# Patient Record
Sex: MALE | Race: BLACK OR AFRICAN AMERICAN | NOT HISPANIC OR LATINO | Employment: OTHER | URBAN - METROPOLITAN AREA
[De-identification: names, ages, dates, MRNs, and addresses within clinical notes are randomized per-mention and may not be internally consistent; named-entity substitution may affect disease eponyms.]

---

## 2018-02-09 ENCOUNTER — PES CALL (OUTPATIENT)
Dept: ADMINISTRATIVE | Facility: CLINIC | Age: 63
End: 2018-02-09

## 2018-04-05 ENCOUNTER — OFFICE VISIT (OUTPATIENT)
Dept: INTERNAL MEDICINE | Facility: CLINIC | Age: 63
End: 2018-04-05
Payer: MEDICARE

## 2018-04-05 VITALS
OXYGEN SATURATION: 99 % | SYSTOLIC BLOOD PRESSURE: 122 MMHG | WEIGHT: 197.56 LBS | HEART RATE: 97 BPM | HEIGHT: 73 IN | BODY MASS INDEX: 26.18 KG/M2 | DIASTOLIC BLOOD PRESSURE: 92 MMHG

## 2018-04-05 DIAGNOSIS — Z00.00 HEALTHCARE MAINTENANCE: Primary | ICD-10-CM

## 2018-04-05 DIAGNOSIS — I63.10 CEREBROVASCULAR ACCIDENT (CVA) DUE TO EMBOLISM OF PRECEREBRAL ARTERY: ICD-10-CM

## 2018-04-05 DIAGNOSIS — Z86.73 H/O ISCHEMIC RIGHT MCA STROKE: ICD-10-CM

## 2018-04-05 DIAGNOSIS — Z12.5 SCREENING PSA (PROSTATE SPECIFIC ANTIGEN): ICD-10-CM

## 2018-04-05 DIAGNOSIS — E11.8 TYPE 2 DIABETES MELLITUS WITH COMPLICATION, WITH LONG-TERM CURRENT USE OF INSULIN: ICD-10-CM

## 2018-04-05 DIAGNOSIS — M85.9 LOW BONE DENSITY: ICD-10-CM

## 2018-04-05 DIAGNOSIS — Z76.89 ENCOUNTER TO ESTABLISH CARE: ICD-10-CM

## 2018-04-05 DIAGNOSIS — Z79.4 TYPE 2 DIABETES MELLITUS WITH COMPLICATION, WITH LONG-TERM CURRENT USE OF INSULIN: ICD-10-CM

## 2018-04-05 PROCEDURE — 3045F PR MOST RECENT HEMOGLOBIN A1C LEVEL 7.0-9.0%: CPT | Mod: CPTII,GC,S$GLB, | Performed by: STUDENT IN AN ORGANIZED HEALTH CARE EDUCATION/TRAINING PROGRAM

## 2018-04-05 PROCEDURE — 99999 PR PBB SHADOW E&M-EST. PATIENT-LVL IV: CPT | Mod: PBBFAC,GC,, | Performed by: STUDENT IN AN ORGANIZED HEALTH CARE EDUCATION/TRAINING PROGRAM

## 2018-04-05 PROCEDURE — 99205 OFFICE O/P NEW HI 60 MIN: CPT | Mod: GC,S$GLB,, | Performed by: STUDENT IN AN ORGANIZED HEALTH CARE EDUCATION/TRAINING PROGRAM

## 2018-04-05 RX ORDER — CLONIDINE HYDROCHLORIDE 0.1 MG/1
0.1 TABLET ORAL 3 TIMES DAILY
COMMUNITY
Start: 2018-03-20 | End: 2018-05-23 | Stop reason: SDUPTHER

## 2018-04-05 RX ORDER — WARFARIN 2.5 MG/1
TABLET ORAL
COMMUNITY
Start: 2018-01-07 | End: 2018-10-19 | Stop reason: SDUPTHER

## 2018-04-05 RX ORDER — AMANTADINE HYDROCHLORIDE 100 MG/1
100 CAPSULE, GELATIN COATED ORAL DAILY
COMMUNITY
Start: 2018-01-07 | End: 2018-05-10

## 2018-04-05 RX ORDER — BLOOD SUGAR DIAGNOSTIC
STRIP MISCELLANEOUS
COMMUNITY
Start: 2018-03-26 | End: 2018-12-24 | Stop reason: SDUPTHER

## 2018-04-05 RX ORDER — ALENDRONATE SODIUM 70 MG/1
TABLET ORAL
COMMUNITY
Start: 2018-02-27 | End: 2018-05-10

## 2018-04-05 RX ORDER — CARVEDILOL 3.12 MG/1
3.12 TABLET ORAL DAILY
COMMUNITY
Start: 2018-03-19 | End: 2018-06-29 | Stop reason: SDUPTHER

## 2018-04-05 RX ORDER — ERGOCALCIFEROL 1.25 MG/1
50000 CAPSULE ORAL WEEKLY
Refills: 0 | COMMUNITY
Start: 2018-03-30 | End: 2018-06-29 | Stop reason: SDUPTHER

## 2018-04-05 RX ORDER — NAPROXEN SODIUM 220 MG/1
81 TABLET, FILM COATED ORAL DAILY
Refills: 0
Start: 2018-04-05 | End: 2018-07-19

## 2018-04-05 RX ORDER — SIMVASTATIN 10 MG/1
10 TABLET, FILM COATED ORAL NIGHTLY
Qty: 90 TABLET | Refills: 3
Start: 2018-04-05 | End: 2018-05-10

## 2018-04-05 RX ORDER — AMLODIPINE BESYLATE 10 MG/1
10 TABLET ORAL DAILY
COMMUNITY
Start: 2018-03-19 | End: 2018-06-29 | Stop reason: SDUPTHER

## 2018-04-05 RX ORDER — AMMONIUM LACTATE 12 G/100G
1 CREAM TOPICAL 2 TIMES DAILY
Refills: 0 | COMMUNITY
Start: 2018-01-31 | End: 2023-03-23

## 2018-04-05 RX ORDER — INSULIN ASPART 100 [IU]/ML
6 INJECTION, SOLUTION INTRAVENOUS; SUBCUTANEOUS
COMMUNITY
Start: 2018-01-19 | End: 2018-06-29 | Stop reason: SDUPTHER

## 2018-04-05 RX ORDER — LISINOPRIL 20 MG/1
20 TABLET ORAL DAILY
COMMUNITY
Start: 2018-03-19 | End: 2018-06-29 | Stop reason: SDUPTHER

## 2018-04-05 RX ORDER — INSULIN DETEMIR 100 [IU]/ML
50 INJECTION, SOLUTION SUBCUTANEOUS NIGHTLY
COMMUNITY
Start: 2018-03-26 | End: 2018-06-29 | Stop reason: SDUPTHER

## 2018-04-05 RX ORDER — WARFARIN SODIUM 5 MG/1
TABLET ORAL
COMMUNITY
Start: 2018-03-17 | End: 2018-10-19 | Stop reason: SDUPTHER

## 2018-04-05 NOTE — PROGRESS NOTES
INTERNAL MEDICINE RESIDENT CLINIC  CLINIC NOTE    Patient Name: Kamlesh Diane  YOB: 1955    PRESENTING HISTORY       History of Present Illness:  Mr. Kamlesh Diane is a 62 y.o. male w/ significant PMHx of stroke in 2010 (see discharge summary in Epic for more details) Multiple acute CVA including right MCA, bilateral occipital lobe, and   bilateral cerebellar infarcts. Pt underwent vessel bx which was negative for vasculitis. Since then he was discharged to [facility]; today he comes to clinic to re-establish care. Prior to this he was seeing Dr. Felix Nunez at [facility] and says that he recently had a PSA that was elevated and was told that he should urgently see urology; however, he was notified that his insurance was not covered and that he also was not covered for primary care at [facility], so he has come here to establish care.    He says that he has generally been well and lives alone, gets around with a walker. Since his initial stroke he says that he had a subsequent stroke which resulted in R eye vision loss; he went to [facility] for this in 2011 and was put on warfarin. No hx of AF. Additionally on ASA 81 mg + Simvastatin 20 mg daily.    Pt also has DM2 on insulin for approximately 25 years. Takes novolog 6U TID + levemir 50U qHS. Pt's BGL logbook, which he brought with him to clinic, shows that his sugars range from 110s-130s fasting to 180s at noon. Denies hypoglycemic episode.    Pt is hypertensive, well-controlled (logbook shows 110s systolic) on amlodipine 10, clonidine 0.1 TID, coreg 3.125 BID, and lisinopril 20. Denies hx heart failure, CAD, or MI, no recent chest pain. No new strokes or changes in behavior / neurologic function since 2011.    Pt also takes fosamax 70 mg daily and calcium 50KU weekly; unsure if he has osteoporosis or osteopenia. Also on amantadine, unsure of the reason.    Review of Systems   Constitutional: Negative for chills, fever and weight loss.   Eyes:         R eye blindness, old.   Respiratory: Negative for cough.    Cardiovascular: Negative for chest pain.   Gastrointestinal: Negative for nausea and vomiting.   Genitourinary: Negative for dysuria and hematuria.   Musculoskeletal: Negative for falls, joint pain and myalgias.   Skin: Negative for itching and rash.   Neurological: Negative for dizziness and headaches.   Endo/Heme/Allergies: Does not bruise/bleed easily.         PAST HISTORY:   No past medical history on file.    No past surgical history on file.    No family history on file.    Social History     Social History    Marital status: Single     Spouse name: N/A    Number of children: N/A    Years of education: N/A     Social History Main Topics    Smoking status: Never Smoker    Smokeless tobacco: None    Alcohol use None    Drug use: Unknown    Sexual activity: Not Asked     Other Topics Concern    None     Social History Narrative    None       MEDICATIONS & ALLERGIES:     No current outpatient prescriptions on file prior to visit.     No current facility-administered medications on file prior to visit.        Current Outpatient Prescriptions:     ACCU-CHEK JUSTIN PLUS TEST STRP Strp, , Disp: , Rfl:     alendronate (FOSAMAX) 70 MG tablet, , Disp: , Rfl:     amantadine HCl (SYMMETREL) 100 mg capsule, Take 100 mg by mouth once daily., Disp: , Rfl:     amLODIPine (NORVASC) 10 MG tablet, Take 10 mg by mouth once daily., Disp: , Rfl:     ammonium lactate 12 % Crea, Apply 1 application topically 2 (two) times daily. Apply to affected area, Disp: , Rfl: 0    aspirin 81 MG Chew, Take 1 tablet (81 mg total) by mouth once daily., Disp: , Rfl: 0    carvedilol (COREG) 3.125 MG tablet, Take 3.125 mg by mouth once daily., Disp: , Rfl:     cloNIDine (CATAPRES) 0.1 MG tablet, Take 0.1 mg by mouth 3 (three) times daily., Disp: , Rfl:     LEVEMIR FLEXTOUCH U-100 INSULN 100 unit/mL (3 mL) InPn pen, Inject 50 Units/mL into the skin every evening., Disp: ,  "Rfl:     lisinopril (PRINIVIL,ZESTRIL) 20 MG tablet, Take 20 mg by mouth once daily., Disp: , Rfl:     NOVOLOG FLEXPEN U-100 INSULIN 100 unit/mL InPn pen, Inject 6 Units into the skin 3 (three) times daily with meals. If noon blood sugar is greater than 150 mg/dL, use 8 units instead for the noon dose.., Disp: , Rfl:     simvastatin (ZOCOR) 10 MG tablet, Take 1 tablet (10 mg total) by mouth every evening., Disp: 90 tablet, Rfl: 3    VITAMIN D2 50,000 unit capsule, Take 50,000 Units by mouth once a week., Disp: , Rfl: 0    warfarin (COUMADIN) 2.5 MG tablet, every Monday and Friday., Disp: , Rfl:     warfarin (COUMADIN) 5 MG tablet, every Tuesday. Wednesday, Thursday, Saturday, Sunday., Disp: , Rfl:     Review of patient's allergies indicates:  No Known Allergies    OBJECTIVE:   Vital Signs:  Vitals:    04/05/18 1543   BP: (!) 122/92   Pulse: 97   SpO2: 99%   Weight: 89.6 kg (197 lb 8.5 oz)   Height: 6' 1" (1.854 m)       No results found for this or any previous visit (from the past 24 hour(s)).      Physical Exam   Constitutional: He appears well-developed.   HENT:   Head: Normocephalic and atraumatic.   Right Ear: External ear normal.   Left Ear: External ear normal.   Nose: Nose normal.   Eyes: EOM are normal. Pupils are equal, round, and reactive to light.   Neck: No tracheal deviation present. No thyromegaly present.   Cardiovascular: Normal rate and regular rhythm.    Murmur (2/6 systolic murmur at tricuspid window) heard.  Pulmonary/Chest: Effort normal and breath sounds normal.   Abdominal: Soft. There is no tenderness. No hernia.   Musculoskeletal: He exhibits no edema.   Neurological:   R eye blind, dysarthria (pt says this is chronic). Pt walks with walker.   Skin: No rash noted.   Psychiatric: He has a normal mood and affect. Judgment normal.   Vitals reviewed.        ASSESSMENT & PLAN:     Kamlesh was seen today for establish care.    Diagnoses and all orders for this visit:    Healthcare " maintenance    Encounter to establish care    Type 2 diabetes mellitus with complication, with long-term current use of insulin  -     Hemoglobin A1c; Future    Screening PSA (prostate specific antigen)  -     PSA, Screening; Future    Other orders  -     simvastatin (ZOCOR) 10 MG tablet; Take 1 tablet (10 mg total) by mouth every evening.  -     aspirin 81 MG Chew; Take 1 tablet (81 mg total) by mouth once daily.      Plan:  Continue secondary prevention for CVA, warfarin for the time being.  - Plan to consult / refer to neurology to ask duration of warfarin therapy after details of initial indication found via records  - Obtain medical records from Dr. Nunez's office  - Draw PSA, if elevated refer to Urology  - A1c, keep current DM regimen  - Keep current BP regimen    Return in 5-6W to discuss labs, diabetic maintenance e.g. eye and foot exam, get old records.    Sheri Brooke MD  Internal Medicine PGY-1  [phone]

## 2018-04-12 NOTE — PROGRESS NOTES
Teaching Statement  I have personally taken the history and examined this patient and agree with the resident's history, exam and assessment and plan as stated above.  Boo Haq MD

## 2018-05-04 ENCOUNTER — TELEPHONE (OUTPATIENT)
Dept: INTERNAL MEDICINE | Facility: CLINIC | Age: 63
End: 2018-05-04

## 2018-05-04 NOTE — TELEPHONE ENCOUNTER
----- Message from Boo Haq MD sent at 4/23/2018  8:33 AM CDT -----  Pt with elevated PSA.  Please place consult to urology.    Boo Haq

## 2018-05-09 ENCOUNTER — TELEPHONE (OUTPATIENT)
Dept: INTERNAL MEDICINE | Facility: CLINIC | Age: 63
End: 2018-05-09

## 2018-05-09 DIAGNOSIS — R97.20 ELEVATED PSA: Primary | ICD-10-CM

## 2018-05-10 ENCOUNTER — OFFICE VISIT (OUTPATIENT)
Dept: INTERNAL MEDICINE | Facility: CLINIC | Age: 63
End: 2018-05-10
Payer: MEDICARE

## 2018-05-10 ENCOUNTER — ANTI-COAG VISIT (OUTPATIENT)
Dept: CARDIOLOGY | Facility: CLINIC | Age: 63
End: 2018-05-10

## 2018-05-10 VITALS
BODY MASS INDEX: 26 KG/M2 | DIASTOLIC BLOOD PRESSURE: 84 MMHG | HEART RATE: 66 BPM | WEIGHT: 196.19 LBS | HEIGHT: 73 IN | SYSTOLIC BLOOD PRESSURE: 114 MMHG

## 2018-05-10 DIAGNOSIS — Z79.4 TYPE 2 DIABETES MELLITUS WITH COMPLICATION, WITH LONG-TERM CURRENT USE OF INSULIN: ICD-10-CM

## 2018-05-10 DIAGNOSIS — Z79.01 CURRENT USE OF LONG TERM ANTICOAGULATION: ICD-10-CM

## 2018-05-10 DIAGNOSIS — I69.30 SEQUELAE OF CEREBRAL INFARCTION: Primary | ICD-10-CM

## 2018-05-10 DIAGNOSIS — Z79.01 LONG TERM (CURRENT) USE OF ANTICOAGULANTS: ICD-10-CM

## 2018-05-10 DIAGNOSIS — Z86.73 H/O: STROKE: ICD-10-CM

## 2018-05-10 DIAGNOSIS — E11.8 TYPE 2 DIABETES MELLITUS WITH COMPLICATION, WITH LONG-TERM CURRENT USE OF INSULIN: ICD-10-CM

## 2018-05-10 DIAGNOSIS — I63.10 CEREBROVASCULAR ACCIDENT (CVA) DUE TO EMBOLISM OF PRECEREBRAL ARTERY: ICD-10-CM

## 2018-05-10 PROCEDURE — 3045F PR MOST RECENT HEMOGLOBIN A1C LEVEL 7.0-9.0%: CPT | Mod: CPTII,S$GLB,, | Performed by: INTERNAL MEDICINE

## 2018-05-10 PROCEDURE — 3008F BODY MASS INDEX DOCD: CPT | Mod: CPTII,S$GLB,, | Performed by: INTERNAL MEDICINE

## 2018-05-10 PROCEDURE — 99999 PR PBB SHADOW E&M-EST. PATIENT-LVL III: CPT | Mod: PBBFAC,GC,, | Performed by: STUDENT IN AN ORGANIZED HEALTH CARE EDUCATION/TRAINING PROGRAM

## 2018-05-10 PROCEDURE — 99213 OFFICE O/P EST LOW 20 MIN: CPT | Mod: S$GLB,,, | Performed by: INTERNAL MEDICINE

## 2018-05-10 RX ORDER — ATORVASTATIN CALCIUM 40 MG/1
40 TABLET, FILM COATED ORAL DAILY
Qty: 90 TABLET | Refills: 3 | Status: SHIPPED | OUTPATIENT
Start: 2018-05-10 | End: 2019-03-12 | Stop reason: SDUPTHER

## 2018-05-10 NOTE — PROGRESS NOTES
INTERNAL MEDICINE RESIDENT CLINIC  CLINIC NOTE    Patient Name: Kamlesh Diane  YOB: 1955    PRESENTING HISTORY       History of Present Illness:  Mr. Kamlesh Diane is a 62 y.o. male w/ significant PMHx of stroke in 2010 (see discharge summary in Epic for more details) Multiple acute CVA including right MCA, bilateral occipital lobe, and   bilateral cerebellar infarcts. Pt underwent vessel bx which was negative for vasculitis. Since then he was discharged to [facility]; today he comes to clinic to f/u. Prior to this he was seeing Dr. Felix Nunez at [facility]; his insurance changed so he will follow at [facility].     He says that he has generally been well and lives alone, gets around with a walker. Since his initial stroke he says that he had a subsequent stroke which resulted in R eye vision loss; he went to [facility] for this in 2011 and was put on warfarin. No hx of AF; pt says that Dr. Nunez kept the warfarin on but was also not sure what the indication was.     Pt also has DM2 on insulin for approximately 25 years. Takes novolog 6U TID + levemir 50U qHS. Pt's BGL logbook, which he brought with him to clinic, shows that his sugars range from 110s-130s fasting to 180s at noon. Denies hypoglycemic episode. 2018 A1c 7.3%. He says that he has his eyes checked at [facility] yearly and will continue to get them checked at [facility]; he has gotten them checked this year. He sees podiatry at an outside hospital / clinic for his foot exams and for assistance with nails.     Pt is hypertensive, well-controlled (logbook shows 110s systolic) on amlodipine 10, clonidine 0.1 TID, coreg 3.125 BID, and lisinopril 20. Denies hx heart failure, CAD, or MI, no recent chest pain. No new strokes or changes in behavior / neurologic function since 2011.    Since his last visit with me his PSA was elevated at 6.3 Denies hematuria, dysuria, hesitancy, incomplete emptying, weight loss, night sweats, fevers, bone pain, or  recent falls/fractures.     Review of Systems   Constitutional: Negative for chills, fever and weight loss.   Eyes:        R eye blindness, old.   Respiratory: Negative for cough.    Cardiovascular: Negative for chest pain.   Gastrointestinal: Negative for nausea and vomiting.   Genitourinary: Negative for dysuria and hematuria.   Musculoskeletal: Negative for falls, joint pain and myalgias.   Skin: Negative for itching and rash.   Neurological: Negative for dizziness and headaches.   Endo/Heme/Allergies: Does not bruise/bleed easily.         PAST HISTORY:   No past medical history on file.    No past surgical history on file.    No family history on file.    Social History     Social History    Marital status: Single     Spouse name: N/A    Number of children: N/A    Years of education: N/A     Social History Main Topics    Smoking status: Never Smoker    Smokeless tobacco: None    Alcohol use None    Drug use: Unknown    Sexual activity: Not Asked     Other Topics Concern    None     Social History Narrative    None       MEDICATIONS & ALLERGIES:     Current Outpatient Prescriptions on File Prior to Visit   Medication Sig    ACCU-CHEK JUSTIN PLUS TEST STRP Strp     amLODIPine (NORVASC) 10 MG tablet Take 10 mg by mouth once daily.    ammonium lactate 12 % Crea Apply 1 application topically 2 (two) times daily. Apply to affected area    aspirin 81 MG Chew Take 1 tablet (81 mg total) by mouth once daily.    carvedilol (COREG) 3.125 MG tablet Take 3.125 mg by mouth once daily.    cloNIDine (CATAPRES) 0.1 MG tablet Take 0.1 mg by mouth 3 (three) times daily.    LEVEMIR FLEXTOUCH U-100 INSULN 100 unit/mL (3 mL) InPn pen Inject 50 Units/mL into the skin every evening.    lisinopril (PRINIVIL,ZESTRIL) 20 MG tablet Take 20 mg by mouth once daily.    NOVOLOG FLEXPEN U-100 INSULIN 100 unit/mL InPn pen Inject 6 Units into the skin 3 (three) times daily with meals. If noon blood sugar is greater than 150  "mg/dL, use 8 units instead for the noon dose..    VITAMIN D2 50,000 unit capsule Take 50,000 Units by mouth once a week.    warfarin (COUMADIN) 2.5 MG tablet every Monday and Friday.    warfarin (COUMADIN) 5 MG tablet every Tuesday. Wednesday, Thursday, Saturday, Sunday.    [DISCONTINUED] alendronate (FOSAMAX) 70 MG tablet     [DISCONTINUED] amantadine HCl (SYMMETREL) 100 mg capsule Take 100 mg by mouth once daily.    [DISCONTINUED] simvastatin (ZOCOR) 10 MG tablet Take 1 tablet (10 mg total) by mouth every evening.     No current facility-administered medications on file prior to visit.        Review of patient's allergies indicates:  No Known Allergies    OBJECTIVE:   Vital Signs:  Vitals:    05/10/18 1523   BP: 114/84   Pulse: 66   Weight: 89 kg (196 lb 3.4 oz)   Height: 6' 1" (1.854 m)       No results found for this or any previous visit (from the past 24 hour(s)).      Physical Exam   Constitutional: He appears well-developed.   HENT:   Head: Normocephalic and atraumatic.   Right Ear: External ear normal.   Left Ear: External ear normal.   Nose: Nose normal.   Eyes: EOM are normal. Pupils are equal, round, and reactive to light.   Neck: No tracheal deviation present. No thyromegaly present.   Cardiovascular: Normal rate and regular rhythm.    Murmur (2/6 systolic murmur at tricuspid window) heard.  Pulmonary/Chest: Effort normal and breath sounds normal.   Abdominal: Soft. There is no tenderness. No hernia.   Musculoskeletal: He exhibits no edema.   Neurological:   R eye blind, dysarthria (pt says this is chronic). Pt walks with walker.   Skin: No rash noted.   Psychiatric: He has a normal mood and affect. Judgment normal.   Vitals reviewed.        ASSESSMENT & PLAN:     Kamlesh was seen today for follow-up.    Diagnoses and all orders for this visit:    H/O: stroke  -     atorvastatin (LIPITOR) 40 MG tablet; Take 1 tablet (40 mg total) by mouth once daily.  -     Lipid panel; Future    Current use of " long term anticoagulation  -     Protime-INR; Future  -     Ambulatory consult to Anticoagulation Monitoring    Sequelae of cerebral infarction   -     Lipid panel; Future    F/U with Dr. Ontiveros to establish care, long-term management of warfarin.    Signed release of information to get [facility] records.      Sheri Brooke MD  Internal Medicine PGY-1  [phone]

## 2018-05-10 NOTE — PROGRESS NOTES
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    Per his med card, he has Warfarin 2.5mg and 5mg tablets.  The pt went to the lab for an INR this afternoon.  LMFCB - 10:42am

## 2018-05-11 ENCOUNTER — OFFICE VISIT (OUTPATIENT)
Dept: UROLOGY | Facility: CLINIC | Age: 63
End: 2018-05-11
Payer: MEDICARE

## 2018-05-11 ENCOUNTER — LAB VISIT (OUTPATIENT)
Dept: LAB | Facility: HOSPITAL | Age: 63
End: 2018-05-11
Attending: UROLOGY
Payer: MEDICARE

## 2018-05-11 VITALS
WEIGHT: 196 LBS | DIASTOLIC BLOOD PRESSURE: 79 MMHG | HEIGHT: 73 IN | BODY MASS INDEX: 25.98 KG/M2 | SYSTOLIC BLOOD PRESSURE: 120 MMHG | HEART RATE: 98 BPM

## 2018-05-11 DIAGNOSIS — R97.20 ELEVATED PSA: ICD-10-CM

## 2018-05-11 DIAGNOSIS — R30.0 DYSURIA: ICD-10-CM

## 2018-05-11 DIAGNOSIS — E13.9 DIABETES 1.5, MANAGED AS TYPE 2: ICD-10-CM

## 2018-05-11 DIAGNOSIS — I63.10 CEREBROVASCULAR ACCIDENT (CVA) DUE TO EMBOLISM OF PRECEREBRAL ARTERY: ICD-10-CM

## 2018-05-11 DIAGNOSIS — E11.8 TYPE 2 DIABETES MELLITUS WITH COMPLICATION, WITH LONG-TERM CURRENT USE OF INSULIN: ICD-10-CM

## 2018-05-11 DIAGNOSIS — R97.20 ELEVATED PSA: Primary | ICD-10-CM

## 2018-05-11 DIAGNOSIS — Z79.4 TYPE 2 DIABETES MELLITUS WITH COMPLICATION, WITH LONG-TERM CURRENT USE OF INSULIN: ICD-10-CM

## 2018-05-11 DIAGNOSIS — Z79.01 LONG TERM (CURRENT) USE OF ANTICOAGULANTS: ICD-10-CM

## 2018-05-11 LAB — COMPLEXED PSA SERPL-MCNC: 7.2 NG/ML

## 2018-05-11 PROCEDURE — 3045F PR MOST RECENT HEMOGLOBIN A1C LEVEL 7.0-9.0%: CPT | Mod: CPTII,S$GLB,, | Performed by: UROLOGY

## 2018-05-11 PROCEDURE — 99999 PR PBB SHADOW E&M-EST. PATIENT-LVL III: CPT | Mod: PBBFAC,,,

## 2018-05-11 PROCEDURE — 99204 OFFICE O/P NEW MOD 45 MIN: CPT | Mod: S$GLB,,, | Performed by: UROLOGY

## 2018-05-11 PROCEDURE — 84153 ASSAY OF PSA TOTAL: CPT

## 2018-05-11 PROCEDURE — 36415 COLL VENOUS BLD VENIPUNCTURE: CPT

## 2018-05-11 PROCEDURE — 3008F BODY MASS INDEX DOCD: CPT | Mod: CPTII,S$GLB,, | Performed by: UROLOGY

## 2018-05-11 RX ORDER — CEFTRIAXONE 1 G/1
1 INJECTION, POWDER, FOR SOLUTION INTRAMUSCULAR; INTRAVENOUS ONCE
Status: CANCELLED | OUTPATIENT
Start: 2018-05-11 | End: 2018-05-11

## 2018-05-11 RX ORDER — LIDOCAINE HYDROCHLORIDE 10 MG/ML
10 INJECTION INFILTRATION; PERINEURAL ONCE
Status: CANCELLED | OUTPATIENT
Start: 2018-05-11 | End: 2018-05-11

## 2018-05-11 RX ORDER — LIDOCAINE HYDROCHLORIDE 20 MG/ML
JELLY TOPICAL ONCE
Status: CANCELLED | OUTPATIENT
Start: 2018-05-11 | End: 2018-05-11

## 2018-05-11 NOTE — PROGRESS NOTES
"Urology - [facility]  Resident Clinic  Staff - Dr. Rodríguez    SUBJECTIVE:     Chief Complaint: patient referred by Sheri Brooke MD for elevated PSA    History of Present Illness:  Kamlesh Diane is a 62 y.o. male with history of DMII (on insulin), HTN, CVA (on coumadin) who was referred by his PCP for elevated PSA. His PSA one month ago was 6.2. He has no family history of prostate cancer. He does not complain of lower urinary tract symptoms. Denies hematuria, dysuria, nocturia, incomplete emptying, weak FOS, hesitancy, or straining to void. Denies recent weight loss or bone pain. No fevers, abdominal or flank pain.     He came today with his brother. Used to work at [employer].    He uses a walked to get around. Says he broke his right hip "a while back" and has trouble getting around.     Review of patient's allergies indicates:   No Known Allergies    Past Medical History:   Diagnosis Date    CVA (cerebral vascular accident)     Diabetes     Elevated PSA     Hypertension      No past surgical history on file.  No family history on file.  Social History   Substance Use Topics    Smoking status: Never Smoker    Smokeless tobacco: Not on file    Alcohol use Not on file        Review of Systems   Constitutional: Negative for activity change, appetite change, chills and fever.   HENT: Negative.    Eyes: Negative.    Respiratory: Negative for shortness of breath.    Cardiovascular: Negative for chest pain.   Gastrointestinal: Negative for abdominal distention, abdominal pain, nausea and vomiting.   Genitourinary: Negative.  Negative for difficulty urinating, dysuria, frequency, hematuria and urgency.   Musculoskeletal: Positive for arthralgias, back pain and gait problem.   Skin: Negative.    Neurological: Positive for tremors.   Psychiatric/Behavioral: Negative.        OBJECTIVE:       Estimated body mass index is 25.86 kg/m² as calculated from the following:    Height as of this " "encounter: 6' 1" (1.854 m).    Weight as of this encounter: 88.9 kg (196 lb).    Vital Signs (Most Recent)  Pulse: 98 (05/11/18 1504)  BP: 120/79 (05/11/18 1504)    Physical Exam   Constitutional: He is oriented to person, place, and time. No distress.   HENT:   Head: Normocephalic and atraumatic.   dysarthria   Eyes: EOM are normal. No scleral icterus.   Neck: Normal range of motion.   Cardiovascular: Normal rate and regular rhythm.  Exam reveals no gallop and no friction rub.    No murmur heard.  Pulmonary/Chest: Effort normal and breath sounds normal. No respiratory distress.   Abdominal: Soft. He exhibits no distension. There is no tenderness.   Genitourinary:   Genitourinary Comments:   Uncircumcised, no scrotal masses or tenderness, testicles normal bilaterally  ALEX 25g, no nodules   Musculoskeletal: Normal range of motion.   Lymphadenopathy:     He has no cervical adenopathy.   Neurological: He is alert and oriented to person, place, and time.   Unsteady gait   Skin: Skin is warm and dry. No rash noted.   Psychiatric: Affect and judgment normal.       Lab Results   Component Value Date    PSA 6.2 (H) 04/05/2018       BMP  Lab Results   Component Value Date     05/10/2018    K 4.9 05/10/2018     05/10/2018    CO2 27 05/10/2018    BUN 20 05/10/2018    CREATININE 2.0 (H) 05/10/2018    CALCIUM 9.5 05/10/2018    ANIONGAP 6 (L) 05/10/2018    ESTGFRAFRICA 40.2 (A) 05/10/2018    EGFRNONAA 34.7 (A) 05/10/2018       Lab Results   Component Value Date    WBC 11.40 (H) 09/27/2010    HGB 11.2 (L) 09/27/2010    HCT 33.1 (L) 09/27/2010    MCV 89.7 09/27/2010     09/27/2010       ASSESSMENT/PLAN:     Kamlesh was seen today for elevated psa.    Diagnoses and all orders for this visit:    Elevated PSA  -     Prostate Specific Antigen, Diagnostic; Future  -     Tissue Specimen To Pathology, Urology; Standing  -     Transrectal ultrasound w/ biopsy; Future    Type 2 diabetes mellitus with complication, with " long-term current use of insulin    Cerebrovascular accident (CVA) due to embolism of precerebral artery    Long term (current) use of anticoagulants    Other orders  -     lidocaine HCL 2% jelly; Apply topically once.  -     lidocaine HCL 10 mg/ml (1%) injection 10 mL; 10 mLs by Infiltration route once.  -     sodium phosphates (FLEET ENEMA) 19-7 gram/118 mL Enem; Place 1 enema rectally once. Administer the am of the procedure  -     cefTRIAXone injection 1 g; Inject 1 g into the muscle once.      Will repeat PSA today. If remains elevated will proceed to biopsy.   Collect urine for culture.   Needs to be off of coumadin and aspirin for his biopsy.   Patient's kidney function not ideal for contrasted MRI study of his prostate. Will hold off at this time.     Owen Soria MD  Urology PGY-II    I would like to thank Sheri Brooke MD for their consult on this patient.

## 2018-05-12 NOTE — PROGRESS NOTES
I have reviewed the notes, assessments, and/or procedures performed by the resident, I concur with her/his documentation of Kamlesh Diane.     Tin Daly MD  Internal Medicine

## 2018-05-14 ENCOUNTER — LAB VISIT (OUTPATIENT)
Dept: LAB | Facility: HOSPITAL | Age: 63
End: 2018-05-14
Attending: UROLOGY
Payer: MEDICARE

## 2018-05-14 DIAGNOSIS — R30.0 DYSURIA: ICD-10-CM

## 2018-05-14 PROCEDURE — 87086 URINE CULTURE/COLONY COUNT: CPT

## 2018-05-15 ENCOUNTER — TELEPHONE (OUTPATIENT)
Dept: INTERNAL MEDICINE | Facility: CLINIC | Age: 63
End: 2018-05-15

## 2018-05-15 DIAGNOSIS — Z79.4 TYPE 2 DIABETES MELLITUS WITH COMPLICATION, WITH LONG-TERM CURRENT USE OF INSULIN: Primary | ICD-10-CM

## 2018-05-15 DIAGNOSIS — E11.8 TYPE 2 DIABETES MELLITUS WITH COMPLICATION, WITH LONG-TERM CURRENT USE OF INSULIN: Primary | ICD-10-CM

## 2018-05-15 NOTE — TELEPHONE ENCOUNTER
----- Message from Sheri Jeffrey MD sent at 5/15/2018  1:19 PM CDT -----  Can we please schedule pt to be seen this week? I called him and let him know that his creatinine is up from our last labs (7 years ago), I'd like to get some repeat labs to find out why.    ----- Message -----  From: Nancy Mancera LPN  Sent: 5/14/2018  10:07 AM  To: Boo Haq MD, Sheri Jeffrey MD    He was seen in urology on Friday , do you still need him to come in  And dr jeffrey contact pt  ----- Message -----  From: Boo Haq MD  Sent: 5/11/2018  10:38 AM  To: Nancy Mancera LPN, Sheri Jeffrey MD    Please get patient back in.  Creatinine up.  Creatinine       Date                     Value               Ref Range           Status                05/10/2018               2.0 (H)             0.5 - 1.4 mg/dL     Final                 09/28/2010               0.8                 0.5 - 1.4 mg/dl     Final            ----------  Needs to be seen today.    Boo Haq

## 2018-05-15 NOTE — TELEPHONE ENCOUNTER
----- Message from Erum Mata sent at 5/15/2018  1:46 PM CDT -----      ----- Message -----  From: Eneida Kenny, PharmD  Sent: 5/15/2018   1:37 PM  To: Tin Daly MD, [facility] Staff    Good Afternoon Dr. Daly,    Mr. Diane is new to us at the [facility], as we have just recently taken over his monitoring.  We were informed that he will be scheduling a prostate biopsy in roughly 4 weeks and will need to hold coumadin x 5 days prior to this procedure.  It appears that he has a hx of multiple right ischemic MCAs in 2010.  Would you prefer that he be bridged with lovenox while he holds for this procedure?    Please advise and thanks,  Eneida

## 2018-05-16 LAB — BACTERIA UR CULT: NORMAL

## 2018-05-17 ENCOUNTER — DOCUMENTATION ONLY (OUTPATIENT)
Dept: UROLOGY | Facility: HOSPITAL | Age: 63
End: 2018-05-17

## 2018-05-17 NOTE — PROGRESS NOTES
Called Mr. Diane about his biopsy date and time (6/6 at 8:30am). He has an appointment with Cardiology on 5/23 to discuss coming off of his coumadin and bridging with lovenox before his biopsy. Instructed him to complete Fleet's enema the morning of his procedure. He and his brother expressed understanding.       Owen Soria MD

## 2018-05-18 ENCOUNTER — LAB VISIT (OUTPATIENT)
Dept: LAB | Facility: HOSPITAL | Age: 63
End: 2018-05-18
Payer: MEDICARE

## 2018-05-18 DIAGNOSIS — Z79.4 TYPE 2 DIABETES MELLITUS WITH COMPLICATION, WITH LONG-TERM CURRENT USE OF INSULIN: ICD-10-CM

## 2018-05-18 DIAGNOSIS — E11.8 TYPE 2 DIABETES MELLITUS WITH COMPLICATION, WITH LONG-TERM CURRENT USE OF INSULIN: ICD-10-CM

## 2018-05-18 LAB
ALBUMIN SERPL BCP-MCNC: 3.7 G/DL
ALP SERPL-CCNC: 91 U/L
ALT SERPL W/O P-5'-P-CCNC: 16 U/L
ANION GAP SERPL CALC-SCNC: 8 MMOL/L
ANION GAP SERPL CALC-SCNC: 8 MMOL/L
AST SERPL-CCNC: 14 U/L
BASOPHILS # BLD AUTO: 0.02 K/UL
BASOPHILS NFR BLD: 0.3 %
BILIRUB SERPL-MCNC: 0.5 MG/DL
BUN SERPL-MCNC: 13 MG/DL
BUN SERPL-MCNC: 13 MG/DL
CALCIUM SERPL-MCNC: 9 MG/DL
CALCIUM SERPL-MCNC: 9 MG/DL
CHLORIDE SERPL-SCNC: 111 MMOL/L
CHLORIDE SERPL-SCNC: 111 MMOL/L
CHOLEST SERPL-MCNC: 127 MG/DL
CHOLEST/HDLC SERPL: 3.6 {RATIO}
CO2 SERPL-SCNC: 24 MMOL/L
CO2 SERPL-SCNC: 24 MMOL/L
CREAT SERPL-MCNC: 1.5 MG/DL
CREAT SERPL-MCNC: 1.5 MG/DL
DIFFERENTIAL METHOD: ABNORMAL
EOSINOPHIL # BLD AUTO: 0.3 K/UL
EOSINOPHIL NFR BLD: 4.5 %
ERYTHROCYTE [DISTWIDTH] IN BLOOD BY AUTOMATED COUNT: 13.6 %
EST. GFR  (AFRICAN AMERICAN): 57 ML/MIN/1.73 M^2
EST. GFR  (AFRICAN AMERICAN): 57 ML/MIN/1.73 M^2
EST. GFR  (NON AFRICAN AMERICAN): 49 ML/MIN/1.73 M^2
EST. GFR  (NON AFRICAN AMERICAN): 49 ML/MIN/1.73 M^2
ESTIMATED AVG GLUCOSE: 157 MG/DL
GLUCOSE SERPL-MCNC: 74 MG/DL
GLUCOSE SERPL-MCNC: 74 MG/DL
HBA1C MFR BLD HPLC: 7.1 %
HCT VFR BLD AUTO: 39.7 %
HDLC SERPL-MCNC: 35 MG/DL
HDLC SERPL: 27.6 %
HGB BLD-MCNC: 13.1 G/DL
LDLC SERPL CALC-MCNC: 80.6 MG/DL
LYMPHOCYTES # BLD AUTO: 1.8 K/UL
LYMPHOCYTES NFR BLD: 27.6 %
MAGNESIUM SERPL-MCNC: 2 MG/DL
MCH RBC QN AUTO: 28.7 PG
MCHC RBC AUTO-ENTMCNC: 33 G/DL
MCV RBC AUTO: 87 FL
MONOCYTES # BLD AUTO: 0.7 K/UL
MONOCYTES NFR BLD: 10.2 %
NEUTROPHILS # BLD AUTO: 3.7 K/UL
NEUTROPHILS NFR BLD: 57.4 %
NONHDLC SERPL-MCNC: 92 MG/DL
PLATELET # BLD AUTO: 343 K/UL
PMV BLD AUTO: 10.1 FL
POTASSIUM SERPL-SCNC: 4 MMOL/L
POTASSIUM SERPL-SCNC: 4 MMOL/L
PROT SERPL-MCNC: 7.4 G/DL
RBC # BLD AUTO: 4.56 M/UL
SODIUM SERPL-SCNC: 143 MMOL/L
SODIUM SERPL-SCNC: 143 MMOL/L
TRIGL SERPL-MCNC: 57 MG/DL
WBC # BLD AUTO: 6.48 K/UL

## 2018-05-18 PROCEDURE — 80061 LIPID PANEL: CPT

## 2018-05-18 PROCEDURE — 83036 HEMOGLOBIN GLYCOSYLATED A1C: CPT

## 2018-05-18 PROCEDURE — 36415 COLL VENOUS BLD VENIPUNCTURE: CPT

## 2018-05-18 PROCEDURE — 80053 COMPREHEN METABOLIC PANEL: CPT

## 2018-05-18 PROCEDURE — 83735 ASSAY OF MAGNESIUM: CPT

## 2018-05-18 PROCEDURE — 85025 COMPLETE CBC W/AUTO DIFF WBC: CPT

## 2018-05-21 ENCOUNTER — TELEPHONE (OUTPATIENT)
Dept: INTERNAL MEDICINE | Facility: CLINIC | Age: 63
End: 2018-05-21

## 2018-05-23 ENCOUNTER — ANTI-COAG VISIT (OUTPATIENT)
Dept: CARDIOLOGY | Facility: CLINIC | Age: 63
End: 2018-05-23
Payer: MEDICARE

## 2018-05-23 DIAGNOSIS — Z79.01 LONG TERM (CURRENT) USE OF ANTICOAGULANTS: Primary | ICD-10-CM

## 2018-05-23 DIAGNOSIS — I63.10 CEREBROVASCULAR ACCIDENT (CVA) DUE TO EMBOLISM OF PRECEREBRAL ARTERY: ICD-10-CM

## 2018-05-23 LAB — INR PPP: 2.9 (ref 2–3)

## 2018-05-23 PROCEDURE — 85610 PROTHROMBIN TIME: CPT | Mod: QW,S$GLB,, | Performed by: INTERNAL MEDICINE

## 2018-05-23 NOTE — PROGRESS NOTES
Patient new to coumadin clinic but not new to coumadin. INR within therapeutic range today. A full discussion of the nature of anticoagulants has been carried out.  A benefit risk analysis has been presented to the patient, so that they understand the justification for choosing anticoagulation at this time. The need for frequent and regular monitoring, precise dosage adjustment and compliance is stressed.  Side effects of potential bleeding are discussed.  The patient should avoid any OTC items containing aspirin or ibuprofen, and should avoid great swings in general diet. Patient states that he drinks gin with cranberry juice once a week and eats reilly slaw once a week. Patient advised to be consistent with diet and ETOH consumption. Call if any signs of abnormal bleeding.  Next PT/INR test in 1 week telephone follow up thereafter with procedure instructions.

## 2018-05-24 RX ORDER — CLONIDINE HYDROCHLORIDE 0.1 MG/1
TABLET ORAL
Qty: 90 TABLET | Refills: 5 | Status: SHIPPED | OUTPATIENT
Start: 2018-05-24 | End: 2018-12-28 | Stop reason: SDUPTHER

## 2018-05-24 RX ORDER — AMANTADINE HYDROCHLORIDE 100 MG/1
CAPSULE, GELATIN COATED ORAL
Qty: 60 CAPSULE | Refills: 5 | Status: SHIPPED | OUTPATIENT
Start: 2018-05-24 | End: 2018-07-17 | Stop reason: SDUPTHER

## 2018-05-25 NOTE — PROGRESS NOTES
Pt seen by Ashley LOPEZ. I have reviewed her initial findings and agree with her assessment and plan

## 2018-05-30 ENCOUNTER — LAB VISIT (OUTPATIENT)
Dept: LAB | Facility: HOSPITAL | Age: 63
End: 2018-05-30
Payer: MEDICARE

## 2018-05-30 ENCOUNTER — ANTI-COAG VISIT (OUTPATIENT)
Dept: CARDIOLOGY | Facility: CLINIC | Age: 63
End: 2018-05-30

## 2018-05-30 DIAGNOSIS — I63.10 CEREBROVASCULAR ACCIDENT (CVA) DUE TO EMBOLISM OF PRECEREBRAL ARTERY: ICD-10-CM

## 2018-05-30 DIAGNOSIS — Z79.01 LONG TERM (CURRENT) USE OF ANTICOAGULANTS: ICD-10-CM

## 2018-05-30 LAB
INR PPP: 2.2
PROTHROMBIN TIME: 21.6 SEC

## 2018-05-30 PROCEDURE — 36415 COLL VENOUS BLD VENIPUNCTURE: CPT

## 2018-05-30 PROCEDURE — 85610 PROTHROMBIN TIME: CPT

## 2018-06-06 ENCOUNTER — HOSPITAL ENCOUNTER (OUTPATIENT)
Dept: UROLOGY | Facility: HOSPITAL | Age: 63
Discharge: HOME | End: 2018-06-06
Attending: UROLOGY
Payer: MEDICARE

## 2018-06-06 VITALS
RESPIRATION RATE: 18 BRPM | BODY MASS INDEX: 25.62 KG/M2 | WEIGHT: 193.31 LBS | TEMPERATURE: 99 F | HEIGHT: 73 IN | SYSTOLIC BLOOD PRESSURE: 123 MMHG | DIASTOLIC BLOOD PRESSURE: 77 MMHG | HEART RATE: 94 BPM

## 2018-06-06 DIAGNOSIS — R97.20 ELEVATED PSA: ICD-10-CM

## 2018-06-06 PROCEDURE — 55700 HC BIOPSY OF PROSTATE - NEEDLE OR PUNCH: CPT

## 2018-06-06 PROCEDURE — 88305 TISSUE EXAM BY PATHOLOGIST: CPT | Mod: 26,,, | Performed by: PATHOLOGY

## 2018-06-06 PROCEDURE — 76942 ECHO GUIDE FOR BIOPSY: CPT | Mod: 26,59,, | Performed by: UROLOGY

## 2018-06-06 PROCEDURE — 76872 US TRANSRECTAL: CPT | Mod: 26,,, | Performed by: UROLOGY

## 2018-06-06 PROCEDURE — 76942 ECHO GUIDE FOR BIOPSY: CPT | Mod: 59

## 2018-06-06 PROCEDURE — 76872 US TRANSRECTAL: CPT

## 2018-06-06 PROCEDURE — 88305 TISSUE EXAM BY PATHOLOGIST: CPT | Performed by: PATHOLOGY

## 2018-06-06 PROCEDURE — 55700 PR BIOPSY OF PROSTATE,NEEDLE/PUNCH: CPT | Mod: ,,, | Performed by: UROLOGY

## 2018-06-06 RX ORDER — LIDOCAINE HYDROCHLORIDE 20 MG/ML
JELLY TOPICAL
Status: COMPLETED | OUTPATIENT
Start: 2018-06-06 | End: 2018-06-06

## 2018-06-06 RX ORDER — LIDOCAINE HYDROCHLORIDE 10 MG/ML
10 INJECTION INFILTRATION; PERINEURAL ONCE
Status: COMPLETED | OUTPATIENT
Start: 2018-06-06 | End: 2018-06-06

## 2018-06-06 RX ORDER — CEFTRIAXONE 1 G/1
1 INJECTION, POWDER, FOR SOLUTION INTRAMUSCULAR; INTRAVENOUS ONCE
Status: COMPLETED | OUTPATIENT
Start: 2018-06-06 | End: 2018-06-06

## 2018-06-06 RX ORDER — LIDOCAINE HYDROCHLORIDE 20 MG/ML
JELLY TOPICAL ONCE
Status: DISCONTINUED | OUTPATIENT
Start: 2018-06-06 | End: 2022-04-08

## 2018-06-06 RX ORDER — GENTAMICIN SULFATE 40 MG/ML
160 INJECTION, SOLUTION INTRAMUSCULAR; INTRAVENOUS
Status: DISCONTINUED | OUTPATIENT
Start: 2018-06-06 | End: 2018-06-06

## 2018-06-06 RX ADMIN — LIDOCAINE HYDROCHLORIDE 20 ML: 20 JELLY TOPICAL at 09:06

## 2018-06-06 RX ADMIN — CEFTRIAXONE 1 G: 1 INJECTION, POWDER, FOR SOLUTION INTRAMUSCULAR; INTRAVENOUS at 09:06

## 2018-06-06 RX ADMIN — LIDOCAINE HYDROCHLORIDE 10 ML: 10 INJECTION INFILTRATION; PERINEURAL at 09:06

## 2018-06-06 NOTE — H&P
"SUBJECTIVE:      Chief Complaint: patient referred by Sheri Brooke MD for elevated PSA     History of Present Illness:  Kamlesh Diane is a 62 y.o. male with history of DMII (on insulin), HTN, CVA (on coumadin) who was referred by his PCP for elevated PSA. His PSA one month ago was 6.2. He has no family history of prostate cancer. He does not complain of lower urinary tract symptoms. Denies hematuria, dysuria, nocturia, incomplete emptying, weak FOS, hesitancy, or straining to void. Denies recent weight loss or bone pain. No fevers, abdominal or flank pain.      He came today with his brother. Used to work at [employer].    He uses a walked to get around. Says he broke his right hip "a while back" and has trouble getting around.      Review of patient's allergies indicates:   No Known Allergies          Past Medical History:   Diagnosis Date    CVA (cerebral vascular accident)      Diabetes      Elevated PSA      Hypertension        No past surgical history on file.  No family history on file.       Social History   Substance Use Topics    Smoking status: Never Smoker    Smokeless tobacco: Not on file    Alcohol use Not on file         Review of Systems   Constitutional: Negative for activity change, appetite change, chills and fever.   HENT: Negative.    Eyes: Negative.    Respiratory: Negative for shortness of breath.    Cardiovascular: Negative for chest pain.   Gastrointestinal: Negative for abdominal distention, abdominal pain, nausea and vomiting.   Genitourinary: Negative.  Negative for difficulty urinating, dysuria, frequency, hematuria and urgency.   Musculoskeletal: Positive for arthralgias, back pain and gait problem.   Skin: Negative.    Neurological: Positive for tremors.   Psychiatric/Behavioral: Negative.          OBJECTIVE:         Estimated body mass index is 25.86 kg/m² as calculated from the following:    Height as of this encounter: 6' 1" (1.854 m).    Weight as of this " encounter: 88.9 kg (196 lb).     Vital Signs (Most Recent)  Pulse: 98 (05/11/18 1504)  BP: 120/79 (05/11/18 1504)     Physical Exam   Constitutional: He is oriented to person, place, and time. No distress.   HENT:   Head: Normocephalic and atraumatic.   dysarthria   Eyes: EOM are normal. No scleral icterus.   Neck: Normal range of motion.   Cardiovascular: Normal rate and regular rhythm.  Exam reveals no gallop and no friction rub.    No murmur heard.  Pulmonary/Chest: Effort normal and breath sounds normal. No respiratory distress.   Abdominal: Soft. He exhibits no distension. There is no tenderness.   Genitourinary:   Genitourinary Comments:   Uncircumcised, no scrotal masses or tenderness, testicles normal bilaterally  ALEX 25g, no nodules   Musculoskeletal: Normal range of motion.   Lymphadenopathy:     He has no cervical adenopathy.   Neurological: He is alert and oriented to person, place, and time.   Unsteady gait   Skin: Skin is warm and dry. No rash noted.   Psychiatric: Affect and judgment normal.               Lab Results   Component Value Date     PSA 6.2 (H) 04/05/2018         BMP        Lab Results   Component Value Date      05/10/2018     K 4.9 05/10/2018      05/10/2018     CO2 27 05/10/2018     BUN 20 05/10/2018     CREATININE 2.0 (H) 05/10/2018     CALCIUM 9.5 05/10/2018     ANIONGAP 6 (L) 05/10/2018     ESTGFRAFRICA 40.2 (A) 05/10/2018     EGFRNONAA 34.7 (A) 05/10/2018               Lab Results   Component Value Date     WBC 11.40 (H) 09/27/2010     HGB 11.2 (L) 09/27/2010     HCT 33.1 (L) 09/27/2010     MCV 89.7 09/27/2010      09/27/2010         ASSESSMENT/PLAN:      Kamlesh was seen today for elevated psa.     Diagnoses and all orders for this visit:     Elevated PSA  -     Prostate Specific Antigen, Diagnostic; Future  -     Tissue Specimen To Pathology, Urology; Standing  -     Transrectal ultrasound w/ biopsy; Future     Type 2 diabetes mellitus with complication, with  long-term current use of insulin     Cerebrovascular accident (CVA) due to embolism of precerebral artery     Long term (current) use of anticoagulants     Other orders  -     lidocaine HCL 2% jelly; Apply topically once.  -     lidocaine HCL 10 mg/ml (1%) injection 10 mL; 10 mLs by Infiltration route once.  -     sodium phosphates (FLEET ENEMA) 19-7 gram/118 mL Enem; Place 1 enema rectally once. Administer the am of the procedure  -     cefTRIAXone injection 1 g; Inject 1 g into the muscle once.        Will repeat PSA today. If remains elevated will proceed to biopsy.   Collect urine for culture.   Needs to be off of coumadin and aspirin for his biopsy.   Patient's kidney function not ideal for contrasted MRI study of his prostate. Will hold off at this time.

## 2018-06-06 NOTE — PATIENT INSTRUCTIONS

## 2018-06-06 NOTE — PROCEDURES
Pre-procedure diagnosis: elevated psa  Post-procedure diagnosis: same    Procedure: Transrectal ultrasound guided prostate biopsy    Complications: none    Blood loss: minimal    Surgeon: Carey Rodríguez MD    Anesthesia: 10cc lidocaine jelly    TRUS size: 25cc, no median lobe. Left [place] peripheral area of concern.    Procedure: The risks and benefits of the procedure were explained to the patient and consent was obtained.  The patient laid in the lateral decubitus position.  10cc of lidocaine jelly was used for local analgesia in the rectum.  The ultrasound probe was advanced into the rectum. The prostate was visualized.  There was no median lobe.  10cc of 1% lidocaine without epi was used for a prostatic nerve block.  14 biopsies were then takes, 2 from the apex, mid and base from the right and left side and 1 each from the transition zones.    The patient tolerated the procedure well and was discharged home.  He will be called with the results when available.  Rocephin given today.  Resume coumadin per coumadin clinc

## 2018-06-11 ENCOUNTER — TELEPHONE (OUTPATIENT)
Dept: RADIATION ONCOLOGY | Facility: CLINIC | Age: 63
End: 2018-06-11

## 2018-06-11 ENCOUNTER — TELEPHONE (OUTPATIENT)
Dept: UROLOGY | Facility: CLINIC | Age: 63
End: 2018-06-11

## 2018-06-11 NOTE — TELEPHONE ENCOUNTER
----- Message from Carey Rodríugez MD sent at 6/11/2018  2:02 PM CDT -----  Patient and family informed of biopsy results. Told patient likely active surveillance would be recommended therapy. Will see that appt is scheduled with Dr. Mills and Dr. Muñoz to discuss.

## 2018-06-11 NOTE — TELEPHONE ENCOUNTER
----- Message from Judi Munroe sent at 6/11/2018  1:00 PM CDT -----  Patient Returning Call from [facility]    Who Left Message for Patient: Dr Rodríguez  Communication Preference: [phone]  Additional Information:

## 2018-06-12 ENCOUNTER — ANTI-COAG VISIT (OUTPATIENT)
Dept: CARDIOLOGY | Facility: CLINIC | Age: 63
End: 2018-06-12
Payer: MEDICARE

## 2018-06-12 DIAGNOSIS — I63.10 CEREBROVASCULAR ACCIDENT (CVA) DUE TO EMBOLISM OF PRECEREBRAL ARTERY: ICD-10-CM

## 2018-06-12 DIAGNOSIS — Z79.01 LONG TERM (CURRENT) USE OF ANTICOAGULANTS: Primary | ICD-10-CM

## 2018-06-12 LAB — INR PPP: 1.4 (ref 2–3)

## 2018-06-12 PROCEDURE — 85610 PROTHROMBIN TIME: CPT | Mod: QW,S$GLB,, | Performed by: INTERNAL MEDICINE

## 2018-06-12 NOTE — PROGRESS NOTES
Patient's brother called to reschedule 6/11 missed appointment, it was rescheduled to today-6/12, also reports patient had prostate biopsy 6/06 and held coumadin sev days, resumed -6/11

## 2018-06-12 NOTE — PROGRESS NOTES
Quick follow up from recent biopsy procedure on 6/6. INR low today. Patient reports no bleeding, bruising or other changes. Will boost today and increase weekly dose until follow up in 1 week for close monitoring. Advised patient to call with any changes or concerns.

## 2018-06-13 NOTE — PROGRESS NOTES
Pt seen by Elizabeth LOPEZ. I have reviewed her initial findings and agree with her assessment and plan

## 2018-06-19 ENCOUNTER — ANTI-COAG VISIT (OUTPATIENT)
Dept: CARDIOLOGY | Facility: CLINIC | Age: 63
End: 2018-06-19
Payer: MEDICARE

## 2018-06-19 DIAGNOSIS — Z79.01 LONG TERM (CURRENT) USE OF ANTICOAGULANTS: Primary | ICD-10-CM

## 2018-06-19 DIAGNOSIS — I63.10 CEREBROVASCULAR ACCIDENT (CVA) DUE TO EMBOLISM OF PRECEREBRAL ARTERY: ICD-10-CM

## 2018-06-19 LAB — INR PPP: 2.5 (ref 2–3)

## 2018-06-19 PROCEDURE — 85610 PROTHROMBIN TIME: CPT | Mod: QW,S$GLB,, | Performed by: INTERNAL MEDICINE

## 2018-06-19 NOTE — PROGRESS NOTES
Quick follow up from low INR on 6/12 and close monitoring for new increased dose. INR in therapeutic range. Patient reports no bleeding, bruising or other changes. Patient was stable on decreased dose. Will decrease weekly dose until follow up in 2 weeks. Advised patient to call with any changes or concerns.

## 2018-06-19 NOTE — PROGRESS NOTES
Patient seen by Elizabeth LOPEZ. I have reviewed her initial findings and agree with her assessment.  Care plan made together.

## 2018-06-20 ENCOUNTER — INITIAL CONSULT (OUTPATIENT)
Dept: RADIATION ONCOLOGY | Facility: CLINIC | Age: 63
End: 2018-06-20
Attending: RADIOLOGY
Payer: MEDICARE

## 2018-06-20 VITALS
BODY MASS INDEX: 24.44 KG/M2 | SYSTOLIC BLOOD PRESSURE: 119 MMHG | HEIGHT: 73 IN | HEART RATE: 94 BPM | RESPIRATION RATE: 16 BRPM | WEIGHT: 184.38 LBS | DIASTOLIC BLOOD PRESSURE: 78 MMHG

## 2018-06-20 DIAGNOSIS — C61 CANCER OF PROSTATE WITH LOW RECURRENCE RISK (STAGE T1-2A AND GLEASON < 7 AND PSA < 10): Primary | ICD-10-CM

## 2018-06-20 PROCEDURE — 99203 OFFICE O/P NEW LOW 30 MIN: CPT | Mod: S$GLB,,, | Performed by: RADIOLOGY

## 2018-06-20 PROCEDURE — 99999 PR PBB SHADOW E&M-EST. PATIENT-LVL III: CPT | Mod: PBBFAC,,, | Performed by: RADIOLOGY

## 2018-06-20 PROCEDURE — 3008F BODY MASS INDEX DOCD: CPT | Mod: CPTII,S$GLB,, | Performed by: RADIOLOGY

## 2018-06-20 PROCEDURE — 99499 UNLISTED E&M SERVICE: CPT | Mod: S$PBB,,, | Performed by: RADIOLOGY

## 2018-06-20 RX ORDER — PEN NEEDLE, DIABETIC 31 GX5/16"
NEEDLE, DISPOSABLE MISCELLANEOUS
COMMUNITY
Start: 2018-03-19 | End: 2019-10-09 | Stop reason: SDUPTHER

## 2018-06-20 RX ORDER — LANCETS
EACH MISCELLANEOUS
COMMUNITY
Start: 2018-05-29 | End: 2019-04-17 | Stop reason: SDUPTHER

## 2018-06-20 NOTE — PROGRESS NOTES
HISTORY OF PRESENT ILLNESS:   This patient presents for discussion of treatment options for a recently diagnosed prostate cancer.      Mr. Diane was recently referred to Dr. Rodríguez for evaluation of an elevated screening PSA of 4.2 ng/ml drawn in April of this year.  The patient denied any LUTS or family history of prostate cancer.  ALEX revealed a 25 gm prostate without palpable nodules or induration.  Repeat PSA on 5/11/18 remained elevated at 7.2 ng/ml.  On 6/6/18 the patient was taken for TRUS and biopsy of his prostate.  The prostate measured 25 cc.  There was suspicious area in the Lt. apex.  Biopsies from the Lt. mid gland, Lt. base, Lt. transition zone and Rt. transition zone returned positive for adenocarcinoma, [place] score 6 (3+3).  The patient presents with his brother to discuss possible treatment options.  His PMHx is significant for CVA some 10 years ago with residual Rt. sided weakness.  He currently ambulates with the aid of a walker.   Also with insulin dependent diabetes and chronic anticoagulation with coumadin.  Today, the patient states he feels well.  No  complains.      REVIEW OF SYSTEMS:   Review of Systems   Constitutional: Negative for chills, diaphoresis, fever and weight loss.   Respiratory: Negative for hemoptysis and shortness of breath.    Cardiovascular: Negative for chest pain and palpitations.   Gastrointestinal: Negative for abdominal pain, constipation and diarrhea.   Genitourinary: Negative for frequency, hematuria and urgency.   Neurological: Positive for speech change, focal weakness and headaches.         PAST MEDICAL HISTORY:  Past Medical History:   Diagnosis Date    CVA (cerebral vascular accident)     Diabetes     Elevated PSA     Hypertension     Prostate cancer        PAST SURGICAL HISTORY:  History reviewed. No pertinent surgical history.    ALLERGIES:   Review of patient's allergies indicates:  No Known Allergies    MEDICATIONS:  Current Outpatient  "Prescriptions   Medication Sig    ACCU-CHEK JUSTIN PLUS TEST STRP Strp     ACCU-CHEK SOFTCLIX LANCETS Misc     amantadine HCl (SYMMETREL) 100 mg capsule take 1 capsule by mouth twice a day    amLODIPine (NORVASC) 10 MG tablet Take 10 mg by mouth once daily.    ammonium lactate 12 % Crea Apply 1 application topically 2 (two) times daily. Apply to affected area    aspirin 81 MG Chew Take 1 tablet (81 mg total) by mouth once daily.    atorvastatin (LIPITOR) 40 MG tablet Take 1 tablet (40 mg total) by mouth once daily.    BD ULTRA-FINE SHORT PEN NEEDLE 31 gauge x 5/16" Ndle     carvedilol (COREG) 3.125 MG tablet Take 3.125 mg by mouth once daily.    cloNIDine (CATAPRES) 0.1 MG tablet take 1 tablet by mouth three times a day    LEVEMIR FLEXTOUCH U-100 INSULN 100 unit/mL (3 mL) InPn pen Inject 50 Units/mL into the skin every evening.    lisinopril (PRINIVIL,ZESTRIL) 20 MG tablet Take 20 mg by mouth once daily.    NOVOLOG FLEXPEN U-100 INSULIN 100 unit/mL InPn pen Inject 6 Units into the skin 3 (three) times daily with meals. If noon blood sugar is greater than 150 mg/dL, use 8 units instead for the noon dose..    VITAMIN D2 50,000 unit capsule Take 50,000 Units by mouth once a week.    warfarin (COUMADIN) 2.5 MG tablet every Monday and Friday.    warfarin (COUMADIN) 5 MG tablet every Tuesday. Wednesday, Thursday, Saturday, Sunday.     Current Facility-Administered Medications   Medication    lidocaine HCL 2% jelly       SOCIAL HISTORY:  Social History     Social History    Marital status: Single     Spouse name: N/A    Number of children: N/A    Years of education: N/A     Occupational History    Not on file.     Social History Main Topics    Smoking status: Never Smoker    Smokeless tobacco: Never Used    Alcohol use Yes    Drug use: Yes     Types: Marijuana    Sexual activity: Not Currently     Other Topics Concern    Not on file     Social History Narrative    No narrative on file " "      FAMILY HISTORY:  Family History   Problem Relation Age of Onset    Hypertension Father     Breast cancer Sister     Diabetes Brother     Hypertension Brother          PHYSICAL EXAMINATION:  Vitals:    18 1044   BP: 119/78   Pulse: 94   Resp: 16     Physical Exam   Constitutional: He is oriented to person, place, and time and well-developed, well-nourished, and in no distress.   Neurological: He is alert and oriented to person, place, and time.       ASSESSMENT/PLAN:  Stage I (cT1c, cN0, cM0, PSA: 7.2, Grade Group: 1) adenocarcinoma of the prostate      ECO    I had a long discussion with the patient and his brother.  We reviewed his diagnosis, stage, grade, risk group, and prognosis. We discussed the concept of low risk, moderate risk, and high risk disease.  I explained to the patient he is considered to have "low risk" prostate cancer.  We discussed the different treatment options including active surveillance, prostate brachytherapy, external beam treatment using IMRT /  IGRT techniques and robotic prostatectomy.We discussed the advantages, disadvantages, risks and benefits, as well as complications of each option. Regarding radiation therapy we discussed treatment planning, the different techniques, short and long term complications. These included radiation cystitis, radiation proctitis, and impotence. We discussed success, failure, and salvage therapeutic options.  We discussed surgical therapy including postoperative recuperation and recovery, and short and long term complications. We discussed the risks of incontinence, impotence, and recurrence. I answered questions and addressed concerns.  Given his co-morbidties, I did not recommend he consider surgery.  Suggested he consider active surveillance or definitive radiotherapy using IMRT / IGRT techniques.  The patient would like to proceed with active surveillance for now.  He understands we may recommend treatment in the future depending " on his PSA.  I will arrange for follow up visit with Dr. Rodríguez in 4 months with PSA. Thank you for allowing us to participate in the care of this patient.      I spent approximately 60 minutes reviewing the available records and evaluating the patient, out of which over 50% of the time was spent face to face with the patient in counseling and coordinating this patient's care.

## 2018-06-20 NOTE — LETTER
June 20, 2018      Carey Rodríguez MD  [address]  [facility] [address]           [facility]  [address]  [facility] [address]  Phone: [phone]          Patient: Kamlesh Diane   MR Number: [id number]   YOB: 1955   Date of Visit: 6/20/2018       Dear Dr. Carey Rodríguez:    Thank you for referring Kamlesh Diane to me for evaluation. Attached you will find relevant portions of my assessment and plan of care.    If you have questions, please do not hesitate to call me. I look forward to following Kamlesh Diane along with you.    Sincerely,    Lamont Muñoz Jr., MD    Enclosure  CC:  No Recipients    If you would like to receive this communication electronically, please contact [email or web address] or [phone] to request more information on [employer] access.    For providers and/or their staff who would like to refer a patient to [facility], please contact us through our one-stop-shop provider referral line, [facility], at [phone].    If you feel you have received this communication in error or would no longer like to receive these types of communications, please e-mail [email or web address]

## 2018-06-25 ENCOUNTER — TELEPHONE (OUTPATIENT)
Dept: UROLOGY | Facility: CLINIC | Age: 63
End: 2018-06-25

## 2018-06-25 DIAGNOSIS — C61 PROSTATE CANCER: Primary | ICD-10-CM

## 2018-06-27 ENCOUNTER — TELEPHONE (OUTPATIENT)
Dept: INTERNAL MEDICINE | Facility: CLINIC | Age: 63
End: 2018-06-27

## 2018-06-27 NOTE — TELEPHONE ENCOUNTER
----- Message from Felisha Kovacs sent at 6/27/2018 10:03 AM CDT -----  Contact: Salomon/[facility]/[phone]  Pt is calling to speak with someone in the office in regards to getting a MPI on Dr.Tameem Brooke to complete the insurance information to fill prescription amantadine HCl (SYMMETREL) 100 mg capsule. Please advise.      Thanks

## 2018-06-29 ENCOUNTER — OFFICE VISIT (OUTPATIENT)
Dept: INTERNAL MEDICINE | Facility: CLINIC | Age: 63
End: 2018-06-29
Payer: MEDICARE

## 2018-06-29 ENCOUNTER — TELEPHONE (OUTPATIENT)
Dept: INTERNAL MEDICINE | Facility: CLINIC | Age: 63
End: 2018-06-29

## 2018-06-29 VITALS
WEIGHT: 195.69 LBS | TEMPERATURE: 99 F | HEIGHT: 73 IN | SYSTOLIC BLOOD PRESSURE: 112 MMHG | HEART RATE: 101 BPM | DIASTOLIC BLOOD PRESSURE: 82 MMHG | BODY MASS INDEX: 25.93 KG/M2

## 2018-06-29 DIAGNOSIS — R47.01 BROCA'S APHASIA: ICD-10-CM

## 2018-06-29 DIAGNOSIS — Z79.01 CHRONIC ANTICOAGULATION: ICD-10-CM

## 2018-06-29 DIAGNOSIS — E11.9 DIABETES MELLITUS WITHOUT COMPLICATION: ICD-10-CM

## 2018-06-29 DIAGNOSIS — E55.9 VITAMIN D DEFICIENCY: ICD-10-CM

## 2018-06-29 DIAGNOSIS — Z79.4 CONTROLLED TYPE 2 DIABETES MELLITUS WITH COMPLICATION, WITH LONG-TERM CURRENT USE OF INSULIN: ICD-10-CM

## 2018-06-29 DIAGNOSIS — Z12.11 COLON CANCER SCREENING: ICD-10-CM

## 2018-06-29 DIAGNOSIS — Z11.59 NEED FOR HEPATITIS C SCREENING TEST: ICD-10-CM

## 2018-06-29 DIAGNOSIS — T17.908S ASPIRATION INTO AIRWAY, SEQUELA: ICD-10-CM

## 2018-06-29 DIAGNOSIS — I15.2 HYPERTENSION ASSOCIATED WITH DIABETES: ICD-10-CM

## 2018-06-29 DIAGNOSIS — I69.359 CVA, OLD, HEMIPARESIS: ICD-10-CM

## 2018-06-29 DIAGNOSIS — E11.59 HYPERTENSION ASSOCIATED WITH DIABETES: ICD-10-CM

## 2018-06-29 DIAGNOSIS — E78.5 HYPERLIPIDEMIA ASSOCIATED WITH TYPE 2 DIABETES MELLITUS: ICD-10-CM

## 2018-06-29 DIAGNOSIS — Z86.73 H/O: STROKE: ICD-10-CM

## 2018-06-29 DIAGNOSIS — C61 CANCER OF PROSTATE WITH LOW RECURRENCE RISK (STAGE T1-2A AND GLEASON < 7 AND PSA < 10): Primary | ICD-10-CM

## 2018-06-29 DIAGNOSIS — E11.69 HYPERLIPIDEMIA ASSOCIATED WITH TYPE 2 DIABETES MELLITUS: ICD-10-CM

## 2018-06-29 DIAGNOSIS — H54.61 VISION LOSS OF RIGHT EYE: ICD-10-CM

## 2018-06-29 DIAGNOSIS — E11.8 CONTROLLED TYPE 2 DIABETES MELLITUS WITH COMPLICATION, WITH LONG-TERM CURRENT USE OF INSULIN: ICD-10-CM

## 2018-06-29 PROCEDURE — 99499 UNLISTED E&M SERVICE: CPT | Mod: S$GLB,,, | Performed by: INTERNAL MEDICINE

## 2018-06-29 PROCEDURE — 3008F BODY MASS INDEX DOCD: CPT | Mod: CPTII,S$GLB,, | Performed by: INTERNAL MEDICINE

## 2018-06-29 PROCEDURE — 99999 PR PBB SHADOW E&M-EST. PATIENT-LVL V: CPT | Mod: PBBFAC,,, | Performed by: INTERNAL MEDICINE

## 2018-06-29 PROCEDURE — 3045F PR MOST RECENT HEMOGLOBIN A1C LEVEL 7.0-9.0%: CPT | Mod: CPTII,S$GLB,, | Performed by: INTERNAL MEDICINE

## 2018-06-29 PROCEDURE — 99215 OFFICE O/P EST HI 40 MIN: CPT | Mod: S$GLB,,, | Performed by: INTERNAL MEDICINE

## 2018-06-29 PROCEDURE — 3079F DIAST BP 80-89 MM HG: CPT | Mod: CPTII,S$GLB,, | Performed by: INTERNAL MEDICINE

## 2018-06-29 PROCEDURE — 3074F SYST BP LT 130 MM HG: CPT | Mod: CPTII,S$GLB,, | Performed by: INTERNAL MEDICINE

## 2018-06-29 RX ORDER — LISINOPRIL 20 MG/1
20 TABLET ORAL DAILY
Qty: 90 TABLET | Refills: 3 | Status: SHIPPED | OUTPATIENT
Start: 2018-06-29 | End: 2019-07-08

## 2018-06-29 RX ORDER — INSULIN DETEMIR 100 [IU]/ML
50 INJECTION, SOLUTION SUBCUTANEOUS NIGHTLY
Qty: 3 BOX | Refills: 3 | Status: SHIPPED | OUTPATIENT
Start: 2018-06-29 | End: 2018-12-20 | Stop reason: SDUPTHER

## 2018-06-29 RX ORDER — CARVEDILOL 3.12 MG/1
3.12 TABLET ORAL DAILY
Qty: 90 TABLET | Refills: 3 | Status: SHIPPED | OUTPATIENT
Start: 2018-06-29 | End: 2018-10-19 | Stop reason: SDUPTHER

## 2018-06-29 RX ORDER — INSULIN ASPART 100 [IU]/ML
6 INJECTION, SOLUTION INTRAVENOUS; SUBCUTANEOUS
Qty: 3 BOX | Refills: 3 | Status: SHIPPED | OUTPATIENT
Start: 2018-06-29 | End: 2018-07-17 | Stop reason: SDUPTHER

## 2018-06-29 RX ORDER — AMLODIPINE BESYLATE 10 MG/1
10 TABLET ORAL DAILY
Qty: 90 TABLET | Refills: 3 | Status: SHIPPED | OUTPATIENT
Start: 2018-06-29 | End: 2019-01-02 | Stop reason: SDUPTHER

## 2018-06-29 RX ORDER — ERGOCALCIFEROL 1.25 MG/1
50000 CAPSULE ORAL WEEKLY
Qty: 12 CAPSULE | Refills: 3 | Status: SHIPPED | OUTPATIENT
Start: 2018-06-29 | End: 2018-08-28 | Stop reason: SDUPTHER

## 2018-06-29 NOTE — PROGRESS NOTES
INTERNAL MEDICINE INITIAL VISIT NOTE      CHIEF COMPLAINT     EST CARE    HPI     Kamlesh Diane is a 63 y.o. AA male, accompanied by son Krish, who presents to est care. Previous PCP Dr. Nunez at [facility].     Prostate CA dx via biopsy 6/6/18 w/ Dr. Rodríguez  Rad onc - Dr. Muñoz 6/20/18; do not rec surgery due to comorbidity; plan for surveillance. F/u w/ Dr. Rodríguez in 4 mo w/ PSA.   No dysuria/hematuria/urinary frequency or urgency.     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   CTA head/neck 9/2010 - R MCA, L occipital and parietal and R cerebellum.  Placed on coumadin - unknown reason but possibly thinks CVA was embolic in nature? Follows w/ coumadin clinic.  Takes coumadin 5mg every day except for 2.5 on Mondays and Thursday currently. Last INR was 6/19/18.     Reports tremors in the R hands. On amantidine and that helps.   Occasionally would choke on food per son.    HLD - atorvastatin 40mg daily  LDL 80.6.     DM2 x 25 yrs. levemir 50 U qhs and Novolog 6-6-6  Checks glucose TID - fasting sugars usually <100 and the rest of the day usually 130s-150s. Last hypoglycemia was in Jan at 65 - uncertain what happened. Lowest was 80 6/4/18.   Yearly eye exam at [facility].   Outside podiatrist.  a1c 5/18/18 7.1    HTN - amlo 10, clonidine 0.1mg TID, coreg 3.125mg daily, lisinopril 20mg daily.  Brought BP log. Well controlled.    Vit D def - ergo 50k weekly.    Lives by self. Does not drive. Does not need assistance w/ ADLs per pt but does not cook or clean. Children helps and checks on pt. Children cooks and brings him food. Walks w/ rollator.   Son, Krish, living above pt currently.   Another son lives in [facility] [place], daughter, stays nearby.   Niece helps to give him his meds including insulin.    Past Medical History:  Past Medical History:   Diagnosis Date    CVA (cerebral vascular accident)     Diabetes     Elevated PSA      "Hypertension     Prostate cancer        Past Surgical History:  Past Surgical History:   Procedure Laterality Date    FRACTURE SURGERY      hips x 2 - last fall was 2 yrs ago.       Allergies:  Review of patient's allergies indicates:  No Known Allergies    Home Medications:  Prior to Admission medications    Medication Sig Start Date End Date Taking? Authorizing Provider   AMY JUSTIN PLUS TEST STRP Strp  3/26/18   Historical Provider, MD WOOTENU-MARIANNA SOFTCLIX LANCETS Misc  5/29/18   Historical Provider, MD   amantadine HCl (SYMMETREL) 100 mg capsule take 1 capsule by mouth twice a day 5/24/18   Sheri Brooke MD   amLODIPine (NORVASC) 10 MG tablet Take 10 mg by mouth once daily. 3/19/18   Historical Provider, MD   ammonium lactate 12 % Crea Apply 1 application topically 2 (two) times daily. Apply to affected area 1/31/18   Historical Provider, MD   aspirin 81 MG Chew Take 1 tablet (81 mg total) by mouth once daily. 4/5/18 4/5/19  Sheri Brooke MD   atorvastatin (LIPITOR) 40 MG tablet Take 1 tablet (40 mg total) by mouth once daily. 5/10/18 5/10/19  Sheri Brooke MD   BD ULTRA-FINE SHORT PEN NEEDLE 31 gauge x 5/16" Ndle  3/19/18   Historical Provider, MD   carvedilol (COREG) 3.125 MG tablet Take 3.125 mg by mouth once daily. 3/19/18   Historical Provider, MD   cloNIDine (CATAPRES) 0.1 MG tablet take 1 tablet by mouth three times a day 5/24/18   Sheri Brooke MD   LEVEMIR FLEXTOUCH U-100 INSULN 100 unit/mL (3 mL) InPn pen Inject 50 Units/mL into the skin every evening. 3/26/18   Historical Provider, MD   lisinopril (PRINIVIL,ZESTRIL) 20 MG tablet Take 20 mg by mouth once daily. 3/19/18   Historical Provider, MD   NOVOLOG FLEXPEN U-100 INSULIN 100 unit/mL InPn pen Inject 6 Units into the skin 3 (three) times daily with meals. If noon blood sugar is greater than 150 mg/dL, use 8 units instead for the noon dose.. 1/19/18   Historical Provider, MD   VITAMIN D2 50,000 unit capsule Take 50,000 Units by mouth once a " "week. 3/30/18   Historical Provider, MD   warfarin (COUMADIN) 2.5 MG tablet every Monday and Friday. 1/7/18   Historical Provider, MD   warfarin (COUMADIN) 5 MG tablet every Tuesday. Wednesday, Thursday, Saturday, Sunday. 3/17/18   Historical Provider, MD       Family History:  Family History   Problem Relation Age of Onset    Hypertension Father     Breast cancer Sister     Diabetes Brother     Hypertension Brother        Social History:  Social History   Substance Use Topics    Smoking status: Never Smoker    Smokeless tobacco: Never Used    Alcohol use Yes      Comment: a few shots once a week.        Review of Systems:  Review of Systems   Constitutional: Negative for appetite change, chills, fever and unexpected weight change.   HENT: Negative.    Eyes: Positive for visual disturbance (R eye vision loss of outer peripheral field).   Respiratory: Negative for cough, shortness of breath and wheezing.    Cardiovascular: Negative for chest pain, palpitations and leg swelling.   Gastrointestinal: Negative for abdominal pain, blood in stool, constipation, diarrhea, nausea and vomiting.   Genitourinary: Negative.    Musculoskeletal: Negative.    Skin: Negative for rash.   Neurological: Positive for weakness (RUE - chronic). Negative for dizziness, light-headedness, numbness and headaches.   Psychiatric/Behavioral: Negative for dysphoric mood and sleep disturbance. The patient is not nervous/anxious.      Health Maintainence:   Td - had it in the last 10 yrs.   Flu - yearly  C-SCOPE - done w/ Dr. Nunez.   Hep C screening - need.     PHYSICAL EXAM     /82 (BP Location: Left arm, Patient Position: Sitting, BP Method: Large (Manual))   Pulse 101   Temp 98.8 °F (37.1 °C)   Ht 6' 1" (1.854 m)   Wt 88.8 kg (195 lb 11.2 oz)   BMI 25.82 kg/m²     GEN - A+OX4, NAD   HEENT - L pupil round and reactive to light, EOMI, OP clear. MMM.   Neck - No thyromegaly or cervical LAD. No thyroid masses felt.  CV - RRR, " no m/r. No carotid bruit.   Chest - CTAB, no wheezing or rhonchi  Abd - S/NT/ND/+BS.   Ext - 1+BDP and radial pulses. No LE edema.   Neuro - EOMI, no nystagmus, eyebrow raise, facial sensation, hearing, m of mastication, smile, palatal raise, shoulder shrug, tongue protrusion symmetric and intact. 5/5 BUE and BLE strength. Difficulty w/ intentional movements w/ R hand. Sensation to light touch intact throughout. 0 DTRs. Antalgic gait - walks w/ walker; difficulty getting onto exam tabel. Broca's aphasia. Slurred speech (chronic)  MSK - No spinal tenderness to palpation.   Skin - No rash.    LABS     Previous labs reviewed.    ASSESSMENT/PLAN     Kamlesh Diane is a 63 y.o. male with  Kamlesh was seen today for establish care.    Diagnoses and all orders for this visit:    Cancer of prostate with low recurrence risk (stage T1-2a and [place] < 7 and PSA < 10) - seen Dr. Rodríguez and Dr. Muñoz. Opted for surveillance.     CVA, old, hemiparesis - cont current meds.   -     Ambulatory Referral to Ophthalmology  -     Ambulatory Referral to Cardiology  -     Ambulatory Referral to Neurology  -     Ambulatory Referral to Speech Therapy    Chronic anticoagulation - uncertain why he's on coumadin. Possibly due to multiple CVAs in 2010 imaging and they thought it was embolic in nature? INR within goal. Will refer to cardiology and neurology to see if need for coumadin.   -     Ambulatory Referral to Cardiology  -     Ambulatory Referral to Neurology    Controlled type 2 diabetes mellitus with complication, with long-term current use of insulin  -     Ambulatory Referral to Podiatry  -     NOVOLOG FLEXPEN U-100 INSULIN 100 unit/mL InPn pen; Inject 6 Units into the skin 3 (three) times daily with meals. If noon blood sugar is greater than 150 mg/dL, use 8 units instead for the noon dose..  -     LEVEMIR FLEXTOUCH U-100 INSULN 100 unit/mL (3 mL) InPn pen; Inject 50 Units into the skin every evening.    Vision loss of  right eye  -     Ambulatory Referral to Ophthalmology  -     Ambulatory Referral to Neurology    Hypertension associated with diabetes - Stable and controlled. Continue current medications. Usually coreg is BID but pt's BP controlled w/ daily so will go ahead and cont at daily dosage.  -     carvedilol (COREG) 3.125 MG tablet; Take 1 tablet (3.125 mg total) by mouth once daily.  -     lisinopril (PRINIVIL,ZESTRIL) 20 MG tablet; Take 1 tablet (20 mg total) by mouth once daily.  -     amLODIPine (NORVASC) 10 MG tablet; Take 1 tablet (10 mg total) by mouth once daily.    Hyperlipidemia associated with type 2 diabetes mellitus - cont atorva 40mg daily.     H/O: stroke - as above.    Vitamin D deficiency  -     VITAMIN D2 50,000 unit capsule; Take 1 capsule (50,000 Units total) by mouth once a week.  -     Vitamin D; Future; Expected date: 06/29/2018    Need for hepatitis C screening test  -     Hepatitis C antibody; Future; Expected date: 06/29/2018    Broca's aphasia  -     Ambulatory Referral to Speech Therapy    Aspiration into airway, sequela  -     Ambulatory Referral to Speech Therapy    Will get med release for Dr. Cheek, previous PCP at [facility]  45 minutes was spent on patient with over half the time was spent in coordination of care and/or counseling.  RTC in 4 months, sooner if needed and depending on labs.    Debora Ontiveros MD  Department of Internal Medicine - [facility]  7:01 AM

## 2018-06-29 NOTE — TELEPHONE ENCOUNTER
----- Message from Tiff Ríos sent at 6/29/2018  8:22 AM CDT -----  Contact: Mr Krish Diane/  brother [phone]  Patient in route will be about 10 minutes late.   Please call Mr Diane if any questions [phone]

## 2018-07-05 ENCOUNTER — LAB VISIT (OUTPATIENT)
Dept: LAB | Facility: HOSPITAL | Age: 63
End: 2018-07-05
Attending: INTERNAL MEDICINE
Payer: MEDICARE

## 2018-07-05 ENCOUNTER — TELEPHONE (OUTPATIENT)
Dept: INTERNAL MEDICINE | Facility: CLINIC | Age: 63
End: 2018-07-05

## 2018-07-05 DIAGNOSIS — C61 PROSTATE CANCER: ICD-10-CM

## 2018-07-05 DIAGNOSIS — E55.9 VITAMIN D DEFICIENCY: ICD-10-CM

## 2018-07-05 DIAGNOSIS — Z79.01 LONG TERM (CURRENT) USE OF ANTICOAGULANTS: ICD-10-CM

## 2018-07-05 DIAGNOSIS — I63.10 CEREBROVASCULAR ACCIDENT (CVA) DUE TO EMBOLISM OF PRECEREBRAL ARTERY: ICD-10-CM

## 2018-07-05 DIAGNOSIS — Z11.59 NEED FOR HEPATITIS C SCREENING TEST: ICD-10-CM

## 2018-07-05 LAB
25(OH)D3+25(OH)D2 SERPL-MCNC: 25 NG/ML
COMPLEXED PSA SERPL-MCNC: 5 NG/ML
HCV AB SERPL QL IA: NEGATIVE
INR PPP: 1.9
PROTHROMBIN TIME: 19.5 SEC

## 2018-07-05 PROCEDURE — 84153 ASSAY OF PSA TOTAL: CPT

## 2018-07-05 PROCEDURE — 85610 PROTHROMBIN TIME: CPT

## 2018-07-05 PROCEDURE — 86803 HEPATITIS C AB TEST: CPT

## 2018-07-05 PROCEDURE — 82306 VITAMIN D 25 HYDROXY: CPT

## 2018-07-05 PROCEDURE — 36415 COLL VENOUS BLD VENIPUNCTURE: CPT

## 2018-07-05 NOTE — TELEPHONE ENCOUNTER
Please call pt. Hepatitis C screening was negative. No exposure to hepatitis C.   Your vitamin D was slightly low - please continue supplement.

## 2018-07-05 NOTE — LETTER
[facility]  [address]  [facility] [address]  Phone: [phone]  Fax: [phone]   Dear Kamlesh Diane,        The office has made several attempts to contact you and your brother. We wanted to notify you that your Hepatitis C screening was negative. No exposure to hepatitis C. Your vitamin D was slightly low so please be sure to continue supplement.      Thank you

## 2018-07-06 ENCOUNTER — ANTI-COAG VISIT (OUTPATIENT)
Dept: CARDIOLOGY | Facility: CLINIC | Age: 63
End: 2018-07-06

## 2018-07-06 ENCOUNTER — TELEPHONE (OUTPATIENT)
Dept: UROLOGY | Facility: CLINIC | Age: 63
End: 2018-07-06

## 2018-07-06 DIAGNOSIS — R97.20 ELEVATED PSA: Primary | ICD-10-CM

## 2018-07-06 DIAGNOSIS — I63.10 CEREBROVASCULAR ACCIDENT (CVA) DUE TO EMBOLISM OF PRECEREBRAL ARTERY: ICD-10-CM

## 2018-07-06 DIAGNOSIS — Z79.01 LONG TERM (CURRENT) USE OF ANTICOAGULANTS: ICD-10-CM

## 2018-07-09 PROBLEM — Z00.00 HEALTHCARE MAINTENANCE: Status: RESOLVED | Noted: 2018-04-05 | Resolved: 2018-07-09

## 2018-07-10 ENCOUNTER — TELEPHONE (OUTPATIENT)
Dept: UROLOGY | Facility: CLINIC | Age: 63
End: 2018-07-10

## 2018-07-17 DIAGNOSIS — R25.1 TREMOR: ICD-10-CM

## 2018-07-17 DIAGNOSIS — Z79.4 CONTROLLED TYPE 2 DIABETES MELLITUS WITH COMPLICATION, WITH LONG-TERM CURRENT USE OF INSULIN: ICD-10-CM

## 2018-07-17 DIAGNOSIS — M89.9 BONE DISORDER: ICD-10-CM

## 2018-07-17 DIAGNOSIS — E11.8 CONTROLLED TYPE 2 DIABETES MELLITUS WITH COMPLICATION, WITH LONG-TERM CURRENT USE OF INSULIN: ICD-10-CM

## 2018-07-17 RX ORDER — AMANTADINE HYDROCHLORIDE 100 MG/1
100 CAPSULE, GELATIN COATED ORAL 2 TIMES DAILY
Qty: 180 CAPSULE | Refills: 1 | Status: SHIPPED | OUTPATIENT
Start: 2018-07-17 | End: 2018-12-07 | Stop reason: SDUPTHER

## 2018-07-17 RX ORDER — INSULIN ASPART 100 [IU]/ML
6 INJECTION, SOLUTION INTRAVENOUS; SUBCUTANEOUS
Qty: 3 BOX | Refills: 3 | Status: SHIPPED | OUTPATIENT
Start: 2018-07-17 | End: 2019-10-24

## 2018-07-17 RX ORDER — ALENDRONATE SODIUM 70 MG/1
70 TABLET ORAL
Qty: 12 TABLET | Refills: 1 | Status: SHIPPED | OUTPATIENT
Start: 2018-07-17 | End: 2019-03-12 | Stop reason: SDUPTHER

## 2018-07-17 NOTE — TELEPHONE ENCOUNTER
Alendronate, novolog and amantadine reordered.    Does patient know how long he has been on alendronate and when his last bone density was?

## 2018-07-17 NOTE — TELEPHONE ENCOUNTER
"----- Message from Deacasi Whitney sent at 7/17/2018  1:27 PM CDT -----  Contact: Mena/[facility]/[phone] fax#[phone]  RX request - refill or new RX.  Is this a refill or new RX:    RX name and strength:   #novolog mix 70/30 flex pen  #alondronate 70 mg tablet  #amantadine hci  Directions:   Is this a 30 day or 90 day RX:    Local pharmacy or mail order pharmacy:    Pharmacy name and phone # (DON'T enter "on file" or "in chart"): [facility] [address] [place] [address].   Comments:  Please call and advise. Thank you        "

## 2018-07-17 NOTE — LETTER
[facility]  [address]  [facility] [address]  Phone: [phone]  Fax: [phone]       Dear Kamlesh Diane,    The office have mad several attempts to contact patients brother Mr. Krish Diane. Patients medication was sent in to pharmacy; Alendronate, novolog and amantadine.     The office was calling to find out if the patient knows how long he has been on alendronate and when his last bone density was?  Please give us a call back with this information [phone]  thank you!

## 2018-07-19 ENCOUNTER — ANTI-COAG VISIT (OUTPATIENT)
Dept: CARDIOLOGY | Facility: CLINIC | Age: 63
End: 2018-07-19
Payer: MEDICARE

## 2018-07-19 ENCOUNTER — OFFICE VISIT (OUTPATIENT)
Dept: PODIATRY | Facility: CLINIC | Age: 63
End: 2018-07-19
Payer: MEDICARE

## 2018-07-19 ENCOUNTER — OFFICE VISIT (OUTPATIENT)
Dept: CARDIOLOGY | Facility: CLINIC | Age: 63
End: 2018-07-19
Payer: MEDICARE

## 2018-07-19 VITALS
WEIGHT: 196 LBS | HEART RATE: 100 BPM | HEIGHT: 73 IN | SYSTOLIC BLOOD PRESSURE: 112 MMHG | DIASTOLIC BLOOD PRESSURE: 72 MMHG | BODY MASS INDEX: 25.98 KG/M2

## 2018-07-19 VITALS
WEIGHT: 196.63 LBS | DIASTOLIC BLOOD PRESSURE: 72 MMHG | HEIGHT: 73 IN | HEART RATE: 100 BPM | BODY MASS INDEX: 26.06 KG/M2 | SYSTOLIC BLOOD PRESSURE: 112 MMHG

## 2018-07-19 DIAGNOSIS — E11.49 TYPE II DIABETES MELLITUS WITH NEUROLOGICAL MANIFESTATIONS: Primary | ICD-10-CM

## 2018-07-19 DIAGNOSIS — I73.9 PAD (PERIPHERAL ARTERY DISEASE): ICD-10-CM

## 2018-07-19 DIAGNOSIS — I63.10 CEREBROVASCULAR ACCIDENT (CVA) DUE TO EMBOLISM OF PRECEREBRAL ARTERY: Primary | ICD-10-CM

## 2018-07-19 DIAGNOSIS — I69.30 SEQUELAE OF CEREBRAL INFARCTION: ICD-10-CM

## 2018-07-19 DIAGNOSIS — C61 CANCER OF PROSTATE WITH LOW RECURRENCE RISK (STAGE T1-2A AND GLEASON < 7 AND PSA < 10): ICD-10-CM

## 2018-07-19 DIAGNOSIS — Z79.01 LONG TERM (CURRENT) USE OF ANTICOAGULANTS: Primary | ICD-10-CM

## 2018-07-19 DIAGNOSIS — Z79.01 LONG TERM (CURRENT) USE OF ANTICOAGULANTS: ICD-10-CM

## 2018-07-19 DIAGNOSIS — I63.10 CEREBROVASCULAR ACCIDENT (CVA) DUE TO EMBOLISM OF PRECEREBRAL ARTERY: ICD-10-CM

## 2018-07-19 DIAGNOSIS — B35.1 ONYCHOMYCOSIS DUE TO DERMATOPHYTE: ICD-10-CM

## 2018-07-19 LAB — INR PPP: 3 (ref 2–3)

## 2018-07-19 PROCEDURE — 3078F DIAST BP <80 MM HG: CPT | Mod: CPTII,S$GLB,, | Performed by: INTERNAL MEDICINE

## 2018-07-19 PROCEDURE — 3045F PR MOST RECENT HEMOGLOBIN A1C LEVEL 7.0-9.0%: CPT | Mod: CPTII,S$GLB,, | Performed by: PODIATRIST

## 2018-07-19 PROCEDURE — 17999 UNLISTD PX SKN MUC MEMB SUBQ: CPT | Mod: CSM,,, | Performed by: PODIATRIST

## 2018-07-19 PROCEDURE — 3008F BODY MASS INDEX DOCD: CPT | Mod: CPTII,S$GLB,, | Performed by: PODIATRIST

## 2018-07-19 PROCEDURE — 99202 OFFICE O/P NEW SF 15 MIN: CPT | Mod: S$GLB,,, | Performed by: PODIATRIST

## 2018-07-19 PROCEDURE — 93000 ELECTROCARDIOGRAM COMPLETE: CPT | Mod: S$GLB,,, | Performed by: INTERNAL MEDICINE

## 2018-07-19 PROCEDURE — 99204 OFFICE O/P NEW MOD 45 MIN: CPT | Mod: S$GLB,,, | Performed by: INTERNAL MEDICINE

## 2018-07-19 PROCEDURE — 99999 PR PBB SHADOW E&M-EST. PATIENT-LVL III: CPT | Mod: PBBFAC,,, | Performed by: PODIATRIST

## 2018-07-19 PROCEDURE — 99499 UNLISTED E&M SERVICE: CPT | Mod: S$GLB,,, | Performed by: INTERNAL MEDICINE

## 2018-07-19 PROCEDURE — 3074F SYST BP LT 130 MM HG: CPT | Mod: CPTII,S$GLB,, | Performed by: PODIATRIST

## 2018-07-19 PROCEDURE — 3078F DIAST BP <80 MM HG: CPT | Mod: CPTII,S$GLB,, | Performed by: PODIATRIST

## 2018-07-19 PROCEDURE — 99999 PR PBB SHADOW E&M-EST. PATIENT-LVL IV: CPT | Mod: PBBFAC,,, | Performed by: INTERNAL MEDICINE

## 2018-07-19 PROCEDURE — 3008F BODY MASS INDEX DOCD: CPT | Mod: CPTII,S$GLB,, | Performed by: INTERNAL MEDICINE

## 2018-07-19 PROCEDURE — 3074F SYST BP LT 130 MM HG: CPT | Mod: CPTII,S$GLB,, | Performed by: INTERNAL MEDICINE

## 2018-07-19 PROCEDURE — 85610 PROTHROMBIN TIME: CPT | Mod: QW,S$GLB,, | Performed by: INTERNAL MEDICINE
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
## 2018-07-19 NOTE — LETTER
July 19, 2018      Debora Ontiveros MD  [address]  [facility] [address]           [facility]  [address]  [facility] [address]  Phone: [phone]          Patient: Kamlesh Diane   MR Number: [id number]   YOB: 1955   Date of Visit: 7/19/2018       Dear Dr. Debora Ontiveros:    Thank you for referring Kamlesh Diane to me for evaluation. Attached you will find relevant portions of my assessment and plan of care.    If you have questions, please do not hesitate to call me. I look forward to following Kamlesh Diane along with you.    Sincerely,    Vik Ordaz, DPSRIRAM    Enclosure  CC:  No Recipients    If you would like to receive this communication electronically, please contact [email or web address] or [phone] to request more information on [employer] access.    For providers and/or their staff who would like to refer a patient to [facility], please contact us through our one-stop-shop provider referral line, Penny Howard, at [phone].    If you feel you have received this communication in error or would no longer like to receive these types of communications, please e-mail [email or web address]

## 2018-07-19 NOTE — PROGRESS NOTES
Subjective:      Patient ID: Kamlesh Diane is a 63 y.o. male.    Chief Complaint: Diabetes Mellitus (dr gaitan/ 06/29/2018) and Diabetic Foot Exam    Kamlesh is a 63 y.o. male who presents to the clinic upon referral from Dr. Gaitan  for evaluation and treatment of diabetic feet. Kamlesh has a past medical history of CVA (cerebral vascular accident); Diabetes; Elevated PSA; Hypertension; and Prostate cancer. Patient relates no major problem with feet. Only complaints today consist of long thick toenails both feet.  Gradual onset, worsening over past several weeks, aggravated by increased weight bearing, shoe gear, pressure.  Periodic debridement helps symptoms. .    PCP: Debora Gaitan MD    Date Last Seen by PCP:   Chief Complaint   Patient presents with    Diabetes Mellitus     dr gaitan/ 06/29/2018    Diabetic Foot Exam        Current shoe gear: Casual shoes    Hemoglobin A1C   Date Value Ref Range Status   05/18/2018 7.1 (H) 4.0 - 5.6 % Final     Comment:     According to ADA guidelines, hemoglobin A1c <7.0% represents  optimal control in non-pregnant diabetic patients. Different  metrics may apply to specific patient populations.   Standards of Medical Care in Diabetes-2016.  For the purpose of screening for the presence of diabetes:  <5.7%     Consistent with the absence of diabetes  5.7-6.4%  Consistent with increasing risk for diabetes   (prediabetes)  >or=6.5%  Consistent with diabetes  Currently, no consensus exists for use of hemoglobin A1c  for diagnosis of diabetes for children.  This Hemoglobin A1c assay has significant interference with fetal   hemoglobin   (HbF). The results are invalid for patients with abnormal amounts of   HbF,   including those with known Hereditary Persistence   of Fetal Hemoglobin. Heterozygous hemoglobin variants (HbAS, HbAC,   HbAD, HbAE, HbA2) do not significantly interfere with this assay;   however, presence of multiple variants in a sample may impact the %   interference.      04/05/2018 7.3 (H) 4.0 - 5.6 % Final     Comment:     According to ADA guidelines, hemoglobin A1c <7.0% represents  optimal control in non-pregnant diabetic patients. Different  metrics may apply to specific patient populations.   Standards of Medical Care in Diabetes-2016.  For the purpose of screening for the presence of diabetes:  <5.7%     Consistent with the absence of diabetes  5.7-6.4%  Consistent with increasing risk for diabetes   (prediabetes)  >or=6.5%  Consistent with diabetes  Currently, no consensus exists for use of hemoglobin A1c  for diagnosis of diabetes for children.  This Hemoglobin A1c assay has significant interference with fetal   hemoglobin   (HbF). The results are invalid for patients with abnormal amounts of   HbF,   including those with known Hereditary Persistence   of Fetal Hemoglobin. Heterozygous hemoglobin variants (HbAS, HbAC,   HbAD, HbAE, HbA2) do not significantly interfere with this assay;   however, presence of multiple variants in a sample may impact the %   interference.     09/01/2010 13.6 (H) 4.0 - 6.2 % Final           Review of Systems   Constitution: Negative for chills, diaphoresis, fever, malaise/fatigue and night sweats.   Cardiovascular: Negative for claudication, cyanosis, leg swelling and syncope.   Skin: Positive for nail changes. Negative for color change, dry skin, rash, suspicious lesions and unusual hair distribution.   Musculoskeletal: Positive for stiffness. Negative for falls, joint pain, joint swelling, muscle cramps and muscle weakness.   Gastrointestinal: Negative for constipation, diarrhea, nausea and vomiting.   Neurological: Positive for focal weakness and sensory change. Negative for brief paralysis, disturbances in coordination, numbness, paresthesias and tremors.           Objective:      Physical Exam   Constitutional: He is oriented to person, place, and time. He appears well-developed and well-nourished. He is cooperative.   Oriented to time,  place, and person.   Cardiovascular:   Pulses:       Dorsalis pedis pulses are 1+ on the right side, and 1+ on the left side.        Posterior tibial pulses are 1+ on the right side, and 1+ on the left side.   Capillary fill time 3-5 seconds.  All toes warm to touch.      Negative lower extremity edema bilateral.    Negative elevational pallor and dependent rubor bilateral.     Musculoskeletal:   Minimally ambulatory with walker due to weakness s/p CVA.    All ten toes without clubbing, cyanosis, or signs of ischemia.      No pain to palpation bilateral lower extremities.      Range of motion, stability, muscle strength, and muscle tone are age and health appropriate normal bilateral feet and legs.       Lymphadenopathy:   Negative lymphadenopathy bilateral popliteal fossa and tarsal tunnel.  Negative lymphangitic streaking bilateral foot/ankle bilateral.     Neurological: He is alert and oriented to person, place, and time. He has normal strength. He is not disoriented. He displays no atrophy and no tremor. A sensory deficit is present. He exhibits normal muscle tone.   Reflex Scores:       Patellar reflexes are 2+ on the right side and 2+ on the left side.       Achilles reflexes are 2+ on the right side and 2+ on the left side.  Decreased/absent vibratory sensation bilateral feet to 128Hz tuning fork.     Skin: Skin is warm, dry and intact. No abrasion, no bruising, no burn, no ecchymosis, no laceration, no lesion, no petechiae and no rash noted. He is not diaphoretic. No cyanosis or erythema. No pallor. Nails show no clubbing.   Skin thin, atrophic, with decreased density and distribution of pedal hair bilateral, but without hyperpigmentation, marshall discoloration,  ulcers, masses, nodules or cords palpated bilateral feet and legs.      Toenails 1st, 2nd, 3rd, 4th, 5th  bilateral are hypertrophic thickened 2-3 mm, dystrophic, discolored tanish brown with tan, gray crumbly subungual debris.  Tender to distal nail  plate pressure, without periungual skin abnormality of each.               Assessment:       Encounter Diagnoses   Name Primary?    Type II diabetes mellitus with neurological manifestations Yes    Onychomycosis due to dermatophyte          Plan:       Kamlesh was seen today for diabetes mellitus and diabetic foot exam.    Diagnoses and all orders for this visit:    Type II diabetes mellitus with neurological manifestations    Onychomycosis due to dermatophyte      I counseled the patient on his conditions, their implications and medical management.        - Shoe inspection. Diabetic Foot Education. Patient reminded of the importance of good nutrition and blood sugar control to help prevent podiatric complications of diabetes. Patient instructed on proper foot hygeine. We discussed wearing proper shoe gear, daily foot inspections, never walking without protective shoe gear, never putting sharp instruments to feet, routine podiatric visits at least annually.      - With patient's permission, nails were aggressively reduced and debrided x 10 to their soft tissue attachment mechanically and with electric , removing all offending nail and debris. Patient relates relief following the procedure. He will continue to monitor the areas daily, inspect his feet, wear protective shoe gear when ambulatory, moisturizer to maintain skin integrity and follow in this office p.r.n.      Discussed conservative treatment with shoes of adequate dimensions, material, and style to alleviate symptoms and delay or prevent surgical intervention.     Follow-up in about 1 year (around 7/19/2019).

## 2018-07-20 ENCOUNTER — OFFICE VISIT (OUTPATIENT)
Dept: OPTOMETRY | Facility: CLINIC | Age: 63
End: 2018-07-20
Payer: MEDICARE

## 2018-07-20 DIAGNOSIS — R00.2 PALPITATIONS: ICD-10-CM

## 2018-07-20 DIAGNOSIS — H16.103 SUPERFICIAL KERATITIS OF BOTH EYES: Primary | ICD-10-CM

## 2018-07-20 DIAGNOSIS — H04.123 DRY EYE SYNDROME OF BOTH EYES: ICD-10-CM

## 2018-07-20 DIAGNOSIS — H40.013 OPEN ANGLE WITH BORDERLINE FINDINGS OF BOTH EYES: ICD-10-CM

## 2018-07-20 DIAGNOSIS — Z86.73 H/O ISCHEMIC RIGHT MCA STROKE: ICD-10-CM

## 2018-07-20 DIAGNOSIS — E11.9 TYPE 2 DIABETES MELLITUS WITHOUT OPHTHALMIC MANIFESTATIONS: ICD-10-CM

## 2018-07-20 PROCEDURE — 92004 COMPRE OPH EXAM NEW PT 1/>: CPT | Mod: S$GLB,,, | Performed by: OPTOMETRIST

## 2018-07-20 PROCEDURE — 99999 PR PBB SHADOW E&M-EST. PATIENT-LVL I: CPT | Mod: PBBFAC,,, | Performed by: OPTOMETRIST

## 2018-07-20 RX ORDER — CYCLOSPORINE 0.5 MG/ML
1 EMULSION OPHTHALMIC 2 TIMES DAILY
Qty: 180 VIAL | Refills: 3 | Status: SHIPPED | OUTPATIENT
Start: 2018-07-20 | End: 2019-09-26 | Stop reason: SDUPTHER

## 2018-07-20 NOTE — PROGRESS NOTES
HPI     Hx of Cerebrovascular accident (CVA) x 3-4 years ago.  Patient here w/brother Krish which states OU vision seem impaired.  Pt states OD>OS decreased vision at dist.  No eye pain.  Artificial tears QD OD only  Cataract surgery OU  Doesn't close eyes while sleeping      Last edited by Michelle Bansal, OD on 7/20/2018  2:38 PM. (History)            Assessment /Plan     For exam results, see Encounter Report.    Superficial keratitis of both eyes    Dry eye syndrome of both eyes  -     cycloSPORINE (RESTASIS) 0.05 % ophthalmic emulsion; Place 0.4 mLs (1 drop total) into both eyes 2 (two) times daily.  Dispense: 180 vial; Refill: 3    Type 2 diabetes mellitus without ophthalmic manifestations    Open angle with borderline findings of both eyes    H/O ischemic right MCA stroke            1-2.   Start Restasis bid ou.  Educated patient on SE--redness, stinging.  Checking with insurance to see if it's affordable.  Otherwise may need to consider FML and artificial tears.  Vision should improve when the cornea heals.    3.  No retinopathy--monitor yearly.  4.  Due to increased c/d ratio.  IOPs normal.  Consider ON OCT in the future.  5.  Sending for old records.  Consider HVF in the future.      RTC 1-2 months for dry eye check.

## 2018-07-20 NOTE — LETTER
July 20, 2018      Debora Ontiveros MD  [address]  [facility] [address]           [facility]  [address]  [facility] [address]  Phone: [phone]  Fax: [phone]          Patient: Kamlesh Diane   MR Number: [id number]   YOB: 1955   Date of Visit: 7/20/2018       Dear Dr. Debora Ontiveros:    Thank you for referring Kamlesh Diane to me for evaluation. Attached you will find relevant portions of my assessment and plan of care.    If you have questions, please do not hesitate to call me. I look forward to following Kamlesh Diane along with you.    Sincerely,    Michelle Bansal, OD    Enclosure  CC:  No Recipients    If you would like to receive this communication electronically, please contact [email or web address] or [phone] to request more information on [employer] access.    For providers and/or their staff who would like to refer a patient to [facility], please contact us through our one-stop-shop provider referral line, [facility], at [phone].    If you feel you have received this communication in error or would no longer like to receive these types of communications, please e-mail [email or web address]

## 2018-07-23 ENCOUNTER — TELEPHONE (OUTPATIENT)
Dept: CARDIOLOGY | Facility: CLINIC | Age: 63
End: 2018-07-23

## 2018-07-23 DIAGNOSIS — R00.2 PALPITATIONS: Primary | ICD-10-CM

## 2018-07-23 NOTE — TELEPHONE ENCOUNTER
----- Message from Tiffany Summers RN sent at 7/23/2018 11:22 AM CDT -----  Regarding: FW: Need EKG order placed      ----- Message -----  From: Awa Ornelas  Sent: 7/23/2018  11:13 AM  To: [facility] Cardiology Clinical Support Staff  Subject: Need EKG order placed                            EKG done        7/19          .   Please place the order.

## 2018-07-23 NOTE — TELEPHONE ENCOUNTER
----- Message from Awa Ornelas sent at 7/23/2018 11:13 AM CDT -----  Regarding: Need EKG order placed  EKG done        7/19          .   Please place the order.

## 2018-08-09 ENCOUNTER — LAB VISIT (OUTPATIENT)
Dept: LAB | Facility: HOSPITAL | Age: 63
End: 2018-08-09
Attending: INTERNAL MEDICINE
Payer: MEDICARE

## 2018-08-09 ENCOUNTER — ANTI-COAG VISIT (OUTPATIENT)
Dept: CARDIOLOGY | Facility: CLINIC | Age: 63
End: 2018-08-09

## 2018-08-09 DIAGNOSIS — I63.10 CEREBROVASCULAR ACCIDENT (CVA) DUE TO EMBOLISM OF PRECEREBRAL ARTERY: ICD-10-CM

## 2018-08-09 DIAGNOSIS — Z79.01 LONG TERM (CURRENT) USE OF ANTICOAGULANTS: ICD-10-CM

## 2018-08-09 LAB
INR PPP: 1.3
PROTHROMBIN TIME: 13 SEC

## 2018-08-09 PROCEDURE — 85610 PROTHROMBIN TIME: CPT

## 2018-08-09 PROCEDURE — 36415 COLL VENOUS BLD VENIPUNCTURE: CPT

## 2018-08-16 ENCOUNTER — LAB VISIT (OUTPATIENT)
Dept: LAB | Facility: HOSPITAL | Age: 63
End: 2018-08-16
Attending: INTERNAL MEDICINE
Payer: MEDICARE

## 2018-08-16 ENCOUNTER — ANTI-COAG VISIT (OUTPATIENT)
Dept: CARDIOLOGY | Facility: CLINIC | Age: 63
End: 2018-08-16

## 2018-08-16 DIAGNOSIS — Z79.01 LONG TERM (CURRENT) USE OF ANTICOAGULANTS: ICD-10-CM

## 2018-08-16 DIAGNOSIS — I63.10 CEREBROVASCULAR ACCIDENT (CVA) DUE TO EMBOLISM OF PRECEREBRAL ARTERY: ICD-10-CM

## 2018-08-16 LAB
INR PPP: 1.7
PROTHROMBIN TIME: 17.1 SEC

## 2018-08-16 PROCEDURE — 85610 PROTHROMBIN TIME: CPT

## 2018-08-16 PROCEDURE — 36415 COLL VENOUS BLD VENIPUNCTURE: CPT

## 2018-08-28 DIAGNOSIS — E55.9 VITAMIN D DEFICIENCY: ICD-10-CM

## 2018-08-28 RX ORDER — ERGOCALCIFEROL 1.25 MG/1
50000 CAPSULE ORAL WEEKLY
Qty: 12 CAPSULE | Refills: 0 | Status: SHIPPED | OUTPATIENT
Start: 2018-08-28 | End: 2018-12-07 | Stop reason: SDUPTHER

## 2018-08-28 NOTE — TELEPHONE ENCOUNTER
"----- Message from Dea Whitney sent at 8/28/2018 11:46 AM CDT -----  Contact: Mimi/Isabell/ [phone]  RX request - refill or new RX.  Is this a refill or new RX: refill    RX name and strength: VITAMIN D2 50,000 unit capsule  Directions:   Is this a 30 day or 90 day RX:    Local pharmacy or mail order pharmacy:    Pharmacy name and phone # (DON'T enter "on file" or "in chart"): [facility] - [place], [place] - [address] [place] [address] [phone] (Phone)[phone] (Fax)      Comments:          "

## 2018-08-30 ENCOUNTER — LAB VISIT (OUTPATIENT)
Dept: LAB | Facility: HOSPITAL | Age: 63
End: 2018-08-30
Attending: INTERNAL MEDICINE
Payer: MEDICARE

## 2018-08-30 ENCOUNTER — ANTI-COAG VISIT (OUTPATIENT)
Dept: CARDIOLOGY | Facility: CLINIC | Age: 63
End: 2018-08-30

## 2018-08-30 DIAGNOSIS — I63.10 CEREBROVASCULAR ACCIDENT (CVA) DUE TO EMBOLISM OF PRECEREBRAL ARTERY: ICD-10-CM

## 2018-08-30 DIAGNOSIS — Z79.01 LONG TERM (CURRENT) USE OF ANTICOAGULANTS: ICD-10-CM

## 2018-08-30 LAB
INR PPP: 2.4
PROTHROMBIN TIME: 24.4 SEC

## 2018-08-30 PROCEDURE — 85610 PROTHROMBIN TIME: CPT

## 2018-08-30 PROCEDURE — 36415 COLL VENOUS BLD VENIPUNCTURE: CPT

## 2018-09-13 ENCOUNTER — ANTI-COAG VISIT (OUTPATIENT)
Dept: CARDIOLOGY | Facility: CLINIC | Age: 63
End: 2018-09-13
Payer: MEDICARE

## 2018-09-13 DIAGNOSIS — I63.10 CEREBROVASCULAR ACCIDENT (CVA) DUE TO EMBOLISM OF PRECEREBRAL ARTERY: ICD-10-CM

## 2018-09-13 DIAGNOSIS — Z79.01 LONG TERM (CURRENT) USE OF ANTICOAGULANTS: Primary | ICD-10-CM

## 2018-09-13 LAB — INR PPP: 2.9 (ref 2–3)

## 2018-09-13 PROCEDURE — 85610 PROTHROMBIN TIME: CPT | Mod: PBBFAC

## 2018-09-13 NOTE — PROGRESS NOTES
INR within therapeutic range today. Patient denies any bleeding, bruising or other changes that would affect warfarin therapy. Will maintain current dose. Patient advised to call coumadin clinic with any changes or concerns.

## 2018-10-04 ENCOUNTER — TELEPHONE (OUTPATIENT)
Dept: INTERNAL MEDICINE | Facility: CLINIC | Age: 63
End: 2018-10-04

## 2018-10-04 ENCOUNTER — LAB VISIT (OUTPATIENT)
Dept: LAB | Facility: HOSPITAL | Age: 63
End: 2018-10-04
Payer: MEDICARE

## 2018-10-04 ENCOUNTER — ANTI-COAG VISIT (OUTPATIENT)
Dept: CARDIOLOGY | Facility: CLINIC | Age: 63
End: 2018-10-04

## 2018-10-04 DIAGNOSIS — I63.10 CEREBROVASCULAR ACCIDENT (CVA) DUE TO EMBOLISM OF PRECEREBRAL ARTERY: ICD-10-CM

## 2018-10-04 DIAGNOSIS — Z79.01 LONG TERM (CURRENT) USE OF ANTICOAGULANTS: ICD-10-CM

## 2018-10-04 DIAGNOSIS — Z79.01 LONG TERM (CURRENT) USE OF ANTICOAGULANTS: Primary | ICD-10-CM

## 2018-10-04 LAB
INR PPP: 2
PROTHROMBIN TIME: 20.3 SEC

## 2018-10-04 PROCEDURE — 36415 COLL VENOUS BLD VENIPUNCTURE: CPT

## 2018-10-04 PROCEDURE — 85610 PROTHROMBIN TIME: CPT

## 2018-10-04 NOTE — TELEPHONE ENCOUNTER
I had been reviewing INR and signing off on Coumadin orders in Dr Ontiveros's absence. Now that she's back how do we get the orders transferred back to her? Thanks!

## 2018-10-05 NOTE — PROGRESS NOTES
Mr Rutherford was given lab result, he verified correct dose of coumadin, reported no changes, Mr Rutherford was advised of coumadin instructions and redraw date, appt. booked

## 2018-10-19 ENCOUNTER — TELEPHONE (OUTPATIENT)
Dept: INTERNAL MEDICINE | Facility: CLINIC | Age: 63
End: 2018-10-19

## 2018-10-19 DIAGNOSIS — E11.59 HYPERTENSION ASSOCIATED WITH DIABETES: ICD-10-CM

## 2018-10-19 DIAGNOSIS — I15.2 HYPERTENSION ASSOCIATED WITH DIABETES: ICD-10-CM

## 2018-10-19 RX ORDER — CARVEDILOL 3.12 MG/1
3.12 TABLET ORAL DAILY
Qty: 90 TABLET | Refills: 3 | Status: SHIPPED | OUTPATIENT
Start: 2018-10-19 | End: 2018-10-19 | Stop reason: SDUPTHER

## 2018-10-19 RX ORDER — WARFARIN 2.5 MG/1
TABLET ORAL
Qty: 90 TABLET | Refills: 3 | Status: SHIPPED | OUTPATIENT
Start: 2018-10-19 | End: 2018-11-20 | Stop reason: SDUPTHER

## 2018-10-19 RX ORDER — WARFARIN SODIUM 5 MG/1
TABLET ORAL
Qty: 90 TABLET | Refills: 3 | Status: SHIPPED | OUTPATIENT
Start: 2018-10-19 | End: 2018-11-23 | Stop reason: SDUPTHER

## 2018-10-19 RX ORDER — CARVEDILOL 3.12 MG/1
3.12 TABLET ORAL 2 TIMES DAILY WITH MEALS
Qty: 180 TABLET | Refills: 3 | Status: SHIPPED | OUTPATIENT
Start: 2018-10-19 | End: 2018-12-20 | Stop reason: SDUPTHER

## 2018-10-19 NOTE — TELEPHONE ENCOUNTER
----- Message from Felisa Benavides sent at 10/19/2018 11:06 AM CDT -----  Contact: Diane Pt's Niece [phone]  Pt's niece is calling to speak with someone in regards to his carvedilol (COREG) 3.125 MG tablet. She states that there's a problem with the way the prescription was sent in to the pharmacy and the insurance won't pay for it. Please call back and advise.      Thanks

## 2018-10-19 NOTE — TELEPHONE ENCOUNTER
Called pt.'s niece and let her know that all three medications were called into the pharmacy for pt. Pt.'s niece verbalized understanding.,

## 2018-10-19 NOTE — TELEPHONE ENCOUNTER
Called and spoke to pt.'s niece she said she didn't notice there was a change in medication with patient's carvedilol, she was giving it to the patient twice a day instead of once a day. So she needs a refill on it as well as his warfarin. Pended.

## 2018-10-19 NOTE — TELEPHONE ENCOUNTER
----- Message from Caity Long sent at 10/19/2018  8:59 AM CDT -----  Contact: Audra Steen Cell# [phone]  She's calling in regards to her uncles medication for carvedilol (COREG) 3.125 MG tablet. She said that the dosage have been changed and now [facility] said that they cannot refill his medication due to the change of the dosage. She would like a call back to speak with you please.

## 2018-10-23 ENCOUNTER — TELEPHONE (OUTPATIENT)
Dept: INTERNAL MEDICINE | Facility: CLINIC | Age: 63
End: 2018-10-23

## 2018-10-23 NOTE — TELEPHONE ENCOUNTER
----- Message from Dea Whitney sent at 10/23/2018  1:49 PM CDT -----  Contact: Diane/Audra/[phone]  Diane called in regards needing to find out if quantity on rx carvedilol (COREG) 3.125 MG tablet has change. Because patient was taking different amount before. Thank you

## 2018-11-01 ENCOUNTER — LAB VISIT (OUTPATIENT)
Dept: LAB | Facility: HOSPITAL | Age: 63
End: 2018-11-01
Attending: INTERNAL MEDICINE
Payer: MEDICARE

## 2018-11-01 ENCOUNTER — ANTI-COAG VISIT (OUTPATIENT)
Dept: CARDIOLOGY | Facility: CLINIC | Age: 63
End: 2018-11-01

## 2018-11-01 DIAGNOSIS — Z79.01 LONG TERM (CURRENT) USE OF ANTICOAGULANTS: ICD-10-CM

## 2018-11-01 DIAGNOSIS — I63.10 CEREBROVASCULAR ACCIDENT (CVA) DUE TO EMBOLISM OF PRECEREBRAL ARTERY: ICD-10-CM

## 2018-11-01 LAB
INR PPP: 2.6
PROTHROMBIN TIME: 26 SEC

## 2018-11-01 PROCEDURE — 36415 COLL VENOUS BLD VENIPUNCTURE: CPT | Mod: HCNC

## 2018-11-01 PROCEDURE — 85610 PROTHROMBIN TIME: CPT | Mod: HCNC

## 2018-11-02 NOTE — PROGRESS NOTES
Son advised to have pt (Maintain. Son verbalized understanding.  Cabbage/Jerod Slaw 1x/week  Gin & Cranberry juice 1x/week.

## 2018-11-20 RX ORDER — WARFARIN 2.5 MG/1
TABLET ORAL
Qty: 90 TABLET | Refills: 3 | Status: SHIPPED | OUTPATIENT
Start: 2018-11-20 | End: 2019-03-12 | Stop reason: SDUPTHER

## 2018-11-20 NOTE — TELEPHONE ENCOUNTER
"----- Message from Dea Whitney sent at 11/20/2018  2:35 PM CST -----  Contact: Celsa Diane/[phone]  Is this a refill or new RX:  New refill  RX name and strength: warfarin (COUMADIN) 2.5 MG tablet  Directions:   Is this a 30 day or 90 day RX:    Local pharmacy or mail order pharmacy: Local pharmacy    Pharmacy name and phone # (DON'T enter "on file" or "in chart"): [facility] [address] [place] [address] [address] AT [facility] [place] [place] [phone] (Phone) [phone] (Fax)  Comments:  Thank you        "

## 2018-11-23 RX ORDER — WARFARIN SODIUM 5 MG/1
TABLET ORAL
Qty: 90 TABLET | Refills: 0 | Status: SHIPPED | OUTPATIENT
Start: 2018-11-23 | End: 2019-02-04 | Stop reason: SDUPTHER

## 2018-11-23 NOTE — TELEPHONE ENCOUNTER
----- Message from Nehemiah Marroquin sent at 11/23/2018 10:36 AM CST -----  Contact: Audra/Diane [phone]  Is this a refill or new RX:  Refill    RX name and strength: warfarin (COUMADIN) 5 MG tablet    Pharmacy name and phone # [id number] - [place] [address] AT [facility] [place] [phone] (Phone)  [phone] (Fax)    Comments:  Ms. Contreras said to disregard the previous request for the warfarin (COUMADIN) 2.5 MG tablet and refill this one.

## 2018-11-29 ENCOUNTER — LAB VISIT (OUTPATIENT)
Dept: LAB | Facility: HOSPITAL | Age: 63
End: 2018-11-29
Attending: UROLOGY
Payer: MEDICARE

## 2018-11-29 DIAGNOSIS — R97.20 ELEVATED PSA: ICD-10-CM

## 2018-11-29 LAB — COMPLEXED PSA SERPL-MCNC: 5.8 NG/ML

## 2018-11-29 PROCEDURE — 36415 COLL VENOUS BLD VENIPUNCTURE: CPT | Mod: HCNC

## 2018-11-29 PROCEDURE — 84153 ASSAY OF PSA TOTAL: CPT | Mod: HCNC

## 2018-11-30 ENCOUNTER — TELEPHONE (OUTPATIENT)
Dept: UROLOGY | Facility: CLINIC | Age: 63
End: 2018-11-30

## 2018-11-30 NOTE — TELEPHONE ENCOUNTER
I spoke with patient and gave 's message of :  psa stable. Let patient know and have him follow up with me in 3 months.

## 2018-11-30 NOTE — TELEPHONE ENCOUNTER
----- Message from Carey Rodríguez MD sent at 11/29/2018  9:12 PM CST -----  psa stable. Let patient know and have him follow up with me in 3 months.

## 2018-12-07 DIAGNOSIS — E55.9 VITAMIN D DEFICIENCY: ICD-10-CM

## 2018-12-07 DIAGNOSIS — R25.1 TREMOR: ICD-10-CM

## 2018-12-07 RX ORDER — AMANTADINE HYDROCHLORIDE 100 MG/1
CAPSULE, GELATIN COATED ORAL
Qty: 180 CAPSULE | Refills: 1 | Status: SHIPPED | OUTPATIENT
Start: 2018-12-07 | End: 2019-04-10 | Stop reason: SDUPTHER

## 2018-12-07 RX ORDER — ERGOCALCIFEROL 1.25 MG/1
CAPSULE ORAL
Qty: 12 CAPSULE | Refills: 0 | Status: SHIPPED | OUTPATIENT
Start: 2018-12-07 | End: 2019-03-12 | Stop reason: SDUPTHER

## 2018-12-20 DIAGNOSIS — I15.2 HYPERTENSION ASSOCIATED WITH DIABETES: ICD-10-CM

## 2018-12-20 DIAGNOSIS — E11.8 CONTROLLED TYPE 2 DIABETES MELLITUS WITH COMPLICATION, WITH LONG-TERM CURRENT USE OF INSULIN: ICD-10-CM

## 2018-12-20 DIAGNOSIS — Z79.4 CONTROLLED TYPE 2 DIABETES MELLITUS WITH COMPLICATION, WITH LONG-TERM CURRENT USE OF INSULIN: ICD-10-CM

## 2018-12-20 DIAGNOSIS — E11.59 HYPERTENSION ASSOCIATED WITH DIABETES: ICD-10-CM

## 2018-12-20 RX ORDER — INSULIN DETEMIR 100 [IU]/ML
50 INJECTION, SOLUTION SUBCUTANEOUS NIGHTLY
Qty: 3 BOX | Refills: 3 | Status: SHIPPED | OUTPATIENT
Start: 2018-12-20 | End: 2019-10-24

## 2018-12-20 RX ORDER — CARVEDILOL 3.12 MG/1
3.12 TABLET ORAL 2 TIMES DAILY WITH MEALS
Qty: 180 TABLET | Refills: 3 | Status: SHIPPED | OUTPATIENT
Start: 2018-12-20 | End: 2019-03-15 | Stop reason: SDUPTHER

## 2018-12-24 DIAGNOSIS — I15.2 HYPERTENSION ASSOCIATED WITH DIABETES: ICD-10-CM

## 2018-12-24 DIAGNOSIS — E11.59 HYPERTENSION ASSOCIATED WITH DIABETES: ICD-10-CM

## 2018-12-24 DIAGNOSIS — E11.9 DIABETES MELLITUS TYPE 2, INSULIN DEPENDENT: Primary | ICD-10-CM

## 2018-12-24 DIAGNOSIS — Z79.4 CONTROLLED TYPE 2 DIABETES MELLITUS WITH COMPLICATION, WITH LONG-TERM CURRENT USE OF INSULIN: ICD-10-CM

## 2018-12-24 DIAGNOSIS — E11.8 CONTROLLED TYPE 2 DIABETES MELLITUS WITH COMPLICATION, WITH LONG-TERM CURRENT USE OF INSULIN: ICD-10-CM

## 2018-12-24 DIAGNOSIS — Z79.4 DIABETES MELLITUS TYPE 2, INSULIN DEPENDENT: Primary | ICD-10-CM

## 2018-12-24 RX ORDER — BLOOD SUGAR DIAGNOSTIC
STRIP MISCELLANEOUS
Qty: 400 EACH | Refills: 3 | Status: SHIPPED | OUTPATIENT
Start: 2018-12-24 | End: 2021-07-27 | Stop reason: SDUPTHER

## 2018-12-28 RX ORDER — CLONIDINE HYDROCHLORIDE 0.1 MG/1
0.1 TABLET ORAL 3 TIMES DAILY
Qty: 90 TABLET | Refills: 5 | Status: SHIPPED | OUTPATIENT
Start: 2018-12-28 | End: 2019-03-12 | Stop reason: SDUPTHER

## 2018-12-28 NOTE — TELEPHONE ENCOUNTER
----- Message from Chey Weber sent at 12/28/2018  8:23 AM CST -----  Contact: [phone]  RX request - refill or new RX.  Is this a refill or new RX:  refill  RX name and strength: cloNIDine (CATAPRES) 0.1 MG tablet   Local pharmacy or mail order pharmacy:  [facility] [place] [address] [address] AT [facility] [place]   [phone] (Phone)  [phone] (Fax)        Comments:  Please advise, thanks

## 2019-01-02 DIAGNOSIS — E11.59 HYPERTENSION ASSOCIATED WITH DIABETES: ICD-10-CM

## 2019-01-02 DIAGNOSIS — I15.2 HYPERTENSION ASSOCIATED WITH DIABETES: ICD-10-CM

## 2019-01-02 RX ORDER — AMLODIPINE BESYLATE 10 MG/1
10 TABLET ORAL DAILY
Qty: 90 TABLET | Refills: 3 | Status: SHIPPED | OUTPATIENT
Start: 2019-01-02 | End: 2019-11-25 | Stop reason: SDUPTHER

## 2019-01-02 NOTE — TELEPHONE ENCOUNTER
"----- Message from Sonal Rosenbaum sent at 1/2/2019  1:58 PM CST -----  Contact: wife/[phone]  RX request - refill or new RX.  Is this a refill or new RX:  Refill  RX name and strength: amLODIPine (NORVASC) 10 MG tablet  Directions: Take 1 tablet (10 mg total) by mouth once daily. - Oral  Is this a 30 day or 90 day RX:    Local pharmacy or mail order pharmacy:  local  Pharmacy name and phone # (DON'T enter "on file" or "in chart"): [phone] (Phone)  [phone] (Fax)[facility]   Comments:        "

## 2019-01-03 ENCOUNTER — TELEPHONE (OUTPATIENT)
Dept: INTERNAL MEDICINE | Facility: CLINIC | Age: 64
End: 2019-01-03

## 2019-01-03 NOTE — TELEPHONE ENCOUNTER
----- Message from Tiago Cameron sent at 1/3/2019  1:03 PM CST -----  Contact: Monalisa Diane, [place] [phone]  Niece of the patient calling stating the Rx amLODIPine (NORVASC) 10 MG tablet    Has been refilled for 10 MG, stating thought it was suppose to be refilled for 40 MG,    Requesting a call back to clarify.    Monalisa Diane, [place] [phone]    Please call an advise  Thank you

## 2019-01-15 ENCOUNTER — ANTI-COAG VISIT (OUTPATIENT)
Dept: CARDIOLOGY | Facility: CLINIC | Age: 64
End: 2019-01-15

## 2019-01-15 ENCOUNTER — LAB VISIT (OUTPATIENT)
Dept: LAB | Facility: HOSPITAL | Age: 64
End: 2019-01-15
Attending: INTERNAL MEDICINE
Payer: MEDICARE

## 2019-01-15 DIAGNOSIS — Z79.01 LONG TERM (CURRENT) USE OF ANTICOAGULANTS: ICD-10-CM

## 2019-01-15 DIAGNOSIS — I63.10 CEREBROVASCULAR ACCIDENT (CVA) DUE TO EMBOLISM OF PRECEREBRAL ARTERY: ICD-10-CM

## 2019-01-15 LAB
INR PPP: 2.9
PROTHROMBIN TIME: 30.1 SEC

## 2019-01-15 PROCEDURE — 85610 PROTHROMBIN TIME: CPT | Mod: HCNC

## 2019-01-15 PROCEDURE — 36415 COLL VENOUS BLD VENIPUNCTURE: CPT | Mod: HCNC

## 2019-01-23 ENCOUNTER — TELEPHONE (OUTPATIENT)
Dept: INTERNAL MEDICINE | Facility: CLINIC | Age: 64
End: 2019-01-23

## 2019-01-23 NOTE — TELEPHONE ENCOUNTER
----- Message from Chey Weber sent at 1/23/2019 11:14 AM CST -----  Contact: [phone]  Patient has canceled appointment for today due to weather. Patient would like to reschedule an appointment for tomorrow if possible. The next available appointment is in April.    Please advise, thanks

## 2019-01-23 NOTE — TELEPHONE ENCOUNTER
Called and spoke to pt. Rescheduled appointment and reminded him about neuro.    Pt verbalized understanding of this.

## 2019-02-04 RX ORDER — WARFARIN SODIUM 5 MG/1
TABLET ORAL
Qty: 90 TABLET | Refills: 3 | Status: SHIPPED | OUTPATIENT
Start: 2019-02-04 | End: 2019-02-04 | Stop reason: SDUPTHER

## 2019-02-04 RX ORDER — WARFARIN SODIUM 5 MG/1
TABLET ORAL
Qty: 90 TABLET | Refills: 3 | Status: SHIPPED | OUTPATIENT
Start: 2019-02-04 | End: 2019-03-12 | Stop reason: SDUPTHER

## 2019-02-04 NOTE — TELEPHONE ENCOUNTER
"Sent in rx for coumadin w/ instructions "ok to refill now". Did she want us to print him a script and for her to  also?  "

## 2019-02-04 NOTE — TELEPHONE ENCOUNTER
----- Message from Caity Long sent at 2/4/2019  2:12 PM CST -----  Contact: Pt's Audra Contreras Cell# [phone]  Patient's niece Diane is calling in regards to her uncle's medication for warfarin (COUMADIN) 5 MG tablet. He tried to get his script refilled at [facility] and he was told that it is too soon for the refill. He would like to know if you would write him a script for the medication because he doesn't have any left?    Patient's pharmacy: [facility] [address] [place] [address] [address] AT [facility] [place] [place]  Phone# [phone],Fax#[phone]

## 2019-02-26 ENCOUNTER — ANTI-COAG VISIT (OUTPATIENT)
Dept: CARDIOLOGY | Facility: CLINIC | Age: 64
End: 2019-02-26

## 2019-02-26 ENCOUNTER — LAB VISIT (OUTPATIENT)
Dept: LAB | Facility: HOSPITAL | Age: 64
End: 2019-02-26
Attending: INTERNAL MEDICINE
Payer: MEDICARE

## 2019-02-26 DIAGNOSIS — I63.10 CEREBROVASCULAR ACCIDENT (CVA) DUE TO EMBOLISM OF PRECEREBRAL ARTERY: ICD-10-CM

## 2019-02-26 DIAGNOSIS — Z79.01 LONG TERM (CURRENT) USE OF ANTICOAGULANTS: ICD-10-CM

## 2019-02-26 LAB
INR PPP: 2.2
PROTHROMBIN TIME: 23.1 SEC

## 2019-02-26 PROCEDURE — 36415 COLL VENOUS BLD VENIPUNCTURE: CPT | Mod: HCNC

## 2019-02-26 PROCEDURE — 85610 PROTHROMBIN TIME: CPT | Mod: HCNC

## 2019-02-26 NOTE — PROGRESS NOTES
I left a voice message for the patient because he missed his clinic appointoint and needs to call back to reschedule.

## 2019-02-26 NOTE — PROGRESS NOTES
Son advise to have patient to (Maintain/dosage). Also eat (CAbbage/Coleslaw) 1time weekly. And drink (Gin& Cranberry Juice) 1 time weekly. Son verbalized understanding.

## 2019-03-12 ENCOUNTER — OFFICE VISIT (OUTPATIENT)
Dept: INTERNAL MEDICINE | Facility: CLINIC | Age: 64
End: 2019-03-12
Payer: MEDICARE

## 2019-03-12 ENCOUNTER — LAB VISIT (OUTPATIENT)
Dept: LAB | Facility: HOSPITAL | Age: 64
End: 2019-03-12
Attending: INTERNAL MEDICINE
Payer: MEDICARE

## 2019-03-12 VITALS
DIASTOLIC BLOOD PRESSURE: 66 MMHG | TEMPERATURE: 98 F | SYSTOLIC BLOOD PRESSURE: 112 MMHG | HEART RATE: 78 BPM | BODY MASS INDEX: 25.86 KG/M2 | HEIGHT: 73 IN

## 2019-03-12 DIAGNOSIS — I15.2 HYPERTENSION ASSOCIATED WITH DIABETES: ICD-10-CM

## 2019-03-12 DIAGNOSIS — E11.59 HYPERTENSION ASSOCIATED WITH DIABETES: Primary | ICD-10-CM

## 2019-03-12 DIAGNOSIS — E11.69 HYPERLIPIDEMIA ASSOCIATED WITH TYPE 2 DIABETES MELLITUS: ICD-10-CM

## 2019-03-12 DIAGNOSIS — Z79.01 CHRONIC ANTICOAGULATION: ICD-10-CM

## 2019-03-12 DIAGNOSIS — G81.91 RIGHT HEMIPLEGIA: ICD-10-CM

## 2019-03-12 DIAGNOSIS — M89.9 BONE DISORDER: ICD-10-CM

## 2019-03-12 DIAGNOSIS — I69.30 HISTORY OF CEREBROVASCULAR ACCIDENT (CVA) WITH RESIDUAL DEFICIT: ICD-10-CM

## 2019-03-12 DIAGNOSIS — E55.9 VITAMIN D DEFICIENCY: ICD-10-CM

## 2019-03-12 DIAGNOSIS — E11.59 HYPERTENSION ASSOCIATED WITH DIABETES: ICD-10-CM

## 2019-03-12 DIAGNOSIS — I15.2 HYPERTENSION ASSOCIATED WITH DIABETES: Primary | ICD-10-CM

## 2019-03-12 DIAGNOSIS — E78.5 HYPERLIPIDEMIA ASSOCIATED WITH TYPE 2 DIABETES MELLITUS: ICD-10-CM

## 2019-03-12 DIAGNOSIS — Z86.73 H/O: STROKE: ICD-10-CM

## 2019-03-12 LAB
25(OH)D3+25(OH)D2 SERPL-MCNC: 40 NG/ML
ALBUMIN SERPL BCP-MCNC: 3.9 G/DL
ALP SERPL-CCNC: 123 U/L
ALT SERPL W/O P-5'-P-CCNC: 32 U/L
ANION GAP SERPL CALC-SCNC: 11 MMOL/L
AST SERPL-CCNC: 22 U/L
BASOPHILS # BLD AUTO: 0.02 K/UL
BASOPHILS NFR BLD: 0.3 %
BILIRUB SERPL-MCNC: 0.7 MG/DL
BUN SERPL-MCNC: 14 MG/DL
CALCIUM SERPL-MCNC: 9.8 MG/DL
CHLORIDE SERPL-SCNC: 103 MMOL/L
CHOLEST SERPL-MCNC: 142 MG/DL
CHOLEST/HDLC SERPL: 4.3 {RATIO}
CO2 SERPL-SCNC: 25 MMOL/L
CREAT SERPL-MCNC: 1.7 MG/DL
DIFFERENTIAL METHOD: ABNORMAL
EOSINOPHIL # BLD AUTO: 0.2 K/UL
EOSINOPHIL NFR BLD: 3 %
ERYTHROCYTE [DISTWIDTH] IN BLOOD BY AUTOMATED COUNT: 14.4 %
EST. GFR  (AFRICAN AMERICAN): 49 ML/MIN/1.73 M^2
EST. GFR  (NON AFRICAN AMERICAN): 42 ML/MIN/1.73 M^2
ESTIMATED AVG GLUCOSE: 192 MG/DL
GLUCOSE SERPL-MCNC: 113 MG/DL
HBA1C MFR BLD HPLC: 8.3 %
HCT VFR BLD AUTO: 45.5 %
HDLC SERPL-MCNC: 33 MG/DL
HDLC SERPL: 23.2 %
HGB BLD-MCNC: 14.3 G/DL
LDLC SERPL CALC-MCNC: 93.8 MG/DL
LYMPHOCYTES # BLD AUTO: 1.5 K/UL
LYMPHOCYTES NFR BLD: 22 %
MCH RBC QN AUTO: 27.7 PG
MCHC RBC AUTO-ENTMCNC: 31.4 G/DL
MCV RBC AUTO: 88 FL
MONOCYTES # BLD AUTO: 0.7 K/UL
MONOCYTES NFR BLD: 10.1 %
NEUTROPHILS # BLD AUTO: 4.5 K/UL
NEUTROPHILS NFR BLD: 64.5 %
NONHDLC SERPL-MCNC: 109 MG/DL
PLATELET # BLD AUTO: 367 K/UL
PMV BLD AUTO: 9.8 FL
POTASSIUM SERPL-SCNC: 4.4 MMOL/L
PROT SERPL-MCNC: 8.2 G/DL
RBC # BLD AUTO: 5.17 M/UL
SODIUM SERPL-SCNC: 139 MMOL/L
TRIGL SERPL-MCNC: 76 MG/DL
WBC # BLD AUTO: 7.01 K/UL

## 2019-03-12 PROCEDURE — 80053 COMPREHEN METABOLIC PANEL: CPT | Mod: HCNC

## 2019-03-12 PROCEDURE — 99499 RISK ADDL DX/OHS AUDIT: ICD-10-PCS | Mod: HCNC,S$GLB,, | Performed by: INTERNAL MEDICINE

## 2019-03-12 PROCEDURE — 3008F BODY MASS INDEX DOCD: CPT | Mod: HCNC,CPTII,S$GLB, | Performed by: INTERNAL MEDICINE

## 2019-03-12 PROCEDURE — 99214 OFFICE O/P EST MOD 30 MIN: CPT | Mod: HCNC,S$GLB,, | Performed by: INTERNAL MEDICINE

## 2019-03-12 PROCEDURE — 3078F DIAST BP <80 MM HG: CPT | Mod: HCNC,CPTII,S$GLB, | Performed by: INTERNAL MEDICINE

## 2019-03-12 PROCEDURE — 3008F PR BODY MASS INDEX (BMI) DOCUMENTED: ICD-10-PCS | Mod: HCNC,CPTII,S$GLB, | Performed by: INTERNAL MEDICINE

## 2019-03-12 PROCEDURE — 3078F PR MOST RECENT DIASTOLIC BLOOD PRESSURE < 80 MM HG: ICD-10-PCS | Mod: HCNC,CPTII,S$GLB, | Performed by: INTERNAL MEDICINE

## 2019-03-12 PROCEDURE — 3074F SYST BP LT 130 MM HG: CPT | Mod: HCNC,CPTII,S$GLB, | Performed by: INTERNAL MEDICINE

## 2019-03-12 PROCEDURE — 83036 HEMOGLOBIN GLYCOSYLATED A1C: CPT | Mod: HCNC

## 2019-03-12 PROCEDURE — 99999 PR PBB SHADOW E&M-EST. PATIENT-LVL III: ICD-10-PCS | Mod: PBBFAC,HCNC,, | Performed by: INTERNAL MEDICINE

## 2019-03-12 PROCEDURE — 99499 UNLISTED E&M SERVICE: CPT | Mod: HCNC,S$GLB,, | Performed by: INTERNAL MEDICINE

## 2019-03-12 PROCEDURE — 99999 PR PBB SHADOW E&M-EST. PATIENT-LVL III: CPT | Mod: PBBFAC,HCNC,, | Performed by: INTERNAL MEDICINE

## 2019-03-12 PROCEDURE — 3045F PR MOST RECENT HEMOGLOBIN A1C LEVEL 7.0-9.0%: ICD-10-PCS | Mod: HCNC,CPTII,S$GLB, | Performed by: INTERNAL MEDICINE

## 2019-03-12 PROCEDURE — 3074F PR MOST RECENT SYSTOLIC BLOOD PRESSURE < 130 MM HG: ICD-10-PCS | Mod: HCNC,CPTII,S$GLB, | Performed by: INTERNAL MEDICINE

## 2019-03-12 PROCEDURE — 99214 PR OFFICE/OUTPT VISIT, EST, LEVL IV, 30-39 MIN: ICD-10-PCS | Mod: HCNC,S$GLB,, | Performed by: INTERNAL MEDICINE

## 2019-03-12 PROCEDURE — 82306 VITAMIN D 25 HYDROXY: CPT | Mod: HCNC

## 2019-03-12 PROCEDURE — 80061 LIPID PANEL: CPT | Mod: HCNC

## 2019-03-12 PROCEDURE — 3045F PR MOST RECENT HEMOGLOBIN A1C LEVEL 7.0-9.0%: CPT | Mod: HCNC,CPTII,S$GLB, | Performed by: INTERNAL MEDICINE

## 2019-03-12 PROCEDURE — 85025 COMPLETE CBC W/AUTO DIFF WBC: CPT | Mod: HCNC

## 2019-03-12 PROCEDURE — 36415 COLL VENOUS BLD VENIPUNCTURE: CPT | Mod: HCNC

## 2019-03-12 RX ORDER — WARFARIN 2.5 MG/1
TABLET ORAL
Qty: 90 TABLET | Refills: 3 | Status: SHIPPED | OUTPATIENT
Start: 2019-03-12 | End: 2020-05-25

## 2019-03-12 RX ORDER — ALENDRONATE SODIUM 70 MG/1
70 TABLET ORAL
Qty: 12 TABLET | Refills: 3 | Status: SHIPPED | OUTPATIENT
Start: 2019-03-12 | End: 2023-04-17

## 2019-03-12 RX ORDER — ATORVASTATIN CALCIUM 40 MG/1
40 TABLET, FILM COATED ORAL DAILY
Qty: 90 TABLET | Refills: 3 | Status: SHIPPED | OUTPATIENT
Start: 2019-03-12 | End: 2020-02-23

## 2019-03-12 RX ORDER — CLONIDINE HYDROCHLORIDE 0.1 MG/1
0.1 TABLET ORAL 3 TIMES DAILY
Qty: 270 TABLET | Refills: 5 | Status: SHIPPED | OUTPATIENT
Start: 2019-03-12 | End: 2020-05-25

## 2019-03-12 RX ORDER — WARFARIN SODIUM 5 MG/1
TABLET ORAL
Qty: 90 TABLET | Refills: 3 | Status: SHIPPED | OUTPATIENT
Start: 2019-03-12 | End: 2020-06-02

## 2019-03-12 RX ORDER — ERGOCALCIFEROL 1.25 MG/1
50000 CAPSULE ORAL
Qty: 12 CAPSULE | Refills: 3 | Status: SHIPPED | OUTPATIENT
Start: 2019-03-12 | End: 2019-04-17 | Stop reason: SDUPTHER

## 2019-03-12 NOTE — PROGRESS NOTES
"Subjective:       Patient ID: Kamlesh Diane is a 63 y.o. male.    Chief Complaint: Follow-up    HPI   Pt was last seen 6/2018 to [facility].     Prostate CA - due for f/u w/ Dr. Rodríguez in June. Not surgical candidate.    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   CTA head/neck 9/2010 - R MCA, L occipital and parietal and R cerebellum.  Placed on coumadin - unknown reason but possibly thinks CVA was embolic in nature? Follows w/ coumadin clinic. INR 2/26 2.2.  Evaluated by Dr. Mendosa 7/19/18 and rec no changees to meds and also no additional cardiovascular eval at this time.     DM2 x 25 yrs. levemir 50 U qhs and Novolog 6-6-6  Checks glucose TID - highest was 160s. Lowest in the 80s.    Foot - DR. Ordaz 7/2018  Eye - Dr. Bansal 7/201/8 no retinopathy  a1c 5/18/18 7.1     HTN - amlo 10, clonidine 0.1mg TID, coreg 3.125mg daily, lisinopril 20mg daily.    Had 1 fall about 2 months ago as a stray dog ran across his foot. Fell on L hip. No LOC or head trauma. Intermittently w/ some pain in the L hip. Able to bare weight on the L hip. No bruising. Does not stop him from walking.     Does not have a walker but borrowed uncle's. Would like an upright walker. Will fax to phone number given from TV commercial.    Review of Systems  Comprehensive review of systems otherwise negative. See history/subjective section for more details.    Objective:      Physical Exam    /66 (BP Location: Left arm, Patient Position: Sitting, BP Method: Medium (Manual))   Pulse 78   Temp 98.2 °F (36.8 °C)   Ht 6' 1" (1.854 m)   BMI 25.86 kg/m²     Gen - A+OX4, NAD  HEENT - PERRL, OP clear. Slurred speech and broca's aphasia. MMM.   Neck - no LAD  CV - RRR, no m/r  Chest - CTAB, no wheezing/rhonchi  Abd - S/NT/ND/+BS  Ext - 2+ B radial and PT pulses. No LE edema.   MSK - no spinal tenderness to palpation. No DTRs. Antalgic gait. Walks w/ walker. 5/5 BUE and BLE m static strength. " Decreased hand  on the R and difficulty w/ intentional movements. No hip tenderness to palpation.  Skin - no rash.     Previous labs reviewed.    Assessment/Plan     Kamlesh was seen today for follow-up.    Diagnoses and all orders for this visit:    Hypertension associated with diabetes  -     Hemoglobin A1c; Future  -     Comprehensive metabolic panel; Future  -     Microalbumin/creatinine urine ratio; Future  -     cloNIDine (CATAPRES) 0.1 MG tablet; Take 1 tablet (0.1 mg total) by mouth 3 (three) times daily.    Chronic anticoagulation  -     CBC auto differential; Future  -     warfarin (COUMADIN) 2.5 MG tablet; 1 tab every M, T and F or as directed by coumadin clinic.  -     warfarin (COUMADIN) 5 MG tablet; TAKE 1 TABLET BY MOUTH ON WEDNESDAY,THURSDAY,SATURDAY, SUNDAY OR AS DIRECTED BY COUMADIN CLINIC    History of cerebrovascular accident (CVA) with residual deficit  -     Comprehensive metabolic panel; Future  -     Lipid panel; Future  -     WALKER FOR HOME USE    Hyperlipidemia associated with type 2 diabetes mellitus  -     Comprehensive metabolic panel; Future  -     Lipid panel; Future    Vitamin D deficiency  -     Vitamin D; Future  -     ergocalciferol (VITAMIN D2) 50,000 unit Cap; Take 1 capsule (50,000 Units total) by mouth every 7 days.    H/O: stroke  -     atorvastatin (LIPITOR) 40 MG tablet; Take 1 tablet (40 mg total) by mouth once daily.    Bone disorder  -     alendronate (FOSAMAX) 70 MG tablet; Take 1 tablet (70 mg total) by mouth every 7 days.    Right hemiplegia  -     WALKER FOR HOME USE        Follow-up in about 5 months (around 8/12/2019).      Debora Ontiveros MD  Department of Internal Medicine - [facility]  11:53 AM

## 2019-03-14 DIAGNOSIS — I15.2 HYPERTENSION ASSOCIATED WITH DIABETES: ICD-10-CM

## 2019-03-14 DIAGNOSIS — E11.59 HYPERTENSION ASSOCIATED WITH DIABETES: ICD-10-CM

## 2019-03-15 RX ORDER — CARVEDILOL 3.12 MG/1
3.12 TABLET ORAL 2 TIMES DAILY WITH MEALS
Qty: 180 TABLET | Refills: 3 | Status: SHIPPED | OUTPATIENT
Start: 2019-03-15 | End: 2019-12-06 | Stop reason: SDUPTHER

## 2019-03-15 NOTE — TELEPHONE ENCOUNTER
----- Message from Chey Weber sent at 3/15/2019  8:16 AM CDT -----  Contact: [phone]  RX request - refill or new RX.  Is this a refill or new RX:  Refill   RX name and strength: carvedilol (COREG) 3.125 MG tablet    [facility] [address] [place] [address] [address] AT [facility] [place]   [phone] (Phone)  [phone] (Fax)      Comments:  Please advise, thanks

## 2019-03-18 ENCOUNTER — TELEPHONE (OUTPATIENT)
Dept: INTERNAL MEDICINE | Facility: CLINIC | Age: 64
End: 2019-03-18

## 2019-03-18 DIAGNOSIS — E11.22 CKD STAGE 3 DUE TO TYPE 2 DIABETES MELLITUS: Primary | ICD-10-CM

## 2019-03-18 DIAGNOSIS — N18.30 CKD STAGE 3 DUE TO TYPE 2 DIABETES MELLITUS: Primary | ICD-10-CM

## 2019-03-18 NOTE — TELEPHONE ENCOUNTER
Please call and notify pt:    Sugars worsen to an a1c of 8.3. There is no extra protein spilling into the urine. I would recommend watching diet closely. We'll recheck this in another 3 mo (ordered, please have pt schedule) and if it continues to trend up, I'm going to refer him to diabetes education.  Vit D normal.   Cholesterol ok. Continue atorvastatin.

## 2019-04-02 ENCOUNTER — ANTI-COAG VISIT (OUTPATIENT)
Dept: CARDIOLOGY | Facility: CLINIC | Age: 64
End: 2019-04-02

## 2019-04-02 ENCOUNTER — TELEPHONE (OUTPATIENT)
Dept: INTERNAL MEDICINE | Facility: CLINIC | Age: 64
End: 2019-04-02

## 2019-04-02 ENCOUNTER — HOSPITAL ENCOUNTER (OUTPATIENT)
Dept: RADIOLOGY | Facility: HOSPITAL | Age: 64
Discharge: HOME | End: 2019-04-02
Attending: INTERNAL MEDICINE
Payer: MEDICARE

## 2019-04-02 ENCOUNTER — OFFICE VISIT (OUTPATIENT)
Dept: INTERNAL MEDICINE | Facility: CLINIC | Age: 64
End: 2019-04-02
Payer: MEDICARE

## 2019-04-02 VITALS
BODY MASS INDEX: 26.18 KG/M2 | TEMPERATURE: 98 F | HEART RATE: 73 BPM | WEIGHT: 197.56 LBS | HEIGHT: 73 IN | SYSTOLIC BLOOD PRESSURE: 124 MMHG | DIASTOLIC BLOOD PRESSURE: 64 MMHG

## 2019-04-02 DIAGNOSIS — M25.511 ACUTE PAIN OF RIGHT SHOULDER: ICD-10-CM

## 2019-04-02 DIAGNOSIS — G89.29 CHRONIC LEFT HIP PAIN: Primary | ICD-10-CM

## 2019-04-02 DIAGNOSIS — E11.8 TYPE 2 DIABETES MELLITUS WITH COMPLICATION, WITH LONG-TERM CURRENT USE OF INSULIN: ICD-10-CM

## 2019-04-02 DIAGNOSIS — M25.552 CHRONIC LEFT HIP PAIN: ICD-10-CM

## 2019-04-02 DIAGNOSIS — M25.552 CHRONIC LEFT HIP PAIN: Primary | ICD-10-CM

## 2019-04-02 DIAGNOSIS — Z79.01 LONG TERM (CURRENT) USE OF ANTICOAGULANTS: ICD-10-CM

## 2019-04-02 DIAGNOSIS — Z86.73 H/O ISCHEMIC RIGHT MCA STROKE: ICD-10-CM

## 2019-04-02 DIAGNOSIS — I63.10 CEREBROVASCULAR ACCIDENT (CVA) DUE TO EMBOLISM OF PRECEREBRAL ARTERY: ICD-10-CM

## 2019-04-02 DIAGNOSIS — G89.29 CHRONIC LEFT HIP PAIN: ICD-10-CM

## 2019-04-02 DIAGNOSIS — Z79.4 TYPE 2 DIABETES MELLITUS WITH COMPLICATION, WITH LONG-TERM CURRENT USE OF INSULIN: ICD-10-CM

## 2019-04-02 PROBLEM — Z76.89 ENCOUNTER TO ESTABLISH CARE: Status: RESOLVED | Noted: 2018-04-05 | Resolved: 2019-04-02

## 2019-04-02 PROCEDURE — 99999 PR PBB SHADOW E&M-EST. PATIENT-LVL IV: ICD-10-PCS | Mod: PBBFAC,HCNC,, | Performed by: INTERNAL MEDICINE

## 2019-04-02 PROCEDURE — 3074F PR MOST RECENT SYSTOLIC BLOOD PRESSURE < 130 MM HG: ICD-10-PCS | Mod: HCNC,CPTII,S$GLB, | Performed by: INTERNAL MEDICINE

## 2019-04-02 PROCEDURE — 73030 X-RAY EXAM OF SHOULDER: CPT | Mod: TC,HCNC,RT

## 2019-04-02 PROCEDURE — 73521 X-RAY EXAM HIPS BI 2 VIEWS: CPT | Mod: 26,HCNC,, | Performed by: RADIOLOGY

## 2019-04-02 PROCEDURE — 99999 PR PBB SHADOW E&M-EST. PATIENT-LVL IV: CPT | Mod: PBBFAC,HCNC,, | Performed by: INTERNAL MEDICINE

## 2019-04-02 PROCEDURE — 99214 OFFICE O/P EST MOD 30 MIN: CPT | Mod: HCNC,S$GLB,, | Performed by: INTERNAL MEDICINE

## 2019-04-02 PROCEDURE — 73521 XR HIPS BILATERAL 2 VIEW INCL AP PELVIS: ICD-10-PCS | Mod: 26,HCNC,, | Performed by: RADIOLOGY

## 2019-04-02 PROCEDURE — 3074F SYST BP LT 130 MM HG: CPT | Mod: HCNC,CPTII,S$GLB, | Performed by: INTERNAL MEDICINE

## 2019-04-02 PROCEDURE — 99214 PR OFFICE/OUTPT VISIT, EST, LEVL IV, 30-39 MIN: ICD-10-PCS | Mod: HCNC,S$GLB,, | Performed by: INTERNAL MEDICINE

## 2019-04-02 PROCEDURE — 73521 X-RAY EXAM HIPS BI 2 VIEWS: CPT | Mod: TC,HCNC

## 2019-04-02 PROCEDURE — 3078F DIAST BP <80 MM HG: CPT | Mod: HCNC,CPTII,S$GLB, | Performed by: INTERNAL MEDICINE

## 2019-04-02 PROCEDURE — 3008F BODY MASS INDEX DOCD: CPT | Mod: HCNC,CPTII,S$GLB, | Performed by: INTERNAL MEDICINE

## 2019-04-02 PROCEDURE — 3078F PR MOST RECENT DIASTOLIC BLOOD PRESSURE < 80 MM HG: ICD-10-PCS | Mod: HCNC,CPTII,S$GLB, | Performed by: INTERNAL MEDICINE

## 2019-04-02 PROCEDURE — 73030 XR SHOULDER COMPLETE 2 OR MORE VIEWS RIGHT: ICD-10-PCS | Mod: 26,HCNC,RT, | Performed by: RADIOLOGY

## 2019-04-02 PROCEDURE — 73030 X-RAY EXAM OF SHOULDER: CPT | Mod: 26,HCNC,RT, | Performed by: RADIOLOGY

## 2019-04-02 PROCEDURE — 3045F PR MOST RECENT HEMOGLOBIN A1C LEVEL 7.0-9.0%: CPT | Mod: HCNC,CPTII,S$GLB, | Performed by: INTERNAL MEDICINE

## 2019-04-02 PROCEDURE — 3008F PR BODY MASS INDEX (BMI) DOCUMENTED: ICD-10-PCS | Mod: HCNC,CPTII,S$GLB, | Performed by: INTERNAL MEDICINE

## 2019-04-02 PROCEDURE — 3045F PR MOST RECENT HEMOGLOBIN A1C LEVEL 7.0-9.0%: ICD-10-PCS | Mod: HCNC,CPTII,S$GLB, | Performed by: INTERNAL MEDICINE

## 2019-04-02 NOTE — PROGRESS NOTES
Brother -Krish was given lab result, verified correct dose of coumadin, Krish reports no changes, nothing new, Krish was advised to continue same dose of coumadin and also was given date of next lab draw, he stated understanding, appointment booked

## 2019-04-02 NOTE — PROGRESS NOTES
"Subjective:       Patient ID: Kamlseh Diane is a 63 y.o. male.    Chief Complaint: Hip Pain (Left two weeks ago)    HPI   Pt was recently seen 3/12/19 for follow up.  Here today for urgent visit for left hip pain. He fell 1.5 mo ago when a stray dog tripped him. Fell onto the L hip. At last visit, able to bear weight on the L hip.   C/o pain becoming more constant and throbbing. This occurs when he lifts his left leg high to step over the tub.   Didn't get his rollator. H/o ischemic R MCA stroke and has chronic aphasia.    C/o R shoulder pain that started about a week ago. No trauma. Pain worse in the morning but is present and feels sore now. Still able to raise the arm. No numbness/tingling. No increased weakness.     DM2 x 25 yrs. levemir 50 U qhs and Novolog 6-6-6. If glucose is on the low side before breakfast, he'll skip the novolog w/ breakfast, not check sugar before lunch, and not take the novolog during lunch also.   Checks glucose BID - TID - highest was 160s. Lowest in the 80s.    Foot - DR. Ordaz 7/2018  Eye - Dr. Bansal 7/201/8 no retinopathy  a1c 3/12/19 - 8.3, up from 7.1 last year.     Review of Systems  as above in HPI.     Objective:      Physical Exam    /64 (BP Location: Left arm, Patient Position: Sitting, BP Method: Large (Manual))   Pulse 73   Temp 98.1 °F (36.7 °C)   Ht 6' 1" (1.854 m)   Wt 89.6 kg (197 lb 8.5 oz)   BMI 26.06 kg/m²     Gen - A+OX4, NAD  heent - perrl, op clear. Slurred speech and broca's. MMM.   Neck - no LAD  CV - RRR, no m/r  Chest - CTAB, no wheezing/rhonchi  Abd - S/NT/ND/+BS  Ext -2 + B radial and PT pulses. No LE edema.   MSK - some pain on flexion of the shoulder to above 90 degrees. Otherwise able to abduct and extend w/o issues. No pain on palpation of the R shoulder or at the AC joint. Good hand . 5/5 BUE m strength except decreased hand  on the R. No hip tenderness to palpation. Some stiffness in movement of the hips (chronic) but no pain on " extension, int or ext rotation. Pain on flexion of L hip above 90 degrees.     Previous labs reviewed.     Assessment/Plan     Kamlesh was seen today for hip pain.    Diagnoses and all orders for this visit:    Chronic left hip pain - referral to  for PT. Consider referral to ortho. Can take tylenol arthritis up to 2000mg in 24 hours as needed for pain.   -     X-Ray Hips Bilateral 2 View Incl AP Pelvis; Future    Acute pain of right shoulder  -     X-ray Shoulder 2 or More Views Right; Future  -     Ambulatory referral to Home Health    H/O ischemic right MCA stroke  -     Ambulatory referral to Home Health    Type 2 diabetes mellitus with complication, with long-term current use of insulin - a1c went up. Likely due to brother not giving pt the lunch dose of novolog for a few months now as his AM sugars are usually very good. Discussed if they were to skip novolog at breakfast, absolutely should check sugars before lunchtime and likely will need novolog w/ lunchtime. Repeat a1c in 3 mo.       Follow up in 3 mo.      Debora Ontiveros MD  Department of Internal Medicine - [facility]  7:23 AM

## 2019-04-02 NOTE — TELEPHONE ENCOUNTER
----- Message from Debora Ontiveros MD sent at 4/2/2019  2:31 PM CDT -----  Please call and notify pt:  Hip xray shows that there are some bony growth by the hip prosthesis on the left. Sometimes that can cause pain. There is mild arthritis changes on the right. Shoulder x-ray is normal. I recommend home health as discussed and I'll also refer him to orthopedics to see if anything additional needs to be done. I recommend tylenol 500mg 2 pills twice daily, maximum of 2000mg in 24 hours.

## 2019-04-02 NOTE — TELEPHONE ENCOUNTER
Notified pt's brother Krish( his care taker) and notified him of results. Mr. Rutherford said he will give the Tylenol as ordered to night then in the morning and agrees to referrals for ortho and hh.       Please assist pt with scheduling ortho and Home Health appointments

## 2019-04-10 ENCOUNTER — LAB VISIT (OUTPATIENT)
Dept: LAB | Facility: HOSPITAL | Age: 64
End: 2019-04-10
Payer: MEDICARE

## 2019-04-10 ENCOUNTER — OFFICE VISIT (OUTPATIENT)
Dept: ORTHOPEDICS | Facility: CLINIC | Age: 64
End: 2019-04-10
Payer: MEDICARE

## 2019-04-10 VITALS
HEART RATE: 105 BPM | WEIGHT: 197 LBS | BODY MASS INDEX: 26.11 KG/M2 | DIASTOLIC BLOOD PRESSURE: 85 MMHG | SYSTOLIC BLOOD PRESSURE: 121 MMHG | HEIGHT: 73 IN

## 2019-04-10 DIAGNOSIS — Z96.642 HISTORY OF LEFT HIP REPLACEMENT: Primary | ICD-10-CM

## 2019-04-10 DIAGNOSIS — M25.552 PAIN OF LEFT HIP JOINT: ICD-10-CM

## 2019-04-10 DIAGNOSIS — Z96.642 HISTORY OF LEFT HIP REPLACEMENT: ICD-10-CM

## 2019-04-10 DIAGNOSIS — R25.1 TREMOR: ICD-10-CM

## 2019-04-10 LAB
CRP SERPL-MCNC: 3.2 MG/L (ref 0–8.2)
ERYTHROCYTE [SEDIMENTATION RATE] IN BLOOD BY WESTERGREN METHOD: 13 MM/HR (ref 0–23)

## 2019-04-10 PROCEDURE — 3074F SYST BP LT 130 MM HG: CPT | Mod: HCNC,CPTII,S$GLB, | Performed by: PHYSICIAN ASSISTANT

## 2019-04-10 PROCEDURE — 3074F PR MOST RECENT SYSTOLIC BLOOD PRESSURE < 130 MM HG: ICD-10-PCS | Mod: HCNC,CPTII,S$GLB, | Performed by: PHYSICIAN ASSISTANT

## 2019-04-10 PROCEDURE — 3079F PR MOST RECENT DIASTOLIC BLOOD PRESSURE 80-89 MM HG: ICD-10-PCS | Mod: HCNC,CPTII,S$GLB, | Performed by: PHYSICIAN ASSISTANT

## 2019-04-10 PROCEDURE — 85652 RBC SED RATE AUTOMATED: CPT | Mod: HCNC

## 2019-04-10 PROCEDURE — 36415 COLL VENOUS BLD VENIPUNCTURE: CPT | Mod: HCNC

## 2019-04-10 PROCEDURE — 3008F BODY MASS INDEX DOCD: CPT | Mod: HCNC,CPTII,S$GLB, | Performed by: PHYSICIAN ASSISTANT

## 2019-04-10 PROCEDURE — 99999 PR PBB SHADOW E&M-EST. PATIENT-LVL III: ICD-10-PCS | Mod: PBBFAC,HCNC,, | Performed by: PHYSICIAN ASSISTANT

## 2019-04-10 PROCEDURE — 3079F DIAST BP 80-89 MM HG: CPT | Mod: HCNC,CPTII,S$GLB, | Performed by: PHYSICIAN ASSISTANT

## 2019-04-10 PROCEDURE — 99999 PR PBB SHADOW E&M-EST. PATIENT-LVL III: CPT | Mod: PBBFAC,HCNC,, | Performed by: PHYSICIAN ASSISTANT

## 2019-04-10 PROCEDURE — 86140 C-REACTIVE PROTEIN: CPT | Mod: HCNC

## 2019-04-10 PROCEDURE — 99203 OFFICE O/P NEW LOW 30 MIN: CPT | Mod: HCNC,S$GLB,, | Performed by: PHYSICIAN ASSISTANT

## 2019-04-10 PROCEDURE — 99203 PR OFFICE/OUTPT VISIT, NEW, LEVL III, 30-44 MIN: ICD-10-PCS | Mod: HCNC,S$GLB,, | Performed by: PHYSICIAN ASSISTANT

## 2019-04-10 PROCEDURE — 3008F PR BODY MASS INDEX (BMI) DOCUMENTED: ICD-10-PCS | Mod: HCNC,CPTII,S$GLB, | Performed by: PHYSICIAN ASSISTANT

## 2019-04-10 NOTE — LETTER
April 10, 2019      Debora Ontiveros MD  [address]  [facility] [address]           [facility] - [facility]oped[facility]  [address], [address]  [facility] [address]  Phone: [phone]          Patient: Kamlesh Diane   MR Number: [id number]   YOB: 1955   Date of Visit: 4/10/2019       Dear Dr. Debora Ontiveros:    Thank you for referring Kamlesh Diane to me for evaluation. Attached you will find relevant portions of my assessment and plan of care.    If you have questions, please do not hesitate to call me. I look forward to following Kamlesh Diane along with you.    Sincerely,    Darrin Aguila PA-C    Enclosure  CC:  No Recipients    If you would like to receive this communication electronically, please contact [email or web address] or [phone] to request more information on [employer] Link access.    For providers and/or their staff who would like to refer a patient to [facility], please contact us through our one-stop-shop provider referral line, [facility], at [phone].    If you feel you have received this communication in error or would no longer like to receive these types of communications, please e-mail [email or web address]

## 2019-04-10 NOTE — PROGRESS NOTES
SUBJECTIVE:     Chief Complaint & History of Present Illness:  Kamlesh Diane is a 63 y.o. year old male, new patient referred to Orthopedics by Dr. Ontiveros, presenting today for intermittent left hip pain which started a couple of months ago.  There is a history of trauma.  Patient states he fell about 1.5 months ago.  He is s/p L LYNETTE.  He is unsure of exact date of surgery, but it was a little over one year ago.  He states the surgery was done at [facility], unsure of the name of the surgeon.  He states he went to follow up with [facility] after surgery was completed, but was told his insurance would not be accepted by them.  He has now been without a follow up for his left hip since surgery occurred.  The pain is located in the lateral/groin aspect of the hip.  The pain is described as discomfort, 1/10.  It is worse with getting in and out of tub.  There is not radiation into the leg.  Previous treatments include acetaminophen which have provided adequate relief.  The patient does use an assistive device.      Past Medical History:   Diagnosis Date    CVA (cerebral vascular accident)     Diabetes     Elevated PSA     Hypertension     Prostate cancer        Past Surgical History:   Procedure Laterality Date    FRACTURE SURGERY      hips x 2 - last fall was 2 yrs ago.       Family History   Problem Relation Age of Onset    Hypertension Father     Heart attack Father     Breast cancer Sister     Diabetes Brother     Hypertension Brother        Review of patient's allergies indicates:  No Known Allergies      Current Outpatient Medications:     ACCU-CHEK JUSTIN PLUS TEST STRP Strp, Use 4x daily., Disp: 400 each, Rfl: 3    ACCU-CHEK SOFTCLIX LANCETS Misc, , Disp: , Rfl:     alendronate (FOSAMAX) 70 MG tablet, Take 1 tablet (70 mg total) by mouth every 7 days., Disp: 12 tablet, Rfl: 3    amantadine HCl (SYMMETREL) 100 mg capsule, TAKE 1 CAPSULE TWICE DAILY, Disp: 180 capsule, Rfl: 1    amLODIPine (NORVASC) 10 MG  "tablet, Take 1 tablet (10 mg total) by mouth once daily., Disp: 90 tablet, Rfl: 3    ammonium lactate 12 % Crea, Apply 1 application topically 2 (two) times daily. Apply to affected area, Disp: , Rfl: 0    atorvastatin (LIPITOR) 40 MG tablet, Take 1 tablet (40 mg total) by mouth once daily., Disp: 90 tablet, Rfl: 3    BD ULTRA-FINE SHORT PEN NEEDLE 31 gauge x 5/16" Ndle, , Disp: , Rfl:     carvedilol (COREG) 3.125 MG tablet, Take 1 tablet (3.125 mg total) by mouth 2 (two) times daily with meals., Disp: 180 tablet, Rfl: 3    cloNIDine (CATAPRES) 0.1 MG tablet, Take 1 tablet (0.1 mg total) by mouth 3 (three) times daily., Disp: 270 tablet, Rfl: 5    cycloSPORINE (RESTASIS) 0.05 % ophthalmic emulsion, Place 0.4 mLs (1 drop total) into both eyes 2 (two) times daily., Disp: 180 vial, Rfl: 3    ergocalciferol (VITAMIN D2) 50,000 unit Cap, Take 1 capsule (50,000 Units total) by mouth every 7 days., Disp: 12 capsule, Rfl: 3    LEVEMIR FLEXTOUCH U-100 INSULN 100 unit/mL (3 mL) InPn pen, Inject 50 Units into the skin every evening., Disp: 3 Box, Rfl: 3    lisinopril (PRINIVIL,ZESTRIL) 20 MG tablet, Take 1 tablet (20 mg total) by mouth once daily., Disp: 90 tablet, Rfl: 3    NOVOLOG FLEXPEN U-100 INSULIN 100 unit/mL InPn pen, Inject 6 Units into the skin 3 (three) times daily with meals. If noon blood sugar is more than 150 mg/dL, use 8 units instead at noon., Disp: 3 Box, Rfl: 3    warfarin (COUMADIN) 2.5 MG tablet, 1 tab every M, T and F or as directed by coumadin clinic., Disp: 90 tablet, Rfl: 3    warfarin (COUMADIN) 5 MG tablet, TAKE 1 TABLET BY MOUTH ON WEDNESDAY,THURSDAY,SATURDAY, SUNDAY OR AS DIRECTED BY COUMADIN CLINIC, Disp: 90 tablet, Rfl: 3    Current Facility-Administered Medications:     lidocaine HCL 2% jelly, , Topical (Top), Once, Carey Rodríguez MD    Review of Systems:  ROS:  Constitutional: no fever or chills  Eyes: no visual changes  ENT: no nasal congestion or sore throat  Respiratory: " "no cough or shortness of breath  Cardiovascular: no chest pain or palpitations  Gastrointestinal: no nausea or vomiting, tolerating diet  Genitourinary: no hematuria or dysuria, positive for hx of prostate cancer  Integument/Breast: no rash or pruritis  Hematologic/Lymphatic: no easy bruising or lymphadenopathy  Musculoskeletal: positive for left hip pain  Neurological: positive for tremor and hx of stroke  Behavioral/Psych: no auditory or visual hallucinations  Endocrine: no heat or cold intolerance, positive for T2DM      PE:  /85   Pulse 105   Ht 6' 1" (1.854 m)   Wt 89.4 kg (197 lb)   BMI 25.99 kg/m²   General: Pleasant, cooperative, NAD   HEENT: NCAT, sclera nonicteric   Lungs: Respirations are equal and unlabored.   Abdomen: Soft and non-tender.  CV: 2+ bilateral upper and lower extremity pulses.   Skin: Intact throughout LE with no rashes, erythema, or lesions  Extremities: No LE edema, NVI lower extremities        Hip Exam:  Left  100 degrees flexion  80 degrees extension   20 degrees internal rotation  25 degrees external rotation  30 degrees abduction  30 degrees adduction   0 flexion contracture    RADIOGRAPHS:  Xray of left hip taken on 4/2/19, reviewed by radiologist and by me in clinic today, demonstrates bipolar prosthesis with hypertrophic bone around area, no evidence of fracture or dislocation.    ASSESSMENT/PLAN:     Plan: We discussed with the patient at length all the different treatment options available for arthrosis of the hip including anti-inflammatories, acetaminophen, rest, ice, lower extremity strengthening exercise, occasional cortisone injections for temporary relief, and finally total hip arthroplasty.   After discussing patient with Dr. Langley, I will order CRP and Sed rate.  Patient signed papers today for medical record release from [facility] for operation note and other pertinent medical information.  He is to take tylenol as discussed with PCP for pain.  Patient scheduled " to follow up with Dr. Biggs for further follow up and treatment.  Patient verbalized understanding and agrees.    Final Diagnosis: History of left hip replacement. Left hip pain.

## 2019-04-11 RX ORDER — AMANTADINE HYDROCHLORIDE 100 MG/1
CAPSULE, GELATIN COATED ORAL
Qty: 180 CAPSULE | Refills: 1 | Status: SHIPPED | OUTPATIENT
Start: 2019-04-11 | End: 2019-11-18 | Stop reason: SDUPTHER

## 2019-04-13 ENCOUNTER — TELEPHONE (OUTPATIENT)
Dept: INTERNAL MEDICINE | Facility: CLINIC | Age: 64
End: 2019-04-13

## 2019-04-13 DIAGNOSIS — G89.29 CHRONIC LEFT HIP PAIN: Primary | ICD-10-CM

## 2019-04-13 DIAGNOSIS — Z86.73 HISTORY OF ISCHEMIC RIGHT MCA STROKE: ICD-10-CM

## 2019-04-13 DIAGNOSIS — M25.552 CHRONIC LEFT HIP PAIN: Primary | ICD-10-CM
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
## 2019-04-16 ENCOUNTER — OFFICE VISIT (OUTPATIENT)
Dept: ORTHOPEDICS | Facility: CLINIC | Age: 64
End: 2019-04-16
Payer: MEDICARE

## 2019-04-16 VITALS — HEIGHT: 73 IN | WEIGHT: 196.88 LBS | BODY MASS INDEX: 26.09 KG/M2

## 2019-04-16 DIAGNOSIS — Z96.642 PRESENCE OF LEFT ARTIFICIAL HIP JOINT: Primary | ICD-10-CM

## 2019-04-16 PROCEDURE — 99999 PR PBB SHADOW E&M-EST. PATIENT-LVL III: CPT | Mod: PBBFAC,HCNC,, | Performed by: ORTHOPAEDIC SURGERY

## 2019-04-16 PROCEDURE — 99214 PR OFFICE/OUTPT VISIT, EST, LEVL IV, 30-39 MIN: ICD-10-PCS | Mod: HCNC,S$GLB,, | Performed by: ORTHOPAEDIC SURGERY

## 2019-04-16 PROCEDURE — 99999 PR PBB SHADOW E&M-EST. PATIENT-LVL III: ICD-10-PCS | Mod: PBBFAC,HCNC,, | Performed by: ORTHOPAEDIC SURGERY

## 2019-04-16 PROCEDURE — 3008F PR BODY MASS INDEX (BMI) DOCUMENTED: ICD-10-PCS | Mod: HCNC,CPTII,S$GLB, | Performed by: ORTHOPAEDIC SURGERY

## 2019-04-16 PROCEDURE — 99214 OFFICE O/P EST MOD 30 MIN: CPT | Mod: HCNC,S$GLB,, | Performed by: ORTHOPAEDIC SURGERY

## 2019-04-16 PROCEDURE — 99499 RISK ADDL DX/OHS AUDIT: ICD-10-PCS | Mod: S$GLB,,, | Performed by: ORTHOPAEDIC SURGERY

## 2019-04-16 PROCEDURE — 99499 UNLISTED E&M SERVICE: CPT | Mod: S$GLB,,, | Performed by: ORTHOPAEDIC SURGERY

## 2019-04-16 PROCEDURE — 3008F BODY MASS INDEX DOCD: CPT | Mod: HCNC,CPTII,S$GLB, | Performed by: ORTHOPAEDIC SURGERY

## 2019-04-16 NOTE — PROGRESS NOTES
Subjective:     HPI:   Kamlesh Diane is a 63 y.o. male who presents for evaluation of his left hip    He apparently fell and had a femoral neck fracture treated at [facility].  He is a very poor historian and cannot remember really any of these details but we were able to find outside records that show he had a left hip hemiarthroplasty by Dr. Quentin Alexis on March 15, 2017.  This was a [place] bipolar hemiarthroplasty size 10 stem size 48 head via posterior approach.  There were no apparent perioperative complications or wound healing issues.  Per the notes he was told that he could not go back there as they dont take his insurance anymore    He had a fall maybe a month or 2 ago onto the left lateral hip seems he may have complained about some left hip pain with Dr. Ontiveros on April 2nd.  Some home health physical therapy was started he was prescribed some Tylenol and sent Orthopedics.    He saw 1 of our PAs on April 10th.  ESR and CRP were obtained which were both normal ESR 13 CRP 3.2.    Today the patient says he has some occasional lateral hip pain but nothing consistent or persistent.  Overall he feels like he has gotten better he is happy with the hip and he even is not real sure why he is here.    Takes Tylenol as needed.  Never any injections in the hip.  He is doing his home health therapy now which is helping.  He is using a Rollator id he says he was using a cane prior to surgery it seems like he has probably been using that Rollator since surgery. He can walk a 0.5 mile.  No specific limitations due the hip.    He lives alone he has a brother and sister close by but he is here by himself today for his visit.  He is on disability    Admittedly he is a very poor historian related to history of stroke and brain aneurysm.  He has hypertension history of prostate cancer which the patient actually denies.  He has diabetes is last A1c was 8.3 in March of 2019 he has chronic kidney disease with a creatinine of 1.7        ROS:  The updated medical history is in the chart and has been reviewed. A review of systems is updated and there is no reported vision changes, ear/nose/mouth/throat complaints,  chest pain, shortness of breath, abdominal pain, urological complaints, fevers or chills, psychiatric complaints. Musculoskeletal and neurologcial symptoms are as documented. All other systems are negative.      Objective:   Exam:  There were no vitals filed for this visit.  Body mass index is 25.97 kg/m².    Physical examination included assessment of the patient's general appearance with particular attention to development, nutrition, body habitus, attention to grooming, and any evidence of distress.  Constitutional: The patient is a well-developed, well-nourished patient in no acute distress.   Cardiovascular: Vascular examination included warmth and capillary refill as well inspection for edema and assessment of pedal pulses. Pulses are palpable and regular.  Musculoskeletal: Gait was assessed as to whether it was steady, non-antalgic, and/or required the use of an assist device. The patient was also asked to walk independently and get onto the examination table.  Skin: The skin was examined for any obvious rashes or lesions in the extremity.  Neurologic: Sensation is intact to light touch in the extremity. The patient has good coordination without hyperreflexia and is alert and oriented to person, place and time and has normal mood and affect.     All of the above were examined and found to be within normal limits except for the following pertinent clinical findings:    He gets up with minimal difficulty.  No limp nonantalgic gait negative Trendelenburg sign.  He has got a slow steady gait. Has a well-healed posterior hip incision.  No groin pain with active straight leg raise.  Hip range of motion 0-110 flexion 20 abduction 20 abduction 30 external 15 internal rotation without any pain.  He has minimal tenderness over the greater  trochanter.  No limb length discrepancy supine      Imaging:  Indication:  Exam status post left hip hemiarthroplasty  Exam Ordered: Radiographs taken today include an anteroposterior pelvis, an AP and  lateral view of the proximal femur including the hip joint  Details of Examination: Today's exam show a well fixed, well positioned total hip arthroplasty with no evidence of wear, osteolysis, or loosening.  Impression:  Status post left hip hemiarthroplasty, implant in good position with heterotopic ossification grade 3        Assessment:     Presence of left artificial hip joint [Z96.642]  Symptoms may have been from hip contusion after a fall.  Today appears minimally symptomatic.  He does have some heterotopic ossification which may cause some occasional discomfort    Poor historian history of stroke and aphasia  On Coumadin  Poorly controlled diabetic  Chronic kidney disease     Plan:       We discussed that overall he appears to be functioning pretty well.  I recommend continued observation I do not think any needs surgery or other intervention on this hip.  Certainly some home health therapy to help with his function and ambulation is reasonable.  He should continue to use the Rollator.    Given his relatively young age at some point he may wear out his acetabulum at that point he we could discuss conversion to a total hip replacement but hopefully that is many years down the road.    At this point I would not recommend any further orthopedic intervention we can see him back on an as-needed basis    No orders of the defined types were placed in this encounter.      Past Medical History:   Diagnosis Date    CVA (cerebral vascular accident)     Diabetes     Elevated PSA     Hypertension     Prostate cancer        Past Surgical History:   Procedure Laterality Date    FRACTURE SURGERY      hips x 2 - last fall was 2 yrs ago.       Family History   Problem Relation Age of Onset    Hypertension Father      Heart attack Father     Breast cancer Sister     Diabetes Brother     Hypertension Brother        Social History     Socioeconomic History    Marital status: Single     Spouse name: Not on file    Number of children: Not on file    Years of education: Not on file    Highest education level: Not on file   Occupational History    Occupation: worked in the hospital previously.    Social Needs    Financial resource strain: Not on file    Food insecurity:     Worry: Not on file     Inability: Not on file    Transportation needs:     Medical: Not on file     Non-medical: Not on file   Tobacco Use    Smoking status: Never Smoker    Smokeless tobacco: Never Used   Substance and Sexual Activity    Alcohol use: Yes     Comment: a few shots once a week.     Drug use: Yes     Types: Marijuana    Sexual activity: Not Currently   Lifestyle    Physical activity:     Days per week: Not on file     Minutes per session: Not on file    Stress: Not on file   Relationships    Social connections:     Talks on phone: Not on file     Gets together: Not on file     Attends Jehovah's witness service: Not on file     Active member of club or organization: Not on file     Attends meetings of clubs or organizations: Not on file     Relationship status: Not on file   Other Topics Concern    Not on file   Social History Narrative    Not on file

## 2019-04-16 NOTE — LETTER
April 16, 2019      Darrin Aguila PA-C  [address]  [facility] [address]           [facility] - Orthopedics  [address], [address]  [facility] [address]  Phone: [phone]          Patient: Kamlesh Diane   MR Number: [id number]   YOB: 1955   Date of Visit: 4/16/2019       Dear Darrin Aguila:    Thank you for referring Kamlesh Diane to me for evaluation. Attached you will find relevant portions of my assessment and plan of care.    If you have questions, please do not hesitate to call me. I look forward to following Kamlesh Diane along with you.    Sincerely,    Krish Biggs III, MD    Enclosure  CC:  No Recipients    If you would like to receive this communication electronically, please contact [email or web address] or [phone] to request more information on [employer] Link access.    For providers and/or their staff who would like to refer a patient to [facility], please contact us through our one-stop-shop provider referral line, [facility], at [phone].    If you feel you have received this communication in error or would no longer like to receive these types of communications, please e-mail [email or web address]

## 2019-04-17 DIAGNOSIS — E55.9 VITAMIN D DEFICIENCY: ICD-10-CM

## 2019-04-18 RX ORDER — LANCETS
1 EACH MISCELLANEOUS 4 TIMES DAILY
Qty: 400 EACH | Refills: 3 | Status: SHIPPED | OUTPATIENT
Start: 2019-04-18 | End: 2020-07-09 | Stop reason: SDUPTHER

## 2019-04-18 RX ORDER — ERGOCALCIFEROL 1.25 MG/1
50000 CAPSULE ORAL
Qty: 12 CAPSULE | Refills: 3 | Status: SHIPPED | OUTPATIENT
Start: 2019-04-18 | End: 2019-06-03 | Stop reason: SDUPTHER

## 2019-04-18 NOTE — TELEPHONE ENCOUNTER
----- Message from Nehemiah Marroquin sent at 4/18/2019  9:29 AM CDT -----  Contact: Isabell Quijano [phone]  Is this a refill or new RX:  Refill    RX name and strength: ACCU-CHEK SOFTCLIX LANCETS Misc and ergocalciferol (VITAMIN D2) 50,000 unit Cap    Pharmacy name and phone # [facility] [place], [place] [facility] [address] [phone] (Phone) [phone] (Fax)

## 2019-05-01 ENCOUNTER — TELEPHONE (OUTPATIENT)
Dept: ADMINISTRATIVE | Facility: CLINIC | Age: 64
End: 2019-05-01

## 2019-05-01 NOTE — TELEPHONE ENCOUNTER
Home Health SOC 04/04/2019 - 06/02/2019 with [facility] ([place]) - Dr. Debora Ontiveros. Patient received SN and PT services.

## 2019-05-16 NOTE — PROGRESS NOTES
Suki with [facility] called to report INR was due 5/14 in clinic but Patient missed the appointment, states nurse is scheduled to see Patient tomorrow -5/17, wants to know if INR can be drawn then

## 2019-05-17 ENCOUNTER — ANTI-COAG VISIT (OUTPATIENT)
Dept: CARDIOLOGY | Facility: CLINIC | Age: 64
End: 2019-05-17
Payer: MEDICARE

## 2019-05-17 DIAGNOSIS — Z79.01 LONG TERM (CURRENT) USE OF ANTICOAGULANTS: ICD-10-CM

## 2019-05-17 DIAGNOSIS — I63.10 CEREBROVASCULAR ACCIDENT (CVA) DUE TO EMBOLISM OF PRECEREBRAL ARTERY: ICD-10-CM

## 2019-05-17 LAB — INR PPP: 2.2

## 2019-05-17 PROCEDURE — 93793 ANTICOAG MGMT PT WARFARIN: CPT | Mod: S$GLB,,,

## 2019-05-17 PROCEDURE — 93793 PR ANTICOAGULANT MGMT FOR PT TAKING WARFARIN: ICD-10-PCS | Mod: S$GLB,,,

## 2019-05-17 NOTE — PROGRESS NOTES
Krish was given lab result, verified correct dose of coumadin, Krish reports no changes, Krish was advised to continue same dose of coumadin and given next lab date, Order was faxed to [facility]

## 2019-06-03 DIAGNOSIS — E55.9 VITAMIN D DEFICIENCY: ICD-10-CM

## 2019-06-03 RX ORDER — ERGOCALCIFEROL 1.25 MG/1
50000 CAPSULE ORAL
Qty: 12 CAPSULE | Refills: 3 | Status: SHIPPED | OUTPATIENT
Start: 2019-06-03 | End: 2019-06-11 | Stop reason: SDUPTHER

## 2019-06-03 NOTE — TELEPHONE ENCOUNTER
----- Message from Karin Chandler sent at 6/3/2019 12:22 PM CDT -----  Contact:  Celsa Zhu [phone]  Patient is calling for an RX refill or new RX.  Is this a refill or new RX:  Refill  RX name and strength: ergocalciferol (VITAMIN D2) 50,000 unit Cap  Directions (copy/paste from chart):   Take 1 capsule (50,000 Units total) by mouth every 7 days  Is this a 30 day or 90 day RX:  90  Local pharmacy or mail order pharmacy:  [facility]  Pharmacy name and phone # (copy/paste from chart):   [facility] [phone] (Phone)  [phone] (Fax)  Comments:

## 2019-06-11 DIAGNOSIS — E55.9 VITAMIN D DEFICIENCY: ICD-10-CM

## 2019-06-11 RX ORDER — ERGOCALCIFEROL 1.25 MG/1
50000 CAPSULE ORAL
Qty: 12 CAPSULE | Refills: 3 | Status: SHIPPED | OUTPATIENT
Start: 2019-06-11 | End: 2020-08-23

## 2019-06-11 NOTE — TELEPHONE ENCOUNTER
----- Message from Felisa Benavides sent at 6/11/2019  8:29 AM CDT -----  Contact: Ilya/ Pt's Niece/ [phone]  Pt's niece is calling to speak with someone in regards to the pt's ergocalciferol (VITAMIN D2) 50,000 unit Cap. She states that it was sent over to [facility] and they need it to be sent over to [facility] - [facility] [address]. She states that it is expensive at [facility] and the pt can not afford it. Pt's niece would like for someone to give her a call back in regards to this. Please call back and advise.      Thanks

## 2019-06-18 ENCOUNTER — LAB VISIT (OUTPATIENT)
Dept: LAB | Facility: HOSPITAL | Age: 64
End: 2019-06-18
Attending: INTERNAL MEDICINE
Payer: MEDICARE

## 2019-06-18 DIAGNOSIS — N18.30 CKD STAGE 3 DUE TO TYPE 2 DIABETES MELLITUS: ICD-10-CM

## 2019-06-18 DIAGNOSIS — E11.22 CKD STAGE 3 DUE TO TYPE 2 DIABETES MELLITUS: ICD-10-CM

## 2019-06-18 LAB
ESTIMATED AVG GLUCOSE: 171 MG/DL (ref 68–131)
HBA1C MFR BLD HPLC: 7.6 % (ref 4–5.6)

## 2019-06-18 PROCEDURE — 36415 COLL VENOUS BLD VENIPUNCTURE: CPT | Mod: HCNC

## 2019-06-18 PROCEDURE — 83036 HEMOGLOBIN GLYCOSYLATED A1C: CPT | Mod: HCNC

## 2019-06-27 ENCOUNTER — OFFICE VISIT (OUTPATIENT)
Dept: URGENT CARE | Facility: CLINIC | Age: 64
End: 2019-06-27
Payer: MEDICARE

## 2019-06-27 VITALS
HEART RATE: 105 BPM | DIASTOLIC BLOOD PRESSURE: 92 MMHG | TEMPERATURE: 99 F | RESPIRATION RATE: 20 BRPM | WEIGHT: 196 LBS | OXYGEN SATURATION: 97 % | HEIGHT: 73 IN | SYSTOLIC BLOOD PRESSURE: 124 MMHG | BODY MASS INDEX: 25.98 KG/M2

## 2019-06-27 DIAGNOSIS — M25.552 HIP PAIN, ACUTE, LEFT: Primary | ICD-10-CM

## 2019-06-27 DIAGNOSIS — M54.50 LOW BACK PAIN, NON-SPECIFIC: ICD-10-CM

## 2019-06-27 PROCEDURE — 72100 XR LUMBAR SPINE 2 OR 3 VIEWS: ICD-10-PCS | Mod: S$GLB,,, | Performed by: RADIOLOGY

## 2019-06-27 PROCEDURE — 99214 PR OFFICE/OUTPT VISIT, EST, LEVL IV, 30-39 MIN: ICD-10-PCS | Mod: S$GLB,,, | Performed by: NURSE PRACTITIONER

## 2019-06-27 PROCEDURE — 3008F BODY MASS INDEX DOCD: CPT | Mod: CPTII,S$GLB,, | Performed by: NURSE PRACTITIONER

## 2019-06-27 PROCEDURE — 3074F SYST BP LT 130 MM HG: CPT | Mod: CPTII,S$GLB,, | Performed by: NURSE PRACTITIONER

## 2019-06-27 PROCEDURE — 3080F DIAST BP >= 90 MM HG: CPT | Mod: CPTII,S$GLB,, | Performed by: NURSE PRACTITIONER

## 2019-06-27 PROCEDURE — 3008F PR BODY MASS INDEX (BMI) DOCUMENTED: ICD-10-PCS | Mod: CPTII,S$GLB,, | Performed by: NURSE PRACTITIONER

## 2019-06-27 PROCEDURE — 3080F PR MOST RECENT DIASTOLIC BLOOD PRESSURE >= 90 MM HG: ICD-10-PCS | Mod: CPTII,S$GLB,, | Performed by: NURSE PRACTITIONER

## 2019-06-27 PROCEDURE — 73502 XR HIP 2 VIEW LEFT: ICD-10-PCS | Mod: LT,S$GLB,, | Performed by: RADIOLOGY

## 2019-06-27 PROCEDURE — 72100 X-RAY EXAM L-S SPINE 2/3 VWS: CPT | Mod: S$GLB,,, | Performed by: RADIOLOGY

## 2019-06-27 PROCEDURE — 3074F PR MOST RECENT SYSTOLIC BLOOD PRESSURE < 130 MM HG: ICD-10-PCS | Mod: CPTII,S$GLB,, | Performed by: NURSE PRACTITIONER

## 2019-06-27 PROCEDURE — 73502 X-RAY EXAM HIP UNI 2-3 VIEWS: CPT | Mod: LT,S$GLB,, | Performed by: RADIOLOGY

## 2019-06-27 PROCEDURE — 99214 OFFICE O/P EST MOD 30 MIN: CPT | Mod: S$GLB,,, | Performed by: NURSE PRACTITIONER

## 2019-06-27 NOTE — PROGRESS NOTES
"Subjective:       Patient ID: Kamlesh Diane is a 64 y.o. male.    Vitals:  height is 6' 1" (1.854 m) and weight is 88.9 kg (196 lb). His temperature is 98.7 °F (37.1 °C). His blood pressure is 124/92 (abnormal) and his pulse is 105. His respiration is 20 and oxygen saturation is 97%.     Chief Complaint: Fall    Pt states he was in the restroom two days ago brushing his teeth when he fell. He was alone and lost his balance. He didn't hit his head.  He is sure that he fell on his buttocks and was unconscious for a few seconds. The fall happen at 930 am that morning , pt is a diabetic and hadn't had the krupa to eat yet, states he usually eats his first meal at 10 am. He has taken an tylenol for his back pain.  He is on coumadin for history of ischemic stroke-- last INR per patient was 2.3    Fall   The accident occurred 2 days ago. The fall occurred while standing. He landed on hard floor. The point of impact was the buttocks. The pain is present in the back. The pain is at a severity of 3/10. The symptoms are aggravated by sitting. Pertinent negatives include no abdominal pain or hematuria. He has tried NSAID for the symptoms.       Constitution: Negative for fatigue.   HENT: Negative for facial swelling and facial trauma.    Neck: Negative for neck stiffness.   Cardiovascular: Negative for chest trauma.   Eyes: Negative for eye trauma, double vision and blurred vision.   Gastrointestinal: Negative for abdominal trauma, abdominal pain and rectal bleeding.   Genitourinary: Negative for hematuria, genital trauma and pelvic pain.   Musculoskeletal: Negative for pain, trauma, joint swelling, abnormal ROM of joint and pain with walking.   Skin: Negative for color change, wound, abrasion and laceration.   Neurological: Negative for dizziness, history of vertigo, light-headedness, coordination disturbances and altered mental status.   Hematologic/Lymphatic: Negative for history of bleeding disorder. "   Psychiatric/Behavioral: Negative for altered mental status.       Objective:      Physical Exam   Constitutional: He is oriented to person, place, and time. He appears well-developed and well-nourished. He is cooperative.  Non-toxic appearance. He does not appear ill. No distress.   Has walker--  Here with friend   CLIFFORD:   Head: Normocephalic and atraumatic. Head is without abrasion, without contusion and without laceration.   Right Ear: Hearing, tympanic membrane, external ear and ear canal normal. No hemotympanum.   Left Ear: Hearing, tympanic membrane, external ear and ear canal normal. No hemotympanum.   Nose: Nose normal. No mucosal edema, rhinorrhea or nasal deformity. No epistaxis. Right sinus exhibits no maxillary sinus tenderness and no frontal sinus tenderness. Left sinus exhibits no maxillary sinus tenderness and no frontal sinus tenderness.   Mouth/Throat: Uvula is midline, oropharynx is clear and moist and mucous membranes are normal. No trismus in the jaw. Normal dentition. No uvula swelling. No posterior oropharyngeal erythema.   Eyes: Pupils are equal, round, and reactive to light. Conjunctivae, EOM and lids are normal. Right eye exhibits no discharge. Left eye exhibits no discharge. No scleral icterus.   Sclera clear bilat   Neck: Trachea normal, normal range of motion, full passive range of motion without pain and phonation normal. Neck supple. No spinous process tenderness and no muscular tenderness present. No neck rigidity. No tracheal deviation present.   Cardiovascular: Normal rate, regular rhythm, normal heart sounds, intact distal pulses and normal pulses.   Pulmonary/Chest: Effort normal and breath sounds normal. No respiratory distress.   Abdominal: Soft. Normal appearance and bowel sounds are normal. He exhibits no distension, no pulsatile midline mass and no mass. There is no tenderness.   Musculoskeletal: Normal range of motion. He exhibits no edema or deformity.        Left hip: He  exhibits decreased strength, tenderness and swelling (mild). He exhibits normal range of motion.        Lumbar back: He exhibits tenderness, pain and spasm. He exhibits no swelling and no edema.   Neurological: He is alert and oriented to person, place, and time. He has normal strength. No cranial nerve deficit or sensory deficit. He exhibits normal muscle tone. He displays no seizure activity. Coordination normal. GCS eye subscore is 4. GCS verbal subscore is 5. GCS motor subscore is 6.   Old Residual stroke symptoms speech deficit at baseline-- uses rolling walker to ambulate   Skin: Skin is warm, dry and intact. Capillary refill takes less than 2 seconds. No abrasion, no bruising, no burn, no ecchymosis, no laceration and no rash noted. He is not diaphoretic. No pallor.   Psychiatric: He has a normal mood and affect. His speech is normal and behavior is normal. Judgment and thought content normal. Cognition and memory are normal.   Nursing note and vitals reviewed.      X-ray Hip 2 Or 3 Views Left    Result Date: 6/27/2019  EXAMINATION: XR HIP 2 VIEW LEFT CLINICAL HISTORY: Pain in left hip TECHNIQUE: AP view of the pelvis and frog leg lateral view of the left hip were performed. COMPARISON: No 04/02/2019 ne FINDINGS: Left hip arthroplasty identified.  The position alignment is satisfactory and unchanged as compared to the previous study.  Heterotopic calcification noted adjacent to the left hip.  No fracture or dislocation.  No bone destruction identified     See above Electronically signed by: Angelo Waller MD Date:    06/27/2019 Time:    15:13    X-ray Lumbar Spine 2 Or 3 Views    Result Date: 6/27/2019  EXAMINATION: XR LUMBAR SPINE 2 OR 3 VIEWS CLINICAL HISTORY: Low back pain, minor trauma;  Pain in left hip COMPARISON: None FINDINGS: DJD.  The disc spaces are narrowed between L4 and S1 vertebral segments.  No fracture, spondylolisthesis or bone destruction identified.  Left hip arthroplasty noted     See above  Electronically signed by: Angelo Waller MD Date:    06/27/2019 Time:    15:13    Assessment:       1. Hip pain, acute, left    2. Low back pain, non-specific        Plan:         Hip pain, acute, left  -     X-Ray Hip 2 or 3 views Left; Future; Expected date: 06/27/2019  -     X-Ray Lumbar Spine 2 Or 3 Views; Future; Expected date: 06/27/2019    Low back pain, non-specific  -     X-Ray Hip 2 or 3 views Left; Future; Expected date: 06/27/2019  -     X-Ray Lumbar Spine 2 Or 3 Views; Future; Expected date: 06/27/2019

## 2019-06-27 NOTE — PATIENT INSTRUCTIONS
Return to Urgent Care or go to ER if symptoms worsen or fail to improve.  Follow up with PCP as soon as possible as recommended for further management.       Take Tylenol Arthritis (8 hour) 2 tablets every 8 hours.   Apply ice to the hip and back x 20 minutes at least 4 times per day.

## 2019-06-28 ENCOUNTER — TELEPHONE (OUTPATIENT)
Dept: INTERNAL MEDICINE | Facility: CLINIC | Age: 64
End: 2019-06-28

## 2019-06-28 NOTE — TELEPHONE ENCOUNTER
----- Message from Chye Weber sent at 6/27/2019  4:23 PM CDT -----  Contact: [phone]  Caller is requesting a sooner appointment. Caller declined first available appointment listed below. Caller will not accept being placed on the wait list and is requesting a message be sent to the provider.    When is the next available appointment:   Did you offer to schedule the next available appt and put the patient on the wait list?:     What visit type: same day  Symptoms:  Back pain  Patient preference of timeframe to be scheduled:  asap  What is the reason the patient is requesting a sooner appointment? (insurance terminating, changing jobs):    Would the patient rather a call back or a response via [facility]?call back   Comments:  Please advise, thanks

## 2019-07-08 ENCOUNTER — OFFICE VISIT (OUTPATIENT)
Dept: INTERNAL MEDICINE | Facility: CLINIC | Age: 64
End: 2019-07-08
Payer: MEDICARE

## 2019-07-08 ENCOUNTER — TELEPHONE (OUTPATIENT)
Dept: INTERNAL MEDICINE | Facility: CLINIC | Age: 64
End: 2019-07-08

## 2019-07-08 VITALS
DIASTOLIC BLOOD PRESSURE: 74 MMHG | HEART RATE: 100 BPM | SYSTOLIC BLOOD PRESSURE: 112 MMHG | HEIGHT: 73 IN | TEMPERATURE: 99 F | BODY MASS INDEX: 21.79 KG/M2 | WEIGHT: 164.44 LBS

## 2019-07-08 DIAGNOSIS — Z79.01 LONG TERM (CURRENT) USE OF ANTICOAGULANTS: ICD-10-CM

## 2019-07-08 DIAGNOSIS — Z79.4 TYPE 2 DIABETES MELLITUS WITH COMPLICATION, WITH LONG-TERM CURRENT USE OF INSULIN: Primary | ICD-10-CM

## 2019-07-08 DIAGNOSIS — E11.59 HYPERTENSION ASSOCIATED WITH DIABETES: ICD-10-CM

## 2019-07-08 DIAGNOSIS — M54.50 ACUTE LOW BACK PAIN WITHOUT SCIATICA, UNSPECIFIED BACK PAIN LATERALITY: ICD-10-CM

## 2019-07-08 DIAGNOSIS — E11.8 TYPE 2 DIABETES MELLITUS WITH COMPLICATION, WITH LONG-TERM CURRENT USE OF INSULIN: Primary | ICD-10-CM
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
## 2019-07-08 DIAGNOSIS — I15.2 HYPERTENSION ASSOCIATED WITH DIABETES: ICD-10-CM

## 2019-07-08 PROCEDURE — 99999 PR PBB SHADOW E&M-EST. PATIENT-LVL III: CPT | Mod: PBBFAC,HCNC,, | Performed by: INTERNAL MEDICINE

## 2019-07-08 PROCEDURE — 99499 RISK ADDL DX/OHS AUDIT: ICD-10-PCS | Mod: HCNC,S$GLB,, | Performed by: INTERNAL MEDICINE

## 2019-07-08 PROCEDURE — 3045F PR MOST RECENT HEMOGLOBIN A1C LEVEL 7.0-9.0%: CPT | Mod: HCNC,CPTII,S$GLB, | Performed by: INTERNAL MEDICINE

## 2019-07-08 PROCEDURE — 99214 PR OFFICE/OUTPT VISIT, EST, LEVL IV, 30-39 MIN: ICD-10-PCS | Mod: HCNC,S$GLB,, | Performed by: INTERNAL MEDICINE

## 2019-07-08 PROCEDURE — 3078F DIAST BP <80 MM HG: CPT | Mod: HCNC,CPTII,S$GLB, | Performed by: INTERNAL MEDICINE

## 2019-07-08 PROCEDURE — 3045F PR MOST RECENT HEMOGLOBIN A1C LEVEL 7.0-9.0%: ICD-10-PCS | Mod: HCNC,CPTII,S$GLB, | Performed by: INTERNAL MEDICINE

## 2019-07-08 PROCEDURE — 99214 OFFICE O/P EST MOD 30 MIN: CPT | Mod: HCNC,S$GLB,, | Performed by: INTERNAL MEDICINE

## 2019-07-08 PROCEDURE — 3074F PR MOST RECENT SYSTOLIC BLOOD PRESSURE < 130 MM HG: ICD-10-PCS | Mod: HCNC,CPTII,S$GLB, | Performed by: INTERNAL MEDICINE

## 2019-07-08 PROCEDURE — 3008F PR BODY MASS INDEX (BMI) DOCUMENTED: ICD-10-PCS | Mod: HCNC,CPTII,S$GLB, | Performed by: INTERNAL MEDICINE

## 2019-07-08 PROCEDURE — 99499 UNLISTED E&M SERVICE: CPT | Mod: HCNC,S$GLB,, | Performed by: INTERNAL MEDICINE

## 2019-07-08 PROCEDURE — 3008F BODY MASS INDEX DOCD: CPT | Mod: HCNC,CPTII,S$GLB, | Performed by: INTERNAL MEDICINE

## 2019-07-08 PROCEDURE — 99999 PR PBB SHADOW E&M-EST. PATIENT-LVL III: ICD-10-PCS | Mod: PBBFAC,HCNC,, | Performed by: INTERNAL MEDICINE

## 2019-07-08 PROCEDURE — 3074F SYST BP LT 130 MM HG: CPT | Mod: HCNC,CPTII,S$GLB, | Performed by: INTERNAL MEDICINE

## 2019-07-08 PROCEDURE — 3078F PR MOST RECENT DIASTOLIC BLOOD PRESSURE < 80 MM HG: ICD-10-PCS | Mod: HCNC,CPTII,S$GLB, | Performed by: INTERNAL MEDICINE

## 2019-07-08 RX ORDER — LOSARTAN POTASSIUM 50 MG/1
50 TABLET ORAL DAILY
Qty: 90 TABLET | Refills: 3 | Status: SHIPPED | OUTPATIENT
Start: 2019-07-08 | End: 2020-04-10

## 2019-07-08 RX ORDER — TRAMADOL HYDROCHLORIDE 50 MG/1
50 TABLET ORAL EVERY 8 HOURS PRN
Qty: 15 TABLET | Refills: 0 | Status: ON HOLD | OUTPATIENT
Start: 2019-07-08 | End: 2024-01-15 | Stop reason: HOSPADM

## 2019-07-08 NOTE — PROGRESS NOTES
"Subjective:       Patient ID: Kamlesh Diane is a 64 y.o. male.    Chief Complaint: Follow-up    HPI   DM2 x 25 yrs. levemir 50 U qhs and Novolog 6-6-6. Has been taking the lunch dose consistently.   Checking 3x/day. Very rarely <80 or higher than 150.   Foot - 7/19/19  Eye - due after 7/20/19.  MAC - 3/12/19  Most recent a1c 7.6 6/18/19    Mid June, he brushed his teeth, put his toothbrush back and then fell. Normally he would try to grab the rail but then missed and so fell. Legs were not weak. Didn't pass out. Fell on the butt. Went to .   XR hip 6/27/19 - L hip arthroplasty ID'd. No fx.   XR L spine 6/27/19 - DJD. No fx.   Currently c/o 8 of 10 pain in the lower back area and more pain when he sits down. Once he sits down it's ok. No radiation. No pain on standing. No weakness/numbness/tingling. Does not affect his walking more than usual.   Pt was taking tylenol arthritis over the counter prn. Using CBD oil. Thinks tylenol arthritis works.   Currently on chronic coumadin for embolic stroke.     HTN - amlo 10, clonidine 0.1mg TID, coreg 3.125mg daily, lisinopril 20mg daily.       Review of Systems  Comprehensive review of systems otherwise negative. See history/subjective section for more details.    Objective:      Physical Exam    /74 (BP Location: Left arm, Patient Position: Sitting, BP Method: Large (Manual))   Pulse 100   Temp 98.6 °F (37 °C)   Ht 6' 1" (1.854 m)   Wt 74.6 kg (164 lb 7.4 oz)   BMI 21.70 kg/m²     Gen - A+OX4, NAD  HEENT - PERRL, OP clear. Slurred speech and broca's aphasia.   MMM.   Neck - no LAD  CV - RRR, no m/r  Chest - CTAB, no wheezing/rhonchi  Abd - S/NT/ND/+BS  Ext - 2+ B radial and PT pulses.  MSK - pain on palpation of the upper sacral spine. No paraspinal tenderness to palpation. Walks w/ rollator. No hip tenderness to palpation.     Labs reviewed.     Assessment/Plan     Kamlesh was seen today for follow-up.    Diagnoses and all orders for this visit:    Type 2 " diabetes mellitus with complication, with long-term current use of insulin - doing better. Cont current meds.     Hypertension associated with diabetes - change lisinopril to losartan. Pt's brother to check BP daily. Goal BP <130/80.   -     losartan (COZAAR) 50 MG tablet; Take 1 tablet (50 mg total) by mouth once daily.    Acute low back pain without sciatica, unspecified back pain laterality - take tylenol arthritis or extra strength tylenol prn. Tramadol prn break through pain. Fall precautions.   -     traMADol (ULTRAM) 50 mg tablet; Take 1 tablet (50 mg total) by mouth every 8 (eight) hours as needed for Pain.    Fall, subsequent encounter  -     traMADol (ULTRAM) 50 mg tablet; Take 1 tablet (50 mg total) by mouth every 8 (eight) hours as needed for Pain.    Long term (current) use of anticoagulants - cont coumadin. No NSAIDs.     Follow up in about 5 months (around 12/8/2019).      Debora Ontiveros MD  Department of Internal Medicine - [facility]  9:00 AM

## 2019-07-08 NOTE — TELEPHONE ENCOUNTER
----- Message from Suni Horton sent at 7/8/2019  8:05 AM CDT -----  Contact: Pt sister Olinda Prakash says pt is not able to come in at 8:30AM says pt need to come in later today     Requesting a callback at [phone]

## 2019-07-09 ENCOUNTER — PES CALL (OUTPATIENT)
Dept: ADMINISTRATIVE | Facility: CLINIC | Age: 64
End: 2019-07-09

## 2019-07-30 ENCOUNTER — TELEPHONE (OUTPATIENT)
Dept: INTERNAL MEDICINE | Facility: CLINIC | Age: 64
End: 2019-07-30

## 2019-07-30 DIAGNOSIS — G45.3 AMAUROSIS FUGAX: Primary | ICD-10-CM

## 2019-07-30 NOTE — TELEPHONE ENCOUNTER
----- Message from Sadaf Edwards sent at 7/30/2019 10:17 AM CDT -----  Contact: Sister  Sister is calling to speak with Staff regarding initiation of a referral for the pt to be seen in Opthalmology.  She is requesting a returned call.    She can be reached at [phone].    Thank you.

## 2019-08-01 DIAGNOSIS — I15.2 HYPERTENSION ASSOCIATED WITH DIABETES: ICD-10-CM

## 2019-08-01 DIAGNOSIS — Z12.11 COLON CANCER SCREENING: ICD-10-CM

## 2019-08-01 DIAGNOSIS — E11.59 HYPERTENSION ASSOCIATED WITH DIABETES: ICD-10-CM

## 2019-09-26 DIAGNOSIS — H04.123 DRY EYE SYNDROME OF BOTH EYES: ICD-10-CM

## 2019-09-27 DIAGNOSIS — E11.9 TYPE 2 DIABETES MELLITUS WITHOUT COMPLICATION: ICD-10-CM

## 2019-09-27 RX ORDER — CYCLOSPORINE 0.5 MG/ML
EMULSION OPHTHALMIC
Qty: 180 VIAL | Refills: 3 | Status: SHIPPED | OUTPATIENT
Start: 2019-09-27 | End: 2021-02-02

## 2019-10-09 ENCOUNTER — TELEPHONE (OUTPATIENT)
Dept: OPTOMETRY | Facility: CLINIC | Age: 64
End: 2019-10-09

## 2019-10-09 DIAGNOSIS — Z79.4 TYPE 2 DIABETES MELLITUS WITH COMPLICATION, WITH LONG-TERM CURRENT USE OF INSULIN: Primary | ICD-10-CM

## 2019-10-09 DIAGNOSIS — E11.8 TYPE 2 DIABETES MELLITUS WITH COMPLICATION, WITH LONG-TERM CURRENT USE OF INSULIN: Primary | ICD-10-CM

## 2019-10-09 NOTE — TELEPHONE ENCOUNTER
"----- Message from Poly Chavarria sent at 10/9/2019  4:17 PM CDT -----  Contact: David with [facility] pharmacy   David with [facility] pharmacy called in requesting refill for BD ULTRA-FINE SHORT PEN NEEDLE 31 gauge x 5/16" Ndle  "

## 2019-10-10 ENCOUNTER — IMMUNIZATION (OUTPATIENT)
Dept: PHARMACY | Facility: CLINIC | Age: 64
End: 2019-10-10
Payer: MEDICAID

## 2019-10-10 ENCOUNTER — ANTI-COAG VISIT (OUTPATIENT)
Dept: CARDIOLOGY | Facility: CLINIC | Age: 64
End: 2019-10-10
Payer: MEDICARE

## 2019-10-10 ENCOUNTER — LAB VISIT (OUTPATIENT)
Dept: LAB | Facility: HOSPITAL | Age: 64
End: 2019-10-10
Attending: INTERNAL MEDICINE
Payer: MEDICARE

## 2019-10-10 DIAGNOSIS — I63.10 CEREBROVASCULAR ACCIDENT (CVA) DUE TO EMBOLISM OF PRECEREBRAL ARTERY: ICD-10-CM

## 2019-10-10 DIAGNOSIS — Z79.01 LONG TERM (CURRENT) USE OF ANTICOAGULANTS: ICD-10-CM

## 2019-10-10 DIAGNOSIS — E11.9 TYPE 2 DIABETES MELLITUS WITHOUT COMPLICATION: ICD-10-CM

## 2019-10-10 LAB
ESTIMATED AVG GLUCOSE: 183 MG/DL (ref 68–131)
HBA1C MFR BLD HPLC: 8 % (ref 4–5.6)
INR PPP: 1.7 (ref 0.8–1.2)
PROTHROMBIN TIME: 17.6 SEC (ref 9–12.5)

## 2019-10-10 PROCEDURE — 93793 PR ANTICOAGULANT MGMT FOR PT TAKING WARFARIN: ICD-10-PCS | Mod: S$GLB,,,

## 2019-10-10 PROCEDURE — 83036 HEMOGLOBIN GLYCOSYLATED A1C: CPT | Mod: HCNC

## 2019-10-10 PROCEDURE — 85610 PROTHROMBIN TIME: CPT | Mod: HCNC

## 2019-10-10 PROCEDURE — 36415 COLL VENOUS BLD VENIPUNCTURE: CPT | Mod: HCNC

## 2019-10-10 PROCEDURE — 93793 ANTICOAG MGMT PT WARFARIN: CPT | Mod: S$GLB,,,

## 2019-10-10 RX ORDER — PEN NEEDLE, DIABETIC 31 GX5/16"
1 NEEDLE, DISPOSABLE MISCELLANEOUS 3 TIMES DAILY
Qty: 200 EACH | Refills: 2 | Status: SHIPPED | OUTPATIENT
Start: 2019-10-10 | End: 2020-03-11 | Stop reason: SDUPTHER

## 2019-10-15 ENCOUNTER — TELEPHONE (OUTPATIENT)
Dept: INTERNAL MEDICINE | Facility: CLINIC | Age: 64
End: 2019-10-15

## 2019-10-15 NOTE — TELEPHONE ENCOUNTER
Spoke with brother luan. Informed brother pt's A1c is back up at 8.0. Brother confirmed the medications in the chart and dosage. Stated pt hasn't missed a dose. Pt's sugar is 120s in the morning and 180s-200s after lunch and at night. He don't give pt the novolog if his sugar is under 100. Stated pt does have snacks at night. He has to have his oreo cookies at night.

## 2019-10-15 NOTE — TELEPHONE ENCOUNTER
----- Message from BEBETO Saldivar sent at 10/15/2019  3:13 PM CDT -----  Please let patient know that his A1c is back up to 8.  Has he been taking all of his medications as read by his medication list?

## 2019-10-15 NOTE — TELEPHONE ENCOUNTER
how often is his sugar below 100? Is he giving 6 units of Novolog TID? And he gets 50 units of Levimer at night correct?

## 2019-10-15 NOTE — TELEPHONE ENCOUNTER
Brother stated its happens probably every other day mostly in the morning. He does give pt the 6 units of novolog TID and the 50 units of levemir at night. Brother gave me his morning readings. Stated it the evenings it is usually between 180-200.    99    10/15  95    10/14  130  10/13  100  10/12  149  10/11  151  10/10  148  10/9  117  10/8

## 2019-10-16 NOTE — TELEPHONE ENCOUNTER
Ok, that is not matching up with the A1c.  I would like him to see Soledad as they may need to put a meter on him to make sure they are getting correct numbers

## 2019-10-16 NOTE — TELEPHONE ENCOUNTER
Spoke with brother luan. Informed him the BEBETO Davis would like for pt to see NP Mckeon regarding his A1c. Appt made for Oct 24th with NP Mckeon.

## 2019-10-17 ENCOUNTER — PATIENT OUTREACH (OUTPATIENT)
Dept: ADMINISTRATIVE | Facility: OTHER | Age: 64
End: 2019-10-17

## 2019-10-18 DIAGNOSIS — E11.9 TYPE 2 DIABETES MELLITUS WITHOUT COMPLICATION, UNSPECIFIED WHETHER LONG TERM INSULIN USE: ICD-10-CM

## 2019-10-22 ENCOUNTER — ANTI-COAG VISIT (OUTPATIENT)
Dept: CARDIOLOGY | Facility: CLINIC | Age: 64
End: 2019-10-22
Payer: MEDICARE

## 2019-10-22 ENCOUNTER — OFFICE VISIT (OUTPATIENT)
Dept: PODIATRY | Facility: CLINIC | Age: 64
End: 2019-10-22
Payer: MEDICARE

## 2019-10-22 ENCOUNTER — LAB VISIT (OUTPATIENT)
Dept: LAB | Facility: HOSPITAL | Age: 64
End: 2019-10-22
Payer: MEDICARE

## 2019-10-22 VITALS
DIASTOLIC BLOOD PRESSURE: 94 MMHG | HEART RATE: 97 BPM | BODY MASS INDEX: 21.74 KG/M2 | SYSTOLIC BLOOD PRESSURE: 142 MMHG | HEIGHT: 73 IN | WEIGHT: 164 LBS

## 2019-10-22 DIAGNOSIS — L84 CORN OR CALLUS: ICD-10-CM

## 2019-10-22 DIAGNOSIS — Z79.01 LONG TERM (CURRENT) USE OF ANTICOAGULANTS: ICD-10-CM

## 2019-10-22 DIAGNOSIS — I63.10 CEREBROVASCULAR ACCIDENT (CVA) DUE TO EMBOLISM OF PRECEREBRAL ARTERY: ICD-10-CM

## 2019-10-22 DIAGNOSIS — E11.42 DIABETIC POLYNEUROPATHY ASSOCIATED WITH TYPE 2 DIABETES MELLITUS: Primary | ICD-10-CM

## 2019-10-22 DIAGNOSIS — B35.1 ONYCHOMYCOSIS DUE TO DERMATOPHYTE: ICD-10-CM

## 2019-10-22 LAB
INR PPP: 3.3 (ref 0.8–1.2)
PROTHROMBIN TIME: 31.9 SEC (ref 9–12.5)

## 2019-10-22 PROCEDURE — 99499 UNLISTED E&M SERVICE: CPT | Mod: S$GLB,,, | Performed by: PODIATRIST

## 2019-10-22 PROCEDURE — 99214 OFFICE O/P EST MOD 30 MIN: CPT | Mod: PBBFAC,25 | Performed by: PODIATRIST

## 2019-10-22 PROCEDURE — 11721 DEBRIDE NAIL 6 OR MORE: CPT | Mod: Q9,59,S$GLB, | Performed by: PODIATRIST

## 2019-10-22 PROCEDURE — 11056 PR TRIM BENIGN HYPERKERATOTIC SKIN LESION,2-4: ICD-10-PCS | Mod: Q9,S$GLB,, | Performed by: PODIATRIST

## 2019-10-22 PROCEDURE — 85610 PROTHROMBIN TIME: CPT | Mod: HCNC

## 2019-10-22 PROCEDURE — 11056 PARNG/CUTG B9 HYPRKR LES 2-4: CPT | Mod: Q9,PBBFAC | Performed by: PODIATRIST

## 2019-10-22 PROCEDURE — 97597 DBRDMT OPN WND 1ST 20 CM/<: CPT | Mod: PBBFAC | Performed by: PODIATRIST

## 2019-10-22 PROCEDURE — 36415 COLL VENOUS BLD VENIPUNCTURE: CPT | Mod: HCNC

## 2019-10-22 PROCEDURE — 11721 DEBRIDE NAIL 6 OR MORE: CPT | Mod: Q9,59,PBBFAC | Performed by: PODIATRIST

## 2019-10-22 PROCEDURE — 11056 PARNG/CUTG B9 HYPRKR LES 2-4: CPT | Mod: Q9,S$GLB,, | Performed by: PODIATRIST

## 2019-10-22 PROCEDURE — 99999 PR PBB SHADOW E&M-EST. PATIENT-LVL IV: ICD-10-PCS | Mod: PBBFAC,,, | Performed by: PODIATRIST

## 2019-10-22 PROCEDURE — 93793 PR ANTICOAGULANT MGMT FOR PT TAKING WARFARIN: ICD-10-PCS | Mod: S$GLB,,,

## 2019-10-22 PROCEDURE — 99999 PR PBB SHADOW E&M-EST. PATIENT-LVL IV: CPT | Mod: PBBFAC,,, | Performed by: PODIATRIST

## 2019-10-22 PROCEDURE — 97597 PR DEBRIDEMENT OPEN WOUND 20 SQ CM<: ICD-10-PCS | Mod: 59,S$GLB,, | Performed by: PODIATRIST

## 2019-10-22 PROCEDURE — 11721 PR DEBRIDEMENT OF NAILS, 6 OR MORE: ICD-10-PCS | Mod: Q9,59,S$GLB, | Performed by: PODIATRIST

## 2019-10-22 PROCEDURE — 97597 DBRDMT OPN WND 1ST 20 CM/<: CPT | Mod: 59,S$GLB,, | Performed by: PODIATRIST

## 2019-10-22 PROCEDURE — 93793 ANTICOAG MGMT PT WARFARIN: CPT | Mod: S$GLB,,,

## 2019-10-22 PROCEDURE — 99499 NO LOS: ICD-10-PCS | Mod: S$GLB,,, | Performed by: PODIATRIST

## 2019-10-23 NOTE — PROGRESS NOTES
INR not at goal. Medications, chart, and patient findings reviewed. See calendar for adjustments to dose and follow up plan.  Pt reports less greens (to resume normal diet).  Will instruct pt to take 2.5mg 10/23 & resume normal diet.  Plan to re-assess in 2 weeks.

## 2019-10-24 ENCOUNTER — PATIENT OUTREACH (OUTPATIENT)
Dept: ADMINISTRATIVE | Facility: OTHER | Age: 64
End: 2019-10-24

## 2019-10-24 ENCOUNTER — OFFICE VISIT (OUTPATIENT)
Dept: INTERNAL MEDICINE | Facility: CLINIC | Age: 64
End: 2019-10-24
Payer: MEDICARE

## 2019-10-24 VITALS
SYSTOLIC BLOOD PRESSURE: 128 MMHG | BODY MASS INDEX: 26.24 KG/M2 | WEIGHT: 198 LBS | HEIGHT: 73 IN | DIASTOLIC BLOOD PRESSURE: 60 MMHG

## 2019-10-24 DIAGNOSIS — E11.8 CONTROLLED TYPE 2 DIABETES MELLITUS WITH COMPLICATION, WITH LONG-TERM CURRENT USE OF INSULIN: ICD-10-CM

## 2019-10-24 DIAGNOSIS — Z86.73 H/O ISCHEMIC RIGHT MCA STROKE: ICD-10-CM

## 2019-10-24 DIAGNOSIS — Z79.4 CONTROLLED TYPE 2 DIABETES MELLITUS WITH COMPLICATION, WITH LONG-TERM CURRENT USE OF INSULIN: ICD-10-CM

## 2019-10-24 DIAGNOSIS — Z79.4 TYPE 2 DIABETES MELLITUS WITH COMPLICATION, WITH LONG-TERM CURRENT USE OF INSULIN: Primary | ICD-10-CM

## 2019-10-24 DIAGNOSIS — I10 ESSENTIAL HYPERTENSION: ICD-10-CM

## 2019-10-24 DIAGNOSIS — E11.8 TYPE 2 DIABETES MELLITUS WITH COMPLICATION, WITH LONG-TERM CURRENT USE OF INSULIN: Primary | ICD-10-CM

## 2019-10-24 DIAGNOSIS — C61 CANCER OF PROSTATE WITH LOW RECURRENCE RISK (STAGE T1-2A AND GLEASON < 7 AND PSA < 10): ICD-10-CM

## 2019-10-24 DIAGNOSIS — M89.9 BONE DISORDER: ICD-10-CM

## 2019-10-24 PROCEDURE — 99999 PR PBB SHADOW E&M-EST. PATIENT-LVL IV: ICD-10-PCS | Mod: PBBFAC,HCNC,, | Performed by: NURSE PRACTITIONER

## 2019-10-24 PROCEDURE — 3078F DIAST BP <80 MM HG: CPT | Mod: HCNC,CPTII,S$GLB, | Performed by: NURSE PRACTITIONER

## 2019-10-24 PROCEDURE — 99999 PR PBB SHADOW E&M-EST. PATIENT-LVL IV: CPT | Mod: PBBFAC,HCNC,, | Performed by: NURSE PRACTITIONER

## 2019-10-24 PROCEDURE — 3074F PR MOST RECENT SYSTOLIC BLOOD PRESSURE < 130 MM HG: ICD-10-PCS | Mod: HCNC,CPTII,S$GLB, | Performed by: NURSE PRACTITIONER

## 2019-10-24 PROCEDURE — 99214 PR OFFICE/OUTPT VISIT, EST, LEVL IV, 30-39 MIN: ICD-10-PCS | Mod: HCNC,S$GLB,, | Performed by: NURSE PRACTITIONER

## 2019-10-24 PROCEDURE — 3008F BODY MASS INDEX DOCD: CPT | Mod: HCNC,CPTII,S$GLB, | Performed by: NURSE PRACTITIONER

## 2019-10-24 PROCEDURE — 99214 OFFICE O/P EST MOD 30 MIN: CPT | Mod: HCNC,S$GLB,, | Performed by: NURSE PRACTITIONER

## 2019-10-24 PROCEDURE — 3074F SYST BP LT 130 MM HG: CPT | Mod: HCNC,CPTII,S$GLB, | Performed by: NURSE PRACTITIONER

## 2019-10-24 PROCEDURE — 99499 UNLISTED E&M SERVICE: CPT | Mod: S$GLB,,, | Performed by: NURSE PRACTITIONER

## 2019-10-24 PROCEDURE — 3008F PR BODY MASS INDEX (BMI) DOCUMENTED: ICD-10-PCS | Mod: HCNC,CPTII,S$GLB, | Performed by: NURSE PRACTITIONER

## 2019-10-24 PROCEDURE — 99499 RISK ADDL DX/OHS AUDIT: ICD-10-PCS | Mod: S$GLB,,, | Performed by: NURSE PRACTITIONER

## 2019-10-24 PROCEDURE — 3078F PR MOST RECENT DIASTOLIC BLOOD PRESSURE < 80 MM HG: ICD-10-PCS | Mod: HCNC,CPTII,S$GLB, | Performed by: NURSE PRACTITIONER

## 2019-10-24 RX ORDER — INSULIN ASPART 100 [IU]/ML
INJECTION, SOLUTION INTRAVENOUS; SUBCUTANEOUS
Qty: 3 BOX | Refills: 3
Start: 2019-10-24 | End: 2019-11-18 | Stop reason: SDUPTHER

## 2019-10-24 RX ORDER — INSULIN DETEMIR 100 [IU]/ML
45 INJECTION, SOLUTION SUBCUTANEOUS NIGHTLY
Qty: 3 BOX | Refills: 3
Start: 2019-10-24 | End: 2019-11-27 | Stop reason: SDUPTHER

## 2019-10-24 NOTE — PATIENT INSTRUCTIONS
Snacks can be an important part of a balanced, healthy meal plan. They allow you to eat more frequently, feeling full and satisfied throughout the day. Also, they allow you to spread carbohydrates evenly, which may stabilize blood sugars.  Plus, snacks are enjoyable!     The amount of carbohydrate needed at snacks varies. Generally, about 15-30 grams of carbohydrate per snack is recommended.  Below you will find some tasty treats.       0-5 gm carb   Crystal Light   Vitamin Water Zero   Herbal tea, unsweetened   2 tsp peanut butter on celery   1./2 cup sugar-free jell-o   1 sugar-free popsicle   ¼ cup blueberries   8oz Blue Lissette unsweetened almond milk   5 baby carrots & celery sticks, cucumbers, bell peppers dipped in ¼ cup salsa, 2Tbsp light ranch dressing or 2Tbsp plain Greek yogurt   10 Goldfish crackers   ½ oz low-fat cheese or string cheese   1 closed handful of nuts, unsalted   1 Tbsp of sunflower seeds, unsalted   1 cup Smart Pop popcorn   1 whole grain brown rice cake        15 gm carb   1 small piece of fruit or ½ banana or 1/2 cup lite canned fruit   3 niko cracker squares   3 cups Smart Pop popcorn, top spray butter, Sotelo lite salt or cinnamon and Truvia   5 Vanilla Wafers   ½ cup low fat, no added sugar ice cream or frozen yogurt (Blue bell, Blue Bunny, Weight Watchers, Skinny Cow)   ½ turkey, ham, or chicken sandwich   ½ c fruit with ½ c Cottage cheese   4-6 unsalted wheat crackers with 1 oz low fat cheese or 1 tbsp peanut butter    30-45 goldfish crackers (depending on flavor)    7-8 Cheondoism mini brown rice cakes (caramel, apple cinnamon, chocolate)    12 Cheondoism mini brown rice cakes (cheddar, bbq, ranch)    1/3 cup hummus dip with raw veg   1/2 whole wheat sean, 1Tbsp hummus   Mini Pizza (1/2 whole wheat English muffin, low-fat  cheese, tomato sauce)   100 calorie snack pack (Oreo, Chips Ahoy, Ritz Mix, Baked Cheetos)   4-6 oz. light or Greek Style yogurt  (Jesús, Franci, Leonid, [facility])   ½ cup sugar-free pudding     6 in. wheat tortilla or sean oven toasted chips (topped with spray butter flavoring, cinnamon, Truvia OR spray butter, garlic powder, chili powder)    18 BBQ Popchips (available at [employer], Whole Foods, Fresh Market)                   Diabetes Support Group Meetings         Date: Topic:   February 14 Eat Fit JOVITA/Health Promotion   March 14 Taking Care of Your Smile   April 11 Spring into Healthy Eating/Cooking Demo   May 9 Ease Your Mind with Diabetes   Zara 13 Summer Treats/Cooking Demo   July 11 Eat Fit JOVITA/Super Market Sweep   August 8 Taking Care of Your Eyes and Feet   Sept 12 Technology/ADA updates   October 10 Recipes & Treats/Cooking Demo   November 14 Heart Health/Pump it up!   December 12 Year-End Close Out        Meetings are held in the [facility] (A) of the [facility] located at [address] [place] [address]. Please call [phone] for additional information.    Free service, offered every 2nd Thursday of every month! Family members and/or friends are welcome as well!  Support group is for patients with type 1 or type 2 diabetes.    From 3:30p to 4:30p

## 2019-10-30 NOTE — PROGRESS NOTES
Subjective:      Patient ID: Kamlesh Diane is a 64 y.o. male.    Chief Complaint: Diabetes Mellitus (07/08/2019 dr tanner gaitan) and Diabetic Foot Exam    Kamlesh is a 64 y.o. male who presents to the clinic for evaluation and treatment of high risk feet. Kamlesh has a past medical history of CVA (cerebral vascular accident), Diabetes, Elevated PSA, Hypertension, and Prostate cancer. The patient's chief complaint is long, thick toenails. This patient has documented high risk feet requiring routine maintenance secondary to peripheral neuropathy.    PCP: Tanner Gaitan MD    Date Last Seen by PCP: 07/08/2019 dr tanner gaitan    Current shoe gear:  Affected Foot: Slip-on shoes     Unaffected Foot: Slip-on shoes    Hemoglobin A1C   Date Value Ref Range Status   10/10/2019 8.0 (H) 4.0 - 5.6 % Final     Comment:     ADA Screening Guidelines:  5.7-6.4%  Consistent with prediabetes  >or=6.5%  Consistent with diabetes  High levels of fetal hemoglobin interfere with the HbA1C  assay. Heterozygous hemoglobin variants (HbS, HgC, etc)do  not significantly interfere with this assay.   However, presence of multiple variants may affect accuracy.     06/18/2019 7.6 (H) 4.0 - 5.6 % Final     Comment:     ADA Screening Guidelines:  5.7-6.4%  Consistent with prediabetes  >or=6.5%  Consistent with diabetes  High levels of fetal hemoglobin interfere with the HbA1C  assay. Heterozygous hemoglobin variants (HbS, HgC, etc)do  not significantly interfere with this assay.   However, presence of multiple variants may affect accuracy.     03/12/2019 8.3 (H) 4.0 - 5.6 % Final     Comment:     ADA Screening Guidelines:  5.7-6.4%  Consistent with prediabetes  >or=6.5%  Consistent with diabetes  High levels of fetal hemoglobin interfere with the HbA1C  assay. Heterozygous hemoglobin variants (HbS, HgC, etc)do  not significantly interfere with this assay.   However, presence of multiple variants may affect accuracy.         Review of Systems   Constitution:  Negative for chills, fever and malaise/fatigue.   HENT: Negative for hearing loss.    Cardiovascular: Positive for leg swelling. Negative for claudication.   Respiratory: Negative for shortness of breath.    Skin: Positive for color change, dry skin, nail changes and unusual hair distribution. Negative for flushing and rash.   Musculoskeletal: Negative for joint pain and myalgias.   Neurological: Positive for numbness, paresthesias and sensory change. Negative for loss of balance.   Psychiatric/Behavioral: Negative for altered mental status.           Objective:      Physical Exam   Constitutional: He appears well-developed and well-nourished. He is cooperative.   Cardiovascular:   Pulses:       Dorsalis pedis pulses are 0 on the right side, and 0 on the left side.        Posterior tibial pulses are 1+ on the right side, and 1+ on the left side.   Musculoskeletal:        Right ankle: He exhibits decreased range of motion and abnormal pulse. Achilles tendon exhibits normal Ramirez's test results.        Left ankle: He exhibits decreased range of motion and abnormal pulse. Achilles tendon exhibits normal Ramirez's test results.        Right foot: There is decreased range of motion.        Left foot: There is decreased range of motion.   Feet:   Right Foot:   Protective Sensation: 5 sites tested. 2 sites sensed.   Left Foot:   Protective Sensation: 5 sites tested. 2 sites sensed.   Neurological: He is alert.   diminished sensation noted to b/L lower extremities   Skin: Skin is dry. Capillary refill takes more than 3 seconds.   HKL's noted to b/L HIPJs    Ulceration  Location: left hallux nail bed   Measurements: 2.3x 2.0x 0.1  Drainage: serous  Wound base: granular  SOI: none     Psychiatric: His behavior is normal.   Vitals reviewed.      Nails x10 are elongated by  9-10mm's, thickened by 3-4 mm's, dystrophic, and are dark in  coloration . Xerosis Bilaterally.           Assessment:       Encounter Diagnoses   Name  Primary?    Diabetic polyneuropathy associated with type 2 diabetes mellitus Yes    Onychomycosis due to dermatophyte     Corn or callus          Plan:       Kamlesh was seen today for diabetes mellitus and diabetic foot exam.    Diagnoses and all orders for this visit:    Diabetic polyneuropathy associated with type 2 diabetes mellitus    Onychomycosis due to dermatophyte    Corn or callus      I counseled the patient on his conditions, their implications and medical management.    Ulceration on left hallux debrided through dermis/epidermis tissue using an tissue nipper. Ulceration debrided down to healthy tissue. Pt tolerated debridement well.   Cleansed with betadine, iodosorb applied.   Home instructions given to CG.     - Shoe inspection. Diabetic Foot Education. Patient reminded of the importance of good nutrition and blood sugar control to help prevent podiatric complications of diabetes. Patient instructed on proper foot hygeine. We discussed wearing proper shoe gear, daily foot inspections, never walking without protective shoe gear, never putting sharp instruments to feet, routine podiatric nail visits every 2-3 months.    After cleansing with an alcohol prep pad, the about mentioned hyperkeratotic lesions were sharply debrided X 2 utilizing a #15 blade to a smooth base without incident. Pt tolerated the procedure well and reported comfort to the debarment sites. Pt will continue to use padding and moisture the callused areas.     - With patient's permission, nails were aggressively reduced and debrided x 10 to their soft tissue attachment mechanically and with electric , removing all offending nail and debris. Patient relates relief following the procedure. He will continue to monitor the areas daily, inspect his feet, wear protective shoe gear when ambulatory, moisturizer to maintain skin integrity and follow in this office in approximately 2-3 months, sooner p.r.n.

## 2019-11-05 ENCOUNTER — LAB VISIT (OUTPATIENT)
Dept: LAB | Facility: HOSPITAL | Age: 64
End: 2019-11-05
Attending: INTERNAL MEDICINE
Payer: MEDICARE

## 2019-11-05 ENCOUNTER — ANTI-COAG VISIT (OUTPATIENT)
Dept: CARDIOLOGY | Facility: CLINIC | Age: 64
End: 2019-11-05
Payer: MEDICARE

## 2019-11-05 DIAGNOSIS — I63.10 CEREBROVASCULAR ACCIDENT (CVA) DUE TO EMBOLISM OF PRECEREBRAL ARTERY: ICD-10-CM

## 2019-11-05 DIAGNOSIS — Z79.01 LONG TERM (CURRENT) USE OF ANTICOAGULANTS: ICD-10-CM

## 2019-11-05 LAB
INR PPP: 2.7 (ref 0.8–1.2)
PROTHROMBIN TIME: 27.9 SEC (ref 9–12.5)

## 2019-11-05 PROCEDURE — 93793 PR ANTICOAGULANT MGMT FOR PT TAKING WARFARIN: ICD-10-PCS | Mod: S$GLB,,,

## 2019-11-05 PROCEDURE — 36415 COLL VENOUS BLD VENIPUNCTURE: CPT | Mod: HCNC

## 2019-11-05 PROCEDURE — 85610 PROTHROMBIN TIME: CPT | Mod: HCNC

## 2019-11-05 PROCEDURE — 93793 ANTICOAG MGMT PT WARFARIN: CPT | Mod: S$GLB,,,

## 2019-11-06 ENCOUNTER — PATIENT OUTREACH (OUTPATIENT)
Dept: ADMINISTRATIVE | Facility: OTHER | Age: 64
End: 2019-11-06

## 2019-11-06 DIAGNOSIS — E11.59 HYPERTENSION ASSOCIATED WITH DIABETES: ICD-10-CM

## 2019-11-06 DIAGNOSIS — I15.2 HYPERTENSION ASSOCIATED WITH DIABETES: ICD-10-CM

## 2019-11-07 ENCOUNTER — CLINICAL SUPPORT (OUTPATIENT)
Dept: DIABETES | Facility: CLINIC | Age: 64
End: 2019-11-07
Payer: MEDICARE

## 2019-11-07 DIAGNOSIS — E11.8 TYPE 2 DIABETES MELLITUS WITH COMPLICATION, WITH LONG-TERM CURRENT USE OF INSULIN: ICD-10-CM

## 2019-11-07 DIAGNOSIS — Z79.4 TYPE 2 DIABETES MELLITUS WITH COMPLICATION, WITH LONG-TERM CURRENT USE OF INSULIN: ICD-10-CM

## 2019-11-07 PROCEDURE — 99999 PR PBB SHADOW E&M-EST. PATIENT-LVL III: CPT | Mod: PBBFAC,HCNC,,

## 2019-11-07 PROCEDURE — G0108 PR DIAB MANAGE TRN  PER INDIV: ICD-10-PCS | Mod: HCNC,S$GLB,, | Performed by: INTERNAL MEDICINE

## 2019-11-07 PROCEDURE — G0108 DIAB MANAGE TRN  PER INDIV: HCPCS | Mod: HCNC,S$GLB,, | Performed by: INTERNAL MEDICINE

## 2019-11-07 PROCEDURE — 99999 PR PBB SHADOW E&M-EST. PATIENT-LVL III: ICD-10-PCS | Mod: PBBFAC,HCNC,,

## 2019-11-07 NOTE — PROGRESS NOTES
Diabetes Education  Author: Rosi Nunez RN  Date: 11/7/2019    Diabetes Care Management Summary  Diabetes Education Record Assessment/Progress: Initial  Current Diabetes Risk Level: Moderate     Last A1c:   Lab Results   Component Value Date    HGBA1C 8.0 (H) 10/10/2019     Last visit with Diabetes Educator: : 11/07/2019      Diabetes Type  Diabetes Type : Type II    Diabetes History  Diabetes Diagnosis: >10 years  Current Treatment: Insulin  Reviewed Problem List with Patient: Yes    Health Maintenance was reviewed today with patient. Discussed with patient importance of routine eye exams, foot exams/foot care, blood work (i.e.: A1c, microalbumin, and lipid), dental visits, yearly flu vaccine, and pneumonia vaccine as indicated by PCP. Patient verbalized understanding.     Health Maintenance Topics with due status: Not Due       Topic Last Completion Date    Lipid Panel 03/12/2019    Hemoglobin A1c 10/10/2019    High Dose Statin 10/24/2019    Foot Exam 10/24/2019     Health Maintenance Due   Topic Date Due    Fecal Occult Blood Test (FOBT)/[employer]Kit  1955    TETANUS VACCINE  06/16/1973    Eye Exam  07/20/2019       Nutrition  Meal Planning: diet drinks, water, eats out often, skipping meals  What type of beverages do you drink?: diet soda/tea, juice, water(coke zero)  Meal Plan 24 Hour Recall - Breakfast: biscuit and egg, water, coke zero  Meal Plan 24 Hour Recall - Lunch: Burger Arya: whopper and fries, coke zero  Meal Plan 24 Hour Recall - Dinner: Skipped  Meal Plan 24 Hour Recall - Snack: oreo cookies, chips    Monitoring   Monitoring: Other  Self Monitoring : SMBG: 3 times a day. AM: 106-114 lunch: 120s Dinner: 180  Blood Glucose Logs: No  Do you use a personal continuous glucose monitor?: No  In the last month, how often have you had a low blood sugar reaction?: never  Can you tell when your blood sugar is too high?: no    Exercise   Exercise Type: (arm lifts and leg lifts)  Intensity:  Low  Frequency: Daily  Duration: 30 min    Current Diabetes Treatment   Current Treatment: Insulin    Social History  Preferred Learning Method: Face to Face, Demonstration, Reading Materials  Primary Support: Family, Friends  Smoking Status: Never a Smoker      Barriers to Change  Barriers to Change: Functional limitation(Past CVA speech is very garbled, gait unsteady- uses a walker)  Learning Challenges : Other(Garbled speech)  Language -  present?: No    Readiness to Learn   Readiness to Learn : Acceptance    Cultural Influences  Cultural Influences: None    Diabetes Education Assessment/Progress      Diabetes Disease Process (diabetes disease process and treatment options): Discussion, Instructed, Individual Session, Written Materials Provided, Comprehends Key Points  Discussed qualifying parameters of diabetes dx. Reviewed/Instructed on disease process and pt's most likely causes of recently 8.0 A1c. Discussed current treatment options, especially dietary and lifestyle changes as well as medication additions and/or changes. Patient verbalizes understanding of received information/education.       Nutrition (Incorporating nutritional management into one's lifestyle): Discussion, Instructed, Individual Session, Written Materials Provided, Comprehends Key Points  Emphasized importance of eliminating all SSB. Discussed SF drink options. Discussed carb vs non-carb foods and reviewed appropriate amounts of carbs to have at meals vs snacks. Recommended 45-60 gm carb at meals and 15 gm carb at snacks. Instructed on appropriate label reading and serving sizes of specific carb containing foods. Reviewed need to limit total/saturated fats despite lesser effect on BG increase. Encouraged carb sources primarily from whole grains, fresh/frozen fruits, and low-fat milk and yogurt. Discussed meal plans and snack ideas amenable to pt. Patient verbalizes understanding of received information/education.       Physical  Activity (incorporating physical activity into one's lifestyle): Discussion, Instructed, Individual Session, Written Materials Provided, Comprehends Key Points  Discussed goals and benefits of regular Physical Activity. Reviewed difference between active lifestyle and structured physical activity. Encouraged Physical Activity with increased heart rate for sustained duration as tolerated. Reviewed Physical Activity goals of >150 minutes weekly. Patient verbalizes understanding of received information/education.       Medications (states correct name, dose, onset, peak, duration, side effects & timing of meds): Discussion, Instructed, Individual Session, Written Materials Provided, Comprehends Key Points  Discussed timing and mechanism of action of long and rapid acting insulin. Reviewed need for rotation of injection sites, appropriate insulin storage, timing of insulin, safe disposal of used sharps and insulin pen preparation and use, including performing a prime shot of 2 units prior to injection and keeping pen in skin for a count of 10 before removing. Discussed possible side effects and how to avoid these. Reviewed hypoglycemia and treatment with Rule of 15. Discussed general vs severe hyperglycemia and risk of DKA. Emphasized need to take levemir at same time daily and need to take novolog >4 hours apart. Emphasized need to take novolog injections 5-10 min prior to eating meals. Patient verbalizes understanding of received information/education.       Monitoring (monitoring blood glucose/other parameters & using results): Discussion, Instructed, Individual Session, Written Materials Provided, Comprehends Key Points  Discussed goal BGs for different times of day and in relation to meals. Instructed pt to test BG 3 times per day: fasting and 2-hours after any meal. Reviewed need for updated BG logs for all endo, PCP, and education appts. Patient verbalizes understanding of received information/education.        Acute Complications (preventing, detecting, and treating acute complications): Discussion, Instructed, Individual Session, Written Materials Provided, Comprehends Key Points  Discussed hypoglycemia vs hyperglycemia symptoms and discussed appropriate treatments for each. Reviewed that pt is at risk of hypo with current medication regimen. Discussed general vs severe hyperglycemia and risk of DKA.    Chronic Complications (preventing, detecting, and treating chronic complications): Discussion, Instructed, Individual Session, Written Materials Provided, Comprehends Key Points(Referral placed for Podiatry and Optometry.)  Reviewed annual diabetes care schedule and patient priorities. Patient verbalizes understanding of received information/education.       Clinical (diabetes, other pertinent medical history, and relevant comorbidities reviewed during visit): Discussion, Instructed, Individual Session  Reviewed current medical history including co-morbidities.    Cognitive (knowledge of self-management skills, functional health literacy): Discussion, Instructed, Individual Session  Arrives with poor health management knowledge - poor specific knowledge of diabetes management. Leaves with increased knowledge base - will benefit from f/u in  3 months.    Psychosocial (emotional response to diabetes): Not Covered/Deferred(Unable to complete due to Time Constraints)      Diabetes Distress and Support Systems: Not Covered/Deferred(Unable to complete due to Time Constraints)      Behavioral (readiness for change, lifestyle practices, self-care behaviors): Discussion, Instructed, Individual Session  Appears  motivated to make recommended changes.      Goals  Patient has selected/evaluated goals during today's session: Yes, selected  Healthy Eating: Set(Will measure food and read food labels. Will count carbohydrates to equal 45-60 gm per meal. Will abstain from drinking Sugar Beverages and utilize Diet and/or artificial  sweetener.)  Start Date: 11/07/19  Target Date: 02/07/20         Diabetes Care Plan/Intervention  Education Plan/Intervention: Individual Follow-Up DSMT    Diabetes Meal Plan  Restrictions: Restricted Carbohydrate  Carbohydrate Per Meal: 45-60g  Carbohydrate Per Snack : 7-15g    Today's Self-Management Care Plan was developed with the patient's input and is based on barriers identified during today's assessment.    The long and short-term goals in the care plan were written with the patient/caregiver's input. The patient has agreed to work toward these goals to improve his overall diabetes control.      The patient received a copy of today's self-management plan and verbalized understanding of the care plan, goals, and all of today's instructions.      The patient was encouraged to communicate with his physician and care team regarding his condition(s) and treatment.  I provided the patient with my contact information today and encouraged him to contact me via phone or patient portal as needed.     Education Units of Time   Time Spent: 60 min

## 2019-11-08 RX ORDER — CARVEDILOL 3.12 MG/1
TABLET ORAL
Qty: 90 TABLET | Refills: 0 | Status: SHIPPED | OUTPATIENT
Start: 2019-11-08 | End: 2019-12-06

## 2019-11-08 RX ORDER — WARFARIN 2.5 MG/1
TABLET ORAL
Qty: 90 TABLET | Refills: 0 | Status: SHIPPED | OUTPATIENT
Start: 2019-11-08 | End: 2020-06-02

## 2019-11-11 RX ORDER — WARFARIN SODIUM 5 MG/1
TABLET ORAL
Qty: 90 TABLET | Refills: 0 | Status: SHIPPED | OUTPATIENT
Start: 2019-11-11 | End: 2020-05-19

## 2019-11-12 ENCOUNTER — PATIENT OUTREACH (OUTPATIENT)
Dept: ADMINISTRATIVE | Facility: OTHER | Age: 64
End: 2019-11-12

## 2019-11-15 ENCOUNTER — TELEPHONE (OUTPATIENT)
Dept: OPTOMETRY | Facility: CLINIC | Age: 64
End: 2019-11-15

## 2019-11-15 NOTE — LETTER
July 19, 2018      Debora Ontiveros MD  [address]  [place] [address]           [facility]  [address]  [place] [address]  Phone: [phone]          Patient: Kamlesh Diane   MR Number: [id number]   YOB: 1955   Date of Visit: 7/19/2018       Dear Dr. Debora Ontiveros:    Thank you for referring Kamlesh Diane to me for evaluation. Attached you will find relevant portions of my assessment and plan of care.    If you have questions, please do not hesitate to call me. I look forward to following Kamlesh Diane along with you.    Sincerely,    Henrry Mendosa MD    Enclosure  CC:  No Recipients    If you would like to receive this communication electronically, please contact [email or web address] or [phone] to request more information on [employer] Link access.    For providers and/or their staff who would like to refer a patient to [facility], please contact us through our one-stop-shop provider referral line, [facility], at [phone].    If you feel you have received this communication in error or would no longer like to receive these types of communications, please e-mail [email or web address]         
Cardiac

## 2019-11-15 NOTE — TELEPHONE ENCOUNTER
[facility] as of 11/15/19    Member Name: JAYNA ROCK  Member ID: [id number]  Social Security Number: [id number]  YOB: 1955  Address: [address] [place] [address]  Phone Number: [phone]  Gender: Male  Responsible Member: JAYNA ROCK    Network: [employer]  Group: [employer] [place] ([id number])  Benefit Level: 767

## 2019-11-18 ENCOUNTER — PATIENT OUTREACH (OUTPATIENT)
Dept: ADMINISTRATIVE | Facility: OTHER | Age: 64
End: 2019-11-18

## 2019-11-18 DIAGNOSIS — E11.8 CONTROLLED TYPE 2 DIABETES MELLITUS WITH COMPLICATION, WITH LONG-TERM CURRENT USE OF INSULIN: ICD-10-CM

## 2019-11-18 DIAGNOSIS — R25.1 TREMOR: ICD-10-CM

## 2019-11-18 DIAGNOSIS — Z79.4 CONTROLLED TYPE 2 DIABETES MELLITUS WITH COMPLICATION, WITH LONG-TERM CURRENT USE OF INSULIN: ICD-10-CM

## 2019-11-18 RX ORDER — INSULIN ASPART 100 [IU]/ML
INJECTION, SOLUTION INTRAVENOUS; SUBCUTANEOUS
Qty: 30 ML | Refills: 0 | Status: SHIPPED | OUTPATIENT
Start: 2019-11-18 | End: 2020-08-19

## 2019-11-19 ENCOUNTER — LAB VISIT (OUTPATIENT)
Dept: LAB | Facility: HOSPITAL | Age: 64
End: 2019-11-19
Attending: INTERNAL MEDICINE
Payer: MEDICARE

## 2019-11-19 ENCOUNTER — ANTI-COAG VISIT (OUTPATIENT)
Dept: CARDIOLOGY | Facility: CLINIC | Age: 64
End: 2019-11-19
Payer: MEDICARE

## 2019-11-19 ENCOUNTER — OFFICE VISIT (OUTPATIENT)
Dept: OPTOMETRY | Facility: CLINIC | Age: 64
End: 2019-11-19
Payer: COMMERCIAL

## 2019-11-19 DIAGNOSIS — H35.62 RETINAL HEMORRHAGE OF LEFT EYE: ICD-10-CM

## 2019-11-19 DIAGNOSIS — I63.10 CEREBROVASCULAR ACCIDENT (CVA) DUE TO EMBOLISM OF PRECEREBRAL ARTERY: ICD-10-CM

## 2019-11-19 DIAGNOSIS — Z79.01 LONG TERM (CURRENT) USE OF ANTICOAGULANTS: ICD-10-CM

## 2019-11-19 DIAGNOSIS — H04.123 DRY EYE SYNDROME OF BOTH EYES: Primary | ICD-10-CM

## 2019-11-19 DIAGNOSIS — H40.013 OPEN ANGLE WITH BORDERLINE FINDINGS OF BOTH EYES: ICD-10-CM

## 2019-11-19 DIAGNOSIS — Z86.73 H/O ISCHEMIC RIGHT MCA STROKE: ICD-10-CM

## 2019-11-19 DIAGNOSIS — H52.7 REFRACTIVE ERROR: ICD-10-CM

## 2019-11-19 LAB
INR PPP: 2.8 (ref 0.8–1.2)
PROTHROMBIN TIME: 27.9 SEC (ref 9–12.5)

## 2019-11-19 PROCEDURE — 93793 ANTICOAG MGMT PT WARFARIN: CPT | Mod: S$GLB,,,

## 2019-11-19 PROCEDURE — 92014 PR EYE EXAM, EST PATIENT,COMPREHESV: ICD-10-PCS | Mod: S$GLB,,, | Performed by: OPTOMETRIST

## 2019-11-19 PROCEDURE — 36415 COLL VENOUS BLD VENIPUNCTURE: CPT | Mod: HCNC

## 2019-11-19 PROCEDURE — 92015 PR REFRACTION: ICD-10-PCS | Mod: S$GLB,,, | Performed by: OPTOMETRIST

## 2019-11-19 PROCEDURE — 93793 PR ANTICOAGULANT MGMT FOR PT TAKING WARFARIN: ICD-10-PCS | Mod: S$GLB,,,

## 2019-11-19 PROCEDURE — 99999 PR PBB SHADOW E&M-EST. PATIENT-LVL II: CPT | Mod: PBBFAC,,, | Performed by: OPTOMETRIST

## 2019-11-19 PROCEDURE — 85610 PROTHROMBIN TIME: CPT | Mod: HCNC

## 2019-11-19 PROCEDURE — 92014 COMPRE OPH EXAM EST PT 1/>: CPT | Mod: S$GLB,,, | Performed by: OPTOMETRIST

## 2019-11-19 PROCEDURE — 92015 DETERMINE REFRACTIVE STATE: CPT | Mod: S$GLB,,, | Performed by: OPTOMETRIST

## 2019-11-19 PROCEDURE — 99999 PR PBB SHADOW E&M-EST. PATIENT-LVL II: ICD-10-PCS | Mod: PBBFAC,,, | Performed by: OPTOMETRIST

## 2019-11-19 RX ORDER — AMANTADINE HYDROCHLORIDE 100 MG/1
CAPSULE, GELATIN COATED ORAL
Qty: 180 CAPSULE | Refills: 0 | Status: SHIPPED | OUTPATIENT
Start: 2019-11-19 | End: 2020-03-02 | Stop reason: SDUPTHER

## 2019-11-19 NOTE — PROGRESS NOTES
"HPI     Hx of Cerebrovascular accident (CVA) x 3-4 years ago.  Patient here w/brother Krish which states OU vision seem impaired.  Broken glasses  Pt states OD>OS decreased vision at dist.  No eye pain.  Artificial tears ? restasis  Cataract surgery OU  Doesn't close eyes while sleeping      Last edited by Anayeli De Oliveira on 11/19/2019 10:50 AM. (History)            Assessment /Plan     For exam results, see Encounter Report.    Dry eye syndrome of both eyes    Open angle with borderline findings of both eyes    Retinal hemorrhage of left eye    H/O ischemic right MCA stroke    Refractive error      1. Continue use of restasis BID OU + gel AT qhs. Monitor.   2. (-)FHx. WNL IOP. Larger C/d ratio --- stable from previous year. Consider RNFL OCT in the future.   3-4. Hemes inferior OS + CWS. BRVO ?. Pt minimally cooperative during exam. Fundus photos attempted with minimal success. H/o CVA. Suggested in the past to perform HVF, however, do not think pt would comprehend testing. Refer to retina for further eval   5. Updated SRx. Monitor yearly.     *Brother notes pt has seen touro OMD in the past. Described "retinal hole that closed on its own", assume mac hole (?). Discussed requesting records and bringing with pt to retina visit.     RTC with Dr. Tapia for further retina eval OS, me yearly or prn.                    "

## 2019-11-19 NOTE — LETTER
November 19, 2019      Soledad Mckeon, APRN, FNP  [address]  [facility] [address]           [facility]  [address]  [facility] [address]  Phone: [phone]          Patient: Kamlesh Diane   MR Number: [id number]   YOB: 1955   Date of Visit: 11/19/2019       Dear Soledad Mckeon:    Thank you for referring Kamlesh Diane to me for evaluation. Attached you will find relevant portions of my assessment and plan of care.    If you have questions, please do not hesitate to call me. I look forward to following Kamlesh Diane along with you.    Sincerely,    Christiane Swift, OD    Enclosure  CC:  No Recipients    If you would like to receive this communication electronically, please contact [email or web address] or [phone] to request more information on [employer] Link access.    For providers and/or their staff who would like to refer a patient to [facility], please contact us through our one-stop-shop provider referral line, [facility], at [phone].    If you feel you have received this communication in error or would no longer like to receive these types of communications, please e-mail [email or web address]

## 2019-11-25 ENCOUNTER — PATIENT OUTREACH (OUTPATIENT)
Dept: ADMINISTRATIVE | Facility: OTHER | Age: 64
End: 2019-11-25

## 2019-11-25 DIAGNOSIS — E11.59 HYPERTENSION ASSOCIATED WITH DIABETES: ICD-10-CM

## 2019-11-25 DIAGNOSIS — I15.2 HYPERTENSION ASSOCIATED WITH DIABETES: ICD-10-CM

## 2019-11-25 RX ORDER — AMLODIPINE BESYLATE 10 MG/1
TABLET ORAL
Qty: 90 TABLET | Refills: 3 | Status: SHIPPED | OUTPATIENT
Start: 2019-11-25 | End: 2020-10-02 | Stop reason: SDUPTHER

## 2019-11-26 ENCOUNTER — OFFICE VISIT (OUTPATIENT)
Dept: OPHTHALMOLOGY | Facility: CLINIC | Age: 64
End: 2019-11-26
Payer: MEDICARE

## 2019-11-26 ENCOUNTER — ANTI-COAG VISIT (OUTPATIENT)
Dept: CARDIOLOGY | Facility: CLINIC | Age: 64
End: 2019-11-26
Payer: MEDICARE

## 2019-11-26 ENCOUNTER — LAB VISIT (OUTPATIENT)
Dept: LAB | Facility: HOSPITAL | Age: 64
End: 2019-11-26
Payer: MEDICARE

## 2019-11-26 VITALS — HEART RATE: 100 BPM | DIASTOLIC BLOOD PRESSURE: 79 MMHG | SYSTOLIC BLOOD PRESSURE: 129 MMHG

## 2019-11-26 DIAGNOSIS — H43.12 VITREOUS HEMORRHAGE, LEFT: Primary | ICD-10-CM

## 2019-11-26 DIAGNOSIS — I63.10 CEREBROVASCULAR ACCIDENT (CVA) DUE TO EMBOLISM OF PRECEREBRAL ARTERY: ICD-10-CM

## 2019-11-26 DIAGNOSIS — Z79.01 LONG TERM (CURRENT) USE OF ANTICOAGULANTS: ICD-10-CM

## 2019-11-26 DIAGNOSIS — H35.033 HYPERTENSIVE RETINOPATHY, BILATERAL: ICD-10-CM

## 2019-11-26 LAB
INR PPP: 2.8 (ref 0.8–1.2)
PROTHROMBIN TIME: 26.9 SEC (ref 9–12.5)

## 2019-11-26 PROCEDURE — 99499 UNLISTED E&M SERVICE: CPT | Mod: S$GLB,,, | Performed by: OPHTHALMOLOGY

## 2019-11-26 PROCEDURE — 92225 PR SPECIAL EYE EXAM, INITIAL: CPT | Mod: 50,PBBFAC | Performed by: OPHTHALMOLOGY

## 2019-11-26 PROCEDURE — 92235 FLUORESCEIN ANGIOGRAPHY - OU - BOTH EYES: ICD-10-PCS | Mod: S$GLB,,, | Performed by: OPHTHALMOLOGY

## 2019-11-26 PROCEDURE — 99999 PR PBB SHADOW E&M-EST. PATIENT-LVL III: CPT | Mod: PBBFAC,,, | Performed by: OPHTHALMOLOGY

## 2019-11-26 PROCEDURE — 99499 RISK ADDL DX/OHS AUDIT: ICD-10-PCS | Mod: S$GLB,,, | Performed by: OPHTHALMOLOGY

## 2019-11-26 PROCEDURE — 99213 OFFICE O/P EST LOW 20 MIN: CPT | Mod: PBBFAC,25 | Performed by: OPHTHALMOLOGY

## 2019-11-26 PROCEDURE — 93793 PR ANTICOAGULANT MGMT FOR PT TAKING WARFARIN: ICD-10-PCS | Mod: S$GLB,,,

## 2019-11-26 PROCEDURE — 85610 PROTHROMBIN TIME: CPT | Mod: HCNC

## 2019-11-26 PROCEDURE — 92235 FLUORESCEIN ANGRPH MLTIFRAME: CPT | Mod: PBBFAC | Performed by: OPHTHALMOLOGY

## 2019-11-26 PROCEDURE — 36415 COLL VENOUS BLD VENIPUNCTURE: CPT | Mod: HCNC

## 2019-11-26 PROCEDURE — 92014 PR EYE EXAM, EST PATIENT,COMPREHESV: ICD-10-PCS | Mod: S$GLB,,, | Performed by: OPHTHALMOLOGY

## 2019-11-26 PROCEDURE — 92014 COMPRE OPH EXAM EST PT 1/>: CPT | Mod: S$GLB,,, | Performed by: OPHTHALMOLOGY

## 2019-11-26 PROCEDURE — 93793 ANTICOAG MGMT PT WARFARIN: CPT | Mod: S$GLB,,,

## 2019-11-26 PROCEDURE — 92225 PR SPECIAL EYE EXAM, INITIAL: ICD-10-PCS | Mod: 50,S$GLB,, | Performed by: OPHTHALMOLOGY

## 2019-11-26 PROCEDURE — 92225 PR SPECIAL EYE EXAM, INITIAL: CPT | Mod: 50,S$GLB,, | Performed by: OPHTHALMOLOGY

## 2019-11-26 PROCEDURE — 99999 PR PBB SHADOW E&M-EST. PATIENT-LVL III: ICD-10-PCS | Mod: PBBFAC,,, | Performed by: OPHTHALMOLOGY

## 2019-11-26 RX ORDER — FLUORESCEIN 500 MG/ML
5 INJECTION INTRAVENOUS ONCE
Status: COMPLETED | OUTPATIENT
Start: 2019-11-26 | End: 2019-11-26

## 2019-11-26 RX ADMIN — FLUORESCEIN 500 MG: 500 INJECTION INTRAVENOUS at 02:11

## 2019-11-26 NOTE — PATIENT INSTRUCTIONS
Fluorescein Angiography     A fluorescein angiogram of the retina   Fluorescein angiography is an eye test. It is done to look at the back of your eye, including:  · The blood vessels in your eye  · The layer of tissue at the back of your eye (the retina)  · The center of your retina (the macula)  · The optic nerve  This test can diagnose diseases found in these areas. It can also diagnose other conditions that affect these areas. To do this test, a dye called fluorescein is shot (injected) into your arm. The dye goes into your bloodstream and up into the blood vessels in your eyes. A special camera is then used to take images (angiograms) of your eyes.  Getting ready for your test  Tell your healthcare provider if you:  · Are pregnant or think you may be pregnant  · Are breastfeeding  · Have a history of severe allergic reactions, including to X-ray dye or other medicines  · Have kidney problems  Tell your provider about any medicines you are taking. You may need to stop taking all or some of these before the test. This includes:  · All prescription medicines  · Over-the-counter medicines such as aspirin or ibuprofen  · Street drugs  · Herbs, vitamins, and other supplements  You should arrange for an adult family member or friend to drive you home after your test. Your vision will be blurry for up to 12 hours.  Follow any other instructions from your healthcare provider.  During your test  · You are given eye drops to enlarge (dilate) your pupils.  · You then sit in front of a special camera. You place your chin on the chin rest and look into the camera.  · Images are taken of your eyes, one eye at a time.  · Fluorescein dye is then injected into your arm. The lights in the room are turned off. You may have mild nausea. You may have a warm feeling in your arm or upper body. Tell your provider if your skin feels itchy or if you are having trouble breathing. If so, you could be having an allergic reaction to the  dye.  · More pictures of your eyes are taken over 15 to 30 minutes. The camera shines a bright light into your eyes. Try to keep your head still and your eyes open.  · When enough images have been taken, the test is over.  After your test  Your vision will be blurry for up to 4 to 12 hours. This is because of your dilated pupils. Your eye will be more sensitive to light for up to 12 hours. You may want to wear sunglasses during this time. Do not drive if your vision is very blurry. You may also find it uncomfortable to read. Your skin may look yellow for a few hours. This is from the dye. Your urine will be bright yellow or orange for 24 to 48 hours after the test.     Risks and possible complications  All procedures have some risks. Possible risks of fluorescein angiography include:  · Upset stomach (nausea) and vomiting  · Leaking dye around the injection site that causes pain and swelling  · Metallic taste in your mouth  · Infection at injection site  · Allergic reaction to the dye  · Dry mouth or too much saliva  · Faster heart rate  · Sweating  · Lower back pain   Date Last Reviewed: 5/30/2015  © 2285-5004 [employer]. [address] [place] [address], [place] [address]. All rights reserved. This information is not intended as a substitute for professional medical care. Always follow your healthcare professional's instructions.

## 2019-11-26 NOTE — PROGRESS NOTES
HPI     Referred by Dr. Swift    Patient denies any blurry vision. He states he had a stroke in 2010 and at   that time his vision became blurry. (+)floaters denies flashes or pain.     Eye meds:  Restatsis BID OU    Last edited by Analia Devine on 11/26/2019  1:33 PM. (History)      OCT - NO ME OU    [facility] - peripheral NP and NV OU      A/P    1. PDR OU  T2 uncontrolled on insulin  No ME OU  Watch ST fibrosis    Plan PRP OU - OS first    2. VH OS   Minimal    3. PCIOL OU    4. HTN Ret OU  BS/BP/chol control    5. Prior CVA   On coumadin      2-3 weeks PRP OS

## 2019-11-26 NOTE — LETTER
November 26, 2019      Christiane Swift, OD  [address]  [facility] [address]           [facility]  [address]  [facility] [address]  Phone: [phone]  Fax: [phone]          Patient: Kamlesh Diane   MR Number: [id number]   YOB: 1955   Date of Visit: 11/26/2019       Dear Dr. Christiane Swift:    Thank you for referring Kamlesh Diane to me for evaluation. Attached you will find relevant portions of my assessment and plan of care.    If you have questions, please do not hesitate to call me. I look forward to following Kamlesh Diane along with you.    Sincerely,    YANDY Tapia MD    Enclosure  CC:  No Recipients    If you would like to receive this communication electronically, please contact [email or web address] or [phone] to request more information on [employer] Link access.    For providers and/or their staff who would like to refer a patient to [facility], please contact us through our one-stop-shop provider referral line, [facility], at [phone].    If you feel you have received this communication in error or would no longer like to receive these types of communications, please e-mail [email or web address]

## 2019-11-27 DIAGNOSIS — Z79.4 CONTROLLED TYPE 2 DIABETES MELLITUS WITH COMPLICATION, WITH LONG-TERM CURRENT USE OF INSULIN: ICD-10-CM

## 2019-11-27 DIAGNOSIS — E11.8 CONTROLLED TYPE 2 DIABETES MELLITUS WITH COMPLICATION, WITH LONG-TERM CURRENT USE OF INSULIN: ICD-10-CM

## 2019-11-27 RX ORDER — INSULIN DETEMIR 100 [IU]/ML
45 INJECTION, SOLUTION SUBCUTANEOUS NIGHTLY
Qty: 3 BOX | Refills: 3 | Status: SHIPPED | OUTPATIENT
Start: 2019-11-27 | End: 2020-03-02 | Stop reason: SDUPTHER

## 2019-11-27 NOTE — TELEPHONE ENCOUNTER
----- Message from Chey Weber sent at 11/27/2019 10:42 AM CST -----  Contact: [place] [phone]  Type: Rx    Name of medication(s): LEVEMIR FLEXTOUCH U-100 INSULN 100 unit/mL (3 mL) InPn pen    Is this a refill? New rx? Refill     Pharmacy Name, Phone, & Location:[facility] - [address]   [phone] (Phone)  [phone] (Fax)    Comments:please advise, thanks

## 2019-12-01 ENCOUNTER — PATIENT OUTREACH (OUTPATIENT)
Dept: ADMINISTRATIVE | Facility: OTHER | Age: 64
End: 2019-12-01

## 2019-12-05 ENCOUNTER — TELEPHONE (OUTPATIENT)
Dept: ADMINISTRATIVE | Facility: OTHER | Age: 64
End: 2019-12-05

## 2019-12-05 ENCOUNTER — TELEPHONE (OUTPATIENT)
Dept: INTERNAL MEDICINE | Facility: CLINIC | Age: 64
End: 2019-12-05

## 2019-12-05 DIAGNOSIS — I15.2 HYPERTENSION ASSOCIATED WITH DIABETES: ICD-10-CM

## 2019-12-05 DIAGNOSIS — E11.59 HYPERTENSION ASSOCIATED WITH DIABETES: ICD-10-CM

## 2019-12-05 NOTE — TELEPHONE ENCOUNTER
----- Message from Cait Coronado sent at 12/5/2019  1:37 PM CST -----  Contact: Diane/Audra [phone]  Wants to know when did you change Rx carvedilol (COREG) 3.125 MG tablet to once a day.    Please call and advise.    Thank You

## 2019-12-06 ENCOUNTER — TELEPHONE (OUTPATIENT)
Dept: PHARMACY | Facility: CLINIC | Age: 64
End: 2019-12-06

## 2019-12-06 ENCOUNTER — TELEPHONE (OUTPATIENT)
Dept: INTERNAL MEDICINE | Facility: CLINIC | Age: 64
End: 2019-12-06

## 2019-12-06 RX ORDER — INSULIN LISPRO 100 [IU]/ML
INJECTION, SOLUTION INTRAVENOUS; SUBCUTANEOUS
Qty: 15 ML | Refills: 3 | Status: SHIPPED | OUTPATIENT
Start: 2019-12-06 | End: 2020-03-02 | Stop reason: SDUPTHER

## 2019-12-06 RX ORDER — CARVEDILOL 3.12 MG/1
3.12 TABLET ORAL 2 TIMES DAILY WITH MEALS
Qty: 180 TABLET | Refills: 3 | Status: SHIPPED | OUTPATIENT
Start: 2019-12-06 | End: 2020-10-02 | Stop reason: SDUPTHER

## 2019-12-06 NOTE — TELEPHONE ENCOUNTER
Patient was given a free 1-month supply of Humalog Kwik Pen.  Per patients request I mailed him an application.  I'm waiting for him to sign and return necessary documents.

## 2019-12-06 NOTE — TELEPHONE ENCOUNTER
----- Message from Sybil Eduardo sent at 12/6/2019  2:57 PM CST -----  Contact: Diane(Audra)  Wants to confirm a few things about Insulin    Please advise, can be reached at [phone] Diane(Audra)

## 2019-12-06 NOTE — TELEPHONE ENCOUNTER
----- Message from Estrellita Nicholson sent at 12/6/2019  8:11 AM CST -----  Contact: miriam/erasmo/[phone]  Pt nijojo called in regards to the pt insurance will not cover his Rx for     NOVOLOG FLEXPEN U-100 INSULIN 100 unit/mL (3 mL) InPn pen 30 mL 0 11/18/2019  Yes  USE AS DIRECTED BY YOUR PRESCRIBER. COMPLETE DIRECTIONS ARE INCLUDED IN A LETTER WITH YOUR ORIGINAL ORDER    They wanted to know if the dr could switch it.     Please advise

## 2019-12-06 NOTE — TELEPHONE ENCOUNTER
I think that was a mistake on Marizol's refill. I'll send it in again as BID, which was his previous script.

## 2019-12-06 NOTE — TELEPHONE ENCOUNTER
----- Message from Estrellita Nicholson sent at 12/6/2019 10:10 AM CST -----  Contact: miriam/erasmo/[phone]  Pt niece called in regards to getting clarification on when did the dr change the dosage on the pt Rx for     carvedilol (COREG) 3.125 MG tablet 180 tablet 3 12/6/2019  No Take 1 tablet (3.125 mg total) by mouth 2 (two) times daily with meals.     She would like a call so she can further explain.    Please advise

## 2019-12-06 NOTE — TELEPHONE ENCOUNTER
Called and spoke to Diane.  Cleared up confusion on Coreg and let her know about Humalog change and  Pharmacy assistance referral.  Pt.'s verbalized understanding of this.

## 2019-12-11 ENCOUNTER — LAB VISIT (OUTPATIENT)
Dept: LAB | Facility: HOSPITAL | Age: 64
End: 2019-12-11
Attending: INTERNAL MEDICINE
Payer: MEDICARE

## 2019-12-11 ENCOUNTER — ANTI-COAG VISIT (OUTPATIENT)
Dept: CARDIOLOGY | Facility: CLINIC | Age: 64
End: 2019-12-11
Payer: MEDICARE

## 2019-12-11 DIAGNOSIS — Z79.01 LONG TERM (CURRENT) USE OF ANTICOAGULANTS: ICD-10-CM

## 2019-12-11 DIAGNOSIS — I63.10 CEREBROVASCULAR ACCIDENT (CVA) DUE TO EMBOLISM OF PRECEREBRAL ARTERY: ICD-10-CM

## 2019-12-11 LAB
INR PPP: 3.4 (ref 0.8–1.2)
PROTHROMBIN TIME: 34.7 SEC (ref 9–12.5)

## 2019-12-11 PROCEDURE — 93793 ANTICOAG MGMT PT WARFARIN: CPT | Mod: S$GLB,,,

## 2019-12-11 PROCEDURE — 85610 PROTHROMBIN TIME: CPT | Mod: HCNC

## 2019-12-11 PROCEDURE — 36415 COLL VENOUS BLD VENIPUNCTURE: CPT | Mod: HCNC

## 2019-12-11 PROCEDURE — 93793 PR ANTICOAGULANT MGMT FOR PT TAKING WARFARIN: ICD-10-PCS | Mod: S$GLB,,,

## 2019-12-11 NOTE — PROGRESS NOTES
INR not at goal. Medications, chart, and patient findings reviewed. See calendar for adjustments to dose and follow up plan.  Pt's brother states he may have had a little less coleslaw.  Instruct pt to hold coumadin 12/11 & resume maintenance dose.  Plan to re-assess in 2 weeks.

## 2019-12-16 ENCOUNTER — PATIENT OUTREACH (OUTPATIENT)
Dept: ADMINISTRATIVE | Facility: OTHER | Age: 64
End: 2019-12-16

## 2019-12-27 ENCOUNTER — PATIENT OUTREACH (OUTPATIENT)
Dept: ADMINISTRATIVE | Facility: OTHER | Age: 64
End: 2019-12-27

## 2019-12-30 ENCOUNTER — ANTI-COAG VISIT (OUTPATIENT)
Dept: CARDIOLOGY | Facility: CLINIC | Age: 64
End: 2019-12-30
Payer: MEDICARE

## 2019-12-30 ENCOUNTER — LAB VISIT (OUTPATIENT)
Dept: LAB | Facility: HOSPITAL | Age: 64
End: 2019-12-30
Attending: INTERNAL MEDICINE
Payer: MEDICARE

## 2019-12-30 DIAGNOSIS — Z79.01 LONG TERM (CURRENT) USE OF ANTICOAGULANTS: ICD-10-CM

## 2019-12-30 DIAGNOSIS — I63.10 CEREBROVASCULAR ACCIDENT (CVA) DUE TO EMBOLISM OF PRECEREBRAL ARTERY: ICD-10-CM

## 2019-12-30 LAB
INR PPP: 3.6 (ref 0.8–1.2)
PROTHROMBIN TIME: 34.5 SEC (ref 9–12.5)

## 2019-12-30 PROCEDURE — 36415 COLL VENOUS BLD VENIPUNCTURE: CPT | Mod: HCNC

## 2019-12-30 PROCEDURE — 85610 PROTHROMBIN TIME: CPT | Mod: HCNC

## 2019-12-30 PROCEDURE — 93793 ANTICOAG MGMT PT WARFARIN: CPT | Mod: S$GLB,,,

## 2019-12-30 PROCEDURE — 93793 PR ANTICOAGULANT MGMT FOR PT TAKING WARFARIN: ICD-10-PCS | Mod: S$GLB,,,

## 2019-12-30 NOTE — PROGRESS NOTES
INR not at goal. Medications, chart, and patient findings reviewed. See calendar for adjustments to dose and follow up plan.  Pt slightly elevated due to increase in alcohol intake.  Will instruct pt to hold coumadin 12/30 & re-assess in 2 weeks as he was slightly elevated last lab.  Pt may require dosing reduction.  Pt to keep diet & alcohol intake consistent.

## 2019-12-31 ENCOUNTER — OFFICE VISIT (OUTPATIENT)
Dept: PODIATRY | Facility: CLINIC | Age: 64
End: 2019-12-31
Payer: MEDICARE

## 2019-12-31 VITALS
DIASTOLIC BLOOD PRESSURE: 86 MMHG | WEIGHT: 198 LBS | SYSTOLIC BLOOD PRESSURE: 140 MMHG | HEIGHT: 73 IN | BODY MASS INDEX: 26.24 KG/M2 | HEART RATE: 109 BPM

## 2019-12-31 DIAGNOSIS — B35.1 ONYCHOMYCOSIS DUE TO DERMATOPHYTE: ICD-10-CM

## 2019-12-31 DIAGNOSIS — E11.42 DIABETIC POLYNEUROPATHY ASSOCIATED WITH TYPE 2 DIABETES MELLITUS: Primary | ICD-10-CM

## 2019-12-31 DIAGNOSIS — L84 CORN OR CALLUS: ICD-10-CM

## 2019-12-31 PROCEDURE — 99213 OFFICE O/P EST LOW 20 MIN: CPT | Mod: PBBFAC,25 | Performed by: PODIATRIST

## 2019-12-31 PROCEDURE — 11057 PARNG/CUTG B9 HYPRKR LES >4: CPT | Mod: Q9,PBBFAC | Performed by: PODIATRIST

## 2019-12-31 PROCEDURE — 11721 DEBRIDE NAIL 6 OR MORE: CPT | Mod: Q9,59,S$GLB, | Performed by: PODIATRIST

## 2019-12-31 PROCEDURE — 11721 PR DEBRIDEMENT OF NAILS, 6 OR MORE: ICD-10-PCS | Mod: Q9,59,S$GLB, | Performed by: PODIATRIST

## 2019-12-31 PROCEDURE — 99999 PR PBB SHADOW E&M-EST. PATIENT-LVL III: CPT | Mod: PBBFAC,,, | Performed by: PODIATRIST

## 2019-12-31 PROCEDURE — 11057 PARNG/CUTG B9 HYPRKR LES >4: CPT | Mod: Q9,S$GLB,, | Performed by: PODIATRIST

## 2019-12-31 PROCEDURE — 99499 UNLISTED E&M SERVICE: CPT | Mod: S$GLB,,, | Performed by: PODIATRIST

## 2019-12-31 PROCEDURE — 99999 PR PBB SHADOW E&M-EST. PATIENT-LVL III: ICD-10-PCS | Mod: PBBFAC,,, | Performed by: PODIATRIST

## 2019-12-31 PROCEDURE — 99499 NO LOS: ICD-10-PCS | Mod: S$GLB,,, | Performed by: PODIATRIST

## 2019-12-31 PROCEDURE — 11057 PR TRIM BENIGN HYPERKERATOTIC SKIN LESION,>4: ICD-10-PCS | Mod: Q9,S$GLB,, | Performed by: PODIATRIST

## 2019-12-31 PROCEDURE — 11721 DEBRIDE NAIL 6 OR MORE: CPT | Mod: Q9,59,PBBFAC | Performed by: PODIATRIST

## 2019-12-31 NOTE — PROGRESS NOTES
Subjective:      Patient ID: Kamlesh Diane is a 64 y.o. male.    Chief Complaint: Nail Problem (left great toe) and Diabetes Mellitus (michael feng 10/24/19)    Kamlesh is a 64 y.o. male who presents to the clinic for evaluation and treatment of high risk feet. Kamlesh has a past medical history of CVA (cerebral vascular accident), Diabetes, Elevated PSA, Hypertension, Prostate cancer, and Stroke. The patient's chief complaint is long, thick toenails. This patient has documented high risk feet requiring routine maintenance secondary to diabetes mellitis and those secondary complications of diabetes, as mentioned..    PCP: Debora Ontiveros MD    Date Last Seen by PCP:   Chief Complaint   Patient presents with    Nail Problem     left great toe    Diabetes Mellitus     michael feng 10/24/19         Hemoglobin A1C   Date Value Ref Range Status   10/10/2019 8.0 (H) 4.0 - 5.6 % Final     Comment:     ADA Screening Guidelines:  5.7-6.4%  Consistent with prediabetes  >or=6.5%  Consistent with diabetes  High levels of fetal hemoglobin interfere with the HbA1C  assay. Heterozygous hemoglobin variants (HbS, HgC, etc)do  not significantly interfere with this assay.   However, presence of multiple variants may affect accuracy.     06/18/2019 7.6 (H) 4.0 - 5.6 % Final     Comment:     ADA Screening Guidelines:  5.7-6.4%  Consistent with prediabetes  >or=6.5%  Consistent with diabetes  High levels of fetal hemoglobin interfere with the HbA1C  assay. Heterozygous hemoglobin variants (HbS, HgC, etc)do  not significantly interfere with this assay.   However, presence of multiple variants may affect accuracy.     03/12/2019 8.3 (H) 4.0 - 5.6 % Final     Comment:     ADA Screening Guidelines:  5.7-6.4%  Consistent with prediabetes  >or=6.5%  Consistent with diabetes  High levels of fetal hemoglobin interfere with the HbA1C  assay. Heterozygous hemoglobin variants (HbS, HgC, etc)do  not significantly interfere with this assay.   However,  presence of multiple variants may affect accuracy.         Review of Systems   Constitution: Negative for chills, decreased appetite and fever.   Cardiovascular: Negative for leg swelling.   Skin: Positive for dry skin and nail changes.   Musculoskeletal: Positive for arthritis and muscle weakness. Negative for joint pain, joint swelling and myalgias.   Gastrointestinal: Negative for nausea and vomiting.   Neurological: Positive for numbness. Negative for loss of balance and paresthesias.           Objective:      Physical Exam   Constitutional: He is oriented to person, place, and time. He appears well-developed and well-nourished.   Cardiovascular:   Dorsalis pedis and posterior tibial pulses are diminished Bilaterally. Toes are cool to touch. Feet are warm proximally.There is decreased digital hair. Skin is atrophic, slightly hyperpigmented, and mildly edematous       Musculoskeletal: Normal range of motion. He exhibits no edema or tenderness.   Adequate joint range of motion without pain, limitation, nor crepitation Bilateral feet and ankle joints. Muscle strength is 5/5 in all groups bilaterally.         Neurological: He is alert and oriented to person, place, and time.   Aline-Gurpreet 5.07 monofilamant testing is diminished Hari feet. Sharp/dull sensation diminished Bilaterally. Light touch absent Bilaterally.       Skin: Skin is warm and dry. No ecchymosis and no lesion noted. No erythema.   Nails x10 are elongated by  2-4mm's, thickened by 2-7 mm's, dystrophic, and are darkened in  coloration . Xerosis Bilaterally. No open lesions noted.    Hyperkeratotic tissue noted to distal toes 2-4 b/l      Psychiatric: He has a normal mood and affect. His behavior is normal.             Assessment:       Encounter Diagnoses   Name Primary?    Diabetic polyneuropathy associated with type 2 diabetes mellitus Yes    Onychomycosis due to dermatophyte     Corn or callus          Plan:       Kamlesh was seen today for  nail problem and diabetes mellitus.    Diagnoses and all orders for this visit:    Diabetic polyneuropathy associated with type 2 diabetes mellitus    Onychomycosis due to dermatophyte    Corn or callus      I counseled the patient on his conditions, their implications and medical management.        - Shoe inspection. Diabetic Foot Education. Patient reminded of the importance of good nutrition and blood sugar control to help prevent podiatric complications of diabetes. Patient instructed on proper foot hygeine. We discussed wearing proper shoe gear, daily foot inspections, never walking without protective shoe gear, never putting sharp instruments to feet, routine podiatric nail visits every 2-3 months.      - With patient's permission, nails were aggressively reduced and debrided x 10 to their soft tissue attachment mechanically and with electric , removing all offending nail and debris. Patient relates relief following the procedure. He will continue to monitor the areas daily, inspect his feet, wear protective shoe gear when ambulatory, moisturizer to maintain skin integrity and follow in this office in approximately 2-3 months, sooner p.r.n.    - After cleansing the  area w/ alcohol prep pad the above mentioned hyperkeratosis was trimmed utilizing No 15 scapel, to a smooth base with out incident. Patient tolerated this  well and reported comfort to the area of dista toes 2-4 b/l

## 2019-12-31 NOTE — LETTER
December 31, 2019      Soledad Mckeon, APRN, FNP  [address]  [place] [address]           [facility] - Podiatry  [address]  [facility] [address]  Phone: [phone]          Patient: Kamlesh Diane   MR Number: [id number]   YOB: 1955   Date of Visit: 12/31/2019       Dear Soledad Mckeon:    Thank you for referring Kamlesh Diane to me for evaluation. Attached you will find relevant portions of my assessment and plan of care.    If you have questions, please do not hesitate to call me. I look forward to following Kamlesh Diane along with you.    Sincerely,    Selena Diego, SYLVIA    Enclosure  CC:  No Recipients    If you would like to receive this communication electronically, please contact [email or web address] or [phone] to request more information on [employer] access.    For providers and/or their staff who would like to refer a patient to [facility], please contact us through our one-stop-shop provider referral line, [facility], at [phone].    If you feel you have received this communication in error or would no longer like to receive these types of communications, please e-mail [email or web address]

## 2020-01-05 ENCOUNTER — PATIENT OUTREACH (OUTPATIENT)
Dept: ADMINISTRATIVE | Facility: OTHER | Age: 65
End: 2020-01-05

## 2020-01-07 ENCOUNTER — CLINICAL SUPPORT (OUTPATIENT)
Dept: DIABETES | Facility: CLINIC | Age: 65
End: 2020-01-07
Payer: MEDICARE

## 2020-01-07 DIAGNOSIS — Z79.4 TYPE 2 DIABETES MELLITUS WITH COMPLICATION, WITH LONG-TERM CURRENT USE OF INSULIN: ICD-10-CM

## 2020-01-07 DIAGNOSIS — E11.8 TYPE 2 DIABETES MELLITUS WITH COMPLICATION, WITH LONG-TERM CURRENT USE OF INSULIN: ICD-10-CM

## 2020-01-07 PROCEDURE — 99999 PR PBB SHADOW E&M-EST. PATIENT-LVL I: ICD-10-PCS | Mod: PBBFAC,,,

## 2020-01-07 PROCEDURE — 99999 PR PBB SHADOW E&M-EST. PATIENT-LVL I: CPT | Mod: PBBFAC,,,

## 2020-01-07 PROCEDURE — G0108 DIAB MANAGE TRN  PER INDIV: HCPCS | Mod: S$GLB,,, | Performed by: INTERNAL MEDICINE

## 2020-01-07 PROCEDURE — G0108 PR DIAB MANAGE TRN  PER INDIV: ICD-10-PCS | Mod: S$GLB,,, | Performed by: INTERNAL MEDICINE

## 2020-01-07 NOTE — LETTER
January 7, 2020      KEVON Herrera, FNP  [address] [facility] [address]         Patient: Kamlesh Diane   MR Number: [id number]   YOB: 1955   Date of Visit: 1/7/2020       Dear Dr. Mckeon:    Thank you for referring Kamlesh for diabetes self-management education and support. He has completed all components of our Diabetes Management Program and his Self-Management Support Plan. Below is a summary of his clinical outcomes and goal progress.    Patient Outcomes:    A1c Status:   Lab Results   Component Value Date    HGBA1C 8.0 (H) 10/10/2019    HGBA1C 7.6 (H) 06/18/2019     Goals  Patient has selected/evaluated goals during today's session: Yes, evaluated  Healthy Eating: % Met  Met Percentage : 100%    Diabetes Self-Management Support Plan  Diabetes Learning: DM apps, DM support group  Exercise/Nutrition: websites, apps  Stress Management: family, friends, support group  Review Status: Patient has selected and agrees to support plan., Patient was provided Diabetes Self-Management Support Plan document that includes support options.    Follow up:   · Kamlesh to follow diabetes support plan indicated above  · Kamlesh to attend medical appointments as scheduled  · Kamlesh to update you on his DM education progress as needed      If you have questions, please do not hesitate to call me. I look forward to providing additional education and support as needed.    Sincerely,    Rosi Nunez RN
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
## 2020-01-14 ENCOUNTER — LAB VISIT (OUTPATIENT)
Dept: LAB | Facility: HOSPITAL | Age: 65
End: 2020-01-14
Attending: INTERNAL MEDICINE
Payer: MEDICARE

## 2020-01-14 ENCOUNTER — ANTI-COAG VISIT (OUTPATIENT)
Dept: CARDIOLOGY | Facility: CLINIC | Age: 65
End: 2020-01-14
Payer: MEDICARE

## 2020-01-14 DIAGNOSIS — I63.10 CEREBROVASCULAR ACCIDENT (CVA) DUE TO EMBOLISM OF PRECEREBRAL ARTERY: ICD-10-CM

## 2020-01-14 DIAGNOSIS — Z79.01 LONG TERM (CURRENT) USE OF ANTICOAGULANTS: ICD-10-CM

## 2020-01-14 LAB
INR PPP: 3.7 (ref 0.8–1.2)
PROTHROMBIN TIME: 37.3 SEC (ref 9–12.5)

## 2020-01-14 PROCEDURE — 85610 PROTHROMBIN TIME: CPT

## 2020-01-14 PROCEDURE — 93793 PR ANTICOAGULANT MGMT FOR PT TAKING WARFARIN: ICD-10-PCS | Mod: S$GLB,,,

## 2020-01-14 PROCEDURE — 93793 ANTICOAG MGMT PT WARFARIN: CPT | Mod: S$GLB,,,

## 2020-01-14 PROCEDURE — 36415 COLL VENOUS BLD VENIPUNCTURE: CPT

## 2020-01-14 NOTE — PROGRESS NOTES
INR not at goal. Medications, chart, and patient findings reviewed. See calendar for adjustments to dose and follow up plan.  Pt denies any changes.  Will instruct pt to hold coumadin & decrease maintenance dose.  Plan to re-assess in 1 week.

## 2020-01-24 ENCOUNTER — LAB VISIT (OUTPATIENT)
Dept: LAB | Facility: HOSPITAL | Age: 65
End: 2020-01-24
Attending: NURSE PRACTITIONER
Payer: MEDICARE

## 2020-01-24 ENCOUNTER — ANTI-COAG VISIT (OUTPATIENT)
Dept: CARDIOLOGY | Facility: CLINIC | Age: 65
End: 2020-01-24
Payer: MEDICARE

## 2020-01-24 DIAGNOSIS — Z79.01 LONG TERM (CURRENT) USE OF ANTICOAGULANTS: ICD-10-CM

## 2020-01-24 DIAGNOSIS — I63.10 CEREBROVASCULAR ACCIDENT (CVA) DUE TO EMBOLISM OF PRECEREBRAL ARTERY: ICD-10-CM

## 2020-01-24 DIAGNOSIS — E11.8 TYPE 2 DIABETES MELLITUS WITH COMPLICATION, WITH LONG-TERM CURRENT USE OF INSULIN: ICD-10-CM

## 2020-01-24 DIAGNOSIS — Z79.4 TYPE 2 DIABETES MELLITUS WITH COMPLICATION, WITH LONG-TERM CURRENT USE OF INSULIN: ICD-10-CM

## 2020-01-24 LAB
ESTIMATED AVG GLUCOSE: 183 MG/DL (ref 68–131)
HBA1C MFR BLD HPLC: 8 % (ref 4–5.6)
INR PPP: 3.9 (ref 0.8–1.2)
PROTHROMBIN TIME: 40.1 SEC (ref 9–12.5)
TSH SERPL DL<=0.005 MIU/L-ACNC: 1.63 UIU/ML (ref 0.4–4)

## 2020-01-24 PROCEDURE — 83036 HEMOGLOBIN GLYCOSYLATED A1C: CPT

## 2020-01-24 PROCEDURE — 93793 ANTICOAG MGMT PT WARFARIN: CPT | Mod: S$GLB,,,

## 2020-01-24 PROCEDURE — 93793 PR ANTICOAGULANT MGMT FOR PT TAKING WARFARIN: ICD-10-PCS | Mod: S$GLB,,,

## 2020-01-24 PROCEDURE — 36415 COLL VENOUS BLD VENIPUNCTURE: CPT

## 2020-01-24 PROCEDURE — 85610 PROTHROMBIN TIME: CPT

## 2020-01-24 PROCEDURE — 84443 ASSAY THYROID STIM HORMONE: CPT

## 2020-01-29 ENCOUNTER — LAB VISIT (OUTPATIENT)
Dept: LAB | Facility: HOSPITAL | Age: 65
End: 2020-01-29
Attending: INTERNAL MEDICINE
Payer: MEDICARE

## 2020-01-29 ENCOUNTER — ANTI-COAG VISIT (OUTPATIENT)
Dept: CARDIOLOGY | Facility: CLINIC | Age: 65
End: 2020-01-29
Payer: MEDICARE

## 2020-01-29 DIAGNOSIS — I63.10 CEREBROVASCULAR ACCIDENT (CVA) DUE TO EMBOLISM OF PRECEREBRAL ARTERY: ICD-10-CM

## 2020-01-29 DIAGNOSIS — Z79.01 LONG TERM (CURRENT) USE OF ANTICOAGULANTS: ICD-10-CM

## 2020-01-29 LAB
INR PPP: 2.6 (ref 0.8–1.2)
PROTHROMBIN TIME: 27.5 SEC (ref 9–12.5)

## 2020-01-29 PROCEDURE — 36415 COLL VENOUS BLD VENIPUNCTURE: CPT

## 2020-01-29 PROCEDURE — 93793 ANTICOAG MGMT PT WARFARIN: CPT | Mod: S$GLB,,,

## 2020-01-29 PROCEDURE — 93793 PR ANTICOAGULANT MGMT FOR PT TAKING WARFARIN: ICD-10-PCS | Mod: S$GLB,,,

## 2020-01-29 PROCEDURE — 85610 PROTHROMBIN TIME: CPT

## 2020-02-12 ENCOUNTER — ANTI-COAG VISIT (OUTPATIENT)
Dept: CARDIOLOGY | Facility: CLINIC | Age: 65
End: 2020-02-12
Payer: MEDICARE

## 2020-02-12 ENCOUNTER — LAB VISIT (OUTPATIENT)
Dept: LAB | Facility: HOSPITAL | Age: 65
End: 2020-02-12
Attending: INTERNAL MEDICINE
Payer: MEDICARE

## 2020-02-12 DIAGNOSIS — Z79.01 LONG TERM (CURRENT) USE OF ANTICOAGULANTS: ICD-10-CM

## 2020-02-12 DIAGNOSIS — I63.10 CEREBROVASCULAR ACCIDENT (CVA) DUE TO EMBOLISM OF PRECEREBRAL ARTERY: ICD-10-CM

## 2020-02-12 LAB
INR PPP: 2.1 (ref 0.8–1.2)
PROTHROMBIN TIME: 19.8 SEC (ref 9–12.5)

## 2020-02-12 PROCEDURE — 93793 ANTICOAG MGMT PT WARFARIN: CPT | Mod: S$GLB,,,

## 2020-02-12 PROCEDURE — 85610 PROTHROMBIN TIME: CPT

## 2020-02-12 PROCEDURE — 93793 PR ANTICOAGULANT MGMT FOR PT TAKING WARFARIN: ICD-10-PCS | Mod: S$GLB,,,

## 2020-02-12 PROCEDURE — 36415 COLL VENOUS BLD VENIPUNCTURE: CPT

## 2020-02-17 ENCOUNTER — TELEPHONE (OUTPATIENT)
Dept: INTERNAL MEDICINE | Facility: CLINIC | Age: 65
End: 2020-02-17

## 2020-02-17 NOTE — TELEPHONE ENCOUNTER
----- Message from Cait Coronado sent at 2/17/2020  8:49 AM CST -----  Contact: /Diane [phone]   Prescription Request:     Name of medication: All medications    Reason for request: Refill    Pharmacy: [facility] #[id number] [place] [address] AT [facility] [address]    Please advise.    Thank You

## 2020-02-17 NOTE — TELEPHONE ENCOUNTER
Called Diane and left a vm.  Need to know which medications as all of them are not prescribed by Dr. Ontiveros.

## 2020-02-23 DIAGNOSIS — Z86.73 H/O: STROKE: ICD-10-CM

## 2020-02-23 RX ORDER — ATORVASTATIN CALCIUM 40 MG/1
TABLET, FILM COATED ORAL
Qty: 90 TABLET | Refills: 3 | Status: SHIPPED | OUTPATIENT
Start: 2020-02-23 | End: 2021-05-03 | Stop reason: SDUPTHER

## 2020-03-02 DIAGNOSIS — Z79.4 CONTROLLED TYPE 2 DIABETES MELLITUS WITH COMPLICATION, WITH LONG-TERM CURRENT USE OF INSULIN: ICD-10-CM

## 2020-03-02 DIAGNOSIS — E11.8 CONTROLLED TYPE 2 DIABETES MELLITUS WITH COMPLICATION, WITH LONG-TERM CURRENT USE OF INSULIN: ICD-10-CM

## 2020-03-02 DIAGNOSIS — R25.1 TREMOR: ICD-10-CM

## 2020-03-02 NOTE — TELEPHONE ENCOUNTER
Lamar can you please contact pts maria isabel Contreras to get more info on pts insulin?  Thank you!

## 2020-03-02 NOTE — TELEPHONE ENCOUNTER
----- Message from Penny Longoria sent at 3/2/2020  3:07 PM CST -----  Needs Advice    Reason for call:--Insulin?--        Communication Preference:--Marin--Audra--[phone]--    Additional Information:Mrs. Deion calling to speak with a nurse regarding pt insulin. (No other information was given) Please call to advise.

## 2020-03-02 NOTE — TELEPHONE ENCOUNTER
Spoke with maria isabel. Maria Isabel stated pt needs refills on both insulins and the amantadine. [facility]. Pt will see Marizol on April 3rd for annual.

## 2020-03-03 ENCOUNTER — LAB VISIT (OUTPATIENT)
Dept: LAB | Facility: HOSPITAL | Age: 65
End: 2020-03-03
Payer: MEDICARE

## 2020-03-03 ENCOUNTER — ANTI-COAG VISIT (OUTPATIENT)
Dept: CARDIOLOGY | Facility: CLINIC | Age: 65
End: 2020-03-03
Payer: MEDICARE

## 2020-03-03 DIAGNOSIS — Z79.01 LONG TERM (CURRENT) USE OF ANTICOAGULANTS: ICD-10-CM

## 2020-03-03 DIAGNOSIS — I63.10 CEREBROVASCULAR ACCIDENT (CVA) DUE TO EMBOLISM OF PRECEREBRAL ARTERY: ICD-10-CM

## 2020-03-03 LAB
INR PPP: 2.1 (ref 0.8–1.2)
PROTHROMBIN TIME: 20 SEC (ref 9–12.5)

## 2020-03-03 PROCEDURE — 36415 COLL VENOUS BLD VENIPUNCTURE: CPT

## 2020-03-03 PROCEDURE — 93793 PR ANTICOAGULANT MGMT FOR PT TAKING WARFARIN: ICD-10-PCS | Mod: S$GLB,,,

## 2020-03-03 PROCEDURE — 93793 ANTICOAG MGMT PT WARFARIN: CPT | Mod: S$GLB,,,

## 2020-03-03 PROCEDURE — 85610 PROTHROMBIN TIME: CPT

## 2020-03-03 RX ORDER — AMANTADINE HYDROCHLORIDE 100 MG/1
100 CAPSULE, GELATIN COATED ORAL 2 TIMES DAILY
Qty: 180 CAPSULE | Refills: 3 | Status: SHIPPED | OUTPATIENT
Start: 2020-03-03 | End: 2020-09-11

## 2020-03-03 RX ORDER — INSULIN DETEMIR 100 [IU]/ML
45 INJECTION, SOLUTION SUBCUTANEOUS NIGHTLY
Qty: 3 BOX | Refills: 3 | Status: SHIPPED | OUTPATIENT
Start: 2020-03-03 | End: 2020-05-12

## 2020-03-03 RX ORDER — INSULIN LISPRO 100 [IU]/ML
INJECTION, SOLUTION INTRAVENOUS; SUBCUTANEOUS
Qty: 15 ML | Refills: 3 | Status: SHIPPED | OUTPATIENT
Start: 2020-03-03 | End: 2020-08-19

## 2020-03-06 ENCOUNTER — PES CALL (OUTPATIENT)
Dept: ADMINISTRATIVE | Facility: CLINIC | Age: 65
End: 2020-03-06

## 2020-03-11 DIAGNOSIS — E11.8 TYPE 2 DIABETES MELLITUS WITH COMPLICATION, WITH LONG-TERM CURRENT USE OF INSULIN: ICD-10-CM

## 2020-03-11 DIAGNOSIS — Z79.4 TYPE 2 DIABETES MELLITUS WITH COMPLICATION, WITH LONG-TERM CURRENT USE OF INSULIN: ICD-10-CM

## 2020-03-11 RX ORDER — PEN NEEDLE, DIABETIC 31 GX5/16"
1 NEEDLE, DISPOSABLE MISCELLANEOUS 3 TIMES DAILY
Qty: 200 EACH | Refills: 2 | Status: SHIPPED | OUTPATIENT
Start: 2020-03-11 | End: 2020-10-12

## 2020-03-11 NOTE — TELEPHONE ENCOUNTER
"----- Message from Noa Clemens sent at 3/11/2020  2:27 PM CDT -----  Contact: Diane Mercer   Diabetic or Medical Supplies.  What supplies are needed: BD ULTRA-FINE SHORT PEN NEEDLE 31 gauge x 5/16" Ndle  What is the brand name of the supplies: BD  Is this a refill or new prescription:  new  Who prescribed the supplies:  Debora Ontiveros  Pharmacy or company name, phone # and location:  [id number] [place] [address] AT [facility] [address] [phone] (Phone)  [phone] (Fax)  Comments:      "

## 2020-03-24 ENCOUNTER — LAB VISIT (OUTPATIENT)
Dept: LAB | Facility: HOSPITAL | Age: 65
End: 2020-03-24
Attending: INTERNAL MEDICINE
Payer: MEDICARE

## 2020-03-24 ENCOUNTER — ANTI-COAG VISIT (OUTPATIENT)
Dept: CARDIOLOGY | Facility: CLINIC | Age: 65
End: 2020-03-24
Payer: MEDICARE

## 2020-03-24 DIAGNOSIS — Z79.01 LONG TERM (CURRENT) USE OF ANTICOAGULANTS: ICD-10-CM

## 2020-03-24 DIAGNOSIS — I63.10 CEREBROVASCULAR ACCIDENT (CVA) DUE TO EMBOLISM OF PRECEREBRAL ARTERY: ICD-10-CM

## 2020-03-24 LAB
INR PPP: 2.4 (ref 0.8–1.2)
PROTHROMBIN TIME: 23.1 SEC (ref 9–12.5)

## 2020-03-24 PROCEDURE — 36415 COLL VENOUS BLD VENIPUNCTURE: CPT

## 2020-03-24 PROCEDURE — 93793 PR ANTICOAGULANT MGMT FOR PT TAKING WARFARIN: ICD-10-PCS | Mod: S$GLB,,,

## 2020-03-24 PROCEDURE — 93793 ANTICOAG MGMT PT WARFARIN: CPT | Mod: S$GLB,,,

## 2020-03-24 PROCEDURE — 85610 PROTHROMBIN TIME: CPT

## 2020-03-31 ENCOUNTER — TELEPHONE (OUTPATIENT)
Dept: INTERNAL MEDICINE | Facility: CLINIC | Age: 65
End: 2020-03-31

## 2020-03-31 NOTE — TELEPHONE ENCOUNTER
Initially scheduled to do annual w/ Marizol on Friday. Please call him to see if he wants to delay his appt and see me instead in a mo or so.

## 2020-04-03 ENCOUNTER — TELEPHONE (OUTPATIENT)
Dept: INTERNAL MEDICINE | Facility: CLINIC | Age: 65
End: 2020-04-03

## 2020-04-03 NOTE — TELEPHONE ENCOUNTER
Spoke to pt. Pt rescheduled his appointment to a video visit with Dr. Ontiveros. Due to covid-19 concerns.

## 2020-04-07 ENCOUNTER — TELEPHONE (OUTPATIENT)
Dept: INTERNAL MEDICINE | Facility: CLINIC | Age: 65
End: 2020-04-07

## 2020-04-07 NOTE — TELEPHONE ENCOUNTER
Called and spoke to pts brother got him rescheduled for 6/2/20 at 9am  Nga can you please schedule that for me?

## 2020-04-07 NOTE — TELEPHONE ENCOUNTER
----- Message from Kayli Bond sent at 4/7/2020 11:00 AM CDT -----  Contact: luan(brother) [phone]  Pt was not able to get into my chart video appt. Pt wants to know can he still do the appt or come in. Please call and advise

## 2020-04-10 DIAGNOSIS — E11.59 HYPERTENSION ASSOCIATED WITH DIABETES: ICD-10-CM

## 2020-04-10 DIAGNOSIS — I15.2 HYPERTENSION ASSOCIATED WITH DIABETES: ICD-10-CM

## 2020-04-10 RX ORDER — LOSARTAN POTASSIUM 50 MG/1
TABLET ORAL
Qty: 90 TABLET | Refills: 3 | Status: SHIPPED | OUTPATIENT
Start: 2020-04-10 | End: 2020-10-02 | Stop reason: SDUPTHER

## 2020-04-21 ENCOUNTER — LAB VISIT (OUTPATIENT)
Dept: LAB | Facility: HOSPITAL | Age: 65
End: 2020-04-21
Attending: INTERNAL MEDICINE
Payer: MEDICARE

## 2020-04-21 ENCOUNTER — PATIENT MESSAGE (OUTPATIENT)
Dept: CARDIOLOGY | Facility: CLINIC | Age: 65
End: 2020-04-21

## 2020-04-21 ENCOUNTER — ANTI-COAG VISIT (OUTPATIENT)
Dept: CARDIOLOGY | Facility: CLINIC | Age: 65
End: 2020-04-21
Payer: MEDICARE

## 2020-04-21 DIAGNOSIS — I63.10 CEREBROVASCULAR ACCIDENT (CVA) DUE TO EMBOLISM OF PRECEREBRAL ARTERY: ICD-10-CM

## 2020-04-21 DIAGNOSIS — Z79.01 LONG TERM (CURRENT) USE OF ANTICOAGULANTS: ICD-10-CM

## 2020-04-21 LAB
INR PPP: 1.8 (ref 0.8–1.2)
PROTHROMBIN TIME: 17.4 SEC (ref 9–12.5)

## 2020-04-21 PROCEDURE — 93793 PR ANTICOAGULANT MGMT FOR PT TAKING WARFARIN: ICD-10-PCS | Mod: S$GLB,,,

## 2020-04-21 PROCEDURE — 36415 COLL VENOUS BLD VENIPUNCTURE: CPT

## 2020-04-21 PROCEDURE — 93793 ANTICOAG MGMT PT WARFARIN: CPT | Mod: S$GLB,,,

## 2020-04-21 PROCEDURE — 85610 PROTHROMBIN TIME: CPT

## 2020-05-12 ENCOUNTER — TELEPHONE (OUTPATIENT)
Dept: INTERNAL MEDICINE | Facility: CLINIC | Age: 65
End: 2020-05-12

## 2020-05-12 DIAGNOSIS — E11.8 CONTROLLED TYPE 2 DIABETES MELLITUS WITH COMPLICATION, WITH LONG-TERM CURRENT USE OF INSULIN: ICD-10-CM

## 2020-05-12 DIAGNOSIS — Z79.4 CONTROLLED TYPE 2 DIABETES MELLITUS WITH COMPLICATION, WITH LONG-TERM CURRENT USE OF INSULIN: ICD-10-CM

## 2020-05-12 RX ORDER — INSULIN DETEMIR 100 [IU]/ML
41 INJECTION, SOLUTION SUBCUTANEOUS NIGHTLY
Qty: 3 BOX | Refills: 3
Start: 2020-05-12 | End: 2020-05-15

## 2020-05-12 NOTE — TELEPHONE ENCOUNTER
Spoke with maria isabel Steen, she states she was concerned as to if his insulin dosages may need to be adjusted since his B.S.has been running lower as reported in the previous message. She denies that he is not having any symptoms or problems and he has a good appetite. I asked her to send me other readings and she is going to send his readings for a week to help determine if an adjustment is needed.

## 2020-05-12 NOTE — TELEPHONE ENCOUNTER
I spoke with the niece, she was instructed to have pt decrease the Levemir to 41 units nightly , he is taking Humalog 6 units TID.She is in the process of forwarding the week of Blood Sugar Readings.She verbalizes understanding.

## 2020-05-12 NOTE — TELEPHONE ENCOUNTER
----- Message from Cait Coronado sent at 5/11/2020  4:00 PM CDT -----  Contact: Diane/Audra [phone]  Stated that the patients glucose levels have been low in the morning(71) and at night(81)    Please call and advise.    Thank You

## 2020-05-12 NOTE — TELEPHONE ENCOUNTER
Can decrease levemir to 41 units nightly. Confirm humalog or novolog and what dosage please. Thanks! Let pt/sister know.

## 2020-05-12 NOTE — TELEPHONE ENCOUNTER
----- Message from Poly Chavarria sent at 5/12/2020  1:26 PM CDT -----  Contact: self    Patient is returning a phone call.  Who left a message for the patient: Mimi Alarcon LPN    Does patient know what this is regarding:  Blood sugar  Comments:

## 2020-05-13 ENCOUNTER — PES CALL (OUTPATIENT)
Dept: ADMINISTRATIVE | Facility: CLINIC | Age: 65
End: 2020-05-13

## 2020-05-13 DIAGNOSIS — E11.9 TYPE 2 DIABETES MELLITUS WITHOUT COMPLICATION: ICD-10-CM

## 2020-05-15 ENCOUNTER — OFFICE VISIT (OUTPATIENT)
Dept: INTERNAL MEDICINE | Facility: CLINIC | Age: 65
End: 2020-05-15
Payer: MEDICARE

## 2020-05-15 ENCOUNTER — TELEPHONE (OUTPATIENT)
Dept: INTERNAL MEDICINE | Facility: CLINIC | Age: 65
End: 2020-05-15

## 2020-05-15 VITALS
DIASTOLIC BLOOD PRESSURE: 80 MMHG | SYSTOLIC BLOOD PRESSURE: 106 MMHG | BODY MASS INDEX: 25.84 KG/M2 | OXYGEN SATURATION: 99 % | WEIGHT: 195 LBS | HEART RATE: 95 BPM | HEIGHT: 73 IN

## 2020-05-15 DIAGNOSIS — H35.033 HYPERTENSIVE RETINOPATHY, BILATERAL: ICD-10-CM

## 2020-05-15 DIAGNOSIS — I63.10 CEREBROVASCULAR ACCIDENT (CVA) DUE TO EMBOLISM OF PRECEREBRAL ARTERY: ICD-10-CM

## 2020-05-15 DIAGNOSIS — Z86.73 H/O ISCHEMIC RIGHT MCA STROKE: ICD-10-CM

## 2020-05-15 DIAGNOSIS — I73.9 PAD (PERIPHERAL ARTERY DISEASE): ICD-10-CM

## 2020-05-15 DIAGNOSIS — Z79.4 CONTROLLED TYPE 2 DIABETES MELLITUS WITH COMPLICATION, WITH LONG-TERM CURRENT USE OF INSULIN: ICD-10-CM

## 2020-05-15 DIAGNOSIS — E11.8 CONTROLLED TYPE 2 DIABETES MELLITUS WITH COMPLICATION, WITH LONG-TERM CURRENT USE OF INSULIN: ICD-10-CM

## 2020-05-15 DIAGNOSIS — I10 ESSENTIAL HYPERTENSION: ICD-10-CM

## 2020-05-15 DIAGNOSIS — C61 CANCER OF PROSTATE WITH LOW RECURRENCE RISK (STAGE T1-2A AND GLEASON < 7 AND PSA < 10): ICD-10-CM

## 2020-05-15 DIAGNOSIS — Z79.01 CURRENT USE OF LONG TERM ANTICOAGULATION: ICD-10-CM

## 2020-05-15 PROCEDURE — 99214 PR OFFICE/OUTPT VISIT, EST, LEVL IV, 30-39 MIN: ICD-10-PCS | Mod: S$GLB,,, | Performed by: NURSE PRACTITIONER

## 2020-05-15 PROCEDURE — 3008F PR BODY MASS INDEX (BMI) DOCUMENTED: ICD-10-PCS | Mod: CPTII,S$GLB,, | Performed by: NURSE PRACTITIONER

## 2020-05-15 PROCEDURE — 99214 OFFICE O/P EST MOD 30 MIN: CPT | Mod: S$GLB,,, | Performed by: NURSE PRACTITIONER

## 2020-05-15 PROCEDURE — 3008F BODY MASS INDEX DOCD: CPT | Mod: CPTII,S$GLB,, | Performed by: NURSE PRACTITIONER

## 2020-05-15 PROCEDURE — 3074F SYST BP LT 130 MM HG: CPT | Mod: CPTII,S$GLB,, | Performed by: NURSE PRACTITIONER

## 2020-05-15 PROCEDURE — 3079F PR MOST RECENT DIASTOLIC BLOOD PRESSURE 80-89 MM HG: ICD-10-PCS | Mod: CPTII,S$GLB,, | Performed by: NURSE PRACTITIONER

## 2020-05-15 PROCEDURE — 3074F PR MOST RECENT SYSTOLIC BLOOD PRESSURE < 130 MM HG: ICD-10-PCS | Mod: CPTII,S$GLB,, | Performed by: NURSE PRACTITIONER

## 2020-05-15 PROCEDURE — 3052F PR MOST RECENT HEMOGLOBIN A1C LEVEL 8.0 - < 9.0%: ICD-10-PCS | Mod: CPTII,S$GLB,, | Performed by: NURSE PRACTITIONER

## 2020-05-15 PROCEDURE — 99999 PR PBB SHADOW E&M-EST. PATIENT-LVL IV: ICD-10-PCS | Mod: PBBFAC,,, | Performed by: NURSE PRACTITIONER

## 2020-05-15 PROCEDURE — 3079F DIAST BP 80-89 MM HG: CPT | Mod: CPTII,S$GLB,, | Performed by: NURSE PRACTITIONER

## 2020-05-15 PROCEDURE — 3052F HG A1C>EQUAL 8.0%<EQUAL 9.0%: CPT | Mod: CPTII,S$GLB,, | Performed by: NURSE PRACTITIONER

## 2020-05-15 PROCEDURE — 99499 UNLISTED E&M SERVICE: CPT | Mod: S$GLB,,, | Performed by: NURSE PRACTITIONER

## 2020-05-15 PROCEDURE — 99999 PR PBB SHADOW E&M-EST. PATIENT-LVL IV: CPT | Mod: PBBFAC,,, | Performed by: NURSE PRACTITIONER

## 2020-05-15 PROCEDURE — 99499 RISK ADDL DX/OHS AUDIT: ICD-10-PCS | Mod: S$GLB,,, | Performed by: NURSE PRACTITIONER

## 2020-05-15 RX ORDER — INSULIN DETEMIR 100 [IU]/ML
INJECTION, SOLUTION SUBCUTANEOUS
Qty: 3 BOX | Refills: 3
Start: 2020-05-15 | End: 2020-05-16

## 2020-05-15 NOTE — PATIENT INSTRUCTIONS
levemir green pen, 45 units at night  novolog 6 units w/ breakfast 8 units w/ lunch and dinner, skip if not eating  Scale use 180-230+2, 231-280+4, 281-330+6, 331-380+8 if sugar is higher than 180, add to base dose (novolog only)      Snacks can be an important part of a balanced, healthy meal plan. They allow you to eat more frequently, feeling full and satisfied throughout the day. Also, they allow you to spread carbohydrates evenly, which may stabilize blood sugars.  Plus, snacks are enjoyable!     The amount of carbohydrate needed at snacks varies. Generally, about 15-30 grams of carbohydrate per snack is recommended.  Below you will find some tasty treats.       0-5 gm carb   Crystal Light   Vitamin Water Zero   Herbal tea, unsweetened   2 tsp peanut butter on celery   1./2 cup sugar-free jell-o   1 sugar-free popsicle   ¼ cup blueberries   8oz Blue Lissette unsweetened almond milk   5 baby carrots & celery sticks, cucumbers, bell peppers dipped in ¼ cup salsa, 2Tbsp light ranch dressing or 2Tbsp plain Greek yogurt   10 Goldfish crackers   ½ oz low-fat cheese or string cheese   1 closed handful of nuts, unsalted   1 Tbsp of sunflower seeds, unsalted   1 cup Smart Pop popcorn   1 whole grain brown rice cake        15 gm carb   1 small piece of fruit or ½ banana or 1/2 cup lite canned fruit   3 niko cracker squares   3 cups Smart Pop popcorn, top spray butter, Sotelo lite salt or cinnamon and Truvia   5 Vanilla Wafers   ½ cup low fat, no added sugar ice cream or frozen yogurt (Blue bell, Blue Bunny, Weight Watchers, Skinny Cow)   ½ turkey, ham, or chicken sandwich   ½ c fruit with ½ c Cottage cheese   4-6 unsalted wheat crackers with 1 oz low fat cheese or 1 tbsp peanut butter    30-45 goldfish crackers (depending on flavor)    7-8 Caodaism mini brown rice cakes (caramel, apple cinnamon, chocolate)    12 Caodaism mini brown rice cakes (cheddar, bbq, ranch)    1/3 cup hummus dip with raw  veg   1/2 whole wheat sean, 1Tbsp hummus   Mini Pizza (1/2 whole wheat English muffin, low-fat  cheese, tomato sauce)   100 calorie snack pack (Oreo, Chips Ahoy, Ritz Mix, Baked Cheetos)   4-6 oz. light or Greek Style yogurt (Chobani, Yoplait, Okios, [place]oneyfield)   ½ cup sugar-free pudding     6 in. wheat tortilla or sean oven toasted chips (topped with spray butter flavoring, cinnamon, Truvia OR spray butter, garlic powder, chili powder)    18 BBQ Popchips (available at [employer], [employer], Fresh Market)                   Hypoglycemia (Low Blood Sugar)     Fast-acting sugar includes a cup of nonfat milk.     Too little sugar (glucose) in your blood is called hypoglycemia or low blood sugar. Low blood sugar usually means anything lower than 70 mg/dL. Talk with your healthcare provider about your target range and what level is too low for you. Diabetes itself doesnt cause low blood sugar. But some of the treatments for diabetes, such as pills or insulin, may raise your risk for it. Low blood sugar may cause you to pass out or have a seizure. So always treat low blood sugar right away, but don't overeat.  Special note: Always carry a source of fast-acting sugar and a snack in case of hypoglycemia.   What you may notice  If you have low blood sugar, you may have one or more of these symptoms:  · Shakiness or dizziness  · Cold, clammy skin or sweating  · Feelings of hunger  · Headache  · Nervousness  · A hard, fast heartbeat  · Weakness  · Confusion or irritability  · Blurred vision  · Having nightmares or waking up confused or sweating  · Numbness or tingling in the lips or tongue  What you should do  Here are tips to follow if you have hypoglycemia:   · First check your blood sugar. If it is too low (out of your target range), eat or drink 15 to 20 grams of fast-acting sugar. This may be 3 to 4 glucose tablets, 4 ounces (half a cup) of fruit juice or regular (nondiet) soda, 8 ounces (1 cup) of fat-free milk,  or 1 tablespoon of honey. Dont take more than this, or your blood sugar may go too high.  · Wait 15 minutes. Then recheck your blood sugar if you can.  · If your blood sugar is still too low, repeat the steps above and check your blood sugar again. If your blood sugar still has not returned to your target range, contact your healthcare provider or seek emergency care.  · Once your blood sugar returns to target range, eat a snack or meal.  Preventing low blood sugar  Things you can do include the following:   · If your condition needs a strict treatment plan, eat your meals and snacks at the same times each day. Dont skip meals!  · If your treatment plan lets you change when you eat and what you eat, learn how to change the time and dose of your rapid-acting insulin to match this.   · Ask your healthcare provider if it is safe for you to drink alcohol. Never drink on an empty stomach.  · Take your medicine at the prescribed times.  · Always carry a source of fast-acting sugar and a snack when youre away from home.  Other things to do  Additional tips include the following:  · Carry a medical ID card, a compact USB drive, or wear a medical alert bracelet or necklace. It should say that you have diabetes. It should also say what to do if you pass out or have a seizure.  · Make sure your family, friends, and coworkers know the signs of low blood sugar. Tell them what to do if your blood sugar falls very low and you cant treat yourself.  · Keep a glucagon emergency kit handy. Be sure your family, friends, and coworkers know how and when to use it. Check it regularly and replace the glucagon before it expires.  · Talk with your health care team about other things you can do to prevent low blood sugar.     If you have unexplained hypoglycemia or hypoglycemia several times, call your healthcare provider.   Date Last Reviewed: 5/1/2016  © 7799-6620 [employer]. [address] [place] [address], [place] [address]. All  rights reserved. This information is not intended as a substitute for professional medical care. Always follow your healthcare professional's instructions.

## 2020-05-15 NOTE — PROGRESS NOTES
CHIEF COMPLAINT: Type 2 Diabetes     HPI: Mr. Kamlesh Diane is a 64 y.o. male who was diagnosed with Type 2 DM > 5 years.   Pt has h/o MCA stroke, HTN, PAD, low bone density-on fosamax, prostate ca, CKD3, CR 1.7.  Pt is being seen by me for the second time.  Last seen by me in Oct 2019.   Last fall: 2.5 years ago, uses walker  Accompanied by family member.  DE 1/2020 leonard/ Rosi   Lab Results   Component Value Date    HGBA1C 8.0 (H) 01/24/2020     Needs lab work.    PREVIOUS DIABETES MEDICATIONS TRIED  novolog   levemir     CURRENT DIABETIC MEDS: novolog 6-8-8 units ac  levemir 50 units at night    Pt is monitoring blood glucose readings 2-3 times a day.  BG 73, mostly under 200 mg/dl   Needs >100 strips per month related to fluctuations with blood glucose reading, a1c trends, and activity level.  9-11a 1st meal  4-430 dinner  1 snack later in the day-not too much lately     Breakfast egg, sausage, biscuit   Lunch, dinner : varies   Meat, vegetables, deepali hen     Diabetes Management Status    Statin: Taking  ACE/ARB: Taking    Screening or Prevention Patient's value Goal Complete/Controlled?   HgA1C Testing and Control   Lab Results   Component Value Date    HGBA1C 8.0 (H) 01/24/2020      Annually/Less than 8% No   Lipid profile : 03/12/2019 Annually Yes   LDL control Lab Results   Component Value Date    LDLCALC 93.8 03/12/2019    Annually/Less than 100 mg/dl  Yes   Nephropathy screening Lab Results   Component Value Date    LABMICR 31.0 03/12/2019     Lab Results   Component Value Date    PROTEINUA Negative 05/30/2018    Annually Yes   Blood pressure BP Readings from Last 1 Encounters:   12/31/19 (!) 140/86    Less than 140/90 No   Dilated retinal exam : 11/26/2019 Annually Yes   Foot exam   : 10/24/2019 Annually No       REVIEW OF SYSTEMS  General: +weakness, fatigue, +weight changes 3# fluctuations  Eyes: no double or blurred vision, eye pain, or redness  Cardiovascular: no chest pain, palpitations, edema,  "or murmurs.   Respiratory: no cough or dyspnea.   GI: no heartburn, nausea, or changes in bowel patterns; good appetite.   Skin: no rashes, dryness, itching, or reactions at insulin injection sites.  Neuro: + numbness, tingling, tremors, or vertigo. +hip pain (L)  Endocrine: no polyuria, polydipsia, polyphagia, heat or cold intolerance.     Vital Signs  /80   Pulse 95   Ht 6' 1" (1.854 m)   Wt 88.5 kg (195 lb)   SpO2 99%   BMI 25.73 kg/m²     Hemoglobin A1C   Date Value Ref Range Status   01/24/2020 8.0 (H) 4.0 - 5.6 % Final     Comment:     ADA Screening Guidelines:  5.7-6.4%  Consistent with prediabetes  >or=6.5%  Consistent with diabetes  High levels of fetal hemoglobin interfere with the HbA1C  assay. Heterozygous hemoglobin variants (HbS, HgC, etc)do  not significantly interfere with this assay.   However, presence of multiple variants may affect accuracy.     10/10/2019 8.0 (H) 4.0 - 5.6 % Final     Comment:     ADA Screening Guidelines:  5.7-6.4%  Consistent with prediabetes  >or=6.5%  Consistent with diabetes  High levels of fetal hemoglobin interfere with the HbA1C  assay. Heterozygous hemoglobin variants (HbS, HgC, etc)do  not significantly interfere with this assay.   However, presence of multiple variants may affect accuracy.     06/18/2019 7.6 (H) 4.0 - 5.6 % Final     Comment:     ADA Screening Guidelines:  5.7-6.4%  Consistent with prediabetes  >or=6.5%  Consistent with diabetes  High levels of fetal hemoglobin interfere with the HbA1C  assay. Heterozygous hemoglobin variants (HbS, HgC, etc)do  not significantly interfere with this assay.   However, presence of multiple variants may affect accuracy.          Chemistry        Component Value Date/Time     03/12/2019 1235    K 4.4 03/12/2019 1235     03/12/2019 1235    CO2 25 03/12/2019 1235    BUN 14 03/12/2019 1235    CREATININE 1.7 (H) 03/12/2019 1235     (H) 03/12/2019 1235        Component Value Date/Time    CALCIUM " 9.8 03/12/2019 1235    ALKPHOS 123 03/12/2019 1235    AST 22 03/12/2019 1235    ALT 32 03/12/2019 1235    BILITOT 0.7 03/12/2019 1235    ESTGFRAFRICA 49 (A) 03/12/2019 1235    EGFRNONAA 42 (A) 03/12/2019 1235           Lab Results   Component Value Date    TSH 1.629 01/24/2020      Lab Results   Component Value Date    CHOL 142 03/12/2019    CHOL 127 05/18/2018    CHOL 145 05/10/2018     Lab Results   Component Value Date    HDL 33 (L) 03/12/2019    HDL 35 (L) 05/18/2018    HDL 32 (L) 05/10/2018     Lab Results   Component Value Date    LDLCALC 93.8 03/12/2019    LDLCALC 80.6 05/18/2018    LDLCALC 92.8 05/10/2018     Lab Results   Component Value Date    TRIG 76 03/12/2019    TRIG 57 05/18/2018    TRIG 101 05/10/2018     Lab Results   Component Value Date    CHOLHDL 23.2 03/12/2019    CHOLHDL 27.6 05/18/2018    CHOLHDL 22.1 05/10/2018         PHYSICAL EXAMINATION  Constitutional: Appears fairly well, no distress  Neck: Supple, trachea midline.   Respiratory: No wheezes, even and unlabored.  Cardiovascular: RRR; no edema.   Lymph: no lymphadenopathy palpated  Skin: warm and dry; no injection site reactions, no acanthosis nigracans observed.  Neuro:patient alert and cooperative, normal affect; steady gait, uses walker.    Diabetes Foot Exam:   Deferred  (L) great toe -toenail removed     Assessment/Plan    1. 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 Cancer of prostate with low recurrence risk (stage T1-2a and Brent < 7 and PSA < 10)     3. Cerebrovascular accident (CVA) due to embolism of precerebral artery     4. PAD (peripheral artery disease)     5. Hypertensive retinopathy, bilateral     6. H/O ischemic right MCA stroke     7. Essential hypertension     8. Current use of long term  anticoagulation       1. F/u in 3 mos w/ me   a1c cmp lipid next week  a1c in 3 mos   a1c goal less than 7.5%  Check bg during visit, ate > 2 hours ago  Decrease basal insulin to 45 units at night    Addendum: pt spoke with pcp  Instructed to cut basal to 41 units  Stated in visit on 50 units-having hypoglycemia-cutting down by 15-20% will help, base on weight base and having a more even distribution of insulin (mdi)    Given carb book   rx mail order  2. F/u with urology   3. F/u with neurology prn, pcp  4. Vascular prn   5. F/u with ophthalmology   6. See above  7. Continue med(s)  Controlled   8. Pt/inr-managed per pcp      FOLLOW UP  Follow up in about 3 months (around 8/15/2020).

## 2020-05-15 NOTE — TELEPHONE ENCOUNTER
----- Message from Kayli Bond sent at 5/15/2020  3:24 PM CDT -----  Contact: brother( luan) [phone]  Pt brother states the dr feng changed his medication dosage for NOVOLOG FLEXPEN U-100 INSULIN 100 unit/mL (3 mL) InPn pen it was changed to 41 by dr gaitan but dr feng changed it back to 45. Please call and verify this with the pts brother.

## 2020-05-15 NOTE — TELEPHONE ENCOUNTER
lvm for pt on his brother luan phone number. Per isaías pt needs to take 42u of long acting insulin in the evening, not 50u as he stated at time of visit. Left call back number

## 2020-05-16 ENCOUNTER — TELEPHONE (OUTPATIENT)
Dept: INTERNAL MEDICINE | Facility: CLINIC | Age: 65
End: 2020-05-16

## 2020-05-16 RX ORDER — INSULIN DETEMIR 100 [IU]/ML
INJECTION, SOLUTION SUBCUTANEOUS
Qty: 3 BOX | Refills: 3
Start: 2020-05-16 | End: 2020-08-19

## 2020-05-16 NOTE — TELEPHONE ENCOUNTER
----- Message from Nehemiah Marroquin sent at 5/15/2020  4:54 PM CDT -----  Contact: Brother/ Kamaljit [phone]  Telephone Consult    He wants to discuss how his brother did when he came in by himself.    Thank you

## 2020-05-16 NOTE — TELEPHONE ENCOUNTER
----- Message from KEVON Herrera, JANIS sent at 5/16/2020 10:42 AM CDT -----  Please call pt and let him know that his long acting insulin levemir is 41 units at night, not 45 units  Pt has been having low sugars  Susannah attempted to call on yesterday afternoon.  Discussion with Dr. Rama hinton    Please inform pt to stay at 41 units at night  And to call office if he has sugars <70     Thanks  Soledad

## 2020-05-19 ENCOUNTER — LAB VISIT (OUTPATIENT)
Dept: LAB | Facility: HOSPITAL | Age: 65
End: 2020-05-19
Attending: INTERNAL MEDICINE
Payer: MEDICARE

## 2020-05-19 ENCOUNTER — ANTI-COAG VISIT (OUTPATIENT)
Dept: CARDIOLOGY | Facility: CLINIC | Age: 65
End: 2020-05-19
Payer: MEDICARE

## 2020-05-19 DIAGNOSIS — Z79.01 LONG TERM (CURRENT) USE OF ANTICOAGULANTS: ICD-10-CM

## 2020-05-19 DIAGNOSIS — I63.10 CEREBROVASCULAR ACCIDENT (CVA) DUE TO EMBOLISM OF PRECEREBRAL ARTERY: ICD-10-CM

## 2020-05-19 LAB
ALBUMIN SERPL BCP-MCNC: 3.8 G/DL (ref 3.5–5.2)
ALP SERPL-CCNC: 116 U/L (ref 55–135)
ALT SERPL W/O P-5'-P-CCNC: 18 U/L (ref 10–44)
ANION GAP SERPL CALC-SCNC: 9 MMOL/L (ref 8–16)
AST SERPL-CCNC: 19 U/L (ref 10–40)
BILIRUB SERPL-MCNC: 0.5 MG/DL (ref 0.1–1)
BUN SERPL-MCNC: 17 MG/DL (ref 8–23)
CALCIUM SERPL-MCNC: 9.3 MG/DL (ref 8.7–10.5)
CHLORIDE SERPL-SCNC: 104 MMOL/L (ref 95–110)
CHOLEST SERPL-MCNC: 136 MG/DL (ref 120–199)
CHOLEST/HDLC SERPL: 4.5 {RATIO} (ref 2–5)
CO2 SERPL-SCNC: 25 MMOL/L (ref 23–29)
CREAT SERPL-MCNC: 1.9 MG/DL (ref 0.5–1.4)
EST. GFR  (AFRICAN AMERICAN): 42.1 ML/MIN/1.73 M^2
EST. GFR  (NON AFRICAN AMERICAN): 36.4 ML/MIN/1.73 M^2
ESTIMATED AVG GLUCOSE: 166 MG/DL (ref 68–131)
GLUCOSE SERPL-MCNC: 122 MG/DL (ref 70–110)
HBA1C MFR BLD HPLC: 7.4 % (ref 4–5.6)
HDLC SERPL-MCNC: 30 MG/DL (ref 40–75)
HDLC SERPL: 22.1 % (ref 20–50)
INR PPP: 1.5 (ref 0.8–1.2)
LDLC SERPL CALC-MCNC: 86.2 MG/DL (ref 63–159)
NONHDLC SERPL-MCNC: 106 MG/DL
POTASSIUM SERPL-SCNC: 4.3 MMOL/L (ref 3.5–5.1)
PROT SERPL-MCNC: 8.1 G/DL (ref 6–8.4)
PROTHROMBIN TIME: 14.7 SEC (ref 9–12.5)
SODIUM SERPL-SCNC: 138 MMOL/L (ref 136–145)
TRIGL SERPL-MCNC: 99 MG/DL (ref 30–150)

## 2020-05-19 PROCEDURE — 93793 PR ANTICOAGULANT MGMT FOR PT TAKING WARFARIN: ICD-10-PCS | Mod: S$GLB,,,

## 2020-05-19 PROCEDURE — 80061 LIPID PANEL: CPT

## 2020-05-19 PROCEDURE — 93793 ANTICOAG MGMT PT WARFARIN: CPT | Mod: S$GLB,,,

## 2020-05-19 PROCEDURE — 80053 COMPREHEN METABOLIC PANEL: CPT

## 2020-05-19 PROCEDURE — 85610 PROTHROMBIN TIME: CPT

## 2020-05-19 PROCEDURE — 83036 HEMOGLOBIN GLYCOSYLATED A1C: CPT

## 2020-05-19 PROCEDURE — 36415 COLL VENOUS BLD VENIPUNCTURE: CPT

## 2020-05-19 RX ORDER — WARFARIN SODIUM 5 MG/1
TABLET ORAL
Qty: 42 TABLET | Refills: 11 | Status: SHIPPED | OUTPATIENT
Start: 2020-05-19 | End: 2020-10-19 | Stop reason: SDUPTHER

## 2020-05-19 NOTE — PROGRESS NOTES
Refill Routing Note    Medication(s) are not appropriate for processing by [facility]:       Outside of protocol           Medication reconciliation completed: No      Automatic Epic Protocol Generated Data:    Requested Prescriptions   Pending Prescriptions Disp Refills    warfarin (COUMADIN) 5 MG tablet [Pharmacy Med Name: WARFARIN SOD 5MG TABLETS (PEACH)] 42 tablet      Sig: TAKE ONE TABLET BY MOUTH ON WEDNESDAY, THURSDAY, SATURDAY,SUN OR AS DIRECTED BY COUMADIN CLINIC       There is no refill protocol information for this order           Appointments  past 12m or future 3m with PCP    Date Provider   Last Visit   7/8/2019 Debora Ontiveros MD   Next Visit   6/2/2020 Debora Ontiveros MD   ED visits in past 90 days: 0     Note composed:5:25 AM 05/19/2020

## 2020-05-19 NOTE — PROGRESS NOTES
INR not at goal. Medications, chart, and patient findings reviewed. See calendar for adjustments to dose and follow up plan.  Pt denies any significant changes & his current maintenance dose has been re-challenged.  Recommend to increase dosing.  Plan to re-assess in 2 weeks.  Sister Celsa advised & indicated understanding.    Patient was re-educated on situations that would require placing a call to the [facility], including bleeding or unusual bruising issues, changes in health, diet or medications,upcoming procedures that require warfarin interruption, and missed Coumadin dose(s). Patient expressed understanding that avoidance of consistency with these parameters could cause fluctuations in INR, leading to more frequent visits and increase risk of adverse events.

## 2020-05-23 DIAGNOSIS — E11.59 HYPERTENSION ASSOCIATED WITH DIABETES: ICD-10-CM

## 2020-05-23 DIAGNOSIS — Z79.01 CHRONIC ANTICOAGULATION: ICD-10-CM

## 2020-05-23 DIAGNOSIS — I15.2 HYPERTENSION ASSOCIATED WITH DIABETES: ICD-10-CM

## 2020-05-25 RX ORDER — WARFARIN 2.5 MG/1
TABLET ORAL
Qty: 90 TABLET | Refills: 3 | Status: SHIPPED | OUTPATIENT
Start: 2020-05-25 | End: 2021-09-11

## 2020-05-25 RX ORDER — CLONIDINE HYDROCHLORIDE 0.1 MG/1
TABLET ORAL
Qty: 270 TABLET | Refills: 5 | Status: SHIPPED | OUTPATIENT
Start: 2020-05-25 | End: 2020-10-02 | Stop reason: SDUPTHER

## 2020-06-02 ENCOUNTER — LAB VISIT (OUTPATIENT)
Dept: LAB | Facility: HOSPITAL | Age: 65
End: 2020-06-02
Attending: INTERNAL MEDICINE
Payer: MEDICARE

## 2020-06-02 ENCOUNTER — OFFICE VISIT (OUTPATIENT)
Dept: INTERNAL MEDICINE | Facility: CLINIC | Age: 65
End: 2020-06-02
Payer: MEDICARE

## 2020-06-02 ENCOUNTER — ANTI-COAG VISIT (OUTPATIENT)
Dept: CARDIOLOGY | Facility: CLINIC | Age: 65
End: 2020-06-02
Payer: MEDICAID

## 2020-06-02 VITALS
WEIGHT: 189.81 LBS | DIASTOLIC BLOOD PRESSURE: 74 MMHG | OXYGEN SATURATION: 98 % | BODY MASS INDEX: 25.16 KG/M2 | HEIGHT: 73 IN | HEART RATE: 72 BPM | SYSTOLIC BLOOD PRESSURE: 124 MMHG

## 2020-06-02 DIAGNOSIS — I63.10 CEREBROVASCULAR ACCIDENT (CVA) DUE TO EMBOLISM OF PRECEREBRAL ARTERY: ICD-10-CM

## 2020-06-02 DIAGNOSIS — Z87.310 PERSONAL HISTORY OF (HEALED) OSTEOPOROSIS FRACTURE: ICD-10-CM

## 2020-06-02 DIAGNOSIS — C61 PROSTATE CANCER: ICD-10-CM

## 2020-06-02 DIAGNOSIS — E78.5 HYPERLIPIDEMIA ASSOCIATED WITH TYPE 2 DIABETES MELLITUS: ICD-10-CM

## 2020-06-02 DIAGNOSIS — M85.9 LOW BONE DENSITY: ICD-10-CM

## 2020-06-02 DIAGNOSIS — E11.69 HYPERLIPIDEMIA ASSOCIATED WITH TYPE 2 DIABETES MELLITUS: ICD-10-CM

## 2020-06-02 DIAGNOSIS — E55.9 VITAMIN D DEFICIENCY: ICD-10-CM

## 2020-06-02 DIAGNOSIS — E11.59 HYPERTENSION ASSOCIATED WITH DIABETES: ICD-10-CM

## 2020-06-02 DIAGNOSIS — I69.30 HISTORY OF CVA WITH RESIDUAL DEFICIT: ICD-10-CM

## 2020-06-02 DIAGNOSIS — Z79.01 LONG TERM (CURRENT) USE OF ANTICOAGULANTS: ICD-10-CM

## 2020-06-02 DIAGNOSIS — N18.30 CKD (CHRONIC KIDNEY DISEASE) STAGE 3, GFR 30-59 ML/MIN: ICD-10-CM

## 2020-06-02 DIAGNOSIS — Z79.01 CHRONIC ANTICOAGULATION: ICD-10-CM

## 2020-06-02 DIAGNOSIS — I15.2 HYPERTENSION ASSOCIATED WITH DIABETES: ICD-10-CM

## 2020-06-02 PROCEDURE — 99215 PR OFFICE/OUTPT VISIT, EST, LEVL V, 40-54 MIN: ICD-10-PCS | Mod: S$GLB,,, | Performed by: INTERNAL MEDICINE

## 2020-06-02 PROCEDURE — 99215 OFFICE O/P EST HI 40 MIN: CPT | Mod: S$GLB,,, | Performed by: INTERNAL MEDICINE

## 2020-06-02 PROCEDURE — 99999 PR PBB SHADOW E&M-EST. PATIENT-LVL IV: CPT | Mod: PBBFAC,,, | Performed by: INTERNAL MEDICINE

## 2020-06-02 PROCEDURE — 99499 RISK ADDL DX/OHS AUDIT: ICD-10-PCS | Mod: S$GLB,,, | Performed by: INTERNAL MEDICINE

## 2020-06-02 PROCEDURE — 3051F HG A1C>EQUAL 7.0%<8.0%: CPT | Mod: CPTII,S$GLB,, | Performed by: INTERNAL MEDICINE

## 2020-06-02 PROCEDURE — 3051F PR MOST RECENT HEMOGLOBIN A1C LEVEL 7.0 - < 8.0%: ICD-10-PCS | Mod: CPTII,S$GLB,, | Performed by: INTERNAL MEDICINE

## 2020-06-02 PROCEDURE — 3074F PR MOST RECENT SYSTOLIC BLOOD PRESSURE < 130 MM HG: ICD-10-PCS | Mod: CPTII,S$GLB,, | Performed by: INTERNAL MEDICINE

## 2020-06-02 PROCEDURE — 3008F PR BODY MASS INDEX (BMI) DOCUMENTED: ICD-10-PCS | Mod: CPTII,S$GLB,, | Performed by: INTERNAL MEDICINE

## 2020-06-02 PROCEDURE — 3074F SYST BP LT 130 MM HG: CPT | Mod: CPTII,S$GLB,, | Performed by: INTERNAL MEDICINE

## 2020-06-02 PROCEDURE — 3078F DIAST BP <80 MM HG: CPT | Mod: CPTII,S$GLB,, | Performed by: INTERNAL MEDICINE

## 2020-06-02 PROCEDURE — 3008F BODY MASS INDEX DOCD: CPT | Mod: CPTII,S$GLB,, | Performed by: INTERNAL MEDICINE

## 2020-06-02 PROCEDURE — 99499 UNLISTED E&M SERVICE: CPT | Mod: S$GLB,,, | Performed by: INTERNAL MEDICINE

## 2020-06-02 PROCEDURE — 3078F PR MOST RECENT DIASTOLIC BLOOD PRESSURE < 80 MM HG: ICD-10-PCS | Mod: CPTII,S$GLB,, | Performed by: INTERNAL MEDICINE

## 2020-06-02 PROCEDURE — 99999 PR PBB SHADOW E&M-EST. PATIENT-LVL IV: ICD-10-PCS | Mod: PBBFAC,,, | Performed by: INTERNAL MEDICINE

## 2020-06-02 NOTE — PATIENT INSTRUCTIONS
Follow up with urology - Dr. Mills or Dr. Rodríguez - for prostate cancer.   Follow up with podiatry (foot doctor) in October.  Make follow up appointment with Dr. Swift (eye doctor) to replace glasses.     Labs today.     Turn back in stool test.    Have your pneumonia vaccine (prevnar) and shingles (Shingrix) after your birthday when you turn 65. Happy birthday!

## 2020-06-02 NOTE — PROGRESS NOTES
Patient, Kamlesh Diane (MRN #[id number]), presented with a recent Estimated Glumerular Filtration Rate (EGFR) between 30 and 45 consistent with the definition of chronic kidney disease stage 3 - moderate (ICD10 - N18.3).    eGFR if    Date Value Ref Range Status   05/19/2020 42.1 (A) >60 mL/min/1.73 m^2 Final       The patient's chronic kidney disease stage 3 was monitored, evaluated, addressed and/or treated. This addendum to the medical record is made on 06/02/2020.

## 2020-06-02 NOTE — PROGRESS NOTES
INR not at goal. Medications, chart, and patient findings reviewed. See calendar for adjustments to dose and follow up plan.  Pt denies any changes.  Recommend to increase.  Plan to re-assess in 1 week. Pt's wife advised & she wrote down dosing.

## 2020-06-02 NOTE — PROGRESS NOTES
INTERNAL MEDICINE YEARLY VISIT NOTE      CHIEF COMPLAINT     YEARLY    HPI     Kamlesh Diane is a 64 y.o. AA male who presents for yearly exam.  Sister is now retired and living w/ pt and checks his sugars regularly. Has been cooking for pt.     DM2 X 25+ yrs. Levemir 41 u qhs, novolog 6-8-8.  Follows w/ JANIS Flynn 5/15/20.  Has been checking sugars twice daily. Reports running pretty good. Rare low sugars - had 1 hypoglycemic ep about 3 weeks ago.   a1c - 7.4 5/19/20<--8  1/2020  Foot - 10/2019.  Eye - due for eye doctor in Nov. Broke glasses in December.   MAC - 3/12/19  LDL 86.2. Atorvastatin 40mg qd.    CKD3 - baseline Cr 1.5-2.    HTN - amlo 10, clonidine 0.1mg TID, coreg 3.125mg 2x daily, losartan 50mg daily.    Prostate CA (biopsy 2018) - due for f/u w/ Dr. Rodríguez. Not surgical candidate.    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   CTA head/neck 9/2010 - R MCA, L occipital and parietal and R cerebellum.  Placed on coumadin for embolic stroke. Follows w/ coumadin clinic. INR 5/19/20 1.5  Evaluated by Dr. Mendosa 7/19/18 and rec no changees to meds and also no additional cardiovascular eval at this time.     Vit D def - ergo 50k weekly.    Osteoporosis w/ h/o falls and fx. Fosamax 70mg weekly.   No more falls.     Past Medical History:  Past Medical History:   Diagnosis Date    CVA (cerebral vascular accident)     Diabetes     Elevated PSA     Hypertension     Prostate cancer     Stroke        Past Surgical History:  Past Surgical History:   Procedure Laterality Date    CATARACT EXTRACTION      FRACTURE SURGERY      hips x 2 - last fall was 2 yrs ago.       Allergies:  Review of patient's allergies indicates:  No Known Allergies    Home Medications:  Prior to Admission medications    Medication Sig Start Date End Date Taking? Authorizing Provider   ACCU-CHEK JUSTIN PLUS TEST STRP Strp Use 4x daily. 12/24/18   MD SUGAR ShepardU-CHEK  "SOFTCLIX LANCETS Misc 1 each by Misc.(Non-Drug; Combo Route) route 4 (four) times daily. 4/18/19   Debora Ontiveros MD   alendronate (FOSAMAX) 70 MG tablet Take 1 tablet (70 mg total) by mouth every 7 days. 3/12/19 3/11/20  Debora Ontiveros MD   amantadine HCL (SYMMETREL) 100 mg capsule Take 1 capsule (100 mg total) by mouth 2 (two) times daily. 3/3/20   Debora Ontiveros MD   amLODIPine (NORVASC) 10 MG tablet TAKE ONE TABLET BY MOUTH EVERY DAY. 11/25/19   Debora Ontiveros MD   ammonium lactate 12 % Crea Apply 1 application topically 2 (two) times daily. Apply to affected area 1/31/18   Heber Gonzalez MD   atorvastatin (LIPITOR) 40 MG tablet TAKE 1 TABLET BY MOUTH ONCE DAILY 2/23/20   Debora Ontiveros MD   BD ULTRA-FINE SHORT PEN NEEDLE 31 gauge x 5/16" Ndle 1 each by Misc.(Non-Drug; Combo Route) route 3 (three) times daily. 3/11/20   Debora Ontiveros MD   carvedilol (COREG) 3.125 MG tablet Take 1 tablet (3.125 mg total) by mouth 2 (two) times daily with meals. 12/6/19   Debora Ontiveros MD   cloNIDine (CATAPRES) 0.1 MG tablet TAKE 1 TABLET BY MOUTH THREE TIMES DAILY 5/25/20   Debora Ontiveros MD   ergocalciferol (VITAMIN D2) 50,000 unit Cap Take 1 capsule (50,000 Units total) by mouth every 7 days. 6/11/19   Debora Ontiveros MD   insulin lispro (HUMALOG KWIKPEN INSULIN) 100 unit/mL pen Take 6 units prior to breakfast, 8 units prior to lunch, 8 units prior to dinner plus sliding scale for meals. 3/3/20   Debora Ontiveros MD   LEVEMIR FLEXTOUCH U-100 INSULN 100 unit/mL (3 mL) InPn pen Inject 41 units nightly. 5/16/20   KEVON Herrera, FNP   losartan (COZAAR) 50 MG tablet TAKE 1 TABLET(50 MG) BY MOUTH EVERY DAY 4/10/20   Debora Ontiveros MD   NOVOLOG FLEXPEN U-100 INSULIN 100 unit/mL (3 mL) InPn pen USE AS DIRECTED BY YOUR PRESCRIBER. COMPLETE DIRECTIONS ARE INCLUDED IN A LETTER WITH YOUR ORIGINAL ORDER 11/18/19   Cuca Hoskins MD   RESTASIS 0.05 % ophthalmic emulsion PLACE 1 DROP INTO BOTH EYES TWICE DAILY 9/27/19   Michelle Bansal, OD   traMADol (ULTRAM) 50 mg tablet Take 1 tablet " "(50 mg total) by mouth every 8 (eight) hours as needed for Pain. 7/8/19   Debora Ontiveros MD   warfarin (COUMADIN) 2.5 MG tablet TAKE 1 TABLET BY MOUTH EVERY MONDAY AND FRIDAY OR AS DIRECTED BY COUMADIN CLINIC 11/8/19   BEBETO Saldivar   warfarin (COUMADIN) 2.5 MG tablet TAKE 1 TABLET BY MOUTH EVERY MONDAY, TUESDAY, AND FRIDAY OR AS DIRECTED BY COUMADIN CLINIC 5/25/20   Debora Ontiveros MD   warfarin (COUMADIN) 5 MG tablet TAKE 1 TABLET BY MOUTH ON WEDNESDAY,THURSDAY,SATURDAY, SUNDAY OR AS DIRECTED BY COUMADIN CLINIC 3/12/19   Debora Ontiveros MD   warfarin (COUMADIN) 5 MG tablet TAKE ONE TABLET BY MOUTH ON WEDNESDAY, THURSDAY, SATURDAY,SUN OR AS DIRECTED BY COUMADIN CLINIC 5/19/20   Debora Ontiveros MD       Family History:  Family History   Problem Relation Age of Onset    Hypertension Father     Heart attack Father     Breast cancer Sister     Diabetes Brother     Hypertension Brother        Social History:  Social History     Tobacco Use    Smoking status: Never Smoker    Smokeless tobacco: Never Used   Substance Use Topics    Alcohol use: Yes     Comment: a few shots once a week.     Drug use: Yes     Types: Marijuana       Review of Systems:  Review of Systems Comprehensive review of systems otherwise negative. See history/subjective section for more details.    Health Maintainence:    reviewed.     PHYSICAL EXAM     /74 (BP Location: Left arm, Patient Position: Sitting, BP Method: Medium (Manual))   Pulse 72   Ht 6' 1" (1.854 m)   Wt 86.1 kg (189 lb 13.1 oz)   SpO2 98%   BMI 25.04 kg/m²     Gen - A+OX4, NAD  HEENT - PERRL, OP clear. Slurred speech and broca's aphasia.   MMM.   Neck - no LAD  CV - RRR, no m/r  Chest - CTAB, no wheezing/rhonchi  Abd - S/NT/ND/+BS  Ext - 2+ B radial and PT pulses.  MSK - No spinal or paraspinal tenderness to palpation. Walks w/ rollator. No hip tenderness to palpation. RUE and RLE weakness.    LABS     Previous labs reviewed.    ASSESSMENT/PLAN     Kamlesh Diane is a 64 y.o. male " with  Kamlesh was seen today for annual exam.    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 Cont w/ current regimen. F/u w/ endo.   -     Microalbumin/creatinine urine ratio; Future; Expected date: 06/02/2020    Hypertension associated with diabetes - Stable and controlled. Continue current medications.  -     Ambulatory referral/consult to Optometry; Future; Expected date: 06/09/2020    Hyperlipidemia associated with type 2 diabetes mellitus - cont atorva.     Prostate cancer  -     PROSTATE SPECIFIC ANTIGEN, DIAGNOSTIC; Future; Expected date: 06/02/2020  -     Ambulatory referral/consult to Urology; Future; Expected date: 06/09/2020    Low bone density - on fosamax.   -     DXA Bone Density Spine And Hip; Future; Expected date: 06/02/2020    Personal history of (healed) osteoporosis fracture   -     DXA Bone Density Spine And Hip; Future; Expected date: 06/02/2020    Vitamin D deficiency - on ergo 50k.   -     Vitamin D; Future; Expected date: 06/02/2020    History of CVA with residual deficit - cont coumadin for embolic stroke. BP controlled. Needs better DM control but is improving.     Chronic anticoagulation - f/u w/ coumadin clinic. INR today.     ckd 3- chronic and stable. Cont to monitor.    Follow up with urology - Dr. Mills or Dr. Rodríguez - for prostate cancer.   Follow up with podiatry (foot doctor) in October.  Make follow up appointment with Dr. Swift (eye doctor) to replace glasses.     Labs today.     Turn back in stool test.    Have your pneumonia vaccine (prevnar) and shingles (Shingrix) after your birthday when you turn 65. Happy birthday!    45 minutes was spent on patient with over half the time was spent in coordination of care and/or counseling.  RTC in 4 months, sooner if needed and depending on labs.    Debora Ontiveros  MD  Department of Internal Medicine - [facility]  8:24 AM

## 2020-06-03 ENCOUNTER — LAB VISIT (OUTPATIENT)
Dept: LAB | Facility: HOSPITAL | Age: 65
End: 2020-06-03
Attending: INTERNAL MEDICINE
Payer: MEDICARE

## 2020-06-03 DIAGNOSIS — I63.10 CEREBROVASCULAR ACCIDENT (CVA) DUE TO EMBOLISM OF PRECEREBRAL ARTERY: ICD-10-CM

## 2020-06-03 DIAGNOSIS — Z79.01 LONG TERM (CURRENT) USE OF ANTICOAGULANTS: ICD-10-CM

## 2020-06-03 PROCEDURE — 82043 UR ALBUMIN QUANTITATIVE: CPT

## 2020-06-04 LAB
ALBUMIN/CREAT UR: 5.1 UG/MG (ref 0–30)
CREAT UR-MCNC: 196 MG/DL (ref 23–375)
MICROALBUMIN UR DL<=1MG/L-MCNC: 10 UG/ML

## 2020-06-09 ENCOUNTER — PATIENT OUTREACH (OUTPATIENT)
Dept: ADMINISTRATIVE | Facility: OTHER | Age: 65
End: 2020-06-09

## 2020-06-11 ENCOUNTER — HOSPITAL ENCOUNTER (OUTPATIENT)
Dept: RADIOLOGY | Facility: CLINIC | Age: 65
Discharge: HOME | End: 2020-06-11
Attending: INTERNAL MEDICINE
Payer: MEDICARE

## 2020-06-11 ENCOUNTER — TELEPHONE (OUTPATIENT)
Dept: INTERNAL MEDICINE | Facility: CLINIC | Age: 65
End: 2020-06-11

## 2020-06-11 DIAGNOSIS — Z87.310 PERSONAL HISTORY OF (HEALED) OSTEOPOROSIS FRACTURE: ICD-10-CM

## 2020-06-11 DIAGNOSIS — M85.9 LOW BONE DENSITY: ICD-10-CM

## 2020-06-11 PROCEDURE — 77080 DXA BONE DENSITY AXIAL: CPT | Mod: TC

## 2020-06-11 PROCEDURE — 77080 DXA BONE DENSITY AXIAL: CPT | Mod: 26,,, | Performed by: INTERNAL MEDICINE

## 2020-06-11 PROCEDURE — 77080 DEXA BONE DENSITY SPINE HIP: ICD-10-PCS | Mod: 26,,, | Performed by: INTERNAL MEDICINE

## 2020-06-11 NOTE — TELEPHONE ENCOUNTER
----- Message from Debora Oconnor sent at 6/11/2020 11:34 AM CDT -----  Contact: [phone] Diane nicolas Pt is requesting to speak with you re: labs. Please advise

## 2020-06-12 ENCOUNTER — LAB VISIT (OUTPATIENT)
Dept: LAB | Facility: HOSPITAL | Age: 65
End: 2020-06-12
Attending: INTERNAL MEDICINE
Payer: MEDICARE

## 2020-06-12 ENCOUNTER — ANTI-COAG VISIT (OUTPATIENT)
Dept: CARDIOLOGY | Facility: CLINIC | Age: 65
End: 2020-06-12
Payer: MEDICARE

## 2020-06-12 DIAGNOSIS — Z79.01 LONG TERM (CURRENT) USE OF ANTICOAGULANTS: ICD-10-CM

## 2020-06-12 DIAGNOSIS — I63.10 CEREBROVASCULAR ACCIDENT (CVA) DUE TO EMBOLISM OF PRECEREBRAL ARTERY: ICD-10-CM

## 2020-06-12 LAB
INR PPP: 2.7 (ref 0.8–1.2)
PROTHROMBIN TIME: 25.5 SEC (ref 9–12.5)

## 2020-06-12 PROCEDURE — 93793 PR ANTICOAGULANT MGMT FOR PT TAKING WARFARIN: ICD-10-PCS | Mod: S$GLB,,,

## 2020-06-12 PROCEDURE — 93793 ANTICOAG MGMT PT WARFARIN: CPT | Mod: S$GLB,,,

## 2020-06-12 PROCEDURE — 85610 PROTHROMBIN TIME: CPT

## 2020-06-12 PROCEDURE — 36415 COLL VENOUS BLD VENIPUNCTURE: CPT

## 2020-06-12 NOTE — PROGRESS NOTES
Diane capps) called for Lab result, Diane was given lab result, verified coumadin dosage correctly, reports no changes, Diane was advised to continue same dose of coumadin and given next lab date, verbalized understanding

## 2020-06-23 ENCOUNTER — ANTI-COAG VISIT (OUTPATIENT)
Dept: CARDIOLOGY | Facility: CLINIC | Age: 65
End: 2020-06-23
Payer: MEDICARE

## 2020-06-23 ENCOUNTER — LAB VISIT (OUTPATIENT)
Dept: LAB | Facility: HOSPITAL | Age: 65
End: 2020-06-23
Attending: INTERNAL MEDICINE
Payer: MEDICAID

## 2020-06-23 DIAGNOSIS — I63.10 CEREBROVASCULAR ACCIDENT (CVA) DUE TO EMBOLISM OF PRECEREBRAL ARTERY: ICD-10-CM

## 2020-06-23 DIAGNOSIS — Z79.01 LONG TERM (CURRENT) USE OF ANTICOAGULANTS: ICD-10-CM

## 2020-06-23 LAB
INR PPP: 3 (ref 0.8–1.2)
PROTHROMBIN TIME: 28.3 SEC (ref 9–12.5)

## 2020-06-23 PROCEDURE — 93793 ANTICOAG MGMT PT WARFARIN: CPT | Mod: S$GLB,,,

## 2020-06-23 PROCEDURE — 36415 COLL VENOUS BLD VENIPUNCTURE: CPT

## 2020-06-23 PROCEDURE — 93793 PR ANTICOAGULANT MGMT FOR PT TAKING WARFARIN: ICD-10-PCS | Mod: S$GLB,,,

## 2020-06-23 PROCEDURE — 85610 PROTHROMBIN TIME: CPT

## 2020-06-23 NOTE — PROGRESS NOTES
INR at goal. Medications and chart reviewed. No changes noted to necessitate adjustment of warfarin or follow-up plan. See calendar.  Pt to have a serving of greens 6/23.

## 2020-06-30 ENCOUNTER — PATIENT OUTREACH (OUTPATIENT)
Dept: ADMINISTRATIVE | Facility: OTHER | Age: 65
End: 2020-06-30

## 2020-07-01 NOTE — PROGRESS NOTES
Chart reviewed.   Immunizations: updated  Orders placed: n/a  Upcoming appts to satisfy VENANCIO topics: Hemoglobin A1c 8/14

## 2020-07-02 ENCOUNTER — OFFICE VISIT (OUTPATIENT)
Dept: UROLOGY | Facility: CLINIC | Age: 65
End: 2020-07-02
Payer: MEDICARE

## 2020-07-02 VITALS
DIASTOLIC BLOOD PRESSURE: 72 MMHG | HEART RATE: 103 BPM | WEIGHT: 189 LBS | SYSTOLIC BLOOD PRESSURE: 99 MMHG | BODY MASS INDEX: 25.05 KG/M2 | HEIGHT: 73 IN

## 2020-07-02 DIAGNOSIS — I10 ESSENTIAL HYPERTENSION: ICD-10-CM

## 2020-07-02 DIAGNOSIS — R97.20 ELEVATED PSA: ICD-10-CM

## 2020-07-02 DIAGNOSIS — Z79.01 LONG TERM (CURRENT) USE OF ANTICOAGULANTS: ICD-10-CM

## 2020-07-02 DIAGNOSIS — C61 CANCER OF PROSTATE WITH LOW RECURRENCE RISK (STAGE T1-2A AND GLEASON < 7 AND PSA < 10): ICD-10-CM

## 2020-07-02 DIAGNOSIS — I63.10 CEREBROVASCULAR ACCIDENT (CVA) DUE TO EMBOLISM OF PRECEREBRAL ARTERY: ICD-10-CM

## 2020-07-02 DIAGNOSIS — N18.30 CKD (CHRONIC KIDNEY DISEASE) STAGE 3, GFR 30-59 ML/MIN: Primary | ICD-10-CM

## 2020-07-02 DIAGNOSIS — C61 PROSTATE CANCER: ICD-10-CM

## 2020-07-02 PROCEDURE — 3008F BODY MASS INDEX DOCD: CPT | Mod: CPTII,S$GLB,, | Performed by: UROLOGY

## 2020-07-02 PROCEDURE — 3078F DIAST BP <80 MM HG: CPT | Mod: CPTII,S$GLB,, | Performed by: UROLOGY

## 2020-07-02 PROCEDURE — 99214 OFFICE O/P EST MOD 30 MIN: CPT | Mod: S$GLB,,, | Performed by: UROLOGY

## 2020-07-02 PROCEDURE — 3051F HG A1C>EQUAL 7.0%<8.0%: CPT | Mod: CPTII,S$GLB,, | Performed by: UROLOGY

## 2020-07-02 PROCEDURE — 3078F PR MOST RECENT DIASTOLIC BLOOD PRESSURE < 80 MM HG: ICD-10-PCS | Mod: CPTII,S$GLB,, | Performed by: UROLOGY

## 2020-07-02 PROCEDURE — 99214 PR OFFICE/OUTPT VISIT, EST, LEVL IV, 30-39 MIN: ICD-10-PCS | Mod: S$GLB,,, | Performed by: UROLOGY

## 2020-07-02 PROCEDURE — 3051F PR MOST RECENT HEMOGLOBIN A1C LEVEL 7.0 - < 8.0%: ICD-10-PCS | Mod: CPTII,S$GLB,, | Performed by: UROLOGY

## 2020-07-02 PROCEDURE — 99999 PR PBB SHADOW E&M-EST. PATIENT-LVL IV: CPT | Mod: PBBFAC,,, | Performed by: UROLOGY

## 2020-07-02 PROCEDURE — 99499 RISK ADDL DX/OHS AUDIT: ICD-10-PCS | Mod: S$PBB,,, | Performed by: UROLOGY

## 2020-07-02 PROCEDURE — 3008F PR BODY MASS INDEX (BMI) DOCUMENTED: ICD-10-PCS | Mod: CPTII,S$GLB,, | Performed by: UROLOGY

## 2020-07-02 PROCEDURE — 3074F PR MOST RECENT SYSTOLIC BLOOD PRESSURE < 130 MM HG: ICD-10-PCS | Mod: CPTII,S$GLB,, | Performed by: UROLOGY

## 2020-07-02 PROCEDURE — 3074F SYST BP LT 130 MM HG: CPT | Mod: CPTII,S$GLB,, | Performed by: UROLOGY

## 2020-07-02 PROCEDURE — 1101F PR PT FALLS ASSESS DOC 0-1 FALLS W/OUT INJ PAST YR: ICD-10-PCS | Mod: CPTII,S$GLB,, | Performed by: UROLOGY

## 2020-07-02 PROCEDURE — 1101F PT FALLS ASSESS-DOCD LE1/YR: CPT | Mod: CPTII,S$GLB,, | Performed by: UROLOGY

## 2020-07-02 PROCEDURE — 99499 UNLISTED E&M SERVICE: CPT | Mod: S$PBB,,, | Performed by: UROLOGY

## 2020-07-02 PROCEDURE — 99999 PR PBB SHADOW E&M-EST. PATIENT-LVL IV: ICD-10-PCS | Mod: PBBFAC,,, | Performed by: UROLOGY

## 2020-07-02 NOTE — PROGRESS NOTES
Subjective:       Patient ID: Kamlesh Diane is a 65 y.o. male.    Chief Complaint: prostate cancer    HPI  Kamlesh Diane is a 65 y.o. male with prostate cancer diagnosed in 2018.   BIOPSIES OF 2. LEFT MIDDLE, 3. LEFT BASE, 7. LEFT TRANSITION ZONE, AND 8. RIGHT TRANSITION  ZONE:  ADENOCARCINOMA TOTAL TEVIN SCORE 6 (3+ 3)  .  Diagnosed by: Paul Wei M.D.  (Electronically Signed: 2018-06-08 09:01:44)  Microscopic Examination  Adenocarcinoma involves under 5% of specimen 2 (involvement of both cores), under 5% of specimen 3 (1 of the 2  cores), 15% of specimen 7, and under 5% of specimen 8 (involvement of both cores). The carcinoma in all these  areas is [place] grade 3.  No frequency. Nocturia x 1. No gross hematuria. No dysuria.   No new bone pain.   No weight loss.     Lab Results   Component Value Date    PSA 6.2 (H) 04/05/2018    PSADIAG 7.9 (H) 06/02/2020    PSADIAG 5.8 (H) 11/29/2018    PSADIAG 5.0 (H) 07/05/2018    PSADIAG 7.2 (H) 05/11/2018       Past Surgical History:   Procedure Laterality Date    CATARACT EXTRACTION      FRACTURE SURGERY      hips x 2 - last fall was 2 yrs ago.       Past Medical History:   Diagnosis Date    CVA (cerebral vascular accident)     Diabetes     Elevated PSA     Hypertension     Prostate cancer     Stroke        Social History     Socioeconomic History    Marital status: Single     Spouse name: Not on file    Number of children: Not on file    Years of education: Not on file    Highest education level: Not on file   Occupational History    Occupation: worked in the hospital previously.    Social Needs    Financial resource strain: Not on file    Food insecurity     Worry: Not on file     Inability: Not on file    Transportation needs     Medical: Not on file     Non-medical: Not on file   Tobacco Use    Smoking status: Never Smoker    Smokeless tobacco: Never Used   Substance and Sexual Activity    Alcohol use: Yes     Comment: a few shots once a  "week.     Drug use: Yes     Types: Marijuana    Sexual activity: Not Currently   Lifestyle    Physical activity     Days per week: Not on file     Minutes per session: Not on file    Stress: Not on file   Relationships    Social connections     Talks on phone: Not on file     Gets together: Not on file     Attends [employer] service: Not on file     Active member of club or organization: Not on file     Attends meetings of clubs or organizations: Not on file     Relationship status: Not on file   Other Topics Concern    Not on file   Social History Narrative    Not on file       Family History   Problem Relation Age of Onset    Hypertension Father     Heart attack Father     Breast cancer Sister     Diabetes Brother     Hypertension Brother        Current Outpatient Medications   Medication Sig Dispense Refill    ACCU-CHEK JUSTIN PLUS TEST STRP Strp Use 4x daily. 400 each 3    ACCU-CHEK SOFTCLIX LANCETS Misc 1 each by Misc.(Non-Drug; Combo Route) route 4 (four) times daily. 400 each 3    alendronate (FOSAMAX) 70 MG tablet Take 1 tablet (70 mg total) by mouth every 7 days. 12 tablet 3    amantadine HCL (SYMMETREL) 100 mg capsule Take 1 capsule (100 mg total) by mouth 2 (two) times daily. 180 capsule 3    amLODIPine (NORVASC) 10 MG tablet TAKE ONE TABLET BY MOUTH EVERY DAY. 90 tablet 3    ammonium lactate 12 % Crea Apply 1 application topically 2 (two) times daily. Apply to affected area  0    atorvastatin (LIPITOR) 40 MG tablet TAKE 1 TABLET BY MOUTH ONCE DAILY 90 tablet 3    BD ULTRA-FINE SHORT PEN NEEDLE 31 gauge x 5/16" Ndle 1 each by Misc.(Non-Drug; Combo Route) route 3 (three) times daily. 200 each 2    carvedilol (COREG) 3.125 MG tablet Take 1 tablet (3.125 mg total) by mouth 2 (two) times daily with meals. 180 tablet 3    cloNIDine (CATAPRES) 0.1 MG tablet TAKE 1 TABLET BY MOUTH THREE TIMES DAILY 270 tablet 5    ergocalciferol (VITAMIN D2) 50,000 unit Cap Take 1 capsule (50,000 Units " total) by mouth every 7 days. 12 capsule 3    insulin lispro (HUMALOG KWIKPEN INSULIN) 100 unit/mL pen Take 6 units prior to breakfast, 8 units prior to lunch, 8 units prior to dinner plus sliding scale for meals. 15 mL 3    LEVEMIR FLEXTOUCH U-100 INSULN 100 unit/mL (3 mL) InPn pen Inject 41 units nightly. 3 Box 3    losartan (COZAAR) 50 MG tablet TAKE 1 TABLET(50 MG) BY MOUTH EVERY DAY 90 tablet 3    NOVOLOG FLEXPEN U-100 INSULIN 100 unit/mL (3 mL) InPn pen USE AS DIRECTED BY YOUR PRESCRIBER. COMPLETE DIRECTIONS ARE INCLUDED IN A LETTER WITH YOUR ORIGINAL ORDER 30 mL 0    RESTASIS 0.05 % ophthalmic emulsion PLACE 1 DROP INTO BOTH EYES TWICE DAILY 180 vial 3    traMADol (ULTRAM) 50 mg tablet Take 1 tablet (50 mg total) by mouth every 8 (eight) hours as needed for Pain. 15 tablet 0    warfarin (COUMADIN) 2.5 MG tablet TAKE 1 TABLET BY MOUTH EVERY MONDAY, TUESDAY, AND FRIDAY OR AS DIRECTED BY COUMADIN CLINIC 90 tablet 3    warfarin (COUMADIN) 5 MG tablet TAKE ONE TABLET BY MOUTH ON WEDNESDAY, THURSDAY, SATURDAY,SUN OR AS DIRECTED BY COUMADIN CLINIC 42 tablet 11     Current Facility-Administered Medications   Medication Dose Route Frequency Provider Last Rate Last Dose    lidocaine HCL 2% jelly   Topical (Top) Once Carey Rodríguez MD           Review of patient's allergies indicates:  No Known Allergies     BMP  Lab Results   Component Value Date     05/19/2020    K 4.3 05/19/2020     05/19/2020    CO2 25 05/19/2020    BUN 17 05/19/2020    CREATININE 1.9 (H) 05/19/2020    CALCIUM 9.3 05/19/2020    ANIONGAP 9 05/19/2020    ESTGFRAFRICA 42.1 (A) 05/19/2020    EGFRNONAA 36.4 (A) 05/19/2020       Review of Systems   Constitutional: Negative for chills, fever and unexpected weight change.   HENT: Negative for hearing loss and nosebleeds.    Eyes: Negative for visual disturbance.   Respiratory: Negative for chest tightness.    Cardiovascular: Negative for chest pain.   Gastrointestinal: Negative  for diarrhea.   Genitourinary: Negative for dysuria, hematuria, nocturia and urgency.   Musculoskeletal: Negative for joint swelling.   Skin: Negative for rash.   Neurological: Negative for seizures.   Hematological: Does not bruise/bleed easily.   Psychiatric/Behavioral: Negative for behavioral problems.   no urine  Objective:      Physical Exam   Constitutional: He is oriented to person, place, and time. He appears well-developed.   HENT:   Head: Normocephalic and atraumatic.   Eyes: No scleral icterus.   Neck: Neck supple.   Cardiovascular: Normal rate and regular rhythm.    Pulmonary/Chest: Effort normal. No respiratory distress.   Abdominal: He exhibits no mass.   Genitourinary:    Genitourinary Comments: Prostate was smooth without nodularity. No rectal masses.  35 grams. External hemorrhoids were not present. Normal perineum.       Musculoskeletal: No tenderness.   Lymphadenopathy:     He has no cervical adenopathy.   Neurological: He is alert and oriented to person, place, and time.   Skin: Skin is warm. No rash noted.       no urine - will give from home.   Assessment:       1. CKD (chronic kidney disease) stage 3, GFR 30-59 ml/min    2. Prostate cancer    3. Essential hypertension    4. Cancer of prostate with low recurrence risk (stage T1-2a and [place] < 7 and PSA < 10)    5. Long term (current) use of anticoagulants    6. Elevated PSA    7. Cerebrovascular accident (CVA) due to embolism of precerebral artery    8. 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   No MRI b/c of CKD.   Will hold off on repeat biopsy for now b/c of anticoagulants.

## 2020-07-02 NOTE — LETTER
July 2, 2020      Debora Ontiveros MD  [address]  [facility] [address]           [facility] - Urology 4th Floor  [address]  [facility] [address]  Phone: [phone]          Patient: Kamlesh Diane   MR Number: [id number]   YOB: 1955   Date of Visit: 7/2/2020       Dear Dr. Debora Ontiveros:    Thank you for referring Kamlesh Diane to me for evaluation. Attached you will find relevant portions of my assessment and plan of care.    If you have questions, please do not hesitate to call me. I look forward to following Kamlesh Diane along with you.    Sincerely,    Carey Rodríguez MD    Enclosure  CC:  No Recipients    If you would like to receive this communication electronically, please contact [email or web address] or [phone] to request more information on nCrypted Cloud Link access.    For providers and/or their staff who would like to refer a patient to [facility], please contact us through our one-stop-shop provider referral line, [facility] , at [phone].    If you feel you have received this communication in error or would no longer like to receive these types of communications, please e-mail [email or web address]

## 2020-07-06 ENCOUNTER — TELEPHONE (OUTPATIENT)
Dept: UROLOGY | Facility: CLINIC | Age: 65
End: 2020-07-06

## 2020-07-06 DIAGNOSIS — R97.20 ELEVATED PSA: Primary | ICD-10-CM

## 2020-07-07 ENCOUNTER — LAB VISIT (OUTPATIENT)
Dept: LAB | Facility: HOSPITAL | Age: 65
End: 2020-07-07
Attending: INTERNAL MEDICINE
Payer: MEDICARE

## 2020-07-07 ENCOUNTER — ANTI-COAG VISIT (OUTPATIENT)
Dept: CARDIOLOGY | Facility: CLINIC | Age: 65
End: 2020-07-07
Payer: MEDICARE

## 2020-07-07 DIAGNOSIS — I63.10 CEREBROVASCULAR ACCIDENT (CVA) DUE TO EMBOLISM OF PRECEREBRAL ARTERY: ICD-10-CM

## 2020-07-07 DIAGNOSIS — Z79.01 LONG TERM (CURRENT) USE OF ANTICOAGULANTS: ICD-10-CM

## 2020-07-07 LAB
INR PPP: 3.5 (ref 0.8–1.2)
PROTHROMBIN TIME: 32.7 SEC (ref 9–12.5)

## 2020-07-07 PROCEDURE — 36415 COLL VENOUS BLD VENIPUNCTURE: CPT

## 2020-07-07 PROCEDURE — 93793 PR ANTICOAGULANT MGMT FOR PT TAKING WARFARIN: ICD-10-PCS | Mod: S$GLB,,,

## 2020-07-07 PROCEDURE — 85610 PROTHROMBIN TIME: CPT

## 2020-07-07 PROCEDURE — 93793 ANTICOAG MGMT PT WARFARIN: CPT | Mod: S$GLB,,,

## 2020-07-07 NOTE — PROGRESS NOTES
INR not at goal. Medications, chart, and patient findings reviewed. See calendar for adjustments to dose and follow up plan.  Pt denies any changes.  Recommend he holds 7/7 & decrease maintenance dose.  Plan to re-assess in 2 weeks.

## 2020-07-09 ENCOUNTER — OFFICE VISIT (OUTPATIENT)
Dept: PODIATRY | Facility: CLINIC | Age: 65
End: 2020-07-09
Payer: MEDICARE

## 2020-07-09 VITALS
HEART RATE: 91 BPM | SYSTOLIC BLOOD PRESSURE: 124 MMHG | RESPIRATION RATE: 18 BRPM | WEIGHT: 189 LBS | DIASTOLIC BLOOD PRESSURE: 80 MMHG | BODY MASS INDEX: 25.05 KG/M2 | HEIGHT: 73 IN

## 2020-07-09 DIAGNOSIS — B35.1 ONYCHOMYCOSIS DUE TO DERMATOPHYTE: ICD-10-CM

## 2020-07-09 DIAGNOSIS — E11.42 DIABETIC POLYNEUROPATHY ASSOCIATED WITH TYPE 2 DIABETES MELLITUS: Primary | ICD-10-CM

## 2020-07-09 DIAGNOSIS — L84 CORN OR CALLUS: ICD-10-CM

## 2020-07-09 PROCEDURE — 11056 PR TRIM BENIGN HYPERKERATOTIC SKIN LESION,2-4: ICD-10-PCS | Mod: Q9,S$GLB,, | Performed by: PODIATRIST

## 2020-07-09 PROCEDURE — 99499 NO LOS: ICD-10-PCS | Mod: S$GLB,,, | Performed by: PODIATRIST

## 2020-07-09 PROCEDURE — 11721 PR DEBRIDEMENT OF NAILS, 6 OR MORE: ICD-10-PCS | Mod: Q9,59,S$GLB, | Performed by: PODIATRIST

## 2020-07-09 PROCEDURE — 99499 UNLISTED E&M SERVICE: CPT | Mod: S$GLB,,, | Performed by: PODIATRIST

## 2020-07-09 PROCEDURE — 99999 PR PBB SHADOW E&M-EST. PATIENT-LVL IV: ICD-10-PCS | Mod: PBBFAC,,, | Performed by: PODIATRIST

## 2020-07-09 PROCEDURE — 11056 PARNG/CUTG B9 HYPRKR LES 2-4: CPT | Mod: Q9,S$GLB,, | Performed by: PODIATRIST

## 2020-07-09 PROCEDURE — 99999 PR PBB SHADOW E&M-EST. PATIENT-LVL IV: CPT | Mod: PBBFAC,,, | Performed by: PODIATRIST

## 2020-07-09 PROCEDURE — 11721 DEBRIDE NAIL 6 OR MORE: CPT | Mod: Q9,59,S$GLB, | Performed by: PODIATRIST

## 2020-07-09 RX ORDER — LANCETS
1 EACH MISCELLANEOUS 4 TIMES DAILY
Qty: 400 EACH | Refills: 3 | Status: SHIPPED | OUTPATIENT
Start: 2020-07-09 | End: 2021-07-27 | Stop reason: SDUPTHER

## 2020-07-09 NOTE — TELEPHONE ENCOUNTER
"----- Message from Tiago Cameron sent at 7/9/2020 11:05 AM CDT -----  Regarding: Advice  Contact: Spouse [phone]  RX request - refill or new RX.  Is this a refill or new RX:    RX name and strength: Micro Let Pin "Lancets"  Directions:   Is this a 30 day or 90 day RX:    Pharmacy name and phone #[id number] - [place] [address] AT [facility] [address] [phone] (Phone)  [phone] (Fax    Comments:  call to inform has been sent     Please call an advise  Thank you    "

## 2020-07-13 NOTE — PROGRESS NOTES
Subjective:      Patient ID: Kamlesh Diane is a 65 y.o. male.    Chief Complaint: PCP (Debora Ontiveros 6/2/20), Diabetic Foot Exam, Nail Problem, and Nail Care    Kamlesh is a 65 y.o. male who presents to the clinic for evaluation and treatment of high risk feet. Kamlesh has a past medical history of CVA (cerebral vascular accident), Diabetes, Elevated PSA, Hypertension, Prostate cancer, and Stroke. The patient's chief complaint is long, thick toenails. This patient has documented high risk feet requiring routine maintenance secondary to diabetes mellitis and those secondary complications of diabetes, as mentioned..    PCP: Debora Ontiveros MD    Date Last Seen by PCP:   Chief Complaint   Patient presents with    PCP     Debora Ontiveros 6/2/20    Diabetic Foot Exam    Nail Problem    Nail Care         Hemoglobin A1C   Date Value Ref Range Status   05/19/2020 7.4 (H) 4.0 - 5.6 % Final     Comment:     ADA Screening Guidelines:  5.7-6.4%  Consistent with prediabetes  >or=6.5%  Consistent with diabetes  High levels of fetal hemoglobin interfere with the HbA1C  assay. Heterozygous hemoglobin variants (HbS, HgC, etc)do  not significantly interfere with this assay.   However, presence of multiple variants may affect accuracy.     01/24/2020 8.0 (H) 4.0 - 5.6 % Final     Comment:     ADA Screening Guidelines:  5.7-6.4%  Consistent with prediabetes  >or=6.5%  Consistent with diabetes  High levels of fetal hemoglobin interfere with the HbA1C  assay. Heterozygous hemoglobin variants (HbS, HgC, etc)do  not significantly interfere with this assay.   However, presence of multiple variants may affect accuracy.     10/10/2019 8.0 (H) 4.0 - 5.6 % Final     Comment:     ADA Screening Guidelines:  5.7-6.4%  Consistent with prediabetes  >or=6.5%  Consistent with diabetes  High levels of fetal hemoglobin interfere with the HbA1C  assay. Heterozygous hemoglobin variants (HbS, HgC, etc)do  not significantly interfere with this assay.   However, presence  "of multiple variants may affect accuracy.         Review of Systems   Constitution: Negative for chills, decreased appetite and fever.   Cardiovascular: Negative for leg swelling.   Skin: Positive for dry skin and nail changes.   Musculoskeletal: Positive for arthritis and muscle weakness. Negative for joint pain, joint swelling and myalgias.   Gastrointestinal: Negative for nausea and vomiting.   Neurological: Positive for numbness. Negative for loss of balance and paresthesias.           Objective:       Vitals:    07/09/20 1535   BP: 124/80   Pulse: 91   Resp: 18   Weight: 85.7 kg (189 lb)   Height: 6' 1" (1.854 m)   PainSc: 0-No pain        Physical Exam  Vitals signs reviewed.   Constitutional:       Appearance: He is well-developed.   Cardiovascular:      Comments: Dorsalis pedis and posterior tibial pulses are diminished Bilaterally. Toes are cool to touch. Feet are warm proximally.There is decreased digital hair. Skin is atrophic, slightly hyperpigmented, and mildly edematous      Musculoskeletal: Normal range of motion.         General: No tenderness or signs of injury.      Comments: Adequate joint range of motion without pain, limitation, nor crepitation Bilateral feet and ankle joints. Muscle strength is 5/5 in all groups bilaterally.         Skin:     General: Skin is warm and dry.      Findings: No ecchymosis, erythema or lesion.      Comments: Nails x10 are elongated by  4-8mm's, thickened by 2-7 mm's, dystrophic, and are darkened in  coloration . Xerosis Bilaterally. No open lesions noted.    Hyperkeratotic tissue noted to distal toes 2-4 b/l      Neurological:      Mental Status: He is alert and oriented to person, place, and time.      Comments: [place] 5.07 monofilamant testing is diminished Hari feet. Sharp/dull sensation diminished Bilaterally. Light touch absent Bilaterally.       Psychiatric:         Behavior: Behavior normal.               Assessment:       Encounter Diagnoses   Name " Primary?    Diabetic polyneuropathy associated with type 2 diabetes mellitus Yes    Onychomycosis due to dermatophyte     Corn or callus          Plan:       Kamlesh was seen today for pcp, diabetic foot exam, nail problem and nail care.    Diagnoses and all orders for this visit:    Diabetic polyneuropathy associated with type 2 diabetes mellitus    Onychomycosis due to dermatophyte    Corn or callus      I counseled the patient on his conditions, their implications and medical management.        - Shoe inspection. Diabetic Foot Education. Patient reminded of the importance of good nutrition and blood sugar control to help prevent podiatric complications of diabetes. Patient instructed on proper foot hygeine. We discussed wearing proper shoe gear, daily foot inspections, never walking without protective shoe gear, never putting sharp instruments to feet, routine podiatric nail visits every 2-3 months.      - With patient's permission, nails were aggressively reduced and debrided x 10 to their soft tissue attachment mechanically and with electric , removing all offending nail and debris. Patient relates relief following the procedure. He will continue to monitor the areas daily, inspect his feet, wear protective shoe gear when ambulatory, moisturizer to maintain skin integrity and follow in this office in approximately 2-3 months, sooner p.r.n.    - After cleansing the  area w/ alcohol prep pad the above mentioned hyperkeratosis was trimmed utilizing No 15 scapel, to a smooth base with out incident. Patient tolerated this  well and reported comfort to the area of dista toes 2-4 b/l

## 2020-07-20 ENCOUNTER — PATIENT OUTREACH (OUTPATIENT)
Dept: ADMINISTRATIVE | Facility: OTHER | Age: 65
End: 2020-07-20

## 2020-07-20 NOTE — PROGRESS NOTES
Chart reviewed.   Immunizations: updated  Orders placed: n/a  Upcoming appts to satisfy VENANCIO topics: Hemoglobin A1c 8/12

## 2020-07-21 ENCOUNTER — LAB VISIT (OUTPATIENT)
Dept: LAB | Facility: HOSPITAL | Age: 65
End: 2020-07-21
Attending: UROLOGY
Payer: MEDICAID

## 2020-07-21 ENCOUNTER — ANTI-COAG VISIT (OUTPATIENT)
Dept: CARDIOLOGY | Facility: CLINIC | Age: 65
End: 2020-07-21
Payer: MEDICARE

## 2020-07-21 ENCOUNTER — OFFICE VISIT (OUTPATIENT)
Dept: OPHTHALMOLOGY | Facility: CLINIC | Age: 65
End: 2020-07-21
Payer: MEDICARE

## 2020-07-21 DIAGNOSIS — H43.12 VITREOUS HEMORRHAGE, LEFT: ICD-10-CM

## 2020-07-21 DIAGNOSIS — Z79.01 LONG TERM (CURRENT) USE OF ANTICOAGULANTS: ICD-10-CM

## 2020-07-21 DIAGNOSIS — I63.10 CEREBROVASCULAR ACCIDENT (CVA) DUE TO EMBOLISM OF PRECEREBRAL ARTERY: ICD-10-CM

## 2020-07-21 DIAGNOSIS — H35.033 HYPERTENSIVE RETINOPATHY, BILATERAL: ICD-10-CM

## 2020-07-21 LAB
INR PPP: 2.6 (ref 0.8–1.2)
PROTHROMBIN TIME: 27.8 SEC (ref 9–12.5)

## 2020-07-21 PROCEDURE — 92014 PR EYE EXAM, EST PATIENT,COMPREHESV: ICD-10-PCS | Mod: 25,S$GLB,, | Performed by: OPHTHALMOLOGY

## 2020-07-21 PROCEDURE — 36415 COLL VENOUS BLD VENIPUNCTURE: CPT

## 2020-07-21 PROCEDURE — 99999 PR PBB SHADOW E&M-EST. PATIENT-LVL III: CPT | Mod: PBBFAC,,, | Performed by: OPHTHALMOLOGY

## 2020-07-21 PROCEDURE — 67228 TREATMENT X10SV RETINOPATHY: CPT | Mod: LT,S$GLB,, | Performed by: OPHTHALMOLOGY

## 2020-07-21 PROCEDURE — 67228 PR TREATMENT EXTENSIVE RETINOPATHY, PHOTOCOAGULATION: ICD-10-PCS | Mod: LT,S$GLB,, | Performed by: OPHTHALMOLOGY

## 2020-07-21 PROCEDURE — 85610 PROTHROMBIN TIME: CPT

## 2020-07-21 PROCEDURE — 93793 PR ANTICOAGULANT MGMT FOR PT TAKING WARFARIN: ICD-10-PCS | Mod: S$GLB,,,

## 2020-07-21 PROCEDURE — 99999 PR PBB SHADOW E&M-EST. PATIENT-LVL III: ICD-10-PCS | Mod: PBBFAC,,, | Performed by: OPHTHALMOLOGY

## 2020-07-21 PROCEDURE — 93793 ANTICOAG MGMT PT WARFARIN: CPT | Mod: S$GLB,,,

## 2020-07-21 PROCEDURE — 92014 COMPRE OPH EXAM EST PT 1/>: CPT | Mod: 25,S$GLB,, | Performed by: OPHTHALMOLOGY

## 2020-07-21 NOTE — PROGRESS NOTES
HPI     PDR      Additional comments: Both eyes               Vit heme      Additional comments: left eye              Comments     DLS 07/01/2020 Dr Swift  DLS 11/26/2019     poss PRP OS today per previous note     64 yo male with history stroke x 10 years ago reports hazy vision in the   right eye , comes and goes  He is also requesting new glasses RX     Eye meds:  Restatsis BID OU    Ocular hx   PDR OU  VH OU  HTN retinopathy OU         OCT - NO ME OU    [facility] - peripheral NP and NV OU      A/P    1. PDR OU  T2 uncontrolled on insulin  No ME OU  Watch ST fibrosis    Plan PRP OU - OS first start today    2. VH OS   Minimal    3. PCIOL OU    4. HTN Ret OU  BS/BP/chol control    5. Prior CVA   On coumadin      2-3 weeks PRP OS complete (2 of 2)      Risks, benefits, and alternatives to treatment discussed in detail with the patient.  The patient voiced understanding and wished to proceed with the procedure    Laser Procedure Note  Dx: PDR OS   Laser: PRP OS (1 of 2)  Argon  Spot: 200  Power: 120-150  Dur: 0.1  #:   1194 inferior 180 degrees  Complications: None  F/U as above

## 2020-08-12 ENCOUNTER — ANTI-COAG VISIT (OUTPATIENT)
Dept: CARDIOLOGY | Facility: CLINIC | Age: 65
End: 2020-08-12
Payer: MEDICARE

## 2020-08-12 ENCOUNTER — LAB VISIT (OUTPATIENT)
Dept: LAB | Facility: HOSPITAL | Age: 65
End: 2020-08-12
Attending: INTERNAL MEDICINE
Payer: MEDICARE

## 2020-08-12 DIAGNOSIS — Z79.01 LONG TERM (CURRENT) USE OF ANTICOAGULANTS: ICD-10-CM

## 2020-08-12 DIAGNOSIS — I63.10 CEREBROVASCULAR ACCIDENT (CVA) DUE TO EMBOLISM OF PRECEREBRAL ARTERY: ICD-10-CM

## 2020-08-12 LAB
ESTIMATED AVG GLUCOSE: 166 MG/DL (ref 68–131)
HBA1C MFR BLD HPLC: 7.4 % (ref 4–5.6)

## 2020-08-12 PROCEDURE — 93793 PR ANTICOAGULANT MGMT FOR PT TAKING WARFARIN: ICD-10-PCS | Mod: S$GLB,,,

## 2020-08-12 PROCEDURE — 83036 HEMOGLOBIN GLYCOSYLATED A1C: CPT

## 2020-08-12 PROCEDURE — 93793 ANTICOAG MGMT PT WARFARIN: CPT | Mod: S$GLB,,,

## 2020-08-14 ENCOUNTER — TELEPHONE (OUTPATIENT)
Dept: UROLOGY | Facility: CLINIC | Age: 65
End: 2020-08-14

## 2020-08-14 DIAGNOSIS — R97.20 ELEVATED PSA: Primary | ICD-10-CM

## 2020-08-14 NOTE — TELEPHONE ENCOUNTER
A message was left for the patient to call the office back regarding his results. Contact name and number was given to the patient.    [place]    ----- Message from Carey Rodríguez MD sent at 8/14/2020  9:19 AM CDT -----  psa stable. F/u with psa in 6 months and visit.

## 2020-08-19 ENCOUNTER — OFFICE VISIT (OUTPATIENT)
Dept: INTERNAL MEDICINE | Facility: CLINIC | Age: 65
End: 2020-08-19
Payer: MEDICARE

## 2020-08-19 VITALS
WEIGHT: 192.88 LBS | SYSTOLIC BLOOD PRESSURE: 118 MMHG | OXYGEN SATURATION: 99 % | BODY MASS INDEX: 25.45 KG/M2 | DIASTOLIC BLOOD PRESSURE: 88 MMHG | HEART RATE: 93 BPM

## 2020-08-19 DIAGNOSIS — Z79.4 CONTROLLED TYPE 2 DIABETES MELLITUS WITH COMPLICATION, WITH LONG-TERM CURRENT USE OF INSULIN: ICD-10-CM

## 2020-08-19 DIAGNOSIS — I73.9 PAD (PERIPHERAL ARTERY DISEASE): ICD-10-CM

## 2020-08-19 DIAGNOSIS — E11.8 CONTROLLED TYPE 2 DIABETES MELLITUS WITH COMPLICATION, WITH LONG-TERM CURRENT USE OF INSULIN: ICD-10-CM

## 2020-08-19 DIAGNOSIS — C61 CANCER OF PROSTATE WITH LOW RECURRENCE RISK (STAGE T1-2A AND GLEASON < 7 AND PSA < 10): ICD-10-CM

## 2020-08-19 DIAGNOSIS — N18.30 CKD (CHRONIC KIDNEY DISEASE) STAGE 3, GFR 30-59 ML/MIN: ICD-10-CM

## 2020-08-19 DIAGNOSIS — Z86.73 H/O ISCHEMIC RIGHT MCA STROKE: ICD-10-CM

## 2020-08-19 DIAGNOSIS — M89.9 BONE DISORDER: ICD-10-CM

## 2020-08-19 PROCEDURE — 1101F PT FALLS ASSESS-DOCD LE1/YR: CPT | Mod: CPTII,S$GLB,, | Performed by: NURSE PRACTITIONER

## 2020-08-19 PROCEDURE — 99499 UNLISTED E&M SERVICE: CPT | Mod: S$PBB,,, | Performed by: NURSE PRACTITIONER

## 2020-08-19 PROCEDURE — 99499 RISK ADDL DX/OHS AUDIT: ICD-10-PCS | Mod: S$PBB,,, | Performed by: NURSE PRACTITIONER

## 2020-08-19 PROCEDURE — 99214 PR OFFICE/OUTPT VISIT, EST, LEVL IV, 30-39 MIN: ICD-10-PCS | Mod: S$GLB,,, | Performed by: NURSE PRACTITIONER

## 2020-08-19 PROCEDURE — 99214 OFFICE O/P EST MOD 30 MIN: CPT | Mod: S$GLB,,, | Performed by: NURSE PRACTITIONER

## 2020-08-19 PROCEDURE — 99999 PR PBB SHADOW E&M-EST. PATIENT-LVL IV: ICD-10-PCS | Mod: PBBFAC,,, | Performed by: NURSE PRACTITIONER

## 2020-08-19 PROCEDURE — 99999 PR PBB SHADOW E&M-EST. PATIENT-LVL IV: CPT | Mod: PBBFAC,,, | Performed by: NURSE PRACTITIONER

## 2020-08-19 PROCEDURE — 1101F PR PT FALLS ASSESS DOC 0-1 FALLS W/OUT INJ PAST YR: ICD-10-PCS | Mod: CPTII,S$GLB,, | Performed by: NURSE PRACTITIONER

## 2020-08-19 PROCEDURE — 3079F DIAST BP 80-89 MM HG: CPT | Mod: CPTII,S$GLB,, | Performed by: NURSE PRACTITIONER

## 2020-08-19 PROCEDURE — 3008F PR BODY MASS INDEX (BMI) DOCUMENTED: ICD-10-PCS | Mod: CPTII,S$GLB,, | Performed by: NURSE PRACTITIONER

## 2020-08-19 PROCEDURE — 3074F SYST BP LT 130 MM HG: CPT | Mod: CPTII,S$GLB,, | Performed by: NURSE PRACTITIONER

## 2020-08-19 PROCEDURE — 3079F PR MOST RECENT DIASTOLIC BLOOD PRESSURE 80-89 MM HG: ICD-10-PCS | Mod: CPTII,S$GLB,, | Performed by: NURSE PRACTITIONER

## 2020-08-19 PROCEDURE — 3074F PR MOST RECENT SYSTOLIC BLOOD PRESSURE < 130 MM HG: ICD-10-PCS | Mod: CPTII,S$GLB,, | Performed by: NURSE PRACTITIONER

## 2020-08-19 PROCEDURE — 3051F HG A1C>EQUAL 7.0%<8.0%: CPT | Mod: CPTII,S$GLB,, | Performed by: NURSE PRACTITIONER

## 2020-08-19 PROCEDURE — 3051F PR MOST RECENT HEMOGLOBIN A1C LEVEL 7.0 - < 8.0%: ICD-10-PCS | Mod: CPTII,S$GLB,, | Performed by: NURSE PRACTITIONER

## 2020-08-19 PROCEDURE — 3008F BODY MASS INDEX DOCD: CPT | Mod: CPTII,S$GLB,, | Performed by: NURSE PRACTITIONER

## 2020-08-19 RX ORDER — INSULIN LISPRO 100 [IU]/ML
INJECTION, SOLUTION INTRAVENOUS; SUBCUTANEOUS
Qty: 15 ML | Refills: 3
Start: 2020-08-19 | End: 2023-03-28

## 2020-08-19 RX ORDER — INSULIN DETEMIR 100 [IU]/ML
INJECTION, SOLUTION SUBCUTANEOUS
Qty: 3 BOX | Refills: 3
Start: 2020-08-19 | End: 2021-04-20 | Stop reason: SDUPTHER

## 2020-08-19 NOTE — PROGRESS NOTES
CHIEF COMPLAINT: Type 2 Diabetes     HPI: Mr. Kamlesh Diane is a 65 y.o. male who was diagnosed with Type 2 DM > 5 years.   Pt has h/o MCA stroke, HTN, PAD, low bone density-on fosamax, prostate ca, CKD3, CR 1.7.  Past Medical History:   Diagnosis Date    CVA (cerebral vascular accident)     Diabetes     Elevated PSA     Hypertension     Prostate cancer     Stroke      Pt arrived in office visit alone, uses walker.  a1c has been stable, 7.4% in May, this month 7.4%.   Reports eating 3 meals a day, not snacking much.   Last seen by me in 5/2020.  Has refills, formulary change to humalog w/ peoples.  On levemir 41 units at night, novolog 7-8 units w/ meals, occ will hold lunch dose    DE ---- 1/2020     Dietary habits:  B: Eggs, sausage (2), pancakes sometimes  L: varies  D: varies, red beans  Sometimes burger amilcar, popeyes, cooked foods    Lab Results   Component Value Date    HGBA1C 7.4 (H) 08/12/2020     Needs lab work.    PREVIOUS DIABETES MEDICATIONS TRIED  novolog   levemir     CURRENT DIABETIC MEDS: novolog 7-8 units ac  levemir 41 units at night    Pt is monitoring blood glucose readings 2-3 times a day.  Needs >100 strips per month related to fluctuations with blood glucose reading, a1c trends, and activity level.    bg mostly  mg/dl  This am 93 mg/dl    Diabetes Management Status    Statin: Taking  ACE/ARB: Taking    Screening or Prevention Patient's value Goal Complete/Controlled?   HgA1C Testing and Control   Lab Results   Component Value Date    HGBA1C 7.4 (H) 08/12/2020      Annually/Less than 8% No   Lipid profile : 06/02/2020 Annually Yes   LDL control Lab Results   Component Value Date    LDLCALC 86.4 06/02/2020    Annually/Less than 100 mg/dl  Yes   Nephropathy screening Lab Results   Component Value Date    LABMICR 10.0 06/03/2020     Lab Results   Component Value Date    PROTEINUA Negative 05/30/2018    Annually Yes   Blood pressure BP Readings from Last 1 Encounters:   08/19/20  118/88    Less than 140/90 No   Dilated retinal exam : 07/21/2020 Annually Yes   Foot exam   : 10/24/2019 Annually No       REVIEW OF SYSTEMS  General: +weakness, fatigue, +weight changes 3# fluctuations  Eyes: no double or blurred vision, eye pain, or redness  Cardiovascular: no chest pain, palpitations, edema, or murmurs.   Respiratory: no cough or dyspnea.   GI: no heartburn, nausea, or changes in bowel patterns; good appetite.   Skin: no rashes, dryness, itching, or reactions at insulin injection sites.  Neuro: + numbness, tingling, tremors, or vertigo. +hip pain (L)  Endocrine: no polyuria, polydipsia, polyphagia, heat or cold intolerance.     Vital Signs  /88 (BP Location: Left arm, Patient Position: Sitting, BP Method: Medium (Manual))   Pulse 93   Wt 56.9 kg (125 lb 7.1 oz)   SpO2 99%   BMI 16.55 kg/m²     Hemoglobin A1C   Date Value Ref Range Status   08/12/2020 7.4 (H) 4.0 - 5.6 % Final     Comment:     ADA Screening Guidelines:  5.7-6.4%  Consistent with prediabetes  >or=6.5%  Consistent with diabetes  High levels of fetal hemoglobin interfere with the HbA1C  assay. Heterozygous hemoglobin variants (HbS, HgC, etc)do  not significantly interfere with this assay.   However, presence of multiple variants may affect accuracy.     05/19/2020 7.4 (H) 4.0 - 5.6 % Final     Comment:     ADA Screening Guidelines:  5.7-6.4%  Consistent with prediabetes  >or=6.5%  Consistent with diabetes  High levels of fetal hemoglobin interfere with the HbA1C  assay. Heterozygous hemoglobin variants (HbS, HgC, etc)do  not significantly interfere with this assay.   However, presence of multiple variants may affect accuracy.     01/24/2020 8.0 (H) 4.0 - 5.6 % Final     Comment:     ADA Screening Guidelines:  5.7-6.4%  Consistent with prediabetes  >or=6.5%  Consistent with diabetes  High levels of fetal hemoglobin interfere with the HbA1C  assay. Heterozygous hemoglobin variants (HbS, HgC, etc)do  not significantly  interfere with this assay.   However, presence of multiple variants may affect accuracy.          Chemistry        Component Value Date/Time     05/19/2020 1158    K 4.3 05/19/2020 1158     05/19/2020 1158    CO2 25 05/19/2020 1158    BUN 17 05/19/2020 1158    CREATININE 1.9 (H) 05/19/2020 1158     (H) 05/19/2020 1158        Component Value Date/Time    CALCIUM 9.3 05/19/2020 1158    ALKPHOS 116 05/19/2020 1158    AST 19 05/19/2020 1158    ALT 18 05/19/2020 1158    BILITOT 0.5 05/19/2020 1158    ESTGFRAFRICA 42.1 (A) 05/19/2020 1158    EGFRNONAA 36.4 (A) 05/19/2020 1158           Lab Results   Component Value Date    TSH 1.629 01/24/2020      Lab Results   Component Value Date    CHOL 137 06/02/2020    CHOL 136 05/19/2020    CHOL 142 03/12/2019     Lab Results   Component Value Date    HDL 35 (L) 06/02/2020    HDL 30 (L) 05/19/2020    HDL 33 (L) 03/12/2019     Lab Results   Component Value Date    LDLCALC 86.4 06/02/2020    LDLCALC 86.2 05/19/2020    LDLCALC 93.8 03/12/2019     Lab Results   Component Value Date    TRIG 78 06/02/2020    TRIG 99 05/19/2020    TRIG 76 03/12/2019     Lab Results   Component Value Date    CHOLHDL 25.5 06/02/2020    CHOLHDL 22.1 05/19/2020    CHOLHDL 23.2 03/12/2019         PHYSICAL EXAMINATION  Constitutional: Appears well, no distress  Neck: Supple, trachea midline.   Respiratory: No wheezes, even and unlabored.  Cardiovascular: RRR; no edema.   Lymph: no lymphadenopathy palpated  Skin: warm and dry; no injection site reactions, no acanthosis nigracans observed.  Neuro:patient alert and cooperative, normal affect; mostly steady gait, uses walker.    Diabetes Foot Exam:   Deferred  7/9/2020    Assessment/Plan    1. 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 PAD (peripheral artery disease)     3. H/O  ischemic right MCA stroke     4. CKD (chronic kidney disease) stage 3, GFR 30-59 ml/min     5. Cancer of prostate with low recurrence risk (stage T1-2a and Brent < 7 and PSA < 10)     6. Bone disorder         1. F/u in 3-4 mos w/ me  a1c prior  a1c goal less than 7.5%  Remains below goal   Continue regimen   Discussed dietary habits  Brother, niece, other family member administers insulin   No scale needed at this time   humalog change w/ prandial insulin r/t insurance  2. F/u with podiatry  Vascular prn  3. F/u w/ pcp  4. Avoid hypoglycemia  Continue to watch  5. F/u with urology   6. On fosamax   Managed per pcp     FOLLOW UP  No follow-ups on file.

## 2020-08-19 NOTE — PATIENT INSTRUCTIONS
Snacks can be an important part of a balanced, healthy meal plan. They allow you to eat more frequently, feeling full and satisfied throughout the day. Also, they allow you to spread carbohydrates evenly, which may stabilize blood sugars.  Plus, snacks are enjoyable!     The amount of carbohydrate needed at snacks varies. Generally, about 15-30 grams of carbohydrate per snack is recommended.  Below you will find some tasty treats.       0-5 gm carb   Crystal Light   Vitamin Water Zero   Herbal tea, unsweetened   2 tsp peanut butter on celery   1./2 cup sugar-free jell-o   1 sugar-free popsicle   ¼ cup blueberries   8oz Blue Lissette unsweetened almond milk   5 baby carrots & celery sticks, cucumbers, bell peppers dipped in ¼ cup salsa, 2Tbsp light ranch dressing or 2Tbsp plain Greek yogurt   10 Goldfish crackers   ½ oz low-fat cheese or string cheese   1 closed handful of nuts, unsalted   1 Tbsp of sunflower seeds, unsalted   1 cup Smart Pop popcorn   1 whole grain brown rice cake        15 gm carb   1 small piece of fruit or ½ banana or 1/2 cup lite canned fruit   3 niko cracker squares   3 cups Smart Pop popcorn, top spray butter, Sotelo lite salt or cinnamon and Truvia   5 Vanilla Wafers   ½ cup low fat, no added sugar ice cream or frozen yogurt (Blue bell, Blue Bunny, Weight Watchers, Skinny Cow)   ½ turkey, ham, or chicken sandwich   ½ c fruit with ½ c Cottage cheese   4-6 unsalted wheat crackers with 1 oz low fat cheese or 1 tbsp peanut butter    30-45 goldfish crackers (depending on flavor)    7-8 Episcopal mini brown rice cakes (caramel, apple cinnamon, chocolate)    12 Episcopal mini brown rice cakes (cheddar, bbq, ranch)    1/3 cup hummus dip with raw veg   1/2 whole wheat sean, 1Tbsp hummus   Mini Pizza (1/2 whole wheat English muffin, low-fat  cheese, tomato sauce)   100 calorie snack pack (Oreo, Chips Ahoy, Ritz Mix, Baked Cheetos)   4-6 oz. light or Greek Style yogurt  (Jesús, Franci, Leonid, [facility])   ½ cup sugar-free pudding     6 in. wheat tortilla or sean oven toasted chips (topped with spray butter flavoring, cinnamon, Truvia OR spray butter, garlic powder, chili powder)    18 BBQ Popchips (available at Target, Whole Foods, Fresh Market)                   Managing Diabetes: The A1C Test       Healthy red blood cells have some glucose stuck to them. A high A1C means that unhealthy amounts of glucose are stuck to the cells.   What is the A1C test?  Using your meter helps you track your blood sugar every day. But your glucose meter tells you the value at the time of testing only. You also need to know if your treatment plan is keeping you healthy over time. The hemoglobin A1C (or glycated hemoglobin) test can help. This test measures your average blood sugar level over a few months. A higher A1C result means that you have a higher risk of developing complications.  The A1C test  The A1C is a blood test done by your healthcare provider. You will likely have an A1C test every 3 to 6 months.  Your blood glucose goal  A1C has been shown as a percentage. But it can also be shown as a number representing the estimated Average Glucose (eAG). Unlike the A1C percentage, eAG is a number similar to the numbers listed on your daily glucose monitor. Both A1C and eAG measure the amount of glucose stuck to a protein called hemoglobin in red blood cells. Your healthcare provider will help you figure out what your ideal A1C or eAG should be. Your target number will depend on your age, general health, and other factors. If your current number is too high, your treatment plan may need changes, such as different medicines.  Sample results  Most people aim for an A1c lower than 7%. Thats an eAG less than 154 mg/dL. Or, your healthcare provider may want you to aim for an A1C of 6%. Thats an eAG of 126 mg/dL.     Glucose  calculator  Visit http://professional.diabetes.org/diapro/glucose_calc for a chart that helps convert your A1C percentages into eAG numbers.   Date Last Reviewed: 6/1/2016 © 2000-2017 [employer]. [address] [place] [address], [place] [address]. All rights reserved. This information is not intended as a substitute for professional medical care. Always follow your healthcare professional's instructions.        Hypoglycemia (Low Blood Sugar)     Fast-acting sugar includes a cup of nonfat milk.     Too little sugar (glucose) in your blood is called hypoglycemia or low blood sugar. Low blood sugar usually means anything lower than 70 mg/dL. Talk with your healthcare provider about your target range and what level is too low for you. Diabetes itself doesnt cause low blood sugar. But some of the treatments for diabetes, such as pills or insulin, may raise your risk for it. Low blood sugar may cause you to pass out or have a seizure. So always treat low blood sugar right away, but don't overeat.  Special note: Always carry a source of fast-acting sugar and a snack in case of hypoglycemia.   What you may notice  If you have low blood sugar, you may have one or more of these symptoms:  · Shakiness or dizziness  · Cold, clammy skin or sweating  · Feelings of hunger  · Headache  · Nervousness  · A hard, fast heartbeat  · Weakness  · Confusion or irritability  · Blurred vision  · Having nightmares or waking up confused or sweating  · Numbness or tingling in the lips or tongue  What you should do  Here are tips to follow if you have hypoglycemia:   · First check your blood sugar. If it is too low (out of your target range), eat or drink 15 to 20 grams of fast-acting sugar. This may be 3 to 4 glucose tablets, 4 ounces (half a cup) of fruit juice or regular (nondiet) soda, 8 ounces (1 cup) of fat-free milk, or 1 tablespoon of honey. Dont take more than this, or your blood sugar may go too high.  · Wait 15 minutes. Then  recheck your blood sugar if you can.  · If your blood sugar is still too low, repeat the steps above and check your blood sugar again. If your blood sugar still has not returned to your target range, contact your healthcare provider or seek emergency care.  · Once your blood sugar returns to target range, eat a snack or meal.  Preventing low blood sugar  Things you can do include the following:   · If your condition needs a strict treatment plan, eat your meals and snacks at the same times each day. Dont skip meals!  · If your treatment plan lets you change when you eat and what you eat, learn how to change the time and dose of your rapid-acting insulin to match this.   · Ask your healthcare provider if it is safe for you to drink alcohol. Never drink on an empty stomach.  · Take your medicine at the prescribed times.  · Always carry a source of fast-acting sugar and a snack when youre away from home.  Other things to do  Additional tips include the following:  · Carry a medical ID card, a compact USB drive, or wear a medical alert bracelet or necklace. It should say that you have diabetes. It should also say what to do if you pass out or have a seizure.  · Make sure your family, friends, and coworkers know the signs of low blood sugar. Tell them what to do if your blood sugar falls very low and you cant treat yourself.  · Keep a glucagon emergency kit handy. Be sure your family, friends, and coworkers know how and when to use it. Check it regularly and replace the glucagon before it expires.  · Talk with your health care team about other things you can do to prevent low blood sugar.     If you have unexplained hypoglycemia or hypoglycemia several times, call your healthcare provider.   Date Last Reviewed: 5/1/2016  © 1492-2553 [employer]. [address] [place] [address], [place] [address]. All rights reserved. This information is not intended as a substitute for professional medical care. Always follow  your healthcare professional's instructions.

## 2020-08-22 DIAGNOSIS — E55.9 VITAMIN D DEFICIENCY: ICD-10-CM

## 2020-08-23 RX ORDER — ERGOCALCIFEROL 1.25 MG/1
CAPSULE ORAL
Qty: 12 CAPSULE | Refills: 3 | Status: SHIPPED | OUTPATIENT
Start: 2020-08-23 | End: 2021-07-19

## 2020-08-23 NOTE — PROGRESS NOTES
Refill Routing Note   Medication(s) are not appropriate for processing by [facility]:       - Non-participating provider           Medication reconciliation completed: No      Automatic Epic Generated Protocol Data:        Requested Prescriptions   Pending Prescriptions Disp Refills    ergocalciferol (ERGOCALCIFEROL) 50,000 unit Cap [Pharmacy Med Name: VITAMIN D2 50,000IU (ERGO) CAP RX] 12 capsule 3     Sig: TAKE ONE CAPSULE BY MOUTH EVERY 7 DAYS       There is no refill protocol information for this order           Appointments  past 12m or future 3m with PCP    Date Provider   Last Visit   6/2/2020 Debora Ontiveros MD   Next Visit   10/2/2020 Debora Ontiveros MD   ED visits in past 90 days: 0     Note composed:9:33 AM 08/23/2020

## 2020-08-24 ENCOUNTER — TELEPHONE (OUTPATIENT)
Dept: INTERNAL MEDICINE | Facility: CLINIC | Age: 65
End: 2020-08-24

## 2020-08-24 NOTE — TELEPHONE ENCOUNTER
----- Message from Estrellita Nicholson sent at 8/24/2020  3:32 PM CDT -----  Contact: maria isabel/erasmo/[phone]  Pt maria isabel called in regards to the pt vitamin d is not covered by his insurance and wanted to know what should he do.     Please advise

## 2020-08-31 NOTE — PROGRESS NOTES
[facility] routine remote shows    2,344 Short V-V intervals  54 Monitored VT  2 VT-NS  AF is 71.5%   The pt's biopsy is currently scheduled for 6/6/18.

## 2020-09-02 ENCOUNTER — LAB VISIT (OUTPATIENT)
Dept: LAB | Facility: HOSPITAL | Age: 65
End: 2020-09-02
Attending: INTERNAL MEDICINE
Payer: MEDICARE

## 2020-09-02 ENCOUNTER — ANTI-COAG VISIT (OUTPATIENT)
Dept: CARDIOLOGY | Facility: CLINIC | Age: 65
End: 2020-09-02
Payer: MEDICARE

## 2020-09-02 DIAGNOSIS — Z79.01 LONG TERM (CURRENT) USE OF ANTICOAGULANTS: ICD-10-CM

## 2020-09-02 DIAGNOSIS — I63.10 CEREBROVASCULAR ACCIDENT (CVA) DUE TO EMBOLISM OF PRECEREBRAL ARTERY: ICD-10-CM

## 2020-09-02 LAB
INR PPP: 2 (ref 0.8–1.2)
PROTHROMBIN TIME: 21.9 SEC (ref 9–12.5)

## 2020-09-02 PROCEDURE — 93793 ANTICOAG MGMT PT WARFARIN: CPT | Mod: S$GLB,,,

## 2020-09-02 PROCEDURE — 36415 COLL VENOUS BLD VENIPUNCTURE: CPT

## 2020-09-02 PROCEDURE — 85610 PROTHROMBIN TIME: CPT

## 2020-09-02 PROCEDURE — 93793 PR ANTICOAGULANT MGMT FOR PT TAKING WARFARIN: ICD-10-PCS | Mod: S$GLB,,,

## 2020-09-04 ENCOUNTER — PATIENT OUTREACH (OUTPATIENT)
Dept: ADMINISTRATIVE | Facility: OTHER | Age: 65
End: 2020-09-04

## 2020-09-04 DIAGNOSIS — Z12.11 COLON CANCER SCREENING: ICD-10-CM

## 2020-09-08 ENCOUNTER — OFFICE VISIT (OUTPATIENT)
Dept: OPHTHALMOLOGY | Facility: CLINIC | Age: 65
End: 2020-09-08
Payer: MEDICARE

## 2020-09-08 DIAGNOSIS — H43.12 VITREOUS HEMORRHAGE, LEFT: ICD-10-CM

## 2020-09-08 PROCEDURE — 99999 PR PBB SHADOW E&M-EST. PATIENT-LVL III: ICD-10-PCS | Mod: PBBFAC,,, | Performed by: OPHTHALMOLOGY

## 2020-09-08 PROCEDURE — 99499 NO LOS: ICD-10-PCS | Mod: S$GLB,,, | Performed by: OPHTHALMOLOGY

## 2020-09-08 PROCEDURE — 99999 PR PBB SHADOW E&M-EST. PATIENT-LVL III: CPT | Mod: PBBFAC,,, | Performed by: OPHTHALMOLOGY

## 2020-09-08 PROCEDURE — 99499 UNLISTED E&M SERVICE: CPT | Mod: S$GLB,,, | Performed by: OPHTHALMOLOGY

## 2020-09-08 PROCEDURE — 67228 PR TREATMENT EXTENSIVE RETINOPATHY, PHOTOCOAGULATION: ICD-10-PCS | Mod: LT,S$GLB,, | Performed by: OPHTHALMOLOGY

## 2020-09-08 PROCEDURE — 67228 TREATMENT X10SV RETINOPATHY: CPT | Mod: LT,S$GLB,, | Performed by: OPHTHALMOLOGY

## 2020-09-08 NOTE — PROGRESS NOTES
HPI     PRP OS complete today  DLS: 07/21/2020 Dr. Tapia    Patient states his vision has been stable since his last visit. Denies   flashes, floaters, diplopia, photophobia, glare, HA's, or pain.        OCT - NO ME OU    [facility] - peripheral NP and NV OU      A/P    1. PDR OU  T2 uncontrolled on insulin  No ME OU  Watch ST fibrosis    Plan PRP OU - OS complete today    2. VH OS   Minimal    3. PCIOL OU    4. HTN Ret OU  BS/BP/chol control    5. Prior CVA   On coumadin      2-3 weeks PRP OD 1/2      Risks, benefits, and alternatives to treatment discussed in detail with the patient.  The patient voiced understanding and wished to proceed with the procedure    Laser Procedure Note  Dx: PDR OS   Laser: PRP OS (1 of 2)  Argon  Spot: 200  Power: 120-150  Dur: 0.1  #:  1220 superior 180 degrees  Complications: None  F/U as above

## 2020-09-23 ENCOUNTER — ANTI-COAG VISIT (OUTPATIENT)
Dept: CARDIOLOGY | Facility: CLINIC | Age: 65
End: 2020-09-23
Payer: MEDICARE

## 2020-09-23 ENCOUNTER — LAB VISIT (OUTPATIENT)
Dept: LAB | Facility: HOSPITAL | Age: 65
End: 2020-09-23
Attending: INTERNAL MEDICINE
Payer: MEDICARE

## 2020-09-23 DIAGNOSIS — Z79.01 LONG TERM (CURRENT) USE OF ANTICOAGULANTS: ICD-10-CM

## 2020-09-23 DIAGNOSIS — I63.10 CEREBROVASCULAR ACCIDENT (CVA) DUE TO EMBOLISM OF PRECEREBRAL ARTERY: ICD-10-CM

## 2020-09-23 LAB
INR PPP: 2.1 (ref 0.8–1.2)
PROTHROMBIN TIME: 22.1 SEC (ref 9–12.5)

## 2020-09-23 PROCEDURE — 93793 ANTICOAG MGMT PT WARFARIN: CPT | Mod: S$GLB,,,

## 2020-09-23 PROCEDURE — 85610 PROTHROMBIN TIME: CPT

## 2020-09-23 PROCEDURE — 36415 COLL VENOUS BLD VENIPUNCTURE: CPT

## 2020-09-23 PROCEDURE — 93793 PR ANTICOAGULANT MGMT FOR PT TAKING WARFARIN: ICD-10-PCS | Mod: S$GLB,,,

## 2020-10-02 ENCOUNTER — IMMUNIZATION (OUTPATIENT)
Dept: INTERNAL MEDICINE | Facility: CLINIC | Age: 65
End: 2020-10-02
Payer: MEDICARE

## 2020-10-02 ENCOUNTER — TELEPHONE (OUTPATIENT)
Dept: INTERNAL MEDICINE | Facility: CLINIC | Age: 65
End: 2020-10-02

## 2020-10-02 ENCOUNTER — OFFICE VISIT (OUTPATIENT)
Dept: INTERNAL MEDICINE | Facility: CLINIC | Age: 65
End: 2020-10-02
Payer: MEDICARE

## 2020-10-02 VITALS
WEIGHT: 179.25 LBS | DIASTOLIC BLOOD PRESSURE: 84 MMHG | OXYGEN SATURATION: 98 % | HEIGHT: 73 IN | HEART RATE: 119 BPM | SYSTOLIC BLOOD PRESSURE: 124 MMHG | BODY MASS INDEX: 23.76 KG/M2

## 2020-10-02 DIAGNOSIS — N18.30 STAGE 3 CHRONIC KIDNEY DISEASE, UNSPECIFIED WHETHER STAGE 3A OR 3B CKD: Primary | ICD-10-CM

## 2020-10-02 DIAGNOSIS — E11.59 HYPERTENSION ASSOCIATED WITH DIABETES: ICD-10-CM

## 2020-10-02 DIAGNOSIS — E55.9 VITAMIN D DEFICIENCY: ICD-10-CM

## 2020-10-02 DIAGNOSIS — R09.89 DECREASED DORSALIS PEDIS PULSE: ICD-10-CM

## 2020-10-02 DIAGNOSIS — I15.2 HYPERTENSION ASSOCIATED WITH DIABETES: ICD-10-CM

## 2020-10-02 PROCEDURE — 1101F PR PT FALLS ASSESS DOC 0-1 FALLS W/OUT INJ PAST YR: ICD-10-PCS | Mod: CPTII,S$GLB,, | Performed by: INTERNAL MEDICINE

## 2020-10-02 PROCEDURE — 99499 UNLISTED E&M SERVICE: CPT | Mod: S$GLB,,, | Performed by: INTERNAL MEDICINE

## 2020-10-02 PROCEDURE — 3079F DIAST BP 80-89 MM HG: CPT | Mod: CPTII,S$GLB,, | Performed by: INTERNAL MEDICINE

## 2020-10-02 PROCEDURE — 1101F PT FALLS ASSESS-DOCD LE1/YR: CPT | Mod: CPTII,S$GLB,, | Performed by: INTERNAL MEDICINE

## 2020-10-02 PROCEDURE — 99999 PR PBB SHADOW E&M-EST. PATIENT-LVL IV: CPT | Mod: PBBFAC,,, | Performed by: INTERNAL MEDICINE

## 2020-10-02 PROCEDURE — 99214 OFFICE O/P EST MOD 30 MIN: CPT | Mod: 25,S$GLB,, | Performed by: INTERNAL MEDICINE

## 2020-10-02 PROCEDURE — G0008 ADMIN INFLUENZA VIRUS VAC: HCPCS | Mod: S$GLB,,, | Performed by: INTERNAL MEDICINE

## 2020-10-02 PROCEDURE — 99214 PR OFFICE/OUTPT VISIT, EST, LEVL IV, 30-39 MIN: ICD-10-PCS | Mod: 25,S$GLB,, | Performed by: INTERNAL MEDICINE

## 2020-10-02 PROCEDURE — G0008 FLU VACCINE - QUADRIVALENT - ADJUVANTED: ICD-10-PCS | Mod: S$GLB,,, | Performed by: INTERNAL MEDICINE

## 2020-10-02 PROCEDURE — 90670 PCV13 VACCINE IM: CPT | Mod: S$GLB,,, | Performed by: INTERNAL MEDICINE

## 2020-10-02 PROCEDURE — 3051F PR MOST RECENT HEMOGLOBIN A1C LEVEL 7.0 - < 8.0%: ICD-10-PCS | Mod: CPTII,S$GLB,, | Performed by: INTERNAL MEDICINE

## 2020-10-02 PROCEDURE — 99499 RISK ADDL DX/OHS AUDIT: ICD-10-PCS | Mod: S$GLB,,, | Performed by: INTERNAL MEDICINE

## 2020-10-02 PROCEDURE — G0009 ADMIN PNEUMOCOCCAL VACCINE: HCPCS | Mod: S$GLB,,, | Performed by: INTERNAL MEDICINE

## 2020-10-02 PROCEDURE — 3079F PR MOST RECENT DIASTOLIC BLOOD PRESSURE 80-89 MM HG: ICD-10-PCS | Mod: CPTII,S$GLB,, | Performed by: INTERNAL MEDICINE

## 2020-10-02 PROCEDURE — 3074F PR MOST RECENT SYSTOLIC BLOOD PRESSURE < 130 MM HG: ICD-10-PCS | Mod: CPTII,S$GLB,, | Performed by: INTERNAL MEDICINE

## 2020-10-02 PROCEDURE — 3051F HG A1C>EQUAL 7.0%<8.0%: CPT | Mod: CPTII,S$GLB,, | Performed by: INTERNAL MEDICINE

## 2020-10-02 PROCEDURE — G0009 PNEUMOCOCCAL CONJUGATE VACCINE 13-VALENT LESS THAN 5YO & GREATER THAN: ICD-10-PCS | Mod: S$GLB,,, | Performed by: INTERNAL MEDICINE

## 2020-10-02 PROCEDURE — 3008F BODY MASS INDEX DOCD: CPT | Mod: CPTII,S$GLB,, | Performed by: INTERNAL MEDICINE

## 2020-10-02 PROCEDURE — 3074F SYST BP LT 130 MM HG: CPT | Mod: CPTII,S$GLB,, | Performed by: INTERNAL MEDICINE

## 2020-10-02 PROCEDURE — 99999 PR PBB SHADOW E&M-EST. PATIENT-LVL IV: ICD-10-PCS | Mod: PBBFAC,,, | Performed by: INTERNAL MEDICINE

## 2020-10-02 PROCEDURE — 3008F PR BODY MASS INDEX (BMI) DOCUMENTED: ICD-10-PCS | Mod: CPTII,S$GLB,, | Performed by: INTERNAL MEDICINE

## 2020-10-02 PROCEDURE — 90694 VACC AIIV4 NO PRSRV 0.5ML IM: CPT | Mod: S$GLB,,, | Performed by: INTERNAL MEDICINE

## 2020-10-02 PROCEDURE — 90694 FLU VACCINE - QUADRIVALENT - ADJUVANTED: ICD-10-PCS | Mod: S$GLB,,, | Performed by: INTERNAL MEDICINE

## 2020-10-02 PROCEDURE — 90670 PNEUMOCOCCAL CONJUGATE VACCINE 13-VALENT LESS THAN 5YO & GREATER THAN: ICD-10-PCS | Mod: S$GLB,,, | Performed by: INTERNAL MEDICINE

## 2020-10-02 RX ORDER — CARVEDILOL 3.12 MG/1
3.12 TABLET ORAL 2 TIMES DAILY WITH MEALS
Qty: 180 TABLET | Refills: 3 | Status: SHIPPED | OUTPATIENT
Start: 2020-10-02 | End: 2021-03-11

## 2020-10-02 RX ORDER — AMLODIPINE BESYLATE 10 MG/1
10 TABLET ORAL DAILY
Qty: 90 TABLET | Refills: 3 | Status: SHIPPED | OUTPATIENT
Start: 2020-10-02 | End: 2021-05-03 | Stop reason: SDUPTHER

## 2020-10-02 RX ORDER — LOSARTAN POTASSIUM 50 MG/1
50 TABLET ORAL DAILY
Qty: 90 TABLET | Refills: 3 | Status: SHIPPED | OUTPATIENT
Start: 2020-10-02 | End: 2021-05-03 | Stop reason: SDUPTHER

## 2020-10-02 RX ORDER — CLONIDINE HYDROCHLORIDE 0.1 MG/1
0.1 TABLET ORAL 3 TIMES DAILY
Qty: 270 TABLET | Refills: 3 | Status: SHIPPED | OUTPATIENT
Start: 2020-10-02 | End: 2021-05-03 | Stop reason: SDUPTHER

## 2020-10-02 NOTE — PROGRESS NOTES
"Subjective:       Patient ID: Kamlesh Diane is a 65 y.o. male.    Chief Complaint: Follow-up    HPI   DM2 X 25+ yrs. Levemir 41 u qhs, novolog 6-8-8. Sleeps later in the day. May only eat 2 meals a day instead of the 3 so may only take the novolog 6 and 8 units and stay on the levemir.   Follows w/ Soledad Mckeon, FNP LOV 8/19/20  a1c - 8/2020 7.4  Foot - Dr. Fredy Heaton 7/9/20  Eye - saw Dr. Tapia 9/8/20  MAC - neg 6/3/20  LDL - 86.4 6/2/20    HTN - amlo 10, clonidine 0.1mg TID, coreg 3.125mg 2x daily, losartan 50mg daily.  Has appt w/ kidney doctor on Monday - Dr. Chahal - [facility].    Needs CBC to be rechecked.  No blood in stool or urine.     Prostate CA. Surveillance w/ PSA q 6 mo.  Last saw Dr. Rodríguez 7/2/20. F/u in 6 mo.    Review of Systems  Comprehensive review of systems otherwise negative. See history/subjective section for more details.    Objective:      Physical Exam    /84 (BP Location: Right arm, Patient Position: Sitting, BP Method: Medium (Manual))   Pulse (!) 119   Ht 6' 1" (1.854 m)   Wt 81.3 kg (179 lb 3.7 oz)   SpO2 98%   BMI 23.65 kg/m²     Gen - A+OX4, NAD  HEENT - PERRL, OP clear. Slurred speech and broca's aphasia.   MMM.   Neck - no LAD  CV - RRR, no m/r  Chest - CTAB, no wheezing/rhonchi  Abd - S/NT/ND/+BS  Ext - 2+ B radial and PT pulses.  MSK - No spinal or paraspinal tenderness to palpation. Walks w/ rollator. No hip tenderness to palpation. RUE and RLE weakness.    Previous labs reviewed.     Assessment/Plan     Kamlesh was seen today for follow-up.    Diagnoses and all orders for this visit:    Stage 3 chronic kidney disease, unspecified whether stage 3a or 3b CKD  Will get labs for pt prior to him going to [facility] for nephrology on Monday.  -     CBC auto differential; Future  -     Vitamin D; Future  -     PTH, intact; Future  -     Comprehensive metabolic panel; Future  -     Protein / creatinine ratio, urine; Future    Vitamin D deficiency "   -     Vitamin D; Future    Hypertension associated with diabetes - Stable and controlled. Continue current medications.  -     carvediloL (COREG) 3.125 MG tablet; Take 1 tablet (3.125 mg total) by mouth 2 (two) times daily with meals.  -     amLODIPine (NORVASC) 10 MG tablet; Take 1 tablet (10 mg total) by mouth once daily.  -     losartan (COZAAR) 50 MG tablet; Take 1 tablet (50 mg total) by mouth once daily.  -     cloNIDine (CATAPRES) 0.1 MG tablet; Take 1 tablet (0.1 mg total) by mouth 3 (three) times daily.    Decreased dorsalis pedis pulse  -     US Lower Extrem Arteries Bilat with CHELA (xpd); Future    Other orders  -     (In Office Administered) Pneumococcal Conjugate Vaccine (13 Valent) (IM)    Flu vaccine today.    Follow up in about 4 months (around 2/2/2021).      Debora Ontiveros MD  Department of [facility] Medicine [facility] [address]  9:59 AM

## 2020-10-02 NOTE — TELEPHONE ENCOUNTER
----- Message from Betsey Denis sent at 10/2/2020 12:13 PM CDT -----  Pt would like AWV labs linked to lab visit 1/2/21

## 2020-10-05 ENCOUNTER — PES CALL (OUTPATIENT)
Dept: ADMINISTRATIVE | Facility: CLINIC | Age: 65
End: 2020-10-05

## 2020-10-05 ENCOUNTER — HOSPITAL ENCOUNTER (OUTPATIENT)
Dept: RADIOLOGY | Facility: HOSPITAL | Age: 65
Discharge: HOME | End: 2020-10-05
Attending: INTERNAL MEDICINE
Payer: MEDICARE

## 2020-10-05 ENCOUNTER — PATIENT MESSAGE (OUTPATIENT)
Dept: ADMINISTRATIVE | Facility: HOSPITAL | Age: 65
End: 2020-10-05

## 2020-10-05 DIAGNOSIS — R09.89 DECREASED DORSALIS PEDIS PULSE: ICD-10-CM

## 2020-10-05 PROCEDURE — 93922 US ARTERIAL LOWER EXTREMITY BILAT WITH ABI (XPD): ICD-10-PCS | Mod: 26,,, | Performed by: RADIOLOGY

## 2020-10-05 PROCEDURE — 93922 UPR/L XTREMITY ART 2 LEVELS: CPT | Mod: TC

## 2020-10-05 PROCEDURE — 93925 US ARTERIAL LOWER EXTREMITY BILAT WITH ABI (XPD): ICD-10-PCS | Mod: 26,,, | Performed by: RADIOLOGY

## 2020-10-05 PROCEDURE — 93925 LOWER EXTREMITY STUDY: CPT | Mod: 26,,, | Performed by: RADIOLOGY

## 2020-10-05 PROCEDURE — 93922 UPR/L XTREMITY ART 2 LEVELS: CPT | Mod: 26,,, | Performed by: RADIOLOGY

## 2020-10-09 ENCOUNTER — PES CALL (OUTPATIENT)
Dept: ADMINISTRATIVE | Facility: CLINIC | Age: 65
End: 2020-10-09

## 2020-10-12 ENCOUNTER — PATIENT OUTREACH (OUTPATIENT)
Dept: ADMINISTRATIVE | Facility: OTHER | Age: 65
End: 2020-10-12

## 2020-10-12 NOTE — PROGRESS NOTES
LINKS immunization registry updated  Care Everywhere updated  Health Maintenance updated  Chart reviewed for overdue Proactive [facility] Encounters (VENANCIO) health maintenance testing (CRS, Breast Ca, Diabetic Eye Exam)   Orders entered:N/A

## 2020-10-13 ENCOUNTER — OFFICE VISIT (OUTPATIENT)
Dept: PODIATRY | Facility: CLINIC | Age: 65
End: 2020-10-13
Payer: MEDICARE

## 2020-10-13 VITALS
BODY MASS INDEX: 23.76 KG/M2 | HEART RATE: 91 BPM | DIASTOLIC BLOOD PRESSURE: 83 MMHG | WEIGHT: 179.25 LBS | HEIGHT: 73 IN | SYSTOLIC BLOOD PRESSURE: 131 MMHG

## 2020-10-13 DIAGNOSIS — B35.1 ONYCHOMYCOSIS DUE TO DERMATOPHYTE: ICD-10-CM

## 2020-10-13 DIAGNOSIS — E11.42 DIABETIC POLYNEUROPATHY ASSOCIATED WITH TYPE 2 DIABETES MELLITUS: Primary | ICD-10-CM

## 2020-10-13 DIAGNOSIS — L84 CORN OR CALLUS: ICD-10-CM

## 2020-10-13 PROCEDURE — 99999 PR PBB SHADOW E&M-EST. PATIENT-LVL IV: CPT | Mod: PBBFAC,,, | Performed by: PODIATRIST

## 2020-10-13 PROCEDURE — 11721 PR DEBRIDEMENT OF NAILS, 6 OR MORE: ICD-10-PCS | Mod: Q9,59,S$GLB, | Performed by: PODIATRIST

## 2020-10-13 PROCEDURE — 99499 UNLISTED E&M SERVICE: CPT | Mod: S$GLB,,, | Performed by: PODIATRIST

## 2020-10-13 PROCEDURE — 99499 NO LOS: ICD-10-PCS | Mod: S$GLB,,, | Performed by: PODIATRIST

## 2020-10-13 PROCEDURE — 99999 PR PBB SHADOW E&M-EST. PATIENT-LVL IV: ICD-10-PCS | Mod: PBBFAC,,, | Performed by: PODIATRIST

## 2020-10-13 PROCEDURE — 11721 DEBRIDE NAIL 6 OR MORE: CPT | Mod: Q9,59,S$GLB, | Performed by: PODIATRIST

## 2020-10-13 PROCEDURE — 11056 PARNG/CUTG B9 HYPRKR LES 2-4: CPT | Mod: Q9,S$GLB,, | Performed by: PODIATRIST

## 2020-10-13 PROCEDURE — 11056 PR TRIM BENIGN HYPERKERATOTIC SKIN LESION,2-4: ICD-10-PCS | Mod: Q9,S$GLB,, | Performed by: PODIATRIST

## 2020-10-19 ENCOUNTER — TELEPHONE (OUTPATIENT)
Dept: INTERNAL MEDICINE | Facility: CLINIC | Age: 65
End: 2020-10-19

## 2020-10-19 RX ORDER — WARFARIN SODIUM 5 MG/1
TABLET ORAL
Qty: 42 TABLET | Refills: 11 | Status: SHIPPED | OUTPATIENT
Start: 2020-10-19 | End: 2021-07-27

## 2020-10-19 NOTE — TELEPHONE ENCOUNTER
----- Message from Fannie Arce sent at 10/19/2020 11:32 AM CDT -----  Contact: Diane Nunez [phone]  Would like to receive medical advice.    Pharmacy name/number:  [id number] [place] [id number] AT [facility] [address]    Would they like a call back or a response via [facility]:  Call back     Additional information:  Calling regarding the pt medication warfarin (COUMADIN) 2.5 MG tablet. States the pharmacy has been sending request for a refill.

## 2020-10-19 NOTE — TELEPHONE ENCOUNTER
----- Message from Fannie Arce sent at 10/19/2020 11:32 AM CDT -----  Contact: Diane Nunez [phone]  Would like to receive medical advice.    Pharmacy name/number:  [id number] [place] [id number] AT [facility] [address]    Would they like a call back or a response via [facility]:  Call back     Additional information:  Calling regarding the pt medication warfarin (COUMADIN) 2.5 MG tablet. States the pharmacy has been sending request for a refill.

## 2020-10-20 ENCOUNTER — ANTI-COAG VISIT (OUTPATIENT)
Dept: CARDIOLOGY | Facility: CLINIC | Age: 65
End: 2020-10-20
Payer: MEDICARE

## 2020-10-20 ENCOUNTER — OFFICE VISIT (OUTPATIENT)
Dept: OPHTHALMOLOGY | Facility: CLINIC | Age: 65
End: 2020-10-20
Payer: MEDICARE

## 2020-10-20 ENCOUNTER — LAB VISIT (OUTPATIENT)
Dept: LAB | Facility: HOSPITAL | Age: 65
End: 2020-10-20
Payer: MEDICARE

## 2020-10-20 DIAGNOSIS — I63.10 CEREBROVASCULAR ACCIDENT (CVA) DUE TO EMBOLISM OF PRECEREBRAL ARTERY: ICD-10-CM

## 2020-10-20 DIAGNOSIS — Z79.01 LONG TERM (CURRENT) USE OF ANTICOAGULANTS: ICD-10-CM

## 2020-10-20 LAB
INR PPP: 1.3 (ref 0.8–1.2)
PROTHROMBIN TIME: 14.4 SEC (ref 9–12.5)

## 2020-10-20 PROCEDURE — 67228 PR TREATMENT EXTENSIVE RETINOPATHY, PHOTOCOAGULATION: ICD-10-PCS | Mod: RT,S$GLB,, | Performed by: OPHTHALMOLOGY

## 2020-10-20 PROCEDURE — 36415 COLL VENOUS BLD VENIPUNCTURE: CPT

## 2020-10-20 PROCEDURE — 99499 NO LOS: ICD-10-PCS | Mod: S$GLB,,, | Performed by: OPHTHALMOLOGY

## 2020-10-20 PROCEDURE — 67228 TREATMENT X10SV RETINOPATHY: CPT | Mod: RT,S$GLB,, | Performed by: OPHTHALMOLOGY

## 2020-10-20 PROCEDURE — 93793 PR ANTICOAGULANT MGMT FOR PT TAKING WARFARIN: ICD-10-PCS | Mod: S$GLB,,,

## 2020-10-20 PROCEDURE — 93793 ANTICOAG MGMT PT WARFARIN: CPT | Mod: S$GLB,,,

## 2020-10-20 PROCEDURE — 99499 UNLISTED E&M SERVICE: CPT | Mod: S$GLB,,, | Performed by: OPHTHALMOLOGY

## 2020-10-20 PROCEDURE — 99999 PR PBB SHADOW E&M-EST. PATIENT-LVL III: CPT | Mod: PBBFAC,,, | Performed by: OPHTHALMOLOGY

## 2020-10-20 PROCEDURE — 85610 PROTHROMBIN TIME: CPT

## 2020-10-20 PROCEDURE — 99999 PR PBB SHADOW E&M-EST. PATIENT-LVL III: ICD-10-PCS | Mod: PBBFAC,,, | Performed by: OPHTHALMOLOGY

## 2020-10-20 NOTE — PROGRESS NOTES
INR not at goal. Medications, chart, and patient findings reviewed. See calendar for adjustments to dose and follow up plan.  See calendar for missed doses.  Pt did not have 5mg coumadin filled.  Recommend pt takes 5mg 10/20 & resume maintenance dose.  Plan to re-assess in 1 week.  Pt's niece has been advised & indicated understanding.

## 2020-10-20 NOTE — PROGRESS NOTES
HPI     PRP OD today  DLS: 09/08/2020 Dr. Tapia    Patient states his vision has been stable since his last visit. Denies   flashes, floaters, diplopia, photophobia, glare, HA's, or pain.   Last edited by Analia Devine on 10/20/2020  3:28 PM. (History)        OCT - NO ME OU    [facility] [facility] - peripheral NP and NV OU      A/P    1. PDR OU  T2 uncontrolled on insulin  No ME OU  Watch ST fibrosis  PRP OS complete  Plan PRP OD 1/2 - OD complete next visit    2. VH OS   Minimal    3. PCIOL OU    4. HTN Ret OU  BS/BP/chol control    5. Prior CVA   On coumadin    2-3 weeks PRP OD 2/2      Risks, benefits, and alternatives to treatment discussed in detail with the patient.  The patient voiced understanding and wished to proceed with the procedure    Laser Procedure Note  Dx: PDR OD  Laser: PRP OD (1 of 2)  Argon  Spot: 200  Power: 120-150  Dur: 0.1  #:  934 inferior 180 degrees  Complications: None  F/U as above

## 2020-10-21 NOTE — PROGRESS NOTES
Subjective:      Patient ID: Kamlesh Diane is a 65 y.o. male.    Chief Complaint: Diabetic Foot Exam ( Dr Tanner Gaitan  10/02/20) and Nail Care    Kamlesh is a 65 y.o. male who presents to the clinic for evaluation and treatment of high risk feet. Kamlesh has a past medical history of CVA (cerebral vascular accident), Diabetes, Elevated PSA, Hypertension, Prostate cancer, and Stroke. The patient's chief complaint is long, thick toenails. This patient has documented high risk feet requiring routine maintenance secondary to peripheral neuropathy.    PCP: Tanner Gaitan MD    Date Last Seen by PCP: 07/08/2019 dr tanner gaitan    Current shoe gear:  Affected Foot: Slip-on shoes     Unaffected Foot: Slip-on shoes    Hemoglobin A1C   Date Value Ref Range Status   08/12/2020 7.4 (H) 4.0 - 5.6 % Final     Comment:     ADA Screening Guidelines:  5.7-6.4%  Consistent with prediabetes  >or=6.5%  Consistent with diabetes  High levels of fetal hemoglobin interfere with the HbA1C  assay. Heterozygous hemoglobin variants (HbS, HgC, etc)do  not significantly interfere with this assay.   However, presence of multiple variants may affect accuracy.     05/19/2020 7.4 (H) 4.0 - 5.6 % Final     Comment:     ADA Screening Guidelines:  5.7-6.4%  Consistent with prediabetes  >or=6.5%  Consistent with diabetes  High levels of fetal hemoglobin interfere with the HbA1C  assay. Heterozygous hemoglobin variants (HbS, HgC, etc)do  not significantly interfere with this assay.   However, presence of multiple variants may affect accuracy.     01/24/2020 8.0 (H) 4.0 - 5.6 % Final     Comment:     ADA Screening Guidelines:  5.7-6.4%  Consistent with prediabetes  >or=6.5%  Consistent with diabetes  High levels of fetal hemoglobin interfere with the HbA1C  assay. Heterozygous hemoglobin variants (HbS, HgC, etc)do  not significantly interfere with this assay.   However, presence of multiple variants may affect accuracy.         Review of Systems   Constitution:  Negative for chills, fever and malaise/fatigue.   HENT: Negative for hearing loss.    Cardiovascular: Positive for leg swelling. Negative for claudication.   Respiratory: Negative for shortness of breath.    Skin: Positive for color change, dry skin, nail changes and unusual hair distribution. Negative for flushing and rash.   Musculoskeletal: Negative for joint pain and myalgias.   Neurological: Positive for numbness, paresthesias and sensory change. Negative for loss of balance.   Psychiatric/Behavioral: Negative for altered mental status.           Objective:      Physical Exam  Vitals signs reviewed.   Constitutional:       Appearance: He is well-developed.   Cardiovascular:      Pulses:           Dorsalis pedis pulses are 0 on the right side and 0 on the left side.        Posterior tibial pulses are 1+ on the right side and 1+ on the left side.   Musculoskeletal:      Right ankle: He exhibits decreased range of motion and abnormal pulse. Achilles tendon exhibits normal Ramirez's test results.      Left ankle: He exhibits decreased range of motion and abnormal pulse. Achilles tendon exhibits normal Ramirez's test results.      Right foot: Decreased range of motion.      Left foot: Decreased range of motion.   Feet:      Right foot:      Protective Sensation: 5 sites tested. 2 sites sensed.      Left foot:      Protective Sensation: 5 sites tested. 2 sites sensed.   Skin:     General: Skin is dry.      Capillary Refill: Capillary refill takes more than 3 seconds.      Findings: No wound.      Comments: HKL's noted to b/L HIPJs       Neurological:      Mental Status: He is alert.      Comments: diminished sensation noted to b/L lower extremities   Psychiatric:         Behavior: Behavior normal. Behavior is cooperative.         Nails x10 are elongated by  5-6mm's, thickened by 3-4 mm's, dystrophic, and are dark in  coloration . Xerosis Bilaterally.           Assessment:       Encounter Diagnoses   Name Primary?     Diabetic polyneuropathy associated with type 2 diabetes mellitus Yes    Onychomycosis due to dermatophyte     Corn or callus          Plan:       Kamlesh was seen today for diabetic foot exam and nail care.    Diagnoses and all orders for this visit:    Diabetic polyneuropathy associated with type 2 diabetes mellitus    Onychomycosis due to dermatophyte    Corn or callus      I counseled the patient on his conditions, their implications and medical management.      - Shoe inspection. Diabetic Foot Education. Patient reminded of the importance of good nutrition and blood sugar control to help prevent podiatric complications of diabetes. Patient instructed on proper foot hygeine. We discussed wearing proper shoe gear, daily foot inspections, never walking without protective shoe gear, never putting sharp instruments to feet, routine podiatric nail visits every 2-3 months.    After cleansing with an alcohol prep pad, the about mentioned hyperkeratotic lesions were sharply debrided X 2 utilizing a #15 blade to a smooth base without incident. Pt tolerated the procedure well and reported comfort to the debarment sites. Pt will continue to use padding and moisture the callused areas.     - With patient's permission, nails were aggressively reduced and debrided x 10 to their soft tissue attachment mechanically and with electric , removing all offending nail and debris. Patient relates relief following the procedure. He will continue to monitor the areas daily, inspect his feet, wear protective shoe gear when ambulatory, moisturizer to maintain skin integrity and follow in this office in approximately 2-3 months, sooner p.r.n.

## 2020-10-30 ENCOUNTER — PATIENT MESSAGE (OUTPATIENT)
Dept: ADMINISTRATIVE | Facility: HOSPITAL | Age: 65
End: 2020-10-30

## 2020-11-02 ENCOUNTER — PES CALL (OUTPATIENT)
Dept: ADMINISTRATIVE | Facility: CLINIC | Age: 65
End: 2020-11-02

## 2020-11-09 ENCOUNTER — LAB VISIT (OUTPATIENT)
Dept: LAB | Facility: HOSPITAL | Age: 65
End: 2020-11-09
Attending: INTERNAL MEDICINE
Payer: MEDICARE

## 2020-11-09 ENCOUNTER — ANTI-COAG VISIT (OUTPATIENT)
Dept: CARDIOLOGY | Facility: CLINIC | Age: 65
End: 2020-11-09
Payer: MEDICARE

## 2020-11-09 DIAGNOSIS — Z79.01 LONG TERM (CURRENT) USE OF ANTICOAGULANTS: ICD-10-CM

## 2020-11-09 DIAGNOSIS — I63.10 CEREBROVASCULAR ACCIDENT (CVA) DUE TO EMBOLISM OF PRECEREBRAL ARTERY: ICD-10-CM

## 2020-11-09 LAB
INR PPP: 1.9 (ref 0.8–1.2)
PROTHROMBIN TIME: 20.3 SEC (ref 9–12.5)

## 2020-11-09 PROCEDURE — 93793 ANTICOAG MGMT PT WARFARIN: CPT | Mod: S$GLB,,,

## 2020-11-09 PROCEDURE — 85610 PROTHROMBIN TIME: CPT

## 2020-11-09 PROCEDURE — 36415 COLL VENOUS BLD VENIPUNCTURE: CPT

## 2020-11-09 PROCEDURE — 93793 PR ANTICOAGULANT MGMT FOR PT TAKING WARFARIN: ICD-10-PCS | Mod: S$GLB,,,

## 2020-11-10 ENCOUNTER — PES CALL (OUTPATIENT)
Dept: ADMINISTRATIVE | Facility: CLINIC | Age: 65
End: 2020-11-10

## 2020-11-20 ENCOUNTER — PATIENT OUTREACH (OUTPATIENT)
Dept: ADMINISTRATIVE | Facility: OTHER | Age: 65
End: 2020-11-20

## 2020-11-20 ENCOUNTER — OFFICE VISIT (OUTPATIENT)
Dept: INTERNAL MEDICINE | Facility: CLINIC | Age: 65
End: 2020-11-20
Payer: MEDICARE

## 2020-11-20 VITALS
HEART RATE: 111 BPM | WEIGHT: 189.81 LBS | OXYGEN SATURATION: 98 % | SYSTOLIC BLOOD PRESSURE: 126 MMHG | BODY MASS INDEX: 25.16 KG/M2 | DIASTOLIC BLOOD PRESSURE: 88 MMHG | HEIGHT: 73 IN

## 2020-11-20 DIAGNOSIS — I10 ESSENTIAL HYPERTENSION: ICD-10-CM

## 2020-11-20 DIAGNOSIS — N18.32 STAGE 3B CHRONIC KIDNEY DISEASE: ICD-10-CM

## 2020-11-20 DIAGNOSIS — M89.9 BONE DISORDER: ICD-10-CM

## 2020-11-20 DIAGNOSIS — I73.9 PAD (PERIPHERAL ARTERY DISEASE): ICD-10-CM

## 2020-11-20 DIAGNOSIS — C61 CANCER OF PROSTATE WITH LOW RECURRENCE RISK (STAGE T1-2A AND GLEASON < 7 AND PSA < 10): ICD-10-CM

## 2020-11-20 DIAGNOSIS — Z86.73 H/O ISCHEMIC RIGHT MCA STROKE: ICD-10-CM

## 2020-11-20 PROBLEM — N18.30 CKD (CHRONIC KIDNEY DISEASE) STAGE 3, GFR 30-59 ML/MIN: Status: RESOLVED | Noted: 2020-06-02 | Resolved: 2020-11-20

## 2020-11-20 PROCEDURE — 3074F PR MOST RECENT SYSTOLIC BLOOD PRESSURE < 130 MM HG: ICD-10-PCS | Mod: CPTII,S$GLB,, | Performed by: NURSE PRACTITIONER

## 2020-11-20 PROCEDURE — 3288F PR FALLS RISK ASSESSMENT DOCUMENTED: ICD-10-PCS | Mod: CPTII,S$GLB,, | Performed by: NURSE PRACTITIONER

## 2020-11-20 PROCEDURE — 3288F FALL RISK ASSESSMENT DOCD: CPT | Mod: CPTII,S$GLB,, | Performed by: NURSE PRACTITIONER

## 2020-11-20 PROCEDURE — 1101F PT FALLS ASSESS-DOCD LE1/YR: CPT | Mod: CPTII,S$GLB,, | Performed by: NURSE PRACTITIONER

## 2020-11-20 PROCEDURE — 99499 RISK ADDL DX/OHS AUDIT: ICD-10-PCS | Mod: S$GLB,,, | Performed by: NURSE PRACTITIONER

## 2020-11-20 PROCEDURE — 3079F DIAST BP 80-89 MM HG: CPT | Mod: CPTII,S$GLB,, | Performed by: NURSE PRACTITIONER

## 2020-11-20 PROCEDURE — 3074F SYST BP LT 130 MM HG: CPT | Mod: CPTII,S$GLB,, | Performed by: NURSE PRACTITIONER

## 2020-11-20 PROCEDURE — 1101F PR PT FALLS ASSESS DOC 0-1 FALLS W/OUT INJ PAST YR: ICD-10-PCS | Mod: CPTII,S$GLB,, | Performed by: NURSE PRACTITIONER

## 2020-11-20 PROCEDURE — 99999 PR PBB SHADOW E&M-EST. PATIENT-LVL IV: ICD-10-PCS | Mod: PBBFAC,,, | Performed by: NURSE PRACTITIONER

## 2020-11-20 PROCEDURE — 3051F PR MOST RECENT HEMOGLOBIN A1C LEVEL 7.0 - < 8.0%: ICD-10-PCS | Mod: CPTII,S$GLB,, | Performed by: NURSE PRACTITIONER

## 2020-11-20 PROCEDURE — 99214 OFFICE O/P EST MOD 30 MIN: CPT | Mod: S$GLB,,, | Performed by: NURSE PRACTITIONER

## 2020-11-20 PROCEDURE — 3008F BODY MASS INDEX DOCD: CPT | Mod: CPTII,S$GLB,, | Performed by: NURSE PRACTITIONER

## 2020-11-20 PROCEDURE — 3079F PR MOST RECENT DIASTOLIC BLOOD PRESSURE 80-89 MM HG: ICD-10-PCS | Mod: CPTII,S$GLB,, | Performed by: NURSE PRACTITIONER

## 2020-11-20 PROCEDURE — 3051F HG A1C>EQUAL 7.0%<8.0%: CPT | Mod: CPTII,S$GLB,, | Performed by: NURSE PRACTITIONER

## 2020-11-20 PROCEDURE — 99999 PR PBB SHADOW E&M-EST. PATIENT-LVL IV: CPT | Mod: PBBFAC,,, | Performed by: NURSE PRACTITIONER

## 2020-11-20 PROCEDURE — 1126F PR PAIN SEVERITY QUANTIFIED, NO PAIN PRESENT: ICD-10-PCS | Mod: S$GLB,,, | Performed by: NURSE PRACTITIONER

## 2020-11-20 PROCEDURE — 99214 PR OFFICE/OUTPT VISIT, EST, LEVL IV, 30-39 MIN: ICD-10-PCS | Mod: S$GLB,,, | Performed by: NURSE PRACTITIONER

## 2020-11-20 PROCEDURE — 1126F AMNT PAIN NOTED NONE PRSNT: CPT | Mod: S$GLB,,, | Performed by: NURSE PRACTITIONER

## 2020-11-20 PROCEDURE — 99499 UNLISTED E&M SERVICE: CPT | Mod: S$GLB,,, | Performed by: NURSE PRACTITIONER

## 2020-11-20 PROCEDURE — 3008F PR BODY MASS INDEX (BMI) DOCUMENTED: ICD-10-PCS | Mod: CPTII,S$GLB,, | Performed by: NURSE PRACTITIONER

## 2020-11-20 NOTE — PROGRESS NOTES
CHIEF COMPLAINT: Type 2 Diabetes     HPI: Mr. Kamlesh Diane is a 65 y.o. male who was diagnosed with Type 2 DM > 5 years.   Pt has h/o MCA stroke, HTN, PAD, low bone density-on fosamax, prostate ca, CKD3, CR 1.7.  Past Medical History:   Diagnosis Date    CVA (cerebral vascular accident)     Diabetes     Elevated PSA     Hypertension     Prostate cancer     Stroke      Pt arrived in office visit alone, uses walker.  Overdue for labs - scheduled 11/23/20  Accompanied by family member.   Last seen by me in 8/2020    DE ---- 1/2020     Dietary habits:  B: Eggs, sausage (2), pancakes sometimes  L: varies  D: varies, red beans  Sometimes burger amilcar, popeyes, cooked foods  No greens -allergy  Coleslaw -will eat     Lab Results   Component Value Date    HGBA1C 7.4 (H) 08/12/2020     Needs lab work.    PREVIOUS DIABETES MEDICATIONS TRIED  novolog   levemir     CURRENT DIABETIC MEDS: novolog 7-8 units ac , no scale, levemir 41 units at night  Using novolog  humalog will be covered in 2021   Misses breakfast novolog if not eating much or if sugar is low     Pt is monitoring blood glucose readings 2-3 times a day.  Needs >100 strips per month related to fluctuations with blood glucose reading, a1c trends, and activity level.    Highest 136  Lowest 87  H/o mild hypoglycemia    Diabetes Management Status    Statin: Taking  ACE/ARB: Taking    Screening or Prevention Patient's value Goal Complete/Controlled?   HgA1C Testing and Control   Lab Results   Component Value Date    HGBA1C 7.4 (H) 08/12/2020      Annually/Less than 8% No   Lipid profile : 06/02/2020 Annually Yes   LDL control Lab Results   Component Value Date    LDLCALC 86.4 06/02/2020    Annually/Less than 100 mg/dl  Yes   Nephropathy screening Lab Results   Component Value Date    LABMICR 10.0 06/03/2020     Lab Results   Component Value Date    PROTEINUA Negative 05/30/2018    Annually Yes   Blood pressure BP Readings from Last 1 Encounters:   11/20/20 126/88  "   Less than 140/90 No   Dilated retinal exam : 07/21/2020 Annually Yes   Foot exam   : 10/13/2020 Annually No       REVIEW OF SYSTEMS  General: +weakness, fatigue, +weight changes 2-3# loss from august 2020  Eyes: no double or blurred vision, eye pain, or redness  Cardiovascular: no chest pain, palpitations, edema, or murmurs.   Respiratory: no cough or dyspnea.   GI: no heartburn, nausea, or changes in bowel patterns; good appetite.   Skin: no rashes, dryness, itching, or reactions at insulin injection sites.  Neuro: + numbness, tingling, tremors, or vertigo. +hip pain (L)  Endocrine: no polyuria, polydipsia, polyphagia, heat or cold intolerance.     Vital Signs  /88 (BP Location: Left arm, Patient Position: Sitting, BP Method: Medium (Manual))   Pulse (!) 111   Ht 6' 1" (1.854 m)   Wt 86.1 kg (189 lb 13.1 oz)   SpO2 98%   BMI 25.04 kg/m²     Hemoglobin A1C   Date Value Ref Range Status   08/12/2020 7.4 (H) 4.0 - 5.6 % Final     Comment:     ADA Screening Guidelines:  5.7-6.4%  Consistent with prediabetes  >or=6.5%  Consistent with diabetes  High levels of fetal hemoglobin interfere with the HbA1C  assay. Heterozygous hemoglobin variants (HbS, HgC, etc)do  not significantly interfere with this assay.   However, presence of multiple variants may affect accuracy.     05/19/2020 7.4 (H) 4.0 - 5.6 % Final     Comment:     ADA Screening Guidelines:  5.7-6.4%  Consistent with prediabetes  >or=6.5%  Consistent with diabetes  High levels of fetal hemoglobin interfere with the HbA1C  assay. Heterozygous hemoglobin variants (HbS, HgC, etc)do  not significantly interfere with this assay.   However, presence of multiple variants may affect accuracy.     01/24/2020 8.0 (H) 4.0 - 5.6 % Final     Comment:     ADA Screening Guidelines:  5.7-6.4%  Consistent with prediabetes  >or=6.5%  Consistent with diabetes  High levels of fetal hemoglobin interfere with the HbA1C  assay. Heterozygous hemoglobin variants (HbS, HgC, " etc)do  not significantly interfere with this assay.   However, presence of multiple variants may affect accuracy.          Chemistry        Component Value Date/Time     10/02/2020 1212    K 4.5 10/02/2020 1212     10/02/2020 1212    CO2 24 10/02/2020 1212    BUN 17 10/02/2020 1212    CREATININE 2.0 (H) 10/02/2020 1212     (H) 10/02/2020 1212        Component Value Date/Time    CALCIUM 9.4 10/02/2020 1212    ALKPHOS 114 10/02/2020 1212    AST 18 10/02/2020 1212    ALT 23 10/02/2020 1212    BILITOT 0.5 10/02/2020 1212    ESTGFRAFRICA 39.3 (A) 10/02/2020 1212    EGFRNONAA 34.0 (A) 10/02/2020 1212           Lab Results   Component Value Date    TSH 1.629 01/24/2020      Lab Results   Component Value Date    CHOL 137 06/02/2020    CHOL 136 05/19/2020    CHOL 142 03/12/2019     Lab Results   Component Value Date    HDL 35 (L) 06/02/2020    HDL 30 (L) 05/19/2020    HDL 33 (L) 03/12/2019     Lab Results   Component Value Date    LDLCALC 86.4 06/02/2020    LDLCALC 86.2 05/19/2020    LDLCALC 93.8 03/12/2019     Lab Results   Component Value Date    TRIG 78 06/02/2020    TRIG 99 05/19/2020    TRIG 76 03/12/2019     Lab Results   Component Value Date    CHOLHDL 25.5 06/02/2020    CHOLHDL 22.1 05/19/2020    CHOLHDL 23.2 03/12/2019         PHYSICAL EXAMINATION  Constitutional: Appears well, no distress  Neck: Supple, trachea midline.   Respiratory: No wheezes, even and unlabored.  Cardiovascular: RRR; no edema.   Lymph: no lymphadenopathy palpated  Skin: warm and dry; no injection site reactions, no acanthosis nigracans observed.  Neuro:patient alert and cooperative, normal affect; mostly steady gait, uses walker.    Diabetes Foot Exam:   Deferred  7/9/2020    Assessment/Plan  1. 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 PAD (peripheral artery disease)     3. H/O  ischemic right MCA stroke     4. Essential hypertension     5. Cancer of prostate with low recurrence risk (stage T1-2a and Brent < 7 and PSA < 10)     6. Bone disorder     7. Stage 3b chronic kidney disease       1. F/u in 5 mos w/ Irielle  a1c on 11/23/20  a1c next time   a1c goal less than 7.5%  Continue regimen above  2. F/u with vascular prn   3. F/u with pcp, neuro prn  4. Continue med(s)  Controlled  5. F/u with urology   6. On fosamax   7. F/u with nephrology annually   egfr 30-45, cr 1.9-2     FOLLOW UP  Follow up in about 5 months (around 4/20/2021).

## 2020-11-20 NOTE — PATIENT INSTRUCTIONS
Snacks can be an important part of a balanced, healthy meal plan. They allow you to eat more frequently, feeling full and satisfied throughout the day. Also, they allow you to spread carbohydrates evenly, which may stabilize blood sugars.  Plus, snacks are enjoyable!     The amount of carbohydrate needed at snacks varies. Generally, about 15-30 grams of carbohydrate per snack is recommended.  Below you will find some tasty treats.       0-5 gm carb   Crystal Light   Vitamin Water Zero   Herbal tea, unsweetened   2 tsp peanut butter on celery   1./2 cup sugar-free jell-o   1 sugar-free popsicle   ¼ cup blueberries   8oz Blue Lissette unsweetened almond milk   5 baby carrots & celery sticks, cucumbers, bell peppers dipped in ¼ cup salsa, 2Tbsp light ranch dressing or 2Tbsp plain Greek yogurt   10 Goldfish crackers   ½ oz low-fat cheese or string cheese   1 closed handful of nuts, unsalted   1 Tbsp of sunflower seeds, unsalted   1 cup Smart Pop popcorn   1 whole grain brown rice cake        15 gm carb   1 small piece of fruit or ½ banana or 1/2 cup lite canned fruit   3 niko cracker squares   3 cups Smart Pop popcorn, top spray butter, Sotelo lite salt or cinnamon and Truvia   5 Vanilla Wafers   ½ cup low fat, no added sugar ice cream or frozen yogurt (Blue bell, Blue Bunny, Weight Watchers, Skinny Cow)   ½ turkey, ham, or chicken sandwich   ½ c fruit with ½ c Cottage cheese   4-6 unsalted wheat crackers with 1 oz low fat cheese or 1 tbsp peanut butter    30-45 goldfish crackers (depending on flavor)    7-8 Sabianist mini brown rice cakes (caramel, apple cinnamon, chocolate)    12 Sabianist mini brown rice cakes (cheddar, bbq, ranch)    1/3 cup hummus dip with raw veg   1/2 whole wheat sean, 1Tbsp hummus   Mini Pizza (1/2 whole wheat English muffin, low-fat  cheese, tomato sauce)   100 calorie snack pack (Oreo, Chips Ahoy, Ritz Mix, Baked Cheetos)   4-6 oz. light or Greek Style yogurt  ([place]i, Yoplait, [place], [facility])   ½ cup sugar-free pudding     6 in. wheat tortilla or sean oven toasted chips (topped with spray butter flavoring, cinnamon, Truvia OR spray butter, garlic powder, chili powder)    18 BBQ Popchips (available at [employer], Whole Foods, Fresh Market)                   Hypoglycemia (Low Blood Sugar)     Fast-acting sugar includes a cup of nonfat milk.     Too little sugar (glucose) in your blood is called hypoglycemia or low blood sugar. Low blood sugar usually means anything lower than 70 mg/dL. Talk with your healthcare provider about your target range and what level is too low for you. Diabetes itself doesnt cause low blood sugar. But some of the treatments for diabetes, such as pills or insulin, may raise your risk for it. Low blood sugar may cause you to pass out or have a seizure. So always treat low blood sugar right away, but don't overeat.  Special note: Always carry a source of fast-acting sugar and a snack in case of hypoglycemia.   What you may notice  If you have low blood sugar, you may have one or more of these symptoms:  · Shakiness or dizziness  · Cold, clammy skin or sweating  · Feelings of hunger  · Headache  · Nervousness  · A hard, fast heartbeat  · Weakness  · Confusion or irritability  · Blurred vision  · Having nightmares or waking up confused or sweating  · Numbness or tingling in the lips or tongue  What you should do  Here are tips to follow if you have hypoglycemia:   · First check your blood sugar. If it is too low (out of your target range), eat or drink 15 to 20 grams of fast-acting sugar. This may be 3 to 4 glucose tablets, 4 ounces (half a cup) of fruit juice or regular (nondiet) soda, 8 ounces (1 cup) of fat-free milk, or 1 tablespoon of honey. Dont take more than this, or your blood sugar may go too high.  · Wait 15 minutes. Then recheck your blood sugar if you can.  · If your blood sugar is still too low, repeat the steps above and check your  blood sugar again. If your blood sugar still has not returned to your target range, contact your healthcare provider or seek emergency care.  · Once your blood sugar returns to target range, eat a snack or meal.  Preventing low blood sugar  Things you can do include the following:   · If your condition needs a strict treatment plan, eat your meals and snacks at the same times each day. Dont skip meals!  · If your treatment plan lets you change when you eat and what you eat, learn how to change the time and dose of your rapid-acting insulin to match this.   · Ask your healthcare provider if it is safe for you to drink alcohol. Never drink on an empty stomach.  · Take your medicine at the prescribed times.  · Always carry a source of fast-acting sugar and a snack when youre away from home.  Other things to do  Additional tips include the following:  · Carry a medical ID card, a compact USB drive, or wear a medical alert bracelet or necklace. It should say that you have diabetes. It should also say what to do if you pass out or have a seizure.  · Make sure your family, friends, and coworkers know the signs of low blood sugar. Tell them what to do if your blood sugar falls very low and you cant treat yourself.  · Keep a glucagon emergency kit handy. Be sure your family, friends, and coworkers know how and when to use it. Check it regularly and replace the glucagon before it expires.  · Talk with your health care team about other things you can do to prevent low blood sugar.     If you have unexplained hypoglycemia or hypoglycemia several times, call your healthcare provider.   Date Last Reviewed: 5/1/2016  © 7498-8167 [employer]. [address] [place] [address], [place] [address]. All rights reserved. This information is not intended as a substitute for professional medical care. Always follow your healthcare professional's instructions.        Managing Diabetes: The A1C Test       Healthy red blood cells have  some glucose stuck to them. A high A1C means that unhealthy amounts of glucose are stuck to the cells.   What is the A1C test?  Using your meter helps you track your blood sugar every day. But your glucose meter tells you the value at the time of testing only. You also need to know if your treatment plan is keeping you healthy over time. The hemoglobin A1C (or glycated hemoglobin) test can help. This test measures your average blood sugar level over a few months. A higher A1C result means that you have a higher risk of developing complications.  The A1C test  The A1C is a blood test done by your healthcare provider. You will likely have an A1C test every 3 to 6 months.  Your blood glucose goal  A1C has been shown as a percentage. But it can also be shown as a number representing the estimated Average Glucose (eAG). Unlike the A1C percentage, eAG is a number similar to the numbers listed on your daily glucose monitor. Both A1C and eAG measure the amount of glucose stuck to a protein called hemoglobin in red blood cells. Your healthcare provider will help you figure out what your ideal A1C or eAG should be. Your target number will depend on your age, general health, and other factors. If your current number is too high, your treatment plan may need changes, such as different medicines.  Sample results  Most people aim for an A1c lower than 7%. Thats an eAG less than 154 mg/dL. Or, your healthcare provider may want you to aim for an A1C of 6%. Thats an eAG of 126 mg/dL.     Glucose calculator  Visit http://professional.diabetes.org/diapro/glucose_calc for a chart that helps convert your A1C percentages into eAG numbers.   Date Last Reviewed: 6/1/2016  © 8497-7770 [employer]. [address] [place] [address]. All rights reserved. This information is not intended as a substitute for professional medical care. Always follow your healthcare professional's instructions.        Diet for Chronic  Kidney Disease  Following a special diet when you have kidney disease can help you stay as healthy as possible. Your healthcare provider or dietitian should make a special diet plan just for you.    Eating right  Here are some good eating rules to follow:  · Protein. Eating protein is important for your body. But too much protein can put a strain on your kidneys. Eating less protein may slow the progression of chronic kidney disease. Foods high in protein include meat, fish, eggs, cheese, and other dairy products. A registered dietitian can help you plan a diet that has the right amount of protein for you.  · Sodium. Having too much salt in your diet can make your body hold onto (retain) water. Ask your provider or dietitian how much sodium per day you are allowed. This will help you avoid fluid buildup in your body (fluid retention). It can also help control high blood pressure. Learn to read food labels to know how much sodium is in one serving. Foods high in salt include processed meats, canned and boxed foods, sauces, salted chips and snacks, pickled foods, frozen dinners, and restaurant and fast food.  · Fluids. If you have advanced kidney disease, you will need to limit the water and fluids you drink. If you dont, then too much water will build up in your body. The exact amount of fluid you can drink depends on how well your kidneys are working. Ask your provider how much water you can safely drink each day.  · Potassium. In advanced kidney disease, your potassium level can go dangerously high. This affects your heart. It can cause an irregular heartbeat (arrhythmia). Ask your provider or dietitian if you should limit potassium in your diet. Foods high in potassium include dairy products (milk, yogurt, cheese), dried beans, bananas, oranges, potatoes, tomatoes, spinach, cantaloupe, honeydew melon, dried fruits, and nuts.   · Calcium. Calcium is important to build strong bones. But foods high in calcium are  also high in phosphorus, which can take calcium from your bones. Limiting foods high in phosphorus will help keep calcium in your bones. Ask your provider how much calcium you should get each day.  · Phosphorus. In advanced kidney disease, your phosphorus level can go dangerously high. This affects many systems in the body and can damage your heart. Limit your intake of phosphorus-rich foods. These include dried beans and peas, nuts, peanut butter, cocoa, beer, cola drinks, and dairy products.  Date Last Reviewed: 8/1/2016  © 9798-6597 [employer]. [address] [place] [address]. All rights reserved. This information is not intended as a substitute for professional medical care. Always follow your healthcare professional's instructions.        Diabetes: Sick-Day Plan  Infections, the flu, and even a cold, can cause your blood sugar to rise. And, eating less, nausea, and vomiting may cause your blood glucose to fall (hypoglycemia). Ask your healthcare provider to help you develop a sick-day plan. The following information can help.    Donts  Don'ts include the following:  · Diabetes medicines. Dont stop taking your diabetes medicine.  · Other medicines. Dont take other medicines, such as those for colds or the flu, without checking with your healthcare provider.  Dos  Do's include the following:  · Eating. Stick to your meal plan. If you cant eat, try fruit juice, regular gelatin, or frozen juice bars as directed by your healthcare provider.  · Drinking. Drink at least 1 glass of liquid every hour. If youre eating, these liquids should be sugar-free.  · Blood glucose. Check your blood sugar as often as directed by your healthcare provider. You may need to check it more often than usual.  · Ketones. Check your blood or urine for ketones. Ketones are the waste from burning fat instead of glucose for energy. Ketones are a warning sign of ketoacidosis. Ketoacidosis is a medical emergency.  Ketoacidosis can happen to anyone with diabetes, but it's very rare in type 2 diabetes. It is usually only an issue if you have type 1 diabetes.  · Diabetes medicines.  ¨ Adjust your insulin according to your sick-day plan. Don't skip insulin. You need insulin even if you can't eat your normal meals.  ¨ If you take pills for diabetes (oral medicines), take your normal dose unless your healthcare provider tells you something different.  · Sugar-free medicines. Look for sugar-free cough drops and other medicines. Ask your healthcare provider if its OK for you to take these.  · Getting help. If you're alone, ask someone to check on you several times a day.  Try to get all these supplies together before you need them.  Call your healthcare provider  Call your healthcare provider if you have any of the following:  · You vomit or have diarrhea for more than 6 hours.  · Your blood glucose level is higher than usual or more than 250 mg/dL after you have taken extra insulin (if recommended in your sick-day plan).  · You take oral medicine for diabetes, and your blood sugar is higher than usual or over 250 mg/dL, before a meal and stays that high for more than 24 hours.  · Your blood glucose is lower than usual or less than 70 mg/dL  · You have moderate to large amounts of ketones in your blood or urine.  · You arent better after 2 days.  Date Last Reviewed: 6/1/2016  © 2432-8229 [employer]. [address] [place] [address], [place] [address]. All rights reserved. This information is not intended as a substitute for professional medical care. Always follow your healthcare professional's instructions.

## 2020-11-23 ENCOUNTER — OFFICE VISIT (OUTPATIENT)
Dept: OPHTHALMOLOGY | Facility: CLINIC | Age: 65
End: 2020-11-23
Payer: MEDICARE

## 2020-11-23 ENCOUNTER — LAB VISIT (OUTPATIENT)
Dept: LAB | Facility: HOSPITAL | Age: 65
End: 2020-11-23
Attending: INTERNAL MEDICINE
Payer: MEDICARE

## 2020-11-23 DIAGNOSIS — I63.10 CEREBROVASCULAR ACCIDENT (CVA) DUE TO EMBOLISM OF PRECEREBRAL ARTERY: ICD-10-CM

## 2020-11-23 DIAGNOSIS — H43.12 VITREOUS HEMORRHAGE, LEFT: ICD-10-CM

## 2020-11-23 DIAGNOSIS — Z79.01 LONG TERM (CURRENT) USE OF ANTICOAGULANTS: ICD-10-CM

## 2020-11-23 DIAGNOSIS — Z79.4 TYPE 2 DIABETES MELLITUS WITH COMPLICATION, WITH LONG-TERM CURRENT USE OF INSULIN: ICD-10-CM

## 2020-11-23 DIAGNOSIS — E11.8 TYPE 2 DIABETES MELLITUS WITH COMPLICATION, WITH LONG-TERM CURRENT USE OF INSULIN: ICD-10-CM

## 2020-11-23 LAB
ESTIMATED AVG GLUCOSE: 157 MG/DL (ref 68–131)
HBA1C MFR BLD HPLC: 7.1 % (ref 4–5.6)
INR PPP: 1.9 (ref 0.8–1.2)
PROTHROMBIN TIME: 20.4 SEC (ref 9–12.5)

## 2020-11-23 PROCEDURE — 67228 PR TREATMENT EXTENSIVE RETINOPATHY, PHOTOCOAGULATION: ICD-10-PCS | Mod: RT,S$GLB,, | Performed by: OPHTHALMOLOGY

## 2020-11-23 PROCEDURE — 85610 PROTHROMBIN TIME: CPT

## 2020-11-23 PROCEDURE — 99999 PR PBB SHADOW E&M-EST. PATIENT-LVL III: CPT | Mod: PBBFAC,,, | Performed by: OPHTHALMOLOGY

## 2020-11-23 PROCEDURE — 36415 COLL VENOUS BLD VENIPUNCTURE: CPT

## 2020-11-23 PROCEDURE — 83036 HEMOGLOBIN GLYCOSYLATED A1C: CPT

## 2020-11-23 PROCEDURE — 99499 NO LOS: ICD-10-PCS | Mod: S$GLB,,, | Performed by: OPHTHALMOLOGY

## 2020-11-23 PROCEDURE — 99999 PR PBB SHADOW E&M-EST. PATIENT-LVL III: ICD-10-PCS | Mod: PBBFAC,,, | Performed by: OPHTHALMOLOGY

## 2020-11-23 PROCEDURE — 99499 UNLISTED E&M SERVICE: CPT | Mod: S$GLB,,, | Performed by: OPHTHALMOLOGY

## 2020-11-23 PROCEDURE — 67228 TREATMENT X10SV RETINOPATHY: CPT | Mod: RT,S$GLB,, | Performed by: OPHTHALMOLOGY

## 2020-11-23 NOTE — PROGRESS NOTES
HPI     Pt is here today for PRP OD.    Eye Meds:  Restasis BID OU    Last edited by Isiah Day on 11/23/2020  2:58 PM. (History)          HPI     PRP OD today  DLS: 09/08/2020 Dr. Tapia    Patient states his vision has been stable since his last visit. Denies   flashes, floaters, diplopia, photophobia, glare, HA's, or pain.   Last edited by Analia Devine on 10/20/2020  3:28 PM. (History)        OCT - NO ME OU    [facility] - peripheral NP and NV OU      A/P    1. PDR OU  T2 uncontrolled on insulin  No ME OU  Watch ST fibrosis  PRP OS complete  Plan PRP OD 2/2 - OD complete next visit    2. VH OS   Minimal    3. PCIOL OU    4. HTN Ret OU  BS/BP/chol control    5. Prior CVA   On coumadin      3 months OCT      Risks, benefits, and alternatives to treatment discussed in detail with the patient.  The patient voiced understanding and wished to proceed with the procedure    Laser Procedure Note  Dx: PDR OD  Laser: PRP OD (2 of 2)  Argon  Spot: 200  Power:   Dur: 0.1  #:  1339 sup 180  Complications: None  F/U as above

## 2020-11-24 ENCOUNTER — ANTI-COAG VISIT (OUTPATIENT)
Dept: CARDIOLOGY | Facility: CLINIC | Age: 65
End: 2020-11-24
Payer: MEDICARE

## 2020-11-24 DIAGNOSIS — Z79.01 LONG TERM (CURRENT) USE OF ANTICOAGULANTS: ICD-10-CM

## 2020-11-24 DIAGNOSIS — I63.10 CEREBROVASCULAR ACCIDENT (CVA) DUE TO EMBOLISM OF PRECEREBRAL ARTERY: ICD-10-CM

## 2020-11-24 PROCEDURE — 93793 PR ANTICOAGULANT MGMT FOR PT TAKING WARFARIN: ICD-10-PCS | Mod: S$GLB,,,

## 2020-11-24 PROCEDURE — 93793 ANTICOAG MGMT PT WARFARIN: CPT | Mod: S$GLB,,,

## 2020-12-07 ENCOUNTER — ANTI-COAG VISIT (OUTPATIENT)
Dept: CARDIOLOGY | Facility: CLINIC | Age: 65
End: 2020-12-07
Payer: MEDICARE

## 2020-12-07 ENCOUNTER — LAB VISIT (OUTPATIENT)
Dept: LAB | Facility: HOSPITAL | Age: 65
End: 2020-12-07
Attending: INTERNAL MEDICINE
Payer: MEDICARE

## 2020-12-07 DIAGNOSIS — Z79.01 LONG TERM (CURRENT) USE OF ANTICOAGULANTS: ICD-10-CM

## 2020-12-07 DIAGNOSIS — I63.10 CEREBROVASCULAR ACCIDENT (CVA) DUE TO EMBOLISM OF PRECEREBRAL ARTERY: ICD-10-CM

## 2020-12-07 LAB
INR PPP: 2.6 (ref 0.8–1.2)
PROTHROMBIN TIME: 27.7 SEC (ref 9–12.5)

## 2020-12-07 PROCEDURE — 93793 PR ANTICOAGULANT MGMT FOR PT TAKING WARFARIN: ICD-10-PCS | Mod: S$GLB,,,

## 2020-12-07 PROCEDURE — 93793 ANTICOAG MGMT PT WARFARIN: CPT | Mod: S$GLB,,,

## 2020-12-07 PROCEDURE — 85610 PROTHROMBIN TIME: CPT

## 2020-12-07 PROCEDURE — 36415 COLL VENOUS BLD VENIPUNCTURE: CPT

## 2020-12-28 ENCOUNTER — LAB VISIT (OUTPATIENT)
Dept: LAB | Facility: HOSPITAL | Age: 65
End: 2020-12-28
Attending: INTERNAL MEDICINE
Payer: MEDICARE

## 2020-12-28 ENCOUNTER — ANTI-COAG VISIT (OUTPATIENT)
Dept: CARDIOLOGY | Facility: CLINIC | Age: 65
End: 2020-12-28
Payer: MEDICARE

## 2020-12-28 DIAGNOSIS — Z79.01 LONG TERM (CURRENT) USE OF ANTICOAGULANTS: ICD-10-CM

## 2020-12-28 DIAGNOSIS — I63.10 CEREBROVASCULAR ACCIDENT (CVA) DUE TO EMBOLISM OF PRECEREBRAL ARTERY: ICD-10-CM

## 2020-12-28 LAB
INR PPP: 2.1 (ref 0.8–1.2)
PROTHROMBIN TIME: 22.1 SEC (ref 9–12.5)

## 2020-12-28 PROCEDURE — 93793 PR ANTICOAGULANT MGMT FOR PT TAKING WARFARIN: ICD-10-PCS | Mod: S$GLB,,,

## 2020-12-28 PROCEDURE — 93793 ANTICOAG MGMT PT WARFARIN: CPT | Mod: S$GLB,,,

## 2020-12-28 PROCEDURE — 36415 COLL VENOUS BLD VENIPUNCTURE: CPT

## 2020-12-28 PROCEDURE — 85610 PROTHROMBIN TIME: CPT

## 2020-12-29 ENCOUNTER — PATIENT OUTREACH (OUTPATIENT)
Dept: ADMINISTRATIVE | Facility: OTHER | Age: 65
End: 2020-12-29

## 2020-12-30 NOTE — PROGRESS NOTES
Requested updates within Care Everywhere.  Patient's chart was reviewed for overdue VENANCIO topics.  Immunizations reconciled.

## 2021-01-04 ENCOUNTER — PATIENT MESSAGE (OUTPATIENT)
Dept: ADMINISTRATIVE | Facility: HOSPITAL | Age: 66
End: 2021-01-04

## 2021-01-05 ENCOUNTER — OFFICE VISIT (OUTPATIENT)
Dept: OPHTHALMOLOGY | Facility: CLINIC | Age: 66
End: 2021-01-05
Payer: MEDICARE

## 2021-01-05 DIAGNOSIS — H35.033 HYPERTENSIVE RETINOPATHY, BILATERAL: ICD-10-CM

## 2021-01-05 DIAGNOSIS — H43.12 VITREOUS HEMORRHAGE, LEFT: ICD-10-CM

## 2021-01-05 PROCEDURE — 1101F PR PT FALLS ASSESS DOC 0-1 FALLS W/OUT INJ PAST YR: ICD-10-PCS | Mod: CPTII,S$GLB,, | Performed by: OPHTHALMOLOGY

## 2021-01-05 PROCEDURE — 92014 COMPRE OPH EXAM EST PT 1/>: CPT | Mod: S$GLB,,, | Performed by: OPHTHALMOLOGY

## 2021-01-05 PROCEDURE — 1126F PR PAIN SEVERITY QUANTIFIED, NO PAIN PRESENT: ICD-10-PCS | Mod: S$GLB,,, | Performed by: OPHTHALMOLOGY

## 2021-01-05 PROCEDURE — 3288F FALL RISK ASSESSMENT DOCD: CPT | Mod: CPTII,S$GLB,, | Performed by: OPHTHALMOLOGY

## 2021-01-05 PROCEDURE — 2023F DILAT RTA XM W/O RTNOPTHY: CPT | Mod: S$GLB,,, | Performed by: OPHTHALMOLOGY

## 2021-01-05 PROCEDURE — 3288F PR FALLS RISK ASSESSMENT DOCUMENTED: ICD-10-PCS | Mod: CPTII,S$GLB,, | Performed by: OPHTHALMOLOGY

## 2021-01-05 PROCEDURE — 99999 PR PBB SHADOW E&M-EST. PATIENT-LVL III: CPT | Mod: PBBFAC,,, | Performed by: OPHTHALMOLOGY

## 2021-01-05 PROCEDURE — 92134 CPTRZ OPH DX IMG PST SGM RTA: CPT | Mod: S$GLB,,, | Performed by: OPHTHALMOLOGY

## 2021-01-05 PROCEDURE — 92201 PR OPHTHALMOSCOPY, EXT, W/RET DRAW/SCLERAL DEPR, I&R, UNI/BI: ICD-10-PCS | Mod: S$GLB,,, | Performed by: OPHTHALMOLOGY

## 2021-01-05 PROCEDURE — 92014 PR EYE EXAM, EST PATIENT,COMPREHESV: ICD-10-PCS | Mod: S$GLB,,, | Performed by: OPHTHALMOLOGY

## 2021-01-05 PROCEDURE — 92134 POSTERIOR SEGMENT OCT RETINA (OCULAR COHERENCE TOMOGRAPHY)-BOTH EYES: ICD-10-PCS | Mod: S$GLB,,, | Performed by: OPHTHALMOLOGY

## 2021-01-05 PROCEDURE — 99499 UNLISTED E&M SERVICE: CPT | Mod: S$GLB,,, | Performed by: OPHTHALMOLOGY

## 2021-01-05 PROCEDURE — 99499 RISK ADDL DX/OHS AUDIT: ICD-10-PCS | Mod: S$GLB,,, | Performed by: OPHTHALMOLOGY

## 2021-01-05 PROCEDURE — 2023F PR DILATED RETINAL EXAM W/O EVID OF RETINOPATHY: ICD-10-PCS | Mod: S$GLB,,, | Performed by: OPHTHALMOLOGY

## 2021-01-05 PROCEDURE — 92201 OPSCPY EXTND RTA DRAW UNI/BI: CPT | Mod: S$GLB,,, | Performed by: OPHTHALMOLOGY

## 2021-01-05 PROCEDURE — 99999 PR PBB SHADOW E&M-EST. PATIENT-LVL III: ICD-10-PCS | Mod: PBBFAC,,, | Performed by: OPHTHALMOLOGY

## 2021-01-05 PROCEDURE — 1126F AMNT PAIN NOTED NONE PRSNT: CPT | Mod: S$GLB,,, | Performed by: OPHTHALMOLOGY

## 2021-01-05 PROCEDURE — 1101F PT FALLS ASSESS-DOCD LE1/YR: CPT | Mod: CPTII,S$GLB,, | Performed by: OPHTHALMOLOGY

## 2021-01-25 ENCOUNTER — ANTI-COAG VISIT (OUTPATIENT)
Dept: CARDIOLOGY | Facility: CLINIC | Age: 66
End: 2021-01-25
Payer: MEDICARE

## 2021-01-25 ENCOUNTER — LAB VISIT (OUTPATIENT)
Dept: LAB | Facility: HOSPITAL | Age: 66
End: 2021-01-25
Attending: INTERNAL MEDICINE
Payer: MEDICARE

## 2021-01-25 DIAGNOSIS — I63.10 CEREBROVASCULAR ACCIDENT (CVA) DUE TO EMBOLISM OF PRECEREBRAL ARTERY: ICD-10-CM

## 2021-01-25 DIAGNOSIS — Z79.01 LONG TERM (CURRENT) USE OF ANTICOAGULANTS: Primary | ICD-10-CM

## 2021-01-25 DIAGNOSIS — Z79.01 LONG TERM (CURRENT) USE OF ANTICOAGULANTS: ICD-10-CM

## 2021-01-25 LAB
INR PPP: 2.3 (ref 0.8–1.2)
PROTHROMBIN TIME: 23.6 SEC (ref 9–12.5)

## 2021-01-25 PROCEDURE — 93793 ANTICOAG MGMT PT WARFARIN: CPT | Mod: S$GLB,,,

## 2021-01-25 PROCEDURE — 85610 PROTHROMBIN TIME: CPT

## 2021-01-25 PROCEDURE — 36415 COLL VENOUS BLD VENIPUNCTURE: CPT

## 2021-01-25 PROCEDURE — 93793 PR ANTICOAGULANT MGMT FOR PT TAKING WARFARIN: ICD-10-PCS | Mod: S$GLB,,,

## 2021-02-20 ENCOUNTER — IMMUNIZATION (OUTPATIENT)
Dept: PRIMARY CARE CLINIC | Facility: CLINIC | Age: 66
End: 2021-02-20
Payer: MEDICARE

## 2021-02-20 DIAGNOSIS — Z23 NEED FOR VACCINATION: Primary | ICD-10-CM

## 2021-02-20 PROCEDURE — 91300 PR SARS-COV- 2 COVID-19 VACCINE, NO PRSV, 30MCG/0.3ML, IM: ICD-10-PCS | Mod: S$GLB,,, | Performed by: INTERNAL MEDICINE

## 2021-02-20 PROCEDURE — 0001A PR IMMUNIZ ADMIN, SARS-COV-2 COVID-19 VACC, 30MCG/0.3ML, 1ST DOSE: CPT | Mod: CV19,S$GLB,, | Performed by: INTERNAL MEDICINE

## 2021-02-20 PROCEDURE — 0001A PR IMMUNIZ ADMIN, SARS-COV-2 COVID-19 VACC, 30MCG/0.3ML, 1ST DOSE: ICD-10-PCS | Mod: CV19,S$GLB,, | Performed by: INTERNAL MEDICINE

## 2021-02-20 PROCEDURE — 91300 PR SARS-COV- 2 COVID-19 VACCINE, NO PRSV, 30MCG/0.3ML, IM: CPT | Mod: S$GLB,,, | Performed by: INTERNAL MEDICINE

## 2021-02-20 RX ADMIN — Medication 0.3 ML: at 03:02

## 2021-02-22 ENCOUNTER — ANTI-COAG VISIT (OUTPATIENT)
Dept: CARDIOLOGY | Facility: CLINIC | Age: 66
End: 2021-02-22
Payer: MEDICARE

## 2021-02-22 ENCOUNTER — LAB VISIT (OUTPATIENT)
Dept: LAB | Facility: HOSPITAL | Age: 66
End: 2021-02-22
Attending: INTERNAL MEDICINE
Payer: MEDICARE

## 2021-02-22 DIAGNOSIS — I63.10 CEREBROVASCULAR ACCIDENT (CVA) DUE TO EMBOLISM OF PRECEREBRAL ARTERY: ICD-10-CM

## 2021-02-22 DIAGNOSIS — Z79.01 LONG TERM (CURRENT) USE OF ANTICOAGULANTS: Primary | ICD-10-CM

## 2021-02-22 DIAGNOSIS — Z79.01 LONG TERM (CURRENT) USE OF ANTICOAGULANTS: ICD-10-CM

## 2021-02-22 LAB
INR PPP: 2.2 (ref 0.8–1.2)
PROTHROMBIN TIME: 22.8 SEC (ref 9–12.5)

## 2021-02-22 PROCEDURE — 36415 COLL VENOUS BLD VENIPUNCTURE: CPT

## 2021-02-22 PROCEDURE — 85610 PROTHROMBIN TIME: CPT

## 2021-02-22 PROCEDURE — 93793 ANTICOAG MGMT PT WARFARIN: CPT | Mod: S$GLB,,,

## 2021-02-22 PROCEDURE — 93793 PR ANTICOAGULANT MGMT FOR PT TAKING WARFARIN: ICD-10-PCS | Mod: S$GLB,,,

## 2021-03-13 ENCOUNTER — IMMUNIZATION (OUTPATIENT)
Dept: PRIMARY CARE CLINIC | Facility: CLINIC | Age: 66
End: 2021-03-13
Payer: MEDICARE

## 2021-03-13 DIAGNOSIS — Z23 NEED FOR VACCINATION: Primary | ICD-10-CM

## 2021-03-13 PROCEDURE — 0002A PR IMMUNIZ ADMIN, SARS-COV-2 COVID-19 VACC, 30MCG/0.3ML, 2ND DOSE: ICD-10-PCS | Mod: CV19,S$GLB,, | Performed by: INTERNAL MEDICINE

## 2021-03-13 PROCEDURE — 91300 PR SARS-COV- 2 COVID-19 VACCINE, NO PRSV, 30MCG/0.3ML, IM: ICD-10-PCS | Mod: S$GLB,,, | Performed by: INTERNAL MEDICINE

## 2021-03-13 PROCEDURE — 91300 PR SARS-COV- 2 COVID-19 VACCINE, NO PRSV, 30MCG/0.3ML, IM: CPT | Mod: S$GLB,,, | Performed by: INTERNAL MEDICINE

## 2021-03-13 PROCEDURE — 0002A PR IMMUNIZ ADMIN, SARS-COV-2 COVID-19 VACC, 30MCG/0.3ML, 2ND DOSE: CPT | Mod: CV19,S$GLB,, | Performed by: INTERNAL MEDICINE

## 2021-03-13 RX ADMIN — Medication 0.3 ML: at 03:03

## 2021-03-22 ENCOUNTER — LAB VISIT (OUTPATIENT)
Dept: LAB | Facility: HOSPITAL | Age: 66
End: 2021-03-22
Attending: INTERNAL MEDICINE
Payer: MEDICARE

## 2021-03-22 ENCOUNTER — ANTI-COAG VISIT (OUTPATIENT)
Dept: CARDIOLOGY | Facility: CLINIC | Age: 66
End: 2021-03-22
Payer: MEDICARE

## 2021-03-22 DIAGNOSIS — Z79.01 LONG TERM (CURRENT) USE OF ANTICOAGULANTS: Primary | ICD-10-CM

## 2021-03-22 DIAGNOSIS — Z79.01 LONG TERM (CURRENT) USE OF ANTICOAGULANTS: ICD-10-CM

## 2021-03-22 DIAGNOSIS — I63.10 CEREBROVASCULAR ACCIDENT (CVA) DUE TO EMBOLISM OF PRECEREBRAL ARTERY: ICD-10-CM

## 2021-03-22 LAB
INR PPP: 2.4 (ref 0.8–1.2)
PROTHROMBIN TIME: 24.6 SEC (ref 9–12.5)

## 2021-03-22 PROCEDURE — 36415 COLL VENOUS BLD VENIPUNCTURE: CPT | Performed by: INTERNAL MEDICINE

## 2021-03-22 PROCEDURE — 93793 ANTICOAG MGMT PT WARFARIN: CPT | Mod: S$GLB,,,

## 2021-03-22 PROCEDURE — 93793 PR ANTICOAGULANT MGMT FOR PT TAKING WARFARIN: ICD-10-PCS | Mod: S$GLB,,,

## 2021-03-22 PROCEDURE — 85610 PROTHROMBIN TIME: CPT | Performed by: INTERNAL MEDICINE

## 2021-04-05 ENCOUNTER — PATIENT MESSAGE (OUTPATIENT)
Dept: ADMINISTRATIVE | Facility: HOSPITAL | Age: 66
End: 2021-04-05

## 2021-04-15 ENCOUNTER — LAB VISIT (OUTPATIENT)
Dept: LAB | Facility: HOSPITAL | Age: 66
End: 2021-04-15
Attending: NURSE PRACTITIONER
Payer: MEDICARE

## 2021-04-15 LAB
ESTIMATED AVG GLUCOSE: 163 MG/DL (ref 68–131)
HBA1C MFR BLD: 7.3 % (ref 4–5.6)

## 2021-04-15 PROCEDURE — 83036 HEMOGLOBIN GLYCOSYLATED A1C: CPT | Performed by: NURSE PRACTITIONER

## 2021-04-15 PROCEDURE — 36415 COLL VENOUS BLD VENIPUNCTURE: CPT | Performed by: NURSE PRACTITIONER

## 2021-04-18 PROBLEM — E11.3593 TYPE 2 DIABETES MELLITUS WITH BOTH EYES AFFECTED BY PROLIFERATIVE RETINOPATHY WITHOUT MACULAR EDEMA, WITH LONG-TERM CURRENT USE OF INSULIN: Status: ACTIVE | Noted: 2018-04-05

## 2021-04-20 ENCOUNTER — OFFICE VISIT (OUTPATIENT)
Dept: INTERNAL MEDICINE | Facility: CLINIC | Age: 66
End: 2021-04-20
Payer: MEDICARE

## 2021-04-20 ENCOUNTER — ANTI-COAG VISIT (OUTPATIENT)
Dept: CARDIOLOGY | Facility: CLINIC | Age: 66
End: 2021-04-20
Payer: MEDICARE

## 2021-04-20 ENCOUNTER — LAB VISIT (OUTPATIENT)
Dept: LAB | Facility: HOSPITAL | Age: 66
End: 2021-04-20
Attending: INTERNAL MEDICINE
Payer: MEDICARE

## 2021-04-20 VITALS
WEIGHT: 188.69 LBS | HEIGHT: 73 IN | SYSTOLIC BLOOD PRESSURE: 108 MMHG | DIASTOLIC BLOOD PRESSURE: 64 MMHG | OXYGEN SATURATION: 99 % | HEART RATE: 105 BPM | BODY MASS INDEX: 25.01 KG/M2

## 2021-04-20 DIAGNOSIS — Z79.4 TYPE 2 DIABETES MELLITUS WITH BOTH EYES AFFECTED BY PROLIFERATIVE RETINOPATHY WITHOUT MACULAR EDEMA, WITH LONG-TERM CURRENT USE OF INSULIN: Primary | ICD-10-CM

## 2021-04-20 DIAGNOSIS — E11.3593 TYPE 2 DIABETES MELLITUS WITH BOTH EYES AFFECTED BY PROLIFERATIVE RETINOPATHY WITHOUT MACULAR EDEMA, WITH LONG-TERM CURRENT USE OF INSULIN: Primary | ICD-10-CM

## 2021-04-20 DIAGNOSIS — C61 CANCER OF PROSTATE WITH LOW RECURRENCE RISK (STAGE T1-2A AND GLEASON < 7 AND PSA < 10): ICD-10-CM

## 2021-04-20 DIAGNOSIS — E11.8 CONTROLLED TYPE 2 DIABETES MELLITUS WITH COMPLICATION, WITH LONG-TERM CURRENT USE OF INSULIN: ICD-10-CM

## 2021-04-20 DIAGNOSIS — Z86.73 H/O ISCHEMIC RIGHT MCA STROKE: ICD-10-CM

## 2021-04-20 DIAGNOSIS — Z11.4 ENCOUNTER FOR SCREENING FOR HIV: ICD-10-CM

## 2021-04-20 DIAGNOSIS — I10 ESSENTIAL HYPERTENSION: ICD-10-CM

## 2021-04-20 DIAGNOSIS — Z79.4 CONTROLLED TYPE 2 DIABETES MELLITUS WITH COMPLICATION, WITH LONG-TERM CURRENT USE OF INSULIN: ICD-10-CM

## 2021-04-20 DIAGNOSIS — I63.10 CEREBROVASCULAR ACCIDENT (CVA) DUE TO EMBOLISM OF PRECEREBRAL ARTERY: ICD-10-CM

## 2021-04-20 DIAGNOSIS — Z79.01 LONG TERM (CURRENT) USE OF ANTICOAGULANTS: Primary | ICD-10-CM

## 2021-04-20 DIAGNOSIS — Z79.01 LONG TERM (CURRENT) USE OF ANTICOAGULANTS: ICD-10-CM

## 2021-04-20 DIAGNOSIS — I73.9 PAD (PERIPHERAL ARTERY DISEASE): ICD-10-CM

## 2021-04-20 DIAGNOSIS — H35.033 HYPERTENSIVE RETINOPATHY, BILATERAL: ICD-10-CM

## 2021-04-20 LAB
INR PPP: 2.3 (ref 0.8–1.2)
PROTHROMBIN TIME: 24 SEC (ref 9–12.5)

## 2021-04-20 PROCEDURE — 3008F BODY MASS INDEX DOCD: CPT | Mod: CPTII,S$GLB,, | Performed by: NURSE PRACTITIONER

## 2021-04-20 PROCEDURE — 3051F HG A1C>EQUAL 7.0%<8.0%: CPT | Mod: CPTII,S$GLB,, | Performed by: NURSE PRACTITIONER

## 2021-04-20 PROCEDURE — 85610 PROTHROMBIN TIME: CPT | Performed by: INTERNAL MEDICINE

## 2021-04-20 PROCEDURE — 1101F PT FALLS ASSESS-DOCD LE1/YR: CPT | Mod: CPTII,S$GLB,, | Performed by: NURSE PRACTITIONER

## 2021-04-20 PROCEDURE — 3288F FALL RISK ASSESSMENT DOCD: CPT | Mod: CPTII,S$GLB,, | Performed by: NURSE PRACTITIONER

## 2021-04-20 PROCEDURE — 99214 OFFICE O/P EST MOD 30 MIN: CPT | Mod: S$GLB,,, | Performed by: NURSE PRACTITIONER

## 2021-04-20 PROCEDURE — 1126F PR PAIN SEVERITY QUANTIFIED, NO PAIN PRESENT: ICD-10-PCS | Mod: S$GLB,,, | Performed by: NURSE PRACTITIONER

## 2021-04-20 PROCEDURE — 99999 PR PBB SHADOW E&M-EST. PATIENT-LVL V: CPT | Mod: PBBFAC,,, | Performed by: NURSE PRACTITIONER

## 2021-04-20 PROCEDURE — 3288F PR FALLS RISK ASSESSMENT DOCUMENTED: ICD-10-PCS | Mod: CPTII,S$GLB,, | Performed by: NURSE PRACTITIONER

## 2021-04-20 PROCEDURE — 36415 COLL VENOUS BLD VENIPUNCTURE: CPT | Performed by: INTERNAL MEDICINE

## 2021-04-20 PROCEDURE — 99499 UNLISTED E&M SERVICE: CPT | Mod: S$GLB,,, | Performed by: NURSE PRACTITIONER

## 2021-04-20 PROCEDURE — 1126F AMNT PAIN NOTED NONE PRSNT: CPT | Mod: S$GLB,,, | Performed by: NURSE PRACTITIONER

## 2021-04-20 PROCEDURE — 1101F PR PT FALLS ASSESS DOC 0-1 FALLS W/OUT INJ PAST YR: ICD-10-PCS | Mod: CPTII,S$GLB,, | Performed by: NURSE PRACTITIONER

## 2021-04-20 PROCEDURE — 99214 PR OFFICE/OUTPT VISIT, EST, LEVL IV, 30-39 MIN: ICD-10-PCS | Mod: S$GLB,,, | Performed by: NURSE PRACTITIONER

## 2021-04-20 PROCEDURE — 3008F PR BODY MASS INDEX (BMI) DOCUMENTED: ICD-10-PCS | Mod: CPTII,S$GLB,, | Performed by: NURSE PRACTITIONER

## 2021-04-20 PROCEDURE — 3051F PR MOST RECENT HEMOGLOBIN A1C LEVEL 7.0 - < 8.0%: ICD-10-PCS | Mod: CPTII,S$GLB,, | Performed by: NURSE PRACTITIONER

## 2021-04-20 PROCEDURE — 99999 PR PBB SHADOW E&M-EST. PATIENT-LVL V: ICD-10-PCS | Mod: PBBFAC,,, | Performed by: NURSE PRACTITIONER

## 2021-04-20 PROCEDURE — 99499 RISK ADDL DX/OHS AUDIT: ICD-10-PCS | Mod: S$GLB,,, | Performed by: NURSE PRACTITIONER

## 2021-04-20 RX ORDER — INSULIN DETEMIR 100 [IU]/ML
INJECTION, SOLUTION SUBCUTANEOUS
Qty: 3 BOX | Refills: 3 | Status: SHIPPED | OUTPATIENT
Start: 2021-04-20 | End: 2021-07-27

## 2021-05-03 DIAGNOSIS — E11.59 HYPERTENSION ASSOCIATED WITH DIABETES: ICD-10-CM

## 2021-05-03 DIAGNOSIS — Z86.73 H/O: STROKE: ICD-10-CM

## 2021-05-03 DIAGNOSIS — I15.2 HYPERTENSION ASSOCIATED WITH DIABETES: ICD-10-CM

## 2021-05-03 RX ORDER — AMLODIPINE BESYLATE 10 MG/1
10 TABLET ORAL DAILY
Qty: 90 TABLET | Refills: 3 | Status: SHIPPED | OUTPATIENT
Start: 2021-05-03 | End: 2021-12-14

## 2021-05-03 RX ORDER — LOSARTAN POTASSIUM 50 MG/1
50 TABLET ORAL DAILY
Qty: 90 TABLET | Refills: 3 | Status: SHIPPED | OUTPATIENT
Start: 2021-05-03 | End: 2022-02-16

## 2021-05-03 RX ORDER — CLONIDINE HYDROCHLORIDE 0.1 MG/1
0.1 TABLET ORAL 3 TIMES DAILY
Qty: 270 TABLET | Refills: 3 | Status: SHIPPED | OUTPATIENT
Start: 2021-05-03 | End: 2021-06-21

## 2021-05-03 RX ORDER — ATORVASTATIN CALCIUM 40 MG/1
40 TABLET, FILM COATED ORAL DAILY
Qty: 90 TABLET | Refills: 3 | Status: SHIPPED | OUTPATIENT
Start: 2021-05-03 | End: 2022-01-30

## 2021-05-20 ENCOUNTER — LAB VISIT (OUTPATIENT)
Dept: LAB | Facility: HOSPITAL | Age: 66
End: 2021-05-20
Attending: INTERNAL MEDICINE
Payer: MEDICARE

## 2021-05-20 ENCOUNTER — ANTI-COAG VISIT (OUTPATIENT)
Dept: CARDIOLOGY | Facility: CLINIC | Age: 66
End: 2021-05-20
Payer: MEDICARE

## 2021-05-20 DIAGNOSIS — Z79.01 LONG TERM (CURRENT) USE OF ANTICOAGULANTS: ICD-10-CM

## 2021-05-20 DIAGNOSIS — I63.10 CEREBROVASCULAR ACCIDENT (CVA) DUE TO EMBOLISM OF PRECEREBRAL ARTERY: ICD-10-CM

## 2021-05-20 DIAGNOSIS — Z79.01 LONG TERM (CURRENT) USE OF ANTICOAGULANTS: Primary | ICD-10-CM

## 2021-05-20 LAB
INR PPP: 2.6 (ref 0.8–1.2)
PROTHROMBIN TIME: 26.8 SEC (ref 9–12.5)

## 2021-05-20 PROCEDURE — 85610 PROTHROMBIN TIME: CPT | Performed by: INTERNAL MEDICINE

## 2021-05-20 PROCEDURE — 93793 PR ANTICOAGULANT MGMT FOR PT TAKING WARFARIN: ICD-10-PCS | Mod: S$GLB,,,

## 2021-05-20 PROCEDURE — 36415 COLL VENOUS BLD VENIPUNCTURE: CPT | Performed by: INTERNAL MEDICINE

## 2021-05-20 PROCEDURE — 93793 ANTICOAG MGMT PT WARFARIN: CPT | Mod: S$GLB,,,

## 2021-05-31 ENCOUNTER — OFFICE VISIT (OUTPATIENT)
Dept: INTERNAL MEDICINE | Facility: CLINIC | Age: 66
End: 2021-05-31
Payer: MEDICARE

## 2021-05-31 ENCOUNTER — LAB VISIT (OUTPATIENT)
Dept: LAB | Facility: HOSPITAL | Age: 66
End: 2021-05-31
Attending: INTERNAL MEDICINE
Payer: MEDICARE

## 2021-05-31 VITALS
DIASTOLIC BLOOD PRESSURE: 80 MMHG | SYSTOLIC BLOOD PRESSURE: 100 MMHG | HEART RATE: 103 BPM | OXYGEN SATURATION: 99 % | HEIGHT: 73 IN | WEIGHT: 189.13 LBS | BODY MASS INDEX: 25.07 KG/M2

## 2021-05-31 DIAGNOSIS — N18.30 CKD STAGE 3 DUE TO TYPE 2 DIABETES MELLITUS: ICD-10-CM

## 2021-05-31 DIAGNOSIS — E11.59 HYPERTENSION ASSOCIATED WITH DIABETES: ICD-10-CM

## 2021-05-31 DIAGNOSIS — M81.0 OSTEOPOROSIS, UNSPECIFIED OSTEOPOROSIS TYPE, UNSPECIFIED PATHOLOGICAL FRACTURE PRESENCE: ICD-10-CM

## 2021-05-31 DIAGNOSIS — Z79.4 TYPE 2 DIABETES MELLITUS WITH BOTH EYES AFFECTED BY PROLIFERATIVE RETINOPATHY WITHOUT MACULAR EDEMA, WITH LONG-TERM CURRENT USE OF INSULIN: Primary | ICD-10-CM

## 2021-05-31 DIAGNOSIS — E11.3593 TYPE 2 DIABETES MELLITUS WITH BOTH EYES AFFECTED BY PROLIFERATIVE RETINOPATHY WITHOUT MACULAR EDEMA, WITH LONG-TERM CURRENT USE OF INSULIN: Primary | ICD-10-CM

## 2021-05-31 DIAGNOSIS — E11.22 CKD STAGE 3 DUE TO TYPE 2 DIABETES MELLITUS: ICD-10-CM

## 2021-05-31 DIAGNOSIS — I15.2 HYPERTENSION ASSOCIATED WITH DIABETES: ICD-10-CM

## 2021-05-31 DIAGNOSIS — E78.5 HYPERLIPIDEMIA ASSOCIATED WITH TYPE 2 DIABETES MELLITUS: ICD-10-CM

## 2021-05-31 DIAGNOSIS — I69.359 CVA, OLD, HEMIPARESIS: ICD-10-CM

## 2021-05-31 DIAGNOSIS — E11.69 HYPERLIPIDEMIA ASSOCIATED WITH TYPE 2 DIABETES MELLITUS: ICD-10-CM

## 2021-05-31 DIAGNOSIS — N25.81 SECONDARY RENAL HYPERPARATHYROIDISM: ICD-10-CM

## 2021-05-31 DIAGNOSIS — Z79.4 TYPE 2 DIABETES MELLITUS WITH BOTH EYES AFFECTED BY PROLIFERATIVE RETINOPATHY WITHOUT MACULAR EDEMA, WITH LONG-TERM CURRENT USE OF INSULIN: ICD-10-CM

## 2021-05-31 DIAGNOSIS — E11.3593 TYPE 2 DIABETES MELLITUS WITH BOTH EYES AFFECTED BY PROLIFERATIVE RETINOPATHY WITHOUT MACULAR EDEMA, WITH LONG-TERM CURRENT USE OF INSULIN: ICD-10-CM

## 2021-05-31 LAB
25(OH)D3+25(OH)D2 SERPL-MCNC: 41 NG/ML (ref 30–96)
ALBUMIN SERPL BCP-MCNC: 4 G/DL (ref 3.5–5.2)
ALP SERPL-CCNC: 94 U/L (ref 55–135)
ALT SERPL W/O P-5'-P-CCNC: 22 U/L (ref 10–44)
ANION GAP SERPL CALC-SCNC: 8 MMOL/L (ref 8–16)
AST SERPL-CCNC: 19 U/L (ref 10–40)
BASOPHILS # BLD AUTO: 0.03 K/UL (ref 0–0.2)
BASOPHILS NFR BLD: 0.5 % (ref 0–1.9)
BILIRUB SERPL-MCNC: 0.5 MG/DL (ref 0.1–1)
BUN SERPL-MCNC: 18 MG/DL (ref 8–23)
CALCIUM SERPL-MCNC: 10 MG/DL (ref 8.7–10.5)
CHLORIDE SERPL-SCNC: 104 MMOL/L (ref 95–110)
CHOLEST SERPL-MCNC: 139 MG/DL (ref 120–199)
CHOLEST/HDLC SERPL: 3.8 {RATIO} (ref 2–5)
CO2 SERPL-SCNC: 26 MMOL/L (ref 23–29)
CREAT SERPL-MCNC: 1.9 MG/DL (ref 0.5–1.4)
DIFFERENTIAL METHOD: ABNORMAL
EOSINOPHIL # BLD AUTO: 0.1 K/UL (ref 0–0.5)
EOSINOPHIL NFR BLD: 2.1 % (ref 0–8)
ERYTHROCYTE [DISTWIDTH] IN BLOOD BY AUTOMATED COUNT: 14.1 % (ref 11.5–14.5)
EST. GFR  (AFRICAN AMERICAN): 41.8 ML/MIN/1.73 M^2
EST. GFR  (NON AFRICAN AMERICAN): 36.2 ML/MIN/1.73 M^2
GLUCOSE SERPL-MCNC: 102 MG/DL (ref 70–110)
HCT VFR BLD AUTO: 46.5 % (ref 40–54)
HDLC SERPL-MCNC: 37 MG/DL (ref 40–75)
HDLC SERPL: 26.6 % (ref 20–50)
HGB BLD-MCNC: 14.6 G/DL (ref 14–18)
IMM GRANULOCYTES # BLD AUTO: 0.01 K/UL (ref 0–0.04)
IMM GRANULOCYTES NFR BLD AUTO: 0.2 % (ref 0–0.5)
LDLC SERPL CALC-MCNC: 86.6 MG/DL (ref 63–159)
LYMPHOCYTES # BLD AUTO: 1.5 K/UL (ref 1–4.8)
LYMPHOCYTES NFR BLD: 24.4 % (ref 18–48)
MCH RBC QN AUTO: 28 PG (ref 27–31)
MCHC RBC AUTO-ENTMCNC: 31.4 G/DL (ref 32–36)
MCV RBC AUTO: 89 FL (ref 82–98)
MONOCYTES # BLD AUTO: 0.6 K/UL (ref 0.3–1)
MONOCYTES NFR BLD: 9.3 % (ref 4–15)
NEUTROPHILS # BLD AUTO: 4 K/UL (ref 1.8–7.7)
NEUTROPHILS NFR BLD: 63.5 % (ref 38–73)
NONHDLC SERPL-MCNC: 102 MG/DL
NRBC BLD-RTO: 0 /100 WBC
PLATELET # BLD AUTO: 339 K/UL (ref 150–450)
PMV BLD AUTO: 10.5 FL (ref 9.2–12.9)
POTASSIUM SERPL-SCNC: 4.7 MMOL/L (ref 3.5–5.1)
PROT SERPL-MCNC: 8.2 G/DL (ref 6–8.4)
PTH-INTACT SERPL-MCNC: 142 PG/ML (ref 9–77)
RBC # BLD AUTO: 5.22 M/UL (ref 4.6–6.2)
SODIUM SERPL-SCNC: 138 MMOL/L (ref 136–145)
TRIGL SERPL-MCNC: 77 MG/DL (ref 30–150)
TSH SERPL DL<=0.005 MIU/L-ACNC: 2.11 UIU/ML (ref 0.4–4)
WBC # BLD AUTO: 6.22 K/UL (ref 3.9–12.7)

## 2021-05-31 PROCEDURE — 99999 PR PBB SHADOW E&M-EST. PATIENT-LVL IV: CPT | Mod: PBBFAC,,, | Performed by: INTERNAL MEDICINE

## 2021-05-31 PROCEDURE — 1126F PR PAIN SEVERITY QUANTIFIED, NO PAIN PRESENT: ICD-10-PCS | Mod: S$GLB,,, | Performed by: INTERNAL MEDICINE

## 2021-05-31 PROCEDURE — 80061 LIPID PANEL: CPT | Performed by: INTERNAL MEDICINE

## 2021-05-31 PROCEDURE — 3288F PR FALLS RISK ASSESSMENT DOCUMENTED: ICD-10-PCS | Mod: CPTII,S$GLB,, | Performed by: INTERNAL MEDICINE

## 2021-05-31 PROCEDURE — 99999 PR PBB SHADOW E&M-EST. PATIENT-LVL IV: ICD-10-PCS | Mod: PBBFAC,,, | Performed by: INTERNAL MEDICINE

## 2021-05-31 PROCEDURE — 84443 ASSAY THYROID STIM HORMONE: CPT | Performed by: INTERNAL MEDICINE

## 2021-05-31 PROCEDURE — 99499 RISK ADDL DX/OHS AUDIT: ICD-10-PCS | Mod: S$GLB,,, | Performed by: INTERNAL MEDICINE

## 2021-05-31 PROCEDURE — 82306 VITAMIN D 25 HYDROXY: CPT | Performed by: INTERNAL MEDICINE

## 2021-05-31 PROCEDURE — 83970 ASSAY OF PARATHORMONE: CPT | Performed by: INTERNAL MEDICINE

## 2021-05-31 PROCEDURE — 1126F AMNT PAIN NOTED NONE PRSNT: CPT | Mod: S$GLB,,, | Performed by: INTERNAL MEDICINE

## 2021-05-31 PROCEDURE — 3288F FALL RISK ASSESSMENT DOCD: CPT | Mod: CPTII,S$GLB,, | Performed by: INTERNAL MEDICINE

## 2021-05-31 PROCEDURE — 80053 COMPREHEN METABOLIC PANEL: CPT | Performed by: INTERNAL MEDICINE

## 2021-05-31 PROCEDURE — 1101F PR PT FALLS ASSESS DOC 0-1 FALLS W/OUT INJ PAST YR: ICD-10-PCS | Mod: CPTII,S$GLB,, | Performed by: INTERNAL MEDICINE

## 2021-05-31 PROCEDURE — 3008F PR BODY MASS INDEX (BMI) DOCUMENTED: ICD-10-PCS | Mod: CPTII,S$GLB,, | Performed by: INTERNAL MEDICINE

## 2021-05-31 PROCEDURE — 3051F PR MOST RECENT HEMOGLOBIN A1C LEVEL 7.0 - < 8.0%: ICD-10-PCS | Mod: CPTII,S$GLB,, | Performed by: INTERNAL MEDICINE

## 2021-05-31 PROCEDURE — 1101F PT FALLS ASSESS-DOCD LE1/YR: CPT | Mod: CPTII,S$GLB,, | Performed by: INTERNAL MEDICINE

## 2021-05-31 PROCEDURE — 99499 UNLISTED E&M SERVICE: CPT | Mod: S$GLB,,, | Performed by: INTERNAL MEDICINE

## 2021-05-31 PROCEDURE — 36415 COLL VENOUS BLD VENIPUNCTURE: CPT | Performed by: INTERNAL MEDICINE

## 2021-05-31 PROCEDURE — 99214 OFFICE O/P EST MOD 30 MIN: CPT | Mod: S$GLB,,, | Performed by: INTERNAL MEDICINE

## 2021-05-31 PROCEDURE — 85025 COMPLETE CBC W/AUTO DIFF WBC: CPT | Performed by: INTERNAL MEDICINE

## 2021-05-31 PROCEDURE — 3051F HG A1C>EQUAL 7.0%<8.0%: CPT | Mod: CPTII,S$GLB,, | Performed by: INTERNAL MEDICINE

## 2021-05-31 PROCEDURE — 99214 PR OFFICE/OUTPT VISIT, EST, LEVL IV, 30-39 MIN: ICD-10-PCS | Mod: S$GLB,,, | Performed by: INTERNAL MEDICINE

## 2021-05-31 PROCEDURE — 3008F BODY MASS INDEX DOCD: CPT | Mod: CPTII,S$GLB,, | Performed by: INTERNAL MEDICINE

## 2021-05-31 RX ORDER — CALCITRIOL 0.25 UG/1
0.25 CAPSULE ORAL DAILY
Status: ON HOLD | COMMUNITY
Start: 2021-03-11 | End: 2024-01-15 | Stop reason: HOSPADM

## 2021-06-17 ENCOUNTER — LAB VISIT (OUTPATIENT)
Dept: LAB | Facility: HOSPITAL | Age: 66
End: 2021-06-17
Attending: INTERNAL MEDICINE
Payer: MEDICARE

## 2021-06-17 ENCOUNTER — ANTI-COAG VISIT (OUTPATIENT)
Dept: CARDIOLOGY | Facility: CLINIC | Age: 66
End: 2021-06-17
Payer: MEDICARE

## 2021-06-17 DIAGNOSIS — I63.10 CEREBROVASCULAR ACCIDENT (CVA) DUE TO EMBOLISM OF PRECEREBRAL ARTERY: ICD-10-CM

## 2021-06-17 DIAGNOSIS — Z79.01 LONG TERM (CURRENT) USE OF ANTICOAGULANTS: ICD-10-CM

## 2021-06-17 DIAGNOSIS — Z79.01 LONG TERM (CURRENT) USE OF ANTICOAGULANTS: Primary | ICD-10-CM

## 2021-06-17 LAB
INR PPP: 3.1 (ref 0.8–1.2)
PROTHROMBIN TIME: 31.8 SEC (ref 9–12.5)

## 2021-06-17 PROCEDURE — 93793 PR ANTICOAGULANT MGMT FOR PT TAKING WARFARIN: ICD-10-PCS | Mod: S$GLB,,,

## 2021-06-17 PROCEDURE — 85610 PROTHROMBIN TIME: CPT | Performed by: INTERNAL MEDICINE

## 2021-06-17 PROCEDURE — 36415 COLL VENOUS BLD VENIPUNCTURE: CPT | Performed by: INTERNAL MEDICINE

## 2021-06-17 PROCEDURE — 93793 ANTICOAG MGMT PT WARFARIN: CPT | Mod: S$GLB,,,

## 2021-07-07 ENCOUNTER — LAB VISIT (OUTPATIENT)
Dept: LAB | Facility: HOSPITAL | Age: 66
End: 2021-07-07
Attending: INTERNAL MEDICINE
Payer: MEDICARE

## 2021-07-07 ENCOUNTER — PATIENT MESSAGE (OUTPATIENT)
Dept: ADMINISTRATIVE | Facility: HOSPITAL | Age: 66
End: 2021-07-07

## 2021-07-07 ENCOUNTER — ANTI-COAG VISIT (OUTPATIENT)
Dept: CARDIOLOGY | Facility: CLINIC | Age: 66
End: 2021-07-07
Payer: MEDICARE

## 2021-07-07 DIAGNOSIS — Z79.01 LONG TERM (CURRENT) USE OF ANTICOAGULANTS: ICD-10-CM

## 2021-07-07 DIAGNOSIS — I63.10 CEREBROVASCULAR ACCIDENT (CVA) DUE TO EMBOLISM OF PRECEREBRAL ARTERY: ICD-10-CM

## 2021-07-07 DIAGNOSIS — Z79.01 LONG TERM (CURRENT) USE OF ANTICOAGULANTS: Primary | ICD-10-CM

## 2021-07-07 LAB
INR PPP: 2.1 (ref 0.8–1.2)
PROTHROMBIN TIME: 22.4 SEC (ref 9–12.5)

## 2021-07-07 PROCEDURE — 93793 PR ANTICOAGULANT MGMT FOR PT TAKING WARFARIN: ICD-10-PCS | Mod: S$GLB,,,

## 2021-07-07 PROCEDURE — 93793 ANTICOAG MGMT PT WARFARIN: CPT | Mod: S$GLB,,,

## 2021-07-07 PROCEDURE — 36415 COLL VENOUS BLD VENIPUNCTURE: CPT | Performed by: INTERNAL MEDICINE

## 2021-07-07 PROCEDURE — 85610 PROTHROMBIN TIME: CPT | Performed by: INTERNAL MEDICINE

## 2021-07-28 ENCOUNTER — ANTI-COAG VISIT (OUTPATIENT)
Dept: CARDIOLOGY | Facility: CLINIC | Age: 66
End: 2021-07-28
Payer: MEDICARE

## 2021-07-28 ENCOUNTER — LAB VISIT (OUTPATIENT)
Dept: LAB | Facility: HOSPITAL | Age: 66
End: 2021-07-28
Attending: INTERNAL MEDICINE
Payer: MEDICARE

## 2021-07-28 DIAGNOSIS — Z79.01 LONG TERM (CURRENT) USE OF ANTICOAGULANTS: ICD-10-CM

## 2021-07-28 DIAGNOSIS — Z79.01 LONG TERM (CURRENT) USE OF ANTICOAGULANTS: Primary | ICD-10-CM

## 2021-07-28 DIAGNOSIS — I63.10 CEREBROVASCULAR ACCIDENT (CVA) DUE TO EMBOLISM OF PRECEREBRAL ARTERY: ICD-10-CM

## 2021-07-28 LAB
INR PPP: 1.7 (ref 0.8–1.2)
PROTHROMBIN TIME: 18.1 SEC (ref 9–12.5)

## 2021-07-28 PROCEDURE — 93793 PR ANTICOAGULANT MGMT FOR PT TAKING WARFARIN: ICD-10-PCS | Mod: S$GLB,,,

## 2021-07-28 PROCEDURE — 36415 COLL VENOUS BLD VENIPUNCTURE: CPT | Performed by: INTERNAL MEDICINE

## 2021-07-28 PROCEDURE — 93793 ANTICOAG MGMT PT WARFARIN: CPT | Mod: S$GLB,,,

## 2021-07-28 PROCEDURE — 85610 PROTHROMBIN TIME: CPT | Performed by: INTERNAL MEDICINE

## 2021-07-30 ENCOUNTER — TELEPHONE (OUTPATIENT)
Dept: INTERNAL MEDICINE | Facility: CLINIC | Age: 66
End: 2021-07-30

## 2021-07-30 DIAGNOSIS — E11.9 INSULIN DEPENDENT TYPE 2 DIABETES MELLITUS: Primary | ICD-10-CM

## 2021-07-30 DIAGNOSIS — Z79.4 INSULIN DEPENDENT TYPE 2 DIABETES MELLITUS: Primary | ICD-10-CM

## 2021-07-30 RX ORDER — INSULIN PUMP SYRINGE, 3 ML
EACH MISCELLANEOUS
Qty: 1 EACH | Refills: 0 | Status: SHIPPED | OUTPATIENT
Start: 2021-07-30 | End: 2022-07-08 | Stop reason: SDUPTHER

## 2021-07-30 RX ORDER — ISOPROPYL ALCOHOL 70 ML/100ML
SWAB TOPICAL
Qty: 400 EACH | Refills: 3 | Status: SHIPPED | OUTPATIENT
Start: 2021-07-30 | End: 2022-07-08 | Stop reason: SDUPTHER

## 2021-07-30 RX ORDER — LANCING DEVICE
1 EACH MISCELLANEOUS 2 TIMES DAILY WITH MEALS
Qty: 1 EACH | Refills: 0 | Status: SHIPPED | OUTPATIENT
Start: 2021-07-30 | End: 2022-07-08 | Stop reason: SDUPTHER

## 2021-07-30 RX ORDER — LANCETS
1 EACH MISCELLANEOUS 4 TIMES DAILY
Qty: 400 EACH | Refills: 3 | Status: SHIPPED | OUTPATIENT
Start: 2021-07-30 | End: 2021-08-16 | Stop reason: SDUPTHER

## 2021-08-03 ENCOUNTER — PATIENT MESSAGE (OUTPATIENT)
Dept: ADMINISTRATIVE | Facility: HOSPITAL | Age: 66
End: 2021-08-03

## 2021-08-11 ENCOUNTER — LAB VISIT (OUTPATIENT)
Dept: LAB | Facility: HOSPITAL | Age: 66
End: 2021-08-11
Attending: INTERNAL MEDICINE
Payer: MEDICARE

## 2021-08-11 ENCOUNTER — ANTI-COAG VISIT (OUTPATIENT)
Dept: CARDIOLOGY | Facility: CLINIC | Age: 66
End: 2021-08-11
Payer: MEDICARE

## 2021-08-11 DIAGNOSIS — I63.10 CEREBROVASCULAR ACCIDENT (CVA) DUE TO EMBOLISM OF PRECEREBRAL ARTERY: ICD-10-CM

## 2021-08-11 DIAGNOSIS — Z79.01 LONG TERM (CURRENT) USE OF ANTICOAGULANTS: ICD-10-CM

## 2021-08-11 DIAGNOSIS — Z79.01 LONG TERM (CURRENT) USE OF ANTICOAGULANTS: Primary | ICD-10-CM

## 2021-08-11 LAB
INR PPP: 2.5 (ref 0.8–1.2)
PROTHROMBIN TIME: 25.9 SEC (ref 9–12.5)

## 2021-08-11 PROCEDURE — 85610 PROTHROMBIN TIME: CPT | Performed by: INTERNAL MEDICINE

## 2021-08-11 PROCEDURE — 36415 COLL VENOUS BLD VENIPUNCTURE: CPT | Performed by: INTERNAL MEDICINE

## 2021-08-11 PROCEDURE — 93793 ANTICOAG MGMT PT WARFARIN: CPT | Mod: S$GLB,,,

## 2021-08-11 PROCEDURE — 93793 PR ANTICOAGULANT MGMT FOR PT TAKING WARFARIN: ICD-10-PCS | Mod: S$GLB,,,

## 2021-08-16 DIAGNOSIS — Z79.4 INSULIN DEPENDENT TYPE 2 DIABETES MELLITUS: ICD-10-CM

## 2021-08-16 DIAGNOSIS — E11.9 INSULIN DEPENDENT TYPE 2 DIABETES MELLITUS: ICD-10-CM

## 2021-08-16 RX ORDER — LANCETS
1 EACH MISCELLANEOUS 4 TIMES DAILY
Qty: 400 EACH | Refills: 3 | Status: SHIPPED | OUTPATIENT
Start: 2021-08-16 | End: 2022-07-08 | Stop reason: SDUPTHER

## 2021-08-17 DIAGNOSIS — Z79.4 TYPE 2 DIABETES MELLITUS WITH COMPLICATION, WITH LONG-TERM CURRENT USE OF INSULIN: ICD-10-CM

## 2021-08-17 DIAGNOSIS — E11.8 TYPE 2 DIABETES MELLITUS WITH COMPLICATION, WITH LONG-TERM CURRENT USE OF INSULIN: ICD-10-CM

## 2021-08-17 RX ORDER — PEN NEEDLE, DIABETIC 30 GX3/16"
NEEDLE, DISPOSABLE MISCELLANEOUS
Qty: 200 EACH | Refills: 2 | Status: SHIPPED | OUTPATIENT
Start: 2021-08-17 | End: 2022-07-20

## 2021-08-25 ENCOUNTER — LAB VISIT (OUTPATIENT)
Dept: LAB | Facility: HOSPITAL | Age: 66
End: 2021-08-25
Attending: INTERNAL MEDICINE
Payer: MEDICARE

## 2021-08-25 ENCOUNTER — ANTI-COAG VISIT (OUTPATIENT)
Dept: CARDIOLOGY | Facility: CLINIC | Age: 66
End: 2021-08-25
Payer: MEDICARE

## 2021-08-25 DIAGNOSIS — I63.10 CEREBROVASCULAR ACCIDENT (CVA) DUE TO EMBOLISM OF PRECEREBRAL ARTERY: ICD-10-CM

## 2021-08-25 DIAGNOSIS — Z79.01 LONG TERM (CURRENT) USE OF ANTICOAGULANTS: Primary | ICD-10-CM

## 2021-08-25 DIAGNOSIS — Z79.01 LONG TERM (CURRENT) USE OF ANTICOAGULANTS: ICD-10-CM

## 2021-08-25 LAB
INR PPP: 2.6 (ref 0.8–1.2)
PROTHROMBIN TIME: 26.7 SEC (ref 9–12.5)

## 2021-08-25 PROCEDURE — 85610 PROTHROMBIN TIME: CPT | Performed by: INTERNAL MEDICINE

## 2021-08-25 PROCEDURE — 93793 ANTICOAG MGMT PT WARFARIN: CPT | Mod: S$GLB,,,

## 2021-08-25 PROCEDURE — 93793 PR ANTICOAGULANT MGMT FOR PT TAKING WARFARIN: ICD-10-PCS | Mod: S$GLB,,,

## 2021-08-25 PROCEDURE — 36415 COLL VENOUS BLD VENIPUNCTURE: CPT | Performed by: INTERNAL MEDICINE

## 2021-09-13 ENCOUNTER — TELEPHONE (OUTPATIENT)
Dept: PODIATRY | Facility: CLINIC | Age: 66
End: 2021-09-13

## 2021-09-15 ENCOUNTER — LAB VISIT (OUTPATIENT)
Dept: LAB | Facility: HOSPITAL | Age: 66
End: 2021-09-15
Attending: INTERNAL MEDICINE
Payer: MEDICARE

## 2021-09-15 ENCOUNTER — ANTI-COAG VISIT (OUTPATIENT)
Dept: CARDIOLOGY | Facility: CLINIC | Age: 66
End: 2021-09-15
Payer: MEDICARE

## 2021-09-15 DIAGNOSIS — Z79.01 LONG TERM (CURRENT) USE OF ANTICOAGULANTS: ICD-10-CM

## 2021-09-15 DIAGNOSIS — I63.10 CEREBROVASCULAR ACCIDENT (CVA) DUE TO EMBOLISM OF PRECEREBRAL ARTERY: ICD-10-CM

## 2021-09-15 DIAGNOSIS — Z79.01 LONG TERM (CURRENT) USE OF ANTICOAGULANTS: Primary | ICD-10-CM

## 2021-09-15 LAB
INR PPP: 4.1 (ref 0.8–1.2)
PROTHROMBIN TIME: 41.2 SEC (ref 9–12.5)

## 2021-09-15 PROCEDURE — 85610 PROTHROMBIN TIME: CPT | Performed by: INTERNAL MEDICINE

## 2021-09-15 PROCEDURE — 93793 PR ANTICOAGULANT MGMT FOR PT TAKING WARFARIN: ICD-10-PCS | Mod: S$GLB,,,

## 2021-09-15 PROCEDURE — 93793 ANTICOAG MGMT PT WARFARIN: CPT | Mod: S$GLB,,,

## 2021-09-15 PROCEDURE — 36415 COLL VENOUS BLD VENIPUNCTURE: CPT | Performed by: INTERNAL MEDICINE

## 2021-09-20 ENCOUNTER — OFFICE VISIT (OUTPATIENT)
Dept: INTERNAL MEDICINE | Facility: CLINIC | Age: 66
End: 2021-09-20
Payer: MEDICARE

## 2021-09-20 VITALS
HEIGHT: 73 IN | HEART RATE: 66 BPM | WEIGHT: 181 LBS | SYSTOLIC BLOOD PRESSURE: 110 MMHG | BODY MASS INDEX: 23.99 KG/M2 | OXYGEN SATURATION: 97 % | DIASTOLIC BLOOD PRESSURE: 82 MMHG

## 2021-09-20 DIAGNOSIS — C61 CANCER OF PROSTATE WITH LOW RECURRENCE RISK (STAGE T1-2A AND GLEASON < 7 AND PSA < 10): ICD-10-CM

## 2021-09-20 DIAGNOSIS — Z86.73 H/O: CVA (CEREBROVASCULAR ACCIDENT): ICD-10-CM

## 2021-09-20 DIAGNOSIS — Z79.4 TYPE 2 DIABETES MELLITUS WITH BOTH EYES AFFECTED BY PROLIFERATIVE RETINOPATHY WITHOUT MACULAR EDEMA, WITH LONG-TERM CURRENT USE OF INSULIN: Primary | ICD-10-CM

## 2021-09-20 DIAGNOSIS — E11.3593 TYPE 2 DIABETES MELLITUS WITH BOTH EYES AFFECTED BY PROLIFERATIVE RETINOPATHY WITHOUT MACULAR EDEMA, WITH LONG-TERM CURRENT USE OF INSULIN: Primary | ICD-10-CM

## 2021-09-20 DIAGNOSIS — I10 ESSENTIAL HYPERTENSION: ICD-10-CM

## 2021-09-20 DIAGNOSIS — I73.9 PAD (PERIPHERAL ARTERY DISEASE): ICD-10-CM

## 2021-09-20 PROCEDURE — 99499 UNLISTED E&M SERVICE: CPT | Mod: S$GLB,,, | Performed by: NURSE PRACTITIONER

## 2021-09-20 PROCEDURE — 99214 OFFICE O/P EST MOD 30 MIN: CPT | Mod: S$GLB,,, | Performed by: NURSE PRACTITIONER

## 2021-09-20 PROCEDURE — 1126F AMNT PAIN NOTED NONE PRSNT: CPT | Mod: CPTII,S$GLB,, | Performed by: NURSE PRACTITIONER

## 2021-09-20 PROCEDURE — 1159F MED LIST DOCD IN RCRD: CPT | Mod: CPTII,S$GLB,, | Performed by: NURSE PRACTITIONER

## 2021-09-20 PROCEDURE — 1160F RVW MEDS BY RX/DR IN RCRD: CPT | Mod: CPTII,S$GLB,, | Performed by: NURSE PRACTITIONER

## 2021-09-20 PROCEDURE — 1101F PT FALLS ASSESS-DOCD LE1/YR: CPT | Mod: CPTII,S$GLB,, | Performed by: NURSE PRACTITIONER

## 2021-09-20 PROCEDURE — 4010F ACE/ARB THERAPY RXD/TAKEN: CPT | Mod: CPTII,S$GLB,, | Performed by: NURSE PRACTITIONER

## 2021-09-20 PROCEDURE — 99999 PR PBB SHADOW E&M-EST. PATIENT-LVL IV: ICD-10-PCS | Mod: PBBFAC,,, | Performed by: NURSE PRACTITIONER

## 2021-09-20 PROCEDURE — 3079F DIAST BP 80-89 MM HG: CPT | Mod: CPTII,S$GLB,, | Performed by: NURSE PRACTITIONER

## 2021-09-20 PROCEDURE — 99499 RISK ADDL DX/OHS AUDIT: ICD-10-PCS | Mod: S$GLB,,, | Performed by: NURSE PRACTITIONER

## 2021-09-20 PROCEDURE — 1159F PR MEDICATION LIST DOCUMENTED IN MEDICAL RECORD: ICD-10-PCS | Mod: CPTII,S$GLB,, | Performed by: NURSE PRACTITIONER

## 2021-09-20 PROCEDURE — 3079F PR MOST RECENT DIASTOLIC BLOOD PRESSURE 80-89 MM HG: ICD-10-PCS | Mod: CPTII,S$GLB,, | Performed by: NURSE PRACTITIONER

## 2021-09-20 PROCEDURE — 4010F PR ACE/ARB THEARPY RXD/TAKEN: ICD-10-PCS | Mod: CPTII,S$GLB,, | Performed by: NURSE PRACTITIONER

## 2021-09-20 PROCEDURE — 3074F SYST BP LT 130 MM HG: CPT | Mod: CPTII,S$GLB,, | Performed by: NURSE PRACTITIONER

## 2021-09-20 PROCEDURE — 3008F PR BODY MASS INDEX (BMI) DOCUMENTED: ICD-10-PCS | Mod: CPTII,S$GLB,, | Performed by: NURSE PRACTITIONER

## 2021-09-20 PROCEDURE — 3051F PR MOST RECENT HEMOGLOBIN A1C LEVEL 7.0 - < 8.0%: ICD-10-PCS | Mod: CPTII,S$GLB,, | Performed by: NURSE PRACTITIONER

## 2021-09-20 PROCEDURE — 3288F PR FALLS RISK ASSESSMENT DOCUMENTED: ICD-10-PCS | Mod: CPTII,S$GLB,, | Performed by: NURSE PRACTITIONER

## 2021-09-20 PROCEDURE — 3008F BODY MASS INDEX DOCD: CPT | Mod: CPTII,S$GLB,, | Performed by: NURSE PRACTITIONER

## 2021-09-20 PROCEDURE — 3051F HG A1C>EQUAL 7.0%<8.0%: CPT | Mod: CPTII,S$GLB,, | Performed by: NURSE PRACTITIONER

## 2021-09-20 PROCEDURE — 1160F PR REVIEW ALL MEDS BY PRESCRIBER/CLIN PHARMACIST DOCUMENTED: ICD-10-PCS | Mod: CPTII,S$GLB,, | Performed by: NURSE PRACTITIONER

## 2021-09-20 PROCEDURE — 99214 PR OFFICE/OUTPT VISIT, EST, LEVL IV, 30-39 MIN: ICD-10-PCS | Mod: S$GLB,,, | Performed by: NURSE PRACTITIONER

## 2021-09-20 PROCEDURE — 3074F PR MOST RECENT SYSTOLIC BLOOD PRESSURE < 130 MM HG: ICD-10-PCS | Mod: CPTII,S$GLB,, | Performed by: NURSE PRACTITIONER

## 2021-09-20 PROCEDURE — 99999 PR PBB SHADOW E&M-EST. PATIENT-LVL IV: CPT | Mod: PBBFAC,,, | Performed by: NURSE PRACTITIONER

## 2021-09-20 PROCEDURE — 1101F PR PT FALLS ASSESS DOC 0-1 FALLS W/OUT INJ PAST YR: ICD-10-PCS | Mod: CPTII,S$GLB,, | Performed by: NURSE PRACTITIONER

## 2021-09-20 PROCEDURE — 1126F PR PAIN SEVERITY QUANTIFIED, NO PAIN PRESENT: ICD-10-PCS | Mod: CPTII,S$GLB,, | Performed by: NURSE PRACTITIONER

## 2021-09-20 PROCEDURE — 3288F FALL RISK ASSESSMENT DOCD: CPT | Mod: CPTII,S$GLB,, | Performed by: NURSE PRACTITIONER

## 2021-09-21 ENCOUNTER — ANTI-COAG VISIT (OUTPATIENT)
Dept: CARDIOLOGY | Facility: CLINIC | Age: 66
End: 2021-09-21
Payer: MEDICARE

## 2021-09-21 ENCOUNTER — OFFICE VISIT (OUTPATIENT)
Dept: PODIATRY | Facility: CLINIC | Age: 66
End: 2021-09-21
Payer: MEDICARE

## 2021-09-21 ENCOUNTER — LAB VISIT (OUTPATIENT)
Dept: LAB | Facility: HOSPITAL | Age: 66
End: 2021-09-21
Attending: NURSE PRACTITIONER
Payer: MEDICARE

## 2021-09-21 VITALS
SYSTOLIC BLOOD PRESSURE: 132 MMHG | HEART RATE: 89 BPM | DIASTOLIC BLOOD PRESSURE: 83 MMHG | WEIGHT: 181 LBS | BODY MASS INDEX: 23.99 KG/M2 | HEIGHT: 73 IN

## 2021-09-21 DIAGNOSIS — B35.1 ONYCHOMYCOSIS DUE TO DERMATOPHYTE: ICD-10-CM

## 2021-09-21 DIAGNOSIS — Z79.01 LONG TERM (CURRENT) USE OF ANTICOAGULANTS: ICD-10-CM

## 2021-09-21 DIAGNOSIS — L84 CORN OR CALLUS: ICD-10-CM

## 2021-09-21 DIAGNOSIS — I63.10 CEREBROVASCULAR ACCIDENT (CVA) DUE TO EMBOLISM OF PRECEREBRAL ARTERY: ICD-10-CM

## 2021-09-21 DIAGNOSIS — E11.42 DIABETIC POLYNEUROPATHY ASSOCIATED WITH TYPE 2 DIABETES MELLITUS: Primary | ICD-10-CM

## 2021-09-21 DIAGNOSIS — Z79.01 LONG TERM (CURRENT) USE OF ANTICOAGULANTS: Primary | ICD-10-CM

## 2021-09-21 LAB
ESTIMATED AVG GLUCOSE: 148 MG/DL (ref 68–131)
HBA1C MFR BLD: 6.8 % (ref 4–5.6)
INR PPP: 2.7 (ref 0.8–1.2)
PROTHROMBIN TIME: 27.4 SEC (ref 9–12.5)

## 2021-09-21 PROCEDURE — 99999 PR PBB SHADOW E&M-EST. PATIENT-LVL IV: ICD-10-PCS | Mod: PBBFAC,,, | Performed by: PODIATRIST

## 2021-09-21 PROCEDURE — 99499 NO LOS: ICD-10-PCS | Mod: S$GLB,,, | Performed by: PODIATRIST

## 2021-09-21 PROCEDURE — 1126F AMNT PAIN NOTED NONE PRSNT: CPT | Mod: CPTII,S$GLB,, | Performed by: PODIATRIST

## 2021-09-21 PROCEDURE — 99499 UNLISTED E&M SERVICE: CPT | Mod: S$GLB,,, | Performed by: PODIATRIST

## 2021-09-21 PROCEDURE — 3075F SYST BP GE 130 - 139MM HG: CPT | Mod: CPTII,S$GLB,, | Performed by: PODIATRIST

## 2021-09-21 PROCEDURE — 3079F PR MOST RECENT DIASTOLIC BLOOD PRESSURE 80-89 MM HG: ICD-10-PCS | Mod: CPTII,S$GLB,, | Performed by: PODIATRIST

## 2021-09-21 PROCEDURE — 11721 PR DEBRIDEMENT OF NAILS, 6 OR MORE: ICD-10-PCS | Mod: 59,Q9,S$GLB, | Performed by: PODIATRIST

## 2021-09-21 PROCEDURE — 3008F PR BODY MASS INDEX (BMI) DOCUMENTED: ICD-10-PCS | Mod: CPTII,S$GLB,, | Performed by: PODIATRIST

## 2021-09-21 PROCEDURE — 83036 HEMOGLOBIN GLYCOSYLATED A1C: CPT | Performed by: NURSE PRACTITIONER

## 2021-09-21 PROCEDURE — 11721 DEBRIDE NAIL 6 OR MORE: CPT | Mod: 59,Q9,S$GLB, | Performed by: PODIATRIST

## 2021-09-21 PROCEDURE — 4010F PR ACE/ARB THEARPY RXD/TAKEN: ICD-10-PCS | Mod: CPTII,S$GLB,, | Performed by: PODIATRIST

## 2021-09-21 PROCEDURE — 85610 PROTHROMBIN TIME: CPT | Performed by: INTERNAL MEDICINE

## 2021-09-21 PROCEDURE — 93793 PR ANTICOAGULANT MGMT FOR PT TAKING WARFARIN: ICD-10-PCS | Mod: S$GLB,,,

## 2021-09-21 PROCEDURE — 99999 PR PBB SHADOW E&M-EST. PATIENT-LVL IV: CPT | Mod: PBBFAC,,, | Performed by: PODIATRIST

## 2021-09-21 PROCEDURE — 3051F HG A1C>EQUAL 7.0%<8.0%: CPT | Mod: CPTII,S$GLB,, | Performed by: PODIATRIST

## 2021-09-21 PROCEDURE — 1159F MED LIST DOCD IN RCRD: CPT | Mod: CPTII,S$GLB,, | Performed by: PODIATRIST

## 2021-09-21 PROCEDURE — 1126F PR PAIN SEVERITY QUANTIFIED, NO PAIN PRESENT: ICD-10-PCS | Mod: CPTII,S$GLB,, | Performed by: PODIATRIST

## 2021-09-21 PROCEDURE — 4010F ACE/ARB THERAPY RXD/TAKEN: CPT | Mod: CPTII,S$GLB,, | Performed by: PODIATRIST

## 2021-09-21 PROCEDURE — 1159F PR MEDICATION LIST DOCUMENTED IN MEDICAL RECORD: ICD-10-PCS | Mod: CPTII,S$GLB,, | Performed by: PODIATRIST

## 2021-09-21 PROCEDURE — 11056 PR TRIM BENIGN HYPERKERATOTIC SKIN LESION,2-4: ICD-10-PCS | Mod: Q9,S$GLB,, | Performed by: PODIATRIST

## 2021-09-21 PROCEDURE — 93793 ANTICOAG MGMT PT WARFARIN: CPT | Mod: S$GLB,,,

## 2021-09-21 PROCEDURE — 3008F BODY MASS INDEX DOCD: CPT | Mod: CPTII,S$GLB,, | Performed by: PODIATRIST

## 2021-09-21 PROCEDURE — 36415 COLL VENOUS BLD VENIPUNCTURE: CPT | Performed by: INTERNAL MEDICINE

## 2021-09-21 PROCEDURE — 3051F PR MOST RECENT HEMOGLOBIN A1C LEVEL 7.0 - < 8.0%: ICD-10-PCS | Mod: CPTII,S$GLB,, | Performed by: PODIATRIST

## 2021-09-21 PROCEDURE — 11056 PARNG/CUTG B9 HYPRKR LES 2-4: CPT | Mod: Q9,S$GLB,, | Performed by: PODIATRIST

## 2021-09-21 PROCEDURE — 3079F DIAST BP 80-89 MM HG: CPT | Mod: CPTII,S$GLB,, | Performed by: PODIATRIST

## 2021-09-21 PROCEDURE — 3075F PR MOST RECENT SYSTOLIC BLOOD PRESS GE 130-139MM HG: ICD-10-PCS | Mod: CPTII,S$GLB,, | Performed by: PODIATRIST

## 2021-09-29 DIAGNOSIS — Z12.11 COLON CANCER SCREENING: ICD-10-CM

## 2021-10-04 ENCOUNTER — PATIENT MESSAGE (OUTPATIENT)
Dept: ADMINISTRATIVE | Facility: HOSPITAL | Age: 66
End: 2021-10-04

## 2021-10-05 ENCOUNTER — ANTI-COAG VISIT (OUTPATIENT)
Dept: CARDIOLOGY | Facility: CLINIC | Age: 66
End: 2021-10-05
Payer: MEDICARE

## 2021-10-05 ENCOUNTER — LAB VISIT (OUTPATIENT)
Dept: LAB | Facility: HOSPITAL | Age: 66
End: 2021-10-05
Attending: INTERNAL MEDICINE
Payer: MEDICARE

## 2021-10-05 DIAGNOSIS — I63.10 CEREBROVASCULAR ACCIDENT (CVA) DUE TO EMBOLISM OF PRECEREBRAL ARTERY: ICD-10-CM

## 2021-10-05 DIAGNOSIS — Z79.01 LONG TERM (CURRENT) USE OF ANTICOAGULANTS: ICD-10-CM

## 2021-10-05 DIAGNOSIS — Z79.01 LONG TERM (CURRENT) USE OF ANTICOAGULANTS: Primary | ICD-10-CM

## 2021-10-05 LAB
INR PPP: 2.5 (ref 0.8–1.2)
PROTHROMBIN TIME: 25.8 SEC (ref 9–12.5)

## 2021-10-05 PROCEDURE — 93793 ANTICOAG MGMT PT WARFARIN: CPT | Mod: S$GLB,,,

## 2021-10-05 PROCEDURE — 36415 COLL VENOUS BLD VENIPUNCTURE: CPT | Performed by: INTERNAL MEDICINE

## 2021-10-05 PROCEDURE — 93793 PR ANTICOAGULANT MGMT FOR PT TAKING WARFARIN: ICD-10-PCS | Mod: S$GLB,,,

## 2021-10-05 PROCEDURE — 85610 PROTHROMBIN TIME: CPT | Performed by: INTERNAL MEDICINE

## 2021-10-15 ENCOUNTER — PES CALL (OUTPATIENT)
Dept: ADMINISTRATIVE | Facility: CLINIC | Age: 66
End: 2021-10-15

## 2021-10-18 ENCOUNTER — PATIENT MESSAGE (OUTPATIENT)
Dept: ADMINISTRATIVE | Facility: HOSPITAL | Age: 66
End: 2021-10-18
Payer: MEDICARE

## 2021-10-26 ENCOUNTER — ANTI-COAG VISIT (OUTPATIENT)
Dept: CARDIOLOGY | Facility: CLINIC | Age: 66
End: 2021-10-26
Payer: MEDICARE

## 2021-10-26 ENCOUNTER — LAB VISIT (OUTPATIENT)
Dept: LAB | Facility: HOSPITAL | Age: 66
End: 2021-10-26
Attending: INTERNAL MEDICINE
Payer: MEDICARE

## 2021-10-26 DIAGNOSIS — Z79.01 LONG TERM (CURRENT) USE OF ANTICOAGULANTS: ICD-10-CM

## 2021-10-26 DIAGNOSIS — I63.10 CEREBROVASCULAR ACCIDENT (CVA) DUE TO EMBOLISM OF PRECEREBRAL ARTERY: ICD-10-CM

## 2021-10-26 DIAGNOSIS — Z79.01 LONG TERM (CURRENT) USE OF ANTICOAGULANTS: Primary | ICD-10-CM

## 2021-10-26 LAB
INR PPP: 2.4 (ref 0.8–1.2)
PROTHROMBIN TIME: 24.5 SEC (ref 9–12.5)

## 2021-10-26 PROCEDURE — 93793 PR ANTICOAGULANT MGMT FOR PT TAKING WARFARIN: ICD-10-PCS | Mod: S$GLB,,,

## 2021-10-26 PROCEDURE — 93793 ANTICOAG MGMT PT WARFARIN: CPT | Mod: S$GLB,,,

## 2021-10-26 PROCEDURE — 36415 COLL VENOUS BLD VENIPUNCTURE: CPT | Performed by: INTERNAL MEDICINE

## 2021-10-26 PROCEDURE — 85610 PROTHROMBIN TIME: CPT | Performed by: INTERNAL MEDICINE

## 2021-11-16 ENCOUNTER — LAB VISIT (OUTPATIENT)
Dept: LAB | Facility: HOSPITAL | Age: 66
End: 2021-11-16
Attending: INTERNAL MEDICINE
Payer: MEDICARE

## 2021-11-16 DIAGNOSIS — Z79.01 LONG TERM (CURRENT) USE OF ANTICOAGULANTS: ICD-10-CM

## 2021-11-16 DIAGNOSIS — I63.10 CEREBROVASCULAR ACCIDENT (CVA) DUE TO EMBOLISM OF PRECEREBRAL ARTERY: ICD-10-CM

## 2021-11-16 LAB
INR PPP: 2.6 (ref 0.8–1.2)
PROTHROMBIN TIME: 26.7 SEC (ref 9–12.5)

## 2021-11-16 PROCEDURE — 36415 COLL VENOUS BLD VENIPUNCTURE: CPT | Performed by: INTERNAL MEDICINE

## 2021-11-16 PROCEDURE — 85610 PROTHROMBIN TIME: CPT | Performed by: INTERNAL MEDICINE

## 2021-11-17 ENCOUNTER — ANTI-COAG VISIT (OUTPATIENT)
Dept: CARDIOLOGY | Facility: CLINIC | Age: 66
End: 2021-11-17
Payer: MEDICARE

## 2021-11-17 DIAGNOSIS — I63.10 CEREBROVASCULAR ACCIDENT (CVA) DUE TO EMBOLISM OF PRECEREBRAL ARTERY: ICD-10-CM

## 2021-11-17 DIAGNOSIS — Z79.01 LONG TERM (CURRENT) USE OF ANTICOAGULANTS: Primary | ICD-10-CM

## 2021-11-17 PROCEDURE — 93793 PR ANTICOAGULANT MGMT FOR PT TAKING WARFARIN: ICD-10-PCS | Mod: S$GLB,,,

## 2021-11-17 PROCEDURE — 93793 ANTICOAG MGMT PT WARFARIN: CPT | Mod: S$GLB,,,

## 2021-12-08 ENCOUNTER — LAB VISIT (OUTPATIENT)
Dept: LAB | Facility: HOSPITAL | Age: 66
End: 2021-12-08
Attending: INTERNAL MEDICINE
Payer: MEDICARE

## 2021-12-08 DIAGNOSIS — Z79.01 LONG TERM (CURRENT) USE OF ANTICOAGULANTS: ICD-10-CM

## 2021-12-08 DIAGNOSIS — I63.10 CEREBROVASCULAR ACCIDENT (CVA) DUE TO EMBOLISM OF PRECEREBRAL ARTERY: ICD-10-CM

## 2021-12-08 LAB
INR PPP: 2.1 (ref 0.8–1.2)
PROTHROMBIN TIME: 21.9 SEC (ref 9–12.5)

## 2021-12-08 PROCEDURE — 85610 PROTHROMBIN TIME: CPT | Performed by: INTERNAL MEDICINE

## 2021-12-08 PROCEDURE — 36415 COLL VENOUS BLD VENIPUNCTURE: CPT | Performed by: INTERNAL MEDICINE

## 2021-12-09 ENCOUNTER — ANTI-COAG VISIT (OUTPATIENT)
Dept: CARDIOLOGY | Facility: CLINIC | Age: 66
End: 2021-12-09
Payer: MEDICARE

## 2021-12-09 DIAGNOSIS — Z79.01 LONG TERM (CURRENT) USE OF ANTICOAGULANTS: Primary | ICD-10-CM

## 2021-12-09 DIAGNOSIS — I63.10 CEREBROVASCULAR ACCIDENT (CVA) DUE TO EMBOLISM OF PRECEREBRAL ARTERY: ICD-10-CM

## 2021-12-09 PROCEDURE — 99211 OFF/OP EST MAY X REQ PHY/QHP: CPT | Mod: S$GLB,,, | Performed by: INTERNAL MEDICINE

## 2021-12-09 PROCEDURE — 99211 PR OFFICE/OUTPT VISIT, EST, LEVL I: ICD-10-PCS | Mod: S$GLB,,, | Performed by: INTERNAL MEDICINE

## 2021-12-16 ENCOUNTER — PATIENT OUTREACH (OUTPATIENT)
Dept: ADMINISTRATIVE | Facility: OTHER | Age: 66
End: 2021-12-16
Payer: MEDICARE

## 2022-01-05 ENCOUNTER — LAB VISIT (OUTPATIENT)
Dept: LAB | Facility: HOSPITAL | Age: 67
End: 2022-01-05
Attending: INTERNAL MEDICINE
Payer: MEDICARE

## 2022-01-05 ENCOUNTER — OFFICE VISIT (OUTPATIENT)
Dept: UROLOGY | Facility: CLINIC | Age: 67
End: 2022-01-05
Payer: MEDICARE

## 2022-01-05 VITALS
BODY MASS INDEX: 23.99 KG/M2 | SYSTOLIC BLOOD PRESSURE: 123 MMHG | DIASTOLIC BLOOD PRESSURE: 84 MMHG | WEIGHT: 181 LBS | HEART RATE: 87 BPM | HEIGHT: 73 IN

## 2022-01-05 DIAGNOSIS — I63.10 CEREBROVASCULAR ACCIDENT (CVA) DUE TO EMBOLISM OF PRECEREBRAL ARTERY: ICD-10-CM

## 2022-01-05 DIAGNOSIS — N32.81 OVERACTIVE BLADDER: ICD-10-CM

## 2022-01-05 DIAGNOSIS — Z79.01 LONG TERM (CURRENT) USE OF ANTICOAGULANTS: ICD-10-CM

## 2022-01-05 DIAGNOSIS — R97.20 ELEVATED PSA: ICD-10-CM

## 2022-01-05 DIAGNOSIS — C61 PROSTATE CANCER: Primary | ICD-10-CM

## 2022-01-05 PROCEDURE — 3008F BODY MASS INDEX DOCD: CPT | Mod: CPTII,S$GLB,, | Performed by: UROLOGY

## 2022-01-05 PROCEDURE — 1126F PR PAIN SEVERITY QUANTIFIED, NO PAIN PRESENT: ICD-10-PCS | Mod: CPTII,S$GLB,, | Performed by: UROLOGY

## 2022-01-05 PROCEDURE — 1159F PR MEDICATION LIST DOCUMENTED IN MEDICAL RECORD: ICD-10-PCS | Mod: CPTII,S$GLB,, | Performed by: UROLOGY

## 2022-01-05 PROCEDURE — 3008F PR BODY MASS INDEX (BMI) DOCUMENTED: ICD-10-PCS | Mod: CPTII,S$GLB,, | Performed by: UROLOGY

## 2022-01-05 PROCEDURE — 3288F PR FALLS RISK ASSESSMENT DOCUMENTED: ICD-10-PCS | Mod: CPTII,S$GLB,, | Performed by: UROLOGY

## 2022-01-05 PROCEDURE — 3074F SYST BP LT 130 MM HG: CPT | Mod: CPTII,S$GLB,, | Performed by: UROLOGY

## 2022-01-05 PROCEDURE — 1159F MED LIST DOCD IN RCRD: CPT | Mod: CPTII,S$GLB,, | Performed by: UROLOGY

## 2022-01-05 PROCEDURE — 99213 PR OFFICE/OUTPT VISIT, EST, LEVL III, 20-29 MIN: ICD-10-PCS | Mod: S$GLB,,, | Performed by: UROLOGY

## 2022-01-05 PROCEDURE — 1101F PT FALLS ASSESS-DOCD LE1/YR: CPT | Mod: CPTII,S$GLB,, | Performed by: UROLOGY

## 2022-01-05 PROCEDURE — 99213 OFFICE O/P EST LOW 20 MIN: CPT | Mod: S$GLB,,, | Performed by: UROLOGY

## 2022-01-05 PROCEDURE — 3079F DIAST BP 80-89 MM HG: CPT | Mod: CPTII,S$GLB,, | Performed by: UROLOGY

## 2022-01-05 PROCEDURE — 3074F PR MOST RECENT SYSTOLIC BLOOD PRESSURE < 130 MM HG: ICD-10-PCS | Mod: CPTII,S$GLB,, | Performed by: UROLOGY

## 2022-01-05 PROCEDURE — 1160F RVW MEDS BY RX/DR IN RCRD: CPT | Mod: CPTII,S$GLB,, | Performed by: UROLOGY

## 2022-01-05 PROCEDURE — 1101F PR PT FALLS ASSESS DOC 0-1 FALLS W/OUT INJ PAST YR: ICD-10-PCS | Mod: CPTII,S$GLB,, | Performed by: UROLOGY

## 2022-01-05 PROCEDURE — 1160F PR REVIEW ALL MEDS BY PRESCRIBER/CLIN PHARMACIST DOCUMENTED: ICD-10-PCS | Mod: CPTII,S$GLB,, | Performed by: UROLOGY

## 2022-01-05 PROCEDURE — 99999 PR PBB SHADOW E&M-EST. PATIENT-LVL V: CPT | Mod: PBBFAC,,, | Performed by: UROLOGY

## 2022-01-05 PROCEDURE — 3288F FALL RISK ASSESSMENT DOCD: CPT | Mod: CPTII,S$GLB,, | Performed by: UROLOGY

## 2022-01-05 PROCEDURE — 85610 PROTHROMBIN TIME: CPT | Performed by: INTERNAL MEDICINE

## 2022-01-05 PROCEDURE — 3079F PR MOST RECENT DIASTOLIC BLOOD PRESSURE 80-89 MM HG: ICD-10-PCS | Mod: CPTII,S$GLB,, | Performed by: UROLOGY

## 2022-01-05 PROCEDURE — 99999 PR PBB SHADOW E&M-EST. PATIENT-LVL V: ICD-10-PCS | Mod: PBBFAC,,, | Performed by: UROLOGY

## 2022-01-05 PROCEDURE — 1126F AMNT PAIN NOTED NONE PRSNT: CPT | Mod: CPTII,S$GLB,, | Performed by: UROLOGY

## 2022-01-05 PROCEDURE — 36415 COLL VENOUS BLD VENIPUNCTURE: CPT | Performed by: INTERNAL MEDICINE

## 2022-01-05 NOTE — PROGRESS NOTES
Subjective:       Patient ID: Kamlesh Diane is a 66 y.o. male.    Chief Complaint: prostate cancer    Follow-up  Pertinent negatives include no chest pain, chills, fever, joint swelling or rash.     Kamlesh Diane is a 66 y.o. male with prostate cancer diagnosed in 2018.   BIOPSIES OF 2. LEFT MIDDLE, 3. LEFT BASE, 7. LEFT TRANSITION ZONE, AND 8. RIGHT TRANSITION  ZONE:  ADENOCARCINOMA TOTAL BRENT SCORE 6 (3+ 3)  .  Diagnosed by: Paul Wei M.D.  (Electronically Signed: 2018-06-08 09:01:44)  Microscopic Examination  Adenocarcinoma involves under 5% of specimen 2 (involvement of both cores), under 5% of specimen 3 (1 of the 2  cores), 15% of specimen 7, and under 5% of specimen 8 (involvement of both cores). The carcinoma in all these  areas is Brent grade 3.  Frequency during the day. Good stream. No intermittency, hesitancy. Has urinary urgency  - no incontinence. Nocturia x 2.  No gross hematuria. No dysuria.  Does snore. No diagnoss of sleep apnea.    No new bone pain.   No weight loss.       Lab Results   Component Value Date    PSA 6.2 (H) 04/05/2018    PSADIAG 8.9 (H) 07/02/2020    PSADIAG 7.9 (H) 06/02/2020    PSADIAG 5.8 (H) 11/29/2018    PSADIAG 5.0 (H) 07/05/2018    PSADIAG 7.2 (H) 05/11/2018    PSATOTAL 7.1 (H) 08/12/2020    PSAFREE 0.58 08/12/2020    PSAFREEPCT 8.17 08/12/2020       Past Surgical History:   Procedure Laterality Date    CATARACT EXTRACTION      FRACTURE SURGERY      hips x 2 - last fall was 2 yrs ago.       Past Medical History:   Diagnosis Date    CVA (cerebral vascular accident)     Diabetes     Elevated PSA     Hypertension     Prostate cancer     Stroke        Social History     Socioeconomic History    Marital status: Single   Occupational History    Occupation: worked in the hospital previously.    Tobacco Use    Smoking status: Never Smoker    Smokeless tobacco: Never Used   Substance and Sexual Activity    Alcohol use: Yes     Comment: a few shots once a  week.     Drug use: Yes     Types: Marijuana    Sexual activity: Not Currently       Family History   Problem Relation Age of Onset    Hypertension Father     Heart attack Father     Breast cancer Sister     Diabetes Brother     Hypertension Brother        Current Outpatient Medications   Medication Sig Dispense Refill    alcohol swabs (ALCOHOL PREP PADS) PadM Use 4 times a day. 400 each 3    amantadine HCL (SYMMETREL) 100 mg capsule TAKE 1 CAPSULE(100 MG) BY MOUTH TWICE DAILY 180 capsule 3    amLODIPine (NORVASC) 10 MG tablet TAKE 1 TABLET(10 MG) BY MOUTH EVERY DAY 90 tablet 1    ammonium lactate 12 % Crea Apply 1 application topically 2 (two) times daily. Apply to affected area  0    atorvastatin (LIPITOR) 40 MG tablet Take 1 tablet (40 mg total) by mouth once daily. 90 tablet 3    blood sugar diagnostic Strp 1 strip by Misc.(Non-Drug; Combo Route) route 4 (four) times daily. 400 strip 3    blood-glucose meter kit Use as instructed 1 each 0    calcitRIOL (ROCALTROL) 0.25 MCG Cap Take 0.25 mcg by mouth once daily.      carvediloL (COREG) 3.125 MG tablet TAKE 1 TABLET(3.125 MG) BY MOUTH TWICE DAILY WITH MEALS 180 tablet 3    cloNIDine (CATAPRES) 0.1 MG tablet TAKE 1 TABLET BY MOUTH THREE TIMES DAILY 270 tablet 3    ergocalciferol (ERGOCALCIFEROL) 50,000 unit Cap TAKE 1 CAPSULE BY MOUTH EVERY 7 DAYS 12 capsule 3    insulin lispro (HUMALOG KWIKPEN INSULIN) 100 unit/mL pen Inject 7-8 units w/ breakfast, lunch and dinner, no scale max daily 24 units. 15 mL 3    lancets Misc 1 lancet by Misc.(Non-Drug; Combo Route) route 4 (four) times daily. 400 each 3    lancing device Misc 1 Device by Misc.(Non-Drug; Combo Route) route 2 (two) times daily with meals. 1 each 0    LEVEMIR FLEXTOUCH U-100 INSULN 100 unit/mL (3 mL) InPn pen ADMINISTER 45 UNITS UNDER THE SKIN EVERY EVENING 45 mL 11    losartan (COZAAR) 50 MG tablet Take 1 tablet (50 mg total) by mouth once daily. 90 tablet 3    pen needle, diabetic  "(BD ULTRA-FINE SHORT PEN NEEDLE) 31 gauge x 5/16" Ndle USE THREE TIMES DAILY 200 each 2    RESTASIS 0.05 % ophthalmic emulsion INSTILL 1 DROP IN BOTH EYES TWICE DAILY 180 each 3    traMADol (ULTRAM) 50 mg tablet Take 1 tablet (50 mg total) by mouth every 8 (eight) hours as needed for Pain. 15 tablet 0    warfarin (COUMADIN) 2.5 MG tablet TAKE 1 TABLET BY MOUTH EVERY MONDAY, TUESDAY, OR FRIDAY AS DIRECTED 90 tablet 3    warfarin (COUMADIN) 5 MG tablet TAKE 1 TABLET BY MOUTH ON WEDNESDAY, THURSDAY, SATURDAY AND SUNDAY OR AS DIRECTED BY COUMADIN CLINIC 42 tablet 11    alendronate (FOSAMAX) 70 MG tablet Take 1 tablet (70 mg total) by mouth every 7 days. 12 tablet 3     Current Facility-Administered Medications   Medication Dose Route Frequency Provider Last Rate Last Admin    lidocaine HCL 2% jelly   Topical (Top) Once Carey Rodríguez MD           Review of patient's allergies indicates:  No Known Allergies     BMP  Lab Results   Component Value Date     05/31/2021    K 4.7 05/31/2021     05/31/2021    CO2 26 05/31/2021    BUN 18 05/31/2021    CREATININE 1.9 (H) 05/31/2021    CALCIUM 10.0 05/31/2021    ANIONGAP 8 05/31/2021    ESTGFRAFRICA 41.8 (A) 05/31/2021    EGFRNONAA 36.2 (A) 05/31/2021       Review of Systems   Constitutional: Negative for chills, fever and unexpected weight change.   HENT: Negative for hearing loss and nosebleeds.    Eyes: Negative for visual disturbance.   Respiratory: Negative for chest tightness.    Cardiovascular: Negative for chest pain.   Gastrointestinal: Negative for diarrhea.   Genitourinary: Negative for dysuria, hematuria, nocturia and urgency.   Musculoskeletal: Negative for joint swelling.   Skin: Negative for rash.   Neurological: Negative for seizures.   Hematological: Does not bruise/bleed easily.   Psychiatric/Behavioral: Negative for behavioral problems.   no urine  Objective:      Physical Exam  Constitutional:       Appearance: He is well-developed. "   HENT:      Head: Normocephalic and atraumatic.   Eyes:      General: No scleral icterus.  Cardiovascular:      Rate and Rhythm: Normal rate and regular rhythm.   Pulmonary:      Effort: Pulmonary effort is normal. No respiratory distress.   Abdominal:      Palpations: There is no mass.   Genitourinary:     Comments: Prostate was smooth without nodularity. No rectal masses.  35 grams. External hemorrhoids were not present. Normal perineum.    Musculoskeletal:         General: No tenderness.      Cervical back: Neck supple.   Lymphadenopathy:      Cervical: No cervical adenopathy.   Skin:     General: Skin is warm.      Findings: No rash.   Neurological:      Mental Status: He is alert and oriented to person, place, and time.       no urine - will give from home.   Assessment:       1. Prostate cancer    2. Elevated PSA    3. Overactive bladder        Plan:   Kamlesh was seen today for follow-up.    Diagnoses and all orders for this visit:    Prostate cancer  -     Prostate Specific Antigen, Diagnostic; Future    Elevated PSA  -     Prostate Specific Antigen, Diagnostic; Future    Overactive bladder    repeat psa. Consider repeat biopsy.   No MRI b/c of CKD.   Has some OAB symptoms, could consider myrbetriq.

## 2022-01-06 ENCOUNTER — ANTI-COAG VISIT (OUTPATIENT)
Dept: CARDIOLOGY | Facility: CLINIC | Age: 67
End: 2022-01-06
Payer: MEDICARE

## 2022-01-06 ENCOUNTER — IMMUNIZATION (OUTPATIENT)
Dept: INTERNAL MEDICINE | Facility: CLINIC | Age: 67
End: 2022-01-06
Payer: MEDICARE

## 2022-01-06 DIAGNOSIS — Z23 NEED FOR VACCINATION: Primary | ICD-10-CM

## 2022-01-06 DIAGNOSIS — I63.10 CEREBROVASCULAR ACCIDENT (CVA) DUE TO EMBOLISM OF PRECEREBRAL ARTERY: ICD-10-CM

## 2022-01-06 DIAGNOSIS — Z79.01 LONG TERM (CURRENT) USE OF ANTICOAGULANTS: Primary | ICD-10-CM

## 2022-01-06 LAB
INR PPP: 2.7 (ref 0.8–1.2)
PROTHROMBIN TIME: 28.1 SEC (ref 9–12.5)

## 2022-01-06 PROCEDURE — 0004A COVID-19, MRNA, LNP-S, PF, 30 MCG/0.3 ML DOSE VACCINE: CPT | Mod: CV19,PBBFAC | Performed by: INTERNAL MEDICINE

## 2022-01-06 PROCEDURE — 99211 OFF/OP EST MAY X REQ PHY/QHP: CPT | Mod: S$GLB,,, | Performed by: INTERNAL MEDICINE

## 2022-01-06 PROCEDURE — 99211 PR OFFICE/OUTPT VISIT, EST, LEVL I: ICD-10-PCS | Mod: S$GLB,,, | Performed by: INTERNAL MEDICINE

## 2022-01-14 ENCOUNTER — LAB VISIT (OUTPATIENT)
Dept: LAB | Facility: HOSPITAL | Age: 67
End: 2022-01-14
Attending: NURSE PRACTITIONER
Payer: MEDICARE

## 2022-01-14 DIAGNOSIS — E11.3593 TYPE 2 DIABETES MELLITUS WITH BOTH EYES AFFECTED BY PROLIFERATIVE RETINOPATHY WITHOUT MACULAR EDEMA, WITH LONG-TERM CURRENT USE OF INSULIN: ICD-10-CM

## 2022-01-14 DIAGNOSIS — Z79.4 TYPE 2 DIABETES MELLITUS WITH BOTH EYES AFFECTED BY PROLIFERATIVE RETINOPATHY WITHOUT MACULAR EDEMA, WITH LONG-TERM CURRENT USE OF INSULIN: ICD-10-CM

## 2022-01-14 DIAGNOSIS — C61 PROSTATE CANCER: ICD-10-CM

## 2022-01-14 DIAGNOSIS — R97.20 ELEVATED PSA: ICD-10-CM

## 2022-01-14 LAB
COMPLEXED PSA SERPL-MCNC: 13.4 NG/ML (ref 0–4)
ESTIMATED AVG GLUCOSE: 160 MG/DL (ref 68–131)
HBA1C MFR BLD: 7.2 % (ref 4–5.6)

## 2022-01-14 PROCEDURE — 36415 COLL VENOUS BLD VENIPUNCTURE: CPT | Performed by: UROLOGY

## 2022-01-14 PROCEDURE — 84153 ASSAY OF PSA TOTAL: CPT | Performed by: UROLOGY

## 2022-01-14 PROCEDURE — 83036 HEMOGLOBIN GLYCOSYLATED A1C: CPT | Performed by: NURSE PRACTITIONER

## 2022-01-20 ENCOUNTER — PATIENT OUTREACH (OUTPATIENT)
Dept: ADMINISTRATIVE | Facility: OTHER | Age: 67
End: 2022-01-20
Payer: MEDICARE

## 2022-01-20 NOTE — PROGRESS NOTES
CHIEF COMPLAINT: Type 2 Diabetes     HPI: Mr. Kamlesh Diane is a 66 y.o. male who was diagnosed with Type 2 DM > 5 years.   Pt has h/o MCA stroke, HTN, PAD, low bone density-on fosamax, prostate ca, CKD3, CR 1.7-2.  Past Medical History:   Diagnosis Date    CVA (cerebral vascular accident)     Diabetes     Elevated PSA     Hypertension     Prostate cancer     Stroke      Pt was last seen by me in 9/2021   Arrived with brother  Moved in with brother, still having work done on home.   Uses walker.  Reports highest bg : 210 mg/dl x 1   Lowest bg 80 mg/dl  93 , 108, 117 in am     a1c stable.    Lab Results   Component Value Date    HGBA1C 7.2 (H) 01/14/2022     Last fall 9/6/21, minimal trauma.    Dietary habits:  Wakes up at 10-11a  Eating 2 meals a day  Brunch and dinner   Crystal lite/water  Varies with meals  Cooked meal, occ fasting foods   Caramel popcorns, fruits     PREVIOUS DIABETES MEDICATIONS TRIED  novolog   levemir   humalog     CURRENT DIABETIC MEDS: humalog 8-9  units ac-holds if sugar is less than 100 , no scale, levemir 41 units at night  Holds if sugar is < 100 mg/dl  admins humalog if sugar is > 150 mg/dl  Pt is monitoring blood glucose readings 3 times a day max .  Needs >100 strips per month related to fluctuations with blood glucose reading, a1c trends, and activity level.    Diabetes Management Status    Statin: Taking  ACE/ARB: Taking    Screening or Prevention Patient's value Goal Complete/Controlled?   HgA1C Testing and Control   Lab Results   Component Value Date    HGBA1C 7.2 (H) 01/14/2022      Annually/Less than 8% No   Lipid profile : 05/31/2021 Annually Yes   LDL control Lab Results   Component Value Date    LDLCALC 86.6 05/31/2021    Annually/Less than 100 mg/dl  Yes   Nephropathy screening Lab Results   Component Value Date    LABMICR 40.0 01/14/2022     Lab Results   Component Value Date    PROTEINUA Negative 05/30/2018    Annually Yes   Blood pressure BP Readings from Last 1  "Encounters:   01/05/22 123/84    Less than 140/90 No   Dilated retinal exam : 01/05/2021 Annually Yes   Foot exam   : 09/20/2021 Annually No       REVIEW OF SYSTEMS  General: +weakness, fatigue, +weight gain 4-6#   Eyes: no double or blurred vision, eye pain, or redness  Cardiovascular: no chest pain, palpitations, edema, or murmurs.   Respiratory: no cough or dyspnea.   GI: no heartburn, nausea, or changes in bowel patterns; good appetite.   Skin: no rashes, dryness, itching, or reactions at insulin injection sites.  Neuro: + numbness, tingling, tremors, or vertigo. +hip pain (L)  Endocrine: no polyuria, polydipsia, polyphagia, heat or cold intolerance.     Vital Signs  /80 (BP Location: Left arm, Patient Position: Sitting, BP Method: Medium (Manual))   Pulse 97   Resp 20   Ht 6' 1" (1.854 m)   Wt 84.5 kg (186 lb 4.6 oz)   SpO2 99%   BMI 24.58 kg/m²     Hemoglobin A1C   Date Value Ref Range Status   01/14/2022 7.2 (H) 4.0 - 5.6 % Final     Comment:     ADA Screening Guidelines:  5.7-6.4%  Consistent with prediabetes  >or=6.5%  Consistent with diabetes    High levels of fetal hemoglobin interfere with the HbA1C  assay. Heterozygous hemoglobin variants (HbS, HgC, etc)do  not significantly interfere with this assay.   However, presence of multiple variants may affect accuracy.     09/21/2021 6.8 (H) 4.0 - 5.6 % Final     Comment:     ADA Screening Guidelines:  5.7-6.4%  Consistent with prediabetes  >or=6.5%  Consistent with diabetes    High levels of fetal hemoglobin interfere with the HbA1C  assay. Heterozygous hemoglobin variants (HbS, HgC, etc)do  not significantly interfere with this assay.   However, presence of multiple variants may affect accuracy.     04/15/2021 7.3 (H) 4.0 - 5.6 % Final     Comment:     ADA Screening Guidelines:  5.7-6.4%  Consistent with prediabetes  >or=6.5%  Consistent with diabetes    High levels of fetal hemoglobin interfere with the HbA1C  assay. Heterozygous hemoglobin " variants (HbS, HgC, etc)do  not significantly interfere with this assay.   However, presence of multiple variants may affect accuracy.          Chemistry        Component Value Date/Time     05/31/2021 1201    K 4.7 05/31/2021 1201     05/31/2021 1201    CO2 26 05/31/2021 1201    BUN 18 05/31/2021 1201    CREATININE 1.9 (H) 05/31/2021 1201     05/31/2021 1201        Component Value Date/Time    CALCIUM 10.0 05/31/2021 1201    ALKPHOS 94 05/31/2021 1201    AST 19 05/31/2021 1201    ALT 22 05/31/2021 1201    BILITOT 0.5 05/31/2021 1201    ESTGFRAFRICA 41.8 (A) 05/31/2021 1201    EGFRNONAA 36.2 (A) 05/31/2021 1201           Lab Results   Component Value Date    TSH 2.113 05/31/2021      Lab Results   Component Value Date    CHOL 139 05/31/2021    CHOL 137 06/02/2020    CHOL 136 05/19/2020     Lab Results   Component Value Date    HDL 37 (L) 05/31/2021    HDL 35 (L) 06/02/2020    HDL 30 (L) 05/19/2020     Lab Results   Component Value Date    LDLCALC 86.6 05/31/2021    LDLCALC 86.4 06/02/2020    LDLCALC 86.2 05/19/2020     Lab Results   Component Value Date    TRIG 77 05/31/2021    TRIG 78 06/02/2020    TRIG 99 05/19/2020     Lab Results   Component Value Date    CHOLHDL 26.6 05/31/2021    CHOLHDL 25.5 06/02/2020    CHOLHDL 22.1 05/19/2020         PHYSICAL EXAMINATION  Constitutional: Appears well, no distress  Neck: Supple, trachea midline.   Respiratory: No wheezes, even and unlabored.  Cardiovascular: RRR; no edema.   Lymph: no lymphadenopathy palpated  Skin: warm and dry; no injection site reactions, no acanthosis nigracans observed. +sl mottled   Neuro:patient alert and cooperative, normal affect; mostly steady gait, uses walker.    Diabetes Foot Exam:   Deferred     Assessment/Plan  1. Type 2 diabetes mellitus with both eyes affected by proliferative retinopathy without macular edema, with long-term current use of insulin  Hemoglobin A1C    Ambulatory referral/consult to Optometry   2. Stage 3b  chronic kidney disease     3. PAD (peripheral artery disease)     4. H/O: CVA (cerebrovascular accident)     5. Essential hypertension     6. Cancer of prostate with low recurrence risk (stage T1-2a and [place] < 7 and PSA < 10)     7. Controlled type 2 diabetes mellitus with complication, with long-term current use of insulin  LEVEMIR FLEXTOUCH U-100 INSULN 100 unit/mL (3 mL) InPn pen     1. F/u in 4-5 mos w/ me   Optometry Dr. Tapia due this month/feb 2022   Influenza hd today  a1c prior (1-7 days before appt)   Discussed stressors, routine, dietary habits  Has refills   a1c goal less than 7.5%  2. Avoid hypoglycemia  3. F/u with podiatry  4. F/u with pcp   Uses walker  5. Continue med(s)  Controlled  6. F/u with urology     FOLLOW UP  Follow up in about 4 months (around 5/21/2022).

## 2022-01-21 ENCOUNTER — TELEPHONE (OUTPATIENT)
Dept: PRIMARY CARE CLINIC | Facility: CLINIC | Age: 67
End: 2022-01-21
Payer: MEDICARE

## 2022-01-21 ENCOUNTER — OFFICE VISIT (OUTPATIENT)
Dept: INTERNAL MEDICINE | Facility: CLINIC | Age: 67
End: 2022-01-21
Payer: MEDICARE

## 2022-01-21 VITALS
BODY MASS INDEX: 24.69 KG/M2 | OXYGEN SATURATION: 99 % | HEIGHT: 73 IN | WEIGHT: 186.31 LBS | DIASTOLIC BLOOD PRESSURE: 80 MMHG | HEART RATE: 97 BPM | SYSTOLIC BLOOD PRESSURE: 128 MMHG | RESPIRATION RATE: 20 BRPM

## 2022-01-21 DIAGNOSIS — E11.8 CONTROLLED TYPE 2 DIABETES MELLITUS WITH COMPLICATION, WITH LONG-TERM CURRENT USE OF INSULIN: ICD-10-CM

## 2022-01-21 DIAGNOSIS — Z23 NEED FOR PNEUMOCOCCAL VACCINE: Primary | ICD-10-CM

## 2022-01-21 DIAGNOSIS — C61 CANCER OF PROSTATE WITH LOW RECURRENCE RISK (STAGE T1-2A AND GLEASON < 7 AND PSA < 10): ICD-10-CM

## 2022-01-21 DIAGNOSIS — Z86.73 H/O: CVA (CEREBROVASCULAR ACCIDENT): ICD-10-CM

## 2022-01-21 DIAGNOSIS — N18.32 STAGE 3B CHRONIC KIDNEY DISEASE: ICD-10-CM

## 2022-01-21 DIAGNOSIS — E11.3593 TYPE 2 DIABETES MELLITUS WITH BOTH EYES AFFECTED BY PROLIFERATIVE RETINOPATHY WITHOUT MACULAR EDEMA, WITH LONG-TERM CURRENT USE OF INSULIN: ICD-10-CM

## 2022-01-21 DIAGNOSIS — Z79.4 TYPE 2 DIABETES MELLITUS WITH BOTH EYES AFFECTED BY PROLIFERATIVE RETINOPATHY WITHOUT MACULAR EDEMA, WITH LONG-TERM CURRENT USE OF INSULIN: ICD-10-CM

## 2022-01-21 DIAGNOSIS — Z79.4 CONTROLLED TYPE 2 DIABETES MELLITUS WITH COMPLICATION, WITH LONG-TERM CURRENT USE OF INSULIN: ICD-10-CM

## 2022-01-21 DIAGNOSIS — I10 ESSENTIAL HYPERTENSION: ICD-10-CM

## 2022-01-21 DIAGNOSIS — I73.9 PAD (PERIPHERAL ARTERY DISEASE): ICD-10-CM

## 2022-01-21 PROCEDURE — 3061F NEG MICROALBUMINURIA REV: CPT | Mod: CPTII,S$GLB,, | Performed by: NURSE PRACTITIONER

## 2022-01-21 PROCEDURE — 99214 PR OFFICE/OUTPT VISIT, EST, LEVL IV, 30-39 MIN: ICD-10-PCS | Mod: 25,S$GLB,, | Performed by: NURSE PRACTITIONER

## 2022-01-21 PROCEDURE — 3288F FALL RISK ASSESSMENT DOCD: CPT | Mod: CPTII,S$GLB,, | Performed by: NURSE PRACTITIONER

## 2022-01-21 PROCEDURE — 3008F PR BODY MASS INDEX (BMI) DOCUMENTED: ICD-10-PCS | Mod: CPTII,S$GLB,, | Performed by: NURSE PRACTITIONER

## 2022-01-21 PROCEDURE — 1125F PR PAIN SEVERITY QUANTIFIED, PAIN PRESENT: ICD-10-PCS | Mod: CPTII,S$GLB,, | Performed by: NURSE PRACTITIONER

## 2022-01-21 PROCEDURE — 99999 PR PBB SHADOW E&M-EST. PATIENT-LVL V: ICD-10-PCS | Mod: PBBFAC,,, | Performed by: NURSE PRACTITIONER

## 2022-01-21 PROCEDURE — 3079F PR MOST RECENT DIASTOLIC BLOOD PRESSURE 80-89 MM HG: ICD-10-PCS | Mod: CPTII,S$GLB,, | Performed by: NURSE PRACTITIONER

## 2022-01-21 PROCEDURE — 99214 OFFICE O/P EST MOD 30 MIN: CPT | Mod: 25,S$GLB,, | Performed by: NURSE PRACTITIONER

## 2022-01-21 PROCEDURE — 3061F PR NEG MICROALBUMINURIA RESULT DOCUMENTED/REVIEW: ICD-10-PCS | Mod: CPTII,S$GLB,, | Performed by: NURSE PRACTITIONER

## 2022-01-21 PROCEDURE — G0009 ADMIN PNEUMOCOCCAL VACCINE: HCPCS | Mod: S$GLB,,, | Performed by: NURSE PRACTITIONER

## 2022-01-21 PROCEDURE — 3051F PR MOST RECENT HEMOGLOBIN A1C LEVEL 7.0 - < 8.0%: ICD-10-PCS | Mod: CPTII,S$GLB,, | Performed by: NURSE PRACTITIONER

## 2022-01-21 PROCEDURE — 1101F PR PT FALLS ASSESS DOC 0-1 FALLS W/OUT INJ PAST YR: ICD-10-PCS | Mod: CPTII,S$GLB,, | Performed by: NURSE PRACTITIONER

## 2022-01-21 PROCEDURE — 1159F MED LIST DOCD IN RCRD: CPT | Mod: CPTII,S$GLB,, | Performed by: NURSE PRACTITIONER

## 2022-01-21 PROCEDURE — 1160F RVW MEDS BY RX/DR IN RCRD: CPT | Mod: CPTII,S$GLB,, | Performed by: NURSE PRACTITIONER

## 2022-01-21 PROCEDURE — 3066F NEPHROPATHY DOC TX: CPT | Mod: CPTII,S$GLB,, | Performed by: NURSE PRACTITIONER

## 2022-01-21 PROCEDURE — 3074F SYST BP LT 130 MM HG: CPT | Mod: CPTII,S$GLB,, | Performed by: NURSE PRACTITIONER

## 2022-01-21 PROCEDURE — 90694 VACC AIIV4 NO PRSRV 0.5ML IM: CPT | Mod: S$GLB,,, | Performed by: NURSE PRACTITIONER

## 2022-01-21 PROCEDURE — 3008F BODY MASS INDEX DOCD: CPT | Mod: CPTII,S$GLB,, | Performed by: NURSE PRACTITIONER

## 2022-01-21 PROCEDURE — 90694 FLU VACCINE - QUADRIVALENT - ADJUVANTED: ICD-10-PCS | Mod: S$GLB,,, | Performed by: NURSE PRACTITIONER

## 2022-01-21 PROCEDURE — 99499 UNLISTED E&M SERVICE: CPT | Mod: S$GLB,,, | Performed by: NURSE PRACTITIONER

## 2022-01-21 PROCEDURE — 1159F PR MEDICATION LIST DOCUMENTED IN MEDICAL RECORD: ICD-10-PCS | Mod: CPTII,S$GLB,, | Performed by: NURSE PRACTITIONER

## 2022-01-21 PROCEDURE — G0008 ADMIN INFLUENZA VIRUS VAC: HCPCS | Mod: S$GLB,,, | Performed by: NURSE PRACTITIONER

## 2022-01-21 PROCEDURE — G0008 FLU VACCINE - QUADRIVALENT - ADJUVANTED: ICD-10-PCS | Mod: S$GLB,,, | Performed by: NURSE PRACTITIONER

## 2022-01-21 PROCEDURE — 3288F PR FALLS RISK ASSESSMENT DOCUMENTED: ICD-10-PCS | Mod: CPTII,S$GLB,, | Performed by: NURSE PRACTITIONER

## 2022-01-21 PROCEDURE — 1125F AMNT PAIN NOTED PAIN PRSNT: CPT | Mod: CPTII,S$GLB,, | Performed by: NURSE PRACTITIONER

## 2022-01-21 PROCEDURE — 1101F PT FALLS ASSESS-DOCD LE1/YR: CPT | Mod: CPTII,S$GLB,, | Performed by: NURSE PRACTITIONER

## 2022-01-21 PROCEDURE — 3066F PR DOCUMENTATION OF TREATMENT FOR NEPHROPATHY: ICD-10-PCS | Mod: CPTII,S$GLB,, | Performed by: NURSE PRACTITIONER

## 2022-01-21 PROCEDURE — 1160F PR REVIEW ALL MEDS BY PRESCRIBER/CLIN PHARMACIST DOCUMENTED: ICD-10-PCS | Mod: CPTII,S$GLB,, | Performed by: NURSE PRACTITIONER

## 2022-01-21 PROCEDURE — 90732 PNEUMOCOCCAL POLYSACCHARIDE VACCINE 23-VALENT =>2YO SQ IM: ICD-10-PCS | Mod: S$GLB,,, | Performed by: NURSE PRACTITIONER

## 2022-01-21 PROCEDURE — 3074F PR MOST RECENT SYSTOLIC BLOOD PRESSURE < 130 MM HG: ICD-10-PCS | Mod: CPTII,S$GLB,, | Performed by: NURSE PRACTITIONER

## 2022-01-21 PROCEDURE — 3051F HG A1C>EQUAL 7.0%<8.0%: CPT | Mod: CPTII,S$GLB,, | Performed by: NURSE PRACTITIONER

## 2022-01-21 PROCEDURE — 90732 PPSV23 VACC 2 YRS+ SUBQ/IM: CPT | Mod: S$GLB,,, | Performed by: NURSE PRACTITIONER

## 2022-01-21 PROCEDURE — 99499 RISK ADDL DX/OHS AUDIT: ICD-10-PCS | Mod: S$GLB,,, | Performed by: NURSE PRACTITIONER

## 2022-01-21 PROCEDURE — G0009 PNEUMOCOCCAL POLYSACCHARIDE VACCINE 23-VALENT =>2YO SQ IM: ICD-10-PCS | Mod: S$GLB,,, | Performed by: NURSE PRACTITIONER

## 2022-01-21 PROCEDURE — 99999 PR PBB SHADOW E&M-EST. PATIENT-LVL V: CPT | Mod: PBBFAC,,, | Performed by: NURSE PRACTITIONER

## 2022-01-21 PROCEDURE — 3079F DIAST BP 80-89 MM HG: CPT | Mod: CPTII,S$GLB,, | Performed by: NURSE PRACTITIONER

## 2022-01-21 RX ORDER — INSULIN DETEMIR 100 [IU]/ML
INJECTION, SOLUTION SUBCUTANEOUS
Qty: 45 ML | Refills: 3
Start: 2022-01-21 | End: 2022-08-17

## 2022-01-21 NOTE — PATIENT INSTRUCTIONS
Follow up in 4-5 months w/Irielle  A1c prior (1-7 days before appt)   humalog 8 units before meals if sugar is > 150   levemir 41 units at night   a1c 7.2%   Goal less than 7.5%   [email or web address]   Eat fit giuseppe  myfitness pal ap p  [email or web address]

## 2022-01-21 NOTE — PROGRESS NOTES
Verified patient name and . Advised patient to wait 15 mins. post injection of Fluzone HD and pneumovax 23. Patient tolerated well.

## 2022-01-25 ENCOUNTER — OFFICE VISIT (OUTPATIENT)
Dept: PODIATRY | Facility: CLINIC | Age: 67
End: 2022-01-25
Payer: MEDICARE

## 2022-01-25 VITALS
DIASTOLIC BLOOD PRESSURE: 84 MMHG | HEART RATE: 90 BPM | SYSTOLIC BLOOD PRESSURE: 118 MMHG | WEIGHT: 186 LBS | HEIGHT: 73 IN | RESPIRATION RATE: 18 BRPM | BODY MASS INDEX: 24.65 KG/M2

## 2022-01-25 DIAGNOSIS — E11.42 DIABETIC POLYNEUROPATHY ASSOCIATED WITH TYPE 2 DIABETES MELLITUS: Primary | ICD-10-CM

## 2022-01-25 DIAGNOSIS — B35.1 ONYCHOMYCOSIS DUE TO DERMATOPHYTE: ICD-10-CM

## 2022-01-25 PROCEDURE — 99499 UNLISTED E&M SERVICE: CPT | Mod: S$GLB,,, | Performed by: PODIATRIST

## 2022-01-25 PROCEDURE — 99999 PR PBB SHADOW E&M-EST. PATIENT-LVL IV: CPT | Mod: PBBFAC,,, | Performed by: PODIATRIST

## 2022-01-25 PROCEDURE — 1126F PR PAIN SEVERITY QUANTIFIED, NO PAIN PRESENT: ICD-10-PCS | Mod: CPTII,S$GLB,, | Performed by: PODIATRIST

## 2022-01-25 PROCEDURE — 3074F PR MOST RECENT SYSTOLIC BLOOD PRESSURE < 130 MM HG: ICD-10-PCS | Mod: CPTII,S$GLB,, | Performed by: PODIATRIST

## 2022-01-25 PROCEDURE — 3051F HG A1C>EQUAL 7.0%<8.0%: CPT | Mod: CPTII,S$GLB,, | Performed by: PODIATRIST

## 2022-01-25 PROCEDURE — 3066F PR DOCUMENTATION OF TREATMENT FOR NEPHROPATHY: ICD-10-PCS | Mod: CPTII,S$GLB,, | Performed by: PODIATRIST

## 2022-01-25 PROCEDURE — 3079F DIAST BP 80-89 MM HG: CPT | Mod: CPTII,S$GLB,, | Performed by: PODIATRIST

## 2022-01-25 PROCEDURE — 3008F PR BODY MASS INDEX (BMI) DOCUMENTED: ICD-10-PCS | Mod: CPTII,S$GLB,, | Performed by: PODIATRIST

## 2022-01-25 PROCEDURE — 1159F MED LIST DOCD IN RCRD: CPT | Mod: CPTII,S$GLB,, | Performed by: PODIATRIST

## 2022-01-25 PROCEDURE — 11721 DEBRIDE NAIL 6 OR MORE: CPT | Mod: Q9,S$GLB,, | Performed by: PODIATRIST

## 2022-01-25 PROCEDURE — 3066F NEPHROPATHY DOC TX: CPT | Mod: CPTII,S$GLB,, | Performed by: PODIATRIST

## 2022-01-25 PROCEDURE — 11721 PR DEBRIDEMENT OF NAILS, 6 OR MORE: ICD-10-PCS | Mod: Q9,S$GLB,, | Performed by: PODIATRIST

## 2022-01-25 PROCEDURE — 3061F NEG MICROALBUMINURIA REV: CPT | Mod: CPTII,S$GLB,, | Performed by: PODIATRIST

## 2022-01-25 PROCEDURE — 1159F PR MEDICATION LIST DOCUMENTED IN MEDICAL RECORD: ICD-10-PCS | Mod: CPTII,S$GLB,, | Performed by: PODIATRIST

## 2022-01-25 PROCEDURE — 99499 NO LOS: ICD-10-PCS | Mod: S$GLB,,, | Performed by: PODIATRIST

## 2022-01-25 PROCEDURE — 99999 PR PBB SHADOW E&M-EST. PATIENT-LVL IV: ICD-10-PCS | Mod: PBBFAC,,, | Performed by: PODIATRIST

## 2022-01-25 PROCEDURE — 1126F AMNT PAIN NOTED NONE PRSNT: CPT | Mod: CPTII,S$GLB,, | Performed by: PODIATRIST

## 2022-01-25 PROCEDURE — 3074F SYST BP LT 130 MM HG: CPT | Mod: CPTII,S$GLB,, | Performed by: PODIATRIST

## 2022-01-25 PROCEDURE — 3008F BODY MASS INDEX DOCD: CPT | Mod: CPTII,S$GLB,, | Performed by: PODIATRIST

## 2022-01-25 PROCEDURE — 3061F PR NEG MICROALBUMINURIA RESULT DOCUMENTED/REVIEW: ICD-10-PCS | Mod: CPTII,S$GLB,, | Performed by: PODIATRIST

## 2022-01-25 PROCEDURE — 3079F PR MOST RECENT DIASTOLIC BLOOD PRESSURE 80-89 MM HG: ICD-10-PCS | Mod: CPTII,S$GLB,, | Performed by: PODIATRIST

## 2022-01-25 PROCEDURE — 3051F PR MOST RECENT HEMOGLOBIN A1C LEVEL 7.0 - < 8.0%: ICD-10-PCS | Mod: CPTII,S$GLB,, | Performed by: PODIATRIST

## 2022-01-25 NOTE — PROGRESS NOTES
Subjective:      Patient ID: Kamlesh Diane is a 66 y.o. male.    Chief Complaint: PCP (KEVON Herrera, BASILIAP  1/21/22), Diabetic Foot Exam, and Nail Care    Kamlesh is a 66 y.o. male who presents to the clinic for evaluation and treatment of high risk feet. Kamlesh has a past medical history of CVA (cerebral vascular accident), Diabetes, Elevated PSA, Hypertension, Prostate cancer, and Stroke. The patient's chief complaint is long, thick toenails. This patient has documented high risk feet requiring routine maintenance secondary to peripheral neuropathy.    PCP: Debora Ontiveros MD    Date Last Seen by PCP:   Chief Complaint   Patient presents with    PCP     KEVON Herrera, BASILIAP  1/21/22    Diabetic Foot Exam    Nail Care         Current shoe gear:  Affected Foot: Slip-on shoes     Unaffected Foot: Slip-on shoes    Hemoglobin A1C   Date Value Ref Range Status   01/14/2022 7.2 (H) 4.0 - 5.6 % Final     Comment:     ADA Screening Guidelines:  5.7-6.4%  Consistent with prediabetes  >or=6.5%  Consistent with diabetes    High levels of fetal hemoglobin interfere with the HbA1C  assay. Heterozygous hemoglobin variants (HbS, HgC, etc)do  not significantly interfere with this assay.   However, presence of multiple variants may affect accuracy.     09/21/2021 6.8 (H) 4.0 - 5.6 % Final     Comment:     ADA Screening Guidelines:  5.7-6.4%  Consistent with prediabetes  >or=6.5%  Consistent with diabetes    High levels of fetal hemoglobin interfere with the HbA1C  assay. Heterozygous hemoglobin variants (HbS, HgC, etc)do  not significantly interfere with this assay.   However, presence of multiple variants may affect accuracy.     04/15/2021 7.3 (H) 4.0 - 5.6 % Final     Comment:     ADA Screening Guidelines:  5.7-6.4%  Consistent with prediabetes  >or=6.5%  Consistent with diabetes    High levels of fetal hemoglobin interfere with the HbA1C  assay. Heterozygous hemoglobin variants (HbS, HgC, etc)do  not  significantly interfere with this assay.   However, presence of multiple variants may affect accuracy.         Review of Systems   Constitutional: Negative for chills, fever and malaise/fatigue.   HENT: Negative for hearing loss.    Cardiovascular: Positive for leg swelling. Negative for claudication.   Respiratory: Negative for shortness of breath.    Skin: Positive for color change, dry skin, nail changes and unusual hair distribution. Negative for flushing and rash.   Musculoskeletal: Negative for joint pain and myalgias.   Neurological: Positive for numbness, paresthesias and sensory change. Negative for loss of balance.   Psychiatric/Behavioral: Negative for altered mental status.           Objective:      Physical Exam  Vitals reviewed.   Constitutional:       Appearance: He is well-developed.   Cardiovascular:      Pulses:           Dorsalis pedis pulses are 0 on the right side and 0 on the left side.        Posterior tibial pulses are 1+ on the right side and 1+ on the left side.   Musculoskeletal:      Right ankle: Decreased range of motion. Abnormal pulse.      Right Achilles Tendon: Ramirez's test negative.      Left ankle: Decreased range of motion. Abnormal pulse.      Left Achilles Tendon: Ramirez's test negative.      Right foot: Decreased range of motion.      Left foot: Decreased range of motion.   Feet:      Right foot:      Protective Sensation: 5 sites tested. 2 sites sensed.      Left foot:      Protective Sensation: 5 sites tested. 2 sites sensed.   Skin:     General: Skin is warm and dry.      Capillary Refill: Capillary refill takes more than 3 seconds.      Coloration: Skin is ashen.      Findings: No wound.      Comments:   Nails x 10 are elongated by  2-4mm's, thickened by 2-5 mm's, dystrophic, and are darkened in  coloration . Xerosis Bilaterally. No open lesions, lacerations or wounds noted     Neurological:      Mental Status: He is alert.      Comments: diminished sensation noted to  b/L lower extremities   Psychiatric:         Behavior: Behavior normal. Behavior is cooperative.           Assessment:       Encounter Diagnoses   Name Primary?    Diabetic polyneuropathy associated with type 2 diabetes mellitus Yes    Onychomycosis due to dermatophyte          Plan:       Kamlesh was seen today for pcp, diabetic foot exam and nail care.    Diagnoses and all orders for this visit:    Diabetic polyneuropathy associated with type 2 diabetes mellitus    Onychomycosis due to dermatophyte      I counseled the patient on his conditions, their implications and medical management.      - Shoe inspection. Diabetic Foot Education. Patient reminded of the importance of good nutrition and blood sugar control to help prevent podiatric complications of diabetes. Patient instructed on proper foot hygeine. We discussed wearing proper shoe gear, daily foot inspections, never walking without protective shoe gear, never putting sharp instruments to feet, routine podiatric nail visits every 2-3 months.      - With patient's permission, nails were aggressively reduced and debrided x 10 to their soft tissue attachment mechanically and with electric , removing all offending nail and debris. Patient relates relief following the procedure. He will continue to monitor the areas daily, inspect his feet, wear protective shoe gear when ambulatory, moisturizer to maintain skin integrity and follow in this office in approximately 2-3 months, sooner p.r.n.

## 2022-01-28 DIAGNOSIS — Z86.73 H/O: STROKE: ICD-10-CM

## 2022-01-28 DIAGNOSIS — C61 PROSTATE CANCER: Primary | ICD-10-CM

## 2022-01-29 NOTE — PROGRESS NOTES
Gila - We need to set up a repeat biopsy. Psa is rising and we need to recheck and see where he is with the prostate cancer. Order in for biopsy. Will need to be off coumadin. I'll give antibiotic at time of biopsy. Fleets enema morning of.

## 2022-01-30 RX ORDER — ATORVASTATIN CALCIUM 40 MG/1
TABLET, FILM COATED ORAL
Qty: 90 TABLET | Refills: 3 | Status: SHIPPED | OUTPATIENT
Start: 2022-01-30 | End: 2023-01-20

## 2022-02-01 ENCOUNTER — TELEPHONE (OUTPATIENT)
Dept: UROLOGY | Facility: CLINIC | Age: 67
End: 2022-02-01
Payer: MEDICARE

## 2022-02-01 NOTE — TELEPHONE ENCOUNTER
Unable to speak to the patient. Left message on his brother's VM, Krish Diane. [phone].    ADAM Uriostegui      ----- Message from Carey Rodríguez MD sent at 1/28/2022  8:06 PM CST -----  Gila - We need to set up a repeat biopsy. Psa is rising and we need to recheck and see where he is with the prostate cancer. Order in for biopsy. Will need to be off coumadin. I'll give antibiotic at time of biopsy. Fleets enema morning of.

## 2022-02-03 ENCOUNTER — OFFICE VISIT (OUTPATIENT)
Dept: PRIMARY CARE CLINIC | Facility: CLINIC | Age: 67
End: 2022-02-03
Payer: MEDICARE

## 2022-02-03 ENCOUNTER — TELEPHONE (OUTPATIENT)
Dept: SPEECH THERAPY | Facility: HOSPITAL | Age: 67
End: 2022-02-03
Payer: MEDICARE

## 2022-02-03 VITALS
TEMPERATURE: 99 F | SYSTOLIC BLOOD PRESSURE: 124 MMHG | OXYGEN SATURATION: 97 % | BODY MASS INDEX: 24.92 KG/M2 | HEIGHT: 73 IN | DIASTOLIC BLOOD PRESSURE: 82 MMHG | WEIGHT: 188.06 LBS | HEART RATE: 87 BPM

## 2022-02-03 DIAGNOSIS — C61 PROSTATE CANCER: ICD-10-CM

## 2022-02-03 DIAGNOSIS — E11.3593 TYPE 2 DIABETES MELLITUS WITH BOTH EYES AFFECTED BY PROLIFERATIVE RETINOPATHY WITHOUT MACULAR EDEMA, WITH LONG-TERM CURRENT USE OF INSULIN: Primary | ICD-10-CM

## 2022-02-03 DIAGNOSIS — M81.0 SENILE OSTEOPOROSIS: ICD-10-CM

## 2022-02-03 DIAGNOSIS — Z79.4 TYPE 2 DIABETES MELLITUS WITH BOTH EYES AFFECTED BY PROLIFERATIVE RETINOPATHY WITHOUT MACULAR EDEMA, WITH LONG-TERM CURRENT USE OF INSULIN: Primary | ICD-10-CM

## 2022-02-03 DIAGNOSIS — N25.81 HYPERPARATHYROIDISM, SECONDARY RENAL: ICD-10-CM

## 2022-02-03 DIAGNOSIS — Z12.11 COLON CANCER SCREENING: ICD-10-CM

## 2022-02-03 DIAGNOSIS — I69.359 CVA, OLD, HEMIPARESIS: ICD-10-CM

## 2022-02-03 DIAGNOSIS — R05.9 COUGH: ICD-10-CM

## 2022-02-03 DIAGNOSIS — I10 ESSENTIAL HYPERTENSION: ICD-10-CM

## 2022-02-03 DIAGNOSIS — N18.30 STAGE 3 CHRONIC KIDNEY DISEASE, UNSPECIFIED WHETHER STAGE 3A OR 3B CKD: ICD-10-CM

## 2022-02-03 DIAGNOSIS — Z79.01 CHRONIC ANTICOAGULATION: ICD-10-CM

## 2022-02-03 PROCEDURE — 3051F HG A1C>EQUAL 7.0%<8.0%: CPT | Mod: CPTII,S$GLB,, | Performed by: INTERNAL MEDICINE

## 2022-02-03 PROCEDURE — 3066F NEPHROPATHY DOC TX: CPT | Mod: CPTII,S$GLB,, | Performed by: INTERNAL MEDICINE

## 2022-02-03 PROCEDURE — 3074F PR MOST RECENT SYSTOLIC BLOOD PRESSURE < 130 MM HG: ICD-10-PCS | Mod: CPTII,S$GLB,, | Performed by: INTERNAL MEDICINE

## 2022-02-03 PROCEDURE — 3074F SYST BP LT 130 MM HG: CPT | Mod: CPTII,S$GLB,, | Performed by: INTERNAL MEDICINE

## 2022-02-03 PROCEDURE — 99999 PR PBB SHADOW E&M-EST. PATIENT-LVL V: CPT | Mod: PBBFAC,,, | Performed by: INTERNAL MEDICINE

## 2022-02-03 PROCEDURE — 3061F PR NEG MICROALBUMINURIA RESULT DOCUMENTED/REVIEW: ICD-10-PCS | Mod: CPTII,S$GLB,, | Performed by: INTERNAL MEDICINE

## 2022-02-03 PROCEDURE — 3288F FALL RISK ASSESSMENT DOCD: CPT | Mod: CPTII,S$GLB,, | Performed by: INTERNAL MEDICINE

## 2022-02-03 PROCEDURE — 4010F ACE/ARB THERAPY RXD/TAKEN: CPT | Mod: CPTII,S$GLB,, | Performed by: INTERNAL MEDICINE

## 2022-02-03 PROCEDURE — 3051F PR MOST RECENT HEMOGLOBIN A1C LEVEL 7.0 - < 8.0%: ICD-10-PCS | Mod: CPTII,S$GLB,, | Performed by: INTERNAL MEDICINE

## 2022-02-03 PROCEDURE — 1101F PR PT FALLS ASSESS DOC 0-1 FALLS W/OUT INJ PAST YR: ICD-10-PCS | Mod: CPTII,S$GLB,, | Performed by: INTERNAL MEDICINE

## 2022-02-03 PROCEDURE — 1126F PR PAIN SEVERITY QUANTIFIED, NO PAIN PRESENT: ICD-10-PCS | Mod: CPTII,S$GLB,, | Performed by: INTERNAL MEDICINE

## 2022-02-03 PROCEDURE — 1160F PR REVIEW ALL MEDS BY PRESCRIBER/CLIN PHARMACIST DOCUMENTED: ICD-10-PCS | Mod: CPTII,S$GLB,, | Performed by: INTERNAL MEDICINE

## 2022-02-03 PROCEDURE — 3066F PR DOCUMENTATION OF TREATMENT FOR NEPHROPATHY: ICD-10-PCS | Mod: CPTII,S$GLB,, | Performed by: INTERNAL MEDICINE

## 2022-02-03 PROCEDURE — 99215 OFFICE O/P EST HI 40 MIN: CPT | Mod: S$GLB,,, | Performed by: INTERNAL MEDICINE

## 2022-02-03 PROCEDURE — 3079F PR MOST RECENT DIASTOLIC BLOOD PRESSURE 80-89 MM HG: ICD-10-PCS | Mod: CPTII,S$GLB,, | Performed by: INTERNAL MEDICINE

## 2022-02-03 PROCEDURE — 4010F PR ACE/ARB THEARPY RXD/TAKEN: ICD-10-PCS | Mod: CPTII,S$GLB,, | Performed by: INTERNAL MEDICINE

## 2022-02-03 PROCEDURE — 3008F PR BODY MASS INDEX (BMI) DOCUMENTED: ICD-10-PCS | Mod: CPTII,S$GLB,, | Performed by: INTERNAL MEDICINE

## 2022-02-03 PROCEDURE — 1159F MED LIST DOCD IN RCRD: CPT | Mod: CPTII,S$GLB,, | Performed by: INTERNAL MEDICINE

## 2022-02-03 PROCEDURE — 99499 RISK ADDL DX/OHS AUDIT: ICD-10-PCS | Mod: S$GLB,,, | Performed by: INTERNAL MEDICINE

## 2022-02-03 PROCEDURE — 3008F BODY MASS INDEX DOCD: CPT | Mod: CPTII,S$GLB,, | Performed by: INTERNAL MEDICINE

## 2022-02-03 PROCEDURE — 1101F PT FALLS ASSESS-DOCD LE1/YR: CPT | Mod: CPTII,S$GLB,, | Performed by: INTERNAL MEDICINE

## 2022-02-03 PROCEDURE — 3288F PR FALLS RISK ASSESSMENT DOCUMENTED: ICD-10-PCS | Mod: CPTII,S$GLB,, | Performed by: INTERNAL MEDICINE

## 2022-02-03 PROCEDURE — 1126F AMNT PAIN NOTED NONE PRSNT: CPT | Mod: CPTII,S$GLB,, | Performed by: INTERNAL MEDICINE

## 2022-02-03 PROCEDURE — 1159F PR MEDICATION LIST DOCUMENTED IN MEDICAL RECORD: ICD-10-PCS | Mod: CPTII,S$GLB,, | Performed by: INTERNAL MEDICINE

## 2022-02-03 PROCEDURE — 99499 UNLISTED E&M SERVICE: CPT | Mod: S$GLB,,, | Performed by: INTERNAL MEDICINE

## 2022-02-03 PROCEDURE — 99999 PR PBB SHADOW E&M-EST. PATIENT-LVL V: ICD-10-PCS | Mod: PBBFAC,,, | Performed by: INTERNAL MEDICINE

## 2022-02-03 PROCEDURE — 1160F RVW MEDS BY RX/DR IN RCRD: CPT | Mod: CPTII,S$GLB,, | Performed by: INTERNAL MEDICINE

## 2022-02-03 PROCEDURE — 99215 PR OFFICE/OUTPT VISIT, EST, LEVL V, 40-54 MIN: ICD-10-PCS | Mod: S$GLB,,, | Performed by: INTERNAL MEDICINE

## 2022-02-03 PROCEDURE — 3079F DIAST BP 80-89 MM HG: CPT | Mod: CPTII,S$GLB,, | Performed by: INTERNAL MEDICINE

## 2022-02-03 PROCEDURE — 3061F NEG MICROALBUMINURIA REV: CPT | Mod: CPTII,S$GLB,, | Performed by: INTERNAL MEDICINE

## 2022-02-03 NOTE — PROGRESS NOTES
2/3/22-received message.  Pt has prostate biopsy scheduled on 2/16/22.  Recommend to hold warfarin & bridging.
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
## 2022-02-03 NOTE — Clinical Note
Dr. Rouse, Mr. Diane is on coumadin and will be getting his prostate biopsy w/ Dr. Rodríguez on 2/16/22 for rising PSA. Do you mind getting in touch w/ pt/brother Krish with instructions for his coumadin? Thanks! Debora

## 2022-02-09 ENCOUNTER — ANTI-COAG VISIT (OUTPATIENT)
Dept: CARDIOLOGY | Facility: CLINIC | Age: 67
End: 2022-02-09
Payer: MEDICARE

## 2022-02-09 ENCOUNTER — LAB VISIT (OUTPATIENT)
Dept: LAB | Facility: HOSPITAL | Age: 67
End: 2022-02-09
Attending: INTERNAL MEDICINE
Payer: MEDICARE

## 2022-02-09 DIAGNOSIS — I63.10 CEREBROVASCULAR ACCIDENT (CVA) DUE TO EMBOLISM OF PRECEREBRAL ARTERY: ICD-10-CM

## 2022-02-09 DIAGNOSIS — Z79.4 TYPE 2 DIABETES MELLITUS WITH BOTH EYES AFFECTED BY PROLIFERATIVE RETINOPATHY WITHOUT MACULAR EDEMA, WITH LONG-TERM CURRENT USE OF INSULIN: ICD-10-CM

## 2022-02-09 DIAGNOSIS — Z79.01 LONG TERM (CURRENT) USE OF ANTICOAGULANTS: ICD-10-CM

## 2022-02-09 DIAGNOSIS — Z79.01 LONG TERM (CURRENT) USE OF ANTICOAGULANTS: Primary | ICD-10-CM

## 2022-02-09 DIAGNOSIS — N25.81 HYPERPARATHYROIDISM, SECONDARY RENAL: ICD-10-CM

## 2022-02-09 DIAGNOSIS — E11.3593 TYPE 2 DIABETES MELLITUS WITH BOTH EYES AFFECTED BY PROLIFERATIVE RETINOPATHY WITHOUT MACULAR EDEMA, WITH LONG-TERM CURRENT USE OF INSULIN: ICD-10-CM

## 2022-02-09 DIAGNOSIS — N18.30 STAGE 3 CHRONIC KIDNEY DISEASE, UNSPECIFIED WHETHER STAGE 3A OR 3B CKD: ICD-10-CM

## 2022-02-09 DIAGNOSIS — I10 ESSENTIAL HYPERTENSION: ICD-10-CM

## 2022-02-09 LAB
ALBUMIN SERPL BCP-MCNC: 3.7 G/DL (ref 3.5–5.2)
ALP SERPL-CCNC: 91 U/L (ref 55–135)
ALT SERPL W/O P-5'-P-CCNC: 21 U/L (ref 10–44)
ANION GAP SERPL CALC-SCNC: 6 MMOL/L (ref 8–16)
AST SERPL-CCNC: 17 U/L (ref 10–40)
BASOPHILS # BLD AUTO: 0.03 K/UL (ref 0–0.2)
BASOPHILS NFR BLD: 0.5 % (ref 0–1.9)
BILIRUB SERPL-MCNC: 0.4 MG/DL (ref 0.1–1)
BUN SERPL-MCNC: 17 MG/DL (ref 8–23)
CALCIUM SERPL-MCNC: 9.4 MG/DL (ref 8.7–10.5)
CHLORIDE SERPL-SCNC: 104 MMOL/L (ref 95–110)
CO2 SERPL-SCNC: 28 MMOL/L (ref 23–29)
CREAT SERPL-MCNC: 1.6 MG/DL (ref 0.5–1.4)
DIFFERENTIAL METHOD: ABNORMAL
EOSINOPHIL # BLD AUTO: 0.2 K/UL (ref 0–0.5)
EOSINOPHIL NFR BLD: 3.5 % (ref 0–8)
ERYTHROCYTE [DISTWIDTH] IN BLOOD BY AUTOMATED COUNT: 13.3 % (ref 11.5–14.5)
EST. GFR  (AFRICAN AMERICAN): 51.1 ML/MIN/1.73 M^2
EST. GFR  (NON AFRICAN AMERICAN): 44.2 ML/MIN/1.73 M^2
ESTIMATED AVG GLUCOSE: 163 MG/DL (ref 68–131)
GLUCOSE SERPL-MCNC: 134 MG/DL (ref 70–110)
HBA1C MFR BLD: 7.3 % (ref 4–5.6)
HCT VFR BLD AUTO: 44.8 % (ref 40–54)
HGB BLD-MCNC: 14.1 G/DL (ref 14–18)
IMM GRANULOCYTES # BLD AUTO: 0.01 K/UL (ref 0–0.04)
IMM GRANULOCYTES NFR BLD AUTO: 0.2 % (ref 0–0.5)
INR PPP: 3.4 (ref 0.8–1.2)
LYMPHOCYTES # BLD AUTO: 1.6 K/UL (ref 1–4.8)
LYMPHOCYTES NFR BLD: 25.3 % (ref 18–48)
MCH RBC QN AUTO: 29.1 PG (ref 27–31)
MCHC RBC AUTO-ENTMCNC: 31.5 G/DL (ref 32–36)
MCV RBC AUTO: 92 FL (ref 82–98)
MONOCYTES # BLD AUTO: 0.6 K/UL (ref 0.3–1)
MONOCYTES NFR BLD: 8.9 % (ref 4–15)
NEUTROPHILS # BLD AUTO: 3.8 K/UL (ref 1.8–7.7)
NEUTROPHILS NFR BLD: 61.6 % (ref 38–73)
NRBC BLD-RTO: 0 /100 WBC
PLATELET # BLD AUTO: 319 K/UL (ref 150–450)
PMV BLD AUTO: 10.1 FL (ref 9.2–12.9)
POTASSIUM SERPL-SCNC: 4.9 MMOL/L (ref 3.5–5.1)
PROT SERPL-MCNC: 7.6 G/DL (ref 6–8.4)
PROTHROMBIN TIME: 32.9 SEC (ref 9–12.5)
PTH-INTACT SERPL-MCNC: 104.2 PG/ML (ref 9–77)
RBC # BLD AUTO: 4.85 M/UL (ref 4.6–6.2)
SODIUM SERPL-SCNC: 138 MMOL/L (ref 136–145)
WBC # BLD AUTO: 6.2 K/UL (ref 3.9–12.7)

## 2022-02-09 PROCEDURE — 83036 HEMOGLOBIN GLYCOSYLATED A1C: CPT | Performed by: NURSE PRACTITIONER

## 2022-02-09 PROCEDURE — 85025 COMPLETE CBC W/AUTO DIFF WBC: CPT | Performed by: INTERNAL MEDICINE

## 2022-02-09 PROCEDURE — 99211 PR OFFICE/OUTPT VISIT, EST, LEVL I: ICD-10-PCS | Mod: S$GLB,,, | Performed by: INTERNAL MEDICINE

## 2022-02-09 PROCEDURE — 99211 OFF/OP EST MAY X REQ PHY/QHP: CPT | Mod: S$GLB,,, | Performed by: INTERNAL MEDICINE

## 2022-02-09 PROCEDURE — 83970 ASSAY OF PARATHORMONE: CPT | Performed by: INTERNAL MEDICINE

## 2022-02-09 PROCEDURE — 36415 COLL VENOUS BLD VENIPUNCTURE: CPT | Performed by: NURSE PRACTITIONER

## 2022-02-09 PROCEDURE — 80053 COMPREHEN METABOLIC PANEL: CPT | Performed by: INTERNAL MEDICINE

## 2022-02-09 PROCEDURE — 85610 PROTHROMBIN TIME: CPT | Performed by: INTERNAL MEDICINE

## 2022-02-09 RX ORDER — ENOXAPARIN SODIUM 150 MG/ML
120 INJECTION SUBCUTANEOUS DAILY
Qty: 10 EACH | Refills: 1 | Status: SHIPPED | OUTPATIENT
Start: 2022-02-09 | End: 2022-02-24

## 2022-02-09 NOTE — PROGRESS NOTES
INR not at goal. Medications, chart, and patient findings reviewed. See calendar for adjustments to dose and follow up plan.  Pt has upcoming biopsy.   Patient is here for lovenox bridging instructions    Height    73in            Weight  85.3kg            SCr   1.9             CrCl    ~43.2ml/min             Pre-Procedure Instructions:    Take last dose of warfarin on :       2/10/22                          Start enoxaparin injections the morning of:    2/12/22                             Enoxaparin dose is:      120mg                  Once daily in the morning  Last dose of enoxaparin will be the morning of:     2/15/22                            Post-Procedure Instructions:    Restart warfarin dose on    2/16/22                         Unless directed otherwise by your physician  Restart lovenox injections on the morning of      2/17/22                     Unless directed otherwise by your physician  Call the coumadin clinic your physician has different recommendations post procedure

## 2022-02-09 NOTE — PROGRESS NOTES
Patient given  Coumadin and lovenox  instructions and re-draw date. Celsa repeated back dosing directions. Verbalized understanding and will comply.

## 2022-02-16 ENCOUNTER — PROCEDURE VISIT (OUTPATIENT)
Dept: UROLOGY | Facility: CLINIC | Age: 67
End: 2022-02-16
Payer: MEDICARE

## 2022-02-16 VITALS
DIASTOLIC BLOOD PRESSURE: 80 MMHG | BODY MASS INDEX: 25.42 KG/M2 | WEIGHT: 191.81 LBS | SYSTOLIC BLOOD PRESSURE: 115 MMHG | HEIGHT: 73 IN | TEMPERATURE: 97 F | HEART RATE: 96 BPM | RESPIRATION RATE: 16 BRPM

## 2022-02-16 DIAGNOSIS — C61 PROSTATE CANCER: ICD-10-CM

## 2022-02-16 PROCEDURE — 88341 IMHCHEM/IMCYTCHM EA ADD ANTB: CPT | Mod: 26,,, | Performed by: PATHOLOGY

## 2022-02-16 PROCEDURE — 55700 TRANSRECTAL ULTRASOUND W/ BIOPSY: CPT | Mod: 59,S$GLB,, | Performed by: UROLOGY

## 2022-02-16 PROCEDURE — 88305 TISSUE EXAM BY PATHOLOGIST: ICD-10-PCS | Mod: 26,,, | Performed by: PATHOLOGY

## 2022-02-16 PROCEDURE — 96372 PR INJECTION,THERAP/PROPH/DIAG2ST, IM OR SUBCUT: ICD-10-PCS | Mod: S$GLB,,, | Performed by: UROLOGY

## 2022-02-16 PROCEDURE — 88305 TISSUE EXAM BY PATHOLOGIST: CPT | Mod: 59 | Performed by: PATHOLOGY

## 2022-02-16 PROCEDURE — 88342 IMHCHEM/IMCYTCHM 1ST ANTB: CPT | Mod: 26,,, | Performed by: PATHOLOGY

## 2022-02-16 PROCEDURE — 76872 US TRANSRECTAL: CPT | Mod: 26,S$GLB,, | Performed by: UROLOGY

## 2022-02-16 PROCEDURE — 88341 PR IHC OR ICC EACH ADD'L SINGLE ANTIBODY  STAINPR: ICD-10-PCS | Mod: 26,,, | Performed by: PATHOLOGY

## 2022-02-16 PROCEDURE — 55700 TRANSRECTAL ULTRASOUND W/ BIOPSY: ICD-10-PCS | Mod: 59,S$GLB,, | Performed by: UROLOGY

## 2022-02-16 PROCEDURE — 96372 THER/PROPH/DIAG INJ SC/IM: CPT | Mod: S$GLB,,, | Performed by: UROLOGY

## 2022-02-16 PROCEDURE — 88342 CHG IMMUNOCYTOCHEMISTRY: ICD-10-PCS | Mod: 26,,, | Performed by: PATHOLOGY

## 2022-02-16 PROCEDURE — 88342 IMHCHEM/IMCYTCHM 1ST ANTB: CPT | Mod: 59 | Performed by: PATHOLOGY

## 2022-02-16 PROCEDURE — 88341 IMHCHEM/IMCYTCHM EA ADD ANTB: CPT | Mod: 59 | Performed by: PATHOLOGY

## 2022-02-16 PROCEDURE — 76872 TRANSRECTAL ULTRASOUND W/ BIOPSY: ICD-10-PCS | Mod: 26,S$GLB,, | Performed by: UROLOGY

## 2022-02-16 PROCEDURE — 88305 TISSUE EXAM BY PATHOLOGIST: CPT | Mod: 26,,, | Performed by: PATHOLOGY

## 2022-02-16 RX ORDER — LIDOCAINE HYDROCHLORIDE 10 MG/ML
10 INJECTION INFILTRATION; PERINEURAL
Status: COMPLETED | OUTPATIENT
Start: 2022-02-16 | End: 2022-02-16

## 2022-02-16 RX ORDER — LIDOCAINE HYDROCHLORIDE 20 MG/ML
JELLY TOPICAL
Status: COMPLETED | OUTPATIENT
Start: 2022-02-16 | End: 2022-02-16

## 2022-02-16 RX ORDER — CEFTRIAXONE 1 G/1
1 INJECTION, POWDER, FOR SOLUTION INTRAMUSCULAR; INTRAVENOUS
Status: COMPLETED | OUTPATIENT
Start: 2022-02-16 | End: 2022-02-16

## 2022-02-16 RX ADMIN — LIDOCAINE HYDROCHLORIDE: 20 JELLY TOPICAL at 10:02

## 2022-02-16 RX ADMIN — LIDOCAINE HYDROCHLORIDE 10 ML: 10 INJECTION INFILTRATION; PERINEURAL at 10:02

## 2022-02-16 RX ADMIN — CEFTRIAXONE 1 G: 1 INJECTION, POWDER, FOR SOLUTION INTRAMUSCULAR; INTRAVENOUS at 10:02

## 2022-02-16 NOTE — PROCEDURES
"Transrectal Ultrasound w/ Biopsy    Date/Time: 2/16/2022 9:30 AM  Performed by: Carey Rodríguez MD  Authorized by: Carey Rodríguez MD     Consent Done?:  Yes (Written)  Time out: Immediately prior to procedure a "time out" was called to verify the correct patient, procedure, equipment, support staff and site/side marked as required.    Indications: Prostate Cancer and Elevated PSA    Preparation: Patient was prepped and draped in usual sterile fashion    Position:  Left lateral  Anesthesia:  20cc's 1% Lidocaine, Lidocaine jelly and 10cc's 1% Lidocaine  Patient sedated: No    Prostate Size:  22cc  Lesions:: Yes         Type:  Hypoechoic  Left Base Biopsies: 2  Left Mid Biopsies: 2  Left [place] Biopsies: 2  Right Base Biopsies: 2  Right Mid Biopsies: 2  Right [place] Biopsies: 2  Transitional zone: No    Total Biopsies:  12    Patient tolerance:  Patient tolerated the procedure well with no immediate complications     Will follow up with results      "

## 2022-02-16 NOTE — PATIENT INSTRUCTIONS

## 2022-02-18 ENCOUNTER — PATIENT MESSAGE (OUTPATIENT)
Dept: PRIMARY CARE CLINIC | Facility: CLINIC | Age: 67
End: 2022-02-18
Payer: MEDICARE

## 2022-02-18 ENCOUNTER — ANTI-COAG VISIT (OUTPATIENT)
Dept: CARDIOLOGY | Facility: CLINIC | Age: 67
End: 2022-02-18
Payer: MEDICARE

## 2022-02-18 ENCOUNTER — LAB VISIT (OUTPATIENT)
Dept: LAB | Facility: HOSPITAL | Age: 67
End: 2022-02-18
Payer: MEDICARE

## 2022-02-18 DIAGNOSIS — I63.10 CEREBROVASCULAR ACCIDENT (CVA) DUE TO EMBOLISM OF PRECEREBRAL ARTERY: ICD-10-CM

## 2022-02-18 DIAGNOSIS — Z79.01 LONG TERM (CURRENT) USE OF ANTICOAGULANTS: Primary | ICD-10-CM

## 2022-02-18 DIAGNOSIS — Z79.01 LONG TERM (CURRENT) USE OF ANTICOAGULANTS: ICD-10-CM

## 2022-02-18 LAB
INR PPP: 1.2 (ref 0.8–1.2)
PROTHROMBIN TIME: 12.4 SEC (ref 9–12.5)

## 2022-02-18 PROCEDURE — 99211 PR OFFICE/OUTPT VISIT, EST, LEVL I: ICD-10-PCS | Mod: S$GLB,,, | Performed by: INTERNAL MEDICINE

## 2022-02-18 PROCEDURE — 99211 OFF/OP EST MAY X REQ PHY/QHP: CPT | Mod: S$GLB,,, | Performed by: INTERNAL MEDICINE

## 2022-02-18 PROCEDURE — 85610 PROTHROMBIN TIME: CPT | Performed by: INTERNAL MEDICINE

## 2022-02-18 PROCEDURE — 36415 COLL VENOUS BLD VENIPUNCTURE: CPT | Performed by: INTERNAL MEDICINE

## 2022-02-21 ENCOUNTER — LAB VISIT (OUTPATIENT)
Dept: LAB | Facility: HOSPITAL | Age: 67
End: 2022-02-21
Attending: INTERNAL MEDICINE
Payer: MEDICARE

## 2022-02-21 DIAGNOSIS — I63.10 CEREBROVASCULAR ACCIDENT (CVA) DUE TO EMBOLISM OF PRECEREBRAL ARTERY: ICD-10-CM

## 2022-02-21 DIAGNOSIS — Z79.01 LONG TERM (CURRENT) USE OF ANTICOAGULANTS: ICD-10-CM

## 2022-02-21 LAB
INR PPP: 2 (ref 0.8–1.2)
PROTHROMBIN TIME: 19.8 SEC (ref 9–12.5)

## 2022-02-21 PROCEDURE — 36415 COLL VENOUS BLD VENIPUNCTURE: CPT | Performed by: INTERNAL MEDICINE

## 2022-02-21 PROCEDURE — 85610 PROTHROMBIN TIME: CPT | Performed by: INTERNAL MEDICINE

## 2022-02-21 NOTE — TELEPHONE ENCOUNTER
That's up to coumadin clinic. Looks like they already talked to them. Just fyi for future references, if they're part of coumadin clinic, we defer to them about coumadin/anticoagulation bridges as not to have too many hands in the pot.

## 2022-02-22 ENCOUNTER — TELEPHONE (OUTPATIENT)
Dept: UROLOGY | Facility: CLINIC | Age: 67
End: 2022-02-22
Payer: MEDICARE

## 2022-02-22 ENCOUNTER — PATIENT MESSAGE (OUTPATIENT)
Dept: PRIMARY CARE CLINIC | Facility: CLINIC | Age: 67
End: 2022-02-22
Payer: MEDICARE

## 2022-02-22 ENCOUNTER — ANTI-COAG VISIT (OUTPATIENT)
Dept: CARDIOLOGY | Facility: CLINIC | Age: 67
End: 2022-02-22
Payer: MEDICARE

## 2022-02-22 DIAGNOSIS — C61 PROSTATE CANCER: Primary | ICD-10-CM

## 2022-02-22 DIAGNOSIS — Z79.01 LONG TERM (CURRENT) USE OF ANTICOAGULANTS: Primary | ICD-10-CM

## 2022-02-22 DIAGNOSIS — I63.10 CEREBROVASCULAR ACCIDENT (CVA) DUE TO EMBOLISM OF PRECEREBRAL ARTERY: ICD-10-CM

## 2022-02-22 LAB
FINAL PATHOLOGIC DIAGNOSIS: NORMAL
GROSS: NORMAL
Lab: NORMAL
MICROSCOPIC EXAM: NORMAL

## 2022-02-22 PROCEDURE — 99211 PR OFFICE/OUTPT VISIT, EST, LEVL I: ICD-10-PCS | Mod: S$GLB,,, | Performed by: INTERNAL MEDICINE

## 2022-02-22 PROCEDURE — 99211 OFF/OP EST MAY X REQ PHY/QHP: CPT | Mod: S$GLB,,, | Performed by: INTERNAL MEDICINE

## 2022-02-22 RX ORDER — MIRABEGRON 25 MG/1
25 TABLET, FILM COATED, EXTENDED RELEASE ORAL DAILY
Qty: 30 TABLET | Refills: 11 | Status: SHIPPED | OUTPATIENT
Start: 2022-02-22 | End: 2022-04-08 | Stop reason: SDUPTHER

## 2022-02-22 NOTE — PROGRESS NOTES
Coumadin instructions and re-draw date given to jordin, dosing repeated, verbalized understanding and will comply. No changes to report. Advised to jaden/c Lovenox

## 2022-02-22 NOTE — TELEPHONE ENCOUNTER
Patient needs f/u with Dr. Muñoz for his prostate cancer.   Had repeat prostate biopsy - has dada 3+4=7. Previously 3+3.     Lab Results   Component Value Date    PSA 6.2 (H) 04/05/2018    PSADIAG 13.4 (H) 01/14/2022    PSADIAG 8.9 (H) 07/02/2020    PSADIAG 7.9 (H) 06/02/2020    PSADIAG 5.8 (H) 11/29/2018    PSADIAG 5.0 (H) 07/05/2018    PSADIAG 7.2 (H) 05/11/2018    PSATOTAL 7.1 (H) 08/12/2020    PSAFREE 0.58 08/12/2020    PSAFREEPCT 8.17 08/12/2020     Ordered a bone scan, since psa was 13.     Sent myrbetriq for OAB. See how he does.   F/u 6 weeks with NP/PA.

## 2022-02-22 NOTE — TELEPHONE ENCOUNTER
Nurse navigator attempted to contact patient for scheduling bone scan and f/u with Dr. Muñoz.  Detailed message left with contact information for return call.

## 2022-02-23 ENCOUNTER — OFFICE VISIT (OUTPATIENT)
Dept: PRIMARY CARE CLINIC | Facility: CLINIC | Age: 67
End: 2022-02-23
Payer: MEDICARE

## 2022-02-23 DIAGNOSIS — N39.41 URGE INCONTINENCE: ICD-10-CM

## 2022-02-23 DIAGNOSIS — C61 PROSTATE CANCER: Primary | ICD-10-CM

## 2022-02-23 PROCEDURE — 3051F PR MOST RECENT HEMOGLOBIN A1C LEVEL 7.0 - < 8.0%: ICD-10-PCS | Mod: CPTII,95,, | Performed by: INTERNAL MEDICINE

## 2022-02-23 PROCEDURE — 99214 PR OFFICE/OUTPT VISIT, EST, LEVL IV, 30-39 MIN: ICD-10-PCS | Mod: 95,,, | Performed by: INTERNAL MEDICINE

## 2022-02-23 PROCEDURE — 4010F ACE/ARB THERAPY RXD/TAKEN: CPT | Mod: CPTII,95,, | Performed by: INTERNAL MEDICINE

## 2022-02-23 PROCEDURE — 4010F PR ACE/ARB THEARPY RXD/TAKEN: ICD-10-PCS | Mod: CPTII,95,, | Performed by: INTERNAL MEDICINE

## 2022-02-23 PROCEDURE — 3051F HG A1C>EQUAL 7.0%<8.0%: CPT | Mod: CPTII,95,, | Performed by: INTERNAL MEDICINE

## 2022-02-23 PROCEDURE — 99214 OFFICE O/P EST MOD 30 MIN: CPT | Mod: 95,,, | Performed by: INTERNAL MEDICINE

## 2022-02-23 PROCEDURE — 3066F NEPHROPATHY DOC TX: CPT | Mod: CPTII,95,, | Performed by: INTERNAL MEDICINE

## 2022-02-23 PROCEDURE — 1160F PR REVIEW ALL MEDS BY PRESCRIBER/CLIN PHARMACIST DOCUMENTED: ICD-10-PCS | Mod: CPTII,95,, | Performed by: INTERNAL MEDICINE

## 2022-02-23 PROCEDURE — 1160F RVW MEDS BY RX/DR IN RCRD: CPT | Mod: CPTII,95,, | Performed by: INTERNAL MEDICINE

## 2022-02-23 PROCEDURE — 3061F NEG MICROALBUMINURIA REV: CPT | Mod: CPTII,95,, | Performed by: INTERNAL MEDICINE

## 2022-02-23 PROCEDURE — 1159F MED LIST DOCD IN RCRD: CPT | Mod: CPTII,95,, | Performed by: INTERNAL MEDICINE

## 2022-02-23 PROCEDURE — 3066F PR DOCUMENTATION OF TREATMENT FOR NEPHROPATHY: ICD-10-PCS | Mod: CPTII,95,, | Performed by: INTERNAL MEDICINE

## 2022-02-23 PROCEDURE — 1159F PR MEDICATION LIST DOCUMENTED IN MEDICAL RECORD: ICD-10-PCS | Mod: CPTII,95,, | Performed by: INTERNAL MEDICINE

## 2022-02-23 PROCEDURE — 3061F PR NEG MICROALBUMINURIA RESULT DOCUMENTED/REVIEW: ICD-10-PCS | Mod: CPTII,95,, | Performed by: INTERNAL MEDICINE

## 2022-02-23 NOTE — PROGRESS NOTES
The patient location is: [place], [place]  The chief complaint leading to consultation is: discuss prostate CA    Visit type: audiovisual    Face to Face time with patient: 15 min  15 minutes of total time spent on the encounter, which includes face to face time and non-face to face time preparing to see the patient (eg, review of tests), Obtaining and/or reviewing separately obtained history, Documenting clinical information in the electronic or other health record, Independently interpreting results (not separately reported) and communicating results to the patient/family/caregiver, or Care coordination (not separately reported).         Each patient to whom he or she provides medical services by telemedicine is:  (1) informed of the relationship between the physician and patient and the respective role of any other health care provider with respect to management of the patient; and (2) notified that he or she may decline to receive medical services by telemedicine and may withdraw from such care at any time.    Notes:     Subjective:       Patient ID: Kamlesh Diane is a 66 y.o. male.    Chief Complaint: prostate CA    HPI   Discussed biopsy results and general overview of management options including risks w/ prostatectomy, radiation, etc. Pt reports feeling pretty well and not quite interested in anything invasive at this time. Discussed need to f/u w/ Dr. Muñoz for second opinion.     Reports some urinary urge incontinence. Not new. Rx myrbetriq but hasn't picked up yet.     Review of Systems   Constitutional: Negative for activity change and unexpected weight change.   HENT: Positive for rhinorrhea. Negative for hearing loss and trouble swallowing.    Eyes: Negative for discharge and visual disturbance.   Respiratory: Negative for chest tightness and wheezing.    Cardiovascular: Negative for chest pain and palpitations.   Gastrointestinal: Negative for blood in stool, constipation, diarrhea and vomiting.    Endocrine: Positive for polyuria. Negative for polydipsia.   Genitourinary: Positive for urgency. Negative for difficulty urinating and hematuria.   Musculoskeletal: Negative for arthralgias, joint swelling and neck pain.   Neurological: Negative for weakness and headaches.   Psychiatric/Behavioral: Negative for confusion and dysphoric mood.         Objective:      Physical Exam      Gen - A+OX4, NAD  CHEST - completing full sentences. Normal work of breathing.     Assessment/Plan     Diagnoses and all orders for this visit:    Prostate cancer - plans on making appt to see Dr. Muñoz.     Urge incontinence - will  myrbetriq. Also recommended trying timed voiding such as every 3 hours whether he feels the urge or not.       F/u as per previous visit.      Debora Ontiveros MD  Department of Internal Medicine - [facility]  11:04 AM

## 2022-02-24 ENCOUNTER — TELEPHONE (OUTPATIENT)
Dept: RADIATION ONCOLOGY | Facility: CLINIC | Age: 67
End: 2022-02-24
Payer: MEDICARE

## 2022-02-24 NOTE — TELEPHONE ENCOUNTER
Nurse navigator contacted patient's niece, Celsa, to confirm bone scan, labs and follow up with Dr. Muñoz on 3/8. Contact information provided for further needs.

## 2022-03-02 ENCOUNTER — APPOINTMENT (OUTPATIENT)
Dept: RADIATION THERAPY | Facility: OTHER | Age: 67
End: 2022-03-02
Attending: RADIOLOGY
Payer: MEDICARE

## 2022-03-03 ENCOUNTER — TELEPHONE (OUTPATIENT)
Dept: SPEECH THERAPY | Facility: HOSPITAL | Age: 67
End: 2022-03-03
Payer: MEDICARE

## 2022-03-08 ENCOUNTER — OFFICE VISIT (OUTPATIENT)
Dept: RADIATION ONCOLOGY | Facility: CLINIC | Age: 67
End: 2022-03-08
Payer: MEDICARE

## 2022-03-08 ENCOUNTER — HOSPITAL ENCOUNTER (OUTPATIENT)
Dept: RADIOLOGY | Facility: HOSPITAL | Age: 67
Discharge: HOME | End: 2022-03-08
Attending: UROLOGY
Payer: MEDICARE

## 2022-03-08 ENCOUNTER — ANTI-COAG VISIT (OUTPATIENT)
Dept: CARDIOLOGY | Facility: CLINIC | Age: 67
End: 2022-03-08
Payer: MEDICARE

## 2022-03-08 VITALS
HEART RATE: 95 BPM | HEIGHT: 73 IN | DIASTOLIC BLOOD PRESSURE: 88 MMHG | BODY MASS INDEX: 25.3 KG/M2 | SYSTOLIC BLOOD PRESSURE: 134 MMHG

## 2022-03-08 DIAGNOSIS — C61 PROSTATE CANCER: ICD-10-CM

## 2022-03-08 DIAGNOSIS — C61 CANCER OF PROSTATE WITH LOW RECURRENCE RISK (STAGE T1-2A AND GLEASON < 7 AND PSA < 10): Primary | ICD-10-CM

## 2022-03-08 DIAGNOSIS — Z79.01 LONG TERM (CURRENT) USE OF ANTICOAGULANTS: Primary | ICD-10-CM

## 2022-03-08 DIAGNOSIS — I63.10 CEREBROVASCULAR ACCIDENT (CVA) DUE TO EMBOLISM OF PRECEREBRAL ARTERY: ICD-10-CM

## 2022-03-08 PROCEDURE — 3288F PR FALLS RISK ASSESSMENT DOCUMENTED: ICD-10-PCS | Mod: CPTII,S$GLB,, | Performed by: RADIOLOGY

## 2022-03-08 PROCEDURE — 78306 NM BONE SCAN WHOLE BODY: ICD-10-PCS | Mod: 26,,, | Performed by: RADIOLOGY

## 2022-03-08 PROCEDURE — 4010F PR ACE/ARB THEARPY RXD/TAKEN: ICD-10-PCS | Mod: CPTII,S$GLB,, | Performed by: RADIOLOGY

## 2022-03-08 PROCEDURE — 1159F MED LIST DOCD IN RCRD: CPT | Mod: CPTII,S$GLB,, | Performed by: RADIOLOGY

## 2022-03-08 PROCEDURE — 3061F PR NEG MICROALBUMINURIA RESULT DOCUMENTED/REVIEW: ICD-10-PCS | Mod: CPTII,S$GLB,, | Performed by: RADIOLOGY

## 2022-03-08 PROCEDURE — 3075F PR MOST RECENT SYSTOLIC BLOOD PRESS GE 130-139MM HG: ICD-10-PCS | Mod: CPTII,S$GLB,, | Performed by: RADIOLOGY

## 2022-03-08 PROCEDURE — 3066F PR DOCUMENTATION OF TREATMENT FOR NEPHROPATHY: ICD-10-PCS | Mod: CPTII,S$GLB,, | Performed by: RADIOLOGY

## 2022-03-08 PROCEDURE — 3066F NEPHROPATHY DOC TX: CPT | Mod: CPTII,S$GLB,, | Performed by: RADIOLOGY

## 2022-03-08 PROCEDURE — 1160F RVW MEDS BY RX/DR IN RCRD: CPT | Mod: CPTII,S$GLB,, | Performed by: RADIOLOGY

## 2022-03-08 PROCEDURE — 3008F PR BODY MASS INDEX (BMI) DOCUMENTED: ICD-10-PCS | Mod: CPTII,S$GLB,, | Performed by: RADIOLOGY

## 2022-03-08 PROCEDURE — A9503 TC99M MEDRONATE: HCPCS

## 2022-03-08 PROCEDURE — 1126F AMNT PAIN NOTED NONE PRSNT: CPT | Mod: CPTII,S$GLB,, | Performed by: RADIOLOGY

## 2022-03-08 PROCEDURE — 1101F PR PT FALLS ASSESS DOC 0-1 FALLS W/OUT INJ PAST YR: ICD-10-PCS | Mod: CPTII,S$GLB,, | Performed by: RADIOLOGY

## 2022-03-08 PROCEDURE — 99999 PR PBB SHADOW E&M-EST. PATIENT-LVL IV: CPT | Mod: PBBFAC,,, | Performed by: RADIOLOGY

## 2022-03-08 PROCEDURE — 1160F PR REVIEW ALL MEDS BY PRESCRIBER/CLIN PHARMACIST DOCUMENTED: ICD-10-PCS | Mod: CPTII,S$GLB,, | Performed by: RADIOLOGY

## 2022-03-08 PROCEDURE — 4010F ACE/ARB THERAPY RXD/TAKEN: CPT | Mod: CPTII,S$GLB,, | Performed by: RADIOLOGY

## 2022-03-08 PROCEDURE — 3288F FALL RISK ASSESSMENT DOCD: CPT | Mod: CPTII,S$GLB,, | Performed by: RADIOLOGY

## 2022-03-08 PROCEDURE — 78306 BONE IMAGING WHOLE BODY: CPT | Mod: 26,,, | Performed by: RADIOLOGY

## 2022-03-08 PROCEDURE — 99999 PR PBB SHADOW E&M-EST. PATIENT-LVL IV: ICD-10-PCS | Mod: PBBFAC,,, | Performed by: RADIOLOGY

## 2022-03-08 PROCEDURE — 3075F SYST BP GE 130 - 139MM HG: CPT | Mod: CPTII,S$GLB,, | Performed by: RADIOLOGY

## 2022-03-08 PROCEDURE — 3051F HG A1C>EQUAL 7.0%<8.0%: CPT | Mod: CPTII,S$GLB,, | Performed by: RADIOLOGY

## 2022-03-08 PROCEDURE — 3008F BODY MASS INDEX DOCD: CPT | Mod: CPTII,S$GLB,, | Performed by: RADIOLOGY

## 2022-03-08 PROCEDURE — 78306 BONE IMAGING WHOLE BODY: CPT | Mod: TC

## 2022-03-08 PROCEDURE — 3079F DIAST BP 80-89 MM HG: CPT | Mod: CPTII,S$GLB,, | Performed by: RADIOLOGY

## 2022-03-08 PROCEDURE — 3051F PR MOST RECENT HEMOGLOBIN A1C LEVEL 7.0 - < 8.0%: ICD-10-PCS | Mod: CPTII,S$GLB,, | Performed by: RADIOLOGY

## 2022-03-08 PROCEDURE — 3079F PR MOST RECENT DIASTOLIC BLOOD PRESSURE 80-89 MM HG: ICD-10-PCS | Mod: CPTII,S$GLB,, | Performed by: RADIOLOGY

## 2022-03-08 PROCEDURE — 99203 OFFICE O/P NEW LOW 30 MIN: CPT | Mod: S$GLB,,, | Performed by: RADIOLOGY

## 2022-03-08 PROCEDURE — 1159F PR MEDICATION LIST DOCUMENTED IN MEDICAL RECORD: ICD-10-PCS | Mod: CPTII,S$GLB,, | Performed by: RADIOLOGY

## 2022-03-08 PROCEDURE — 1126F PR PAIN SEVERITY QUANTIFIED, NO PAIN PRESENT: ICD-10-PCS | Mod: CPTII,S$GLB,, | Performed by: RADIOLOGY

## 2022-03-08 PROCEDURE — 1101F PT FALLS ASSESS-DOCD LE1/YR: CPT | Mod: CPTII,S$GLB,, | Performed by: RADIOLOGY

## 2022-03-08 PROCEDURE — 99211 OFF/OP EST MAY X REQ PHY/QHP: CPT | Mod: S$GLB,,, | Performed by: INTERNAL MEDICINE

## 2022-03-08 PROCEDURE — 99211 PR OFFICE/OUTPT VISIT, EST, LEVL I: ICD-10-PCS | Mod: S$GLB,,, | Performed by: INTERNAL MEDICINE

## 2022-03-08 PROCEDURE — 3061F NEG MICROALBUMINURIA REV: CPT | Mod: CPTII,S$GLB,, | Performed by: RADIOLOGY

## 2022-03-08 PROCEDURE — 99203 PR OFFICE/OUTPT VISIT, NEW, LEVL III, 30-44 MIN: ICD-10-PCS | Mod: S$GLB,,, | Performed by: RADIOLOGY

## 2022-03-08 NOTE — PROGRESS NOTES
Subjective:       Patient ID: Kamlesh Diane is a 66 y.o. male.    Chief Complaint: Prostate Cancer (Discuss treatment options)    This patient presents for follow up and discussion of therapy.      Mr. Diane has a history of Stage I (T1c, N0, M0, PSA < 10, GG 1) adenocarcinoma of the prostate.  Initial PSA in May of 2018 remained elevated at 7.2 ng/ml.  Biopsies in June of 2018 from the Lt. mid gland, Lt. base, Lt. transition zone and Rt. transition zone returned positive for adenocarcinoma, Brent score 6 (3+3).  We discussed treatment options with the patient at that time.  His PMHx is significant for CVA some 10 years ago with residual Rt. sided weakness.  He currently ambulates with the aid of a walker.   Also with insulin dependent diabetes and chronic anticoagulation with coumadin.  We elected to continue with active surveillance.  Repeat PSA in January of this year increased to 13.4 ng/ml.  Repeat biopsies on 2/16/22 revealed [place] 7 (3+4) adenocarcinoma involving 15% of 2 of 2 cores from the Lt. apex.  The Brent pattern 4 accounted for 10 - 15% of the tumor.  There was Brent 6 (3+3) adenocarcinoma involving 5% of 2 of 2 cores from the Lt. mid gland and 1 - 3% of 6 of 6 cores from the Rt. base, mid gland and apex.  Bone scan earlier today was negative for metastatic disease.  The patient presents with his brother to discuss treatment options.      Review of Systems   Constitutional: Negative for activity change, appetite change, chills and fatigue.   Gastrointestinal: Negative for abdominal pain, constipation and diarrhea.   Genitourinary: Negative for bladder incontinence, difficulty urinating, dysuria, frequency and hematuria.         Objective:      Physical Exam  Constitutional:       General: He is not in acute distress.     Appearance: Normal appearance.   Abdominal:      General: Abdomen is flat. There is no distension.   Neurological:      Mental Status: He is alert and oriented to person,  place, and time.      Motor: Weakness present.      Gait: Gait abnormal.      Comments: expressive aphasia with Rt. sided weakness.    Psychiatric:         Mood and Affect: Mood normal.         Judgment: Judgment normal.         Assessment:       Problem List Items Addressed This Visit     Cancer of prostate with low recurrence risk (stage T1-2a and [place] < 7 and PSA < 10) - Primary          Plan:       Long discussion with the patient and his brother.  Explained he has shown some progression of his disease to intermediate risk.  Given his co - morbidities, I recommended he consider treatment now prior to further progression of his cancer.  Recommended 70 Gy in 28 fractions to the prostate.  The patient was agreeable to proceed with treatment.  Will plan simulation.

## 2022-03-08 NOTE — PROGRESS NOTES
INR not at goal. Medications, chart, and patient findings reviewed. See calendar for adjustments to dose and follow up plan.  Pt INR has dropped back to baseline.  See findings.  Pt unable to verify dosing.  Pt to write down & repeat back dosing.

## 2022-03-09 ENCOUNTER — PATIENT MESSAGE (OUTPATIENT)
Dept: CARDIOLOGY | Facility: CLINIC | Age: 67
End: 2022-03-09
Payer: MEDICARE

## 2022-03-11 ENCOUNTER — LAB VISIT (OUTPATIENT)
Dept: LAB | Facility: HOSPITAL | Age: 67
End: 2022-03-11
Attending: INTERNAL MEDICINE
Payer: MEDICARE

## 2022-03-11 ENCOUNTER — ANTI-COAG VISIT (OUTPATIENT)
Dept: CARDIOLOGY | Facility: CLINIC | Age: 67
End: 2022-03-11
Payer: MEDICARE

## 2022-03-11 DIAGNOSIS — Z79.01 LONG TERM (CURRENT) USE OF ANTICOAGULANTS: ICD-10-CM

## 2022-03-11 DIAGNOSIS — I63.10 CEREBROVASCULAR ACCIDENT (CVA) DUE TO EMBOLISM OF PRECEREBRAL ARTERY: ICD-10-CM

## 2022-03-11 DIAGNOSIS — Z79.01 LONG TERM (CURRENT) USE OF ANTICOAGULANTS: Primary | ICD-10-CM

## 2022-03-11 LAB
INR PPP: 1.2 (ref 0.8–1.2)
PROTHROMBIN TIME: 12.7 SEC (ref 9–12.5)

## 2022-03-11 PROCEDURE — 99211 OFF/OP EST MAY X REQ PHY/QHP: CPT | Mod: S$GLB,,, | Performed by: INTERNAL MEDICINE

## 2022-03-11 PROCEDURE — 99211 PR OFFICE/OUTPT VISIT, EST, LEVL I: ICD-10-PCS | Mod: S$GLB,,, | Performed by: INTERNAL MEDICINE

## 2022-03-11 PROCEDURE — 85610 PROTHROMBIN TIME: CPT | Performed by: INTERNAL MEDICINE

## 2022-03-11 PROCEDURE — 36415 COLL VENOUS BLD VENIPUNCTURE: CPT | Performed by: INTERNAL MEDICINE

## 2022-03-11 NOTE — PROGRESS NOTES
INR not at goal. Medications, chart, and patient findings reviewed. See calendar for adjustments to dose and follow up plan.  INR is not moving with increased dosing.  Will be more aggressive & re-assess Monday.  Pt to continue lovenox.

## 2022-03-14 ENCOUNTER — LAB VISIT (OUTPATIENT)
Dept: LAB | Facility: HOSPITAL | Age: 67
End: 2022-03-14
Attending: INTERNAL MEDICINE
Payer: MEDICARE

## 2022-03-14 ENCOUNTER — ANTI-COAG VISIT (OUTPATIENT)
Dept: CARDIOLOGY | Facility: CLINIC | Age: 67
End: 2022-03-14
Payer: MEDICARE

## 2022-03-14 DIAGNOSIS — Z79.01 LONG TERM (CURRENT) USE OF ANTICOAGULANTS: Primary | ICD-10-CM

## 2022-03-14 DIAGNOSIS — I63.10 CEREBROVASCULAR ACCIDENT (CVA) DUE TO EMBOLISM OF PRECEREBRAL ARTERY: ICD-10-CM

## 2022-03-14 DIAGNOSIS — Z79.01 LONG TERM (CURRENT) USE OF ANTICOAGULANTS: ICD-10-CM

## 2022-03-14 LAB
INR PPP: 2.6 (ref 0.8–1.2)
PROTHROMBIN TIME: 26 SEC (ref 9–12.5)

## 2022-03-14 PROCEDURE — 99211 PR OFFICE/OUTPT VISIT, EST, LEVL I: ICD-10-PCS | Mod: S$GLB,,, | Performed by: INTERNAL MEDICINE

## 2022-03-14 PROCEDURE — 99211 OFF/OP EST MAY X REQ PHY/QHP: CPT | Mod: S$GLB,,, | Performed by: INTERNAL MEDICINE

## 2022-03-14 PROCEDURE — 85610 PROTHROMBIN TIME: CPT | Performed by: INTERNAL MEDICINE

## 2022-03-14 PROCEDURE — 36415 COLL VENOUS BLD VENIPUNCTURE: CPT | Performed by: INTERNAL MEDICINE

## 2022-03-14 NOTE — PROGRESS NOTES
INR at goal. Medications and chart reviewed. No changes noted to necessitate adjustment of warfarin or follow-up plan. See calendar.  Will not be more aggressive w/ dosing increase as pt is starting 70 Gy in 28 fractions radiation therapy. Recommend to monitor closely for supratherapeutic INR & risk of bleeding.  Advised niece to d/c lovenox, dose per calendar & r/s f/u.  Reviewed importance of diet consistency & consistency w/ alcohol intake.  She indicated understanding.  We also discussed delicate balance of keeping her uncle in range as he starting radiation and the increase in monitoring as a result.

## 2022-03-16 ENCOUNTER — HOSPITAL ENCOUNTER (OUTPATIENT)
Dept: RADIATION THERAPY | Facility: HOSPITAL | Age: 67
Discharge: HOME | End: 2022-03-16
Attending: RADIOLOGY
Payer: MEDICARE

## 2022-03-16 PROCEDURE — 77014 PR  CT GUIDANCE PLACEMENT RAD THERAPY FIELDS: CPT | Mod: 26,,, | Performed by: RADIOLOGY

## 2022-03-16 PROCEDURE — 77263 PR  RADIATION THERAPY PLAN COMPLEX: ICD-10-PCS | Mod: ,,, | Performed by: RADIOLOGY

## 2022-03-16 PROCEDURE — 77334 PR  RADN TREATMENT AID(S) COMPLX: ICD-10-PCS | Mod: 26,,, | Performed by: RADIOLOGY

## 2022-03-16 PROCEDURE — 77334 RADIATION TREATMENT AID(S): CPT | Mod: 26,,, | Performed by: RADIOLOGY

## 2022-03-16 PROCEDURE — 77263 THER RADIOLOGY TX PLNG CPLX: CPT | Mod: ,,, | Performed by: RADIOLOGY

## 2022-03-16 PROCEDURE — 77014 PR  CT GUIDANCE PLACEMENT RAD THERAPY FIELDS: ICD-10-PCS | Mod: 26,,, | Performed by: RADIOLOGY

## 2022-03-16 PROCEDURE — 77334 RADIATION TREATMENT AID(S): CPT | Mod: TC | Performed by: RADIOLOGY

## 2022-03-16 PROCEDURE — 77014 HC CT GUIDANCE RADIATION THERAPY FLDS PLACEMENT: CPT | Mod: TC | Performed by: RADIOLOGY

## 2022-03-17 ENCOUNTER — ANTI-COAG VISIT (OUTPATIENT)
Dept: CARDIOLOGY | Facility: CLINIC | Age: 67
End: 2022-03-17
Payer: MEDICARE

## 2022-03-17 ENCOUNTER — LAB VISIT (OUTPATIENT)
Dept: LAB | Facility: HOSPITAL | Age: 67
End: 2022-03-17
Attending: INTERNAL MEDICINE
Payer: MEDICARE

## 2022-03-17 DIAGNOSIS — Z79.01 LONG TERM (CURRENT) USE OF ANTICOAGULANTS: ICD-10-CM

## 2022-03-17 DIAGNOSIS — I63.10 CEREBROVASCULAR ACCIDENT (CVA) DUE TO EMBOLISM OF PRECEREBRAL ARTERY: ICD-10-CM

## 2022-03-17 DIAGNOSIS — Z79.01 LONG TERM (CURRENT) USE OF ANTICOAGULANTS: Primary | ICD-10-CM

## 2022-03-17 LAB
INR PPP: 2.8 (ref 0.8–1.2)
PROTHROMBIN TIME: 28.2 SEC (ref 9–12.5)

## 2022-03-17 PROCEDURE — 99211 PR OFFICE/OUTPT VISIT, EST, LEVL I: ICD-10-PCS | Mod: S$GLB,,, | Performed by: INTERNAL MEDICINE

## 2022-03-17 PROCEDURE — 77301 PR  INTEN MOD RADIOTHER PLAN W/DOSE VOL HIST: ICD-10-PCS | Mod: 26,,, | Performed by: RADIOLOGY

## 2022-03-17 PROCEDURE — 77301 RADIOTHERAPY DOSE PLAN IMRT: CPT | Mod: TC | Performed by: RADIOLOGY

## 2022-03-17 PROCEDURE — 85610 PROTHROMBIN TIME: CPT | Performed by: INTERNAL MEDICINE

## 2022-03-17 PROCEDURE — 77301 RADIOTHERAPY DOSE PLAN IMRT: CPT | Mod: 26,,, | Performed by: RADIOLOGY

## 2022-03-17 PROCEDURE — 99211 OFF/OP EST MAY X REQ PHY/QHP: CPT | Mod: S$GLB,,, | Performed by: INTERNAL MEDICINE

## 2022-03-17 PROCEDURE — 36415 COLL VENOUS BLD VENIPUNCTURE: CPT | Performed by: INTERNAL MEDICINE

## 2022-03-18 PROCEDURE — 77338 PR  MLC IMRT DESIGN & CONSTRUCTION PER IMRT PLAN: ICD-10-PCS | Mod: 26,,, | Performed by: RADIOLOGY

## 2022-03-18 PROCEDURE — 77338 DESIGN MLC DEVICE FOR IMRT: CPT | Mod: 26,,, | Performed by: RADIOLOGY

## 2022-03-18 PROCEDURE — 77300 RADIATION THERAPY DOSE PLAN: CPT | Mod: TC | Performed by: RADIOLOGY

## 2022-03-18 PROCEDURE — 77300 PR RADIATION THERAPY,DOSIMETRY PLAN: ICD-10-PCS | Mod: 26,,, | Performed by: RADIOLOGY

## 2022-03-18 PROCEDURE — 77338 DESIGN MLC DEVICE FOR IMRT: CPT | Mod: TC | Performed by: RADIOLOGY

## 2022-03-18 PROCEDURE — 77300 RADIATION THERAPY DOSE PLAN: CPT | Mod: 26,,, | Performed by: RADIOLOGY

## 2022-03-21 ENCOUNTER — TELEPHONE (OUTPATIENT)
Dept: SPEECH THERAPY | Facility: HOSPITAL | Age: 67
End: 2022-03-21
Payer: MEDICARE

## 2022-03-21 NOTE — PROGRESS NOTES
CHIEF COMPLAINT: Type 2 Diabetes     HPI: Mr. Kamlesh Diane is a 64 y.o. male who was diagnosed with Type 2 DM > 5 years.   Pt has h/o MCA stroke, HTN, PAD, low bone density, prostate ca, CKD3.  Pt is being seen by me for the first time.  Last fall: 2 years ago, uses walker  Accompanied by family member.  Lab Results   Component Value Date    HGBA1C 8.0 (H) 10/10/2019       PREVIOUS DIABETES MEDICATIONS TRIED  novolog   levemir     CURRENT DIABETIC MEDS: novolog 6-8 units ac  levemir 50 units at night    Pt is monitoring blood glucose readings 4 times a day.  Needs >100 strips per month related to fluctuations with blood glucose reading, a1c trends, and activity level.  9-11a 1st meal  4-430 dinner  1 snack later in the day    Diabetes Management Status    Statin: Taking  ACE/ARB: Taking    Screening or Prevention Patient's value Goal Complete/Controlled?   HgA1C Testing and Control   Lab Results   Component Value Date    HGBA1C 8.0 (H) 10/10/2019      Annually/Less than 8% No   Lipid profile : 03/12/2019 Annually Yes   LDL control Lab Results   Component Value Date    LDLCALC 93.8 03/12/2019    Annually/Less than 100 mg/dl  Yes   Nephropathy screening Lab Results   Component Value Date    LABMICR 31.0 03/12/2019     Lab Results   Component Value Date    PROTEINUA Negative 05/30/2018    Annually Yes   Blood pressure BP Readings from Last 1 Encounters:   10/22/19 (!) 142/94    Less than 140/90 No   Dilated retinal exam : 07/20/2018 Annually Yes   Foot exam   : 10/22/2019 Annually No       REVIEW OF SYSTEMS  General: +weakness, fatigue, or weight changes.   Eyes: no double or blurred vision, eye pain, or redness  Cardiovascular: no chest pain, palpitations, edema, or murmurs.   Respiratory: no cough or dyspnea.   GI: no heartburn, nausea, or changes in bowel patterns; good appetite.   Skin: no rashes, dryness, itching, or reactions at insulin injection sites.  Neuro: + numbness, tingling, tremors, or vertigo.  "  Endocrine: no polyuria, polydipsia, polyphagia, heat or cold intolerance.     Vital Signs  /60   Ht 6' 1.2" (1.859 m)   Wt 89.8 kg (198 lb)   BMI 25.98 kg/m²     Hemoglobin A1C   Date Value Ref Range Status   10/10/2019 8.0 (H) 4.0 - 5.6 % Final     Comment:     ADA Screening Guidelines:  5.7-6.4%  Consistent with prediabetes  >or=6.5%  Consistent with diabetes  High levels of fetal hemoglobin interfere with the HbA1C  assay. Heterozygous hemoglobin variants (HbS, HgC, etc)do  not significantly interfere with this assay.   However, presence of multiple variants may affect accuracy.     06/18/2019 7.6 (H) 4.0 - 5.6 % Final     Comment:     ADA Screening Guidelines:  5.7-6.4%  Consistent with prediabetes  >or=6.5%  Consistent with diabetes  High levels of fetal hemoglobin interfere with the HbA1C  assay. Heterozygous hemoglobin variants (HbS, HgC, etc)do  not significantly interfere with this assay.   However, presence of multiple variants may affect accuracy.     03/12/2019 8.3 (H) 4.0 - 5.6 % Final     Comment:     ADA Screening Guidelines:  5.7-6.4%  Consistent with prediabetes  >or=6.5%  Consistent with diabetes  High levels of fetal hemoglobin interfere with the HbA1C  assay. Heterozygous hemoglobin variants (HbS, HgC, etc)do  not significantly interfere with this assay.   However, presence of multiple variants may affect accuracy.          Chemistry        Component Value Date/Time     03/12/2019 1235    K 4.4 03/12/2019 1235     03/12/2019 1235    CO2 25 03/12/2019 1235    BUN 14 03/12/2019 1235    CREATININE 1.7 (H) 03/12/2019 1235     (H) 03/12/2019 1235        Component Value Date/Time    CALCIUM 9.8 03/12/2019 1235    ALKPHOS 123 03/12/2019 1235    AST 22 03/12/2019 1235    ALT 32 03/12/2019 1235    BILITOT 0.7 03/12/2019 1235    ESTGFRAFRICA 49 (A) 03/12/2019 1235    EGFRNONAA 42 (A) 03/12/2019 1235           Lab Results   Component Value Date    TSH 0.513 09/01/2010      Lab " Results   Component Value Date    CHOL 142 03/12/2019    CHOL 127 05/18/2018    CHOL 145 05/10/2018     Lab Results   Component Value Date    HDL 33 (L) 03/12/2019    HDL 35 (L) 05/18/2018    HDL 32 (L) 05/10/2018     Lab Results   Component Value Date    LDLCALC 93.8 03/12/2019    LDLCALC 80.6 05/18/2018    LDLCALC 92.8 05/10/2018     Lab Results   Component Value Date    TRIG 76 03/12/2019    TRIG 57 05/18/2018    TRIG 101 05/10/2018     Lab Results   Component Value Date    CHOLHDL 23.2 03/12/2019    CHOLHDL 27.6 05/18/2018    CHOLHDL 22.1 05/10/2018         PHYSICAL EXAMINATION  Constitutional: Appears fairly well, no distress  Neck: Supple, trachea midline.   Respiratory: CTA without wheezes, even and unlabored.  Cardiovascular: RRR; no carotid bruits or murmurs; (+) DP pulses; no edema.   Lymph: no lymphadenopathy palpated  Skin: warm and dry; no injection site reactions, no acanthosis nigracans observed.  Neuro:patient alert and cooperative, normal affect; steady gait.    Diabetes Foot Exam:   Deferred  (L) great toe -toenail removed     Assessment/Plan    1. Type 2 diabetes mellitus with complication, with long-term current use of insulin  Hemoglobin A1c    Ambulatory Referral to Diabetes Education    TSH    Ambulatory Referral to Diabetes Education   2. H/O ischemic right MCA stroke     3. Cancer of prostate with low recurrence risk (stage T1-2a and Brent < 7 and PSA < 10)     4. Bone disorder     5. Essential hypertension     6. Controlled type 2 diabetes mellitus with complication, with long-term current use of insulin  NOVOLOG FLEXPEN U-100 INSULIN 100 unit/mL (3 mL) InPn pen    LEVEMIR FLEXTOUCH U-100 INSULN 100 unit/mL (3 mL) InPn pen     1. F/u in 3 mos w/ me  New contact info given  Brochures given for vgo 30, 3 clicks with meal  Additional click if sugar >180  Snack dose 1 click   Change levemir 45 units at night  Change novolog 6-8-8 units ac w/ scale 180-230+1, etc   Logs given   Sensors  reviewed ---freestyle roseann and dexcom g6   Discussed features  Discuss glp1a -next time -trulcity or victoza  Meal/nutrition resources given    DE in 1-2 weeks   2. F/u with neuro   3. F/u with urology   4. Managed per pcp  5. Controlled, continue med(s)  6. See above       FOLLOW UP  Follow up in about 3 months (around 1/24/2020).            Body Location Override (Optional - Billing Will Still Be Based On Selected Body Map Location If Applicable): left arm

## 2022-03-21 NOTE — TELEPHONE ENCOUNTER
Left msg for pt callback mbss. ----- Message from Saniya Summers, Patient Care Assistant sent at 3/21/2022 12:56 PM CDT -----  Regarding: call back  Contact: Pt maria isabel nicolas is requesting a call back in regard to miss call on 3/3.        Aditya nicolas @ [phone]

## 2022-03-23 ENCOUNTER — TELEPHONE (OUTPATIENT)
Dept: SPEECH THERAPY | Facility: HOSPITAL | Age: 67
End: 2022-03-23
Payer: MEDICARE

## 2022-03-23 NOTE — TELEPHONE ENCOUNTER
Returned pt maria isabel call jordin. She request scheduling for mbss. inquiried preference patient will have radiation done at [facility] which will be easier for them. Transferred to [facility] for scheduling

## 2022-03-24 PROCEDURE — 77014 HC CT GUIDANCE RADIATION THERAPY FLDS PLACEMENT: CPT | Mod: TC | Performed by: RADIOLOGY

## 2022-03-24 PROCEDURE — 77014 PR  CT GUIDANCE PLACEMENT RAD THERAPY FIELDS: CPT | Mod: 26,,, | Performed by: RADIOLOGY

## 2022-03-24 PROCEDURE — 77014 PR  CT GUIDANCE PLACEMENT RAD THERAPY FIELDS: ICD-10-PCS | Mod: 26,,, | Performed by: RADIOLOGY

## 2022-03-24 PROCEDURE — 77385 HC IMRT, SIMPLE: CPT | Performed by: RADIOLOGY

## 2022-03-25 PROCEDURE — 77014 PR  CT GUIDANCE PLACEMENT RAD THERAPY FIELDS: CPT | Mod: 26,,, | Performed by: RADIOLOGY

## 2022-03-25 PROCEDURE — 77385 HC IMRT, SIMPLE: CPT | Performed by: RADIOLOGY

## 2022-03-25 PROCEDURE — 77014 HC CT GUIDANCE RADIATION THERAPY FLDS PLACEMENT: CPT | Mod: TC | Performed by: RADIOLOGY

## 2022-03-25 PROCEDURE — 77014 PR  CT GUIDANCE PLACEMENT RAD THERAPY FIELDS: ICD-10-PCS | Mod: 26,,, | Performed by: RADIOLOGY

## 2022-03-28 ENCOUNTER — DOCUMENTATION ONLY (OUTPATIENT)
Dept: RADIATION ONCOLOGY | Facility: CLINIC | Age: 67
End: 2022-03-28
Payer: MEDICARE

## 2022-03-28 PROCEDURE — 77385 HC IMRT, SIMPLE: CPT | Performed by: RADIOLOGY

## 2022-03-28 PROCEDURE — 77014 HC CT GUIDANCE RADIATION THERAPY FLDS PLACEMENT: CPT | Mod: TC | Performed by: RADIOLOGY

## 2022-03-28 PROCEDURE — 77014 PR  CT GUIDANCE PLACEMENT RAD THERAPY FIELDS: CPT | Mod: 26,,, | Performed by: RADIOLOGY

## 2022-03-28 PROCEDURE — 77014 PR  CT GUIDANCE PLACEMENT RAD THERAPY FIELDS: ICD-10-PCS | Mod: 26,,, | Performed by: RADIOLOGY

## 2022-03-29 ENCOUNTER — PATIENT MESSAGE (OUTPATIENT)
Dept: ENDOSCOPY | Facility: HOSPITAL | Age: 67
End: 2022-03-29
Payer: MEDICARE

## 2022-03-29 ENCOUNTER — HOSPITAL ENCOUNTER (OUTPATIENT)
Dept: RADIOLOGY | Facility: OTHER | Age: 67
Discharge: HOME | End: 2022-03-29
Attending: INTERNAL MEDICINE
Payer: MEDICARE

## 2022-03-29 ENCOUNTER — TELEPHONE (OUTPATIENT)
Dept: ENDOSCOPY | Facility: HOSPITAL | Age: 67
End: 2022-03-29
Payer: MEDICARE

## 2022-03-29 ENCOUNTER — ANTI-COAG VISIT (OUTPATIENT)
Dept: CARDIOLOGY | Facility: CLINIC | Age: 67
End: 2022-03-29
Payer: MEDICARE

## 2022-03-29 DIAGNOSIS — R05.9 COUGH: ICD-10-CM

## 2022-03-29 DIAGNOSIS — Z79.01 LONG TERM (CURRENT) USE OF ANTICOAGULANTS: Primary | ICD-10-CM

## 2022-03-29 DIAGNOSIS — Z12.11 SPECIAL SCREENING FOR MALIGNANT NEOPLASMS, COLON: Primary | ICD-10-CM

## 2022-03-29 DIAGNOSIS — I63.10 CEREBROVASCULAR ACCIDENT (CVA) DUE TO EMBOLISM OF PRECEREBRAL ARTERY: ICD-10-CM

## 2022-03-29 DIAGNOSIS — I69.359 CVA, OLD, HEMIPARESIS: ICD-10-CM

## 2022-03-29 PROCEDURE — 77014 PR  CT GUIDANCE PLACEMENT RAD THERAPY FIELDS: CPT | Mod: 26,,, | Performed by: RADIOLOGY

## 2022-03-29 PROCEDURE — 99211 OFF/OP EST MAY X REQ PHY/QHP: CPT | Mod: S$GLB,,, | Performed by: INTERNAL MEDICINE

## 2022-03-29 PROCEDURE — 99211 PR OFFICE/OUTPT VISIT, EST, LEVL I: ICD-10-PCS | Mod: S$GLB,,, | Performed by: INTERNAL MEDICINE

## 2022-03-29 PROCEDURE — 77014 HC CT GUIDANCE RADIATION THERAPY FLDS PLACEMENT: CPT | Mod: TC | Performed by: RADIOLOGY

## 2022-03-29 PROCEDURE — 77014 PR  CT GUIDANCE PLACEMENT RAD THERAPY FIELDS: ICD-10-PCS | Mod: 26,,, | Performed by: RADIOLOGY

## 2022-03-29 PROCEDURE — 77385 HC IMRT, SIMPLE: CPT | Performed by: RADIOLOGY

## 2022-03-29 RX ORDER — POLYETHYLENE GLYCOL 3350, SODIUM SULFATE ANHYDROUS, SODIUM BICARBONATE, SODIUM CHLORIDE, POTASSIUM CHLORIDE 236; 22.74; 6.74; 5.86; 2.97 G/4L; G/4L; G/4L; G/4L; G/4L
4 POWDER, FOR SOLUTION ORAL ONCE
Qty: 4000 ML | Refills: 0 | Status: SHIPPED | OUTPATIENT
Start: 2022-03-29 | End: 2022-03-29

## 2022-03-29 RX ORDER — ENOXAPARIN SODIUM 150 MG/ML
120 INJECTION SUBCUTANEOUS DAILY
Qty: 10 EACH | Refills: 1 | Status: SHIPPED | OUTPATIENT
Start: 2022-03-29 | End: 2022-04-08

## 2022-03-29 NOTE — PROGRESS NOTES
INR at goal. Medications and chart reviewed. No changes noted to necessitate adjustment of warfarin or follow-up plan. See calendar.  Pt upcoming c-scope r/s to 5/3.

## 2022-03-29 NOTE — TELEPHONE ENCOUNTER
Patient is scheduled for a colonoscopy on 5/3/22. Patient is on Coumadin/Warfarin.  Per Endoscopy protocol, patient will need to hold Coumadin x 5 days.Please advise on holding instructions. Thank you.

## 2022-03-30 ENCOUNTER — HOSPITAL ENCOUNTER (OUTPATIENT)
Dept: RADIOLOGY | Facility: OTHER | Age: 67
Discharge: HOME | End: 2022-03-30
Attending: INTERNAL MEDICINE
Payer: MEDICARE

## 2022-03-30 PROCEDURE — 74230 X-RAY XM SWLNG FUNCJ C+: CPT | Mod: TC

## 2022-03-30 PROCEDURE — 74230 X-RAY XM SWLNG FUNCJ C+: CPT | Mod: 26,,, | Performed by: RADIOLOGY

## 2022-03-30 PROCEDURE — 74230 FL MODIFIED BARIUM SWALLOW SPEECH STUDY: ICD-10-PCS | Mod: 26,,, | Performed by: RADIOLOGY

## 2022-03-30 PROCEDURE — 92611 MOTION FLUOROSCOPY/SWALLOW: CPT

## 2022-03-30 PROCEDURE — A9698 NON-RAD CONTRAST MATERIALNOC: HCPCS | Performed by: INTERNAL MEDICINE

## 2022-03-30 PROCEDURE — 25500020 PHARM REV CODE 255: Performed by: INTERNAL MEDICINE

## 2022-03-30 RX ADMIN — BARIUM SULFATE 20 ML: 0.81 POWDER, FOR SUSPENSION ORAL at 10:03

## 2022-03-30 NOTE — PROCEDURES
"Modified Barium Swallow    Patient Name:  Kamlesh Diane   MRN:  [id number]      Recommendations:     Recommendations:                General Recommendations:     1. Recommend referral to outpatient speech pathology for remediation of oral and pharyngeal dysphagia, dysarthria    Diet recommendations:   1. SOLIDS:  Mechanical soft diet  2. LIQUIDS: Thin    Aspiration Precautions:  1. Small, single sips of thin liquids  2. Chin down head posture during intake of liquids to reduce effects of swallow reflex delay and reduce degree of airway penetration  3. Small bites of food  4. Multiple swallows   5. Alternation of purees with solids  6. Upright for all oral intake    General Precautions: Standard,      Communication strategies: dysarthria noted: would benefit from course of speech pathology for communication strategies    Referral     Reason for Referral  Patient was referred for a Modified Barium Swallow Study to assess the efficiency of his swallow function, rule out aspiration and make recommendations regarding safe dietary consistencies, effective compensatory strategies, and safe eating environment.     Diagnosis: <principal problem not specified>       History:   HPI: Mr. Kamlesh Diane is a 66 y.o. male who was diagnosed with Type 2 DM > 5 years, h/o MCA stroke, HTN, PAD, low bone density-on fosamax, prostate ca, CKD3,     He reports coughing when eating, "on almost everything."    Past Medical History:   Diagnosis Date    CVA (cerebral vascular accident)     Diabetes     Elevated PSA     Hypertension     Prostate cancer     Stroke        Objective:     Consistencies Assessed  · Thin 3 mls, 5 mls,   · Nectar thick 5 mls  · Puree 1/2 and 1 tsp amounts  · Solids : small pieces of dry solids    Oral Preparation/Oral Phase  · Resting tremors of lips, jaw and tongue noted  · Mildly dcr formation of a cohesive bolus  · Mildly slow and labored a-p transport   · Slow mastication of solids  · Mild oral residuals " after the swallow on all consistencies with passive leak to the pharynx    Pharyngeal Phase: Clonus? Resting tremors noted of the soft palate and pharynx.     THIN LIQUIDS: delayed trigger of the swallow reflex, 2-3 secs longer than normal, with premature spillage to the valleculae, and to the pyriform sinuses on larger volume sips.  PENETRATION of the airway before and during the swallow 8/11 swallows. Degree of penetration was  to the level of the vocal cords on swallows with head in neutral position. Dcr in depth of airway penetration with use of a chin tuck.  Trace ASPIRATION during the swallow 2/11 swallows. Use of a chin tuck during the swallow reduced degree and depth of airway penetration. Trace aspirated material contacts the vocal cords but is ejected with cough.   Minimal RESIDUE after the swallow on the tongue base, valleculae and pyriform sinuses. Dcr cricopharyngeal opening during the swallow with mild bolus flow obstruction and need for multiple swallows on 3-5 ml amounts.     NECTAR THICK : delayed trigger of the swallow reflex, 1-2 secs longer than normal, with premature spillage to the valleculae, and toward the pyriform sinuses on larger volume sips.  PENETRATION of the airway before and during the swallow 8/10 swallows.  Material remains above the vocal cords with visible stasis in the airway entrance. Able to eject with verbal cue to cough. No ASPIRATION.  Use of a chin tuck during the swallow reduced degree and depth of airway penetration.  MILD RESIDUE after the swallow on the tongue base, valleculae and pyriform sinuses. Dcr cricopharyngeal opening during the swallow with mild bolus flow obstruction and need for multiple swallows on 3-5 ml amounts.     PUREES: delayed trigger of the swallow reflex, 2 secs longer than normal, with premature spillage to the valleculae. No airway penetration or aspiration on 4 swallows. Mild RESIDUE after the swallow in the valleculae and pyriform sinuses. Dcr  cricopharyngeal opening during the swallow with mild bolus flow obstruction and need for multiple swallows. Able to clear residuals with multiple effortful swallows     SOLIDS: delayed trigger of the swallow reflex, 3 secs longer than normal, with aggregation/ premature spillage to the valleculae while chewing. No airway penetration or aspiration on 5 swallows. Mild to Moderate  RESIDUE after the swallow in the valleculae and pyriform sinuses. Dcr cricopharyngeal opening during the swallow with mild bolus flow obstruction and need for multiple swallows. Able to reduce residuals with multiple effortful swallows. Use of alternation of purees and solids further reduce residuals.      Cervical Esophageal Phase  · dcr degree and duration of cricopharyngeal opening during the swallow with mild bolus flow obstruction during the swallow.    Assessment:     Impressions  · Mild oral phase dysphagia  · Moderate pharyngeal dysphagia  · Oral motor incoordination  · Delayed trigger of the swallow reflex  · dcr tongue base retraction  · Airway penetration  · Trace aspiration of thins  · dcr pharyngeal contraction  · dcr cricopharyngeal opening during the swallow    Prognosis: Good    Education  Recorded results reviewed with patient after study. His brother was not present right after the study, as he had to run an errand. Recorded results reviewed again with patient and his brother when bother arrived to pick him up around 12:00. Instances of aspiration, degree of dysphagia, ways to reduce aspiration risk with small sips and use of chin down discussed and demonstrated. Discussed recommendation for outpatient speech therapy. Bother stated patient had a brief course of speech therapy after the initial stroke but has not had regular speech services    Plan:   · Patient to be seen:      · Plan of Care expires:     · Plan of Care reviewed with:     Patient, patient brother      Discharge recommendations:          Time Tracking:   SLP  Treatment Date:   03/30/22  Speech Start Time:  1000  Speech Stop Time:  1045     Speech Total Time (min):  45 min    03/30/2022

## 2022-03-31 PROBLEM — M85.9 LOW BONE DENSITY: Status: RESOLVED | Noted: 2018-04-05 | Resolved: 2022-03-31

## 2022-03-31 PROBLEM — I70.209 ATHEROSCLEROSIS OF ARTERY OF LOWER EXTREMITY: Status: ACTIVE | Noted: 2018-07-19

## 2022-03-31 PROBLEM — Z79.01 CURRENT USE OF LONG TERM ANTICOAGULATION: Status: RESOLVED | Noted: 2018-05-10 | Resolved: 2022-03-31

## 2022-03-31 PROBLEM — I63.10 CEREBROVASCULAR ACCIDENT (CVA) DUE TO EMBOLISM OF PRECEREBRAL ARTERY: Status: RESOLVED | Noted: 2018-04-05 | Resolved: 2022-03-31

## 2022-03-31 PROBLEM — I69.30 SEQUELAE OF CEREBRAL INFARCTION: Status: RESOLVED | Noted: 2018-05-10 | Resolved: 2022-03-31

## 2022-03-31 PROCEDURE — 77014 PR  CT GUIDANCE PLACEMENT RAD THERAPY FIELDS: ICD-10-PCS | Mod: 26,,, | Performed by: RADIOLOGY

## 2022-03-31 PROCEDURE — 77014 PR  CT GUIDANCE PLACEMENT RAD THERAPY FIELDS: CPT | Mod: 26,,, | Performed by: RADIOLOGY

## 2022-03-31 PROCEDURE — 77014 HC CT GUIDANCE RADIATION THERAPY FLDS PLACEMENT: CPT | Mod: TC | Performed by: RADIOLOGY

## 2022-03-31 PROCEDURE — 77385 HC IMRT, SIMPLE: CPT | Performed by: RADIOLOGY

## 2022-04-01 ENCOUNTER — APPOINTMENT (OUTPATIENT)
Dept: RADIATION THERAPY | Facility: OTHER | Age: 67
End: 2022-04-01
Attending: RADIOLOGY
Payer: MEDICARE

## 2022-04-01 PROCEDURE — 77014 PR  CT GUIDANCE PLACEMENT RAD THERAPY FIELDS: CPT | Mod: 26,,, | Performed by: RADIOLOGY

## 2022-04-01 PROCEDURE — 77385 HC IMRT, SIMPLE: CPT | Performed by: RADIOLOGY

## 2022-04-01 PROCEDURE — 77014 PR  CT GUIDANCE PLACEMENT RAD THERAPY FIELDS: ICD-10-PCS | Mod: 26,,, | Performed by: RADIOLOGY

## 2022-04-01 PROCEDURE — 77014 HC CT GUIDANCE RADIATION THERAPY FLDS PLACEMENT: CPT | Mod: TC | Performed by: RADIOLOGY

## 2022-04-01 PROCEDURE — 77336 RADIATION PHYSICS CONSULT: CPT | Performed by: RADIOLOGY

## 2022-04-03 ENCOUNTER — TELEPHONE (OUTPATIENT)
Dept: PRIMARY CARE CLINIC | Facility: CLINIC | Age: 67
End: 2022-04-03
Payer: MEDICARE

## 2022-04-03 DIAGNOSIS — R47.1 DYSARTHRIA: Primary | ICD-10-CM

## 2022-04-03 DIAGNOSIS — R13.10 DYSPHAGIA, UNSPECIFIED TYPE: ICD-10-CM

## 2022-04-03 NOTE — TELEPHONE ENCOUNTER
Please let pt know that his swallow study did not show any aspiration but the speech therapist recommend that he get some outpt ST for dysphagia and dysarthria. The recommendation is for mechanical soft diet w/ thin liquids - please see rec on speech therapist's note 4/1. Referral sent for outpt ST.

## 2022-04-04 PROCEDURE — 77385 HC IMRT, SIMPLE: CPT | Performed by: RADIOLOGY

## 2022-04-04 PROCEDURE — 77014 PR  CT GUIDANCE PLACEMENT RAD THERAPY FIELDS: ICD-10-PCS | Mod: 26,,, | Performed by: RADIOLOGY

## 2022-04-04 PROCEDURE — 77014 HC CT GUIDANCE RADIATION THERAPY FLDS PLACEMENT: CPT | Mod: TC | Performed by: RADIOLOGY

## 2022-04-04 PROCEDURE — 77014 PR  CT GUIDANCE PLACEMENT RAD THERAPY FIELDS: CPT | Mod: 26,,, | Performed by: RADIOLOGY

## 2022-04-05 ENCOUNTER — DOCUMENTATION ONLY (OUTPATIENT)
Dept: RADIATION ONCOLOGY | Facility: CLINIC | Age: 67
End: 2022-04-05
Payer: MEDICARE

## 2022-04-05 PROCEDURE — 77014 HC CT GUIDANCE RADIATION THERAPY FLDS PLACEMENT: CPT | Mod: TC | Performed by: RADIOLOGY

## 2022-04-05 PROCEDURE — 77014 PR  CT GUIDANCE PLACEMENT RAD THERAPY FIELDS: ICD-10-PCS | Mod: 26,,, | Performed by: RADIOLOGY

## 2022-04-05 PROCEDURE — 77385 HC IMRT, SIMPLE: CPT | Performed by: RADIOLOGY

## 2022-04-05 PROCEDURE — 77014 PR  CT GUIDANCE PLACEMENT RAD THERAPY FIELDS: CPT | Mod: 26,,, | Performed by: RADIOLOGY

## 2022-04-06 ENCOUNTER — PATIENT OUTREACH (OUTPATIENT)
Dept: ADMINISTRATIVE | Facility: OTHER | Age: 67
End: 2022-04-06
Payer: MEDICARE

## 2022-04-06 PROCEDURE — 77014 PR  CT GUIDANCE PLACEMENT RAD THERAPY FIELDS: ICD-10-PCS | Mod: 26,,, | Performed by: RADIOLOGY

## 2022-04-06 PROCEDURE — 77014 PR  CT GUIDANCE PLACEMENT RAD THERAPY FIELDS: CPT | Mod: 26,,, | Performed by: RADIOLOGY

## 2022-04-06 PROCEDURE — 77014 HC CT GUIDANCE RADIATION THERAPY FLDS PLACEMENT: CPT | Mod: TC | Performed by: RADIOLOGY

## 2022-04-06 PROCEDURE — 77385 HC IMRT, SIMPLE: CPT | Performed by: RADIOLOGY

## 2022-04-07 PROCEDURE — 77014 HC CT GUIDANCE RADIATION THERAPY FLDS PLACEMENT: CPT | Mod: TC | Performed by: RADIOLOGY

## 2022-04-07 PROCEDURE — 77385 HC IMRT, SIMPLE: CPT | Performed by: RADIOLOGY

## 2022-04-07 PROCEDURE — 77014 PR  CT GUIDANCE PLACEMENT RAD THERAPY FIELDS: CPT | Mod: 26,,, | Performed by: RADIOLOGY

## 2022-04-07 PROCEDURE — 77014 PR  CT GUIDANCE PLACEMENT RAD THERAPY FIELDS: ICD-10-PCS | Mod: 26,,, | Performed by: RADIOLOGY

## 2022-04-07 NOTE — TELEPHONE ENCOUNTER
Spoke with Ms. Steen, let her know instructions on food and swallow study findings.  She said that they did get a call from outpatient speech therapy to set that up.

## 2022-04-08 ENCOUNTER — OFFICE VISIT (OUTPATIENT)
Dept: UROLOGY | Facility: CLINIC | Age: 67
End: 2022-04-08
Payer: MEDICARE

## 2022-04-08 VITALS
SYSTOLIC BLOOD PRESSURE: 119 MMHG | DIASTOLIC BLOOD PRESSURE: 83 MMHG | HEIGHT: 73 IN | WEIGHT: 187.38 LBS | BODY MASS INDEX: 24.83 KG/M2 | HEART RATE: 84 BPM

## 2022-04-08 DIAGNOSIS — Z79.01 LONG TERM (CURRENT) USE OF ANTICOAGULANTS: ICD-10-CM

## 2022-04-08 DIAGNOSIS — I63.10 CEREBROVASCULAR ACCIDENT (CVA) DUE TO EMBOLISM OF PRECEREBRAL ARTERY: ICD-10-CM

## 2022-04-08 DIAGNOSIS — N32.81 OVERACTIVE BLADDER: Primary | ICD-10-CM

## 2022-04-08 DIAGNOSIS — C61 PROSTATE CANCER: ICD-10-CM

## 2022-04-08 LAB
BILIRUB SERPL-MCNC: NORMAL MG/DL
BLOOD URINE, POC: NORMAL
CLARITY, POC UA: CLEAR
COLOR, POC UA: NORMAL
GLUCOSE UR QL STRIP: NORMAL
KETONES UR QL STRIP: NORMAL
LEUKOCYTE ESTERASE URINE, POC: NORMAL
NITRITE, POC UA: NORMAL
PH, POC UA: 5
POC RESIDUAL URINE VOLUME: 48 ML (ref 0–100)
PROTEIN, POC: NORMAL
SPECIFIC GRAVITY, POC UA: 1.01
UROBILINOGEN, POC UA: NORMAL

## 2022-04-08 PROCEDURE — 77014 PR  CT GUIDANCE PLACEMENT RAD THERAPY FIELDS: ICD-10-PCS | Mod: 26,,, | Performed by: RADIOLOGY

## 2022-04-08 PROCEDURE — 99999 PR PBB SHADOW E&M-EST. PATIENT-LVL V: ICD-10-PCS | Mod: PBBFAC,,, | Performed by: NURSE PRACTITIONER

## 2022-04-08 PROCEDURE — 99215 OFFICE O/P EST HI 40 MIN: CPT | Mod: S$GLB,,, | Performed by: NURSE PRACTITIONER

## 2022-04-08 PROCEDURE — 3288F PR FALLS RISK ASSESSMENT DOCUMENTED: ICD-10-PCS | Mod: CPTII,S$GLB,, | Performed by: NURSE PRACTITIONER

## 2022-04-08 PROCEDURE — 3008F PR BODY MASS INDEX (BMI) DOCUMENTED: ICD-10-PCS | Mod: CPTII,S$GLB,, | Performed by: NURSE PRACTITIONER

## 2022-04-08 PROCEDURE — 1101F PR PT FALLS ASSESS DOC 0-1 FALLS W/OUT INJ PAST YR: ICD-10-PCS | Mod: CPTII,S$GLB,, | Performed by: NURSE PRACTITIONER

## 2022-04-08 PROCEDURE — 3079F PR MOST RECENT DIASTOLIC BLOOD PRESSURE 80-89 MM HG: ICD-10-PCS | Mod: CPTII,S$GLB,, | Performed by: NURSE PRACTITIONER

## 2022-04-08 PROCEDURE — 3079F DIAST BP 80-89 MM HG: CPT | Mod: CPTII,S$GLB,, | Performed by: NURSE PRACTITIONER

## 2022-04-08 PROCEDURE — 1126F PR PAIN SEVERITY QUANTIFIED, NO PAIN PRESENT: ICD-10-PCS | Mod: CPTII,S$GLB,, | Performed by: NURSE PRACTITIONER

## 2022-04-08 PROCEDURE — 99215 PR OFFICE/OUTPT VISIT, EST, LEVL V, 40-54 MIN: ICD-10-PCS | Mod: S$GLB,,, | Performed by: NURSE PRACTITIONER

## 2022-04-08 PROCEDURE — 1126F AMNT PAIN NOTED NONE PRSNT: CPT | Mod: CPTII,S$GLB,, | Performed by: NURSE PRACTITIONER

## 2022-04-08 PROCEDURE — 3061F PR NEG MICROALBUMINURIA RESULT DOCUMENTED/REVIEW: ICD-10-PCS | Mod: CPTII,S$GLB,, | Performed by: NURSE PRACTITIONER

## 2022-04-08 PROCEDURE — 3066F NEPHROPATHY DOC TX: CPT | Mod: CPTII,S$GLB,, | Performed by: NURSE PRACTITIONER

## 2022-04-08 PROCEDURE — 3066F PR DOCUMENTATION OF TREATMENT FOR NEPHROPATHY: ICD-10-PCS | Mod: CPTII,S$GLB,, | Performed by: NURSE PRACTITIONER

## 2022-04-08 PROCEDURE — 3008F BODY MASS INDEX DOCD: CPT | Mod: CPTII,S$GLB,, | Performed by: NURSE PRACTITIONER

## 2022-04-08 PROCEDURE — 1159F MED LIST DOCD IN RCRD: CPT | Mod: CPTII,S$GLB,, | Performed by: NURSE PRACTITIONER

## 2022-04-08 PROCEDURE — 3051F PR MOST RECENT HEMOGLOBIN A1C LEVEL 7.0 - < 8.0%: ICD-10-PCS | Mod: CPTII,S$GLB,, | Performed by: NURSE PRACTITIONER

## 2022-04-08 PROCEDURE — 51798 US URINE CAPACITY MEASURE: CPT | Mod: S$GLB,,, | Performed by: NURSE PRACTITIONER

## 2022-04-08 PROCEDURE — 1160F RVW MEDS BY RX/DR IN RCRD: CPT | Mod: CPTII,S$GLB,, | Performed by: NURSE PRACTITIONER

## 2022-04-08 PROCEDURE — 3074F PR MOST RECENT SYSTOLIC BLOOD PRESSURE < 130 MM HG: ICD-10-PCS | Mod: CPTII,S$GLB,, | Performed by: NURSE PRACTITIONER

## 2022-04-08 PROCEDURE — 1159F PR MEDICATION LIST DOCUMENTED IN MEDICAL RECORD: ICD-10-PCS | Mod: CPTII,S$GLB,, | Performed by: NURSE PRACTITIONER

## 2022-04-08 PROCEDURE — 3288F FALL RISK ASSESSMENT DOCD: CPT | Mod: CPTII,S$GLB,, | Performed by: NURSE PRACTITIONER

## 2022-04-08 PROCEDURE — 77385 HC IMRT, SIMPLE: CPT | Performed by: RADIOLOGY

## 2022-04-08 PROCEDURE — 1101F PT FALLS ASSESS-DOCD LE1/YR: CPT | Mod: CPTII,S$GLB,, | Performed by: NURSE PRACTITIONER

## 2022-04-08 PROCEDURE — 81002 POCT URINE DIPSTICK WITHOUT MICROSCOPE: ICD-10-PCS | Mod: S$GLB,,, | Performed by: NURSE PRACTITIONER

## 2022-04-08 PROCEDURE — 51798 POCT BLADDER SCAN: ICD-10-PCS | Mod: S$GLB,,, | Performed by: NURSE PRACTITIONER

## 2022-04-08 PROCEDURE — 3051F HG A1C>EQUAL 7.0%<8.0%: CPT | Mod: CPTII,S$GLB,, | Performed by: NURSE PRACTITIONER

## 2022-04-08 PROCEDURE — 77014 PR  CT GUIDANCE PLACEMENT RAD THERAPY FIELDS: CPT | Mod: 26,,, | Performed by: RADIOLOGY

## 2022-04-08 PROCEDURE — 1160F PR REVIEW ALL MEDS BY PRESCRIBER/CLIN PHARMACIST DOCUMENTED: ICD-10-PCS | Mod: CPTII,S$GLB,, | Performed by: NURSE PRACTITIONER

## 2022-04-08 PROCEDURE — 77014 HC CT GUIDANCE RADIATION THERAPY FLDS PLACEMENT: CPT | Mod: TC | Performed by: RADIOLOGY

## 2022-04-08 PROCEDURE — 81002 URINALYSIS NONAUTO W/O SCOPE: CPT | Mod: S$GLB,,, | Performed by: NURSE PRACTITIONER

## 2022-04-08 PROCEDURE — 3061F NEG MICROALBUMINURIA REV: CPT | Mod: CPTII,S$GLB,, | Performed by: NURSE PRACTITIONER

## 2022-04-08 PROCEDURE — 4010F PR ACE/ARB THEARPY RXD/TAKEN: ICD-10-PCS | Mod: CPTII,S$GLB,, | Performed by: NURSE PRACTITIONER

## 2022-04-08 PROCEDURE — 77336 RADIATION PHYSICS CONSULT: CPT | Performed by: RADIOLOGY

## 2022-04-08 PROCEDURE — 3074F SYST BP LT 130 MM HG: CPT | Mod: CPTII,S$GLB,, | Performed by: NURSE PRACTITIONER

## 2022-04-08 PROCEDURE — 99999 PR PBB SHADOW E&M-EST. PATIENT-LVL V: CPT | Mod: PBBFAC,,, | Performed by: NURSE PRACTITIONER

## 2022-04-08 PROCEDURE — 4010F ACE/ARB THERAPY RXD/TAKEN: CPT | Mod: CPTII,S$GLB,, | Performed by: NURSE PRACTITIONER

## 2022-04-08 RX ORDER — MIRABEGRON 25 MG/1
25 TABLET, FILM COATED, EXTENDED RELEASE ORAL DAILY
Qty: 90 TABLET | Refills: 3 | Status: SHIPPED | OUTPATIENT
Start: 2022-04-08 | End: 2023-03-09

## 2022-04-08 NOTE — PROGRESS NOTES
CHIEF COMPLAINT:    Kamlesh Diane is a 66 y.o. male presents today for OAB.     HISTORY OF PRESENTING ILLINESS:    Kamlesh Diane is a 66 y.o. insulin dependent Diabetic male s/p CVA with right sided weakness ~10 years ago. He has a history of Stage I (T1c, N0, M0, PSA < 10, GG 1) adenocarcinoma of the prostate. He was diagnosed June, 2018.     02/22/2022 Re-Staging TRUS bx with Dr. Rodríguez:  -Brent 7 (3+4): left apex  -Brent 6 (3+3): left middle, right apex, right middle,right base  -PSA was 13.4 at time of bx    (-) bone scan 03/08/2022  He met with Dr. Muñoz 03/08/2022    Last clinic visit was 01/05/2022 with Dr. Rodríguez. This is a new patient to for me. I personally reviewed their recent medical records as well as their outside medical, surgical, family, & social history.     Here today to discuss OAB since starting Myrbetriq.  Pleased with medication.          REVIEW OF SYSTEMS:  Review of Systems   Constitutional: Negative.  Negative for chills and fever.   Eyes: Negative for double vision.   Respiratory: Negative for cough and shortness of breath.    Cardiovascular: Negative for chest pain and palpitations.   Gastrointestinal: Negative for nausea and vomiting.   Genitourinary: Positive for frequency. Negative for dysuria, flank pain and hematuria.        Ok with urination  LUTS much improved with medication  Nocturia 2x; no urinary incontinence;   Not wearing pads.     Neurological: Positive for weakness. Negative for dizziness.         PATIENT HISTORY:    Past Medical History:   Diagnosis Date    CVA (cerebral vascular accident)     Diabetes     Elevated PSA     Hypertension     Prostate cancer     Stroke        Past Surgical History:   Procedure Laterality Date    CATARACT EXTRACTION      FRACTURE SURGERY      hips x 2 - last fall was 2 yrs ago.    AL EVAL,SWALLOW FUNCTION,CINE/VIDEO RECORD  4/1/2022            Family History   Problem Relation Age of Onset    Hypertension  Father     Heart attack Father     Breast cancer Sister     Diabetes Brother     Hypertension Brother        Social History     Socioeconomic History    Marital status: Single   Occupational History    Occupation: worked in the hospital previously.    Tobacco Use    Smoking status: Never Smoker    Smokeless tobacco: Never Used   Substance and Sexual Activity    Alcohol use: Yes     Comment: a few shots once a week.     Drug use: Yes     Types: Marijuana    Sexual activity: Not Currently       Allergies:  Patient has no known allergies.    Medications:    Current Outpatient Medications:     ACCU-CHEK JUSTIN PLUS TEST STRP Strp, TEST FOUR TIMES DAILY AS DIRECTED, Disp: 400 strip, Rfl: 3    alcohol swabs (ALCOHOL PREP PADS) PadM, Use 4 times a day., Disp: 400 each, Rfl: 3    amantadine HCL (SYMMETREL) 100 mg capsule, TAKE 1 CAPSULE(100 MG) BY MOUTH TWICE DAILY, Disp: 180 capsule, Rfl: 3    amLODIPine (NORVASC) 10 MG tablet, TAKE 1 TABLET(10 MG) BY MOUTH EVERY DAY, Disp: 90 tablet, Rfl: 1    ammonium lactate 12 % Crea, Apply 1 application topically 2 (two) times daily. Apply to affected area, Disp: , Rfl: 0    atorvastatin (LIPITOR) 40 MG tablet, TAKE 1 TABLET(40 MG) BY MOUTH EVERY DAY, Disp: 90 tablet, Rfl: 3    blood-glucose meter kit, Use as instructed, Disp: 1 each, Rfl: 0    calcitRIOL (ROCALTROL) 0.25 MCG Cap, Take 0.25 mcg by mouth once daily., Disp: , Rfl:     carvediloL (COREG) 3.125 MG tablet, TAKE 1 TABLET(3.125 MG) BY MOUTH TWICE DAILY WITH MEALS, Disp: 180 tablet, Rfl: 3    cloNIDine (CATAPRES) 0.1 MG tablet, TAKE 1 TABLET BY MOUTH THREE TIMES DAILY, Disp: 270 tablet, Rfl: 3    enoxaparin (LOVENOX) 120 mg/0.8 mL Syrg, Inject 0.8 mLs (120 mg total) into the skin once daily. for 10 doses, Disp: 10 each, Rfl: 1    ergocalciferol (ERGOCALCIFEROL) 50,000 unit Cap, TAKE 1 CAPSULE BY MOUTH EVERY 7 DAYS, Disp: 12 capsule, Rfl: 3    insulin lispro (HUMALOG KWIKPEN INSULIN) 100 unit/mL pen,  "Inject 7-8 units w/ breakfast, lunch and dinner, no scale max daily 24 units., Disp: 15 mL, Rfl: 3    lancets Misc, 1 lancet by Misc.(Non-Drug; Combo Route) route 4 (four) times daily., Disp: 400 each, Rfl: 3    lancing device Misc, 1 Device by Misc.(Non-Drug; Combo Route) route 2 (two) times daily with meals., Disp: 1 each, Rfl: 0    LEVEMIR FLEXTOUCH U-100 INSULN 100 unit/mL (3 mL) InPn pen, ADMINISTER 41 UNITS UNDER THE SKIN EVERY EVENING, Disp: 45 mL, Rfl: 3    losartan (COZAAR) 50 MG tablet, TAKE 1 TABLET(50 MG) BY MOUTH EVERY DAY, Disp: 90 tablet, Rfl: 3    pen needle, diabetic (BD ULTRA-FINE SHORT PEN NEEDLE) 31 gauge x 5/16" Ndle, USE THREE TIMES DAILY, Disp: 200 each, Rfl: 2    RESTASIS 0.05 % ophthalmic emulsion, INSTILL 1 DROP IN BOTH EYES TWICE DAILY, Disp: 180 each, Rfl: 3    traMADol (ULTRAM) 50 mg tablet, Take 1 tablet (50 mg total) by mouth every 8 (eight) hours as needed for Pain., Disp: 15 tablet, Rfl: 0    warfarin (COUMADIN) 2.5 MG tablet, TAKE 1 TABLET BY MOUTH EVERY MONDAY, TUESDAY, OR FRIDAY AS DIRECTED, Disp: 90 tablet, Rfl: 3    warfarin (COUMADIN) 5 MG tablet, TAKE 1 TABLET BY MOUTH ON WEDNESDAY, THURSDAY, SATURDAY AND SUNDAY OR AS DIRECTED BY COUMADIN CLINIC, Disp: 42 tablet, Rfl: 11    alendronate (FOSAMAX) 70 MG tablet, Take 1 tablet (70 mg total) by mouth every 7 days., Disp: 12 tablet, Rfl: 3    mirabegron (MYRBETRIQ) 25 mg Tb24 ER tablet, Take 1 tablet (25 mg total) by mouth once daily., Disp: 90 tablet, Rfl: 3  No current facility-administered medications for this visit.    PHYSICAL EXAMINATION:  Physical Exam  Vitals and nursing note reviewed.   Constitutional:       General: He is awake.      Appearance: Normal appearance.   HENT:      Head: Normocephalic.      Right Ear: External ear normal.      Left Ear: External ear normal.      Nose: Nose normal.   Cardiovascular:      Rate and Rhythm: Normal rate.   Pulmonary:      Effort: Pulmonary effort is normal. No respiratory " distress.   Abdominal:      General: Abdomen is flat.      Tenderness: There is no abdominal tenderness. There is no right CVA tenderness or left CVA tenderness.   Genitourinary:     Penis: Normal.       Testes: Normal.   Musculoskeletal:      Cervical back: Normal range of motion.      Comments: Ambulating well with standup walker.    Skin:     General: Skin is warm and dry.   Neurological:      General: No focal deficit present.      Mental Status: He is alert and oriented to person, place, and time.      Motor: Weakness present.      Gait: Gait abnormal.   Psychiatric:         Mood and Affect: Mood normal.         Behavior: Behavior is cooperative.           LABS:      In office UA today was clear of active infection    The PVR in the office today done immediately after urination by the nurse was 48.      Lab Results   Component Value Date    PSA 6.2 (H) 04/05/2018    PSADIAG 13.4 (H) 01/14/2022    PSADIAG 8.9 (H) 07/02/2020    PSADIAG 7.9 (H) 06/02/2020    PSATOTAL 7.1 (H) 08/12/2020             IMPRESSION:    Encounter Diagnoses   Name Primary?    Overactive bladder Yes    Prostate cancer     Cerebrovascular accident (CVA) due to embolism of precerebral artery     Long term (current) use of anticoagulants          Assessment:       1. Overactive bladder    2. Prostate cancer    3. Cerebrovascular accident (CVA) due to embolism of precerebral artery    4. Long term (current) use of anticoagulants        Plan:         I spent 40 minutes with the patient of which more than half was spent in direct consultation with the patient in regards to our treatment and plan.  We addressed the office findings and recent labs.   Education and recommendations of today's plan of care including home remedies and needed follow up with PCP.   We discussed his office findings today  Contributory factors for his LUTS.   Diet modifications. Reducing pm fluids.   Continue Myrbetriq 25mg daily. Discussed benefits over other OAB  meds; risks to high for him with anticholinergics.  Monitor BP  RTC one year or any time since he is see Rad/Onc

## 2022-04-11 ENCOUNTER — DOCUMENTATION ONLY (OUTPATIENT)
Dept: RADIATION ONCOLOGY | Facility: CLINIC | Age: 67
End: 2022-04-11
Payer: MEDICARE

## 2022-04-11 ENCOUNTER — CLINICAL SUPPORT (OUTPATIENT)
Dept: REHABILITATION | Facility: HOSPITAL | Age: 67
End: 2022-04-11
Attending: INTERNAL MEDICINE
Payer: MEDICARE

## 2022-04-11 DIAGNOSIS — Z86.73 H/O: CVA (CEREBROVASCULAR ACCIDENT): Primary | ICD-10-CM

## 2022-04-11 DIAGNOSIS — R47.1 DYSARTHRIA: ICD-10-CM

## 2022-04-11 DIAGNOSIS — R13.10 DYSPHAGIA, UNSPECIFIED TYPE: ICD-10-CM

## 2022-04-11 PROCEDURE — 77385 HC IMRT, SIMPLE: CPT | Performed by: RADIOLOGY

## 2022-04-11 PROCEDURE — 77014 HC CT GUIDANCE RADIATION THERAPY FLDS PLACEMENT: CPT | Mod: TC | Performed by: RADIOLOGY

## 2022-04-11 PROCEDURE — 77014 PR  CT GUIDANCE PLACEMENT RAD THERAPY FIELDS: ICD-10-PCS | Mod: 26,,, | Performed by: RADIOLOGY

## 2022-04-11 PROCEDURE — 77014 PR  CT GUIDANCE PLACEMENT RAD THERAPY FIELDS: CPT | Mod: 26,,, | Performed by: RADIOLOGY

## 2022-04-11 PROCEDURE — 92526 ORAL FUNCTION THERAPY: CPT | Mod: PN

## 2022-04-11 PROCEDURE — 92522 EVALUATE SPEECH PRODUCTION: CPT | Mod: PN

## 2022-04-11 NOTE — PROGRESS NOTES
Please see initial plan of care for evaluation details.     CAMILA Musa, CF-SLP  Speech Language Pathologist   4/11/2022

## 2022-04-11 NOTE — PLAN OF CARE
"Outpatient Neurological Rehabilitation  Speech and Language Therapy Evaluation    Date: 4/11/2022     Name: Kamlesh Diane   MRN: [id number]    Therapy Diagnosis:   Encounter Diagnoses   Name Primary?    Dysarthria     Dysphagia, unspecified type     Physician: Debora Ontiveros MD  Physician Orders: [id number] - AMB REFERRAL/CONSULT TO SPEECH THERAPY   Medical Diagnosis from Referral: R47.1 (ICD-10-CM) - Dysarthria R13.10 (ICD-10-CM) - Dysphagia, unspecified type       Visit #/Visits authorized: 1/ 1  Date of Evaluation:  4/11/2022   Insurance Authorization Period: 4/11/22-4/25/22  Plan of Care Expiration:  4/11/2022 to 5/16/22     Time In: 3:30pm  Time Out: 4:15pm    Procedure Min.   Evaluation of Speech Sound Production  25   Swallowing therapy   20     Precautions:Standard and Fall  Subjective   Date of Onset: CVA in 2005  History of Current Condition:   Pt. Dropped off by brother (primary caregiver) however reported he had to leave for an errand. Pt. Reports case history alone at this time. He reports he had a stroke in 2005, he had some speech therapy following however stopped due to "not needing it anymore". When discussing speech, he reports he has not noticed a change in his speech, his doctor recommended him to go. He recently underwent a Modified Barium swallow study with results below. He endorses coughing and choking during meals, however associates with allergies and post nasal drip. He reports he is currently eating solid foods, even with recommendation against solids from SLP that did his swallow study.      Past Medical History: Kamlesh Diane  has a past medical history of CVA (cerebral vascular accident), Diabetes, Elevated PSA, Hypertension, Prostate cancer, and Stroke.  Kamlesh Diane  has a past surgical history that includes Fracture surgery; Cataract extraction; and pr eval,swallow function,cine/video record (4/1/2022).  Medical Hx and Allergies:  Kamlesh has a current medication list which " "includes the following prescription(s): accu-chek kira plus test strp, alcohol swabs, alendronate, amantadine hcl, amlodipine, ammonium lactate, atorvastatin, blood-glucose meter, calcitriol, carvedilol, clonidine, ergocalciferol, insulin lispro, lancets, lancing device, levemir flextouch u-100 insuln, losartan, myrbetriq, pen needle, diabetic, restasis, tramadol, warfarin, and warfarin. Review of patient's allergies indicates:  No Known Allergies  Imaging: Results from MBSS 3/30/22:  Impressions  · Mild oral phase dysphagia  · Moderate pharyngeal dysphagia  ? Oral motor incoordination  ? Delayed trigger of the swallow reflex  ? dcr tongue base retraction  ? Airway penetration  ? Trace aspiration of thins  ? dcr pharyngeal contraction  dcr cricopharyngeal opening during the swallow  Prior Therapy:  Attended  when he first had his stroke, reported stopped because he didn't need it   Social History:  lives with their family  Prior Level of Function: unable to assess due to patient reporting no change    Current Level of Function: unable to assess due to patient reporting no change   Pain:   0/10  Pain Location / Description: n/a  Nutrition:  Does not stay away from any foods except "greens"  Patient's Therapy Goals:  "Speak better"  Objective   Formal Assessment:    Motor Speech/Fluency/Voice:    Pt's motor speech, fluency, and voice were informally assessed. OME performed within Cranial Nerve Assessment detailed below. Motor speech skills were assessed and were not functional for daily communication with a variety of communication partners (i.e. Family, friends, medical personnel).   Respiration / Phonation:   # of reps/ 5s Norms/ # reps per second Maximum Phon. Time (ah) Words Per Minute:   P^ 9     5.0-7.1 1.8   Trial 1: 4 seconds      Unable to read-  words per minute   T^ 12  4.8-7.1 2.4  Trial 2: 5 seconds  >125 = WFL    K^ 12  4.4-7.4 2.4   <125 = refer for AAC eval   P^T^K^ 6  3.6-7.5 1.2  Avg: " 4.5  Tremor noted        Norm: 160-170  (paragraph reading)       Norm (F 10-26, M 13-34.6) Norm: 150 to 250 (norm conversation)        Phonation Oral Reading Conversation   Stimulus Sustained ah:4.5 seconds     Singing up scale: difficulty noted    Counting 1 to highest # on one breath: 4 [employer] [facility] History and Conversation   Quality weak, dysarthria weak, dysarthria breathy, weak, dysarthria   Duration  4.5 seconds  n/a N/a   Loudness   variable Loudness  variable Loudness  variable Loudness   Steadiness vocal tremor vocal tremor vocal tremor     Diadochokinetic (DDK) rates for rapid repetition of 1 - 3 syllable utterances were not WNL.     Overall Speech Intelligibility: poor- fair; speech was approximately 30% intelligible for unfamiliar listeners in a quiet environment. Vocal indurance/breath support was severely reduced during structured tasks and conversation. Rapid, rushed, tremor like speech and invariable rate of speech noted during the DDK/MMR's.    Swallowing:    Eating Assessment Tool (EAT-10) is a questionnaire given to the patient to  her swallowing function. They ranked their swallowing function as a 10/40 (If the score is greater than 3 there may be swallowing problems.)    0=No problem  4= severe problem    1. My swallowing problem has caused me to lose weight: 0  2. My swallowing problem interferes with my ability to go out for meals: 2  3. Swallowing liquid takes extra effort: 0  4. Swallowing solids takes extra effort: 3  5. Swallowing pills takes extra effort: 0  6. Swallowing is painful: 0  7. The pleasure of my eating is affected by my swallowin  8. When I swallow food sticks in my throat: 1  9. I cough when I eat: 4  10. Swallowing is stressful: 0  Total: 10    Interpretation: Score of greater than 3 is indicative of a swallowing disorder (Jamilah et al., 2008); higher scores correlate with increased penetration-aspiration  scale scores, and scores greater than 15  results in the patient being 2.2 times more likely to aspirate (Kala et al., 2015)     References:   BHAVANI Askew., YANDY Brunner., ANNIE Gauthier., HONEY Holly., JOSUÉ Ortega., HONEY Johnson, & ADIN Whitlock (2008). Validity and reliability of the Eating Assessment Tool (EAT-10). Annals of Otology, Rhinology & Laryngology, 117(12), 919-924. [email or web address]://[email or web address]  YANDY Armendariz., FAINA Walton., HONEY Nascimento., FAINA Le, & BHAVANI Askew (2015). The ability of the 10-item eating assessment tool (EAT-10) to predict aspiration risk in persons with dysphagia. Annals of Otology, Rhinology & Laryngology, 124(5), 351-354. https://doi.org/10.1177/[email or web address]    Clinical Swallow Exam/ Oral Mechanism Exam:  Mandibular Strength and Mobility - Trigeminal Nerve (CNV) Rest: WNL   Lateralization: unable to complete  Protrusion: unable to complete  Retraction:  unable to complete  Involuntary Movement: absent    Oral Labial Strength and Mobility -  Facial Nerve (CN VII)  Rest: mild right side asymmetry    Lateralization: reduced right side movement   Protrusion: reduced strength   Retraction:  WNL  Involuntary Movement: present    Lingual Strength and Mobility- Hypoglossal Nerve (CN XII)  Rest: WNL   Lateralization: WNL  Protrusion: WNL  Elevation:  WNL  Involuntary Movement: present    Velar Elevation -   Glossopharyngeal Nerve (CN IX) and Vagus (CN X) Rest: WNL   Symmetry: WNL   Elevation: WNL   Sustained elevation: WNL   Involuntary movement: present     Buccal Strength and Mobility -   Facial Nerve (CN VII)  reduced    Facial Sensation and Movement -   Facial Nerve (CN VII) Symmetrical at rest: yes; very mild asymmetry   Wrinkle forehead: yes  Able to close eyes tightly: yes  Taste to Anterior 2/3 of Tongue: yes     Structure Abnormalities: no  Dentition: missing teeth in bottom back   Secretion Management: WNL  Mucosal Quality: phlegm   Volitional Cough: WNL  Volitional Swallow: WNL  Voice Prior to PO  "Intake: clear; dysarthric    Effortful swallow   Thins x6 aspiration- 10 swallows   Puree x10 - 0 aspiration       Hearing / Vision: reported he has "holes in his right eye" and vision is difficult. Hearing was not assessed, however functional for purpose of evaluation.       Treatment   Treatment Time In: 3:55pm  Treatment Time Out: 4:15pm  Total Treatment Time: 20 minutes  Completed 20 effortful swallows with 6 s/s of aspirations. Additonally completed one round of CTAR with one 60 second hold and x20 reps.     Education: Plan of Care, role of SLP in care, aspiration precautions  and scheduling/ cancellation policy was discussed with pt. Patient and/or family members expressed understanding. He reported understanding. Did not provide written instructions due to limited reading ability.     Home Program: practice effortful swallow and CTAR  Assessment     Kamlesh presents to [facility] s/p medical diagnosis of  Dysphagia and dysarthria.  Demonstrates impairments including limitations as described in the problem list. He presents with severe dysarthria c/b significantly reduced intelligibility and breath support. Additionally presents with dysphagia c/b   Positive prognostic factors include motivation. Negative prognostic factors include time post stroke. No barriers to therapy identified. Patient will benefit from skilled, outpatient neurological rehabilitation speech therapy.    Rehab Potential: fair-good  Pt's spiritual, cultural and educational needs considered and pt agreeable to plan of care and goals.    Short Term Goals (4 weeks):   1. Patient will participate in tongue base retraction exercises (I.e., hard effortful swallow) 50-75 times given mod A to improve tongue base retraction and overall swallow function.   2. Patient will participate in chin tuck against resistance (CTAR) hold for 60 seconds for 3 sets given cues to improve hyolaryngeal elevation / excursion and overall " swallow function.  3. Patient will participate in chin tuck against resistance (CTAR) repetitions 45 times given cues to improve hyolaryngeal elevation / excursion and overall swallow function.  4. Patient will recall and utilize safe swallow strategies and aspiration precautions independently   5. Patient will produce single words following clinician with 90% acc independently  6. Patient will produce short phrases following clinician with 90% acc independently  7. Patient will produce sentences following clinician with 90% acc independently  7. Patient will use motor speech strategies and answer questions in conversation with a self-rating of at least 2 on a 3 point scale ( 1 = same as before beginning therapy, 2 =better but not best, 3 =best possible outcome) on  8 /10 trials.   8. Patient will use motor speech strategies when asked personal questions with 90% intelligibility to encourage carryover of motor speech strategies    Long Term Goals (6 weeks):   1.  He  will maintain adequate hydration/nutrition with optimum safety and efficiency of swallowing function on PO intake without overt signs and symptoms of aspiration for optimal diet level.   2.  He  will utilize compensatory strategies with optimum safety and efficiency of swallowing function on PO intake without overt signs and symptoms of aspiration for optimal diet level.   3. He  will develop functional and intelligible speech and utilize compensatory strategies through the use of adequate labial and lingual function, increased articulatory precision and speech prosody.    Plan     Plan of Care Certification Period: 4/11/2022  to 5/16/22    Recommended Treatment Plan:  Patient will participate in the [facility] neurological rehabilitation program for speech therapy 2 times per week to address his  Motor Speech and Swallow deficits, to educate patient and their family, and to participate in a home exercise program.    Other Recommendations: not at this  time    Therapist's Name:   CAMILA Musa, CF-SLP  Speech Language Pathologist   4/11/2022

## 2022-04-12 ENCOUNTER — PATIENT MESSAGE (OUTPATIENT)
Dept: PRIMARY CARE CLINIC | Facility: CLINIC | Age: 67
End: 2022-04-12
Payer: MEDICARE

## 2022-04-12 PROCEDURE — 77014 PR  CT GUIDANCE PLACEMENT RAD THERAPY FIELDS: ICD-10-PCS | Mod: 26,,, | Performed by: RADIOLOGY

## 2022-04-12 PROCEDURE — 77014 PR  CT GUIDANCE PLACEMENT RAD THERAPY FIELDS: CPT | Mod: 26,,, | Performed by: RADIOLOGY

## 2022-04-12 PROCEDURE — 77385 HC IMRT, SIMPLE: CPT | Performed by: RADIOLOGY

## 2022-04-12 PROCEDURE — 77014 HC CT GUIDANCE RADIATION THERAPY FLDS PLACEMENT: CPT | Mod: TC | Performed by: RADIOLOGY

## 2022-04-14 PROCEDURE — 77014 HC CT GUIDANCE RADIATION THERAPY FLDS PLACEMENT: CPT | Mod: TC | Performed by: RADIOLOGY

## 2022-04-14 PROCEDURE — 77385 HC IMRT, SIMPLE: CPT | Performed by: RADIOLOGY

## 2022-04-14 PROCEDURE — 77014 PR  CT GUIDANCE PLACEMENT RAD THERAPY FIELDS: ICD-10-PCS | Mod: 26,,, | Performed by: RADIOLOGY

## 2022-04-14 PROCEDURE — 77014 PR  CT GUIDANCE PLACEMENT RAD THERAPY FIELDS: CPT | Mod: 26,,, | Performed by: RADIOLOGY

## 2022-04-18 ENCOUNTER — CLINICAL SUPPORT (OUTPATIENT)
Dept: REHABILITATION | Facility: HOSPITAL | Age: 67
End: 2022-04-18
Attending: INTERNAL MEDICINE
Payer: MEDICARE

## 2022-04-18 ENCOUNTER — DOCUMENTATION ONLY (OUTPATIENT)
Dept: RADIATION ONCOLOGY | Facility: CLINIC | Age: 67
End: 2022-04-18
Payer: MEDICARE

## 2022-04-18 DIAGNOSIS — Z86.73 H/O: CVA (CEREBROVASCULAR ACCIDENT): Primary | ICD-10-CM

## 2022-04-18 PROCEDURE — 77014 HC CT GUIDANCE RADIATION THERAPY FLDS PLACEMENT: CPT | Mod: TC | Performed by: RADIOLOGY

## 2022-04-18 PROCEDURE — 77336 RADIATION PHYSICS CONSULT: CPT | Performed by: RADIOLOGY

## 2022-04-18 PROCEDURE — 77014 PR  CT GUIDANCE PLACEMENT RAD THERAPY FIELDS: CPT | Mod: 26,,, | Performed by: RADIOLOGY

## 2022-04-18 PROCEDURE — 77014 PR  CT GUIDANCE PLACEMENT RAD THERAPY FIELDS: ICD-10-PCS | Mod: 26,,, | Performed by: RADIOLOGY

## 2022-04-18 PROCEDURE — 92526 ORAL FUNCTION THERAPY: CPT | Mod: PN

## 2022-04-18 PROCEDURE — 77385 HC IMRT, SIMPLE: CPT | Performed by: RADIOLOGY

## 2022-04-18 PROCEDURE — 92507 TX SP LANG VOICE COMM INDIV: CPT | Mod: PN

## 2022-04-18 NOTE — PROGRESS NOTES
[facility]  Speech Therapy Treatment Note- Neurological Rehabilitation and Dysphagia  Date: 4/18/2022     Name: Kamlesh Diane   MRN: [id number]   Therapy Diagnosis:   Encounter Diagnosis   Name Primary?    H/O: CVA (cerebrovascular accident) Yes      Physician: Debora Ontiveros MD  Physician Orders: [id number] - THERAPY TREAMENT   Medical Diagnosis: R47.1 (ICD-10-CM) - Dysarthria and anarthria R13.10 (ICD-10-CM) - Dysphagia, unspecified       Visit #/ Visits Authorized: 1/ 5  Date of Evaluation:  4/11/22  Insurance Authorization Period: 4/11/22-4/25/22  Plan of Care Expiration Date:    5/16/22  Extended Plan of Care:  n/a   Progress Note: 5/11/22   Visits Cancelled: 0  Visits No Show: 0    Time In:  3:35pm  Time Out:  4:15pm  Total Billable Time: 40mins      Precautions: Standard and Fall  Subjective:   Patient reports: he has been taking smaller sips and bites. Continues to eat solids foods.   He was compliant to home exercise program.   Response to previous treatment: positive   Pain Scale:  0/10 on a Visual Analog Scale currently.   Pain Location: n/a  Objective:   TIMED  Procedure Min.                   UNTIMED  Procedure Min.   Speech- Language- Voice Therapy  20   Swallowing and Oral Function Evaluation  20   Total Timed Units: 2  Total Untimed Units: 0  Charges Billed/Number of units: 2    Short Term Goals: (4 weeks) Current Progress:   1. Patient will participate in tongue base retraction exercises (I.e., hard effortful swallow) 50-75 times given mod A to improve tongue base retraction and overall swallow function.       Progressing/ Not Met 4/18/2022   Completed 60 effortful swallows with 2 instance of s/s of aspiration/penetration   2. Patient will participate in chin tuck against resistance (CTAR) hold for 60 seconds for 3 sets given cues to improve hyolaryngeal elevation / excursion and overall swallow function.        Progressing/ Not Met 4/18/2022   Completed three- 60 second hold with min A  from clinician    3. Patient will participate in chin tuck against resistance (CTAR) repetitions 45 times given cues to improve hyolaryngeal elevation / excursion and overall swallow function.        Progressing/ Not Met 4/18/2022   Completed 60 CTAR reps with min A from clinician   4. Patient will recall and utilize safe swallow strategies and aspiration precautions independently        Progressing/ Not Met 4/18/2022   Discussed, pt. Reported he was taking smaller sips and bites at home.    5. Patient will produce single words following clinician with 90% acc independently        Progressing/ Not Met 4/18/2022   Completed single word production with 90% accuracy Independently      Goal Met / Discontinue       6. Patient will produce short phrases following clinician with 90% acc independently        Progressing/ Not Met 4/18/2022   Completed phrases level production with 86% accuracy Independently  And 100% with min A         7. Patient will produce sentences following clinician with 90% acc independently        Progressing/ Not Met 4/18/2022   Completed with 89% accuracy Independently  And 100% with min A    8. Patient will use motor speech strategies and answer questions in conversation with a self-rating of at least 2 on a 3 point scale ( 1 = same as before beginning therapy, 2 =better but not best, 3 =best possible outcome) on 8 /10 trials.    Not formally addressed      9. Patient will use motor speech strategies when asked personal questions with 90% intelligibility to encourage carryover of motor speech strategies    Completed picture descriptions with 60% accuracy Independently and 100% with min A and repetitions    Completed speech strategies when answering personal question with 25% acc Independently  And 100% with min A     Patient Education/Response:   Education regarding aspiration precautions and motor speech strategies today. Pt. Was amenable to all suggestions.     Home program established: yes-hard  swallow and speech strategies.   Exercises were reviewed and Kamlesh was able to demonstrate them prior to the end of the session.  Kamlesh demonstrated good  understanding of the education provided.     See Electronic Medical Record under Patient Instructions for exercises provided throughout therapy.  Assessment:   Kamlesh is progressing well towards his goals. Initiated full dysphagia exercises today with 2 instances of s/s of aspiration/penetration. Strength was proven to be using speech strategies when repeating stimuli from clinician, target carryover of spontaneous speech. Current goals remain appropriate. Goals to be updated as necessary.     Patient prognosis is Good-fair. Patient will continue to benefit from skilled outpatient speech and language therapy to address the deficits listed in the problem list on initial evaluation, provide patient/family education and to maximize patient's level of independence in the home and community environment.   Medical necessity is demonstrated by the following IMPAIRMENTS:  Reduced speech intelligibility impedes patient to be understood by familiar and unfamiliar listeners in most contexts and especially in an emergency situation.    Barriers to Therapy: none  Patient's spiritual, cultural and educational needs considered and patient agreeable to plan of care and goals.\  Plan:   Continue Plan of Care with focus on swallowing and motor speech.     CAMILA Musa, CCC-SLP  Speech Language Pathologist   4/18/2022

## 2022-04-18 NOTE — PLAN OF CARE
Pt. On day 16 of outpt. Xrt to prostate.  Used fleets over weekend for constipation.  Will resume miralax/tea in future.  No  issues.

## 2022-04-19 ENCOUNTER — LAB VISIT (OUTPATIENT)
Dept: LAB | Facility: OTHER | Age: 67
End: 2022-04-19
Attending: INTERNAL MEDICINE
Payer: MEDICARE

## 2022-04-19 ENCOUNTER — ANTI-COAG VISIT (OUTPATIENT)
Dept: CARDIOLOGY | Facility: CLINIC | Age: 67
End: 2022-04-19
Payer: MEDICARE

## 2022-04-19 ENCOUNTER — TELEPHONE (OUTPATIENT)
Dept: PRIMARY CARE CLINIC | Facility: CLINIC | Age: 67
End: 2022-04-19
Payer: MEDICARE

## 2022-04-19 DIAGNOSIS — I63.10 CEREBROVASCULAR ACCIDENT (CVA) DUE TO EMBOLISM OF PRECEREBRAL ARTERY: ICD-10-CM

## 2022-04-19 DIAGNOSIS — Z79.01 LONG TERM (CURRENT) USE OF ANTICOAGULANTS: Primary | ICD-10-CM

## 2022-04-19 DIAGNOSIS — Z79.01 LONG TERM (CURRENT) USE OF ANTICOAGULANTS: ICD-10-CM

## 2022-04-19 LAB
INR PPP: 5.6 (ref 0.8–1.2)
PROTHROMBIN TIME: 56.2 SEC (ref 9–12.5)

## 2022-04-19 PROCEDURE — 77014 PR  CT GUIDANCE PLACEMENT RAD THERAPY FIELDS: ICD-10-PCS | Mod: 26,,, | Performed by: RADIOLOGY

## 2022-04-19 PROCEDURE — 93793 ANTICOAG MGMT PT WARFARIN: CPT | Mod: S$GLB,,,

## 2022-04-19 PROCEDURE — 93793 PR ANTICOAGULANT MGMT FOR PT TAKING WARFARIN: ICD-10-PCS | Mod: S$GLB,,,

## 2022-04-19 PROCEDURE — 36415 COLL VENOUS BLD VENIPUNCTURE: CPT | Performed by: INTERNAL MEDICINE

## 2022-04-19 PROCEDURE — 77385 HC IMRT, SIMPLE: CPT | Performed by: RADIOLOGY

## 2022-04-19 PROCEDURE — 77014 HC CT GUIDANCE RADIATION THERAPY FLDS PLACEMENT: CPT | Mod: TC | Performed by: RADIOLOGY

## 2022-04-19 PROCEDURE — 77014 PR  CT GUIDANCE PLACEMENT RAD THERAPY FIELDS: CPT | Mod: 26,,, | Performed by: RADIOLOGY

## 2022-04-19 PROCEDURE — 85610 PROTHROMBIN TIME: CPT | Performed by: INTERNAL MEDICINE

## 2022-04-19 NOTE — PROGRESS NOTES
INR not at goal. Medications, chart, and patient findings reviewed. See calendar for adjustments to dose and follow up plan.  Pt to hold warfarin until INR re-assessed Thursday.

## 2022-04-19 NOTE — TELEPHONE ENCOUNTER
Left message for Pt brother to call us reg PT/ INR labs & hold coumadin per MD.   [facility]ou[facility]in Clinic also notified. Will attempt to call again

## 2022-04-20 ENCOUNTER — CLINICAL SUPPORT (OUTPATIENT)
Dept: REHABILITATION | Facility: HOSPITAL | Age: 67
End: 2022-04-20
Attending: INTERNAL MEDICINE
Payer: MEDICARE

## 2022-04-20 DIAGNOSIS — Z86.73 H/O: CVA (CEREBROVASCULAR ACCIDENT): Primary | ICD-10-CM

## 2022-04-20 PROCEDURE — 92526 ORAL FUNCTION THERAPY: CPT | Mod: PN

## 2022-04-20 PROCEDURE — 77014 PR  CT GUIDANCE PLACEMENT RAD THERAPY FIELDS: CPT | Mod: 26,,, | Performed by: RADIOLOGY

## 2022-04-20 PROCEDURE — 92507 TX SP LANG VOICE COMM INDIV: CPT | Mod: PN

## 2022-04-20 PROCEDURE — 77014 PR  CT GUIDANCE PLACEMENT RAD THERAPY FIELDS: ICD-10-PCS | Mod: 26,,, | Performed by: RADIOLOGY

## 2022-04-20 PROCEDURE — 77014 HC CT GUIDANCE RADIATION THERAPY FLDS PLACEMENT: CPT | Mod: TC | Performed by: RADIOLOGY

## 2022-04-20 PROCEDURE — 77385 HC IMRT, SIMPLE: CPT | Performed by: RADIOLOGY

## 2022-04-20 NOTE — PROGRESS NOTES
[facility]  Speech Therapy Treatment Note- Neurological Rehabilitation and Dysphagia  Date: 4/20/2022     Name: Kamlesh Diane   MRN: [id number]   Therapy Diagnosis:   Encounter Diagnosis   Name Primary?    H/O: CVA (cerebrovascular accident) Yes      Physician: Debora Ontiveros MD  Physician Orders: [id number] - THERAPY TREAMENT   Medical Diagnosis: R47.1 (ICD-10-CM) - Dysarthria and anarthria R13.10 (ICD-10-CM) - Dysphagia, unspecified       Visit #/ Visits Authorized: 2/ 5  Date of Evaluation:  4/11/22  Insurance Authorization Period: 4/11/22-4/25/22  Plan of Care Expiration Date:    5/16/22  Extended Plan of Care:  n/a   Progress Note: 5/11/22   Visits Cancelled: 0  Visits No Show: 0    Time In:  3:30pm  Time Out:  4:15pm  Total Billable Time: 45mins      Precautions: Standard and Fall  Subjective:   Patient reports: He continues to do do his HEP for swallowing and reports slowing his speech down.   He was compliant to home exercise program.   Response to previous treatment: positive   Pain Scale:  0/10 on a Visual Analog Scale currently.   Pain Location: n/a  Objective:   TIMED  Procedure Min.                   UNTIMED  Procedure Min.   Speech- Language- Voice Therapy  20   Swallowing and Oral Function Evaluation  25   Total Timed Units: 2  Total Untimed Units: 0  Charges Billed/Number of units: 2    Short Term Goals: (4 weeks) Current Progress:   1. Patient will participate in tongue base retraction exercises (I.e., hard effortful swallow) 50-75 times given mod A to improve tongue base retraction and overall swallow function.       Progressing/ Not Met 4/20/2022   Completed 60 effortful swallows with 4 instance of s/s of aspiration/penetration  (increased from previous session).    2. Patient will participate in chin tuck against resistance (CTAR) hold for 60 seconds for 3 sets given cues to improve hyolaryngeal elevation / excursion and overall swallow function.        Progressing/ Not Met 4/20/2022    Completed three- 60 second hold with min A from clinician    3. Patient will participate in chin tuck against resistance (CTAR) repetitions 45 times given cues to improve hyolaryngeal elevation / excursion and overall swallow function.        Progressing/ Not Met 4/20/2022   Completed 50 CTAR reps with min A from clinician   4. Patient will recall and utilize safe swallow strategies and aspiration precautions independently        Progressing/ Not Met 4/20/2022   Discussed, pt. Reported he was taking smaller sips and bites at home.    5. Patient will produce single words following clinician with 90% acc independently        Progressing/ Not Met 4/20/2022   Completed single word production with 90% accuracy Independently      Goal Met / Discontinue       6. Patient will produce short phrases following clinician with 90% acc independently        Progressing/ Not Met 4/20/2022   Completed phrases level production with 90% accuracy Independently  And 100% with min A     Goal Met / Discontinue       7. Patient will produce sentences following clinician with 90% acc independently        Progressing/ Not Met 4/20/2022   Completed with 90% accuracy Independently  And 100% with min A     Goal Met / Discontinue    8. Patient will use motor speech strategies and answer questions in conversation with a self-rating of at least 2 on a 3 point scale ( 1 = same as before beginning therapy, 2 =better but not best, 3 =best possible outcome) on 8 /10 trials.    Patient rated himself:  3: 4  2: 4  1: 0     9. Patient will use motor speech strategies when asked personal questions with 90% intelligibility to encourage carryover of motor speech strategies    Completed speech strategies when answering personal question with 49% acc Independently (increased from 25%)  And 86% with min A     Patient Education/Response:   Education regarding aspiration precautions and motor speech strategies today. Pt. Was amenable to all suggestions.     Home  program established: yes-hard swallow and speech strategies.   Exercises were reviewed and Kamlesh was able to demonstrate them prior to the end of the session.  Kamlesh demonstrated good  understanding of the education provided.     See Electronic Medical Record under Patient Instructions for exercises provided throughout therapy.  Assessment:   Kamlesh is progressing well towards his goals. Initiated full dysphagia exercises today with 4 instances of s/s of aspiration/penetration (increased from previous session). Due to good strategy use on phrases and sentences when repeating from clinician, goals have been met. Continue to focus on spontaneous speech with strategies use (slow down).  Current goals remain appropriate. Goals to be updated as necessary.     Patient prognosis is Good-fair. Patient will continue to benefit from skilled outpatient speech and language therapy to address the deficits listed in the problem list on initial evaluation, provide patient/family education and to maximize patient's level of independence in the home and community environment.   Medical necessity is demonstrated by the following IMPAIRMENTS:  Reduced speech intelligibility impedes patient to be understood by familiar and unfamiliar listeners in most contexts and especially in an emergency situation.    Barriers to Therapy: none  Patient's spiritual, cultural and educational needs considered and patient agreeable to plan of care and goals.\  Plan:   Continue Plan of Care with focus on swallowing and motor speech.     CAMILA Musa, CCC-SLP  Speech Language Pathologist   4/20/2022

## 2022-04-21 ENCOUNTER — PATIENT MESSAGE (OUTPATIENT)
Dept: PRIMARY CARE CLINIC | Facility: CLINIC | Age: 67
End: 2022-04-21
Payer: MEDICARE

## 2022-04-22 ENCOUNTER — ANTI-COAG VISIT (OUTPATIENT)
Dept: CARDIOLOGY | Facility: CLINIC | Age: 67
End: 2022-04-22
Payer: MEDICARE

## 2022-04-22 DIAGNOSIS — Z79.01 LONG TERM (CURRENT) USE OF ANTICOAGULANTS: Primary | ICD-10-CM

## 2022-04-22 PROCEDURE — 93793 ANTICOAG MGMT PT WARFARIN: CPT | Mod: S$GLB,,,

## 2022-04-22 PROCEDURE — 77014 PR  CT GUIDANCE PLACEMENT RAD THERAPY FIELDS: CPT | Mod: 26,,, | Performed by: RADIOLOGY

## 2022-04-22 PROCEDURE — 77014 HC CT GUIDANCE RADIATION THERAPY FLDS PLACEMENT: CPT | Mod: TC | Performed by: RADIOLOGY

## 2022-04-22 PROCEDURE — 77385 HC IMRT, SIMPLE: CPT | Performed by: RADIOLOGY

## 2022-04-22 PROCEDURE — 93793 PR ANTICOAGULANT MGMT FOR PT TAKING WARFARIN: ICD-10-PCS | Mod: S$GLB,,,

## 2022-04-22 PROCEDURE — 77014 PR  CT GUIDANCE PLACEMENT RAD THERAPY FIELDS: ICD-10-PCS | Mod: 26,,, | Performed by: RADIOLOGY

## 2022-04-22 NOTE — PROGRESS NOTES
INR not at goal. Medications, chart, and patient findings reviewed. See calendar for adjustments to dose and follow up plan.  Recommend to decrease maintenance dose & re-assess Monday.

## 2022-04-25 ENCOUNTER — LAB VISIT (OUTPATIENT)
Dept: LAB | Facility: OTHER | Age: 67
End: 2022-04-25
Attending: INTERNAL MEDICINE
Payer: MEDICARE

## 2022-04-25 ENCOUNTER — ANTI-COAG VISIT (OUTPATIENT)
Dept: CARDIOLOGY | Facility: CLINIC | Age: 67
End: 2022-04-25
Payer: MEDICARE

## 2022-04-25 ENCOUNTER — TELEPHONE (OUTPATIENT)
Dept: PRIMARY CARE CLINIC | Facility: CLINIC | Age: 67
End: 2022-04-25
Payer: MEDICARE

## 2022-04-25 ENCOUNTER — DOCUMENTATION ONLY (OUTPATIENT)
Dept: RADIATION ONCOLOGY | Facility: CLINIC | Age: 67
End: 2022-04-25
Payer: MEDICARE

## 2022-04-25 ENCOUNTER — CLINICAL SUPPORT (OUTPATIENT)
Dept: REHABILITATION | Facility: HOSPITAL | Age: 67
End: 2022-04-25
Attending: INTERNAL MEDICINE
Payer: MEDICARE

## 2022-04-25 DIAGNOSIS — Z79.01 LONG TERM (CURRENT) USE OF ANTICOAGULANTS: Primary | ICD-10-CM

## 2022-04-25 DIAGNOSIS — Z86.73 H/O: CVA (CEREBROVASCULAR ACCIDENT): ICD-10-CM

## 2022-04-25 DIAGNOSIS — Z79.01 LONG TERM (CURRENT) USE OF ANTICOAGULANTS: ICD-10-CM

## 2022-04-25 DIAGNOSIS — Z86.73 H/O: CVA (CEREBROVASCULAR ACCIDENT): Primary | ICD-10-CM

## 2022-04-25 DIAGNOSIS — I63.10 CEREBROVASCULAR ACCIDENT (CVA) DUE TO EMBOLISM OF PRECEREBRAL ARTERY: ICD-10-CM

## 2022-04-25 LAB
INR PPP: 2 (ref 0.8–1.2)
PROTHROMBIN TIME: 21.3 SEC (ref 9–12.5)

## 2022-04-25 PROCEDURE — 92507 TX SP LANG VOICE COMM INDIV: CPT | Mod: PN

## 2022-04-25 PROCEDURE — 93793 ANTICOAG MGMT PT WARFARIN: CPT | Mod: S$GLB,,,

## 2022-04-25 PROCEDURE — 36415 COLL VENOUS BLD VENIPUNCTURE: CPT | Performed by: INTERNAL MEDICINE

## 2022-04-25 PROCEDURE — 85610 PROTHROMBIN TIME: CPT | Performed by: INTERNAL MEDICINE

## 2022-04-25 PROCEDURE — 77014 PR  CT GUIDANCE PLACEMENT RAD THERAPY FIELDS: CPT | Mod: 26,,, | Performed by: RADIOLOGY

## 2022-04-25 PROCEDURE — 93793 PR ANTICOAGULANT MGMT FOR PT TAKING WARFARIN: ICD-10-PCS | Mod: S$GLB,,,

## 2022-04-25 PROCEDURE — 77014 PR  CT GUIDANCE PLACEMENT RAD THERAPY FIELDS: ICD-10-PCS | Mod: 26,,, | Performed by: RADIOLOGY

## 2022-04-25 PROCEDURE — 77014 HC CT GUIDANCE RADIATION THERAPY FLDS PLACEMENT: CPT | Mod: TC | Performed by: RADIOLOGY

## 2022-04-25 PROCEDURE — 77385 HC IMRT, SIMPLE: CPT | Performed by: RADIOLOGY

## 2022-04-25 RX ORDER — ENOXAPARIN SODIUM 150 MG/ML
120 INJECTION SUBCUTANEOUS DAILY
Qty: 8 ML | Refills: 1 | Status: SHIPPED | OUTPATIENT
Start: 2022-04-25 | End: 2022-05-05

## 2022-04-25 NOTE — PROGRESS NOTES
INR at goal. Medications and chart reviewed. No changes noted to necessitate adjustment of warfarin or follow-up plan. See calendar.  Pt has upcoming c-scope.   Patient lovenox bridging instructions    Height   73in             Weight   85kg           SCr   1.5             CrCl  ~55ml/min               Pre-Procedure Instructions:    Take last dose of warfarin on :    4/27/22                             Start enoxaparin injections the morning of:     4/29/22                            Enoxaparin dose is:        120mg                Once daily in the morning  Last dose of enoxaparin will be the morning of:      5/2/22                           Post-Procedure Instructions:    Restart warfarin dose on     5/3/22                        Unless directed otherwise by your physician  Restart lovenox injections on the morning of     5/4/22                      Unless directed otherwise by your physician  Call the coumadin clinic your physician has different recommendations post procedure

## 2022-04-25 NOTE — PROGRESS NOTES
[facility]  Speech Therapy Treatment Note- Neurological Rehabilitation and Dysphagia  Date: 4/25/2022     Name: Kamlesh Diane   MRN: [id number]   Therapy Diagnosis:   Encounter Diagnosis   Name Primary?    H/O: CVA (cerebrovascular accident) Yes      Physician: Debora Ontiveros MD  Physician Orders: [id number] - THERAPY TREAMENT   Medical Diagnosis: R47.1 (ICD-10-CM) - Dysarthria and anarthria R13.10 (ICD-10-CM) - Dysphagia, unspecified       Visit #/ Visits Authorized: 3/ 5  Date of Evaluation:  4/11/22  Insurance Authorization Period: 4/11/22-4/25/22  Plan of Care Expiration Date:    5/16/22  Extended Plan of Care:  n/a   Progress Note: 5/11/22   Visits Cancelled: 0  Visits No Show: 0    Time In:  3:50pm (arrived late)  Time Out:  4:15pm  Total Billable Time: 25mins      Precautions: Standard and Fall  Subjective:   Patient reports: Pt. Arrived late to session today. He reports he feels his swallowing has improved.   He was compliant to home exercise program.   Response to previous treatment: positive   Pain Scale:  0/10 on a Visual Analog Scale currently.   Pain Location: n/a  Objective:   TIMED  Procedure Min.                   UNTIMED  Procedure Min.   Speech- Language- Voice Therapy  25   Swallowing and Oral Function Evaluation  0   Total Timed Units: 0  Total Untimed Units: 1  Charges Billed/Number of units: 1    Short Term Goals: (4 weeks) Current Progress:   1. Patient will participate in tongue base retraction exercises (I.e., hard effortful swallow) 50-75 times given mod A to improve tongue base retraction and overall swallow function.       Progressing/ Not Met 4/25/2022   Completed 60 effortful swallows with 4 instance of s/s of aspiration/penetration  (increased from previous session).     Not completed today    2. Patient will participate in chin tuck against resistance (CTAR) hold for 60 seconds for 3 sets given cues to improve hyolaryngeal elevation / excursion and overall swallow function.         Progressing/ Not Met 4/25/2022   Completed three- 60 second hold with min A from clinician       Not completed today   3. Patient will participate in chin tuck against resistance (CTAR) repetitions 45 times given cues to improve hyolaryngeal elevation / excursion and overall swallow function.        Progressing/ Not Met 4/25/2022   Completed 50 CTAR reps with min A from clinician      Not completed today   4. Patient will recall and utilize safe swallow strategies and aspiration precautions independently        Progressing/ Not Met 4/25/2022   Discussed, pt. Reported he was taking smaller sips and bites at home.    5. Patient will produce single words following clinician with 90% acc independently        Progressing/ Not Met 4/25/2022   Completed single word production with 90% accuracy Independently      Goal Met / Discontinue       6. Patient will produce short phrases following clinician with 90% acc independently        Progressing/ Not Met 4/25/2022   Completed phrases level production with 90% accuracy Independently  And 100% with min A     Goal Met / Discontinue       7. Patient will produce sentences following clinician with 90% acc independently        Progressing/ Not Met 4/25/2022   Completed with 90% accuracy Independently  And 100% with min A     Goal Met / Discontinue    8. Patient will use motor speech strategies and answer questions in conversation with a self-rating of at least 2 on a 3 point scale ( 1 = same as before beginning therapy, 2 =better but not best, 3 =best possible outcome) on 8 /10 trials.    Patient rated himself:  3: 5  2: 1  1: 0    Discussion regarding decrease intelligibility when speaking in longer sentences.      9. Patient will use motor speech strategies when asked personal questions with 90% intelligibility to encourage carryover of motor speech strategies    Not formally addressed        Patient Education/Response:   Education regarding aspiration precautions and  motor speech strategies today. Pt. Was amenable to all suggestions.     Home program established: yes-hard swallow and speech strategies.   Exercises were reviewed and Kamlesh was able to demonstrate them prior to the end of the session.  Kamlesh demonstrated good  understanding of the education provided.     See Electronic Medical Record under Patient Instructions for exercises provided throughout therapy.  Assessment:   Kamlesh is progressing well towards his goals. Was not able to complete dysphagia today due to time constraint. Speech intelligibly in short phrases have subjectively improved per clinician ear. Continued difficulty in longer sentences/stories with emotions. Will continue to target longer conversational speech and dysphagia. Current goals remain appropriate. Goals to be updated as necessary.     Patient prognosis is Good-fair. Patient will continue to benefit from skilled outpatient speech and language therapy to address the deficits listed in the problem list on initial evaluation, provide patient/family education and to maximize patient's level of independence in the home and community environment.   Medical necessity is demonstrated by the following IMPAIRMENTS:  Reduced speech intelligibility impedes patient to be understood by familiar and unfamiliar listeners in most contexts and especially in an emergency situation.    Barriers to Therapy: none  Patient's spiritual, cultural and educational needs considered and patient agreeable to plan of care and goals.\  Plan:   Continue Plan of Care with focus on swallowing and motor speech.     CAMILA Musa, CCC-SLP  Speech Language Pathologist   4/25/2022

## 2022-04-25 NOTE — TELEPHONE ENCOUNTER
----- Message from Kathleen Reilly RN sent at 4/25/2022  1:59 PM CDT -----  Regarding: Colonoscopy needs to be rescheduled for June  Mr. Diane is currently under treatment with Dr. Muñoz, radiation oncologist, for prostate cancer.  Dr. Muñoz would like Mr. Guillaume colonoscopy rescheduled for 1 month post completion of radiation.    Could you assist in moving this colonoscopy from May 3 to after Zara 3, 2022.    Thank you,    Kasey Reilly, RN  Radiation oncology

## 2022-04-26 ENCOUNTER — TELEPHONE (OUTPATIENT)
Dept: RADIATION ONCOLOGY | Facility: CLINIC | Age: 67
End: 2022-04-26
Payer: MEDICARE

## 2022-04-26 ENCOUNTER — PATIENT MESSAGE (OUTPATIENT)
Dept: PRIMARY CARE CLINIC | Facility: CLINIC | Age: 67
End: 2022-04-26
Payer: MEDICARE

## 2022-04-26 PROCEDURE — 77014 HC CT GUIDANCE RADIATION THERAPY FLDS PLACEMENT: CPT | Mod: TC | Performed by: RADIOLOGY

## 2022-04-26 PROCEDURE — 77014 PR  CT GUIDANCE PLACEMENT RAD THERAPY FIELDS: CPT | Mod: 26,,, | Performed by: RADIOLOGY

## 2022-04-26 PROCEDURE — 77014 PR  CT GUIDANCE PLACEMENT RAD THERAPY FIELDS: ICD-10-PCS | Mod: 26,,, | Performed by: RADIOLOGY

## 2022-04-26 PROCEDURE — 77385 HC IMRT, SIMPLE: CPT | Performed by: RADIOLOGY

## 2022-04-26 PROCEDURE — 77336 RADIATION PHYSICS CONSULT: CPT | Performed by: RADIOLOGY

## 2022-04-26 NOTE — TELEPHONE ENCOUNTER
Spoke with pt.'s brother to clarify colonoscopy needs to be moved 1 month post radiation.  Sent message to Dr. Ontiveros's office as well.

## 2022-04-26 NOTE — PROGRESS NOTES
Pt's niece called to let us know that CScope has been cancelled because pt will be starting radiation therapy on 5/3/22. Expected to r/s C-scope in 1 month.     Update: Pt's brother called and reports pt did not take warfarin on 4/25. .

## 2022-04-26 NOTE — PROGRESS NOTES
4/26/22-as pt has missed dosing 4/25 recommend to boost per calendar.  Dosing revised as c-scope has been r/s.  Will r/s f/u.  Pt will need to be monitored closely as he is staring radiation.

## 2022-04-27 ENCOUNTER — PATIENT MESSAGE (OUTPATIENT)
Dept: ENDOSCOPY | Facility: HOSPITAL | Age: 67
End: 2022-04-27
Payer: MEDICARE

## 2022-04-27 ENCOUNTER — DOCUMENTATION ONLY (OUTPATIENT)
Dept: REHABILITATION | Facility: HOSPITAL | Age: 67
End: 2022-04-27
Payer: MEDICARE

## 2022-04-27 ENCOUNTER — CLINICAL SUPPORT (OUTPATIENT)
Dept: REHABILITATION | Facility: HOSPITAL | Age: 67
End: 2022-04-27
Attending: INTERNAL MEDICINE
Payer: MEDICARE

## 2022-04-27 ENCOUNTER — TELEPHONE (OUTPATIENT)
Dept: ENDOSCOPY | Facility: HOSPITAL | Age: 67
End: 2022-04-27
Payer: MEDICARE

## 2022-04-27 DIAGNOSIS — Z86.73 H/O: CVA (CEREBROVASCULAR ACCIDENT): Primary | ICD-10-CM

## 2022-04-27 PROCEDURE — 77385 HC IMRT, SIMPLE: CPT | Performed by: RADIOLOGY

## 2022-04-27 PROCEDURE — 77014 HC CT GUIDANCE RADIATION THERAPY FLDS PLACEMENT: CPT | Mod: TC | Performed by: RADIOLOGY

## 2022-04-27 PROCEDURE — 77014 PR  CT GUIDANCE PLACEMENT RAD THERAPY FIELDS: CPT | Mod: 26,,, | Performed by: RADIOLOGY

## 2022-04-27 PROCEDURE — 92507 TX SP LANG VOICE COMM INDIV: CPT | Mod: PN

## 2022-04-27 PROCEDURE — 77014 PR  CT GUIDANCE PLACEMENT RAD THERAPY FIELDS: ICD-10-PCS | Mod: 26,,, | Performed by: RADIOLOGY

## 2022-04-27 PROCEDURE — 92526 ORAL FUNCTION THERAPY: CPT | Mod: PN

## 2022-04-27 NOTE — TELEPHONE ENCOUNTER
Pt moved to later date for colonoscopy per dr lake radiation oncologist.lvm with pt. Will continue to monitor.new instructions via pt portal.

## 2022-04-27 NOTE — PROGRESS NOTES
No Show Note/Documentation    Patient: Kamlesh Diane  Date of Session: 4/27/2022  Diagnosis:   Encounter Diagnosis   Name Primary?    H/O: CVA (cerebrovascular accident) Yes      MRN: [id number]    Kamlesh Diane did not attend his  scheduled therapy appointment today. He did not call to cancel nor reschedule. This is the 1st appointment that he has not attended. Next appointment is scheduled for 5/2/22 and will follow up with patient at that time. No charges have been posted today.     CAMILA Musa, CCC-SLP  Speech Language Pathologist   4/27/2022

## 2022-04-27 NOTE — PROGRESS NOTES
[facility]  Speech Therapy Treatment Note- Neurological Rehabilitation and Dysphagia  Date: 4/27/2022     Name: Kamlesh Diane   MRN: [id number]   Therapy Diagnosis:   Encounter Diagnosis   Name Primary?    H/O: CVA (cerebrovascular accident) Yes      Physician: Debora Ontiveros MD  Physician Orders: [id number] - THERAPY TREAMENT   Medical Diagnosis: R47.1 (ICD-10-CM) - Dysarthria and anarthria R13.10 (ICD-10-CM) - Dysphagia, unspecified       Visit #/ Visits Authorized: 3/ 5  Date of Evaluation:  4/11/22  Insurance Authorization Period: 4/11/22-4/25/22  Plan of Care Expiration Date:    5/16/22  Extended Plan of Care:  n/a   Progress Note: 5/11/22   Visits Cancelled: 0  Visits No Show: 0    Time In:  4:15pm  Time Out:  5:00pm  Total Billable Time: 45mins      Precautions: Standard and Fall  Subjective:   Patient reports: Missed his appointment earlier today and rescheduled for the afternoon. Reports he was confused of the days.   He was compliant to home exercise program.   Response to previous treatment: positive   Pain Scale:  0/10 on a Visual Analog Scale currently.   Pain Location: n/a  Objective:   TIMED  Procedure Min.                   UNTIMED  Procedure Min.   Speech- Language- Voice Therapy  20   Swallowing and Oral Function Evaluation  25   Total Timed Units: 0  Total Untimed Units: 2  Charges Billed/Number of units: 2    Short Term Goals: (4 weeks) Current Progress:   1. Patient will participate in tongue base retraction exercises (I.e., hard effortful swallow) 50-75 times given mod A to improve tongue base retraction and overall swallow function.       Progressing/ Not Met 4/27/2022   Completed 60 effortful swallows with 0 instance of s/s of aspiration/penetration         2. Patient will participate in chin tuck against resistance (CTAR) hold for 60 seconds for 3 sets given cues to improve hyolaryngeal elevation / excursion and overall swallow function.        Progressing/ Not Met 4/27/2022    Completed three- 60 second hold with min A from clinician          3. Patient will participate in chin tuck against resistance (CTAR) repetitions 45 times given cues to improve hyolaryngeal elevation / excursion and overall swallow function.        Progressing/ Not Met 4/27/2022   Completed 60 CTAR reps with min A from clinician         4. Patient will recall and utilize safe swallow strategies and aspiration precautions independently        Progressing/ Not Met 4/27/2022   Discussed, pt. Reported he was taking smaller sips and bites at home.    5. Patient will produce single words following clinician with 90% acc independently        Progressing/ Not Met 4/27/2022   Completed single word production with 90% accuracy Independently      Goal Met / Discontinue       6. Patient will produce short phrases following clinician with 90% acc independently        Progressing/ Not Met 4/27/2022   Completed phrases level production with 90% accuracy Independently  And 100% with min A     Goal Met / Discontinue       7. Patient will produce sentences following clinician with 90% acc independently        Progressing/ Not Met 4/27/2022   Completed with 90% accuracy Independently  And 100% with min A     Goal Met / Discontinue    8. Patient will use motor speech strategies and answer questions in conversation with a self-rating of at least 2 on a 3 point scale ( 1 = same as before beginning therapy, 2 =better but not best, 3 =best possible outcome) on 8 /10 trials.    Discussion regarding decrease intelligibility when speaking in longer sentences.      9. Patient will use motor speech strategies when asked personal questions with 90% intelligibility to encourage carryover of motor speech strategies    Completed with 50% accuracy with mod A (reminder to slow down)        Patient Education/Response:   Education regarding aspiration precautions and motor speech strategies today. Pt. Was amenable to all suggestions.     Home program  established: yes-hard swallow and speech strategies.   Exercises were reviewed and Kamlesh was able to demonstrate them prior to the end of the session.  Kamlesh demonstrated good  understanding of the education provided.     See Electronic Medical Record under Patient Instructions for exercises provided throughout therapy.  Assessment:   Kamlesh is progressing well towards his goals. Completed dysphagia therapy today with no s/s of aspiration/penetration. Continued speech with practice on longer phrases/stories. Continuously needed reminders to slow down during speech. Attempt visual and continue with patient rating. Current goals remain appropriate. Goals to be updated as necessary.     Patient prognosis is Good-fair. Patient will continue to benefit from skilled outpatient speech and language therapy to address the deficits listed in the problem list on initial evaluation, provide patient/family education and to maximize patient's level of independence in the home and community environment.   Medical necessity is demonstrated by the following IMPAIRMENTS:  Reduced speech intelligibility impedes patient to be understood by familiar and unfamiliar listeners in most contexts and especially in an emergency situation.    Barriers to Therapy: none  Patient's spiritual, cultural and educational needs considered and patient agreeable to plan of care and goals.\  Plan:   Continue Plan of Care with focus on swallowing and motor speech.     CAMILA Musa, CCC-SLP  Speech Language Pathologist   4/27/2022

## 2022-04-28 PROCEDURE — 77014 PR  CT GUIDANCE PLACEMENT RAD THERAPY FIELDS: ICD-10-PCS | Mod: 26,,, | Performed by: RADIOLOGY

## 2022-04-28 PROCEDURE — 77014 PR  CT GUIDANCE PLACEMENT RAD THERAPY FIELDS: CPT | Mod: 26,,, | Performed by: RADIOLOGY

## 2022-04-28 PROCEDURE — 77014 HC CT GUIDANCE RADIATION THERAPY FLDS PLACEMENT: CPT | Mod: TC | Performed by: RADIOLOGY

## 2022-04-28 PROCEDURE — 77385 HC IMRT, SIMPLE: CPT | Performed by: RADIOLOGY

## 2022-04-29 PROCEDURE — 77385 HC IMRT, SIMPLE: CPT | Performed by: RADIOLOGY

## 2022-04-29 PROCEDURE — 77014 PR  CT GUIDANCE PLACEMENT RAD THERAPY FIELDS: CPT | Mod: 26,,, | Performed by: RADIOLOGY

## 2022-04-29 PROCEDURE — 77014 PR  CT GUIDANCE PLACEMENT RAD THERAPY FIELDS: ICD-10-PCS | Mod: 26,,, | Performed by: RADIOLOGY

## 2022-04-29 PROCEDURE — 77014 HC CT GUIDANCE RADIATION THERAPY FLDS PLACEMENT: CPT | Mod: TC | Performed by: RADIOLOGY

## 2022-05-02 ENCOUNTER — PATIENT MESSAGE (OUTPATIENT)
Dept: OPTOMETRY | Facility: CLINIC | Age: 67
End: 2022-05-02
Payer: MEDICARE

## 2022-05-02 ENCOUNTER — CLINICAL SUPPORT (OUTPATIENT)
Dept: REHABILITATION | Facility: HOSPITAL | Age: 67
End: 2022-05-02
Attending: INTERNAL MEDICINE
Payer: MEDICARE

## 2022-05-02 ENCOUNTER — DOCUMENTATION ONLY (OUTPATIENT)
Dept: RADIATION ONCOLOGY | Facility: CLINIC | Age: 67
End: 2022-05-02
Payer: MEDICARE

## 2022-05-02 ENCOUNTER — APPOINTMENT (OUTPATIENT)
Dept: RADIATION THERAPY | Facility: OTHER | Age: 67
End: 2022-05-02
Attending: RADIOLOGY
Payer: MEDICARE

## 2022-05-02 ENCOUNTER — TELEPHONE (OUTPATIENT)
Dept: ENDOSCOPY | Facility: HOSPITAL | Age: 67
End: 2022-05-02
Payer: MEDICARE

## 2022-05-02 ENCOUNTER — TELEPHONE (OUTPATIENT)
Dept: OPHTHALMOLOGY | Facility: CLINIC | Age: 67
End: 2022-05-02
Payer: MEDICARE

## 2022-05-02 DIAGNOSIS — Z86.73 H/O: CVA (CEREBROVASCULAR ACCIDENT): Primary | ICD-10-CM

## 2022-05-02 DIAGNOSIS — H04.123 DRY EYE SYNDROME OF BOTH EYES: ICD-10-CM

## 2022-05-02 PROCEDURE — 77014 PR  CT GUIDANCE PLACEMENT RAD THERAPY FIELDS: ICD-10-PCS | Mod: 26,,, | Performed by: RADIOLOGY

## 2022-05-02 PROCEDURE — 77014 PR  CT GUIDANCE PLACEMENT RAD THERAPY FIELDS: CPT | Mod: 26,,, | Performed by: RADIOLOGY

## 2022-05-02 PROCEDURE — 77385 HC IMRT, SIMPLE: CPT | Performed by: RADIOLOGY

## 2022-05-02 PROCEDURE — 92507 TX SP LANG VOICE COMM INDIV: CPT | Mod: PN

## 2022-05-02 PROCEDURE — 77014 HC CT GUIDANCE RADIATION THERAPY FLDS PLACEMENT: CPT | Mod: TC | Performed by: RADIOLOGY

## 2022-05-02 PROCEDURE — 92526 ORAL FUNCTION THERAPY: CPT | Mod: PN

## 2022-05-02 RX ORDER — CYCLOSPORINE 0.5 MG/ML
EMULSION OPHTHALMIC
Qty: 180 EACH | Refills: 3 | OUTPATIENT
Start: 2022-05-02

## 2022-05-02 NOTE — TELEPHONE ENCOUNTER
Hi,    Patient needed to reschedule his Colonoscopy. He is now scheduled for 5/31/22 at 3:40pm with Dr. Quintanilla. Can he still hold his Coumadin 5 days prior per our Endoscopy Protocol? Please advise.    Thanks,  DRAKE Pastor

## 2022-05-02 NOTE — PROGRESS NOTES
[facility]  Speech Therapy Treatment Note- Neurological Rehabilitation and Dysphagia  Date: 5/2/2022     Name: Kamlesh Diane   MRN: [id number]   Therapy Diagnosis:   Encounter Diagnosis   Name Primary?    H/O: CVA (cerebrovascular accident) Yes      Physician: Debora Ontiveros MD  Physician Orders: [id number] - THERAPY TREAMENT   Medical Diagnosis: R47.1 (ICD-10-CM) - Dysarthria and anarthria R13.10 (ICD-10-CM) - Dysphagia, unspecified       Visit #/ Visits Authorized: 3/ 5  Date of Evaluation:  4/11/22  Insurance Authorization Period: 4/11/22-4/25/22  Plan of Care Expiration Date:    5/16/22  Extended Plan of Care:  n/a   Progress Note: 5/11/22   Visits Cancelled: 0  Visits No Show: 0    Time In:  4:25pm  Time Out:  5:00pm  Total Billable Time: 35mins      Precautions: Standard and Fall  Subjective:   Patient reports: His last day of radiation is next week, he is very excited. He reports he has been practicing his hard swallow and taking his time with his speech.   He was compliant to home exercise program.   Response to previous treatment: positive   Pain Scale:  0/10 on a Visual Analog Scale currently.   Pain Location: n/a  Objective:   TIMED  Procedure Min.                   UNTIMED  Procedure Min.   Speech- Language- Voice Therapy  15   Swallowing and Oral Function Evaluation  20   Total Timed Units: 0  Total Untimed Units: 2  Charges Billed/Number of units: 2    Short Term Goals: (4 weeks) Current Progress:   1. Patient will participate in tongue base retraction exercises (I.e., hard effortful swallow) 50-75 times given mod A to improve tongue base retraction and overall swallow function.       Progressing/ Not Met 5/2/2022   Completed 60 effortful swallows with 2 instance of s/s of aspiration/penetration (gurly voice and throat clear)          2. Patient will participate in chin tuck against resistance (CTAR) hold for 60 seconds for 3 sets given cues to improve hyolaryngeal elevation / excursion  and overall swallow function.        Progressing/ Not Met 5/2/2022   Completed three- 60 second hold with min A from clinician          3. Patient will participate in chin tuck against resistance (CTAR) repetitions 45 times given cues to improve hyolaryngeal elevation / excursion and overall swallow function.        Progressing/ Not Met 5/2/2022   Completed 60 CTAR reps with min A from clinician         4. Patient will recall and utilize safe swallow strategies and aspiration precautions independently        Progressing/ Not Met 5/2/2022   Discussed, pt. Reported he was taking smaller sips and bites at home.    5. Patient will produce single words following clinician with 90% acc independently        Progressing/ Not Met 5/2/2022   Completed single word production with 90% accuracy Independently      Goal Met / Discontinue       6. Patient will produce short phrases following clinician with 90% acc independently        Progressing/ Not Met 5/2/2022   Completed phrases level production with 90% accuracy Independently  And 100% with min A     Goal Met / Discontinue       7. Patient will produce sentences following clinician with 90% acc independently        Progressing/ Not Met 5/2/2022   Completed with 90% accuracy Independently  And 100% with min A     Goal Met / Discontinue    8. Patient will use motor speech strategies and answer questions in conversation with a self-rating of at least 2 on a 3 point scale ( 1 = same as before beginning therapy, 2 =better but not best, 3 =best possible outcome) on 8 /10 trials.    Not formally addressed        9. Patient will use motor speech strategies when asked personal questions with 90% intelligibility to encourage carryover of motor speech strategies    Completed with 60% accuracy with mod A (reminder to slow down), 100% accuracy with reminder of slowing down.        Patient Education/Response:   Education regarding aspiration precautions and motor speech strategies today.  Pt. Was amenable to all suggestions.     Home program established: yes-hard swallow and speech strategies.   Exercises were reviewed and Kamlesh was able to demonstrate them prior to the end of the session.  Kamlesh demonstrated good  understanding of the education provided.     See Electronic Medical Record under Patient Instructions for exercises provided throughout therapy.  Assessment:   Kamlesh is progressing well towards his goals. Completed dysphagia therapy today with two s/s of aspiration/penetration, cues provided to slow down and take smaller sips. Continued speech with practice in casual conversation. Continued to need reminders to slow down during speech. Attempt visual and continue with patient rating. Current goals remain appropriate. Goals to be updated as necessary.     Patient prognosis is Good-fair. Patient will continue to benefit from skilled outpatient speech and language therapy to address the deficits listed in the problem list on initial evaluation, provide patient/family education and to maximize patient's level of independence in the home and community environment.   Medical necessity is demonstrated by the following IMPAIRMENTS:  Reduced speech intelligibility impedes patient to be understood by familiar and unfamiliar listeners in most contexts and especially in an emergency situation.    Barriers to Therapy: none  Patient's spiritual, cultural and educational needs considered and patient agreeable to plan of care and goals.\  Plan:   Continue Plan of Care with focus on swallowing and motor speech.     CAMILA Musa, CCC-SLP  Speech Language Pathologist   5/2/2022

## 2022-05-02 NOTE — TELEPHONE ENCOUNTER
Yes we can provide pt with holding instructions provided he has his INR done prior to procedure. We will contact him to schedule labs.

## 2022-05-03 ENCOUNTER — PATIENT MESSAGE (OUTPATIENT)
Dept: ENDOSCOPY | Facility: HOSPITAL | Age: 67
End: 2022-05-03
Payer: MEDICARE

## 2022-05-03 PROCEDURE — 77014 PR  CT GUIDANCE PLACEMENT RAD THERAPY FIELDS: ICD-10-PCS | Mod: 26,,, | Performed by: RADIOLOGY

## 2022-05-03 PROCEDURE — 77385 HC IMRT, SIMPLE: CPT | Performed by: RADIOLOGY

## 2022-05-03 PROCEDURE — 77014 HC CT GUIDANCE RADIATION THERAPY FLDS PLACEMENT: CPT | Mod: TC | Performed by: RADIOLOGY

## 2022-05-03 PROCEDURE — 77014 PR  CT GUIDANCE PLACEMENT RAD THERAPY FIELDS: CPT | Mod: 26,,, | Performed by: RADIOLOGY

## 2022-05-04 ENCOUNTER — ANTI-COAG VISIT (OUTPATIENT)
Dept: CARDIOLOGY | Facility: CLINIC | Age: 67
End: 2022-05-04
Payer: MEDICARE

## 2022-05-04 ENCOUNTER — PATIENT MESSAGE (OUTPATIENT)
Dept: CARDIOLOGY | Facility: CLINIC | Age: 67
End: 2022-05-04

## 2022-05-04 ENCOUNTER — CLINICAL SUPPORT (OUTPATIENT)
Dept: REHABILITATION | Facility: HOSPITAL | Age: 67
End: 2022-05-04
Attending: INTERNAL MEDICINE
Payer: MEDICARE

## 2022-05-04 ENCOUNTER — LAB VISIT (OUTPATIENT)
Dept: LAB | Facility: OTHER | Age: 67
End: 2022-05-04
Attending: INTERNAL MEDICINE
Payer: MEDICARE

## 2022-05-04 DIAGNOSIS — Z79.01 LONG TERM (CURRENT) USE OF ANTICOAGULANTS: ICD-10-CM

## 2022-05-04 DIAGNOSIS — Z86.73 H/O: CVA (CEREBROVASCULAR ACCIDENT): Primary | ICD-10-CM

## 2022-05-04 DIAGNOSIS — I63.10 CEREBROVASCULAR ACCIDENT (CVA) DUE TO EMBOLISM OF PRECEREBRAL ARTERY: ICD-10-CM

## 2022-05-04 DIAGNOSIS — Z79.01 LONG TERM (CURRENT) USE OF ANTICOAGULANTS: Primary | ICD-10-CM

## 2022-05-04 LAB
INR PPP: 3.3 (ref 0.8–1.2)
PROTHROMBIN TIME: 33.5 SEC (ref 9–12.5)

## 2022-05-04 PROCEDURE — 77014 HC CT GUIDANCE RADIATION THERAPY FLDS PLACEMENT: CPT | Mod: TC | Performed by: RADIOLOGY

## 2022-05-04 PROCEDURE — 77014 PR  CT GUIDANCE PLACEMENT RAD THERAPY FIELDS: CPT | Mod: 26,,, | Performed by: RADIOLOGY

## 2022-05-04 PROCEDURE — 92526 ORAL FUNCTION THERAPY: CPT | Mod: PN

## 2022-05-04 PROCEDURE — 93793 PR ANTICOAGULANT MGMT FOR PT TAKING WARFARIN: ICD-10-PCS | Mod: S$GLB,,,

## 2022-05-04 PROCEDURE — 77336 RADIATION PHYSICS CONSULT: CPT | Performed by: RADIOLOGY

## 2022-05-04 PROCEDURE — 77385 HC IMRT, SIMPLE: CPT | Performed by: RADIOLOGY

## 2022-05-04 PROCEDURE — 77014 PR  CT GUIDANCE PLACEMENT RAD THERAPY FIELDS: ICD-10-PCS | Mod: 26,,, | Performed by: RADIOLOGY

## 2022-05-04 PROCEDURE — 36415 COLL VENOUS BLD VENIPUNCTURE: CPT | Performed by: INTERNAL MEDICINE

## 2022-05-04 PROCEDURE — 93793 ANTICOAG MGMT PT WARFARIN: CPT | Mod: S$GLB,,,

## 2022-05-04 PROCEDURE — 85610 PROTHROMBIN TIME: CPT | Performed by: INTERNAL MEDICINE

## 2022-05-04 NOTE — PROGRESS NOTES
INR not at goal. Medications, chart, and patient findings reviewed. See calendar for adjustments to dose and follow up plan.  Recommend to decrease maintenance dose & continue to monitor pt closely.

## 2022-05-04 NOTE — PROGRESS NOTES
[facility]  Speech Therapy Treatment Note- Neurological Rehabilitation and Dysphagia  Date: 5/4/2022     Name: Kamlesh Diane   MRN: [id number]   Therapy Diagnosis:   Encounter Diagnosis   Name Primary?    H/O: CVA (cerebrovascular accident) Yes      Physician: Debora Ontiveros MD  Physician Orders: [id number] - THERAPY TREAMENT   Medical Diagnosis: R47.1 (ICD-10-CM) - Dysarthria and anarthria R13.10 (ICD-10-CM) - Dysphagia, unspecified       Visit #/ Visits Authorized: 7/ 23  Date of Evaluation:  4/11/22  Insurance Authorization Period: 4/11/22-4/25/22  Plan of Care Expiration Date: 5/16/22  Extended Plan of Care:  n/a   Progress Note: 5/11/22   Visits Cancelled: 0  Visits No Show: 0    Time In:  3:30pm  Time Out:  4:05pm  Total Billable Time: 35mins      Precautions: Standard and Fall  Subjective:   Patient reports: he has been practicing his hard swallows. He reports he has gotten 2 comments complimenting his speech recently and how much it has improved.     He was compliant to home exercise program.   Response to previous treatment: positive   Pain Scale:  0/10 on a Visual Analog Scale currently.   Pain Location: n/a  Objective:   TIMED  Procedure Min.                   UNTIMED  Procedure Min.   Speech- Language- Voice Therapy  0   Swallowing and Oral Function Evaluation  35   Total Timed Units: 0  Total Untimed Units: 2  Charges Billed/Number of units: 2    Short Term Goals: (4 weeks) Current Progress:   1. Patient will participate in tongue base retraction exercises (I.e., hard effortful swallow) 50-75 times given mod A to improve tongue base retraction and overall swallow function.       Progressing/ Not Met 5/4/2022   Completed 60 effortful swallows with 0 instance of s/s of aspiration/penetration           2. Patient will participate in chin tuck against resistance (CTAR) hold for 60 seconds for 3 sets given cues to improve hyolaryngeal elevation / excursion and overall swallow function.         Progressing/ Not Met 5/4/2022   Completed three- 60 second hold with min A from clinician          3. Patient will participate in chin tuck against resistance (CTAR) repetitions 45 times given cues to improve hyolaryngeal elevation / excursion and overall swallow function.        Progressing/ Not Met 5/4/2022   Completed 60 CTAR reps with min A from clinician         4. Patient will recall and utilize safe swallow strategies and aspiration precautions independently        Progressing/ Not Met 5/4/2022   Discussed, pt. Reported he was taking smaller sips and bites at home.    5. Patient will produce single words following clinician with 90% acc independently        Progressing/ Not Met 5/4/2022   Completed single word production with 90% accuracy Independently      Goal Met / Discontinue       6. Patient will produce short phrases following clinician with 90% acc independently        Progressing/ Not Met 5/4/2022   Completed phrases level production with 90% accuracy Independently  And 100% with min A     Goal Met / Discontinue       7. Patient will produce sentences following clinician with 90% acc independently        Progressing/ Not Met 5/4/2022   Completed with 90% accuracy Independently  And 100% with min A     Goal Met / Discontinue    8. Patient will use motor speech strategies and answer questions in conversation with a self-rating of at least 2 on a 3 point scale ( 1 = same as before beginning therapy, 2 =better but not best, 3 =best possible outcome) on 8 /10 trials.    Not formally addressed        9. Patient will use motor speech strategies when asked personal questions with 90% intelligibility to encourage carryover of motor speech strategies    Completed with 60% accuracy with mod A (reminder to slow down), 100% accuracy with reminder of slowing down.        Patient Education/Response:   Education regarding aspiration precautions and motor speech strategies today. Pt. Was amenable to all  "suggestions.     Home program established: yes-hard swallow and speech strategies.   Exercises were reviewed and Kamlesh was able to demonstrate them prior to the end of the session.  Kamlesh demonstrated good  understanding of the education provided.     See Electronic Medical Record under Patient Instructions for exercises provided throughout therapy.  Assessment:   Kamlesh is progressing well towards his goals. Completed dysphagia therapy today no s/s of aspiration/penetration. Due to improvement in speech and Independently  Recalling all speech strategies, speech was not targeted today. Discussion regarding how to incorporate slowing speech down by using "phone talk" due to being told he sounds better when on the phone.  Current goals remain appropriate. Goals to be updated as necessary.     Patient prognosis is Good-fair. Patient will continue to benefit from skilled outpatient speech and language therapy to address the deficits listed in the problem list on initial evaluation, provide patient/family education and to maximize patient's level of independence in the home and community environment.   Medical necessity is demonstrated by the following IMPAIRMENTS:  Reduced speech intelligibility impedes patient to be understood by familiar and unfamiliar listeners in most contexts and especially in an emergency situation.    Barriers to Therapy: none  Patient's spiritual, cultural and educational needs considered and patient agreeable to plan of care and goals.\  Plan:   Continue Plan of Care with focus on swallowing and motor speech.     CAMILA Musa, CCC-SLP  Speech Language Pathologist   5/4/2022       "

## 2022-05-05 PROCEDURE — 77385 HC IMRT, SIMPLE: CPT | Performed by: RADIOLOGY

## 2022-05-05 PROCEDURE — G6002 STEREOSCOPIC X-RAY GUIDANCE: HCPCS | Mod: 26,,, | Performed by: RADIOLOGY

## 2022-05-05 PROCEDURE — G6002 PR STEREOSCOPIC XRAY GUIDE FOR RADIATION TX DELIV: ICD-10-PCS | Mod: 26,,, | Performed by: RADIOLOGY

## 2022-05-09 PROCEDURE — 77385 HC IMRT, SIMPLE: CPT | Performed by: RADIOLOGY

## 2022-05-09 PROCEDURE — 77014 HC CT GUIDANCE RADIATION THERAPY FLDS PLACEMENT: CPT | Mod: TC | Performed by: RADIOLOGY

## 2022-05-09 PROCEDURE — 77014 PR  CT GUIDANCE PLACEMENT RAD THERAPY FIELDS: ICD-10-PCS | Mod: 26,,, | Performed by: RADIOLOGY

## 2022-05-09 PROCEDURE — 77336 RADIATION PHYSICS CONSULT: CPT | Performed by: RADIOLOGY

## 2022-05-09 PROCEDURE — 77014 PR  CT GUIDANCE PLACEMENT RAD THERAPY FIELDS: CPT | Mod: 26,,, | Performed by: RADIOLOGY

## 2022-05-10 ENCOUNTER — TELEPHONE (OUTPATIENT)
Dept: PODIATRY | Facility: CLINIC | Age: 67
End: 2022-05-10
Payer: MEDICARE

## 2022-05-11 ENCOUNTER — CLINICAL SUPPORT (OUTPATIENT)
Dept: REHABILITATION | Facility: HOSPITAL | Age: 67
End: 2022-05-11
Attending: INTERNAL MEDICINE
Payer: MEDICARE

## 2022-05-11 ENCOUNTER — LAB VISIT (OUTPATIENT)
Dept: LAB | Facility: HOSPITAL | Age: 67
End: 2022-05-11
Attending: INTERNAL MEDICINE
Payer: MEDICARE

## 2022-05-11 DIAGNOSIS — I63.10 CEREBROVASCULAR ACCIDENT (CVA) DUE TO EMBOLISM OF PRECEREBRAL ARTERY: ICD-10-CM

## 2022-05-11 DIAGNOSIS — Z86.73 H/O: CVA (CEREBROVASCULAR ACCIDENT): Primary | ICD-10-CM

## 2022-05-11 DIAGNOSIS — Z79.01 LONG TERM (CURRENT) USE OF ANTICOAGULANTS: ICD-10-CM

## 2022-05-11 LAB
INR PPP: 3.7 (ref 0.8–1.2)
PROTHROMBIN TIME: 36.7 SEC (ref 9–12.5)

## 2022-05-11 PROCEDURE — 92526 ORAL FUNCTION THERAPY: CPT | Mod: PN

## 2022-05-11 PROCEDURE — 36415 COLL VENOUS BLD VENIPUNCTURE: CPT | Performed by: INTERNAL MEDICINE

## 2022-05-11 PROCEDURE — 85610 PROTHROMBIN TIME: CPT | Performed by: INTERNAL MEDICINE

## 2022-05-11 NOTE — PROGRESS NOTES
[facility]  Speech Therapy Treatment Note- Neurological Rehabilitation and Dysphagia  Date: 5/11/2022     Name: Kamlesh Diane   MRN: [id number]   Therapy Diagnosis:   Encounter Diagnosis   Name Primary?    H/O: CVA (cerebrovascular accident) Yes      Physician: Debora Ontiveros MD  Physician Orders: [id number] - THERAPY TREAMENT   Medical Diagnosis: R47.1 (ICD-10-CM) - Dysarthria and anarthria R13.10 (ICD-10-CM) - Dysphagia, unspecified       Visit #/ Visits Authorized: 8/ 23  Date of Evaluation:  4/11/22  Insurance Authorization Period: 4/11/22-4/25/22  Plan of Care Expiration Date: 5/16/22  Extended Plan of Care:  n/a   Progress Note: 5/11/22   Visits Cancelled: 0  Visits No Show: 0    Time In:  3:30pm  Time Out:  4:00pm  Total Billable Time: 30mins      Precautions: Standard and Fall  Subjective:   Patient reports: he finished chemo on Monday. He is moving back in next week. Reports he has no difficulty being understood in his daily life. Due to this, discharge warranted today.     He was compliant to home exercise program.   Response to previous treatment: positive   Pain Scale:  0/10 on a Visual Analog Scale currently.   Pain Location: n/a  Objective:   TIMED  Procedure Min.                   UNTIMED  Procedure Min.   Speech- Language- Voice Therapy  0   Swallowing and Oral Function Evaluation  0   Swallow therapy  30   Total Timed Units: 0  Total Untimed Units: 1  Charges Billed/Number of units: 1    Short Term Goals: (4 weeks) Current Progress:   1. Patient will participate in tongue base retraction exercises (I.e., hard effortful swallow) 50-75 times given mod A to improve tongue base retraction and overall swallow function.       Progressing/ Not Met 5/11/2022   Completed 60 effortful swallows with 3 instance of s/s of aspiration/penetration      Reported water was colder than he thought causing him to cough        2. Patient will participate in chin tuck against resistance (CTAR) hold for 60  seconds for 3 sets given cues to improve hyolaryngeal elevation / excursion and overall swallow function.        Progressing/ Not Met 5/11/2022   Completed three- 60 second hold with min A from clinician          3. Patient will participate in chin tuck against resistance (CTAR) repetitions 45 times given cues to improve hyolaryngeal elevation / excursion and overall swallow function.        Progressing/ Not Met 5/11/2022   Completed 60 CTAR reps with min A from clinician         4. Patient will recall and utilize safe swallow strategies and aspiration precautions independently        Progressing/ Not Met 5/11/2022   Discussed, pt. Reported he was taking smaller sips and bites at home.    5. Patient will produce single words following clinician with 90% acc independently        Progressing/ Not Met 5/11/2022   Completed single word production with 90% accuracy Independently      Goal Met / Discontinue       6. Patient will produce short phrases following clinician with 90% acc independently        Progressing/ Not Met 5/11/2022   Completed phrases level production with 90% accuracy Independently  And 100% with min A     Goal Met / Discontinue       7. Patient will produce sentences following clinician with 90% acc independently        Progressing/ Not Met 5/11/2022   Completed with 90% accuracy Independently  And 100% with min A     Goal Met / Discontinue    8. Patient will use motor speech strategies and answer questions in conversation with a self-rating of at least 2 on a 3 point scale ( 1 = same as before beginning therapy, 2 =better but not best, 3 =best possible outcome) on 8 /10 trials.    Not formally addressed        9. Patient will use motor speech strategies when asked personal questions with 90% intelligibility to encourage carryover of motor speech strategies    Not formally addressed          Patient Education/Response:   Education regarding aspiration precautions and motor speech strategies today. Pt.  Was amenable to all suggestions.     Home program established: yes-hard swallow and speech strategies.   Exercises were reviewed and Kamlesh was able to demonstrate them prior to the end of the session.  Kamlesh demonstrated good  understanding of the education provided.     See Electronic Medical Record under Patient Instructions for exercises provided throughout therapy.  Assessment:   Kamlesh is progressing well towards his goals. Completed dysphagia therapy today with 3 s/s of aspiration/penetration, however reported it was due to temperature of water initially when drinking. Due to improvement in speech and Independently  Recalling all speech strategies, speech was not targeted today. Kamlesh reports no difficulty at home. Discussed importance of speech strategies when excited/emotional. Patient amenable to all strategies. Due to improvement in speech and swallowing, discharge warranted today.      Patient prognosis is Good-fair. Patient will continue to benefit from skilled outpatient speech and language therapy to address the deficits listed in the problem list on initial evaluation, provide patient/family education and to maximize patient's level of independence in the home and community environment.   Medical necessity is demonstrated by the following IMPAIRMENTS:  Reduced speech intelligibility impedes patient to be understood by familiar and unfamiliar listeners in most contexts and especially in an emergency situation.    Barriers to Therapy: none  Patient's spiritual, cultural and educational needs considered and patient agreeable to plan of care and goals.\  Plan:   discharge    CAMILA Musa, CCC-SLP  Speech Language Pathologist   5/11/2022

## 2022-05-11 NOTE — PLAN OF CARE
Outpatient Therapy Discharge Summary   Discharge Date: 5/11/2022   Name: Kamlesh Diane  Clinic Number: [id number]  Therapy Diagnosis:   Encounter Diagnosis   Name Primary?    H/O: CVA (cerebrovascular accident) Yes     Physician: Debora Ontiveros MD  Physician Orders: [id number] - THERAPY TREAMENT   Medical Diagnosis: R47.1 (ICD-10-CM) - Dysarthria and anarthria R13.10 (ICD-10-CM) - Dysphagia, unspecified   Evaluation Date: 4/11/22    Date of Last visit: 5/11/22  Total Visits Received: 8  Cancelled Visits: 1  No Show Visits: 0    Assessment    Assessment of Current Status: Kamlesh has made significant progress since initial evaluation. He reports no difficulty at this time, being understood by his communication partners and swallowing. Swallowing and speech strategies were provided, he was amendable to all suggestions. Due to reported and demonstrated improvement in speech and swallowing, discharge warranted today.         Goals:   Short Term Goals: (4 weeks)   1. Patient will participate in tongue base retraction exercises (I.e., hard effortful swallow) 50-75 times given mod A to improve tongue base retraction and overall swallow function.     Goal Met / Discontinue      2. Patient will participate in chin tuck against resistance (CTAR) hold for 60 seconds for 3 sets given cues to improve hyolaryngeal elevation / excursion and overall swallow function.        Goal Met / Discontinue      3. Patient will participate in chin tuck against resistance (CTAR) repetitions 45 times given cues to improve hyolaryngeal elevation / excursion and overall swallow function.        Goal Met / Discontinue      4. Patient will recall and utilize safe swallow strategies and aspiration precautions independently        Goal Met / Discontinue      5. Patient will produce single words following clinician with 90% acc independently        Goal Met / Discontinue      6. Patient will produce short phrases following clinician with 90% acc  independently        Goal Met / Discontinue      7. Patient will produce sentences following clinician with 90% acc independently        Goal Met / Discontinue      8. Patient will use motor speech strategies and answer questions in conversation with a self-rating of at least 2 on a 3 point scale ( 1 = same as before beginning therapy, 2 =better but not best, 3 =best possible outcome) on 8 /10 trials.     Goal Met / Discontinue        9. Patient will use motor speech strategies when asked personal questions with 90% intelligibility to encourage carryover of motor speech strategies     Goal Not Met / Discontinue          Long Term Goals:  1.  He  will maintain adequate hydration/nutrition with optimum safety and efficiency of swallowing function on PO intake without overt signs and symptoms of aspiration for optimal diet level.   2.  He  will utilize compensatory strategies with optimum safety and efficiency of swallowing function on PO intake without overt signs and symptoms of aspiration for optimal diet level.   3. He  will develop functional and intelligible speech and utilize compensatory strategies through the use of adequate labial and lingual function, increased articulatory precision and speech prosody.     Discharge reason: Patient is now asymptomatic, Patient has reached the maximum rehab potential for the present time and Patient requested discharge    Plan   This patient is discharged from Speech Therapy    CAMILA Musa, -CCC   5/11/2022

## 2022-05-12 ENCOUNTER — PATIENT MESSAGE (OUTPATIENT)
Dept: CARDIOLOGY | Facility: CLINIC | Age: 67
End: 2022-05-12

## 2022-05-12 ENCOUNTER — ANTI-COAG VISIT (OUTPATIENT)
Dept: CARDIOLOGY | Facility: CLINIC | Age: 67
End: 2022-05-12
Payer: MEDICARE

## 2022-05-12 DIAGNOSIS — Z79.01 LONG TERM (CURRENT) USE OF ANTICOAGULANTS: Primary | ICD-10-CM

## 2022-05-12 PROCEDURE — 93793 ANTICOAG MGMT PT WARFARIN: CPT | Mod: S$GLB,,, | Performed by: PHARMACIST

## 2022-05-12 PROCEDURE — 93793 PR ANTICOAGULANT MGMT FOR PT TAKING WARFARIN: ICD-10-PCS | Mod: S$GLB,,, | Performed by: PHARMACIST

## 2022-05-12 NOTE — PROGRESS NOTES
INR climbed on lower dose of coumadin this week. Patient did just complete chemo regimen. No bleeding reported and no other medication issues as per medication chart.    Will lower dose again this week and continue close monitoring.    Of note, patient will have colonoscopy at the end of this month.

## 2022-05-18 ENCOUNTER — LAB VISIT (OUTPATIENT)
Dept: LAB | Facility: HOSPITAL | Age: 67
End: 2022-05-18
Attending: INTERNAL MEDICINE
Payer: MEDICARE

## 2022-05-18 DIAGNOSIS — Z79.01 LONG TERM (CURRENT) USE OF ANTICOAGULANTS: ICD-10-CM

## 2022-05-18 DIAGNOSIS — I63.10 CEREBROVASCULAR ACCIDENT (CVA) DUE TO EMBOLISM OF PRECEREBRAL ARTERY: ICD-10-CM

## 2022-05-18 LAB
INR PPP: 3.4 (ref 0.8–1.2)
PROTHROMBIN TIME: 33.5 SEC (ref 9–12.5)

## 2022-05-18 PROCEDURE — 85610 PROTHROMBIN TIME: CPT | Performed by: INTERNAL MEDICINE

## 2022-05-18 PROCEDURE — 36415 COLL VENOUS BLD VENIPUNCTURE: CPT | Performed by: INTERNAL MEDICINE

## 2022-05-19 ENCOUNTER — PATIENT OUTREACH (OUTPATIENT)
Dept: ADMINISTRATIVE | Facility: OTHER | Age: 67
End: 2022-05-19
Payer: MEDICARE

## 2022-05-19 ENCOUNTER — ANTI-COAG VISIT (OUTPATIENT)
Dept: CARDIOLOGY | Facility: CLINIC | Age: 67
End: 2022-05-19
Payer: MEDICARE

## 2022-05-19 DIAGNOSIS — Z79.01 LONG TERM (CURRENT) USE OF ANTICOAGULANTS: Primary | ICD-10-CM

## 2022-05-19 PROCEDURE — 93793 PR ANTICOAGULANT MGMT FOR PT TAKING WARFARIN: ICD-10-PCS | Mod: S$GLB,,,

## 2022-05-19 PROCEDURE — 93793 ANTICOAG MGMT PT WARFARIN: CPT | Mod: S$GLB,,,

## 2022-05-19 NOTE — PROGRESS NOTES
INR not at goal. Medications, chart, and patient findings reviewed. See calendar for adjustments to dose and follow up plan.  Pt took a lower dose than recommended; however, remains supratherapeutic.  Recommend pt holds warfarin 5/19 & decrease dosing.  Plan to re-assess in 1 week.

## 2022-05-19 NOTE — PROGRESS NOTES
CHIEF COMPLAINT: Type 2 Diabetes     HPI: Mr. Kamlesh Diane is a 66 y.o. male who was diagnosed with Type 2 DM > 5 years.   Pt has h/o MCA stroke, HTN, PAD, low bone density-on fosamax, prostate ca, CKD3, CR 1.7-2.  Past Medical History:   Diagnosis Date    CVA (cerebral vascular accident)     Diabetes     Elevated PSA     Hypertension     Prostate cancer     Stroke      Pt was last seen by me in winter 2022  Arrived with brother  Moved in with brother, still having work done on home.   Uses walker.  1 reading > 200  1 reading 60s    a1c stable.  Needs labs today   Radiation treatments- finished a few weeks ago, for prostate ca  Lab Results   Component Value Date    HGBA1C 7.3 (H) 02/09/2022     Last fall 9/6/21, minimal trauma.    Dietary habits:  Wakes up at 10-11a  Eating 2 meals a day  Brunch and dinner   Crystal lite/water  Varies with meals  Cooked meal, occ fasting foods   Caramel popcorns, fruits     PREVIOUS DIABETES MEDICATIONS TRIED  novolog   levemir   humalog     CURRENT DIABETIC MEDS: humalog 8-9  units ac-holds if sugar is less than 100 , no scale, levemir 41 units at night  Holds if sugar is < 100 mg/dl  admins humalog if sugar is > 150 mg/dl  Pt is monitoring blood glucose readings 3 times a day max .  Needs >100 strips per month related to fluctuations with blood glucose reading, a1c trends, and activity level.    Diabetes Management Status    Statin: Taking  ACE/ARB: Taking    Screening or Prevention Patient's value Goal Complete/Controlled?   HgA1C Testing and Control   Lab Results   Component Value Date    HGBA1C 7.3 (H) 02/09/2022      Annually/Less than 8% No   Lipid profile : 03/17/2022 Annually Yes   LDL control Lab Results   Component Value Date    LDLCALC 86.6 05/31/2021    Annually/Less than 100 mg/dl  Yes   Nephropathy screening Lab Results   Component Value Date    LABMICR 40.0 01/14/2022     Lab Results   Component Value Date    PROTEINUA Negative 05/30/2018    Annually Yes  "  Blood pressure BP Readings from Last 1 Encounters:   04/08/22 119/83    Less than 140/90 No   Dilated retinal exam : 01/05/2021 Annually Yes   Foot exam   : 01/25/2022 Annually No       REVIEW OF SYSTEMS  General: +weakness-improved, fatigue, +weight loss   Eyes: no double or blurred vision, eye pain, or redness  Cardiovascular: no chest pain, palpitations, edema, or murmurs.   Respiratory: no cough or dyspnea.   GI: no heartburn, nausea, or changes in bowel patterns; good appetite.   Skin: no rashes, dryness, itching, or reactions at insulin injection sites.  Neuro: + numbness, tingling, tremors, or vertigo. +hip pain (L)  Endocrine: no polyuria, polydipsia, polyphagia, heat or cold intolerance.     Vital Signs  /86   Pulse 101   Ht 6' 1" (1.854 m)   Wt 76.8 kg (169 lb 5 oz)   SpO2 99%   BMI 22.34 kg/m²     Hemoglobin A1C   Date Value Ref Range Status   02/09/2022 7.3 (H) 4.0 - 5.6 % Final     Comment:     ADA Screening Guidelines:  5.7-6.4%  Consistent with prediabetes  >or=6.5%  Consistent with diabetes    High levels of fetal hemoglobin interfere with the HbA1C  assay. Heterozygous hemoglobin variants (HbS, HgC, etc)do  not significantly interfere with this assay.   However, presence of multiple variants may affect accuracy.     01/14/2022 7.2 (H) 4.0 - 5.6 % Final     Comment:     ADA Screening Guidelines:  5.7-6.4%  Consistent with prediabetes  >or=6.5%  Consistent with diabetes    High levels of fetal hemoglobin interfere with the HbA1C  assay. Heterozygous hemoglobin variants (HbS, HgC, etc)do  not significantly interfere with this assay.   However, presence of multiple variants may affect accuracy.     09/21/2021 6.8 (H) 4.0 - 5.6 % Final     Comment:     ADA Screening Guidelines:  5.7-6.4%  Consistent with prediabetes  >or=6.5%  Consistent with diabetes    High levels of fetal hemoglobin interfere with the HbA1C  assay. Heterozygous hemoglobin variants (HbS, HgC, etc)do  not significantly " interfere with this assay.   However, presence of multiple variants may affect accuracy.          Chemistry        Component Value Date/Time     02/09/2022 0933    K 4.9 02/09/2022 0933     02/09/2022 0933    CO2 28 02/09/2022 0933    BUN 17 02/09/2022 0933    CREATININE 1.6 (H) 02/09/2022 0933     (H) 02/09/2022 0933        Component Value Date/Time    CALCIUM 9.4 02/09/2022 0933    ALKPHOS 91 02/09/2022 0933    AST 17 02/09/2022 0933    ALT 21 02/09/2022 0933    BILITOT 0.4 02/09/2022 0933    ESTGFRAFRICA 51.1 (A) 02/09/2022 0933    EGFRNONAA 44.2 (A) 02/09/2022 0933           Lab Results   Component Value Date    TSH 2.113 05/31/2021      Lab Results   Component Value Date    CHOL 139 05/31/2021    CHOL 137 06/02/2020    CHOL 136 05/19/2020     Lab Results   Component Value Date    HDL 37 (L) 05/31/2021    HDL 35 (L) 06/02/2020    HDL 30 (L) 05/19/2020     Lab Results   Component Value Date    LDLCALC 86.6 05/31/2021    LDLCALC 86.4 06/02/2020    LDLCALC 86.2 05/19/2020     Lab Results   Component Value Date    TRIG 77 05/31/2021    TRIG 78 06/02/2020    TRIG 99 05/19/2020     Lab Results   Component Value Date    CHOLHDL 26.6 05/31/2021    CHOLHDL 25.5 06/02/2020    CHOLHDL 22.1 05/19/2020         PHYSICAL EXAMINATION  Constitutional: Appears well, no distress  Neck: Supple, trachea midline.   Respiratory: No wheezes, even and unlabored.  Cardiovascular: RRR; no edema.   Lymph: no lymphadenopathy palpated  Skin: warm and dry; no injection site reactions, no acanthosis nigracans observed. +sl mottled   Neuro:patient alert and cooperative, normal affect; mostly steady gait, uses walker.    Diabetes Foot Exam:   Deferred     Assessment/Plan  1. Type 2 diabetes mellitus with both eyes affected by proliferative retinopathy without macular edema, with long-term current use of insulin  Hemoglobin A1C   2. Stage 3b chronic kidney disease     3. Essential hypertension     4. H/O: CVA (cerebrovascular  accident)     5. Hypertensive retinopathy, bilateral     6. Cancer of prostate with low recurrence risk (stage T1-2a and Brent < 7 and PSA < 10)       1. F/u in 6.5 mos w/ me   baqsimi rx- brochure given  a1c prior   Eyes- 5/23/22  Continue regimen above   Has refills  2. Avoid hypoglycemia  3. Continue med(s)  Controlled  4. Avoid hypoglycemia   5. See above   6. F/u with urology    FOLLOW UP  In dec 2022

## 2022-05-20 NOTE — PROGRESS NOTES
Requested updates within Care Everywhere.  Patient's chart was reviewed for overdue VENANCIO topics.  Health maintenance:updated  Immunizations:reconciled   Legacy:   Media:  Orders placed:  Tasked appts:  Labs Linked:  Upcoming appt:Optometry 5/23/22, Colonoscopy 5/31/22

## 2022-05-23 ENCOUNTER — OFFICE VISIT (OUTPATIENT)
Dept: OPTOMETRY | Facility: CLINIC | Age: 67
End: 2022-05-23
Payer: MEDICARE

## 2022-05-23 DIAGNOSIS — H52.203 ASTIGMATISM OF BOTH EYES, UNSPECIFIED TYPE: ICD-10-CM

## 2022-05-23 DIAGNOSIS — E11.3593 PROLIFERATIVE DIABETIC RETINOPATHY OF BOTH EYES ASSOCIATED WITH TYPE 2 DIABETES MELLITUS, UNSPECIFIED PROLIFERATIVE RETINOPATHY TYPE: Primary | ICD-10-CM

## 2022-05-23 DIAGNOSIS — Z98.42 S/P BILATERAL CATARACT EXTRACTION: ICD-10-CM

## 2022-05-23 DIAGNOSIS — C61 CANCER OF PROSTATE WITH LOW RECURRENCE RISK (STAGE T1-2A AND GLEASON < 7 AND PSA < 10): Primary | ICD-10-CM

## 2022-05-23 DIAGNOSIS — Z96.1 PSEUDOPHAKIA OF BOTH EYES: ICD-10-CM

## 2022-05-23 DIAGNOSIS — Z98.41 S/P BILATERAL CATARACT EXTRACTION: ICD-10-CM

## 2022-05-23 PROCEDURE — 1126F AMNT PAIN NOTED NONE PRSNT: CPT | Mod: CPTII,S$GLB,, | Performed by: OPTOMETRIST

## 2022-05-23 PROCEDURE — 1159F MED LIST DOCD IN RCRD: CPT | Mod: CPTII,S$GLB,, | Performed by: OPTOMETRIST

## 2022-05-23 PROCEDURE — 4010F PR ACE/ARB THEARPY RXD/TAKEN: ICD-10-PCS | Mod: CPTII,S$GLB,, | Performed by: OPTOMETRIST

## 2022-05-23 PROCEDURE — 3051F PR MOST RECENT HEMOGLOBIN A1C LEVEL 7.0 - < 8.0%: ICD-10-PCS | Mod: CPTII,S$GLB,, | Performed by: OPTOMETRIST

## 2022-05-23 PROCEDURE — 1101F PR PT FALLS ASSESS DOC 0-1 FALLS W/OUT INJ PAST YR: ICD-10-PCS | Mod: CPTII,S$GLB,, | Performed by: OPTOMETRIST

## 2022-05-23 PROCEDURE — 99999 PR PBB SHADOW E&M-EST. PATIENT-LVL V: CPT | Mod: PBBFAC,,, | Performed by: OPTOMETRIST

## 2022-05-23 PROCEDURE — 3066F PR DOCUMENTATION OF TREATMENT FOR NEPHROPATHY: ICD-10-PCS | Mod: CPTII,S$GLB,, | Performed by: OPTOMETRIST

## 2022-05-23 PROCEDURE — 92012 INTRM OPH EXAM EST PATIENT: CPT | Mod: S$GLB,,, | Performed by: OPTOMETRIST

## 2022-05-23 PROCEDURE — 3051F HG A1C>EQUAL 7.0%<8.0%: CPT | Mod: CPTII,S$GLB,, | Performed by: OPTOMETRIST

## 2022-05-23 PROCEDURE — 92012 PR EYE EXAM, EST PATIENT,INTERMED: ICD-10-PCS | Mod: S$GLB,,, | Performed by: OPTOMETRIST

## 2022-05-23 PROCEDURE — 3288F FALL RISK ASSESSMENT DOCD: CPT | Mod: CPTII,S$GLB,, | Performed by: OPTOMETRIST

## 2022-05-23 PROCEDURE — 4010F ACE/ARB THERAPY RXD/TAKEN: CPT | Mod: CPTII,S$GLB,, | Performed by: OPTOMETRIST

## 2022-05-23 PROCEDURE — 3288F PR FALLS RISK ASSESSMENT DOCUMENTED: ICD-10-PCS | Mod: CPTII,S$GLB,, | Performed by: OPTOMETRIST

## 2022-05-23 PROCEDURE — 3066F NEPHROPATHY DOC TX: CPT | Mod: CPTII,S$GLB,, | Performed by: OPTOMETRIST

## 2022-05-23 PROCEDURE — 3061F NEG MICROALBUMINURIA REV: CPT | Mod: CPTII,S$GLB,, | Performed by: OPTOMETRIST

## 2022-05-23 PROCEDURE — 1159F PR MEDICATION LIST DOCUMENTED IN MEDICAL RECORD: ICD-10-PCS | Mod: CPTII,S$GLB,, | Performed by: OPTOMETRIST

## 2022-05-23 PROCEDURE — 99999 PR PBB SHADOW E&M-EST. PATIENT-LVL V: ICD-10-PCS | Mod: PBBFAC,,, | Performed by: OPTOMETRIST

## 2022-05-23 PROCEDURE — 92015 PR REFRACTION: ICD-10-PCS | Mod: S$GLB,,, | Performed by: OPTOMETRIST

## 2022-05-23 PROCEDURE — 3061F PR NEG MICROALBUMINURIA RESULT DOCUMENTED/REVIEW: ICD-10-PCS | Mod: CPTII,S$GLB,, | Performed by: OPTOMETRIST

## 2022-05-23 PROCEDURE — 1101F PT FALLS ASSESS-DOCD LE1/YR: CPT | Mod: CPTII,S$GLB,, | Performed by: OPTOMETRIST

## 2022-05-23 PROCEDURE — 1126F PR PAIN SEVERITY QUANTIFIED, NO PAIN PRESENT: ICD-10-PCS | Mod: CPTII,S$GLB,, | Performed by: OPTOMETRIST

## 2022-05-23 PROCEDURE — 92015 DETERMINE REFRACTIVE STATE: CPT | Mod: S$GLB,,, | Performed by: OPTOMETRIST

## 2022-05-23 NOTE — PROGRESS NOTES
HPI     Diabetic Eye Exam      Additional comments: Overdue diabetic eye examination and refraction.  Broke frame of last spectacle lens correction purchased.               Comments     Patient's age: 66 y.o. AA male   Occupation: retired   Approximate date of last eye examination:  01/05/2021  Name of last eye doctor seen:         Dx:  bilateral proliferative diabetic retinopathy without macular   edema with long-term use of insulin.   City/State: [facility]  Wears glasses? Yes - but broke frame      If yes, wears  Full-time or part-time?  Part-time   Present glasses are: Bifocal, SV Distance, SV Reading?  Bifocal  Approximate age of present glasses:  2 years     Any problem with VA with glasses?  Broke frame   Wears CLs?:  no    Headaches?  no  Eye pain/discomfort?  Tearing OD                                                                                     Flashes?  no  Floaters?  yes  Diplopia/Double vision?  no  Patient's Ocular History:         Any eye surgeries? Cataract surgery at least 5 years ago          Any eye injury?  no         Any treatment for eye disease?  S/p PRP OU for treatment of   bilateral proliferative diabetic retinopathy  Family history of eye disease?  no  Significant patient medical history:         1. Diabetes?  Yes   Hemoglobin A1C       Date                     Value               Ref Range             Status                02/09/2022               7.3 (H)             4.0 - 5.6 %           Final                     If yes, IDDM or NIDDM?  IDDM    2. HBP?  Yes - states controlled with medication                  ! OTC eyedrops currently using:  Refresh prn    ! Prescription eye meds currently using:  no   ! Any history of allergy/adverse reaction to any eye meds used   previously?  no    ! Any history of allergy/adverse reaction to eyedrops used during prior   eye exam(s)? no    ! Any history of allergy/adverse reaction to Novacaine or similar meds?   no   ! Any history of  "allergy/adverse reaction to Epinephrine or similar meds?   no    ! Patient okay with use of anesthetic eyedrops to check eye pressure?    yes         ! Patient okay with use of eyedrops to dilate pupils today?  yes   !  Allergies/Medications/Medical History/Family History reviewed today?    yes      Desired reading distance =  14.5"  Approx computer distance =                                                                        Last edited by Jose Luis Frost, OD on 5/23/2022 12:07 PM. (History)            Assessment /Plan     For exam results, see Encounter Report.    1. Proliferative diabetic retinopathy of both eyes associated with type 2 diabetes mellitus, unspecified proliferative retinopathy type  Ambulatory referral/consult to Ophthalmology   2. S/P bilateral cataract extraction     3. Pseudophakia of both eyes     4. Astigmatism of both eyes, unspecified type                    Insulin-dependant type 2 diabetes.  Prior diagnosis of bilateral proliferative diabetic retinopathy.  S/P PRP in both eyes.  Has been followed by Dr. Tapia in retina clinic.  Overdue for retina evaluation.  Generate referral to Dr. Tapia.    S/P cataract surgery in both eyes, with bilateral posterior chamber intraocular lenses  Residual slight astigmatic refractive error, without noticeable improvement in distance visual acuity in either eye, compared to unaided distance VA.    New spectacle lens Rx issued for use as desired.    Assistant to call Mr. Diane to schedule follow-up visit with Dr. Tapia in retina clinic.    Recheck refraction in one year - or prior, if any problems or bothersome changes in vision noted in the interim.     "

## 2022-05-23 NOTE — PATIENT INSTRUCTIONS
Insulin-dependant type 2 diabetes.  Prior diagnosis of bilateral proliferative diabetic retinopathy.  S/P PRP in both eyes.  Has been followed by Dr. Tapia in retina clinic.  Overdue for retina evaluation.  Generate referral to Dr. Tapia.    S/P cataract surgery in both eyes, with bilateral posterior chamber intraocular lenses  Residual slight astigmatic refractive error, without noticeable improvement in distance visual acuity in either eye, compared to unaided distance VA.    New spectacle lens Rx issued for use as desired.    Assistant to call Mr. Diane to schedule follow-up visit with Dr. Tapia in retina clinic.    Recheck refraction in one year - or prior, if any problems or bothersome changes in vision noted in the interim.

## 2022-05-24 ENCOUNTER — OFFICE VISIT (OUTPATIENT)
Dept: INTERNAL MEDICINE | Facility: CLINIC | Age: 67
End: 2022-05-24
Payer: MEDICARE

## 2022-05-24 ENCOUNTER — LAB VISIT (OUTPATIENT)
Dept: LAB | Facility: HOSPITAL | Age: 67
End: 2022-05-24
Attending: INTERNAL MEDICINE
Payer: MEDICARE

## 2022-05-24 VITALS
WEIGHT: 169.31 LBS | HEIGHT: 73 IN | HEART RATE: 101 BPM | SYSTOLIC BLOOD PRESSURE: 116 MMHG | BODY MASS INDEX: 22.44 KG/M2 | OXYGEN SATURATION: 99 % | DIASTOLIC BLOOD PRESSURE: 86 MMHG

## 2022-05-24 DIAGNOSIS — I10 ESSENTIAL HYPERTENSION: ICD-10-CM

## 2022-05-24 DIAGNOSIS — Z79.4 TYPE 2 DIABETES MELLITUS WITH BOTH EYES AFFECTED BY PROLIFERATIVE RETINOPATHY WITHOUT MACULAR EDEMA, WITH LONG-TERM CURRENT USE OF INSULIN: Primary | ICD-10-CM

## 2022-05-24 DIAGNOSIS — Z79.01 LONG TERM (CURRENT) USE OF ANTICOAGULANTS: ICD-10-CM

## 2022-05-24 DIAGNOSIS — E11.3593 TYPE 2 DIABETES MELLITUS WITH BOTH EYES AFFECTED BY PROLIFERATIVE RETINOPATHY WITHOUT MACULAR EDEMA, WITH LONG-TERM CURRENT USE OF INSULIN: Primary | ICD-10-CM

## 2022-05-24 DIAGNOSIS — N18.32 STAGE 3B CHRONIC KIDNEY DISEASE: ICD-10-CM

## 2022-05-24 DIAGNOSIS — Z86.73 H/O: CVA (CEREBROVASCULAR ACCIDENT): ICD-10-CM

## 2022-05-24 DIAGNOSIS — C61 CANCER OF PROSTATE WITH LOW RECURRENCE RISK (STAGE T1-2A AND GLEASON < 7 AND PSA < 10): ICD-10-CM

## 2022-05-24 DIAGNOSIS — I63.10 CEREBROVASCULAR ACCIDENT (CVA) DUE TO EMBOLISM OF PRECEREBRAL ARTERY: ICD-10-CM

## 2022-05-24 DIAGNOSIS — H35.033 HYPERTENSIVE RETINOPATHY, BILATERAL: ICD-10-CM

## 2022-05-24 LAB
INR PPP: 3.2 (ref 0.8–1.2)
PROTHROMBIN TIME: 31.1 SEC (ref 9–12.5)

## 2022-05-24 PROCEDURE — 99999 PR PBB SHADOW E&M-EST. PATIENT-LVL V: ICD-10-PCS | Mod: PBBFAC,,, | Performed by: NURSE PRACTITIONER

## 2022-05-24 PROCEDURE — 1126F PR PAIN SEVERITY QUANTIFIED, NO PAIN PRESENT: ICD-10-PCS | Mod: CPTII,S$GLB,, | Performed by: NURSE PRACTITIONER

## 2022-05-24 PROCEDURE — 3079F DIAST BP 80-89 MM HG: CPT | Mod: CPTII,S$GLB,, | Performed by: NURSE PRACTITIONER

## 2022-05-24 PROCEDURE — 3066F NEPHROPATHY DOC TX: CPT | Mod: CPTII,S$GLB,, | Performed by: NURSE PRACTITIONER

## 2022-05-24 PROCEDURE — 3288F PR FALLS RISK ASSESSMENT DOCUMENTED: ICD-10-PCS | Mod: CPTII,S$GLB,, | Performed by: NURSE PRACTITIONER

## 2022-05-24 PROCEDURE — 36415 COLL VENOUS BLD VENIPUNCTURE: CPT | Performed by: INTERNAL MEDICINE

## 2022-05-24 PROCEDURE — 85610 PROTHROMBIN TIME: CPT | Performed by: INTERNAL MEDICINE

## 2022-05-24 PROCEDURE — 3066F PR DOCUMENTATION OF TREATMENT FOR NEPHROPATHY: ICD-10-PCS | Mod: CPTII,S$GLB,, | Performed by: NURSE PRACTITIONER

## 2022-05-24 PROCEDURE — 1160F RVW MEDS BY RX/DR IN RCRD: CPT | Mod: CPTII,S$GLB,, | Performed by: NURSE PRACTITIONER

## 2022-05-24 PROCEDURE — 1101F PT FALLS ASSESS-DOCD LE1/YR: CPT | Mod: CPTII,S$GLB,, | Performed by: NURSE PRACTITIONER

## 2022-05-24 PROCEDURE — 99499 RISK ADDL DX/OHS AUDIT: ICD-10-PCS | Mod: S$GLB,,, | Performed by: NURSE PRACTITIONER

## 2022-05-24 PROCEDURE — 1101F PR PT FALLS ASSESS DOC 0-1 FALLS W/OUT INJ PAST YR: ICD-10-PCS | Mod: CPTII,S$GLB,, | Performed by: NURSE PRACTITIONER

## 2022-05-24 PROCEDURE — 3051F PR MOST RECENT HEMOGLOBIN A1C LEVEL 7.0 - < 8.0%: ICD-10-PCS | Mod: CPTII,S$GLB,, | Performed by: NURSE PRACTITIONER

## 2022-05-24 PROCEDURE — 99999 PR PBB SHADOW E&M-EST. PATIENT-LVL V: CPT | Mod: PBBFAC,,, | Performed by: NURSE PRACTITIONER

## 2022-05-24 PROCEDURE — 3074F SYST BP LT 130 MM HG: CPT | Mod: CPTII,S$GLB,, | Performed by: NURSE PRACTITIONER

## 2022-05-24 PROCEDURE — 1159F PR MEDICATION LIST DOCUMENTED IN MEDICAL RECORD: ICD-10-PCS | Mod: CPTII,S$GLB,, | Performed by: NURSE PRACTITIONER

## 2022-05-24 PROCEDURE — 3079F PR MOST RECENT DIASTOLIC BLOOD PRESSURE 80-89 MM HG: ICD-10-PCS | Mod: CPTII,S$GLB,, | Performed by: NURSE PRACTITIONER

## 2022-05-24 PROCEDURE — 1126F AMNT PAIN NOTED NONE PRSNT: CPT | Mod: CPTII,S$GLB,, | Performed by: NURSE PRACTITIONER

## 2022-05-24 PROCEDURE — 99214 PR OFFICE/OUTPT VISIT, EST, LEVL IV, 30-39 MIN: ICD-10-PCS | Mod: S$GLB,,, | Performed by: NURSE PRACTITIONER

## 2022-05-24 PROCEDURE — 99214 OFFICE O/P EST MOD 30 MIN: CPT | Mod: S$GLB,,, | Performed by: NURSE PRACTITIONER

## 2022-05-24 PROCEDURE — 3061F PR NEG MICROALBUMINURIA RESULT DOCUMENTED/REVIEW: ICD-10-PCS | Mod: CPTII,S$GLB,, | Performed by: NURSE PRACTITIONER

## 2022-05-24 PROCEDURE — 4010F PR ACE/ARB THEARPY RXD/TAKEN: ICD-10-PCS | Mod: CPTII,S$GLB,, | Performed by: NURSE PRACTITIONER

## 2022-05-24 PROCEDURE — 1160F PR REVIEW ALL MEDS BY PRESCRIBER/CLIN PHARMACIST DOCUMENTED: ICD-10-PCS | Mod: CPTII,S$GLB,, | Performed by: NURSE PRACTITIONER

## 2022-05-24 PROCEDURE — 3051F HG A1C>EQUAL 7.0%<8.0%: CPT | Mod: CPTII,S$GLB,, | Performed by: NURSE PRACTITIONER

## 2022-05-24 PROCEDURE — 3074F PR MOST RECENT SYSTOLIC BLOOD PRESSURE < 130 MM HG: ICD-10-PCS | Mod: CPTII,S$GLB,, | Performed by: NURSE PRACTITIONER

## 2022-05-24 PROCEDURE — 4010F ACE/ARB THERAPY RXD/TAKEN: CPT | Mod: CPTII,S$GLB,, | Performed by: NURSE PRACTITIONER

## 2022-05-24 PROCEDURE — 3008F BODY MASS INDEX DOCD: CPT | Mod: CPTII,S$GLB,, | Performed by: NURSE PRACTITIONER

## 2022-05-24 PROCEDURE — 3061F NEG MICROALBUMINURIA REV: CPT | Mod: CPTII,S$GLB,, | Performed by: NURSE PRACTITIONER

## 2022-05-24 PROCEDURE — 3288F FALL RISK ASSESSMENT DOCD: CPT | Mod: CPTII,S$GLB,, | Performed by: NURSE PRACTITIONER

## 2022-05-24 PROCEDURE — 3008F PR BODY MASS INDEX (BMI) DOCUMENTED: ICD-10-PCS | Mod: CPTII,S$GLB,, | Performed by: NURSE PRACTITIONER

## 2022-05-24 PROCEDURE — 99499 UNLISTED E&M SERVICE: CPT | Mod: S$GLB,,, | Performed by: NURSE PRACTITIONER

## 2022-05-24 PROCEDURE — 1159F MED LIST DOCD IN RCRD: CPT | Mod: CPTII,S$GLB,, | Performed by: NURSE PRACTITIONER

## 2022-05-24 RX ORDER — GLUCAGON 3 MG/1
POWDER NASAL
Qty: 1 EACH | Refills: 2 | Status: SHIPPED | OUTPATIENT
Start: 2022-05-24

## 2022-05-24 NOTE — PATIENT INSTRUCTIONS
A1c today- scheduled  A1c in December   Follow up in December-Soledad Sales 41 units at night   Humalog 8-9 units before meals if sugar is > 100   [email or web address] emergency nasal spray    [email or web address]  Eat fit giuseppe  MyFit[employer]upal giuseppe  [email or web address]     Goal  no higher than 180    Lab Results   Component Value Date    HGBA1C 7.3 (H) 02/09/2022     Goal less than 7.5%

## 2022-05-25 ENCOUNTER — PATIENT MESSAGE (OUTPATIENT)
Dept: CARDIOLOGY | Facility: CLINIC | Age: 67
End: 2022-05-25

## 2022-05-25 ENCOUNTER — ANTI-COAG VISIT (OUTPATIENT)
Dept: CARDIOLOGY | Facility: CLINIC | Age: 67
End: 2022-05-25
Payer: MEDICARE

## 2022-05-25 DIAGNOSIS — Z79.01 LONG TERM (CURRENT) USE OF ANTICOAGULANTS: Primary | ICD-10-CM

## 2022-05-25 DIAGNOSIS — Z86.73 H/O: CVA (CEREBROVASCULAR ACCIDENT): ICD-10-CM

## 2022-05-25 PROCEDURE — 93793 PR ANTICOAGULANT MGMT FOR PT TAKING WARFARIN: ICD-10-PCS | Mod: S$GLB,,,

## 2022-05-25 PROCEDURE — 93793 ANTICOAG MGMT PT WARFARIN: CPT | Mod: S$GLB,,,

## 2022-05-25 RX ORDER — ENOXAPARIN SODIUM 150 MG/ML
120 INJECTION SUBCUTANEOUS DAILY
Qty: 10 EACH | Refills: 1 | Status: SHIPPED | OUTPATIENT
Start: 2022-05-25 | End: 2022-06-04

## 2022-05-25 NOTE — PROGRESS NOTES
INR not at goal. Medications, chart, and patient findings reviewed. See calendar for adjustments to dose and follow up plan.  Pt's INR continues to trend above therapeutic range after dosing dosing decrease (could be due to starting radiation therapy). Recommend to decrease dosing further. Pt also had upcoming c-scope.   Patient lovenox bridging instructions    Height    73in            Weight   76.8kg           SCr    1.5            CrCl   ~52ml/min              Pre-Procedure Instructions:    Take last dose of warfarin on :      5/24/22                           Start enoxaparin injections the morning of:      5/27/22                           Enoxaparin dose is:       120mg                 Once daily in the morning  Last dose of enoxaparin will be the morning of:     5/30/22                            Post-Procedure Instructions:    Restart warfarin dose on      5/31/22                       Unless directed otherwise by your physician  Restart lovenox injections on the morning of     6/1/22                      Unless directed otherwise by your physician  Call the coumadin clinic your physician has different recommendations post procedure

## 2022-05-30 ENCOUNTER — TELEPHONE (OUTPATIENT)
Dept: ENDOSCOPY | Facility: HOSPITAL | Age: 67
End: 2022-05-30
Payer: MEDICARE

## 2022-05-31 ENCOUNTER — ANESTHESIA (OUTPATIENT)
Dept: ENDOSCOPY | Facility: HOSPITAL | Age: 67
End: 2022-05-31
Payer: MEDICARE

## 2022-05-31 ENCOUNTER — HOSPITAL ENCOUNTER (OUTPATIENT)
Facility: HOSPITAL | Age: 67
Discharge: HOME | End: 2022-05-31
Attending: STUDENT IN AN ORGANIZED HEALTH CARE EDUCATION/TRAINING PROGRAM | Admitting: STUDENT IN AN ORGANIZED HEALTH CARE EDUCATION/TRAINING PROGRAM
Payer: MEDICARE

## 2022-05-31 ENCOUNTER — ANESTHESIA EVENT (OUTPATIENT)
Dept: ENDOSCOPY | Facility: HOSPITAL | Age: 67
End: 2022-05-31
Payer: MEDICARE

## 2022-05-31 ENCOUNTER — TELEPHONE (OUTPATIENT)
Dept: ENDOSCOPY | Facility: HOSPITAL | Age: 67
End: 2022-05-31
Payer: MEDICARE

## 2022-05-31 VITALS
OXYGEN SATURATION: 99 % | TEMPERATURE: 98 F | BODY MASS INDEX: 23.86 KG/M2 | RESPIRATION RATE: 18 BRPM | HEART RATE: 79 BPM | SYSTOLIC BLOOD PRESSURE: 125 MMHG | DIASTOLIC BLOOD PRESSURE: 86 MMHG | WEIGHT: 180 LBS | HEIGHT: 73 IN

## 2022-05-31 DIAGNOSIS — C18.9 MALIGNANT NEOPLASM OF COLON, UNSPECIFIED PART OF COLON: Primary | ICD-10-CM

## 2022-05-31 DIAGNOSIS — Z12.11 COLON CANCER SCREENING: ICD-10-CM

## 2022-05-31 LAB — GLUCOSE SERPL-MCNC: 173 MG/DL (ref 70–110)

## 2022-05-31 PROCEDURE — 45380 COLONOSCOPY AND BIOPSY: CPT | Mod: 59,,, | Performed by: STUDENT IN AN ORGANIZED HEALTH CARE EDUCATION/TRAINING PROGRAM

## 2022-05-31 PROCEDURE — 45380 COLONOSCOPY AND BIOPSY: CPT | Mod: 59 | Performed by: STUDENT IN AN ORGANIZED HEALTH CARE EDUCATION/TRAINING PROGRAM

## 2022-05-31 PROCEDURE — 45385 COLONOSCOPY W/LESION REMOVAL: CPT | Mod: PT | Performed by: STUDENT IN AN ORGANIZED HEALTH CARE EDUCATION/TRAINING PROGRAM

## 2022-05-31 PROCEDURE — 63600175 PHARM REV CODE 636 W HCPCS: Performed by: NURSE ANESTHETIST, CERTIFIED REGISTERED

## 2022-05-31 PROCEDURE — 25000003 PHARM REV CODE 250: Performed by: NURSE ANESTHETIST, CERTIFIED REGISTERED

## 2022-05-31 PROCEDURE — 88305 TISSUE EXAM BY PATHOLOGIST: ICD-10-PCS | Mod: 26,,, | Performed by: PATHOLOGY

## 2022-05-31 PROCEDURE — 37000008 HC ANESTHESIA 1ST 15 MINUTES: Performed by: STUDENT IN AN ORGANIZED HEALTH CARE EDUCATION/TRAINING PROGRAM

## 2022-05-31 PROCEDURE — E9220 PRA ENDO ANESTHESIA: ICD-10-PCS | Mod: PT,,, | Performed by: NURSE ANESTHETIST, CERTIFIED REGISTERED

## 2022-05-31 PROCEDURE — 37000009 HC ANESTHESIA EA ADD 15 MINS: Performed by: STUDENT IN AN ORGANIZED HEALTH CARE EDUCATION/TRAINING PROGRAM

## 2022-05-31 PROCEDURE — 27201012 HC FORCEPS, HOT/COLD, DISP: Performed by: STUDENT IN AN ORGANIZED HEALTH CARE EDUCATION/TRAINING PROGRAM

## 2022-05-31 PROCEDURE — 88305 TISSUE EXAM BY PATHOLOGIST: CPT | Mod: 26,,, | Performed by: PATHOLOGY

## 2022-05-31 PROCEDURE — E9220 PRA ENDO ANESTHESIA: HCPCS | Mod: PT,,, | Performed by: NURSE ANESTHETIST, CERTIFIED REGISTERED

## 2022-05-31 PROCEDURE — 45385 PR COLONOSCOPY,REMV LESN,SNARE: ICD-10-PCS | Mod: PT,,, | Performed by: STUDENT IN AN ORGANIZED HEALTH CARE EDUCATION/TRAINING PROGRAM

## 2022-05-31 PROCEDURE — 45380 PR COLONOSCOPY,BIOPSY: ICD-10-PCS | Mod: 59,,, | Performed by: STUDENT IN AN ORGANIZED HEALTH CARE EDUCATION/TRAINING PROGRAM

## 2022-05-31 PROCEDURE — 88305 TISSUE EXAM BY PATHOLOGIST: CPT | Performed by: PATHOLOGY

## 2022-05-31 PROCEDURE — 25000003 PHARM REV CODE 250: Performed by: STUDENT IN AN ORGANIZED HEALTH CARE EDUCATION/TRAINING PROGRAM

## 2022-05-31 PROCEDURE — 45385 COLONOSCOPY W/LESION REMOVAL: CPT | Mod: PT,,, | Performed by: STUDENT IN AN ORGANIZED HEALTH CARE EDUCATION/TRAINING PROGRAM

## 2022-05-31 RX ORDER — PHENYLEPHRINE HYDROCHLORIDE 10 MG/ML
INJECTION INTRAVENOUS
Status: DISCONTINUED | OUTPATIENT
Start: 2022-05-31 | End: 2022-05-31

## 2022-05-31 RX ORDER — SODIUM CHLORIDE 9 MG/ML
INJECTION, SOLUTION INTRAVENOUS CONTINUOUS
Status: DISCONTINUED | OUTPATIENT
Start: 2022-05-31 | End: 2022-05-31 | Stop reason: HOSPADM

## 2022-05-31 RX ORDER — PROPOFOL 10 MG/ML
VIAL (ML) INTRAVENOUS CONTINUOUS PRN
Status: DISCONTINUED | OUTPATIENT
Start: 2022-05-31 | End: 2022-05-31

## 2022-05-31 RX ORDER — LIDOCAINE HYDROCHLORIDE 20 MG/ML
INJECTION INTRAVENOUS
Status: DISCONTINUED | OUTPATIENT
Start: 2022-05-31 | End: 2022-05-31

## 2022-05-31 RX ORDER — PROPOFOL 10 MG/ML
VIAL (ML) INTRAVENOUS
Status: DISCONTINUED | OUTPATIENT
Start: 2022-05-31 | End: 2022-05-31

## 2022-05-31 RX ADMIN — PROPOFOL 20 MG: 10 INJECTION, EMULSION INTRAVENOUS at 03:05

## 2022-05-31 RX ADMIN — SODIUM CHLORIDE: 0.9 INJECTION, SOLUTION INTRAVENOUS at 03:05

## 2022-05-31 RX ADMIN — PHENYLEPHRINE HYDROCHLORIDE 100 MCG: 10 INJECTION INTRAVENOUS at 03:05

## 2022-05-31 RX ADMIN — LIDOCAINE HYDROCHLORIDE 75 MG: 20 INJECTION, SOLUTION INTRAVENOUS at 03:05

## 2022-05-31 RX ADMIN — PROPOFOL 50 MG: 10 INJECTION, EMULSION INTRAVENOUS at 03:05

## 2022-05-31 RX ADMIN — Medication 175 MCG/KG/MIN: at 03:05

## 2022-05-31 NOTE — TRANSFER OF CARE
"Anesthesia Transfer of Care Note    Patient: Kamlesh Diane    Procedure(s) Performed: Procedure(s) (LRB):  COLONOSCOPY (N/A)    Patient location: GI    Anesthesia Type: general    Transport from OR: Transported from OR on 6-10 L/min O2 by face mask with adequate spontaneous ventilation    Post pain: adequate analgesia    Post assessment: no apparent anesthetic complications and tolerated procedure well    Post vital signs: stable    Level of consciousness: sedated and responds to stimulation    Nausea/Vomiting: no nausea/vomiting    Complications: none    Transfer of care protocol was followed      Last vitals:   Visit Vitals  BP (!) 95/59 (BP Location: Left arm, Patient Position: Lying)   Pulse 90   Temp 36.6 °C (97.8 °F) (Temporal)   Resp 16   Ht 6' 1" (1.854 m)   Wt 81.6 kg (180 lb)   SpO2 100%   BMI 23.75 kg/m²     "

## 2022-05-31 NOTE — ANESTHESIA POSTPROCEDURE EVALUATION
Anesthesia Post Evaluation    Patient: Kamlesh Diane    Procedure(s) Performed: Procedure(s) (LRB):  COLONOSCOPY (N/A)    Final Anesthesia Type: general      Patient location during evaluation: PACU  Patient participation: Yes- Able to Participate  Level of consciousness: awake and alert and oriented  Post-procedure vital signs: reviewed and stable  Pain management: adequate  Airway patency: patent    PONV status at discharge: No PONV  Anesthetic complications: no      Cardiovascular status: hemodynamically stable  Respiratory status: unassisted  Hydration status: euvolemic  Follow-up not needed.          Vitals Value Taken Time   /86 05/31/22 1611   Temp 36.6 °C (97.8 °F) 05/31/22 1549   Pulse 79 05/31/22 1611   Resp 18 05/31/22 1611   SpO2 99 % 05/31/22 1611         No case tracking events are documented in the log.      Pain/Adeel Score: Adeel Score: 9 (5/31/2022  3:49 PM)

## 2022-05-31 NOTE — H&P
Short Stay Endoscopy History and Physical    PCP - Debora Ontiveros MD  Referring Physician - Debora Ontiveros MD  [address] [place] [address]    Procedure - Colonoscopy  ASA - per anesthesia  Mallampati - per anesthesia  History of Anesthesia problems - no  Family history Anesthesia problems -  no   Plan of anesthesia - General    HPI  66 y.o. male  Reason for procedure:   Screening [Z13.9]      ROS:  Constitutional: No fevers, chills, No weight loss  CV: No chest pain  Pulm: No cough, No shortness of breath  GI: see HPI    Medical History:  has a past medical history of CVA (cerebral vascular accident), Diabetes, Elevated PSA, Hypertension, Prostate cancer, and Stroke.    Surgical History:  has a past surgical history that includes Fracture surgery; Cataract extraction; and pr eval,swallow function,cine/video record (4/1/2022).    Family History: family history includes Breast cancer in his sister; Diabetes in his brother; Heart attack in his father; Hypertension in his brother and father..    Social History:  reports that he has never smoked. He has never used smokeless tobacco. He reports current alcohol use. He reports current drug use. Drug: Marijuana.    Review of patient's allergies indicates:  No Known Allergies    Medications:   Medications Prior to Admission   Medication Sig Dispense Refill Last Dose    ACCU-CHEK JUSTIN PLUS TEST STRP Strp TEST FOUR TIMES DAILY AS DIRECTED 400 strip 3     alcohol swabs (ALCOHOL PREP PADS) PadM Use 4 times a day. 400 each 3     alendronate (FOSAMAX) 70 MG tablet Take 1 tablet (70 mg total) by mouth every 7 days. 12 tablet 3     amantadine HCL (SYMMETREL) 100 mg capsule TAKE 1 CAPSULE(100 MG) BY MOUTH TWICE DAILY 180 capsule 3     amLODIPine (NORVASC) 10 MG tablet TAKE 1 TABLET(10 MG) BY MOUTH EVERY DAY 90 tablet 1     ammonium lactate 12 % Crea Apply 1 application topically 2 (two) times daily. Apply to affected area  0     atorvastatin (LIPITOR) 40 MG tablet TAKE 1  "TABLET(40 MG) BY MOUTH EVERY DAY 90 tablet 3     blood-glucose meter kit Use as instructed 1 each 0     calcitRIOL (ROCALTROL) 0.25 MCG Cap Take 0.25 mcg by mouth once daily.       carvediloL (COREG) 3.125 MG tablet TAKE 1 TABLET(3.125 MG) BY MOUTH TWICE DAILY WITH MEALS 180 tablet 3     cloNIDine (CATAPRES) 0.1 MG tablet TAKE 1 TABLET BY MOUTH THREE TIMES DAILY 270 tablet 3     enoxaparin (LOVENOX) 120 mg/0.8 mL Syrg Inject 0.8 mLs (120 mg total) into the skin once daily. for 10 doses 10 each 1     ergocalciferol (ERGOCALCIFEROL) 50,000 unit Cap TAKE 1 CAPSULE BY MOUTH EVERY 7 DAYS 12 capsule 3     glucagon (BAQSIMI) 3 mg/actuation Spry Give one puff via nostril. Hold device between fingers and thumb, do not push plunger yet, insert tip gently into one nostril until finger(s) touch the outside of the nose, then push plunger firmly all the way in . Dose is complete when the green line disappears. 1 each 2     insulin lispro (HUMALOG KWIKPEN INSULIN) 100 unit/mL pen Inject 7-8 units w/ breakfast, lunch and dinner, no scale max daily 24 units. 15 mL 3     lancets Misc 1 lancet by Misc.(Non-Drug; Combo Route) route 4 (four) times daily. 400 each 3     lancing device Misc 1 Device by Misc.(Non-Drug; Combo Route) route 2 (two) times daily with meals. 1 each 0     LEVEMIR FLEXTOUCH U-100 INSULN 100 unit/mL (3 mL) InPn pen ADMINISTER 41 UNITS UNDER THE SKIN EVERY EVENING 45 mL 3     losartan (COZAAR) 50 MG tablet TAKE 1 TABLET(50 MG) BY MOUTH EVERY DAY 90 tablet 3     mirabegron (MYRBETRIQ) 25 mg Tb24 ER tablet Take 1 tablet (25 mg total) by mouth once daily. 90 tablet 3     pen needle, diabetic (BD ULTRA-FINE SHORT PEN NEEDLE) 31 gauge x 5/16" Ndle USE THREE TIMES DAILY 200 each 2     RESTASIS 0.05 % ophthalmic emulsion INSTILL 1 DROP IN BOTH EYES TWICE DAILY 180 each 3     traMADol (ULTRAM) 50 mg tablet Take 1 tablet (50 mg total) by mouth every 8 (eight) hours as needed for Pain. 15 tablet 0     " warfarin (COUMADIN) 2.5 MG tablet TAKE 1 TABLET BY MOUTH EVERY MONDAY, TUESDAY, OR FRIDAY AS DIRECTED 90 tablet 3     warfarin (COUMADIN) 5 MG tablet TAKE 1 TABLET BY MOUTH ON WEDNESDAY, THURSDAY, SATURDAY AND SUNDAY OR AS DIRECTED BY COUMADIN CLINIC 42 tablet 11        Physical Exam:    Vital Signs: There were no vitals filed for this visit.    General Appearance: Well appearing in no acute distress  Abdomen: Soft, non tender, non distended with normal bowel sounds, no masses    Labs:  Lab Results   Component Value Date    WBC 6.20 02/09/2022    HGB 14.1 02/09/2022    HCT 44.8 02/09/2022     02/09/2022    CHOL 139 05/31/2021    TRIG 77 05/31/2021    HDL 37 (L) 05/31/2021    ALT 21 02/09/2022    AST 17 02/09/2022     02/09/2022    K 4.9 02/09/2022     02/09/2022    CREATININE 1.6 (H) 02/09/2022    BUN 17 02/09/2022    CO2 28 02/09/2022    TSH 2.113 05/31/2021    PSA 6.2 (H) 04/05/2018    INR 3.2 (H) 05/24/2022    HGBA1C 7.3 (H) 02/09/2022       I have explained the risks and benefits of this endoscopic procedure to the patient including but not limited to bleeding, inflammation, infection, perforation, and death.      Juan Miguel Quintanilla MD

## 2022-05-31 NOTE — ANESTHESIA PREPROCEDURE EVALUATION
2022  Kamlesh Diane is a 66 y.o., male.  Pre-operative evaluation for COLONOSCOPY (N/A)    Chief Complaint: colon screen    PMH:  1.  Multiple acute CVA including right MCA, bilateral occipital lobe, and bilateral cerebellar 2011  2.  Suspected cerebral vasculitis.   3.  Seizure disorder.   4.  Hypertension.   5.  Type 2 diabetes with diabetic nephropathy.   6.  Hyperlipidemia.       Past Surgical History:   Procedure Laterality Date    CATARACT EXTRACTION      FRACTURE SURGERY      hips x 2 - last fall was 2 yrs ago.    MS EVAL,SWALLOW FUNCTION,CINE/VIDEO RECORD  2022              Vital Signs Range (Last 24H):         CBC:   No results for input(s): WBC, RBC, HGB, HCT, PLT, MCV, MCH, MCHC in the last 720 hours.    CMP: No results for input(s): NA, K, CL, CO2, BUN, CREATININE, GLU, MG, PHOS, CALCIUM, ALBUMIN, PROT, ALKPHOS, ALT, AST, BILITOT in the last 720 hours.    INR:  Recent Labs   Lab 22  1314 22  1617 22  1405 22  1221   INR 3.3* 3.7* 3.4* 3.2*         Diagnostic Studies:      EKD Echo:    Pre-op Assessment    I have reviewed the Patient Summary Reports.     I have reviewed the Nursing Notes. I have reviewed the NPO Status.   I have reviewed the Medications.     Review of Systems  Anesthesia Hx:  No problems with previous Anesthesia    Social:  Non-Smoker, No Alcohol Use    Cardiovascular:  Cardiovascular Normal     Pulmonary:  Pulmonary Normal    Neurological:  Neurology Normal        Physical Exam  General: Well nourished, Cooperative, Oriented, Flat Affect and Lethargic    Airway:  Mouth Opening: Normal  TM Distance: Normal  Tongue: Normal  Neck ROM: Normal ROM    Dental:  Intact    Chest/Lungs:  Normal Respiratory Rate        Anesthesia Plan  Type of Anesthesia, risks & benefits discussed:    Anesthesia Type: Gen Natural Airway, Gen ETT  Intra-op  Monitoring Plan: Standard ASA Monitors  Post Op Pain Control Plan: multimodal analgesia  Induction:  IV  Informed Consent: Informed consent signed with the Patient and all parties understand the risks and agree with anesthesia plan.  All questions answered.   ASA Score: 3  Day of Surgery Review of History & Physical: H&P Update referred to the surgeon/provider.    Ready For Surgery From Anesthesia Perspective.     .

## 2022-05-31 NOTE — PROVATION PATIENT INSTRUCTIONS
Discharge Summary/Instructions after an Endoscopic Procedure  Patient Name: Kalmesh Diane  Patient MRN: [id number]  Patient YOB: 1955  Tuesday, May 31, 2022  Juan Miguel Quintanilla MD  Dear patient,  As a result of recent federal legislation (The Federal Cures Act), you may   receive lab or pathology results from your procedure in your [facility]   account before your physician is able to contact you. Your physician or   their representative will relay the results to you with their   recommendations at their soonest availability.  Thank you,  RESTRICTIONS:  During your procedure today, you received medications for sedation.  These   medications may affect your judgment, balance and coordination.  Therefore,   for 24 hours, you have the following restrictions:   - DO NOT drive a car, operate machinery, make legal/financial decisions,   sign important papers or drink alcohol.    ACTIVITY:  Today: no heavy lifting, straining or running due to procedural   sedation/anesthesia.  The following day: return to full activity including work.  DIET:  Eat and drink normally unless instructed otherwise.     TREATMENT FOR COMMON SIDE EFFECTS:  - Mild abdominal pain, nausea, belching, bloating or excessive gas:  rest,   eat lightly and use a heating pad.  - Sore Throat: treat with throat lozenges and/or gargle with warm salt   water.  - Because air was used during the procedure, expelling large amounts of air   from your rectum or belching is normal.  - If a bowel prep was taken, you may not have a bowel movement for 1-3 days.    This is normal.  SYMPTOMS TO WATCH FOR AND REPORT TO YOUR PHYSICIAN:  1. Abdominal pain or bloating, other than gas cramps.  2. Chest pain.  3. Back pain.  4. Signs of infection such as: chills or fever occurring within 24 hours   after the procedure.  5. Rectal bleeding, which would show as bright red, maroon, or black stools.   (A tablespoon of blood from the rectum is not serious, especially  if   hemorrhoids are present.)  6. Vomiting.  7. Weakness or dizziness.  GO DIRECTLY TO THE NEAREST EMERGENCY ROOM IF YOU HAVE ANY OF THE FOLLOWING:      Difficulty breathing              Chills and/or fever over 101 F   Persistent vomiting and/or vomiting blood   Severe abdominal pain   Severe chest pain   Black, tarry stools   Bleeding- more than one tablespoon   Any other symptom or condition that you feel may need urgent attention  Your doctor recommends these additional instructions:  If any biopsies were taken, your doctors clinic will contact you in 1 to 2   weeks with any results.  - Repeat colonoscopy in 3 years for surveillance given polyps and prep   quality.   - Discharge patient to home.   - Patient has a contact number available for emergencies.  The signs and   symptoms of potential delayed complications were discussed with the   patient.  Return to normal activities tomorrow.  Written discharge   instructions were provided to the patient.   - Await pathology results.   - The findings and recommendations were discussed with the patient's   family.  For questions, problems or results please call your physician - Juan Miguel Quintanilla MD at Work:  [phone].  [facility], EMERGENCY ROOM PHONE NUMBER: [phone]  IF A COMPLICATION OR EMERGENCY SITUATION ARISES AND YOU ARE UNABLE TO REACH   YOUR PHYSICIAN - GO DIRECTLY TO THE EMERGENCY ROOM.  Juan Miguel Quintanilla MD  5/31/2022 3:40:55 PM  This report has been verified and signed electronically.  Dear patient,  As a result of recent federal legislation (The Federal Cures Act), you may   receive lab or pathology results from your procedure in your [facility]   account before your physician is able to contact you. Your physician or   their representative will relay the results to you with their   recommendations at their soonest availability.  Thank you,  PROVATION

## 2022-06-01 LAB — POCT GLUCOSE: 173 MG/DL (ref 70–110)

## 2022-06-03 ENCOUNTER — PATIENT MESSAGE (OUTPATIENT)
Dept: CARDIOLOGY | Facility: CLINIC | Age: 67
End: 2022-06-03

## 2022-06-03 ENCOUNTER — OFFICE VISIT (OUTPATIENT)
Dept: OPHTHALMOLOGY | Facility: CLINIC | Age: 67
End: 2022-06-03
Payer: MEDICARE

## 2022-06-03 ENCOUNTER — LAB VISIT (OUTPATIENT)
Dept: LAB | Facility: HOSPITAL | Age: 67
End: 2022-06-03
Payer: MEDICARE

## 2022-06-03 ENCOUNTER — ANTI-COAG VISIT (OUTPATIENT)
Dept: CARDIOLOGY | Facility: CLINIC | Age: 67
End: 2022-06-03
Payer: MEDICARE

## 2022-06-03 DIAGNOSIS — Z86.73 H/O: CVA (CEREBROVASCULAR ACCIDENT): ICD-10-CM

## 2022-06-03 DIAGNOSIS — E11.3593 CONTROLLED TYPE 2 DIABETES MELLITUS WITH BOTH EYES AFFECTED BY PROLIFERATIVE RETINOPATHY WITHOUT MACULAR EDEMA, WITH LONG-TERM CURRENT USE OF INSULIN: Primary | ICD-10-CM

## 2022-06-03 DIAGNOSIS — Z79.01 LONG TERM (CURRENT) USE OF ANTICOAGULANTS: Primary | ICD-10-CM

## 2022-06-03 DIAGNOSIS — H43.12 VITREOUS HEMORRHAGE, LEFT: ICD-10-CM

## 2022-06-03 DIAGNOSIS — E11.3593 PROLIFERATIVE DIABETIC RETINOPATHY OF BOTH EYES ASSOCIATED WITH TYPE 2 DIABETES MELLITUS, UNSPECIFIED PROLIFERATIVE RETINOPATHY TYPE: ICD-10-CM

## 2022-06-03 DIAGNOSIS — H35.033 HYPERTENSIVE RETINOPATHY, BILATERAL: ICD-10-CM

## 2022-06-03 DIAGNOSIS — Z79.01 LONG TERM (CURRENT) USE OF ANTICOAGULANTS: ICD-10-CM

## 2022-06-03 DIAGNOSIS — Z79.4 CONTROLLED TYPE 2 DIABETES MELLITUS WITH BOTH EYES AFFECTED BY PROLIFERATIVE RETINOPATHY WITHOUT MACULAR EDEMA, WITH LONG-TERM CURRENT USE OF INSULIN: Primary | ICD-10-CM

## 2022-06-03 LAB
INR PPP: 1.1 (ref 0.8–1.2)
PROTHROMBIN TIME: 11.7 SEC (ref 9–12.5)

## 2022-06-03 PROCEDURE — 92014 COMPRE OPH EXAM EST PT 1/>: CPT | Mod: S$GLB,,, | Performed by: OPHTHALMOLOGY

## 2022-06-03 PROCEDURE — 93793 PR ANTICOAGULANT MGMT FOR PT TAKING WARFARIN: ICD-10-PCS | Mod: S$GLB,,,

## 2022-06-03 PROCEDURE — 4010F ACE/ARB THERAPY RXD/TAKEN: CPT | Mod: CPTII,S$GLB,, | Performed by: OPHTHALMOLOGY

## 2022-06-03 PROCEDURE — 1126F PR PAIN SEVERITY QUANTIFIED, NO PAIN PRESENT: ICD-10-PCS | Mod: CPTII,S$GLB,, | Performed by: OPHTHALMOLOGY

## 2022-06-03 PROCEDURE — 85610 PROTHROMBIN TIME: CPT | Performed by: INTERNAL MEDICINE

## 2022-06-03 PROCEDURE — 3066F NEPHROPATHY DOC TX: CPT | Mod: CPTII,S$GLB,, | Performed by: OPHTHALMOLOGY

## 2022-06-03 PROCEDURE — 36415 COLL VENOUS BLD VENIPUNCTURE: CPT | Performed by: INTERNAL MEDICINE

## 2022-06-03 PROCEDURE — 1101F PR PT FALLS ASSESS DOC 0-1 FALLS W/OUT INJ PAST YR: ICD-10-PCS | Mod: CPTII,S$GLB,, | Performed by: OPHTHALMOLOGY

## 2022-06-03 PROCEDURE — 1160F PR REVIEW ALL MEDS BY PRESCRIBER/CLIN PHARMACIST DOCUMENTED: ICD-10-PCS | Mod: CPTII,S$GLB,, | Performed by: OPHTHALMOLOGY

## 2022-06-03 PROCEDURE — 3288F PR FALLS RISK ASSESSMENT DOCUMENTED: ICD-10-PCS | Mod: CPTII,S$GLB,, | Performed by: OPHTHALMOLOGY

## 2022-06-03 PROCEDURE — 99999 PR PBB SHADOW E&M-EST. PATIENT-LVL IV: CPT | Mod: PBBFAC,,, | Performed by: OPHTHALMOLOGY

## 2022-06-03 PROCEDURE — 3288F FALL RISK ASSESSMENT DOCD: CPT | Mod: CPTII,S$GLB,, | Performed by: OPHTHALMOLOGY

## 2022-06-03 PROCEDURE — 3066F PR DOCUMENTATION OF TREATMENT FOR NEPHROPATHY: ICD-10-PCS | Mod: CPTII,S$GLB,, | Performed by: OPHTHALMOLOGY

## 2022-06-03 PROCEDURE — 2023F PR DILATED RETINAL EXAM W/O EVID OF RETINOPATHY: ICD-10-PCS | Mod: CPTII,S$GLB,, | Performed by: OPHTHALMOLOGY

## 2022-06-03 PROCEDURE — 99999 PR PBB SHADOW E&M-EST. PATIENT-LVL IV: ICD-10-PCS | Mod: PBBFAC,,, | Performed by: OPHTHALMOLOGY

## 2022-06-03 PROCEDURE — 2023F DILAT RTA XM W/O RTNOPTHY: CPT | Mod: CPTII,S$GLB,, | Performed by: OPHTHALMOLOGY

## 2022-06-03 PROCEDURE — 3061F PR NEG MICROALBUMINURIA RESULT DOCUMENTED/REVIEW: ICD-10-PCS | Mod: CPTII,S$GLB,, | Performed by: OPHTHALMOLOGY

## 2022-06-03 PROCEDURE — 1101F PT FALLS ASSESS-DOCD LE1/YR: CPT | Mod: CPTII,S$GLB,, | Performed by: OPHTHALMOLOGY

## 2022-06-03 PROCEDURE — 93793 ANTICOAG MGMT PT WARFARIN: CPT | Mod: S$GLB,,,

## 2022-06-03 PROCEDURE — 3051F HG A1C>EQUAL 7.0%<8.0%: CPT | Mod: CPTII,S$GLB,, | Performed by: OPHTHALMOLOGY

## 2022-06-03 PROCEDURE — 92014 PR EYE EXAM, EST PATIENT,COMPREHESV: ICD-10-PCS | Mod: S$GLB,,, | Performed by: OPHTHALMOLOGY

## 2022-06-03 PROCEDURE — 3051F PR MOST RECENT HEMOGLOBIN A1C LEVEL 7.0 - < 8.0%: ICD-10-PCS | Mod: CPTII,S$GLB,, | Performed by: OPHTHALMOLOGY

## 2022-06-03 PROCEDURE — 3061F NEG MICROALBUMINURIA REV: CPT | Mod: CPTII,S$GLB,, | Performed by: OPHTHALMOLOGY

## 2022-06-03 PROCEDURE — 1160F RVW MEDS BY RX/DR IN RCRD: CPT | Mod: CPTII,S$GLB,, | Performed by: OPHTHALMOLOGY

## 2022-06-03 PROCEDURE — 1159F MED LIST DOCD IN RCRD: CPT | Mod: CPTII,S$GLB,, | Performed by: OPHTHALMOLOGY

## 2022-06-03 PROCEDURE — 92201 PR OPHTHALMOSCOPY, EXT, W/RET DRAW/SCLERAL DEPR, I&R, UNI/BI: ICD-10-PCS | Mod: S$GLB,,, | Performed by: OPHTHALMOLOGY

## 2022-06-03 PROCEDURE — 92134 CPTRZ OPH DX IMG PST SGM RTA: CPT | Mod: S$GLB,,, | Performed by: OPHTHALMOLOGY

## 2022-06-03 PROCEDURE — 1126F AMNT PAIN NOTED NONE PRSNT: CPT | Mod: CPTII,S$GLB,, | Performed by: OPHTHALMOLOGY

## 2022-06-03 PROCEDURE — 92134 POSTERIOR SEGMENT OCT RETINA (OCULAR COHERENCE TOMOGRAPHY)-BOTH EYES: ICD-10-PCS | Mod: S$GLB,,, | Performed by: OPHTHALMOLOGY

## 2022-06-03 PROCEDURE — 4010F PR ACE/ARB THEARPY RXD/TAKEN: ICD-10-PCS | Mod: CPTII,S$GLB,, | Performed by: OPHTHALMOLOGY

## 2022-06-03 PROCEDURE — 92201 OPSCPY EXTND RTA DRAW UNI/BI: CPT | Mod: S$GLB,,, | Performed by: OPHTHALMOLOGY

## 2022-06-03 PROCEDURE — 1159F PR MEDICATION LIST DOCUMENTED IN MEDICAL RECORD: ICD-10-PCS | Mod: CPTII,S$GLB,, | Performed by: OPHTHALMOLOGY

## 2022-06-03 RX ORDER — POLYETHYLENE GLYCOL 3350, SODIUM SULFATE ANHYDROUS, SODIUM BICARBONATE, SODIUM CHLORIDE, POTASSIUM CHLORIDE 236; 22.74; 6.74; 5.86; 2.97 G/4L; G/4L; G/4L; G/4L; G/4L
POWDER, FOR SOLUTION ORAL
Status: ON HOLD | COMMUNITY
Start: 2022-03-29 | End: 2024-01-15 | Stop reason: HOSPADM

## 2022-06-03 NOTE — PROGRESS NOTES
HPI     Overdue 1 yr fu OCT/DFE    PT states his va is stable. Stable floaters OD No new symptoms or concerns   today.    No flashes  Floaters OD  No pain  Gtts: Restasis PRN OU        Last edited by Eva Palomino on 6/3/2022 11:04 AM. (History)          OCT - NO ME OU    Prior [place] FA - peripheral NP and NV OU      A/P    1. PDR OU  T2 uncontrolled on insulin  No ME OU  Watch ST fibrosis  PRP OU  11/20    stable    2. VH OS   Minimal    3. PCIOL OU    4. HTN Ret OU  BS/BP/chol control    5. Prior CVA   On coumadin      12 months OCT

## 2022-06-03 NOTE — PROGRESS NOTES
INR not at goal. Medications, chart, and patient findings reviewed. See calendar for adjustments to dose and follow up plan.  Recommend increasing dosing per calendar, pt to resume lovenox & plan to re-assess Tuesday.

## 2022-06-03 NOTE — PROGRESS NOTES
6/3/22-pt is having lab drawn now @ 2:33pm as advised by LORETTA. Advised Diane that if pt's INR >2 pt is to discontinue lovenox. Also, explained that If pt's INR were critical he would be contacted w/ further instructions.

## 2022-06-07 ENCOUNTER — LAB VISIT (OUTPATIENT)
Dept: LAB | Facility: HOSPITAL | Age: 67
End: 2022-06-07
Attending: INTERNAL MEDICINE
Payer: MEDICARE

## 2022-06-07 ENCOUNTER — ANTI-COAG VISIT (OUTPATIENT)
Dept: CARDIOLOGY | Facility: CLINIC | Age: 67
End: 2022-06-07
Payer: MEDICARE

## 2022-06-07 ENCOUNTER — PATIENT MESSAGE (OUTPATIENT)
Dept: CARDIOLOGY | Facility: CLINIC | Age: 67
End: 2022-06-07

## 2022-06-07 ENCOUNTER — OFFICE VISIT (OUTPATIENT)
Dept: PODIATRY | Facility: CLINIC | Age: 67
End: 2022-06-07
Payer: MEDICARE

## 2022-06-07 VITALS
HEART RATE: 104 BPM | BODY MASS INDEX: 25.28 KG/M2 | WEIGHT: 191.56 LBS | SYSTOLIC BLOOD PRESSURE: 133 MMHG | DIASTOLIC BLOOD PRESSURE: 86 MMHG

## 2022-06-07 DIAGNOSIS — Z86.73 H/O: CVA (CEREBROVASCULAR ACCIDENT): ICD-10-CM

## 2022-06-07 DIAGNOSIS — B35.1 ONYCHOMYCOSIS DUE TO DERMATOPHYTE: ICD-10-CM

## 2022-06-07 DIAGNOSIS — E11.42 DIABETIC POLYNEUROPATHY ASSOCIATED WITH TYPE 2 DIABETES MELLITUS: Primary | ICD-10-CM

## 2022-06-07 DIAGNOSIS — Z79.01 LONG TERM (CURRENT) USE OF ANTICOAGULANTS: Primary | ICD-10-CM

## 2022-06-07 DIAGNOSIS — Z79.01 LONG TERM (CURRENT) USE OF ANTICOAGULANTS: ICD-10-CM

## 2022-06-07 LAB
FINAL PATHOLOGIC DIAGNOSIS: NORMAL
INR PPP: 1.9 (ref 0.8–1.2)
Lab: NORMAL
PROTHROMBIN TIME: 18.8 SEC (ref 9–12.5)

## 2022-06-07 PROCEDURE — 3079F PR MOST RECENT DIASTOLIC BLOOD PRESSURE 80-89 MM HG: ICD-10-PCS | Mod: CPTII,S$GLB,, | Performed by: PODIATRIST

## 2022-06-07 PROCEDURE — 3051F HG A1C>EQUAL 7.0%<8.0%: CPT | Mod: CPTII,S$GLB,, | Performed by: PODIATRIST

## 2022-06-07 PROCEDURE — 4010F PR ACE/ARB THEARPY RXD/TAKEN: ICD-10-PCS | Mod: CPTII,S$GLB,, | Performed by: PODIATRIST

## 2022-06-07 PROCEDURE — 3075F PR MOST RECENT SYSTOLIC BLOOD PRESS GE 130-139MM HG: ICD-10-PCS | Mod: CPTII,S$GLB,, | Performed by: PODIATRIST

## 2022-06-07 PROCEDURE — 36415 COLL VENOUS BLD VENIPUNCTURE: CPT | Performed by: INTERNAL MEDICINE

## 2022-06-07 PROCEDURE — 11721 PR DEBRIDEMENT OF NAILS, 6 OR MORE: ICD-10-PCS | Mod: Q9,S$GLB,, | Performed by: PODIATRIST

## 2022-06-07 PROCEDURE — 3079F DIAST BP 80-89 MM HG: CPT | Mod: CPTII,S$GLB,, | Performed by: PODIATRIST

## 2022-06-07 PROCEDURE — 3066F PR DOCUMENTATION OF TREATMENT FOR NEPHROPATHY: ICD-10-PCS | Mod: CPTII,S$GLB,, | Performed by: PODIATRIST

## 2022-06-07 PROCEDURE — 1126F AMNT PAIN NOTED NONE PRSNT: CPT | Mod: CPTII,S$GLB,, | Performed by: PODIATRIST

## 2022-06-07 PROCEDURE — 3051F PR MOST RECENT HEMOGLOBIN A1C LEVEL 7.0 - < 8.0%: ICD-10-PCS | Mod: CPTII,S$GLB,, | Performed by: PODIATRIST

## 2022-06-07 PROCEDURE — 3008F BODY MASS INDEX DOCD: CPT | Mod: CPTII,S$GLB,, | Performed by: PODIATRIST

## 2022-06-07 PROCEDURE — 3075F SYST BP GE 130 - 139MM HG: CPT | Mod: CPTII,S$GLB,, | Performed by: PODIATRIST

## 2022-06-07 PROCEDURE — 93793 ANTICOAG MGMT PT WARFARIN: CPT | Mod: S$GLB,,,

## 2022-06-07 PROCEDURE — 93793 PR ANTICOAGULANT MGMT FOR PT TAKING WARFARIN: ICD-10-PCS | Mod: S$GLB,,,

## 2022-06-07 PROCEDURE — 3066F NEPHROPATHY DOC TX: CPT | Mod: CPTII,S$GLB,, | Performed by: PODIATRIST

## 2022-06-07 PROCEDURE — 99999 PR PBB SHADOW E&M-EST. PATIENT-LVL IV: CPT | Mod: PBBFAC,,, | Performed by: PODIATRIST

## 2022-06-07 PROCEDURE — 3061F NEG MICROALBUMINURIA REV: CPT | Mod: CPTII,S$GLB,, | Performed by: PODIATRIST

## 2022-06-07 PROCEDURE — 3061F PR NEG MICROALBUMINURIA RESULT DOCUMENTED/REVIEW: ICD-10-PCS | Mod: CPTII,S$GLB,, | Performed by: PODIATRIST

## 2022-06-07 PROCEDURE — 85610 PROTHROMBIN TIME: CPT | Performed by: INTERNAL MEDICINE

## 2022-06-07 PROCEDURE — 11721 DEBRIDE NAIL 6 OR MORE: CPT | Mod: Q9,S$GLB,, | Performed by: PODIATRIST

## 2022-06-07 PROCEDURE — 99499 NO LOS: ICD-10-PCS | Mod: S$GLB,,, | Performed by: PODIATRIST

## 2022-06-07 PROCEDURE — 3008F PR BODY MASS INDEX (BMI) DOCUMENTED: ICD-10-PCS | Mod: CPTII,S$GLB,, | Performed by: PODIATRIST

## 2022-06-07 PROCEDURE — 99499 UNLISTED E&M SERVICE: CPT | Mod: S$GLB,,, | Performed by: PODIATRIST

## 2022-06-07 PROCEDURE — 4010F ACE/ARB THERAPY RXD/TAKEN: CPT | Mod: CPTII,S$GLB,, | Performed by: PODIATRIST

## 2022-06-07 PROCEDURE — 99999 PR PBB SHADOW E&M-EST. PATIENT-LVL IV: ICD-10-PCS | Mod: PBBFAC,,, | Performed by: PODIATRIST

## 2022-06-07 PROCEDURE — 1126F PR PAIN SEVERITY QUANTIFIED, NO PAIN PRESENT: ICD-10-PCS | Mod: CPTII,S$GLB,, | Performed by: PODIATRIST

## 2022-06-14 ENCOUNTER — ANTI-COAG VISIT (OUTPATIENT)
Dept: CARDIOLOGY | Facility: CLINIC | Age: 67
End: 2022-06-14
Payer: MEDICARE

## 2022-06-14 ENCOUNTER — LAB VISIT (OUTPATIENT)
Dept: LAB | Facility: HOSPITAL | Age: 67
End: 2022-06-14
Attending: INTERNAL MEDICINE
Payer: MEDICARE

## 2022-06-14 DIAGNOSIS — Z86.73 H/O: CVA (CEREBROVASCULAR ACCIDENT): ICD-10-CM

## 2022-06-14 DIAGNOSIS — Z79.01 LONG TERM (CURRENT) USE OF ANTICOAGULANTS: ICD-10-CM

## 2022-06-14 DIAGNOSIS — Z79.01 LONG TERM (CURRENT) USE OF ANTICOAGULANTS: Primary | ICD-10-CM

## 2022-06-14 LAB
INR PPP: 1.5 (ref 0.8–1.2)
PROTHROMBIN TIME: 15.5 SEC (ref 9–12.5)

## 2022-06-14 PROCEDURE — 36415 COLL VENOUS BLD VENIPUNCTURE: CPT | Performed by: INTERNAL MEDICINE

## 2022-06-14 PROCEDURE — 93793 ANTICOAG MGMT PT WARFARIN: CPT | Mod: S$GLB,,,

## 2022-06-14 PROCEDURE — 93793 PR ANTICOAGULANT MGMT FOR PT TAKING WARFARIN: ICD-10-PCS | Mod: S$GLB,,,

## 2022-06-14 PROCEDURE — 85610 PROTHROMBIN TIME: CPT | Performed by: INTERNAL MEDICINE

## 2022-06-14 NOTE — PROGRESS NOTES
INR not at goal. Medications, chart, and patient findings reviewed. See calendar for adjustments to dose and follow up plan.  Recommend to increase maintenance dose & monitor pt closely.

## 2022-06-15 ENCOUNTER — PATIENT MESSAGE (OUTPATIENT)
Dept: CARDIOLOGY | Facility: CLINIC | Age: 67
End: 2022-06-15
Payer: MEDICARE

## 2022-06-15 NOTE — PROGRESS NOTES
5/15/22 Coumadin clinic was unable to reach patients brother last night for dosing instructions, spoke with him today he states that patient was not given his coumadin due to not knowing what to give

## 2022-06-21 ENCOUNTER — LAB VISIT (OUTPATIENT)
Dept: LAB | Facility: HOSPITAL | Age: 67
End: 2022-06-21
Attending: INTERNAL MEDICINE
Payer: MEDICARE

## 2022-06-21 DIAGNOSIS — Z79.01 LONG TERM (CURRENT) USE OF ANTICOAGULANTS: ICD-10-CM

## 2022-06-21 DIAGNOSIS — Z86.73 H/O: CVA (CEREBROVASCULAR ACCIDENT): ICD-10-CM

## 2022-06-21 LAB
INR PPP: 1.9 (ref 0.8–1.2)
PROTHROMBIN TIME: 19.4 SEC (ref 9–12.5)

## 2022-06-21 PROCEDURE — 85610 PROTHROMBIN TIME: CPT | Performed by: INTERNAL MEDICINE

## 2022-06-21 PROCEDURE — 36415 COLL VENOUS BLD VENIPUNCTURE: CPT | Performed by: INTERNAL MEDICINE

## 2022-06-22 ENCOUNTER — PATIENT MESSAGE (OUTPATIENT)
Dept: CARDIOLOGY | Facility: CLINIC | Age: 67
End: 2022-06-22

## 2022-06-22 ENCOUNTER — ANTI-COAG VISIT (OUTPATIENT)
Dept: CARDIOLOGY | Facility: CLINIC | Age: 67
End: 2022-06-22
Payer: MEDICARE

## 2022-06-22 DIAGNOSIS — Z79.01 LONG TERM (CURRENT) USE OF ANTICOAGULANTS: Primary | ICD-10-CM

## 2022-06-22 PROCEDURE — 93793 PR ANTICOAGULANT MGMT FOR PT TAKING WARFARIN: ICD-10-PCS | Mod: S$GLB,,,

## 2022-06-22 PROCEDURE — 93793 ANTICOAG MGMT PT WARFARIN: CPT | Mod: S$GLB,,,

## 2022-06-22 NOTE — PROGRESS NOTES
INR not at goal. Medications, chart, and patient findings reviewed. See calendar for adjustments to dose and follow up plan.  Pt continues to be subtherapeutic due to noncompliance.  Recommend to boost dosing & continue to monitor pt closely.  Will review importance of compliance.

## 2022-06-28 ENCOUNTER — ANTI-COAG VISIT (OUTPATIENT)
Dept: CARDIOLOGY | Facility: CLINIC | Age: 67
End: 2022-06-28
Payer: MEDICARE

## 2022-06-28 ENCOUNTER — LAB VISIT (OUTPATIENT)
Dept: LAB | Facility: HOSPITAL | Age: 67
End: 2022-06-28
Attending: INTERNAL MEDICINE
Payer: MEDICARE

## 2022-06-28 DIAGNOSIS — Z86.73 H/O: CVA (CEREBROVASCULAR ACCIDENT): ICD-10-CM

## 2022-06-28 DIAGNOSIS — Z79.01 LONG TERM (CURRENT) USE OF ANTICOAGULANTS: Primary | ICD-10-CM

## 2022-06-28 DIAGNOSIS — Z79.01 LONG TERM (CURRENT) USE OF ANTICOAGULANTS: ICD-10-CM

## 2022-06-28 LAB
INR PPP: 2.1 (ref 0.8–1.2)
PROTHROMBIN TIME: 21.2 SEC (ref 9–12.5)

## 2022-06-28 PROCEDURE — 93793 ANTICOAG MGMT PT WARFARIN: CPT | Mod: S$GLB,,,

## 2022-06-28 PROCEDURE — 85610 PROTHROMBIN TIME: CPT | Performed by: INTERNAL MEDICINE

## 2022-06-28 PROCEDURE — 36415 COLL VENOUS BLD VENIPUNCTURE: CPT | Performed by: INTERNAL MEDICINE

## 2022-06-28 PROCEDURE — 93793 PR ANTICOAGULANT MGMT FOR PT TAKING WARFARIN: ICD-10-PCS | Mod: S$GLB,,,

## 2022-07-06 NOTE — PROGRESS NOTES
Subjective:      Patient ID: Kamlesh Diane is a 67 y.o. male.    Chief Complaint: Diabetes Mellitus (Debora Ontiveros MD PCP seen 2 week) and Diabetic Foot Exam    Kamlesh is a 67 y.o. male who presents to the clinic for evaluation and treatment of high risk feet. Kamlesh has a past medical history of CVA (cerebral vascular accident), Diabetes, Diabetic retinopathy, Elevated PSA, Hypertension, Prostate cancer, and Stroke. The patient's chief complaint is long, thick toenails. This patient has documented high risk feet requiring routine maintenance secondary to peripheral neuropathy.    PCP: Debora Ontiveros MD    Date Last Seen by PCP: Mike 5/24/22  Chief Complaint   Patient presents with    Diabetes Mellitus     Debora Ontiveros MD PCP seen 2 week    Diabetic Foot Exam         Current shoe gear:  Affected Foot: Slip-on shoes     Unaffected Foot: Slip-on shoes    Hemoglobin A1C   Date Value Ref Range Status   02/09/2022 7.3 (H) 4.0 - 5.6 % Final     Comment:     ADA Screening Guidelines:  5.7-6.4%  Consistent with prediabetes  >or=6.5%  Consistent with diabetes    High levels of fetal hemoglobin interfere with the HbA1C  assay. Heterozygous hemoglobin variants (HbS, HgC, etc)do  not significantly interfere with this assay.   However, presence of multiple variants may affect accuracy.     01/14/2022 7.2 (H) 4.0 - 5.6 % Final     Comment:     ADA Screening Guidelines:  5.7-6.4%  Consistent with prediabetes  >or=6.5%  Consistent with diabetes    High levels of fetal hemoglobin interfere with the HbA1C  assay. Heterozygous hemoglobin variants (HbS, HgC, etc)do  not significantly interfere with this assay.   However, presence of multiple variants may affect accuracy.     09/21/2021 6.8 (H) 4.0 - 5.6 % Final     Comment:     ADA Screening Guidelines:  5.7-6.4%  Consistent with prediabetes  >or=6.5%  Consistent with diabetes    High levels of fetal hemoglobin interfere with the HbA1C  assay. Heterozygous hemoglobin variants (HbS, HgC,  etc)do  not significantly interfere with this assay.   However, presence of multiple variants may affect accuracy.         Review of Systems   Constitutional: Negative for chills, fever and malaise/fatigue.   HENT: Negative for hearing loss.    Cardiovascular: Positive for leg swelling. Negative for claudication.   Respiratory: Negative for shortness of breath.    Skin: Positive for color change, dry skin, nail changes and unusual hair distribution. Negative for flushing and rash.   Musculoskeletal: Negative for back pain, falls, joint pain and myalgias.   Neurological: Positive for numbness, paresthesias and sensory change. Negative for loss of balance.   Psychiatric/Behavioral: Negative for altered mental status.           Objective:       Vitals:    06/07/22 1356   BP: 133/86   Pulse: 104   Weight: 86.9 kg (191 lb 9.3 oz)   PainSc: 0-No pain        Physical Exam  Vitals reviewed.   Constitutional:       General: He is not in acute distress.     Appearance: He is well-developed. He is not ill-appearing.   Cardiovascular:      Pulses:           Dorsalis pedis pulses are 1+ on the right side and 1+ on the left side.        Posterior tibial pulses are 1+ on the right side and 1+ on the left side.   Musculoskeletal:         General: No signs of injury.      Right ankle: Decreased range of motion. Abnormal pulse.      Right Achilles Tendon: Ramirez's test negative.      Left ankle: Decreased range of motion. Abnormal pulse.      Left Achilles Tendon: Ramirez's test negative.      Right foot: Decreased range of motion.      Left foot: Decreased range of motion.   Feet:      Right foot:      Protective Sensation: 5 sites tested. 2 sites sensed.      Left foot:      Protective Sensation: 5 sites tested. 2 sites sensed.   Skin:     General: Skin is warm and dry.      Capillary Refill: Capillary refill takes more than 3 seconds.      Coloration: Skin is ashen. Skin is not jaundiced or pale.      Findings: No bruising,  erythema or wound.      Comments:   Nails x 10 are elongated by  2-4mm's, thickened by 2-5 mm's, dystrophic, and are darkened in  coloration . Xerosis Bilaterally. No open lesions, lacerations or wounds noted     Neurological:      Mental Status: He is alert.      Comments: diminished sensation noted to b/L lower extremities   Psychiatric:         Behavior: Behavior normal. Behavior is cooperative.           Assessment:       Encounter Diagnoses   Name Primary?    Diabetic polyneuropathy associated with type 2 diabetes mellitus Yes    Onychomycosis due to dermatophyte     Corn or callus          Plan:       Kamlesh was seen today for diabetes mellitus and diabetic foot exam.    Diagnoses and all orders for this visit:    Diabetic polyneuropathy associated with type 2 diabetes mellitus    Onychomycosis due to dermatophyte    Corn or callus      I counseled the patient on his conditions, their implications and medical management.      - Shoe inspection. Diabetic Foot Education. Patient reminded of the importance of good nutrition and blood sugar control to help prevent podiatric complications of diabetes. Patient instructed on proper foot hygeine. We discussed wearing proper shoe gear, daily foot inspections, never walking without protective shoe gear, never putting sharp instruments to feet, routine podiatric nail visits every 2-3 months.      - With patient's permission, nails were aggressively reduced and debrided x 10 to their soft tissue attachment mechanically and with electric , removing all offending nail and debris. Patient relates relief following the procedure. He will continue to monitor the areas daily, inspect his feet, wear protective shoe gear when ambulatory, moisturizer to maintain skin integrity and follow in this office in approximately 2-3 months, sooner p.r.n.

## 2022-07-08 ENCOUNTER — PATIENT MESSAGE (OUTPATIENT)
Dept: PRIMARY CARE CLINIC | Facility: CLINIC | Age: 67
End: 2022-07-08
Payer: MEDICARE

## 2022-07-08 DIAGNOSIS — E11.9 INSULIN DEPENDENT TYPE 2 DIABETES MELLITUS: ICD-10-CM

## 2022-07-08 DIAGNOSIS — Z79.4 INSULIN DEPENDENT TYPE 2 DIABETES MELLITUS: ICD-10-CM

## 2022-07-08 RX ORDER — LANCETS
1 EACH MISCELLANEOUS 4 TIMES DAILY
Qty: 400 EACH | Refills: 3 | Status: SHIPPED | OUTPATIENT
Start: 2022-07-08

## 2022-07-08 RX ORDER — ISOPROPYL ALCOHOL 70 ML/100ML
SWAB TOPICAL
Qty: 400 EACH | Refills: 3 | Status: SHIPPED | OUTPATIENT
Start: 2022-07-08

## 2022-07-08 RX ORDER — LANCING DEVICE
1 EACH MISCELLANEOUS 2 TIMES DAILY WITH MEALS
Qty: 1 EACH | Refills: 0 | Status: SHIPPED | OUTPATIENT
Start: 2022-07-08 | End: 2024-01-26

## 2022-07-08 RX ORDER — INSULIN PUMP SYRINGE, 3 ML
EACH MISCELLANEOUS
Qty: 1 EACH | Refills: 0 | Status: SHIPPED | OUTPATIENT
Start: 2022-07-08 | End: 2024-01-26

## 2022-07-08 RX ORDER — BLOOD SUGAR DIAGNOSTIC
STRIP MISCELLANEOUS
Qty: 400 STRIP | Refills: 3 | Status: SHIPPED | OUTPATIENT
Start: 2022-07-08

## 2022-07-08 NOTE — TELEPHONE ENCOUNTER
----- Message from Romina Richards sent at 7/8/2022  8:36 AM CDT -----  Contact: [employer]  [phone]  [employer] [facility] called and stated that they need a medical necessity form and clinical notes for the pt's diabetic testing supplies.     [employer] [facility] Fax [phone]    Thank you

## 2022-07-12 ENCOUNTER — ANTI-COAG VISIT (OUTPATIENT)
Dept: CARDIOLOGY | Facility: CLINIC | Age: 67
End: 2022-07-12
Payer: MEDICARE

## 2022-07-12 ENCOUNTER — LAB VISIT (OUTPATIENT)
Dept: LAB | Facility: HOSPITAL | Age: 67
End: 2022-07-12
Attending: INTERNAL MEDICINE
Payer: MEDICARE

## 2022-07-12 ENCOUNTER — PATIENT MESSAGE (OUTPATIENT)
Dept: CARDIOLOGY | Facility: CLINIC | Age: 67
End: 2022-07-12

## 2022-07-12 DIAGNOSIS — Z79.01 LONG TERM (CURRENT) USE OF ANTICOAGULANTS: ICD-10-CM

## 2022-07-12 DIAGNOSIS — Z86.73 H/O: CVA (CEREBROVASCULAR ACCIDENT): ICD-10-CM

## 2022-07-12 DIAGNOSIS — Z79.01 LONG TERM (CURRENT) USE OF ANTICOAGULANTS: Primary | ICD-10-CM

## 2022-07-12 LAB
INR PPP: 1.9 (ref 0.8–1.2)
PROTHROMBIN TIME: 19.1 SEC (ref 9–12.5)

## 2022-07-12 PROCEDURE — 85610 PROTHROMBIN TIME: CPT | Performed by: INTERNAL MEDICINE

## 2022-07-12 PROCEDURE — 93793 ANTICOAG MGMT PT WARFARIN: CPT | Mod: S$GLB,,,

## 2022-07-12 PROCEDURE — 36415 COLL VENOUS BLD VENIPUNCTURE: CPT | Performed by: INTERNAL MEDICINE

## 2022-07-12 PROCEDURE — 93793 PR ANTICOAGULANT MGMT FOR PT TAKING WARFARIN: ICD-10-PCS | Mod: S$GLB,,,

## 2022-07-25 NOTE — PROGRESS NOTES
Son called 07/25/22 to sergio appt 07/26/22 to 08/01/22. Patient been (Covid+) since 07/21/22 will retest 07/26/22.

## 2022-07-27 ENCOUNTER — TELEPHONE (OUTPATIENT)
Dept: PRIMARY CARE CLINIC | Facility: CLINIC | Age: 67
End: 2022-07-27
Payer: MEDICARE

## 2022-07-27 ENCOUNTER — PATIENT MESSAGE (OUTPATIENT)
Dept: PRIMARY CARE CLINIC | Facility: CLINIC | Age: 67
End: 2022-07-27
Payer: MEDICARE

## 2022-07-27 NOTE — TELEPHONE ENCOUNTER
Called and spoke with patient's brother, Krish    Stated that patieent was much better an no longer having symptoms.    Stated will re teest later today.

## 2022-08-01 ENCOUNTER — LAB VISIT (OUTPATIENT)
Dept: LAB | Facility: HOSPITAL | Age: 67
End: 2022-08-01
Attending: INTERNAL MEDICINE
Payer: MEDICARE

## 2022-08-01 DIAGNOSIS — Z79.01 LONG TERM (CURRENT) USE OF ANTICOAGULANTS: ICD-10-CM

## 2022-08-01 DIAGNOSIS — Z86.73 H/O: CVA (CEREBROVASCULAR ACCIDENT): ICD-10-CM

## 2022-08-01 LAB
INR PPP: 2.9 (ref 0.8–1.2)
PROTHROMBIN TIME: 28.1 SEC (ref 9–12.5)

## 2022-08-01 PROCEDURE — 36415 COLL VENOUS BLD VENIPUNCTURE: CPT | Performed by: INTERNAL MEDICINE

## 2022-08-01 PROCEDURE — 85610 PROTHROMBIN TIME: CPT | Performed by: INTERNAL MEDICINE

## 2022-08-02 ENCOUNTER — PATIENT MESSAGE (OUTPATIENT)
Dept: CARDIOLOGY | Facility: CLINIC | Age: 67
End: 2022-08-02

## 2022-08-02 ENCOUNTER — ANTI-COAG VISIT (OUTPATIENT)
Dept: CARDIOLOGY | Facility: CLINIC | Age: 67
End: 2022-08-02
Payer: MEDICARE

## 2022-08-02 DIAGNOSIS — Z79.01 LONG TERM (CURRENT) USE OF ANTICOAGULANTS: Primary | ICD-10-CM

## 2022-08-02 PROCEDURE — 93793 ANTICOAG MGMT PT WARFARIN: CPT | Mod: S$GLB,,,

## 2022-08-02 PROCEDURE — 93793 PR ANTICOAGULANT MGMT FOR PT TAKING WARFARIN: ICD-10-PCS | Mod: S$GLB,,,

## 2022-08-04 ENCOUNTER — LAB VISIT (OUTPATIENT)
Dept: LAB | Facility: HOSPITAL | Age: 67
End: 2022-08-04
Attending: RADIOLOGY
Payer: MEDICARE

## 2022-08-04 DIAGNOSIS — C61 CANCER OF PROSTATE WITH LOW RECURRENCE RISK (STAGE T1-2A AND GLEASON < 7 AND PSA < 10): ICD-10-CM

## 2022-08-04 LAB — COMPLEXED PSA SERPL-MCNC: 1.8 NG/ML (ref 0–4)

## 2022-08-04 PROCEDURE — 84153 ASSAY OF PSA TOTAL: CPT | Performed by: RADIOLOGY

## 2022-08-04 PROCEDURE — 36415 COLL VENOUS BLD VENIPUNCTURE: CPT | Performed by: RADIOLOGY

## 2022-08-08 ENCOUNTER — OFFICE VISIT (OUTPATIENT)
Dept: RADIATION ONCOLOGY | Facility: CLINIC | Age: 67
End: 2022-08-08
Attending: RADIOLOGY
Payer: MEDICARE

## 2022-08-08 VITALS
HEART RATE: 99 BPM | DIASTOLIC BLOOD PRESSURE: 81 MMHG | BODY MASS INDEX: 25.28 KG/M2 | RESPIRATION RATE: 16 BRPM | HEIGHT: 73 IN | SYSTOLIC BLOOD PRESSURE: 122 MMHG

## 2022-08-08 DIAGNOSIS — C61 CANCER OF PROSTATE WITH LOW RECURRENCE RISK (STAGE T1-2A AND GLEASON < 7 AND PSA < 10): Primary | ICD-10-CM

## 2022-08-08 PROCEDURE — 3066F PR DOCUMENTATION OF TREATMENT FOR NEPHROPATHY: ICD-10-PCS | Mod: CPTII,S$GLB,, | Performed by: RADIOLOGY

## 2022-08-08 PROCEDURE — 3074F SYST BP LT 130 MM HG: CPT | Mod: CPTII,S$GLB,, | Performed by: RADIOLOGY

## 2022-08-08 PROCEDURE — 3079F PR MOST RECENT DIASTOLIC BLOOD PRESSURE 80-89 MM HG: ICD-10-PCS | Mod: CPTII,S$GLB,, | Performed by: RADIOLOGY

## 2022-08-08 PROCEDURE — 1101F PR PT FALLS ASSESS DOC 0-1 FALLS W/OUT INJ PAST YR: ICD-10-PCS | Mod: CPTII,S$GLB,, | Performed by: RADIOLOGY

## 2022-08-08 PROCEDURE — 3079F DIAST BP 80-89 MM HG: CPT | Mod: CPTII,S$GLB,, | Performed by: RADIOLOGY

## 2022-08-08 PROCEDURE — 4010F PR ACE/ARB THEARPY RXD/TAKEN: ICD-10-PCS | Mod: CPTII,S$GLB,, | Performed by: RADIOLOGY

## 2022-08-08 PROCEDURE — 99499 RISK ADDL DX/OHS AUDIT: ICD-10-PCS | Mod: S$GLB,,, | Performed by: RADIOLOGY

## 2022-08-08 PROCEDURE — 3288F PR FALLS RISK ASSESSMENT DOCUMENTED: ICD-10-PCS | Mod: CPTII,S$GLB,, | Performed by: RADIOLOGY

## 2022-08-08 PROCEDURE — 3066F NEPHROPATHY DOC TX: CPT | Mod: CPTII,S$GLB,, | Performed by: RADIOLOGY

## 2022-08-08 PROCEDURE — 3061F PR NEG MICROALBUMINURIA RESULT DOCUMENTED/REVIEW: ICD-10-PCS | Mod: CPTII,S$GLB,, | Performed by: RADIOLOGY

## 2022-08-08 PROCEDURE — 3288F FALL RISK ASSESSMENT DOCD: CPT | Mod: CPTII,S$GLB,, | Performed by: RADIOLOGY

## 2022-08-08 PROCEDURE — 3074F PR MOST RECENT SYSTOLIC BLOOD PRESSURE < 130 MM HG: ICD-10-PCS | Mod: CPTII,S$GLB,, | Performed by: RADIOLOGY

## 2022-08-08 PROCEDURE — 3008F PR BODY MASS INDEX (BMI) DOCUMENTED: ICD-10-PCS | Mod: CPTII,S$GLB,, | Performed by: RADIOLOGY

## 2022-08-08 PROCEDURE — 99999 PR PBB SHADOW E&M-EST. PATIENT-LVL IV: CPT | Mod: PBBFAC,,, | Performed by: RADIOLOGY

## 2022-08-08 PROCEDURE — 4010F ACE/ARB THERAPY RXD/TAKEN: CPT | Mod: CPTII,S$GLB,, | Performed by: RADIOLOGY

## 2022-08-08 PROCEDURE — 1125F PR PAIN SEVERITY QUANTIFIED, PAIN PRESENT: ICD-10-PCS | Mod: CPTII,S$GLB,, | Performed by: RADIOLOGY

## 2022-08-08 PROCEDURE — 1159F PR MEDICATION LIST DOCUMENTED IN MEDICAL RECORD: ICD-10-PCS | Mod: CPTII,S$GLB,, | Performed by: RADIOLOGY

## 2022-08-08 PROCEDURE — 99499 UNLISTED E&M SERVICE: CPT | Mod: S$GLB,,, | Performed by: RADIOLOGY

## 2022-08-08 PROCEDURE — 3051F PR MOST RECENT HEMOGLOBIN A1C LEVEL 7.0 - < 8.0%: ICD-10-PCS | Mod: CPTII,S$GLB,, | Performed by: RADIOLOGY

## 2022-08-08 PROCEDURE — 1160F PR REVIEW ALL MEDS BY PRESCRIBER/CLIN PHARMACIST DOCUMENTED: ICD-10-PCS | Mod: CPTII,S$GLB,, | Performed by: RADIOLOGY

## 2022-08-08 PROCEDURE — 1160F RVW MEDS BY RX/DR IN RCRD: CPT | Mod: CPTII,S$GLB,, | Performed by: RADIOLOGY

## 2022-08-08 PROCEDURE — 99999 PR PBB SHADOW E&M-EST. PATIENT-LVL IV: ICD-10-PCS | Mod: PBBFAC,,, | Performed by: RADIOLOGY

## 2022-08-08 PROCEDURE — 3061F NEG MICROALBUMINURIA REV: CPT | Mod: CPTII,S$GLB,, | Performed by: RADIOLOGY

## 2022-08-08 PROCEDURE — 1101F PT FALLS ASSESS-DOCD LE1/YR: CPT | Mod: CPTII,S$GLB,, | Performed by: RADIOLOGY

## 2022-08-08 PROCEDURE — 3008F BODY MASS INDEX DOCD: CPT | Mod: CPTII,S$GLB,, | Performed by: RADIOLOGY

## 2022-08-08 PROCEDURE — 1159F MED LIST DOCD IN RCRD: CPT | Mod: CPTII,S$GLB,, | Performed by: RADIOLOGY

## 2022-08-08 PROCEDURE — 3051F HG A1C>EQUAL 7.0%<8.0%: CPT | Mod: CPTII,S$GLB,, | Performed by: RADIOLOGY

## 2022-08-08 PROCEDURE — 1125F AMNT PAIN NOTED PAIN PRSNT: CPT | Mod: CPTII,S$GLB,, | Performed by: RADIOLOGY
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
## 2022-08-23 ENCOUNTER — LAB VISIT (OUTPATIENT)
Dept: LAB | Facility: HOSPITAL | Age: 67
End: 2022-08-23
Attending: INTERNAL MEDICINE
Payer: MEDICARE

## 2022-08-23 DIAGNOSIS — Z79.01 LONG TERM (CURRENT) USE OF ANTICOAGULANTS: ICD-10-CM

## 2022-08-23 DIAGNOSIS — Z86.73 H/O: CVA (CEREBROVASCULAR ACCIDENT): ICD-10-CM

## 2022-08-23 LAB
INR PPP: 2.9 (ref 0.8–1.2)
PROTHROMBIN TIME: 28.4 SEC (ref 9–12.5)

## 2022-08-23 PROCEDURE — 36415 COLL VENOUS BLD VENIPUNCTURE: CPT | Performed by: INTERNAL MEDICINE

## 2022-08-23 PROCEDURE — 85610 PROTHROMBIN TIME: CPT | Performed by: INTERNAL MEDICINE

## 2022-08-24 ENCOUNTER — ANTI-COAG VISIT (OUTPATIENT)
Dept: CARDIOLOGY | Facility: CLINIC | Age: 67
End: 2022-08-24
Payer: MEDICARE

## 2022-08-24 DIAGNOSIS — Z79.01 LONG TERM (CURRENT) USE OF ANTICOAGULANTS: Primary | ICD-10-CM

## 2022-08-24 PROCEDURE — 93793 ANTICOAG MGMT PT WARFARIN: CPT | Mod: S$GLB,,,

## 2022-08-24 PROCEDURE — 93793 PR ANTICOAGULANT MGMT FOR PT TAKING WARFARIN: ICD-10-PCS | Mod: S$GLB,,,

## 2022-08-31 ENCOUNTER — TELEPHONE (OUTPATIENT)
Dept: OPHTHALMOLOGY | Facility: CLINIC | Age: 67
End: 2022-08-31
Payer: MEDICARE

## 2022-09-13 ENCOUNTER — ANTI-COAG VISIT (OUTPATIENT)
Dept: CARDIOLOGY | Facility: CLINIC | Age: 67
End: 2022-09-13
Payer: MEDICARE

## 2022-09-13 ENCOUNTER — LAB VISIT (OUTPATIENT)
Dept: LAB | Facility: HOSPITAL | Age: 67
End: 2022-09-13
Attending: INTERNAL MEDICINE
Payer: MEDICARE

## 2022-09-13 DIAGNOSIS — Z79.01 LONG TERM (CURRENT) USE OF ANTICOAGULANTS: ICD-10-CM

## 2022-09-13 DIAGNOSIS — Z79.01 LONG TERM (CURRENT) USE OF ANTICOAGULANTS: Primary | ICD-10-CM

## 2022-09-13 DIAGNOSIS — Z86.73 H/O: CVA (CEREBROVASCULAR ACCIDENT): ICD-10-CM

## 2022-09-13 LAB
INR PPP: 2.8 (ref 0.8–1.2)
PROTHROMBIN TIME: 27.8 SEC (ref 9–12.5)

## 2022-09-13 PROCEDURE — 93793 ANTICOAG MGMT PT WARFARIN: CPT | Mod: S$GLB,,,

## 2022-09-13 PROCEDURE — 36415 COLL VENOUS BLD VENIPUNCTURE: CPT | Performed by: INTERNAL MEDICINE

## 2022-09-13 PROCEDURE — 85610 PROTHROMBIN TIME: CPT | Performed by: INTERNAL MEDICINE

## 2022-09-13 PROCEDURE — 93793 PR ANTICOAGULANT MGMT FOR PT TAKING WARFARIN: ICD-10-PCS | Mod: S$GLB,,,

## 2022-10-04 ENCOUNTER — LAB VISIT (OUTPATIENT)
Dept: LAB | Facility: HOSPITAL | Age: 67
End: 2022-10-04
Attending: INTERNAL MEDICINE
Payer: MEDICARE

## 2022-10-04 ENCOUNTER — ANTI-COAG VISIT (OUTPATIENT)
Dept: CARDIOLOGY | Facility: CLINIC | Age: 67
End: 2022-10-04
Payer: MEDICARE

## 2022-10-04 DIAGNOSIS — Z79.01 LONG TERM (CURRENT) USE OF ANTICOAGULANTS: ICD-10-CM

## 2022-10-04 DIAGNOSIS — Z79.01 LONG TERM (CURRENT) USE OF ANTICOAGULANTS: Primary | ICD-10-CM

## 2022-10-04 DIAGNOSIS — I15.2 HYPERTENSION ASSOCIATED WITH DIABETES: ICD-10-CM

## 2022-10-04 DIAGNOSIS — E11.59 HYPERTENSION ASSOCIATED WITH DIABETES: ICD-10-CM

## 2022-10-04 DIAGNOSIS — Z86.73 H/O: CVA (CEREBROVASCULAR ACCIDENT): ICD-10-CM

## 2022-10-04 LAB
INR PPP: 2.6 (ref 0.8–1.2)
PROTHROMBIN TIME: 26 SEC (ref 9–12.5)

## 2022-10-04 PROCEDURE — 93793 PR ANTICOAGULANT MGMT FOR PT TAKING WARFARIN: ICD-10-PCS | Mod: S$GLB,,,

## 2022-10-04 PROCEDURE — 85610 PROTHROMBIN TIME: CPT | Performed by: INTERNAL MEDICINE

## 2022-10-04 PROCEDURE — 93793 ANTICOAG MGMT PT WARFARIN: CPT | Mod: S$GLB,,,

## 2022-10-04 PROCEDURE — 36415 COLL VENOUS BLD VENIPUNCTURE: CPT | Performed by: INTERNAL MEDICINE

## 2022-10-04 RX ORDER — CLONIDINE HYDROCHLORIDE 0.1 MG/1
0.1 TABLET ORAL 3 TIMES DAILY
Qty: 270 TABLET | Refills: 3 | Status: SHIPPED | OUTPATIENT
Start: 2022-10-04 | End: 2023-10-29 | Stop reason: SDUPTHER

## 2022-10-04 NOTE — PROGRESS NOTES
INR at goal. Medications and chart reviewed. No changes noted to necessitate adjustment of warfarin or follow-up plan. See calendar.

## 2022-10-04 NOTE — TELEPHONE ENCOUNTER
----- Message from Noa Clemens sent at 10/4/2022 12:43 PM CDT -----  Contact: [phone] Diane  Requesting an RX refill or new RX.  Is this a refill or new RX: new  RX name and strength (copy/paste from chart):  cloNIDine (CATAPRES) 0.1 MG tablet  Is this a 30 day or 90 day RX:   Pharmacy name and phone # (copy/paste from chart):    [id number] - [place], [place] - [address] AT [facility] [address]  [address]  [facility] [address]  Phone: [phone] Fax: [phone]

## 2022-11-01 ENCOUNTER — IMMUNIZATION (OUTPATIENT)
Dept: INTERNAL MEDICINE | Facility: CLINIC | Age: 67
End: 2022-11-01
Payer: MEDICARE

## 2022-11-01 ENCOUNTER — PATIENT MESSAGE (OUTPATIENT)
Dept: CARDIOLOGY | Facility: CLINIC | Age: 67
End: 2022-11-01

## 2022-11-01 ENCOUNTER — ANTI-COAG VISIT (OUTPATIENT)
Dept: CARDIOLOGY | Facility: CLINIC | Age: 67
End: 2022-11-01
Payer: MEDICARE

## 2022-11-01 ENCOUNTER — LAB VISIT (OUTPATIENT)
Dept: LAB | Facility: HOSPITAL | Age: 67
End: 2022-11-01
Attending: INTERNAL MEDICINE
Payer: MEDICARE

## 2022-11-01 DIAGNOSIS — Z23 NEED FOR VACCINATION: Primary | ICD-10-CM

## 2022-11-01 DIAGNOSIS — Z79.01 LONG TERM (CURRENT) USE OF ANTICOAGULANTS: Primary | ICD-10-CM

## 2022-11-01 DIAGNOSIS — Z79.01 LONG TERM (CURRENT) USE OF ANTICOAGULANTS: ICD-10-CM

## 2022-11-01 DIAGNOSIS — Z86.73 H/O: CVA (CEREBROVASCULAR ACCIDENT): ICD-10-CM

## 2022-11-01 LAB
INR PPP: 2.6 (ref 0.8–1.2)
PROTHROMBIN TIME: 25.5 SEC (ref 9–12.5)

## 2022-11-01 PROCEDURE — G0008 FLU VACCINE - QUADRIVALENT - ADJUVANTED: ICD-10-PCS | Mod: S$GLB,,, | Performed by: INTERNAL MEDICINE

## 2022-11-01 PROCEDURE — 93793 PR ANTICOAGULANT MGMT FOR PT TAKING WARFARIN: ICD-10-PCS | Mod: S$GLB,,,

## 2022-11-01 PROCEDURE — 93793 ANTICOAG MGMT PT WARFARIN: CPT | Mod: S$GLB,,,

## 2022-11-01 PROCEDURE — 90694 VACC AIIV4 NO PRSRV 0.5ML IM: CPT | Mod: S$GLB,,, | Performed by: INTERNAL MEDICINE

## 2022-11-01 PROCEDURE — 85610 PROTHROMBIN TIME: CPT | Performed by: INTERNAL MEDICINE

## 2022-11-01 PROCEDURE — 36415 COLL VENOUS BLD VENIPUNCTURE: CPT | Performed by: INTERNAL MEDICINE

## 2022-11-01 PROCEDURE — 91312 COVID-19, MRNA, LNP-S, BIVALENT BOOSTER, PF, 30 MCG/0.3 ML DOSE: CPT | Mod: S$GLB,,, | Performed by: INTERNAL MEDICINE

## 2022-11-01 PROCEDURE — 0124A COVID-19, MRNA, LNP-S, BIVALENT BOOSTER, PF, 30 MCG/0.3 ML DOSE: CPT | Mod: CV19,PBBFAC | Performed by: INTERNAL MEDICINE

## 2022-11-01 PROCEDURE — 91312 COVID-19, MRNA, LNP-S, BIVALENT BOOSTER, PF, 30 MCG/0.3 ML DOSE: ICD-10-PCS | Mod: S$GLB,,, | Performed by: INTERNAL MEDICINE

## 2022-11-01 PROCEDURE — G0008 ADMIN INFLUENZA VIRUS VAC: HCPCS | Mod: S$GLB,,, | Performed by: INTERNAL MEDICINE

## 2022-11-01 PROCEDURE — 90694 FLU VACCINE - QUADRIVALENT - ADJUVANTED: ICD-10-PCS | Mod: S$GLB,,, | Performed by: INTERNAL MEDICINE

## 2022-11-29 ENCOUNTER — ANTI-COAG VISIT (OUTPATIENT)
Dept: CARDIOLOGY | Facility: CLINIC | Age: 67
End: 2022-11-29
Payer: MEDICARE

## 2022-11-29 ENCOUNTER — LAB VISIT (OUTPATIENT)
Dept: LAB | Facility: HOSPITAL | Age: 67
End: 2022-11-29
Attending: INTERNAL MEDICINE
Payer: MEDICARE

## 2022-11-29 ENCOUNTER — PATIENT MESSAGE (OUTPATIENT)
Dept: CARDIOLOGY | Facility: CLINIC | Age: 67
End: 2022-11-29

## 2022-11-29 DIAGNOSIS — Z79.01 LONG TERM (CURRENT) USE OF ANTICOAGULANTS: Primary | ICD-10-CM

## 2022-11-29 DIAGNOSIS — Z86.73 H/O: CVA (CEREBROVASCULAR ACCIDENT): ICD-10-CM

## 2022-11-29 DIAGNOSIS — Z79.01 LONG TERM (CURRENT) USE OF ANTICOAGULANTS: ICD-10-CM

## 2022-11-29 LAB
INR PPP: 2.3 (ref 0.8–1.2)
PROTHROMBIN TIME: 22.7 SEC (ref 9–12.5)

## 2022-11-29 PROCEDURE — 93793 ANTICOAG MGMT PT WARFARIN: CPT | Mod: S$GLB,,,

## 2022-11-29 PROCEDURE — 85610 PROTHROMBIN TIME: CPT | Performed by: INTERNAL MEDICINE

## 2022-11-29 PROCEDURE — 93793 PR ANTICOAGULANT MGMT FOR PT TAKING WARFARIN: ICD-10-PCS | Mod: S$GLB,,,

## 2022-11-29 PROCEDURE — 36415 COLL VENOUS BLD VENIPUNCTURE: CPT | Performed by: INTERNAL MEDICINE

## 2022-12-13 ENCOUNTER — LAB VISIT (OUTPATIENT)
Dept: LAB | Facility: HOSPITAL | Age: 67
End: 2022-12-13
Payer: MEDICARE

## 2022-12-13 DIAGNOSIS — Z79.4 TYPE 2 DIABETES MELLITUS WITH BOTH EYES AFFECTED BY PROLIFERATIVE RETINOPATHY WITHOUT MACULAR EDEMA, WITH LONG-TERM CURRENT USE OF INSULIN: ICD-10-CM

## 2022-12-13 DIAGNOSIS — E11.3593 TYPE 2 DIABETES MELLITUS WITH BOTH EYES AFFECTED BY PROLIFERATIVE RETINOPATHY WITHOUT MACULAR EDEMA, WITH LONG-TERM CURRENT USE OF INSULIN: ICD-10-CM

## 2022-12-13 LAB
ESTIMATED AVG GLUCOSE: 169 MG/DL (ref 68–131)
HBA1C MFR BLD: 7.5 % (ref 4–5.6)

## 2022-12-13 PROCEDURE — 83036 HEMOGLOBIN GLYCOSYLATED A1C: CPT | Performed by: NURSE PRACTITIONER

## 2022-12-13 PROCEDURE — 36415 COLL VENOUS BLD VENIPUNCTURE: CPT | Performed by: NURSE PRACTITIONER

## 2022-12-27 ENCOUNTER — LAB VISIT (OUTPATIENT)
Dept: LAB | Facility: HOSPITAL | Age: 67
End: 2022-12-27
Attending: INTERNAL MEDICINE
Payer: MEDICARE

## 2022-12-27 DIAGNOSIS — Z79.01 LONG TERM (CURRENT) USE OF ANTICOAGULANTS: ICD-10-CM

## 2022-12-27 DIAGNOSIS — Z86.73 H/O: CVA (CEREBROVASCULAR ACCIDENT): ICD-10-CM

## 2022-12-27 LAB
INR PPP: 3.3 (ref 0.8–1.2)
PROTHROMBIN TIME: 31.9 SEC (ref 9–12.5)

## 2022-12-27 PROCEDURE — 85610 PROTHROMBIN TIME: CPT | Performed by: INTERNAL MEDICINE

## 2022-12-27 PROCEDURE — 36415 COLL VENOUS BLD VENIPUNCTURE: CPT | Performed by: INTERNAL MEDICINE

## 2022-12-28 ENCOUNTER — ANTI-COAG VISIT (OUTPATIENT)
Dept: CARDIOLOGY | Facility: CLINIC | Age: 67
End: 2022-12-28
Payer: MEDICARE

## 2022-12-28 DIAGNOSIS — Z79.01 LONG TERM (CURRENT) USE OF ANTICOAGULANTS: Primary | ICD-10-CM

## 2022-12-28 PROCEDURE — 93793 PR ANTICOAGULANT MGMT FOR PT TAKING WARFARIN: ICD-10-PCS | Mod: S$GLB,,,

## 2022-12-28 PROCEDURE — 93793 ANTICOAG MGMT PT WARFARIN: CPT | Mod: S$GLB,,,

## 2023-01-02 ENCOUNTER — PATIENT MESSAGE (OUTPATIENT)
Dept: INTERNAL MEDICINE | Facility: CLINIC | Age: 68
End: 2023-01-02
Payer: MEDICARE

## 2023-01-10 ENCOUNTER — LAB VISIT (OUTPATIENT)
Dept: LAB | Facility: HOSPITAL | Age: 68
End: 2023-01-10
Attending: INTERNAL MEDICINE
Payer: MEDICARE

## 2023-01-10 DIAGNOSIS — Z86.73 H/O: CVA (CEREBROVASCULAR ACCIDENT): ICD-10-CM

## 2023-01-10 DIAGNOSIS — Z79.01 LONG TERM (CURRENT) USE OF ANTICOAGULANTS: ICD-10-CM

## 2023-01-10 LAB
INR PPP: 2.7 (ref 0.8–1.2)
PROTHROMBIN TIME: 26.7 SEC (ref 9–12.5)

## 2023-01-10 PROCEDURE — 36415 COLL VENOUS BLD VENIPUNCTURE: CPT | Performed by: INTERNAL MEDICINE

## 2023-01-10 PROCEDURE — 85610 PROTHROMBIN TIME: CPT | Performed by: INTERNAL MEDICINE

## 2023-01-11 ENCOUNTER — OFFICE VISIT (OUTPATIENT)
Dept: PODIATRY | Facility: CLINIC | Age: 68
End: 2023-01-11
Payer: MEDICARE

## 2023-01-11 ENCOUNTER — ANTI-COAG VISIT (OUTPATIENT)
Dept: CARDIOLOGY | Facility: CLINIC | Age: 68
End: 2023-01-11
Payer: MEDICARE

## 2023-01-11 VITALS
DIASTOLIC BLOOD PRESSURE: 81 MMHG | WEIGHT: 191 LBS | HEIGHT: 73 IN | SYSTOLIC BLOOD PRESSURE: 121 MMHG | HEART RATE: 99 BPM | BODY MASS INDEX: 25.31 KG/M2

## 2023-01-11 DIAGNOSIS — E11.42 DIABETIC POLYNEUROPATHY ASSOCIATED WITH TYPE 2 DIABETES MELLITUS: Primary | ICD-10-CM

## 2023-01-11 DIAGNOSIS — L84 CORN OR CALLUS: ICD-10-CM

## 2023-01-11 DIAGNOSIS — B35.1 ONYCHOMYCOSIS DUE TO DERMATOPHYTE: ICD-10-CM

## 2023-01-11 DIAGNOSIS — Z79.01 LONG TERM (CURRENT) USE OF ANTICOAGULANTS: Primary | ICD-10-CM

## 2023-01-11 PROCEDURE — 11721 PR DEBRIDEMENT OF NAILS, 6 OR MORE: ICD-10-PCS | Mod: 59,Q9,S$GLB, | Performed by: PODIATRIST

## 2023-01-11 PROCEDURE — 11056 PARNG/CUTG B9 HYPRKR LES 2-4: CPT | Mod: Q9,S$GLB,, | Performed by: PODIATRIST

## 2023-01-11 PROCEDURE — 3008F BODY MASS INDEX DOCD: CPT | Mod: CPTII,S$GLB,, | Performed by: PODIATRIST

## 2023-01-11 PROCEDURE — 3079F PR MOST RECENT DIASTOLIC BLOOD PRESSURE 80-89 MM HG: ICD-10-PCS | Mod: CPTII,S$GLB,, | Performed by: PODIATRIST

## 2023-01-11 PROCEDURE — 93793 ANTICOAG MGMT PT WARFARIN: CPT | Mod: S$GLB,,,

## 2023-01-11 PROCEDURE — 3074F PR MOST RECENT SYSTOLIC BLOOD PRESSURE < 130 MM HG: ICD-10-PCS | Mod: CPTII,S$GLB,, | Performed by: PODIATRIST

## 2023-01-11 PROCEDURE — 11056 PR TRIM BENIGN HYPERKERATOTIC SKIN LESION,2-4: ICD-10-PCS | Mod: Q9,S$GLB,, | Performed by: PODIATRIST

## 2023-01-11 PROCEDURE — 1126F PR PAIN SEVERITY QUANTIFIED, NO PAIN PRESENT: ICD-10-PCS | Mod: CPTII,S$GLB,, | Performed by: PODIATRIST

## 2023-01-11 PROCEDURE — 3074F SYST BP LT 130 MM HG: CPT | Mod: CPTII,S$GLB,, | Performed by: PODIATRIST

## 2023-01-11 PROCEDURE — 4010F ACE/ARB THERAPY RXD/TAKEN: CPT | Mod: CPTII,S$GLB,, | Performed by: PODIATRIST

## 2023-01-11 PROCEDURE — 99999 PR PBB SHADOW E&M-EST. PATIENT-LVL IV: ICD-10-PCS | Mod: PBBFAC,,, | Performed by: PODIATRIST

## 2023-01-11 PROCEDURE — 1126F AMNT PAIN NOTED NONE PRSNT: CPT | Mod: CPTII,S$GLB,, | Performed by: PODIATRIST

## 2023-01-11 PROCEDURE — 4010F PR ACE/ARB THEARPY RXD/TAKEN: ICD-10-PCS | Mod: CPTII,S$GLB,, | Performed by: PODIATRIST

## 2023-01-11 PROCEDURE — 11721 DEBRIDE NAIL 6 OR MORE: CPT | Mod: 59,Q9,S$GLB, | Performed by: PODIATRIST

## 2023-01-11 PROCEDURE — 99499 UNLISTED E&M SERVICE: CPT | Mod: S$GLB,,, | Performed by: PODIATRIST

## 2023-01-11 PROCEDURE — 3008F PR BODY MASS INDEX (BMI) DOCUMENTED: ICD-10-PCS | Mod: CPTII,S$GLB,, | Performed by: PODIATRIST

## 2023-01-11 PROCEDURE — 99499 NO LOS: ICD-10-PCS | Mod: S$GLB,,, | Performed by: PODIATRIST

## 2023-01-11 PROCEDURE — 93793 PR ANTICOAGULANT MGMT FOR PT TAKING WARFARIN: ICD-10-PCS | Mod: S$GLB,,,

## 2023-01-11 PROCEDURE — 3079F DIAST BP 80-89 MM HG: CPT | Mod: CPTII,S$GLB,, | Performed by: PODIATRIST

## 2023-01-11 PROCEDURE — 99999 PR PBB SHADOW E&M-EST. PATIENT-LVL IV: CPT | Mod: PBBFAC,,, | Performed by: PODIATRIST

## 2023-02-01 ENCOUNTER — LAB VISIT (OUTPATIENT)
Dept: LAB | Facility: HOSPITAL | Age: 68
End: 2023-02-01
Attending: INTERNAL MEDICINE
Payer: MEDICARE

## 2023-02-01 ENCOUNTER — ANTI-COAG VISIT (OUTPATIENT)
Dept: CARDIOLOGY | Facility: CLINIC | Age: 68
End: 2023-02-01
Payer: MEDICARE

## 2023-02-01 DIAGNOSIS — Z79.01 LONG TERM (CURRENT) USE OF ANTICOAGULANTS: ICD-10-CM

## 2023-02-01 DIAGNOSIS — Z86.73 H/O: CVA (CEREBROVASCULAR ACCIDENT): ICD-10-CM

## 2023-02-01 DIAGNOSIS — Z79.01 LONG TERM (CURRENT) USE OF ANTICOAGULANTS: Primary | ICD-10-CM

## 2023-02-01 LAB
INR PPP: 1.7 (ref 0.8–1.2)
PROTHROMBIN TIME: 17.4 SEC (ref 9–12.5)

## 2023-02-01 PROCEDURE — 93793 PR ANTICOAGULANT MGMT FOR PT TAKING WARFARIN: ICD-10-PCS | Mod: S$GLB,,,

## 2023-02-01 PROCEDURE — 36415 COLL VENOUS BLD VENIPUNCTURE: CPT | Performed by: INTERNAL MEDICINE

## 2023-02-01 PROCEDURE — 93793 ANTICOAG MGMT PT WARFARIN: CPT | Mod: S$GLB,,,

## 2023-02-01 PROCEDURE — 85610 PROTHROMBIN TIME: CPT | Performed by: INTERNAL MEDICINE

## 2023-02-15 ENCOUNTER — LAB VISIT (OUTPATIENT)
Dept: LAB | Facility: HOSPITAL | Age: 68
End: 2023-02-15
Attending: INTERNAL MEDICINE
Payer: MEDICARE

## 2023-02-15 ENCOUNTER — ANTI-COAG VISIT (OUTPATIENT)
Dept: CARDIOLOGY | Facility: CLINIC | Age: 68
End: 2023-02-15
Payer: MEDICARE

## 2023-02-15 DIAGNOSIS — Z79.01 LONG TERM (CURRENT) USE OF ANTICOAGULANTS: Primary | ICD-10-CM

## 2023-02-15 DIAGNOSIS — Z86.73 H/O: CVA (CEREBROVASCULAR ACCIDENT): ICD-10-CM

## 2023-02-15 DIAGNOSIS — Z79.01 LONG TERM (CURRENT) USE OF ANTICOAGULANTS: ICD-10-CM

## 2023-02-15 LAB
INR PPP: 1.5 (ref 0.8–1.2)
PROTHROMBIN TIME: 15.5 SEC (ref 9–12.5)

## 2023-02-15 PROCEDURE — 85610 PROTHROMBIN TIME: CPT | Performed by: INTERNAL MEDICINE

## 2023-02-15 PROCEDURE — 36415 COLL VENOUS BLD VENIPUNCTURE: CPT | Performed by: INTERNAL MEDICINE

## 2023-02-15 PROCEDURE — 93793 PR ANTICOAGULANT MGMT FOR PT TAKING WARFARIN: ICD-10-PCS | Mod: S$GLB,,,

## 2023-02-15 PROCEDURE — 93793 ANTICOAG MGMT PT WARFARIN: CPT | Mod: S$GLB,,,

## 2023-02-20 NOTE — PROGRESS NOTES
Subjective:      Patient ID: Kamlesh Diane is a 67 y.o. male.    Chief Complaint: Diabetes Mellitus (Debora Ontiveros MD-08/24/22/) and Nail Care (Polyneuropathy/)    Kamlesh is a 67 y.o. male who presents to the clinic for evaluation and treatment of high risk feet. Kamlesh has a past medical history of CVA (cerebral vascular accident), Diabetes, Diabetic retinopathy, Elevated PSA, Hypertension, Prostate cancer, and Stroke. The patient's chief complaint is long, thick toenails. This patient has documented high risk feet requiring routine maintenance secondary to peripheral neuropathy.    PCP: Debora Ontiveros MD    Date Last Seen by PCP:   Chief Complaint   Patient presents with    Diabetes Mellitus     Debora Ontiveros MD-08/24/22      Nail Care     Polyneuropathy         Current shoe gear:  Affected Foot: Slip-on shoes     Unaffected Foot: Slip-on shoes    Hemoglobin A1C   Date Value Ref Range Status   12/13/2022 7.5 (H) 4.0 - 5.6 % Final     Comment:     ADA Screening Guidelines:  5.7-6.4%  Consistent with prediabetes  >or=6.5%  Consistent with diabetes    High levels of fetal hemoglobin interfere with the HbA1C  assay. Heterozygous hemoglobin variants (HbS, HgC, etc)do  not significantly interfere with this assay.   However, presence of multiple variants may affect accuracy.     02/09/2022 7.3 (H) 4.0 - 5.6 % Final     Comment:     ADA Screening Guidelines:  5.7-6.4%  Consistent with prediabetes  >or=6.5%  Consistent with diabetes    High levels of fetal hemoglobin interfere with the HbA1C  assay. Heterozygous hemoglobin variants (HbS, HgC, etc)do  not significantly interfere with this assay.   However, presence of multiple variants may affect accuracy.     01/14/2022 7.2 (H) 4.0 - 5.6 % Final     Comment:     ADA Screening Guidelines:  5.7-6.4%  Consistent with prediabetes  >or=6.5%  Consistent with diabetes    High levels of fetal hemoglobin interfere with the HbA1C  assay. Heterozygous hemoglobin variants (HbS, HgC, etc)do  not  significantly interfere with this assay.   However, presence of multiple variants may affect accuracy.         Review of Systems   Constitutional: Negative for chills, fever and malaise/fatigue.   HENT:  Negative for hearing loss.    Cardiovascular:  Positive for leg swelling. Negative for claudication.   Respiratory:  Negative for shortness of breath.    Skin:  Positive for color change, dry skin, nail changes and unusual hair distribution. Negative for flushing and rash.   Musculoskeletal:  Negative for joint pain and myalgias.   Gastrointestinal:  Negative for nausea and vomiting.   Neurological:  Positive for numbness, paresthesias and sensory change. Negative for loss of balance.   Psychiatric/Behavioral:  Negative for altered mental status.          Objective:      Physical Exam  Vitals reviewed.   Constitutional:       Appearance: He is well-developed.   Cardiovascular:      Pulses:           Dorsalis pedis pulses are 0 on the right side and 0 on the left side.        Posterior tibial pulses are 1+ on the right side and 1+ on the left side.   Musculoskeletal:      Right ankle: Decreased range of motion. Abnormal pulse.      Right Achilles Tendon: Ramirez's test negative.      Left ankle: Decreased range of motion. Abnormal pulse.      Left Achilles Tendon: Ramirez's test negative.      Right foot: Decreased range of motion.      Left foot: Decreased range of motion.   Feet:      Right foot:      Protective Sensation: 5 sites tested.  2 sites sensed.      Left foot:      Protective Sensation: 5 sites tested.  2 sites sensed.   Skin:     General: Skin is dry.      Capillary Refill: Capillary refill takes more than 3 seconds.      Findings: No wound.      Comments: HKL's noted to b/L HIPJs       Neurological:      Mental Status: He is alert.      Comments: diminished sensation noted to b/L lower extremities   Psychiatric:         Behavior: Behavior normal. Behavior is cooperative.       Nails x10 are elongated  by  8-9mm's curling underneath toes, thickened by 3-4 mm's, dystrophic, and are dark in  coloration . Xerosis Bilaterally.   Left hallux nail bed ulcerated         Assessment:       Encounter Diagnoses   Name Primary?    Diabetic polyneuropathy associated with type 2 diabetes mellitus Yes    Onychomycosis due to dermatophyte          Plan:       Kamlesh was seen today for diabetes mellitus and nail care.    Diagnoses and all orders for this visit:    Diabetic polyneuropathy associated with type 2 diabetes mellitus    Onychomycosis due to dermatophyte    I counseled the patient on his conditions, their implications and medical management.  Betadine applied to hallux nail bed followed by DSD  Care instructions given to pt  RTC in 1 week    - Shoe inspection. Diabetic Foot Education. Patient reminded of the importance of good nutrition and blood sugar control to help prevent podiatric complications of diabetes. Patient instructed on proper foot hygeine. We discussed wearing proper shoe gear, daily foot inspections, never walking without protective shoe gear, never putting sharp instruments to feet, routine podiatric nail visits every 2-3 months.    After cleansing with an alcohol prep pad, the about mentioned hyperkeratotic lesions were sharply debrided X 2 utilizing a #15 blade to a smooth base without incident. Pt tolerated the procedure well and reported comfort to the debarment sites. Pt will continue to use padding and moisture the callused areas.     - With patient's permission, nails were aggressively reduced and debrided x 10 to their soft tissue attachment mechanically and with electric , removing all offending nail and debris. Patient relates relief following the procedure. He will continue to monitor the areas daily, inspect his feet, wear protective shoe gear when ambulatory, moisturizer to maintain skin integrity and follow in this office in approximately 2-3 months, sooner p.r.n.

## 2023-02-22 ENCOUNTER — ANTI-COAG VISIT (OUTPATIENT)
Dept: CARDIOLOGY | Facility: CLINIC | Age: 68
End: 2023-02-22
Payer: MEDICARE

## 2023-02-22 ENCOUNTER — LAB VISIT (OUTPATIENT)
Dept: LAB | Facility: HOSPITAL | Age: 68
End: 2023-02-22
Attending: INTERNAL MEDICINE
Payer: MEDICARE

## 2023-02-22 ENCOUNTER — TELEPHONE (OUTPATIENT)
Dept: PRIMARY CARE CLINIC | Facility: CLINIC | Age: 68
End: 2023-02-22
Payer: MEDICARE

## 2023-02-22 DIAGNOSIS — Z79.01 LONG TERM (CURRENT) USE OF ANTICOAGULANTS: ICD-10-CM

## 2023-02-22 DIAGNOSIS — Z79.01 LONG TERM (CURRENT) USE OF ANTICOAGULANTS: Primary | ICD-10-CM

## 2023-02-22 DIAGNOSIS — Z86.73 H/O: CVA (CEREBROVASCULAR ACCIDENT): ICD-10-CM

## 2023-02-22 LAB
INR PPP: 2.4 (ref 0.8–1.2)
PROTHROMBIN TIME: 23.7 SEC (ref 9–12.5)

## 2023-02-22 PROCEDURE — 36415 COLL VENOUS BLD VENIPUNCTURE: CPT | Performed by: INTERNAL MEDICINE

## 2023-02-22 PROCEDURE — 85610 PROTHROMBIN TIME: CPT | Performed by: INTERNAL MEDICINE

## 2023-02-22 PROCEDURE — 93793 PR ANTICOAGULANT MGMT FOR PT TAKING WARFARIN: ICD-10-PCS | Mod: S$GLB,,,

## 2023-02-22 PROCEDURE — 93793 ANTICOAG MGMT PT WARFARIN: CPT | Mod: S$GLB,,,

## 2023-02-22 NOTE — TELEPHONE ENCOUNTER
----- Message from Ondina Mcgowan MD sent at 2/22/2023  2:29 PM CST -----  Unsure how inrs are handled here but I assumed the coumadin clinic his informed him that his INR is in range. Continue current dose.

## 2023-02-22 NOTE — PROGRESS NOTES
Unsure how inrs are handled here but I assumed the coumadin clinic his informed him that his INR is in range. Continue current dose.

## 2023-03-01 ENCOUNTER — TELEPHONE (OUTPATIENT)
Dept: PRIMARY CARE CLINIC | Facility: CLINIC | Age: 68
End: 2023-03-01
Payer: MEDICARE

## 2023-03-01 NOTE — TELEPHONE ENCOUNTER
Pt's sister reports sugars has been off. Possibly accidentally taking humalog instead of levemir at night (Olinda will call to let us know the actual insulin). Restarted on levemir and reports his sugars were in the 80s yesterday. Felt like his sugars are kind of all over the place. Given her glucose log and she will monitor his sugars at least twice a day. Please review how much levemir and how much humalog they're taking.     Please call in a week to check on his numbers - if they can take a pic and upload log so I can review, that'd be preferable.

## 2023-03-08 ENCOUNTER — LAB VISIT (OUTPATIENT)
Dept: LAB | Facility: HOSPITAL | Age: 68
End: 2023-03-08
Attending: INTERNAL MEDICINE
Payer: MEDICARE

## 2023-03-08 ENCOUNTER — ANTI-COAG VISIT (OUTPATIENT)
Dept: CARDIOLOGY | Facility: CLINIC | Age: 68
End: 2023-03-08
Payer: MEDICARE

## 2023-03-08 ENCOUNTER — TELEPHONE (OUTPATIENT)
Dept: INTERNAL MEDICINE | Facility: CLINIC | Age: 68
End: 2023-03-08
Payer: MEDICARE

## 2023-03-08 DIAGNOSIS — Z86.73 H/O: CVA (CEREBROVASCULAR ACCIDENT): ICD-10-CM

## 2023-03-08 DIAGNOSIS — I15.2 HYPERTENSION ASSOCIATED WITH DIABETES: ICD-10-CM

## 2023-03-08 DIAGNOSIS — E11.59 HYPERTENSION ASSOCIATED WITH DIABETES: ICD-10-CM

## 2023-03-08 DIAGNOSIS — Z79.01 LONG TERM (CURRENT) USE OF ANTICOAGULANTS: Primary | ICD-10-CM

## 2023-03-08 DIAGNOSIS — Z79.01 LONG TERM (CURRENT) USE OF ANTICOAGULANTS: ICD-10-CM

## 2023-03-08 LAB
INR PPP: 2.8 (ref 0.8–1.2)
PROTHROMBIN TIME: 27.7 SEC (ref 9–12.5)

## 2023-03-08 PROCEDURE — 93793 PR ANTICOAGULANT MGMT FOR PT TAKING WARFARIN: ICD-10-PCS | Mod: S$GLB,,,

## 2023-03-08 PROCEDURE — 85610 PROTHROMBIN TIME: CPT | Performed by: INTERNAL MEDICINE

## 2023-03-08 PROCEDURE — 36415 COLL VENOUS BLD VENIPUNCTURE: CPT | Performed by: INTERNAL MEDICINE

## 2023-03-08 PROCEDURE — 93793 ANTICOAG MGMT PT WARFARIN: CPT | Mod: S$GLB,,,

## 2023-03-08 RX ORDER — AMLODIPINE BESYLATE 10 MG/1
TABLET ORAL
Qty: 90 TABLET | Refills: 0 | Status: SHIPPED | OUTPATIENT
Start: 2023-03-08 | End: 2023-07-05 | Stop reason: SDUPTHER

## 2023-03-09 ENCOUNTER — TELEPHONE (OUTPATIENT)
Dept: UROLOGY | Facility: CLINIC | Age: 68
End: 2023-03-09
Payer: MEDICARE

## 2023-03-10 ENCOUNTER — TELEPHONE (OUTPATIENT)
Dept: PRIMARY CARE CLINIC | Facility: CLINIC | Age: 68
End: 2023-03-10
Payer: MEDICARE

## 2023-03-10 NOTE — TELEPHONE ENCOUNTER
Spoke with Olinda, Blood sugars are as follows for this week.  She did not have all of them. States pt will sometimes get up at lunchtime, so does his BG testing then.     Appt made for 3/23 at 1:40 pm. Advised to bring BG logs and meds with pt. Appt and BG logs sent out today      3/3 110 - Breakfast          177  Dinner          230 Bedtime    3/4 102 - Breakfast     3/5  115 Breakfast         141 Dinner         167 Bedtime    3/6   107 Lunchtime          206 Bedtime      3/7   105 Breakfast          106 Lunch     3/8   79 Breakfast           104 Lunch     3/9    105 Supper     3/10 ( Today )  85 - breakfast

## 2023-03-22 ENCOUNTER — ANTI-COAG VISIT (OUTPATIENT)
Dept: CARDIOLOGY | Facility: CLINIC | Age: 68
End: 2023-03-22
Payer: MEDICARE

## 2023-03-22 ENCOUNTER — LAB VISIT (OUTPATIENT)
Dept: LAB | Facility: HOSPITAL | Age: 68
End: 2023-03-22
Attending: INTERNAL MEDICINE
Payer: MEDICARE

## 2023-03-22 DIAGNOSIS — Z79.01 LONG TERM (CURRENT) USE OF ANTICOAGULANTS: Primary | ICD-10-CM

## 2023-03-22 DIAGNOSIS — Z86.73 H/O: CVA (CEREBROVASCULAR ACCIDENT): ICD-10-CM

## 2023-03-22 DIAGNOSIS — Z79.01 LONG TERM (CURRENT) USE OF ANTICOAGULANTS: ICD-10-CM

## 2023-03-22 LAB
INR PPP: 2.5 (ref 0.8–1.2)
PROTHROMBIN TIME: 25.2 SEC (ref 9–12.5)

## 2023-03-22 PROCEDURE — 85610 PROTHROMBIN TIME: CPT | Performed by: INTERNAL MEDICINE

## 2023-03-22 PROCEDURE — 93793 ANTICOAG MGMT PT WARFARIN: CPT | Mod: S$GLB,,,

## 2023-03-22 PROCEDURE — 36415 COLL VENOUS BLD VENIPUNCTURE: CPT | Performed by: INTERNAL MEDICINE

## 2023-03-22 PROCEDURE — 93793 PR ANTICOAGULANT MGMT FOR PT TAKING WARFARIN: ICD-10-PCS | Mod: S$GLB,,,

## 2023-03-23 ENCOUNTER — OFFICE VISIT (OUTPATIENT)
Dept: PRIMARY CARE CLINIC | Facility: CLINIC | Age: 68
End: 2023-03-23
Payer: MEDICARE

## 2023-03-23 VITALS
BODY MASS INDEX: 23.55 KG/M2 | HEIGHT: 73 IN | WEIGHT: 177.69 LBS | OXYGEN SATURATION: 98 % | SYSTOLIC BLOOD PRESSURE: 128 MMHG | DIASTOLIC BLOOD PRESSURE: 86 MMHG | HEART RATE: 104 BPM | TEMPERATURE: 98 F

## 2023-03-23 DIAGNOSIS — E08.65 DIABETES MELLITUS DUE TO UNDERLYING CONDITION WITH HYPERGLYCEMIA, WITH LONG-TERM CURRENT USE OF INSULIN: Primary | ICD-10-CM

## 2023-03-23 DIAGNOSIS — C61 PROSTATE CANCER: ICD-10-CM

## 2023-03-23 DIAGNOSIS — I10 BENIGN ESSENTIAL HYPERTENSION: ICD-10-CM

## 2023-03-23 DIAGNOSIS — E11.22 CKD STAGE 3 DUE TO TYPE 2 DIABETES MELLITUS: ICD-10-CM

## 2023-03-23 DIAGNOSIS — Z79.01 CHRONIC ANTICOAGULATION: ICD-10-CM

## 2023-03-23 DIAGNOSIS — N18.30 CKD STAGE 3 DUE TO TYPE 2 DIABETES MELLITUS: ICD-10-CM

## 2023-03-23 DIAGNOSIS — E78.5 HYPERLIPIDEMIA, UNSPECIFIED HYPERLIPIDEMIA TYPE: ICD-10-CM

## 2023-03-23 DIAGNOSIS — N25.81 HYPERPARATHYROIDISM, SECONDARY RENAL: ICD-10-CM

## 2023-03-23 DIAGNOSIS — I69.359 CVA, OLD, HEMIPARESIS: ICD-10-CM

## 2023-03-23 DIAGNOSIS — M81.0 SENILE OSTEOPOROSIS: ICD-10-CM

## 2023-03-23 DIAGNOSIS — Z79.4 DIABETES MELLITUS DUE TO UNDERLYING CONDITION WITH HYPERGLYCEMIA, WITH LONG-TERM CURRENT USE OF INSULIN: Primary | ICD-10-CM

## 2023-03-23 PROBLEM — H43.12 VITREOUS HEMORRHAGE, LEFT: Status: RESOLVED | Noted: 2019-11-26 | Resolved: 2023-03-23

## 2023-03-23 PROBLEM — I70.209 ATHEROSCLEROSIS OF ARTERY OF LOWER EXTREMITY: Status: RESOLVED | Noted: 2018-07-19 | Resolved: 2023-03-23

## 2023-03-23 PROBLEM — E11.3593 CONTROLLED TYPE 2 DIABETES MELLITUS WITH BOTH EYES AFFECTED BY PROLIFERATIVE RETINOPATHY WITHOUT MACULAR EDEMA, WITH LONG-TERM CURRENT USE OF INSULIN: Status: RESOLVED | Noted: 2018-04-05 | Resolved: 2023-03-23

## 2023-03-23 PROCEDURE — 3074F PR MOST RECENT SYSTOLIC BLOOD PRESSURE < 130 MM HG: ICD-10-PCS | Mod: CPTII,S$GLB,, | Performed by: INTERNAL MEDICINE

## 2023-03-23 PROCEDURE — 4010F PR ACE/ARB THEARPY RXD/TAKEN: ICD-10-PCS | Mod: CPTII,S$GLB,, | Performed by: INTERNAL MEDICINE

## 2023-03-23 PROCEDURE — 3008F PR BODY MASS INDEX (BMI) DOCUMENTED: ICD-10-PCS | Mod: CPTII,S$GLB,, | Performed by: INTERNAL MEDICINE

## 2023-03-23 PROCEDURE — 3008F BODY MASS INDEX DOCD: CPT | Mod: CPTII,S$GLB,, | Performed by: INTERNAL MEDICINE

## 2023-03-23 PROCEDURE — 99999 PR PBB SHADOW E&M-EST. PATIENT-LVL V: CPT | Mod: PBBFAC,,, | Performed by: INTERNAL MEDICINE

## 2023-03-23 PROCEDURE — 3074F SYST BP LT 130 MM HG: CPT | Mod: CPTII,S$GLB,, | Performed by: INTERNAL MEDICINE

## 2023-03-23 PROCEDURE — 1160F PR REVIEW ALL MEDS BY PRESCRIBER/CLIN PHARMACIST DOCUMENTED: ICD-10-PCS | Mod: CPTII,S$GLB,, | Performed by: INTERNAL MEDICINE

## 2023-03-23 PROCEDURE — 1159F MED LIST DOCD IN RCRD: CPT | Mod: CPTII,S$GLB,, | Performed by: INTERNAL MEDICINE

## 2023-03-23 PROCEDURE — 3288F PR FALLS RISK ASSESSMENT DOCUMENTED: ICD-10-PCS | Mod: CPTII,S$GLB,, | Performed by: INTERNAL MEDICINE

## 2023-03-23 PROCEDURE — 4010F ACE/ARB THERAPY RXD/TAKEN: CPT | Mod: CPTII,S$GLB,, | Performed by: INTERNAL MEDICINE

## 2023-03-23 PROCEDURE — 3079F PR MOST RECENT DIASTOLIC BLOOD PRESSURE 80-89 MM HG: ICD-10-PCS | Mod: CPTII,S$GLB,, | Performed by: INTERNAL MEDICINE

## 2023-03-23 PROCEDURE — 99999 PR PBB SHADOW E&M-EST. PATIENT-LVL V: ICD-10-PCS | Mod: PBBFAC,,, | Performed by: INTERNAL MEDICINE

## 2023-03-23 PROCEDURE — 99214 PR OFFICE/OUTPT VISIT, EST, LEVL IV, 30-39 MIN: ICD-10-PCS | Mod: S$GLB,,, | Performed by: INTERNAL MEDICINE

## 2023-03-23 PROCEDURE — 1159F PR MEDICATION LIST DOCUMENTED IN MEDICAL RECORD: ICD-10-PCS | Mod: CPTII,S$GLB,, | Performed by: INTERNAL MEDICINE

## 2023-03-23 PROCEDURE — 1101F PT FALLS ASSESS-DOCD LE1/YR: CPT | Mod: CPTII,S$GLB,, | Performed by: INTERNAL MEDICINE

## 2023-03-23 PROCEDURE — 3079F DIAST BP 80-89 MM HG: CPT | Mod: CPTII,S$GLB,, | Performed by: INTERNAL MEDICINE

## 2023-03-23 PROCEDURE — 1101F PR PT FALLS ASSESS DOC 0-1 FALLS W/OUT INJ PAST YR: ICD-10-PCS | Mod: CPTII,S$GLB,, | Performed by: INTERNAL MEDICINE

## 2023-03-23 PROCEDURE — 99214 OFFICE O/P EST MOD 30 MIN: CPT | Mod: S$GLB,,, | Performed by: INTERNAL MEDICINE

## 2023-03-23 PROCEDURE — 1160F RVW MEDS BY RX/DR IN RCRD: CPT | Mod: CPTII,S$GLB,, | Performed by: INTERNAL MEDICINE

## 2023-03-23 PROCEDURE — 1125F PR PAIN SEVERITY QUANTIFIED, PAIN PRESENT: ICD-10-PCS | Mod: CPTII,S$GLB,, | Performed by: INTERNAL MEDICINE

## 2023-03-23 PROCEDURE — 1125F AMNT PAIN NOTED PAIN PRSNT: CPT | Mod: CPTII,S$GLB,, | Performed by: INTERNAL MEDICINE

## 2023-03-23 PROCEDURE — 3288F FALL RISK ASSESSMENT DOCD: CPT | Mod: CPTII,S$GLB,, | Performed by: INTERNAL MEDICINE

## 2023-03-23 NOTE — PROGRESS NOTES
Subjective:       Patient ID: Kamlesh Diane is a 67 y.o. male.    Chief Complaint: Diabetes    HPI  Last seen pt in person 2/3/22. Lost to f/u  Cscope 5/31/22 - tubular adenoma polyps. Non bleeding external hemorrhoids. Repeat in 3 yrs.     DM2 - levemir 45 units daily, humalog 7-8 units TID w/ meals. Reports sugars are doing ok. Didn't bring glucose log to visit today.   A1c - 1/9/23 7.4  Foot - Dr. Posadas 2/2023.  Eye - Dr. Tapia 6/3/22 - PDR OU.  MAC - due  Follows w/ Soledad Mckeon NP.    Prostate CA x/p XRT w/ Dr. Muñoz, completed 5/2022. LOV 8/8/22. F/U in 6 mo w/ PSA  NM bone scan 3/8/22 - There is no scintigraphic evidence of osteoblastic metastatic disease.  Diagnostic PSA 1/14/22 13.4-->8/4/22 1.8  Urinating ok. No incontinence.     HTN - amlodipine 10mg daily, coreg 3.125mg BID, clonidine 0.1mg TID, losartan 50mg daily.   Reports BP is doin gwell at home. Didn't bring all his meds to clinic.     CKD3 - MOST RECENT cR 1.63, Egfr 46 1/9/23. Mild anemia in Jan.  Elevated PTH - 3/2022 90  Follows w/ Dr. Chahal, nephrologist at [facility]    H/o multiple CVA 2010 w/ residual R Sided weakness, wernicke's aphasia, R vision loss.   Has been on coumadin for embolic stroke. Follows w/ coumadin clinic. INR at goal.   Was previously staying w/ brother but now back at the house w/ Olinda, sister. Walks w/ walker. No falls. Stable.     HLD - atorvastatin 40mg daily.  LDL 1/9/23 83    Osteoporosis - foxamax 70mg weekly.   Last DEXA 6/2020. Due to repeat    Review of Systems   Constitutional:  Negative for chills and fever.   HENT:  Negative for congestion and postnasal drip.    Eyes:  Negative for visual disturbance (wears glasses.).   Respiratory:  Negative for cough, shortness of breath and wheezing.    Cardiovascular:  Negative for chest pain, palpitations and leg swelling.   Gastrointestinal:  Positive for vomiting (x 1 day. thinks due to something he ate.). Negative for abdominal pain,  "constipation, diarrhea and nausea.   Genitourinary:  Negative for dysuria and frequency.   Musculoskeletal:  Positive for gait problem.   Skin:  Negative for rash.   Neurological:  Negative for dizziness, numbness and headaches.   Psychiatric/Behavioral:  Negative for dysphoric mood. The patient is not nervous/anxious.        Objective:      Physical Exam    /86 (BP Location: Left arm, Patient Position: Sitting, BP Method: Large (Manual))   Pulse 104   Temp 97.9 °F (36.6 °C) (Oral)   Ht 6' 1" (1.854 m)   SpO2 98%   BMI 25.20 kg/m²     Gen - A+OX4, NAD  HEENT - slurred speech. Wears glasses. TM normal. MMM. R pupil not responsive to light.   Neck - no LAD  CV - RRR, no m/r  Chest - CTAB, no wheezing/rhonchi  Abd - S/NT/ND/+BS  Ext -2 + B radial and 1+ B DP pulses. No LE edema.   Neuro/MSK - antalgic gait. Walks w/ a walker. Unsteady standing on the scale. Some RUE spasticity. 4-5 RUE and RLE m strength and 5/5 LUE and LLE m strength. No spinal tenderness to palpation.   Skin - no rash.     Previous labs reviewed.    Assessment/Plan     Kamlesh was seen today for diabetes.    Diagnoses and all orders for this visit:    Diabetes mellitus due to underlying condition with hyperglycemia, with long-term current use of insulin - sister will take a picture of the glucose log and upload to portal. Cont current meds.   -     Microalbumin/Creatinine Ratio, Urine; Future  -     Hemoglobin A1C; Future    CKD stage 3 due to type 2 diabetes mellitus - follows w/ nephrologist. BP at goal. Cont current meds.   -     Comprehensive Metabolic Panel; Future  -     Hemoglobin A1C; Future  -     CBC Auto Differential; Future  -     PTH, intact; Future    Hyperlipidemia, unspecified hyperlipidemia type - cont atorva 40mg daily. Consider increasing dosage.   -     Lipid Panel; Future    Senile osteoporosis - on fosamax weekly.   -     DXA Bone Density Axial Skeleton 1 or more sites; Future    Prostate cancer - f/u w/  " Toni.     Benign essential hypertension - Stable and controlled. Continue current medications.    Hyperparathyroidism, secondary renal - f/u w/ nephrologist.     CVA, old, hemiparesis - stable. Fall precautions. Walks w/ walker.     Chronic anticoagulation - follows w/ coumadin clinic. INR level yesterday ok.     Discussed ACP. Pt to bring back to next visit.    Follow up in about 3 months (around 6/23/2023).      Debora Ontiveros MD  Department of Internal Medicine - [facility]  7:51 AM

## 2023-03-28 ENCOUNTER — OFFICE VISIT (OUTPATIENT)
Dept: UROLOGY | Facility: CLINIC | Age: 68
End: 2023-03-28
Payer: MEDICARE

## 2023-03-28 ENCOUNTER — TELEPHONE (OUTPATIENT)
Dept: PRIMARY CARE CLINIC | Facility: CLINIC | Age: 68
End: 2023-03-28
Payer: MEDICARE

## 2023-03-28 VITALS
SYSTOLIC BLOOD PRESSURE: 151 MMHG | DIASTOLIC BLOOD PRESSURE: 93 MMHG | HEART RATE: 111 BPM | BODY MASS INDEX: 23.46 KG/M2 | WEIGHT: 177 LBS | HEIGHT: 73 IN

## 2023-03-28 DIAGNOSIS — C61 PROSTATE CANCER: Primary | ICD-10-CM

## 2023-03-28 DIAGNOSIS — E11.8 CONTROLLED TYPE 2 DIABETES MELLITUS WITH COMPLICATION, WITH LONG-TERM CURRENT USE OF INSULIN: ICD-10-CM

## 2023-03-28 DIAGNOSIS — I63.10 CEREBROVASCULAR ACCIDENT (CVA) DUE TO EMBOLISM OF PRECEREBRAL ARTERY: ICD-10-CM

## 2023-03-28 DIAGNOSIS — Z92.3 HISTORY OF EXTERNAL BEAM RADIATION THERAPY: ICD-10-CM

## 2023-03-28 DIAGNOSIS — Z79.4 CONTROLLED TYPE 2 DIABETES MELLITUS WITH COMPLICATION, WITH LONG-TERM CURRENT USE OF INSULIN: ICD-10-CM

## 2023-03-28 DIAGNOSIS — E11.649 DIABETIC HYPOGLYCEMIA: ICD-10-CM

## 2023-03-28 DIAGNOSIS — N32.81 OVERACTIVE BLADDER: ICD-10-CM

## 2023-03-28 LAB — POC RESIDUAL URINE VOLUME: 123 ML (ref 0–100)

## 2023-03-28 PROCEDURE — 3288F PR FALLS RISK ASSESSMENT DOCUMENTED: ICD-10-PCS | Mod: CPTII,S$GLB,, | Performed by: NURSE PRACTITIONER

## 2023-03-28 PROCEDURE — 1160F PR REVIEW ALL MEDS BY PRESCRIBER/CLIN PHARMACIST DOCUMENTED: ICD-10-PCS | Mod: CPTII,S$GLB,, | Performed by: NURSE PRACTITIONER

## 2023-03-28 PROCEDURE — 1159F MED LIST DOCD IN RCRD: CPT | Mod: CPTII,S$GLB,, | Performed by: NURSE PRACTITIONER

## 2023-03-28 PROCEDURE — 3080F PR MOST RECENT DIASTOLIC BLOOD PRESSURE >= 90 MM HG: ICD-10-PCS | Mod: CPTII,S$GLB,, | Performed by: NURSE PRACTITIONER

## 2023-03-28 PROCEDURE — 51798 POCT BLADDER SCAN: ICD-10-PCS | Mod: S$GLB,,, | Performed by: NURSE PRACTITIONER

## 2023-03-28 PROCEDURE — 99999 PR PBB SHADOW E&M-EST. PATIENT-LVL IV: CPT | Mod: PBBFAC,,, | Performed by: NURSE PRACTITIONER

## 2023-03-28 PROCEDURE — 1126F AMNT PAIN NOTED NONE PRSNT: CPT | Mod: CPTII,S$GLB,, | Performed by: NURSE PRACTITIONER

## 2023-03-28 PROCEDURE — 1160F RVW MEDS BY RX/DR IN RCRD: CPT | Mod: CPTII,S$GLB,, | Performed by: NURSE PRACTITIONER

## 2023-03-28 PROCEDURE — 3080F DIAST BP >= 90 MM HG: CPT | Mod: CPTII,S$GLB,, | Performed by: NURSE PRACTITIONER

## 2023-03-28 PROCEDURE — 3077F PR MOST RECENT SYSTOLIC BLOOD PRESSURE >= 140 MM HG: ICD-10-PCS | Mod: CPTII,S$GLB,, | Performed by: NURSE PRACTITIONER

## 2023-03-28 PROCEDURE — 51798 US URINE CAPACITY MEASURE: CPT | Mod: S$GLB,,, | Performed by: NURSE PRACTITIONER

## 2023-03-28 PROCEDURE — 4010F ACE/ARB THERAPY RXD/TAKEN: CPT | Mod: CPTII,S$GLB,, | Performed by: NURSE PRACTITIONER

## 2023-03-28 PROCEDURE — 3008F PR BODY MASS INDEX (BMI) DOCUMENTED: ICD-10-PCS | Mod: CPTII,S$GLB,, | Performed by: NURSE PRACTITIONER

## 2023-03-28 PROCEDURE — 3288F FALL RISK ASSESSMENT DOCD: CPT | Mod: CPTII,S$GLB,, | Performed by: NURSE PRACTITIONER

## 2023-03-28 PROCEDURE — 3077F SYST BP >= 140 MM HG: CPT | Mod: CPTII,S$GLB,, | Performed by: NURSE PRACTITIONER

## 2023-03-28 PROCEDURE — 99214 OFFICE O/P EST MOD 30 MIN: CPT | Mod: S$GLB,,, | Performed by: NURSE PRACTITIONER

## 2023-03-28 PROCEDURE — 1126F PR PAIN SEVERITY QUANTIFIED, NO PAIN PRESENT: ICD-10-PCS | Mod: CPTII,S$GLB,, | Performed by: NURSE PRACTITIONER

## 2023-03-28 PROCEDURE — 99214 PR OFFICE/OUTPT VISIT, EST, LEVL IV, 30-39 MIN: ICD-10-PCS | Mod: S$GLB,,, | Performed by: NURSE PRACTITIONER

## 2023-03-28 PROCEDURE — 3008F BODY MASS INDEX DOCD: CPT | Mod: CPTII,S$GLB,, | Performed by: NURSE PRACTITIONER

## 2023-03-28 PROCEDURE — 1101F PT FALLS ASSESS-DOCD LE1/YR: CPT | Mod: CPTII,S$GLB,, | Performed by: NURSE PRACTITIONER

## 2023-03-28 PROCEDURE — 1101F PR PT FALLS ASSESS DOC 0-1 FALLS W/OUT INJ PAST YR: ICD-10-PCS | Mod: CPTII,S$GLB,, | Performed by: NURSE PRACTITIONER

## 2023-03-28 PROCEDURE — 99999 PR PBB SHADOW E&M-EST. PATIENT-LVL IV: ICD-10-PCS | Mod: PBBFAC,,, | Performed by: NURSE PRACTITIONER

## 2023-03-28 PROCEDURE — 1159F PR MEDICATION LIST DOCUMENTED IN MEDICAL RECORD: ICD-10-PCS | Mod: CPTII,S$GLB,, | Performed by: NURSE PRACTITIONER

## 2023-03-28 PROCEDURE — 4010F PR ACE/ARB THEARPY RXD/TAKEN: ICD-10-PCS | Mod: CPTII,S$GLB,, | Performed by: NURSE PRACTITIONER

## 2023-03-28 RX ORDER — INSULIN DETEMIR 100 [IU]/ML
INJECTION, SOLUTION SUBCUTANEOUS
Qty: 41 ML | Refills: 3 | Status: SHIPPED | OUTPATIENT
Start: 2023-03-28 | End: 2023-09-07

## 2023-03-28 RX ORDER — INSULIN LISPRO 100 [IU]/ML
INJECTION, SOLUTION INTRAVENOUS; SUBCUTANEOUS
Qty: 15 ML | Refills: 3
Start: 2023-03-28 | End: 2023-09-07

## 2023-03-28 NOTE — TELEPHONE ENCOUNTER
6pm is his dinner. Then he wakes up late at 11am. First thing AM sugars are low at times into the 50s. Will drop back on humalog to 4 units prior to dinner.

## 2023-03-28 NOTE — PROGRESS NOTES
CHIEF COMPLAINT:    Kamlesh Diane is a 67 y.o. male presents today for annual f/u visit    HISTORY OF PRESENTING ILLINESS:    Kamlesh Diane is a 67 y.o. insulin dependent Diabetic male who is an established patient of our clinic. He is s/p CVA with right sided weakness ~10 years ago. He has a history of Stage I (T1c, N0, M0, PSA < 10, GG 1) adenocarcinoma of the prostate. He was initially diagnosed June, 2018.      02/22/2022 Re-Staging TRUS bx with Dr. Rodríguez:  -Estelline 7 (3+4): left apex  -Estelline 6 (3+3): left middle, right apex, right middle,right base  -PSA was 13.4 at time of bx     (-) bone scan 03/08/2022    The patient was referred for treatment and completed radiotherapy to the prostate, seminal vesicles and pelvic nodes in May of 2022.   Last office visit was 08/08/2022 with Dr. Muñoz.     Lab Results   Component Value Date    PSADIAG 1.8 08/04/2022    PSATOTAL 7.1 (H) 08/12/2020       Last clinic visit was 04/22/2022 with me. He had started Myrbetriq for OAB and was pleased.                    REVIEW OF SYSTEMS:  Review of Systems   Constitutional: Negative.  Negative for chills and fever.   Eyes:  Negative for double vision.   Respiratory:  Negative for cough and shortness of breath.    Cardiovascular:  Negative for chest pain.   Gastrointestinal:  Negative for abdominal pain, constipation, diarrhea, nausea and vomiting.   Genitourinary: Negative.  Negative for dysuria, flank pain and hematuria.        Pleased with urination  Myrbetriq working well  Nocturia 1x  FOS is good for him.  Has never seen blood in his urine     Neurological:  Positive for focal weakness. Negative for dizziness and seizures.        History of CVA   Endo/Heme/Allergies:  Negative for polydipsia.        Currently on coumadin         PATIENT HISTORY:    Past Medical History:   Diagnosis Date    CVA (cerebral vascular accident)     Diabetes     Diabetic retinopathy     Elevated PSA     Hypertension     Prostate  cancer     Stroke        Past Surgical History:   Procedure Laterality Date    CATARACT EXTRACTION      COLONOSCOPY N/A 5/31/2022    Procedure: COLONOSCOPY;  Surgeon: Juan Miguel Quintanilla MD;  Location: [facility] ([address] [place] [address]);  Service: Endoscopy;  Laterality: N/A;  Fully Vaccinatd, Coumadin hold per Coumadin clinic see 5/2 note for 5 days prior with Lovenox bridge - sm  prep instr portal and mailed - sm    FRACTURE SURGERY      hips x 2 - last fall was 2 yrs ago.    MARIBELL LIANG,SWALLOW FUNCTION,CINE/VIDEO RECORD  4/1/2022            Family History   Problem Relation Age of Onset    Hypertension Father     Heart attack Father     Breast cancer Sister     Diabetes Brother     Hypertension Brother        Social History     Socioeconomic History    Marital status: Single   Occupational History    Occupation: worked in the hospital previously.    Tobacco Use    Smoking status: Never    Smokeless tobacco: Never   Substance and Sexual Activity    Alcohol use: Yes     Comment: a few shots once a week.     Drug use: Yes     Types: Marijuana    Sexual activity: Not Currently       Allergies:  Plasma protein fraction    Medications:    Current Outpatient Medications:     ACCU-CHEK JUSTIN PLUS TEST STRP Strp, TEST FOUR TIMES DAILY AS DIRECTED, Disp: 400 strip, Rfl: 3    alcohol swabs (ALCOHOL PREP PADS) PadM, Use 4 times a day., Disp: 400 each, Rfl: 3    amantadine HCL (SYMMETREL) 100 mg capsule, TAKE 1 CAPSULE(100 MG) BY MOUTH TWICE DAILY, Disp: 180 capsule, Rfl: 3    amLODIPine (NORVASC) 10 MG tablet, TAKE 1 TABLET(10 MG) BY MOUTH EVERY DAY, Disp: 90 tablet, Rfl: 0    atorvastatin (LIPITOR) 40 MG tablet, TAKE 1 TABLET(40 MG) BY MOUTH EVERY DAY, Disp: 90 tablet, Rfl: 3    blood-glucose meter kit, Use as instructed, Disp: 1 each, Rfl: 0    calcitRIOL (ROCALTROL) 0.25 MCG Cap, Take 0.25 mcg by mouth once daily., Disp: , Rfl:     carvediloL (COREG) 3.125 MG tablet, TAKE 1 TABLET(3.125 MG) BY MOUTH TWICE DAILY WITH MEALS, Disp: 180  "tablet, Rfl: 3    cloNIDine (CATAPRES) 0.1 MG tablet, Take 1 tablet (0.1 mg total) by mouth 3 (three) times daily., Disp: 270 tablet, Rfl: 3    ergocalciferol (ERGOCALCIFEROL) 50,000 unit Cap, TAKE 1 CAPSULE BY MOUTH EVERY 7 DAYS, Disp: 12 capsule, Rfl: 3    glucagon (BAQSIMI) 3 mg/actuation Spry, Give one puff via nostril. Hold device between fingers and thumb, do not push plunger yet, insert tip gently into one nostril until finger(s) touch the outside of the nose, then push plunger firmly all the way in . Dose is complete when the green line disappears., Disp: 1 each, Rfl: 2    insulin lispro (HUMALOG KWIKPEN INSULIN) 100 unit/mL pen, Inject 7-8 units w/ breakfast, lunch and dinner, no scale max daily 24 units., Disp: 15 mL, Rfl: 3    lancets Misc, 1 lancet by Misc.(Non-Drug; Combo Route) route 4 (four) times daily., Disp: 400 each, Rfl: 3    lancing device Misc, 1 Device by Misc.(Non-Drug; Combo Route) route 2 (two) times daily with meals., Disp: 1 each, Rfl: 0    LEVEMIR FLEXTOUCH U-100 INSULN 100 unit/mL (3 mL) InPn pen, ADMINISTER 45 UNITS UNDER THE SKIN EVERY EVENING, Disp: 45 mL, Rfl: 3    losartan (COZAAR) 50 MG tablet, TAKE 1 TABLET(50 MG) BY MOUTH EVERY DAY, Disp: 90 tablet, Rfl: 3    mirabegron (MYRBETRIQ) 25 mg Tb24 ER tablet, TAKE 1 TABLET(25 MG) BY MOUTH EVERY DAY, Disp: 90 tablet, Rfl: 0    pen needle, diabetic (BD ULTRA-FINE SHORT PEN NEEDLE) 31 gauge x 5/16" Ndle, USE THREE TIMES DAILY AS DIRECTED, Disp: 200 each, Rfl: 2    polyethylene glycol (GOLYTELY) 236-22.74-6.74 -5.86 gram suspension, TAKE 4000 ML BY MOUTH 1 TIME FOR 1 DOSE, Disp: , Rfl:     RESTASIS 0.05 % ophthalmic emulsion, INSTILL 1 DROP IN BOTH EYES TWICE DAILY, Disp: 180 each, Rfl: 3    traMADol (ULTRAM) 50 mg tablet, Take 1 tablet (50 mg total) by mouth every 8 (eight) hours as needed for Pain., Disp: 15 tablet, Rfl: 0    warfarin (COUMADIN) 2.5 MG tablet, TAKE 1 TABLET BY MOUTH EVERY MONDAY, TUESDAY, OR FRIDAY AS DIRECTED, Disp: 90 " tablet, Rfl: 3    warfarin (COUMADIN) 5 MG tablet, TAKE 1 TABLET BY MOUTH ON WEDNESDAY, THURSDAY, SATURDAY, SUN OR AS DIRECTED BY COUMADIN CLINIC, Disp: 42 tablet, Rfl: 11    alendronate (FOSAMAX) 70 MG tablet, Take 1 tablet (70 mg total) by mouth every 7 days., Disp: 12 tablet, Rfl: 3    PHYSICAL EXAMINATION:  Physical Exam  Vitals and nursing note reviewed.   Constitutional:       General: He is awake.      Appearance: Normal appearance.   HENT:      Head: Normocephalic.      Right Ear: External ear normal.      Left Ear: External ear normal.      Nose: Nose normal.   Cardiovascular:      Rate and Rhythm: Normal rate.   Pulmonary:      Effort: Pulmonary effort is normal. No respiratory distress.   Abdominal:      Tenderness: There is no abdominal tenderness. There is no right CVA tenderness or left CVA tenderness.   Genitourinary:     Penis: Normal.       Testes: Normal.   Musculoskeletal:      Cervical back: Normal range of motion.      Comments: Limited mobility 2/2 CVA  Successfully using a standup walker for ambulation     Skin:     General: Skin is warm and dry.   Neurological:      General: No focal deficit present.      Mental Status: He is alert and oriented to person, place, and time.   Psychiatric:         Mood and Affect: Mood normal.         Behavior: Behavior is cooperative.         LABS:    He was unable to urinate in the office; went prior to visit at home.     In office Bladder Scan upon arrival by the nurse was 123.       Lab Results   Component Value Date    PSA 6.2 (H) 04/05/2018    PSADIAG 1.8 08/04/2022    PSADIAG 13.4 (H) 01/14/2022    PSADIAG 8.9 (H) 07/02/2020    PSATOTAL 7.1 (H) 08/12/2020       Lab Results   Component Value Date    CREATININE 1.6 (H) 02/09/2022             IMPRESSION:    Encounter Diagnoses   Name Primary?    Prostate cancer Yes    Overactive bladder     Cerebrovascular accident (CVA) due to embolism of precerebral artery     History of external beam radiation therapy           Assessment:       1. Prostate cancer    2. Overactive bladder    3. Cerebrovascular accident (CVA) due to embolism of precerebral artery    4. History of external beam radiation therapy        Plan:         I spent 30 minutes with the patient of which more than half was spent in direct consultation with the patient in regards to our treatment and plan.  We addressed the office findings and recent labs.   Education and recommendations of today's plan of care including home remedies and needed follow up with PCP.   We discussed the chief complaint; reviewed the LUTS and the possible contributory factors.   We discussed expectations after XRT; especially with bladder; as well as on coumadin.  Reassurance;   Need new PSA   Recommended lifestyle modifications with a proper, healthy diet, good hydration but during the day. Reducing bladder irritants.   Benefits of regular exercise.  Continue Myrbetriq  Update his PSA with upcoming blood work.   Home collect urine; instructions given  RTC one year with a new PSA.

## 2023-03-30 ENCOUNTER — HOSPITAL ENCOUNTER (OUTPATIENT)
Dept: RADIOLOGY | Facility: CLINIC | Age: 68
Discharge: HOME | End: 2023-03-30
Attending: INTERNAL MEDICINE
Payer: MEDICARE

## 2023-03-30 DIAGNOSIS — M81.0 SENILE OSTEOPOROSIS: ICD-10-CM

## 2023-03-30 PROCEDURE — 77080 DXA BONE DENSITY AXIAL: CPT | Mod: 26,,, | Performed by: INTERNAL MEDICINE

## 2023-03-30 PROCEDURE — 77080 DXA BONE DENSITY AXIAL: CPT | Mod: TC

## 2023-03-30 PROCEDURE — 77080 DXA BONE DENSITY AXIAL SKELETON 1 OR MORE SITES: ICD-10-PCS | Mod: 26,,, | Performed by: INTERNAL MEDICINE

## 2023-04-13 ENCOUNTER — OFFICE VISIT (OUTPATIENT)
Dept: PODIATRY | Facility: CLINIC | Age: 68
End: 2023-04-13
Payer: MEDICARE

## 2023-04-13 ENCOUNTER — LAB VISIT (OUTPATIENT)
Dept: LAB | Facility: HOSPITAL | Age: 68
End: 2023-04-13
Attending: INTERNAL MEDICINE
Payer: MEDICARE

## 2023-04-13 ENCOUNTER — ANTI-COAG VISIT (OUTPATIENT)
Dept: CARDIOLOGY | Facility: CLINIC | Age: 68
End: 2023-04-13
Payer: MEDICARE

## 2023-04-13 DIAGNOSIS — E11.42 DIABETIC POLYNEUROPATHY ASSOCIATED WITH TYPE 2 DIABETES MELLITUS: Primary | ICD-10-CM

## 2023-04-13 DIAGNOSIS — Z79.01 LONG TERM (CURRENT) USE OF ANTICOAGULANTS: Primary | ICD-10-CM

## 2023-04-13 DIAGNOSIS — Z86.73 H/O: CVA (CEREBROVASCULAR ACCIDENT): ICD-10-CM

## 2023-04-13 DIAGNOSIS — L84 CORN OR CALLUS: ICD-10-CM

## 2023-04-13 DIAGNOSIS — B35.1 ONYCHOMYCOSIS DUE TO DERMATOPHYTE: ICD-10-CM

## 2023-04-13 DIAGNOSIS — Z79.01 LONG TERM (CURRENT) USE OF ANTICOAGULANTS: ICD-10-CM

## 2023-04-13 LAB
INR PPP: 2.7 (ref 0.8–1.2)
PROTHROMBIN TIME: 26.8 SEC (ref 9–12.5)

## 2023-04-13 PROCEDURE — 11056 PARNG/CUTG B9 HYPRKR LES 2-4: CPT | Mod: Q9,S$GLB,, | Performed by: PODIATRIST

## 2023-04-13 PROCEDURE — 99499 NO LOS: ICD-10-PCS | Mod: S$GLB,,, | Performed by: PODIATRIST

## 2023-04-13 PROCEDURE — 11721 PR DEBRIDEMENT OF NAILS, 6 OR MORE: ICD-10-PCS | Mod: 59,Q9,S$GLB, | Performed by: PODIATRIST

## 2023-04-13 PROCEDURE — 11056 PR TRIM BENIGN HYPERKERATOTIC SKIN LESION,2-4: ICD-10-PCS | Mod: Q9,S$GLB,, | Performed by: PODIATRIST

## 2023-04-13 PROCEDURE — 93793 ANTICOAG MGMT PT WARFARIN: CPT | Mod: S$GLB,,,

## 2023-04-13 PROCEDURE — 99999 PR PBB SHADOW E&M-EST. PATIENT-LVL III: CPT | Mod: PBBFAC,,, | Performed by: PODIATRIST

## 2023-04-13 PROCEDURE — 36415 COLL VENOUS BLD VENIPUNCTURE: CPT | Performed by: INTERNAL MEDICINE

## 2023-04-13 PROCEDURE — 85610 PROTHROMBIN TIME: CPT | Performed by: INTERNAL MEDICINE

## 2023-04-13 PROCEDURE — 99999 PR PBB SHADOW E&M-EST. PATIENT-LVL III: ICD-10-PCS | Mod: PBBFAC,,, | Performed by: PODIATRIST

## 2023-04-13 PROCEDURE — 93793 PR ANTICOAGULANT MGMT FOR PT TAKING WARFARIN: ICD-10-PCS | Mod: S$GLB,,,

## 2023-04-13 PROCEDURE — 99499 UNLISTED E&M SERVICE: CPT | Mod: S$GLB,,, | Performed by: PODIATRIST

## 2023-04-13 PROCEDURE — 11721 DEBRIDE NAIL 6 OR MORE: CPT | Mod: 59,Q9,S$GLB, | Performed by: PODIATRIST

## 2023-04-17 ENCOUNTER — TELEPHONE (OUTPATIENT)
Dept: PRIMARY CARE CLINIC | Facility: CLINIC | Age: 68
End: 2023-04-17
Payer: MEDICARE

## 2023-04-17 DIAGNOSIS — M81.0 SENILE OSTEOPOROSIS: ICD-10-CM

## 2023-04-17 NOTE — TELEPHONE ENCOUNTER
Please call and notify pt:  Bone density scan still shows osteoporosis and some decline in bone density at the hip despite taking weekly fosamax. At this point, I want him to change over to an every 6 month injection called prolia to hopefully better preserve his bone health given his risk of falls/fracture.   Once prolia set up, can stop the fosamax the week of infusion.

## 2023-05-10 ENCOUNTER — LAB VISIT (OUTPATIENT)
Dept: LAB | Facility: HOSPITAL | Age: 68
End: 2023-05-10
Attending: INTERNAL MEDICINE
Payer: MEDICARE

## 2023-05-10 DIAGNOSIS — Z79.01 LONG TERM (CURRENT) USE OF ANTICOAGULANTS: ICD-10-CM

## 2023-05-10 DIAGNOSIS — Z86.73 H/O: CVA (CEREBROVASCULAR ACCIDENT): ICD-10-CM

## 2023-05-10 LAB
INR PPP: 2.3 (ref 0.8–1.2)
PROTHROMBIN TIME: 23.3 SEC (ref 9–12.5)

## 2023-05-10 PROCEDURE — 36415 COLL VENOUS BLD VENIPUNCTURE: CPT | Performed by: INTERNAL MEDICINE

## 2023-05-10 PROCEDURE — 85610 PROTHROMBIN TIME: CPT | Performed by: INTERNAL MEDICINE

## 2023-05-11 ENCOUNTER — ANTI-COAG VISIT (OUTPATIENT)
Dept: CARDIOLOGY | Facility: CLINIC | Age: 68
End: 2023-05-11
Payer: MEDICARE

## 2023-05-11 DIAGNOSIS — Z79.01 LONG TERM (CURRENT) USE OF ANTICOAGULANTS: Primary | ICD-10-CM

## 2023-05-11 PROCEDURE — 93793 ANTICOAG MGMT PT WARFARIN: CPT | Mod: S$GLB,,,

## 2023-05-11 PROCEDURE — 93793 PR ANTICOAGULANT MGMT FOR PT TAKING WARFARIN: ICD-10-PCS | Mod: S$GLB,,,

## 2023-05-15 NOTE — PROGRESS NOTES
Subjective:      Patient ID: Kamlesh Diane is a 67 y.o. male.    Chief Complaint: Diabetes Mellitus    Kamlesh is a 67 y.o. male who presents to the clinic for evaluation and treatment of high risk feet. Kamlesh has a past medical history of CVA (cerebral vascular accident), Diabetes, Diabetic retinopathy, Elevated PSA, Hypertension, Prostate cancer, and Stroke. The patient's chief complaint is long, thick toenails. This patient has documented high risk feet requiring routine maintenance secondary to peripheral neuropathy.    PCP: Debora Ontiveros MD    Date Last Seen by PCP:   Chief Complaint   Patient presents with    Diabetes Mellitus       Current shoe gear:  Affected Foot: Slip-on shoes     Unaffected Foot: Slip-on shoes    Hemoglobin A1C   Date Value Ref Range Status   12/13/2022 7.5 (H) 4.0 - 5.6 % Final     Comment:     ADA Screening Guidelines:  5.7-6.4%  Consistent with prediabetes  >or=6.5%  Consistent with diabetes    High levels of fetal hemoglobin interfere with the HbA1C  assay. Heterozygous hemoglobin variants (HbS, HgC, etc)do  not significantly interfere with this assay.   However, presence of multiple variants may affect accuracy.     02/09/2022 7.3 (H) 4.0 - 5.6 % Final     Comment:     ADA Screening Guidelines:  5.7-6.4%  Consistent with prediabetes  >or=6.5%  Consistent with diabetes    High levels of fetal hemoglobin interfere with the HbA1C  assay. Heterozygous hemoglobin variants (HbS, HgC, etc)do  not significantly interfere with this assay.   However, presence of multiple variants may affect accuracy.     01/14/2022 7.2 (H) 4.0 - 5.6 % Final     Comment:     ADA Screening Guidelines:  5.7-6.4%  Consistent with prediabetes  >or=6.5%  Consistent with diabetes    High levels of fetal hemoglobin interfere with the HbA1C  assay. Heterozygous hemoglobin variants (HbS, HgC, etc)do  not significantly interfere with this assay.   However, presence of multiple variants may affect accuracy.          Review of Systems   Constitutional: Negative for chills, fever and malaise/fatigue.   HENT:  Negative for hearing loss.    Cardiovascular:  Positive for leg swelling. Negative for claudication.   Respiratory:  Negative for shortness of breath.    Skin:  Positive for color change, dry skin, nail changes and unusual hair distribution. Negative for flushing and rash.   Musculoskeletal:  Negative for joint pain and myalgias.   Gastrointestinal:  Negative for nausea and vomiting.   Neurological:  Positive for numbness, paresthesias and sensory change. Negative for loss of balance.   Psychiatric/Behavioral:  Negative for altered mental status.          Objective:      Physical Exam  Vitals reviewed.   Constitutional:       Appearance: He is well-developed.   Cardiovascular:      Pulses:           Dorsalis pedis pulses are 0 on the right side and 0 on the left side.        Posterior tibial pulses are 1+ on the right side and 1+ on the left side.   Musculoskeletal:      Right ankle: Decreased range of motion. Abnormal pulse.      Right Achilles Tendon: Ramirez's test negative.      Left ankle: Decreased range of motion. Abnormal pulse.      Left Achilles Tendon: Ramirez's test negative.      Right foot: Decreased range of motion.      Left foot: Decreased range of motion.   Feet:      Right foot:      Protective Sensation: 5 sites tested.  2 sites sensed.      Left foot:      Protective Sensation: 5 sites tested.  2 sites sensed.   Skin:     General: Skin is cool and dry.      Capillary Refill: Capillary refill takes more than 3 seconds.      Findings: No wound.      Comments: HKL's noted to b/L HIPJs       Neurological:      Mental Status: He is alert.      Comments: diminished sensation noted to b/L lower extremities   Psychiatric:         Behavior: Behavior normal. Behavior is cooperative.       Nails x10 are elongated by  6-8mm's curling underneath toes, thickened by 3-4 mm's, dystrophic, and are dark in   coloration . Xerosis Bilaterally.           Assessment:       Encounter Diagnoses   Name Primary?    Diabetic polyneuropathy associated with type 2 diabetes mellitus Yes    Onychomycosis due to dermatophyte     Corn or callus          Plan:       Kamlesh was seen today for diabetes mellitus.    Diagnoses and all orders for this visit:    Diabetic polyneuropathy associated with type 2 diabetes mellitus    Onychomycosis due to dermatophyte    Corn or callus      I counseled the patient on his conditions, their implications and medical management.    - Shoe inspection. Diabetic Foot Education. Patient reminded of the importance of good nutrition and blood sugar control to help prevent podiatric complications of diabetes. Patient instructed on proper foot hygeine. We discussed wearing proper shoe gear, daily foot inspections, never walking without protective shoe gear, never putting sharp instruments to feet, routine podiatric nail visits every 2-3 months.    After cleansing with an alcohol prep pad, the about mentioned hyperkeratotic lesions were sharply debrided X 2 utilizing a #15 blade to a smooth base without incident. Pt tolerated the procedure well and reported comfort to the debarment sites. Pt will continue to use padding and moisture the callused areas.     - With patient's permission, nails were aggressively reduced and debrided x 10 to their soft tissue attachment mechanically and with electric , removing all offending nail and debris. Patient relates relief following the procedure. He will continue to monitor the areas daily, inspect his feet, wear protective shoe gear when ambulatory, moisturizer to maintain skin integrity and follow in this office in approximately 2-3 months, sooner p.r.n.

## 2023-06-15 ENCOUNTER — LAB VISIT (OUTPATIENT)
Dept: LAB | Facility: HOSPITAL | Age: 68
End: 2023-06-15
Attending: INTERNAL MEDICINE
Payer: MEDICARE

## 2023-06-15 DIAGNOSIS — Z79.01 LONG TERM (CURRENT) USE OF ANTICOAGULANTS: ICD-10-CM

## 2023-06-15 DIAGNOSIS — Z86.73 H/O: CVA (CEREBROVASCULAR ACCIDENT): ICD-10-CM

## 2023-06-15 LAB
INR PPP: 2.4 (ref 0.8–1.2)
PROTHROMBIN TIME: 24.8 SEC (ref 9–12.5)

## 2023-06-15 PROCEDURE — 36415 COLL VENOUS BLD VENIPUNCTURE: CPT | Performed by: INTERNAL MEDICINE

## 2023-06-15 PROCEDURE — 85610 PROTHROMBIN TIME: CPT | Performed by: INTERNAL MEDICINE

## 2023-06-16 ENCOUNTER — ANTI-COAG VISIT (OUTPATIENT)
Dept: CARDIOLOGY | Facility: CLINIC | Age: 68
End: 2023-06-16
Payer: MEDICARE

## 2023-06-16 DIAGNOSIS — Z79.01 LONG TERM (CURRENT) USE OF ANTICOAGULANTS: Primary | ICD-10-CM

## 2023-06-16 PROCEDURE — 93793 ANTICOAG MGMT PT WARFARIN: CPT | Mod: S$GLB,,,

## 2023-06-16 PROCEDURE — 93793 PR ANTICOAGULANT MGMT FOR PT TAKING WARFARIN: ICD-10-PCS | Mod: S$GLB,,,

## 2023-06-21 NOTE — TELEPHONE ENCOUNTER
----- Message from Abigail Corley sent at 6/21/2023 10:52 AM CDT -----  Contact: [phone]  Pt maria isabel Contreras is calling about Mr Whitlock's Rx warfarin (COUMADIN) 5 MG tablet 42 tablet 11 10/10/2022   Sig: TAKE 1 TABLET BY MOUTH ON WEDNESDAY, THURSDAY, SATURDAY, SUN OR AS DIRECTED BY COUMADIN CLINIC      Needs New Script sent to   [id number] - [place] - [address] AT [facility] [address]  [address]  [facility] [address]  Phone: [phone] Fax: [phone]

## 2023-06-22 RX ORDER — WARFARIN SODIUM 5 MG/1
TABLET ORAL
Qty: 42 TABLET | Refills: 11 | Status: SHIPPED | OUTPATIENT
Start: 2023-06-22 | End: 2023-08-07

## 2023-06-23 ENCOUNTER — TELEPHONE (OUTPATIENT)
Dept: PRIMARY CARE CLINIC | Facility: CLINIC | Age: 68
End: 2023-06-23
Payer: MEDICARE

## 2023-06-23 NOTE — TELEPHONE ENCOUNTER
No appointment open for that time requested, pt will need to reschedule if he cannot make it for the scheduled appointment time.

## 2023-06-23 NOTE — TELEPHONE ENCOUNTER
----- Message from Romina Richards sent at 6/23/2023 12:23 PM CDT -----  Contact: Krish/Brother [phone]  Pt brother called and stated that they wanted to see if the pt can be seen around 9:30-10 on 6/26/23. He stated that his wife has an appoint at 2:30 that he needs to bring her too.     Please advise     Thank you

## 2023-07-05 DIAGNOSIS — E11.59 HYPERTENSION ASSOCIATED WITH DIABETES: ICD-10-CM

## 2023-07-05 DIAGNOSIS — I15.2 HYPERTENSION ASSOCIATED WITH DIABETES: ICD-10-CM

## 2023-07-05 NOTE — TELEPHONE ENCOUNTER
----- Message from Jesus Arce sent at 7/5/2023  3:29 PM CDT -----  Contact: self [phone]  Requesting an RX refill or new RX.  Is this a refill or new RX: refill  RX name and strength (copy/paste from chart):  amLODIPine (NORVASC) 10 MG tablet  Is this a 30 day or 90 day RX:   Pharmacy name and phone # (copy/paste from chart):    [id number] [place] [id number] AT [address] [address]  Phone: [phone] Fax: [phone]        The doctors have asked that we provide their patients with the following 2 reminders -- prescription refills can take up to 72 hours, and a friendly reminder that in the future you can use your [facility] account to request refills: yes    Requesting an RX refill or new RX.  Is this a refill or new RX: refill  RX name and strength (copy/paste from chart):  losartan (COZAAR) 50 MG tablet  Is this a 30 day or 90 day RX:   Pharmacy name and phone # (copy/paste from chart):    [facility] #[id number] [facility] [address] AT [address] [address]  Phone: [phone] Fax: [phone]        The doctors have asked that we provide their patients with the following 2 reminders -- prescription refills can take up to 72 hours, and a friendly reminder that in the future you can use your [facility] account to request refills: yes    Requesting an RX refill or new RX.  Is this a refill or new RX: refill  RX name and strength (copy/paste from chart):  MYRBETRIQ 25 mg Tb24 ER tablet  Is this a 30 day or 90 day RX:   Pharmacy name and phone # (copy/paste from chart):    [id number] [address] AT [facility] [address]  [place] [address]  Phone: [phone] Fax: [phone]      The doctors have asked that we provide their patients with  the following 2 reminders -- prescription refills can take up to 72 hours, and a friendly reminder that in the future you can use your [facility] account to request refills: yes    Please call and advise

## 2023-07-06 RX ORDER — LOSARTAN POTASSIUM 50 MG/1
50 TABLET ORAL DAILY
Qty: 90 TABLET | Refills: 0 | Status: SHIPPED | OUTPATIENT
Start: 2023-07-06 | End: 2023-09-15

## 2023-07-06 RX ORDER — AMLODIPINE BESYLATE 10 MG/1
10 TABLET ORAL DAILY
Qty: 90 TABLET | Refills: 0 | Status: SHIPPED | OUTPATIENT
Start: 2023-07-06 | End: 2023-09-01 | Stop reason: SDUPTHER

## 2023-07-11 ENCOUNTER — TELEPHONE (OUTPATIENT)
Dept: PRIMARY CARE CLINIC | Facility: CLINIC | Age: 68
End: 2023-07-11
Payer: MEDICARE

## 2023-07-12 NOTE — TELEPHONE ENCOUNTER
Daughter would like to know if the patient is eligible to get a dexcom, and if he would need to go thru endocrinology to get that supplies?     They are aware of august apt  
Dea Ontiveros [facility] Staff  Caller: [phone]/Megha/ (Today,  9:35 AM)  Patient maria isabel called, requested a call back from Dr Ontiveros's ma or nurse about patient dexcom. Also would like to know why patient had never had an endo doctor.   Please call and advise. Thank you   
LVM for daughter to callback  
Spoke to pt's brother. Scheduled virtual appt  
Will cc Soledad Mckeon NP on message.  
No

## 2023-07-17 ENCOUNTER — INFUSION (OUTPATIENT)
Dept: INFECTIOUS DISEASES | Facility: HOSPITAL | Age: 68
End: 2023-07-17
Payer: MEDICARE

## 2023-07-17 ENCOUNTER — TELEPHONE (OUTPATIENT)
Dept: PODIATRY | Facility: CLINIC | Age: 68
End: 2023-07-17
Payer: MEDICARE

## 2023-07-17 VITALS
BODY MASS INDEX: 23.39 KG/M2 | DIASTOLIC BLOOD PRESSURE: 75 MMHG | SYSTOLIC BLOOD PRESSURE: 136 MMHG | WEIGHT: 177.25 LBS | HEART RATE: 100 BPM | OXYGEN SATURATION: 97 % | TEMPERATURE: 99 F

## 2023-07-17 DIAGNOSIS — M81.0 SENILE OSTEOPOROSIS: Primary | ICD-10-CM

## 2023-07-17 PROCEDURE — 63600175 PHARM REV CODE 636 W HCPCS: Mod: JZ,JG | Performed by: INTERNAL MEDICINE

## 2023-07-17 PROCEDURE — 96372 THER/PROPH/DIAG INJ SC/IM: CPT

## 2023-07-17 RX ADMIN — DENOSUMAB 60 MG: 60 INJECTION SUBCUTANEOUS at 09:07

## 2023-07-17 NOTE — PROGRESS NOTES
Arrived per walker for his first prolia injection. Prolia 60 mg SQ in R arm. Pt observed for 15 minutes. Discharged in NAD. Pt taking vitamin D and denies any recent dental work

## 2023-07-20 ENCOUNTER — LAB VISIT (OUTPATIENT)
Dept: LAB | Facility: HOSPITAL | Age: 68
End: 2023-07-20
Attending: INTERNAL MEDICINE
Payer: MEDICARE

## 2023-07-20 ENCOUNTER — ANTI-COAG VISIT (OUTPATIENT)
Dept: CARDIOLOGY | Facility: CLINIC | Age: 68
End: 2023-07-20
Payer: MEDICARE

## 2023-07-20 DIAGNOSIS — Z86.73 H/O: CVA (CEREBROVASCULAR ACCIDENT): ICD-10-CM

## 2023-07-20 DIAGNOSIS — Z79.01 LONG TERM (CURRENT) USE OF ANTICOAGULANTS: ICD-10-CM

## 2023-07-20 DIAGNOSIS — Z79.01 LONG TERM (CURRENT) USE OF ANTICOAGULANTS: Primary | ICD-10-CM

## 2023-07-20 LAB
INR PPP: 2.4 (ref 0.8–1.2)
PROTHROMBIN TIME: 24 SEC (ref 9–12.5)

## 2023-07-20 PROCEDURE — 93793 PR ANTICOAGULANT MGMT FOR PT TAKING WARFARIN: ICD-10-PCS | Mod: S$GLB,,,

## 2023-07-20 PROCEDURE — 36415 COLL VENOUS BLD VENIPUNCTURE: CPT | Performed by: INTERNAL MEDICINE

## 2023-07-20 PROCEDURE — 93793 ANTICOAG MGMT PT WARFARIN: CPT | Mod: S$GLB,,,

## 2023-07-20 PROCEDURE — 85610 PROTHROMBIN TIME: CPT | Performed by: INTERNAL MEDICINE

## 2023-07-31 ENCOUNTER — OFFICE VISIT (OUTPATIENT)
Dept: PODIATRY | Facility: CLINIC | Age: 68
End: 2023-07-31
Payer: MEDICARE

## 2023-07-31 VITALS
SYSTOLIC BLOOD PRESSURE: 106 MMHG | WEIGHT: 177.25 LBS | HEIGHT: 73 IN | HEART RATE: 98 BPM | DIASTOLIC BLOOD PRESSURE: 72 MMHG | BODY MASS INDEX: 23.49 KG/M2

## 2023-07-31 DIAGNOSIS — L84 CORN OR CALLUS: ICD-10-CM

## 2023-07-31 DIAGNOSIS — B35.1 ONYCHOMYCOSIS DUE TO DERMATOPHYTE: ICD-10-CM

## 2023-07-31 DIAGNOSIS — E11.42 DIABETIC POLYNEUROPATHY ASSOCIATED WITH TYPE 2 DIABETES MELLITUS: Primary | ICD-10-CM

## 2023-07-31 PROCEDURE — 11721 PR DEBRIDEMENT OF NAILS, 6 OR MORE: ICD-10-PCS | Mod: 59,Q9,S$GLB, | Performed by: PODIATRIST

## 2023-07-31 PROCEDURE — 99499 NO LOS: ICD-10-PCS | Mod: S$GLB,,, | Performed by: PODIATRIST

## 2023-07-31 PROCEDURE — 99999 PR PBB SHADOW E&M-EST. PATIENT-LVL IV: CPT | Mod: PBBFAC,,, | Performed by: PODIATRIST

## 2023-07-31 PROCEDURE — 11056 PARNG/CUTG B9 HYPRKR LES 2-4: CPT | Mod: Q9,S$GLB,, | Performed by: PODIATRIST

## 2023-07-31 PROCEDURE — 11056 PR TRIM BENIGN HYPERKERATOTIC SKIN LESION,2-4: ICD-10-PCS | Mod: Q9,S$GLB,, | Performed by: PODIATRIST

## 2023-07-31 PROCEDURE — 99499 UNLISTED E&M SERVICE: CPT | Mod: S$GLB,,, | Performed by: PODIATRIST

## 2023-07-31 PROCEDURE — 11721 DEBRIDE NAIL 6 OR MORE: CPT | Mod: 59,Q9,S$GLB, | Performed by: PODIATRIST

## 2023-07-31 PROCEDURE — 99999 PR PBB SHADOW E&M-EST. PATIENT-LVL IV: ICD-10-PCS | Mod: PBBFAC,,, | Performed by: PODIATRIST

## 2023-07-31 RX ORDER — EMPAGLIFLOZIN 10 MG/1
10 TABLET, FILM COATED ORAL
Status: ON HOLD | COMMUNITY
Start: 2023-07-28 | End: 2024-01-15 | Stop reason: HOSPADM

## 2023-07-31 NOTE — PROGRESS NOTES
Subjective:      Patient ID: Kamlesh Diane is a 68 y.o. male.    Chief Complaint: Nail Care and Diabetes Mellitus    Kamlesh is a 68 y.o. male who presents to the clinic for evaluation and treatment of high risk feet. Kamlesh has a past medical history of CVA (cerebral vascular accident), Diabetes, Diabetic retinopathy, Elevated PSA, Hypertension, Prostate cancer, and Stroke. The patient's chief complaint is long, thick toenails. This patient has documented high risk feet requiring routine maintenance secondary to peripheral neuropathy.    PCP: Debora Ontiveros MD    Date Last Seen by PCP:   Chief Complaint   Patient presents with    Nail Care    Diabetes Mellitus       Current shoe gear:  Affected Foot: Slip-on shoes     Unaffected Foot: Slip-on shoes    Hemoglobin A1C   Date Value Ref Range Status   12/13/2022 7.5 (H) 4.0 - 5.6 % Final     Comment:     ADA Screening Guidelines:  5.7-6.4%  Consistent with prediabetes  >or=6.5%  Consistent with diabetes    High levels of fetal hemoglobin interfere with the HbA1C  assay. Heterozygous hemoglobin variants (HbS, HgC, etc)do  not significantly interfere with this assay.   However, presence of multiple variants may affect accuracy.     02/09/2022 7.3 (H) 4.0 - 5.6 % Final     Comment:     ADA Screening Guidelines:  5.7-6.4%  Consistent with prediabetes  >or=6.5%  Consistent with diabetes    High levels of fetal hemoglobin interfere with the HbA1C  assay. Heterozygous hemoglobin variants (HbS, HgC, etc)do  not significantly interfere with this assay.   However, presence of multiple variants may affect accuracy.     01/14/2022 7.2 (H) 4.0 - 5.6 % Final     Comment:     ADA Screening Guidelines:  5.7-6.4%  Consistent with prediabetes  >or=6.5%  Consistent with diabetes    High levels of fetal hemoglobin interfere with the HbA1C  assay. Heterozygous hemoglobin variants (HbS, HgC, etc)do  not significantly interfere with this assay.   However, presence of multiple variants may  affect accuracy.         Review of Systems   Constitutional: Negative for chills, fever and malaise/fatigue.   HENT:  Negative for hearing loss.    Cardiovascular:  Positive for leg swelling. Negative for claudication.   Respiratory:  Negative for shortness of breath.    Skin:  Positive for color change, dry skin, nail changes and unusual hair distribution. Negative for flushing and rash.   Musculoskeletal:  Negative for joint pain and myalgias.   Gastrointestinal:  Negative for nausea and vomiting.   Neurological:  Positive for numbness, paresthesias and sensory change. Negative for loss of balance.   Psychiatric/Behavioral:  Negative for altered mental status.            Objective:      Physical Exam  Vitals reviewed.   Constitutional:       Appearance: He is well-developed.   Cardiovascular:      Pulses:           Dorsalis pedis pulses are 0 on the right side and 0 on the left side.        Posterior tibial pulses are 1+ on the right side and 1+ on the left side.   Musculoskeletal:      Right ankle: Decreased range of motion. Abnormal pulse.      Right Achilles Tendon: Ramirez's test negative.      Left ankle: Decreased range of motion. Abnormal pulse.      Left Achilles Tendon: Ramirez's test negative.      Right foot: Decreased range of motion.      Left foot: Decreased range of motion.   Feet:      Right foot:      Protective Sensation: 5 sites tested.  2 sites sensed.      Left foot:      Protective Sensation: 5 sites tested.  2 sites sensed.   Skin:     General: Skin is cool and dry.      Capillary Refill: Capillary refill takes more than 3 seconds.      Findings: No wound.      Comments: HKL's noted to b/L HIPJs       Neurological:      Mental Status: He is alert.      Comments: diminished sensation noted to b/L lower extremities   Psychiatric:         Behavior: Behavior normal. Behavior is cooperative.         Nails x10 are elongated by  5-6mm's curling underneath toes, thickened by 3-4 mm's, dystrophic,  and are dark in  coloration . Xerosis Bilaterally.           Assessment:       Encounter Diagnoses   Name Primary?    Diabetic polyneuropathy associated with type 2 diabetes mellitus Yes    Onychomycosis due to dermatophyte     Corn or callus          Plan:       Kamlesh was seen today for nail care and diabetes mellitus.    Diagnoses and all orders for this visit:    Diabetic polyneuropathy associated with type 2 diabetes mellitus    Onychomycosis due to dermatophyte    Corn or callus      I counseled the patient on his conditions, their implications and medical management.    - Shoe inspection. Diabetic Foot Education. Patient reminded of the importance of good nutrition and blood sugar control to help prevent podiatric complications of diabetes. Patient instructed on proper foot hygeine. We discussed wearing proper shoe gear, daily foot inspections, never walking without protective shoe gear, never putting sharp instruments to feet, routine podiatric nail visits every 2-3 months.    After cleansing with an alcohol prep pad, the about mentioned hyperkeratotic lesions were sharply debrided X 2 utilizing a #15 blade to a smooth base without incident. Pt tolerated the procedure well and reported comfort to the debarment sites. Pt will continue to use padding and moisture the callused areas.     - With patient's permission, nails were aggressively reduced and debrided x 10 to their soft tissue attachment mechanically and with electric , removing all offending nail and debris. Patient relates relief following the procedure. He will continue to monitor the areas daily, inspect his feet, wear protective shoe gear when ambulatory, moisturizer to maintain skin integrity and follow in this office in approximately 2-3 months, sooner p.r.n.

## 2023-08-05 ENCOUNTER — PATIENT MESSAGE (OUTPATIENT)
Dept: PRIMARY CARE CLINIC | Facility: CLINIC | Age: 68
End: 2023-08-05
Payer: MEDICARE

## 2023-08-07 RX ORDER — WARFARIN SODIUM 5 MG/1
TABLET ORAL
Qty: 90 TABLET | Refills: 3 | Status: ON HOLD | OUTPATIENT
Start: 2023-08-07 | End: 2024-01-16

## 2023-08-17 ENCOUNTER — LAB VISIT (OUTPATIENT)
Dept: LAB | Facility: HOSPITAL | Age: 68
End: 2023-08-17
Attending: INTERNAL MEDICINE
Payer: MEDICARE

## 2023-08-17 ENCOUNTER — OFFICE VISIT (OUTPATIENT)
Dept: PRIMARY CARE CLINIC | Facility: CLINIC | Age: 68
End: 2023-08-17
Payer: MEDICARE

## 2023-08-17 ENCOUNTER — ANTI-COAG VISIT (OUTPATIENT)
Dept: CARDIOLOGY | Facility: CLINIC | Age: 68
End: 2023-08-17
Payer: MEDICARE

## 2023-08-17 VITALS
HEART RATE: 98 BPM | WEIGHT: 173.31 LBS | DIASTOLIC BLOOD PRESSURE: 77 MMHG | HEIGHT: 73 IN | SYSTOLIC BLOOD PRESSURE: 119 MMHG | BODY MASS INDEX: 22.97 KG/M2 | OXYGEN SATURATION: 100 % | TEMPERATURE: 98 F

## 2023-08-17 DIAGNOSIS — C61 PROSTATE CANCER: ICD-10-CM

## 2023-08-17 DIAGNOSIS — Z79.01 LONG TERM (CURRENT) USE OF ANTICOAGULANTS: ICD-10-CM

## 2023-08-17 DIAGNOSIS — E11.65 TYPE 2 DIABETES MELLITUS WITH HYPERGLYCEMIA, WITH LONG-TERM CURRENT USE OF INSULIN: Primary | ICD-10-CM

## 2023-08-17 DIAGNOSIS — E11.65 TYPE 2 DIABETES MELLITUS WITH HYPERGLYCEMIA, WITH LONG-TERM CURRENT USE OF INSULIN: ICD-10-CM

## 2023-08-17 DIAGNOSIS — D64.9 NORMOCYTIC ANEMIA: ICD-10-CM

## 2023-08-17 DIAGNOSIS — N18.30 CKD STAGE 3 DUE TO TYPE 2 DIABETES MELLITUS: ICD-10-CM

## 2023-08-17 DIAGNOSIS — E11.22 CKD STAGE 3 DUE TO TYPE 2 DIABETES MELLITUS: ICD-10-CM

## 2023-08-17 DIAGNOSIS — M81.0 OSTEOPOROSIS, UNSPECIFIED OSTEOPOROSIS TYPE, UNSPECIFIED PATHOLOGICAL FRACTURE PRESENCE: ICD-10-CM

## 2023-08-17 DIAGNOSIS — Z79.01 CHRONIC ANTICOAGULATION: ICD-10-CM

## 2023-08-17 DIAGNOSIS — I10 ESSENTIAL HYPERTENSION: ICD-10-CM

## 2023-08-17 DIAGNOSIS — H35.033 HYPERTENSIVE RETINOPATHY, BILATERAL: ICD-10-CM

## 2023-08-17 DIAGNOSIS — Z86.73 H/O: CVA (CEREBROVASCULAR ACCIDENT): ICD-10-CM

## 2023-08-17 DIAGNOSIS — I69.359 CVA, OLD, HEMIPARESIS: ICD-10-CM

## 2023-08-17 DIAGNOSIS — Z79.4 TYPE 2 DIABETES MELLITUS WITH HYPERGLYCEMIA, WITH LONG-TERM CURRENT USE OF INSULIN: ICD-10-CM

## 2023-08-17 DIAGNOSIS — Z79.01 LONG TERM (CURRENT) USE OF ANTICOAGULANTS: Primary | ICD-10-CM

## 2023-08-17 DIAGNOSIS — Z79.4 TYPE 2 DIABETES MELLITUS WITH HYPERGLYCEMIA, WITH LONG-TERM CURRENT USE OF INSULIN: Primary | ICD-10-CM

## 2023-08-17 LAB
ALBUMIN SERPL BCP-MCNC: 4.1 G/DL (ref 3.5–5.2)
ALP SERPL-CCNC: 83 U/L (ref 55–135)
ALT SERPL W/O P-5'-P-CCNC: 17 U/L (ref 10–44)
ANION GAP SERPL CALC-SCNC: 10 MMOL/L (ref 8–16)
AST SERPL-CCNC: 19 U/L (ref 10–40)
BASOPHILS # BLD AUTO: 0.04 K/UL (ref 0–0.2)
BASOPHILS NFR BLD: 0.6 % (ref 0–1.9)
BILIRUB SERPL-MCNC: 0.4 MG/DL (ref 0.1–1)
BUN SERPL-MCNC: 16 MG/DL (ref 8–23)
CALCIUM SERPL-MCNC: 9.4 MG/DL (ref 8.7–10.5)
CHLORIDE SERPL-SCNC: 107 MMOL/L (ref 95–110)
CHOLEST SERPL-MCNC: 151 MG/DL (ref 120–199)
CHOLEST/HDLC SERPL: 3.5 {RATIO} (ref 2–5)
CO2 SERPL-SCNC: 25 MMOL/L (ref 23–29)
COMPLEXED PSA SERPL-MCNC: 1.6 NG/ML (ref 0–4)
CREAT SERPL-MCNC: 1.7 MG/DL (ref 0.5–1.4)
DIFFERENTIAL METHOD: ABNORMAL
EOSINOPHIL # BLD AUTO: 0.3 K/UL (ref 0–0.5)
EOSINOPHIL NFR BLD: 4.5 % (ref 0–8)
ERYTHROCYTE [DISTWIDTH] IN BLOOD BY AUTOMATED COUNT: 13.7 % (ref 11.5–14.5)
EST. GFR  (NO RACE VARIABLE): 43.4 ML/MIN/1.73 M^2
ESTIMATED AVG GLUCOSE: 143 MG/DL (ref 68–131)
FERRITIN SERPL-MCNC: 61 NG/ML (ref 20–300)
GLUCOSE SERPL-MCNC: 83 MG/DL (ref 70–110)
HBA1C MFR BLD: 6.6 % (ref 4–5.6)
HCT VFR BLD AUTO: 43.1 % (ref 40–54)
HDLC SERPL-MCNC: 43 MG/DL (ref 40–75)
HDLC SERPL: 28.5 % (ref 20–50)
HGB BLD-MCNC: 13.8 G/DL (ref 14–18)
IMM GRANULOCYTES # BLD AUTO: 0.01 K/UL (ref 0–0.04)
IMM GRANULOCYTES NFR BLD AUTO: 0.2 % (ref 0–0.5)
INR PPP: 2.8 (ref 0.8–1.2)
IRON SERPL-MCNC: 57 UG/DL (ref 45–160)
LDLC SERPL CALC-MCNC: 97.6 MG/DL (ref 63–159)
LYMPHOCYTES # BLD AUTO: 0.9 K/UL (ref 1–4.8)
LYMPHOCYTES NFR BLD: 13.7 % (ref 18–48)
MCH RBC QN AUTO: 29.2 PG (ref 27–31)
MCHC RBC AUTO-ENTMCNC: 32 G/DL (ref 32–36)
MCV RBC AUTO: 91 FL (ref 82–98)
MONOCYTES # BLD AUTO: 0.7 K/UL (ref 0.3–1)
MONOCYTES NFR BLD: 10.4 % (ref 4–15)
NEUTROPHILS # BLD AUTO: 4.5 K/UL (ref 1.8–7.7)
NEUTROPHILS NFR BLD: 70.6 % (ref 38–73)
NONHDLC SERPL-MCNC: 108 MG/DL
NRBC BLD-RTO: 0 /100 WBC
PLATELET # BLD AUTO: 287 K/UL (ref 150–450)
PMV BLD AUTO: 11 FL (ref 9.2–12.9)
POTASSIUM SERPL-SCNC: 4.5 MMOL/L (ref 3.5–5.1)
PROT SERPL-MCNC: 7.9 G/DL (ref 6–8.4)
PROTHROMBIN TIME: 28.2 SEC (ref 9–12.5)
RBC # BLD AUTO: 4.72 M/UL (ref 4.6–6.2)
SATURATED IRON: 15 % (ref 20–50)
SODIUM SERPL-SCNC: 142 MMOL/L (ref 136–145)
TOTAL IRON BINDING CAPACITY: 376 UG/DL (ref 250–450)
TRANSFERRIN SERPL-MCNC: 254 MG/DL (ref 200–375)
TRIGL SERPL-MCNC: 52 MG/DL (ref 30–150)
TSH SERPL DL<=0.005 MIU/L-ACNC: 1.53 UIU/ML (ref 0.4–4)
WBC # BLD AUTO: 6.42 K/UL (ref 3.9–12.7)

## 2023-08-17 PROCEDURE — 3288F FALL RISK ASSESSMENT DOCD: CPT | Mod: CPTII,S$GLB,, | Performed by: INTERNAL MEDICINE

## 2023-08-17 PROCEDURE — 1126F AMNT PAIN NOTED NONE PRSNT: CPT | Mod: CPTII,S$GLB,, | Performed by: INTERNAL MEDICINE

## 2023-08-17 PROCEDURE — 1160F RVW MEDS BY RX/DR IN RCRD: CPT | Mod: CPTII,S$GLB,, | Performed by: INTERNAL MEDICINE

## 2023-08-17 PROCEDURE — 1101F PT FALLS ASSESS-DOCD LE1/YR: CPT | Mod: CPTII,S$GLB,, | Performed by: INTERNAL MEDICINE

## 2023-08-17 PROCEDURE — 82728 ASSAY OF FERRITIN: CPT | Performed by: INTERNAL MEDICINE

## 2023-08-17 PROCEDURE — 3074F PR MOST RECENT SYSTOLIC BLOOD PRESSURE < 130 MM HG: ICD-10-PCS | Mod: CPTII,S$GLB,, | Performed by: INTERNAL MEDICINE

## 2023-08-17 PROCEDURE — 99214 OFFICE O/P EST MOD 30 MIN: CPT | Mod: S$GLB,,, | Performed by: INTERNAL MEDICINE

## 2023-08-17 PROCEDURE — 1124F ACP DISCUSS-NO DSCNMKR DOCD: CPT | Mod: CPTII,S$GLB,, | Performed by: INTERNAL MEDICINE

## 2023-08-17 PROCEDURE — 99214 PR OFFICE/OUTPT VISIT, EST, LEVL IV, 30-39 MIN: ICD-10-PCS | Mod: S$GLB,,, | Performed by: INTERNAL MEDICINE

## 2023-08-17 PROCEDURE — 83036 HEMOGLOBIN GLYCOSYLATED A1C: CPT | Performed by: INTERNAL MEDICINE

## 2023-08-17 PROCEDURE — 85610 PROTHROMBIN TIME: CPT | Performed by: INTERNAL MEDICINE

## 2023-08-17 PROCEDURE — 84443 ASSAY THYROID STIM HORMONE: CPT | Performed by: INTERNAL MEDICINE

## 2023-08-17 PROCEDURE — 1101F PR PT FALLS ASSESS DOC 0-1 FALLS W/OUT INJ PAST YR: ICD-10-PCS | Mod: CPTII,S$GLB,, | Performed by: INTERNAL MEDICINE

## 2023-08-17 PROCEDURE — 1159F PR MEDICATION LIST DOCUMENTED IN MEDICAL RECORD: ICD-10-PCS | Mod: CPTII,S$GLB,, | Performed by: INTERNAL MEDICINE

## 2023-08-17 PROCEDURE — 3074F SYST BP LT 130 MM HG: CPT | Mod: CPTII,S$GLB,, | Performed by: INTERNAL MEDICINE

## 2023-08-17 PROCEDURE — 4010F ACE/ARB THERAPY RXD/TAKEN: CPT | Mod: CPTII,S$GLB,, | Performed by: INTERNAL MEDICINE

## 2023-08-17 PROCEDURE — 3288F PR FALLS RISK ASSESSMENT DOCUMENTED: ICD-10-PCS | Mod: CPTII,S$GLB,, | Performed by: INTERNAL MEDICINE

## 2023-08-17 PROCEDURE — 3008F PR BODY MASS INDEX (BMI) DOCUMENTED: ICD-10-PCS | Mod: CPTII,S$GLB,, | Performed by: INTERNAL MEDICINE

## 2023-08-17 PROCEDURE — 80061 LIPID PANEL: CPT | Performed by: INTERNAL MEDICINE

## 2023-08-17 PROCEDURE — 99999 PR PBB SHADOW E&M-EST. PATIENT-LVL V: CPT | Mod: PBBFAC,,, | Performed by: INTERNAL MEDICINE

## 2023-08-17 PROCEDURE — 3078F PR MOST RECENT DIASTOLIC BLOOD PRESSURE < 80 MM HG: ICD-10-PCS | Mod: CPTII,S$GLB,, | Performed by: INTERNAL MEDICINE

## 2023-08-17 PROCEDURE — 1159F MED LIST DOCD IN RCRD: CPT | Mod: CPTII,S$GLB,, | Performed by: INTERNAL MEDICINE

## 2023-08-17 PROCEDURE — 80053 COMPREHEN METABOLIC PANEL: CPT | Performed by: INTERNAL MEDICINE

## 2023-08-17 PROCEDURE — 1160F PR REVIEW ALL MEDS BY PRESCRIBER/CLIN PHARMACIST DOCUMENTED: ICD-10-PCS | Mod: CPTII,S$GLB,, | Performed by: INTERNAL MEDICINE

## 2023-08-17 PROCEDURE — 99999 PR PBB SHADOW E&M-EST. PATIENT-LVL V: ICD-10-PCS | Mod: PBBFAC,,, | Performed by: INTERNAL MEDICINE

## 2023-08-17 PROCEDURE — 36415 COLL VENOUS BLD VENIPUNCTURE: CPT | Mod: PN | Performed by: INTERNAL MEDICINE

## 2023-08-17 PROCEDURE — 3078F DIAST BP <80 MM HG: CPT | Mod: CPTII,S$GLB,, | Performed by: INTERNAL MEDICINE

## 2023-08-17 PROCEDURE — 1126F PR PAIN SEVERITY QUANTIFIED, NO PAIN PRESENT: ICD-10-PCS | Mod: CPTII,S$GLB,, | Performed by: INTERNAL MEDICINE

## 2023-08-17 PROCEDURE — 3008F BODY MASS INDEX DOCD: CPT | Mod: CPTII,S$GLB,, | Performed by: INTERNAL MEDICINE

## 2023-08-17 PROCEDURE — 84153 ASSAY OF PSA TOTAL: CPT | Performed by: INTERNAL MEDICINE

## 2023-08-17 PROCEDURE — 85025 COMPLETE CBC W/AUTO DIFF WBC: CPT | Performed by: INTERNAL MEDICINE

## 2023-08-17 PROCEDURE — 4010F PR ACE/ARB THEARPY RXD/TAKEN: ICD-10-PCS | Mod: CPTII,S$GLB,, | Performed by: INTERNAL MEDICINE

## 2023-08-17 PROCEDURE — 83540 ASSAY OF IRON: CPT | Performed by: INTERNAL MEDICINE

## 2023-08-17 PROCEDURE — 1124F PR ADV CARE PLAN DISCUSSED, UNABLE/UNWILL DOC PLAN OR SURROGATE: ICD-10-PCS | Mod: CPTII,S$GLB,, | Performed by: INTERNAL MEDICINE

## 2023-08-17 PROCEDURE — 84466 ASSAY OF TRANSFERRIN: CPT | Performed by: INTERNAL MEDICINE

## 2023-08-17 NOTE — PROGRESS NOTES
"Subjective:       Patient ID: Kamlesh Diane is a 68 y.o. male.    Chief Complaint: Diabetes    HPI  DM2 - levemir 45 u qd, humalog 7-8 units TID w/ meals, jardiance 10mg daily.   Follows w/ Soledad Mckeon NP  A1c - 1/9/23 7.4  Due for eye exam. Needs appt w/ Dr. Tapia  Foot - Dr. Posadas 7/31/23  MAC - 6/20/23    HTN - losartan 50mg daily, amlodipine 10mg daily, coreg 3.125mg BID, clonidine 0.1mg TID.   CKD3 - follows w/ Dr. Chahal (outside nephrology). On calcitriol 0.25mcg daily, ergo 50k weekly.    Osteoporosis - fosamax 70mg qweek but w/ decline so started Prolia. Last injection 7/17/23. Next one due 1/2024.  DEXA 3/30/23 - osteoporosis. High FRAX risk. Stable BMD at L spine and -5.2% decline at the hip. Repeat in 2 yrs.    Prostate CA s/p radiation.   Last visit w/ Lyly Munroe NP 3/28/23. Repeat in a yr.   PSA 8/4/22 1.8  OAB - on myrbetriq 25mg daily.    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   Has been on coumadin for embolic stroke. Follows w/ coumadin clinic. INR at goal - 7/20/23 2.4  Walks w/ walker. Lives w/ sister Olinda, who sets out the meds for him. No falls. No numbness/tingling.     Review of Systems  Comprehensive review of systems otherwise negative. See history/subjective section for more details.    Objective:      Physical Exam    /77 (BP Location: Right arm, Patient Position: Sitting, BP Method: Large (Manual))   Pulse 98   Temp 98.2 °F (36.8 °C) (Oral)   Ht 6' 1" (1.854 m)   Wt 78.6 kg (173 lb 4.5 oz)   SpO2 100%   BMI 22.86 kg/m²     Gen - A+OX4, NAD  HEENT - slurred speech. Wears glasses. TM normal. MMM. R pupil not responsive to light.   Neck - no LAD  CV - RRR, no m/r  Chest - CTAB, no wheezing/rhonchi  Abd - S/NT/ND/+BS  Ext -2 + B radial and 1+ B DP pulses. No LE edema.   Neuro/MSK - antalgic gait. Walks w/ a walker. Unsteady standing on the scale. Some RUE spasticity. 4-5 RUE and RLE m strength and 5/5 LUE and LLE m " strength. No spinal tenderness to palpation.   Skin - no rash.     Assessment/Plan     Kamlesh was seen today for diabetes.    Diagnoses and all orders for this visit:    Type 2 diabetes mellitus with hyperglycemia, with long-term current use of insulin - cont jardiance 10mg daily and insulin.   -     Lipid Panel; Future  -     Hemoglobin A1C; Future  -     Ambulatory referral/consult to Ophthalmology; Future    CKD stage 3 due to type 2 diabetes mellitus - f/u w/ Dr. Chahal. Cont w/ current meds. Cont losartan.   -     Comprehensive Metabolic Panel; Future  -     CBC Auto Differential; Future    Osteoporosis, unspecified osteoporosis type, unspecified pathological fracture presence - decline despite fosamax. Cont prolia q 6 mo.   -     Comprehensive Metabolic Panel; Future    Prostate cancer  -     PROSTATE SPECIFIC ANTIGEN, DIAGNOSTIC; Future    CVA, old, hemiparesis - stable. Cont walker.   -     Lipid Panel; Future    Chronic anticoagulation  -     CBC Auto Differential; Future    Normocytic anemia  -     CBC Auto Differential; Future  -     FERRITIN; Future  -     IRON AND TIBC; Future    Essential hypertension - Stable and controlled. Continue current medications.  -     Comprehensive Metabolic Panel; Future  -     Lipid Panel; Future  -     TSH; Future    Hypertensive retinopathy, bilateral - needs follow up w/ Dr. Tapia.   -     Ambulatory referral/consult to Ophthalmology; Future    Advance Care Planning     Date: 08/17/2023  Patient did not wish or was not able to name a surrogate decision maker or provide an Advance Care Plan.       Follow up in about 6 months (around 2/17/2024).      Debora Ontiveros MD  Department of Internal Medicine - [facility]  8:00 AM

## 2023-08-18 ENCOUNTER — TELEPHONE (OUTPATIENT)
Dept: PRIMARY CARE CLINIC | Facility: CLINIC | Age: 68
End: 2023-08-18
Payer: MEDICARE

## 2023-08-25 ENCOUNTER — PATIENT MESSAGE (OUTPATIENT)
Dept: PRIMARY CARE CLINIC | Facility: CLINIC | Age: 68
End: 2023-08-25
Payer: MEDICARE

## 2023-08-25 NOTE — TELEPHONE ENCOUNTER
Would this patient qualify for some type of aid in the home? Advised family to inquire information from insurance company.

## 2023-09-01 DIAGNOSIS — I15.2 HYPERTENSION ASSOCIATED WITH DIABETES: ICD-10-CM

## 2023-09-01 DIAGNOSIS — E11.59 HYPERTENSION ASSOCIATED WITH DIABETES: ICD-10-CM

## 2023-09-01 RX ORDER — AMLODIPINE BESYLATE 10 MG/1
10 TABLET ORAL DAILY
Qty: 90 TABLET | Refills: 0 | Status: SHIPPED | OUTPATIENT
Start: 2023-09-01 | End: 2023-12-21 | Stop reason: SDUPTHER

## 2023-09-01 NOTE — TELEPHONE ENCOUNTER
----- Message from Harsh Newton sent at 9/1/2023  3:16 PM CDT -----  Contact: [phone]  Requesting an RX refill or new RX.  Is this a refill or new RX: refill  RX name and strength (copy/paste from chart):  amLODIPine (NORVASC) 10 MG tablet  Is this a 30 day or 90 day RX:   Pharmacy name and phone # (copy/paste from chart):    [facility] #[id number] - [place], [place] - [facility] AT [facility] [address]  [address]  [facility] [address]  Phone: [phone] Fax: [phone]    The doctors have asked that we provide their patients with the following 2 reminders -- prescription refills can take up to 72 hours, and a friendly reminder that in the future you can use your [facility] account to request refills: call back

## 2023-09-05 RX ORDER — AMLODIPINE BESYLATE 10 MG/1
10 TABLET ORAL
Qty: 90 TABLET | Refills: 3 | Status: SHIPPED | OUTPATIENT
Start: 2023-09-05 | End: 2023-12-21 | Stop reason: SDUPTHER

## 2023-09-06 ENCOUNTER — OFFICE VISIT (OUTPATIENT)
Dept: INTERNAL MEDICINE | Facility: CLINIC | Age: 68
End: 2023-09-06
Payer: MEDICARE

## 2023-09-06 DIAGNOSIS — E11.649 DIABETIC HYPOGLYCEMIA: ICD-10-CM

## 2023-09-06 DIAGNOSIS — Z79.01 LONG TERM (CURRENT) USE OF ANTICOAGULANTS: ICD-10-CM

## 2023-09-06 DIAGNOSIS — C61 CANCER OF PROSTATE WITH LOW RECURRENCE RISK (STAGE T1-2A AND GLEASON < 7 AND PSA < 10): ICD-10-CM

## 2023-09-06 DIAGNOSIS — E11.8 CONTROLLED TYPE 2 DIABETES MELLITUS WITH COMPLICATION, WITH LONG-TERM CURRENT USE OF INSULIN: ICD-10-CM

## 2023-09-06 DIAGNOSIS — Z86.73 H/O: CVA (CEREBROVASCULAR ACCIDENT): ICD-10-CM

## 2023-09-06 DIAGNOSIS — E11.65 TYPE 2 DIABETES MELLITUS WITH HYPERGLYCEMIA, WITH LONG-TERM CURRENT USE OF INSULIN: Primary | ICD-10-CM

## 2023-09-06 DIAGNOSIS — N18.32 STAGE 3B CHRONIC KIDNEY DISEASE: ICD-10-CM

## 2023-09-06 DIAGNOSIS — H35.033 HYPERTENSIVE RETINOPATHY, BILATERAL: ICD-10-CM

## 2023-09-06 DIAGNOSIS — I10 ESSENTIAL HYPERTENSION: ICD-10-CM

## 2023-09-06 DIAGNOSIS — Z79.4 CONTROLLED TYPE 2 DIABETES MELLITUS WITH COMPLICATION, WITH LONG-TERM CURRENT USE OF INSULIN: ICD-10-CM

## 2023-09-06 DIAGNOSIS — Z79.4 TYPE 2 DIABETES MELLITUS WITH HYPERGLYCEMIA, WITH LONG-TERM CURRENT USE OF INSULIN: Primary | ICD-10-CM

## 2023-09-06 PROCEDURE — 3044F HG A1C LEVEL LT 7.0%: CPT | Mod: CPTII,95,, | Performed by: NURSE PRACTITIONER

## 2023-09-06 PROCEDURE — 1159F PR MEDICATION LIST DOCUMENTED IN MEDICAL RECORD: ICD-10-PCS | Mod: CPTII,95,, | Performed by: NURSE PRACTITIONER

## 2023-09-06 PROCEDURE — 1160F RVW MEDS BY RX/DR IN RCRD: CPT | Mod: CPTII,95,, | Performed by: NURSE PRACTITIONER

## 2023-09-06 PROCEDURE — 3044F PR MOST RECENT HEMOGLOBIN A1C LEVEL <7.0%: ICD-10-PCS | Mod: CPTII,95,, | Performed by: NURSE PRACTITIONER

## 2023-09-06 PROCEDURE — 99214 OFFICE O/P EST MOD 30 MIN: CPT | Mod: 95,,, | Performed by: NURSE PRACTITIONER

## 2023-09-06 PROCEDURE — 99214 PR OFFICE/OUTPT VISIT, EST, LEVL IV, 30-39 MIN: ICD-10-PCS | Mod: 95,,, | Performed by: NURSE PRACTITIONER

## 2023-09-06 PROCEDURE — 4010F PR ACE/ARB THEARPY RXD/TAKEN: ICD-10-PCS | Mod: CPTII,95,, | Performed by: NURSE PRACTITIONER

## 2023-09-06 PROCEDURE — 1160F PR REVIEW ALL MEDS BY PRESCRIBER/CLIN PHARMACIST DOCUMENTED: ICD-10-PCS | Mod: CPTII,95,, | Performed by: NURSE PRACTITIONER

## 2023-09-06 PROCEDURE — 1159F MED LIST DOCD IN RCRD: CPT | Mod: CPTII,95,, | Performed by: NURSE PRACTITIONER

## 2023-09-06 PROCEDURE — 4010F ACE/ARB THERAPY RXD/TAKEN: CPT | Mod: CPTII,95,, | Performed by: NURSE PRACTITIONER

## 2023-09-06 RX ORDER — BLOOD-GLUCOSE SENSOR
EACH MISCELLANEOUS
Qty: 3 EACH | Refills: 11 | Status: SHIPPED | OUTPATIENT
Start: 2023-09-06

## 2023-09-06 RX ORDER — BLOOD-GLUCOSE,RECEIVER,CONT
EACH MISCELLANEOUS
Qty: 1 EACH | Refills: 1 | Status: SHIPPED | OUTPATIENT
Start: 2023-09-06

## 2023-09-06 NOTE — Clinical Note
A1c looks great w/ jardiance, less insulin needs:) Fawn-pt had dexcom g7 sent , bro and sis helps a lot Amie book pt in 4 months, a1c prior -thanks

## 2023-09-06 NOTE — PROGRESS NOTES
CHIEF COMPLAINT: Type 2 Diabetes     HPI: Mr. Kamlesh Diane is a 68 y.o. male who was diagnosed with Type 2 DM > 5 years.   Pt has h/o MCA stroke, HTN, PAD, low bone density-on fosamax, prostate ca-radiation treatments, CKD3, CR 1.6-2.  Past Medical History:   Diagnosis Date    CVA (cerebral vascular accident)     Diabetes     Diabetic retinopathy     Elevated PSA     Hypertension     Prostate cancer     Stroke      Pt was last seen by me in 2023.  Arrived with brother  Moved in with brother, still having work done on home.   Uses walker.  Started jardiance 10 mg daily per nephrology-Delberto   A1c has come down even more, kidney functions have improved , egfr, CR.   A1c went down from 7.5% to 6.6%  Has interest in dexcom g7    Lab Results   Component Value Date    HGBA1C 6.6 (H) 08/17/2023       Dietary habits:  Wakes up at 9-10a  Eats first meal  a  Lunch 3-5 p   Snack - chips or grapes     Skips insulin in am if sugar is < 100   Dinner highest bG 180 mg/dl     PREVIOUS DIABETES MEDICATIONS TRIED  novolog   levemir   humalog     CURRENT DIABETIC MEDS: humalog 4-7  units ac-holds if sugar is less than 100 , no scale, levemir 25 units at night, jardiance 10 mg daily   Holds if sugar is < 100 mg/dl  Injections 3x a day max   Testing 4x a day max    Diabetes Management Status    Statin: Taking  ACE/ARB: Taking    Screening or Prevention Patient's value Goal Complete/Controlled?   HgA1C Testing and Control   Lab Results   Component Value Date    HGBA1C 6.6 (H) 08/17/2023      Annually/Less than 8% No   Lipid profile : 08/17/2023 Annually Yes   LDL control Lab Results   Component Value Date    LDLCALC 97.6 08/17/2023    Annually/Less than 100 mg/dl  Yes   Nephropathy screening Lab Results   Component Value Date    LABMICR 40.0 01/14/2022     Lab Results   Component Value Date    PROTEINUA Negative 05/30/2018    Annually Yes   Blood pressure BP Readings from Last 1 Encounters:   08/17/23 119/77    Less than  140/90 No   Dilated retinal exam : 06/03/2022 Annually Yes   Foot exam   : 07/31/2023 Annually No       REVIEW OF SYSTEMS  General: +weakness-improved, fatigue, +weight loss   Eyes: no double or blurred vision, eye pain, or redness  Cardiovascular: no chest pain, palpitations, edema, or murmurs.   Respiratory: no cough or dyspnea.   GI: no heartburn, nausea, or changes in bowel patterns; good appetite.   Skin: no rashes, dryness, itching, or reactions at insulin injection sites.  Neuro: + numbness, tingling, tremors, or vertigo. +hip pain (L)  Endocrine: no polyuria, polydipsia, polyphagia, heat or cold intolerance.     Vital Signs  There were no vitals taken for this visit.    Hemoglobin A1C   Date Value Ref Range Status   08/17/2023 6.6 (H) 4.0 - 5.6 % Final     Comment:     ADA Screening Guidelines:  5.7-6.4%  Consistent with prediabetes  >or=6.5%  Consistent with diabetes    High levels of fetal hemoglobin interfere with the HbA1C  assay. Heterozygous hemoglobin variants (HbS, HgC, etc)do  not significantly interfere with this assay.   However, presence of multiple variants may affect accuracy.     12/13/2022 7.5 (H) 4.0 - 5.6 % Final     Comment:     ADA Screening Guidelines:  5.7-6.4%  Consistent with prediabetes  >or=6.5%  Consistent with diabetes    High levels of fetal hemoglobin interfere with the HbA1C  assay. Heterozygous hemoglobin variants (HbS, HgC, etc)do  not significantly interfere with this assay.   However, presence of multiple variants may affect accuracy.     02/09/2022 7.3 (H) 4.0 - 5.6 % Final     Comment:     ADA Screening Guidelines:  5.7-6.4%  Consistent with prediabetes  >or=6.5%  Consistent with diabetes    High levels of fetal hemoglobin interfere with the HbA1C  assay. Heterozygous hemoglobin variants (HbS, HgC, etc)do  not significantly interfere with this assay.   However, presence of multiple variants may affect accuracy.          Chemistry        Component Value Date/Time      08/17/2023 1039    K 4.5 08/17/2023 1039     08/17/2023 1039    CO2 25 08/17/2023 1039    BUN 16 08/17/2023 1039    CREATININE 1.7 (H) 08/17/2023 1039    GLU 83 08/17/2023 1039        Component Value Date/Time    CALCIUM 9.4 08/17/2023 1039    ALKPHOS 83 08/17/2023 1039    AST 19 08/17/2023 1039    ALT 17 08/17/2023 1039    BILITOT 0.4 08/17/2023 1039    ESTGFRAFRICA 51.1 (A) 02/09/2022 0933    EGFRNONAA 44.2 (A) 02/09/2022 0933           Lab Results   Component Value Date    TSH 1.535 08/17/2023      Lab Results   Component Value Date    CHOL 151 08/17/2023    CHOL 139 05/31/2021    CHOL 137 06/02/2020     Lab Results   Component Value Date    HDL 43 08/17/2023    HDL 37 (L) 05/31/2021    HDL 35 (L) 06/02/2020     Lab Results   Component Value Date    LDLCALC 97.6 08/17/2023    LDLCALC 86.6 05/31/2021    LDLCALC 86.4 06/02/2020     Lab Results   Component Value Date    TRIG 52 08/17/2023    TRIG 77 05/31/2021    TRIG 78 06/02/2020     Lab Results   Component Value Date    CHOLHDL 28.5 08/17/2023    CHOLHDL 26.6 05/31/2021    CHOLHDL 25.5 06/02/2020         PHYSICAL EXAMINATION  Constitutional: Appears well, no distress    Diabetes Foot Exam:   Deferred     Assessment/Plan  1. Type 2 diabetes mellitus with hyperglycemia, with long-term current use of insulin  blood-glucose sensor (DEXCOM G7 SENSOR) Adriana    blood-glucose meter,continuous (DEXCOM G7 ) Misc    Hemoglobin A1C    Microalbumin/Creatinine Ratio, Urine      2. Stage 3b chronic kidney disease        3. Long term (current) use of anticoagulants        4. Hypertensive retinopathy, bilateral        5. H/O: CVA (cerebrovascular accident)        6. Essential hypertension        7. Cancer of prostate with low recurrence risk (stage T1-2a and Brent < 7 and PSA < 10)        8. Diabetic hypoglycemia  insulin lispro (HUMALOG KWIKPEN INSULIN) 100 unit/mL pen      9. Controlled type 2 diabetes mellitus with complication, with long-term current use of  insulin  LEVEMIR FLEXTOUCH U100 INSULIN 100 unit/mL (3 mL) InPn pen        1-9. Follow up in 4 months  Dexcom g7 sensors,  sent to local pharmacy  DE for training if needed- call office  [email or web address] -has video, tutorial etc.   Cut back levemir to 22 units at night   Cut back humalog 4 units only if sugar is > 200 before meals   A1c urine mac in 4 months   F/u with nephrology-[place]  Continue jardiance  F/u with urology   Less insulin, will prevent lows, cgm will help as well     Answers submitted by the patient for this visit:  Diabetes Questionnaire (Submitted on 9/6/2023)  Chief Complaint: Diabetes problem  Diabetes type: type 2  MedicAlert ID: No  blurred vision: No  chest pain: No  fatigue: No  foot paresthesias: No  foot ulcerations: No  polydipsia: No  polyphagia: No  polyuria: No  visual change: No  weakness: No  weight loss: No  Symptom course: stable  confusion: No  dizziness: No  headaches: No  hunger: No  mood changes: No  nervous/anxious: No  pallor: No  seizures: No  sleepiness: No  speech difficulty: No  sweats: No  tremors: No  blackouts: No  hospitalization: No  nocturnal hypoglycemia: No  required assistance: No  required glucagon: No  CVA: Yes  heart disease: No  impotence: No  nephropathy: No  peripheral neuropathy: Yes  PVD: No  retinopathy: Yes  autonomic neuropathy: No  CAD risks: no known risk factors, family history, hypertension, sedentary lifestyle, diabetes mellitus, male sex  Current treatments: insulin injections  Treatment compliance: all of the time  Dose schedule: pre-breakfast, pre-dinner  Given by: relative  Injection sites: abdominal wall  Home blood tests: 1-2 x per day  Home urines: <1 x per month  Monitoring compliance: excellent  Blood glucose trend: no change  Weight trend: stable  Dietitian visit: No  Eye exam current: Yes  Sees podiatrist: Yes

## 2023-09-07 RX ORDER — INSULIN DETEMIR 100 [IU]/ML
INJECTION, SOLUTION SUBCUTANEOUS
Qty: 30 ML | Refills: 3 | Status: ON HOLD
Start: 2023-09-07 | End: 2024-01-15

## 2023-09-07 RX ORDER — INSULIN LISPRO 100 [IU]/ML
INJECTION, SOLUTION INTRAVENOUS; SUBCUTANEOUS
Qty: 15 ML | Refills: 3 | Status: ON HOLD
Start: 2023-09-07 | End: 2024-01-15 | Stop reason: HOSPADM

## 2023-09-08 ENCOUNTER — TELEPHONE (OUTPATIENT)
Dept: INTERNAL MEDICINE | Facility: CLINIC | Age: 68
End: 2023-09-08
Payer: MEDICARE

## 2023-09-08 NOTE — TELEPHONE ENCOUNTER
----- Message from KEVON Herrera, FNP sent at 9/7/2023  1:08 PM CDT -----  A1c looks great w/ jardiance, less insulin needs:)  Fawn-pt had dexcom g7 sent , bro and sis helps a lot  Amie book pt in 4 months, a1c prior -thanks

## 2023-09-11 ENCOUNTER — TELEPHONE (OUTPATIENT)
Dept: DIABETES | Facility: CLINIC | Age: 68
End: 2023-09-11
Payer: MEDICARE

## 2023-09-11 ENCOUNTER — PATIENT MESSAGE (OUTPATIENT)
Dept: DIABETES | Facility: CLINIC | Age: 68
End: 2023-09-11
Payer: MEDICARE

## 2023-09-14 ENCOUNTER — ANTI-COAG VISIT (OUTPATIENT)
Dept: CARDIOLOGY | Facility: CLINIC | Age: 68
End: 2023-09-14
Payer: MEDICARE

## 2023-09-14 ENCOUNTER — LAB VISIT (OUTPATIENT)
Dept: LAB | Facility: HOSPITAL | Age: 68
End: 2023-09-14
Attending: INTERNAL MEDICINE
Payer: MEDICARE

## 2023-09-14 DIAGNOSIS — Z86.73 H/O: CVA (CEREBROVASCULAR ACCIDENT): ICD-10-CM

## 2023-09-14 DIAGNOSIS — I15.2 HYPERTENSION ASSOCIATED WITH DIABETES: ICD-10-CM

## 2023-09-14 DIAGNOSIS — E11.59 HYPERTENSION ASSOCIATED WITH DIABETES: ICD-10-CM

## 2023-09-14 DIAGNOSIS — Z79.01 LONG TERM (CURRENT) USE OF ANTICOAGULANTS: ICD-10-CM

## 2023-09-14 DIAGNOSIS — Z79.01 LONG TERM (CURRENT) USE OF ANTICOAGULANTS: Primary | ICD-10-CM

## 2023-09-14 LAB
INR PPP: 2.9 (ref 0.8–1.2)
PROTHROMBIN TIME: 29.3 SEC (ref 9–12.5)

## 2023-09-14 PROCEDURE — 93793 ANTICOAG MGMT PT WARFARIN: CPT | Mod: S$GLB,,,

## 2023-09-14 PROCEDURE — 85610 PROTHROMBIN TIME: CPT | Performed by: INTERNAL MEDICINE

## 2023-09-14 PROCEDURE — 93793 PR ANTICOAGULANT MGMT FOR PT TAKING WARFARIN: ICD-10-PCS | Mod: S$GLB,,,

## 2023-09-14 PROCEDURE — 36415 COLL VENOUS BLD VENIPUNCTURE: CPT | Performed by: INTERNAL MEDICINE

## 2023-09-15 RX ORDER — LOSARTAN POTASSIUM 50 MG/1
50 TABLET ORAL
Qty: 90 TABLET | Refills: 3 | Status: ON HOLD | OUTPATIENT
Start: 2023-09-15 | End: 2024-01-15

## 2023-10-03 ENCOUNTER — OFFICE VISIT (OUTPATIENT)
Dept: OPHTHALMOLOGY | Facility: CLINIC | Age: 68
End: 2023-10-03
Payer: MEDICARE

## 2023-10-03 DIAGNOSIS — E11.3593 CONTROLLED TYPE 2 DIABETES MELLITUS WITH BOTH EYES AFFECTED BY PROLIFERATIVE RETINOPATHY WITHOUT MACULAR EDEMA, WITH LONG-TERM CURRENT USE OF INSULIN: Primary | ICD-10-CM

## 2023-10-03 DIAGNOSIS — H35.033 HYPERTENSIVE RETINOPATHY, BILATERAL: ICD-10-CM

## 2023-10-03 DIAGNOSIS — Z79.4 CONTROLLED TYPE 2 DIABETES MELLITUS WITH BOTH EYES AFFECTED BY PROLIFERATIVE RETINOPATHY WITHOUT MACULAR EDEMA, WITH LONG-TERM CURRENT USE OF INSULIN: Primary | ICD-10-CM

## 2023-10-03 PROCEDURE — 3288F PR FALLS RISK ASSESSMENT DOCUMENTED: ICD-10-PCS | Mod: CPTII,S$GLB,, | Performed by: OPHTHALMOLOGY

## 2023-10-03 PROCEDURE — 3288F FALL RISK ASSESSMENT DOCD: CPT | Mod: CPTII,S$GLB,, | Performed by: OPHTHALMOLOGY

## 2023-10-03 PROCEDURE — 1159F PR MEDICATION LIST DOCUMENTED IN MEDICAL RECORD: ICD-10-PCS | Mod: CPTII,S$GLB,, | Performed by: OPHTHALMOLOGY

## 2023-10-03 PROCEDURE — 1101F PT FALLS ASSESS-DOCD LE1/YR: CPT | Mod: CPTII,S$GLB,, | Performed by: OPHTHALMOLOGY

## 2023-10-03 PROCEDURE — 92134 POSTERIOR SEGMENT OCT RETINA (OCULAR COHERENCE TOMOGRAPHY)-BOTH EYES: ICD-10-PCS | Mod: S$GLB,,, | Performed by: OPHTHALMOLOGY

## 2023-10-03 PROCEDURE — 99999 PR PBB SHADOW E&M-EST. PATIENT-LVL IV: ICD-10-PCS | Mod: PBBFAC,,, | Performed by: OPHTHALMOLOGY

## 2023-10-03 PROCEDURE — 1160F PR REVIEW ALL MEDS BY PRESCRIBER/CLIN PHARMACIST DOCUMENTED: ICD-10-PCS | Mod: CPTII,S$GLB,, | Performed by: OPHTHALMOLOGY

## 2023-10-03 PROCEDURE — 4010F PR ACE/ARB THEARPY RXD/TAKEN: ICD-10-PCS | Mod: CPTII,S$GLB,, | Performed by: OPHTHALMOLOGY

## 2023-10-03 PROCEDURE — 4010F ACE/ARB THERAPY RXD/TAKEN: CPT | Mod: CPTII,S$GLB,, | Performed by: OPHTHALMOLOGY

## 2023-10-03 PROCEDURE — 3044F PR MOST RECENT HEMOGLOBIN A1C LEVEL <7.0%: ICD-10-PCS | Mod: CPTII,S$GLB,, | Performed by: OPHTHALMOLOGY

## 2023-10-03 PROCEDURE — 92201 PR OPHTHALMOSCOPY, EXT, W/RET DRAW/SCLERAL DEPR, I&R, UNI/BI: ICD-10-PCS | Mod: S$GLB,,, | Performed by: OPHTHALMOLOGY

## 2023-10-03 PROCEDURE — 1101F PR PT FALLS ASSESS DOC 0-1 FALLS W/OUT INJ PAST YR: ICD-10-PCS | Mod: CPTII,S$GLB,, | Performed by: OPHTHALMOLOGY

## 2023-10-03 PROCEDURE — 3044F HG A1C LEVEL LT 7.0%: CPT | Mod: CPTII,S$GLB,, | Performed by: OPHTHALMOLOGY

## 2023-10-03 PROCEDURE — 1159F MED LIST DOCD IN RCRD: CPT | Mod: CPTII,S$GLB,, | Performed by: OPHTHALMOLOGY

## 2023-10-03 PROCEDURE — 92201 OPSCPY EXTND RTA DRAW UNI/BI: CPT | Mod: S$GLB,,, | Performed by: OPHTHALMOLOGY

## 2023-10-03 PROCEDURE — 92014 PR EYE EXAM, EST PATIENT,COMPREHESV: ICD-10-PCS | Mod: S$GLB,,, | Performed by: OPHTHALMOLOGY

## 2023-10-03 PROCEDURE — 92014 COMPRE OPH EXAM EST PT 1/>: CPT | Mod: S$GLB,,, | Performed by: OPHTHALMOLOGY

## 2023-10-03 PROCEDURE — 99999 PR PBB SHADOW E&M-EST. PATIENT-LVL IV: CPT | Mod: PBBFAC,,, | Performed by: OPHTHALMOLOGY

## 2023-10-03 PROCEDURE — 92134 CPTRZ OPH DX IMG PST SGM RTA: CPT | Mod: S$GLB,,, | Performed by: OPHTHALMOLOGY

## 2023-10-03 PROCEDURE — 1160F RVW MEDS BY RX/DR IN RCRD: CPT | Mod: CPTII,S$GLB,, | Performed by: OPHTHALMOLOGY

## 2023-10-03 RX ORDER — MIRABEGRON 25 MG/1
TABLET, FILM COATED, EXTENDED RELEASE ORAL
Qty: 90 TABLET | Refills: 3 | Status: SHIPPED | OUTPATIENT
Start: 2023-10-03 | End: 2024-01-26

## 2023-10-03 NOTE — PROGRESS NOTES
HPI     EYE  EXAM YEARLY      Additional comments: PDR   DM   LAST BS    78    LAST A1C    UNSURE    ST  FIBROSIS    PERP NP  AND NV PRIOR  CVA  BP AND HTN ISSUES   VH  OS   COUMADIN  P O  QD            Comments    DLS 06/03/22        OCT - NO ME OU    Prior [place] FA - peripheral NP and NV OU      A/P    1. PDR OU  T2 uncontrolled on insulin  No ME OU  Watch ST fibrosis  PRP OU  11/20    stable    2. VH OS   Minimal    3. PCIOL OU    4. HTN Ret OU  BS/BP/chol control    5. Prior CVA   On coumadin      12 months OCT

## 2023-10-12 ENCOUNTER — ANTI-COAG VISIT (OUTPATIENT)
Dept: CARDIOLOGY | Facility: CLINIC | Age: 68
End: 2023-10-12
Payer: MEDICARE

## 2023-10-12 ENCOUNTER — LAB VISIT (OUTPATIENT)
Dept: LAB | Facility: HOSPITAL | Age: 68
End: 2023-10-12
Attending: INTERNAL MEDICINE
Payer: MEDICARE

## 2023-10-12 DIAGNOSIS — Z86.73 H/O: CVA (CEREBROVASCULAR ACCIDENT): ICD-10-CM

## 2023-10-12 DIAGNOSIS — Z79.01 LONG TERM (CURRENT) USE OF ANTICOAGULANTS: ICD-10-CM

## 2023-10-12 DIAGNOSIS — Z79.01 LONG TERM (CURRENT) USE OF ANTICOAGULANTS: Primary | ICD-10-CM

## 2023-10-12 LAB
INR PPP: 2.9 (ref 0.8–1.2)
PROTHROMBIN TIME: 29.1 SEC (ref 9–12.5)

## 2023-10-12 PROCEDURE — 93793 ANTICOAG MGMT PT WARFARIN: CPT | Mod: S$GLB,,,

## 2023-10-12 PROCEDURE — 85610 PROTHROMBIN TIME: CPT | Performed by: INTERNAL MEDICINE

## 2023-10-12 PROCEDURE — 93793 PR ANTICOAGULANT MGMT FOR PT TAKING WARFARIN: ICD-10-PCS | Mod: S$GLB,,,

## 2023-10-12 PROCEDURE — 36415 COLL VENOUS BLD VENIPUNCTURE: CPT | Performed by: INTERNAL MEDICINE

## 2023-10-19 DIAGNOSIS — E11.59 HYPERTENSION ASSOCIATED WITH DIABETES: ICD-10-CM

## 2023-10-19 DIAGNOSIS — R25.1 TREMOR: ICD-10-CM

## 2023-10-19 DIAGNOSIS — I15.2 HYPERTENSION ASSOCIATED WITH DIABETES: ICD-10-CM

## 2023-10-19 RX ORDER — CARVEDILOL 3.12 MG/1
3.12 TABLET ORAL 2 TIMES DAILY
Qty: 180 TABLET | Refills: 3 | Status: SHIPPED | OUTPATIENT
Start: 2023-10-19

## 2023-10-19 RX ORDER — AMANTADINE HYDROCHLORIDE 100 MG/1
100 CAPSULE, GELATIN COATED ORAL 2 TIMES DAILY
Qty: 180 CAPSULE | Refills: 3 | Status: SHIPPED | OUTPATIENT
Start: 2023-10-19

## 2023-10-19 NOTE — TELEPHONE ENCOUNTER
----- Message from Marisa Benavides sent at 10/19/2023 11:01 AM CDT -----  Contact: pt's maria isabel Contreras [phone]  Diane is calling in regards to two new prescriptions being called in. Amantadine HCL (SYMMETREL) 100 mg capsule 180 capsule and Carvedilol (COREG) 3.125 MG tablet 180 tablet. Pt is using   [facility][id number] [address] AT [facility] [address]  [address]  [facility] [address]  Phone: [phone] Fax: [phone]              Thank you

## 2023-10-29 DIAGNOSIS — I15.2 HYPERTENSION ASSOCIATED WITH DIABETES: ICD-10-CM

## 2023-10-29 DIAGNOSIS — E11.59 HYPERTENSION ASSOCIATED WITH DIABETES: ICD-10-CM

## 2023-10-30 RX ORDER — CLONIDINE HYDROCHLORIDE 0.1 MG/1
0.1 TABLET ORAL 3 TIMES DAILY
Qty: 270 TABLET | Refills: 0 | Status: ON HOLD | OUTPATIENT
Start: 2023-10-30 | End: 2024-01-15 | Stop reason: HOSPADM

## 2023-10-30 NOTE — TELEPHONE ENCOUNTER
[facility] Staff  Caller: Self [phone] (Today,  9:20 AM)  Requesting an RX refill or new RX.   Is this a refill or new RX: refill   RX name and strength (copy/paste from chart):  cloNIDine (CATAPRES) 0.1 MG tablet   Is this a 30 day or 90 day RX:   Pharmacy name and phone # (copy/paste from chart):  [id number] [place] [address] AT [facility] [address] [place] [[id number]]   The doctors have asked that we provide their patients with the following 2 reminders -- prescription refills can take up to 72 hours, and a friendly reminder that in the future you can use your [facility] account to request refills: yes     Please call and advise

## 2023-11-07 ENCOUNTER — OFFICE VISIT (OUTPATIENT)
Dept: PODIATRY | Facility: CLINIC | Age: 68
End: 2023-11-07
Payer: MEDICARE

## 2023-11-07 VITALS
DIASTOLIC BLOOD PRESSURE: 82 MMHG | BODY MASS INDEX: 22.97 KG/M2 | HEART RATE: 112 BPM | HEIGHT: 73 IN | SYSTOLIC BLOOD PRESSURE: 134 MMHG | WEIGHT: 173.31 LBS

## 2023-11-07 DIAGNOSIS — L84 CORN OR CALLUS: ICD-10-CM

## 2023-11-07 DIAGNOSIS — B35.1 ONYCHOMYCOSIS DUE TO DERMATOPHYTE: ICD-10-CM

## 2023-11-07 DIAGNOSIS — E11.42 DIABETIC POLYNEUROPATHY ASSOCIATED WITH TYPE 2 DIABETES MELLITUS: Primary | ICD-10-CM

## 2023-11-07 PROCEDURE — 99999 PR PBB SHADOW E&M-EST. PATIENT-LVL II: CPT | Mod: PBBFAC,,, | Performed by: PODIATRIST

## 2023-11-07 PROCEDURE — 11721 DEBRIDE NAIL 6 OR MORE: CPT | Mod: Q9,59,S$GLB, | Performed by: PODIATRIST

## 2023-11-07 PROCEDURE — 11721 PR DEBRIDEMENT OF NAILS, 6 OR MORE: ICD-10-PCS | Mod: Q9,59,S$GLB, | Performed by: PODIATRIST

## 2023-11-07 PROCEDURE — 11056 PARNG/CUTG B9 HYPRKR LES 2-4: CPT | Mod: Q9,S$GLB,, | Performed by: PODIATRIST

## 2023-11-07 PROCEDURE — 99499 NO LOS: ICD-10-PCS | Mod: S$GLB,,, | Performed by: PODIATRIST

## 2023-11-07 PROCEDURE — 99999 PR PBB SHADOW E&M-EST. PATIENT-LVL II: ICD-10-PCS | Mod: PBBFAC,,, | Performed by: PODIATRIST

## 2023-11-07 PROCEDURE — 99499 UNLISTED E&M SERVICE: CPT | Mod: S$GLB,,, | Performed by: PODIATRIST

## 2023-11-07 PROCEDURE — 11056 PR TRIM BENIGN HYPERKERATOTIC SKIN LESION,2-4: ICD-10-PCS | Mod: Q9,S$GLB,, | Performed by: PODIATRIST

## 2023-11-07 RX ORDER — CALCITRIOL 0.25 UG/1
1 CAPSULE ORAL DAILY
Status: ON HOLD | COMMUNITY
Start: 2023-09-20 | End: 2024-01-15 | Stop reason: HOSPADM

## 2023-11-07 NOTE — PROGRESS NOTES
Subjective:      Patient ID: Kamlesh Diane is a 68 y.o. male.    Chief Complaint: Diabetes Mellitus (PCP - Debora Ontiveros MD - 9/6/2023) and Nail Care    Kamlesh is a 68 y.o. male who presents to the clinic for evaluation and treatment of high risk feet. Kamlesh has a past medical history of CVA (cerebral vascular accident), Diabetes, Diabetic retinopathy, Elevated PSA, Hypertension, Prostate cancer, and Stroke. The patient's chief complaint is long, thick toenails. This patient has documented high risk feet requiring routine maintenance secondary to peripheral neuropathy.    PCP: Debora Ontiveros MD    Date Last Seen by PCP:   Chief Complaint   Patient presents with    Diabetes Mellitus     PCP - Debora Ontiveros MD - 9/6/2023    Nail Care       Current shoe gear:  Affected Foot: Slip-on shoes     Unaffected Foot: Slip-on shoes    Hemoglobin A1C   Date Value Ref Range Status   08/17/2023 6.6 (H) 4.0 - 5.6 % Final     Comment:     ADA Screening Guidelines:  5.7-6.4%  Consistent with prediabetes  >or=6.5%  Consistent with diabetes    High levels of fetal hemoglobin interfere with the HbA1C  assay. Heterozygous hemoglobin variants (HbS, HgC, etc)do  not significantly interfere with this assay.   However, presence of multiple variants may affect accuracy.     12/13/2022 7.5 (H) 4.0 - 5.6 % Final     Comment:     ADA Screening Guidelines:  5.7-6.4%  Consistent with prediabetes  >or=6.5%  Consistent with diabetes    High levels of fetal hemoglobin interfere with the HbA1C  assay. Heterozygous hemoglobin variants (HbS, HgC, etc)do  not significantly interfere with this assay.   However, presence of multiple variants may affect accuracy.     02/09/2022 7.3 (H) 4.0 - 5.6 % Final     Comment:     ADA Screening Guidelines:  5.7-6.4%  Consistent with prediabetes  >or=6.5%  Consistent with diabetes    High levels of fetal hemoglobin interfere with the HbA1C  assay. Heterozygous hemoglobin variants (HbS, HgC, etc)do  not significantly  interfere with this assay.   However, presence of multiple variants may affect accuracy.         Review of Systems   Constitutional: Negative for chills, fever and malaise/fatigue.   HENT:  Negative for hearing loss.    Cardiovascular:  Positive for leg swelling. Negative for claudication.   Respiratory:  Negative for shortness of breath.    Skin:  Positive for color change, dry skin, nail changes and unusual hair distribution. Negative for flushing and rash.   Musculoskeletal:  Negative for joint pain and myalgias.   Gastrointestinal:  Negative for nausea and vomiting.   Neurological:  Positive for numbness, paresthesias and sensory change. Negative for loss of balance.   Psychiatric/Behavioral:  Negative for altered mental status.            Objective:      Physical Exam  Vitals reviewed.   Constitutional:       Appearance: He is well-developed.   Cardiovascular:      Pulses:           Dorsalis pedis pulses are 0 on the right side and 0 on the left side.        Posterior tibial pulses are 1+ on the right side and 1+ on the left side.   Musculoskeletal:      Right ankle: Decreased range of motion. Abnormal pulse.      Right Achilles Tendon: Ramirez's test negative.      Left ankle: Decreased range of motion. Abnormal pulse.      Left Achilles Tendon: Ramirez's test negative.      Right foot: Decreased range of motion.      Left foot: Decreased range of motion.   Feet:      Right foot:      Protective Sensation: 5 sites tested.  2 sites sensed.      Left foot:      Protective Sensation: 5 sites tested.  2 sites sensed.   Skin:     General: Skin is cool and dry.      Capillary Refill: Capillary refill takes more than 3 seconds.      Findings: No wound.      Comments: HKL's noted to b/L HIPJs       Neurological:      Mental Status: He is alert.      Comments: diminished sensation noted to b/L lower extremities   Psychiatric:         Behavior: Behavior normal. Behavior is cooperative.         Nails x10 are elongated  by  5-7mm's curling underneath toes, thickened by 3-4 mm's, dystrophic, and are dark in  coloration . Xerosis Bilaterally.           Assessment:       Encounter Diagnoses   Name Primary?    Diabetic polyneuropathy associated with type 2 diabetes mellitus Yes    Onychomycosis due to dermatophyte     Corn or callus          Plan:       Kamlesh was seen today for diabetes mellitus and nail care.    Diagnoses and all orders for this visit:    Diabetic polyneuropathy associated with type 2 diabetes mellitus    Onychomycosis due to dermatophyte    Corn or callus      I counseled the patient on his conditions, their implications and medical management.    - Shoe inspection. Diabetic Foot Education. Patient reminded of the importance of good nutrition and blood sugar control to help prevent podiatric complications of diabetes. Patient instructed on proper foot hygeine. We discussed wearing proper shoe gear, daily foot inspections, never walking without protective shoe gear, never putting sharp instruments to feet, routine podiatric nail visits every 2-3 months.    After cleansing with an alcohol prep pad, the about mentioned hyperkeratotic lesions were sharply debrided X 2 utilizing a #15 blade to a smooth base without incident. Pt tolerated the procedure well and reported comfort to the debarment sites. Pt will continue to use padding and moisture the callused areas.     - With patient's permission, nails were aggressively reduced and debrided x 10 to their soft tissue attachment mechanically and with electric , removing all offending nail and debris. Patient relates relief following the procedure. He will continue to monitor the areas daily, inspect his feet, wear protective shoe gear when ambulatory, moisturizer to maintain skin integrity and follow in this office in approximately 2-3 months, sooner p.r.n.

## 2023-11-16 ENCOUNTER — ANTI-COAG VISIT (OUTPATIENT)
Dept: CARDIOLOGY | Facility: CLINIC | Age: 68
End: 2023-11-16
Payer: MEDICARE

## 2023-11-16 ENCOUNTER — LAB VISIT (OUTPATIENT)
Dept: LAB | Facility: HOSPITAL | Age: 68
End: 2023-11-16
Attending: INTERNAL MEDICINE
Payer: MEDICARE

## 2023-11-16 DIAGNOSIS — Z79.01 LONG TERM (CURRENT) USE OF ANTICOAGULANTS: ICD-10-CM

## 2023-11-16 DIAGNOSIS — Z79.01 LONG TERM (CURRENT) USE OF ANTICOAGULANTS: Primary | ICD-10-CM

## 2023-11-16 DIAGNOSIS — Z86.73 H/O: CVA (CEREBROVASCULAR ACCIDENT): ICD-10-CM

## 2023-11-16 LAB
INR PPP: 2.3 (ref 0.8–1.2)
PROTHROMBIN TIME: 23.5 SEC (ref 9–12.5)

## 2023-11-16 PROCEDURE — 93793 ANTICOAG MGMT PT WARFARIN: CPT | Mod: S$GLB,,,

## 2023-11-16 PROCEDURE — 85610 PROTHROMBIN TIME: CPT | Performed by: INTERNAL MEDICINE

## 2023-11-16 PROCEDURE — 36415 COLL VENOUS BLD VENIPUNCTURE: CPT | Performed by: INTERNAL MEDICINE

## 2023-11-16 PROCEDURE — 93793 PR ANTICOAGULANT MGMT FOR PT TAKING WARFARIN: ICD-10-PCS | Mod: S$GLB,,,

## 2023-12-21 DIAGNOSIS — I15.2 HYPERTENSION ASSOCIATED WITH DIABETES: ICD-10-CM

## 2023-12-21 DIAGNOSIS — E11.59 HYPERTENSION ASSOCIATED WITH DIABETES: ICD-10-CM

## 2023-12-21 RX ORDER — AMLODIPINE BESYLATE 10 MG/1
10 TABLET ORAL DAILY
Qty: 90 TABLET | Refills: 3 | Status: ON HOLD | OUTPATIENT
Start: 2023-12-21 | End: 2024-01-15 | Stop reason: HOSPADM

## 2023-12-21 NOTE — TELEPHONE ENCOUNTER
----- Message from Sally Becker sent at 12/21/2023 10:53 AM CST -----  Contact: patient's maria isabel Contreras  [phone]  Patient's maria isabel Diane called requesting Dr. Ontiveros send a new rx to pharmacy for patient's medication amLODIPine (NORVASC) 10 MG tablet, the pharmacy won't refill

## 2024-01-04 ENCOUNTER — ANTI-COAG VISIT (OUTPATIENT)
Dept: CARDIOLOGY | Facility: CLINIC | Age: 69
End: 2024-01-04
Payer: MEDICARE

## 2024-01-04 ENCOUNTER — LAB VISIT (OUTPATIENT)
Dept: LAB | Facility: HOSPITAL | Age: 69
End: 2024-01-04
Attending: INTERNAL MEDICINE
Payer: MEDICARE

## 2024-01-04 DIAGNOSIS — Z79.01 LONG TERM (CURRENT) USE OF ANTICOAGULANTS: Primary | ICD-10-CM

## 2024-01-04 DIAGNOSIS — Z79.01 LONG TERM (CURRENT) USE OF ANTICOAGULANTS: ICD-10-CM

## 2024-01-04 LAB
INR PPP: 5.6 (ref 0.8–1.2)
PROTHROMBIN TIME: 54.2 SEC (ref 9–12.5)

## 2024-01-04 PROCEDURE — 93793 ANTICOAG MGMT PT WARFARIN: CPT | Mod: S$GLB,,,

## 2024-01-04 PROCEDURE — 36415 COLL VENOUS BLD VENIPUNCTURE: CPT | Performed by: INTERNAL MEDICINE

## 2024-01-04 PROCEDURE — 85610 PROTHROMBIN TIME: CPT | Performed by: INTERNAL MEDICINE

## 2024-01-04 NOTE — PROGRESS NOTES
INR not at goal. Medications, chart, and patient findings reviewed. See calendar for adjustments to dose and follow up plan.  Supratherapeutic likely due to change in health.

## 2024-01-04 NOTE — PROGRESS NOTES
Could you please check in on him?  This is unusually high.  If he's acutely ill with something or started taking something new that could account for this.  [facility] pharmacist has already instructed him to hold his dose for the next 2 days.

## 2024-01-08 ENCOUNTER — TELEPHONE (OUTPATIENT)
Dept: PRIMARY CARE CLINIC | Facility: CLINIC | Age: 69
End: 2024-01-08
Payer: MEDICARE

## 2024-01-08 ENCOUNTER — ANTI-COAG VISIT (OUTPATIENT)
Dept: CARDIOLOGY | Facility: CLINIC | Age: 69
End: 2024-01-08
Payer: MEDICARE

## 2024-01-08 ENCOUNTER — LAB VISIT (OUTPATIENT)
Dept: LAB | Facility: HOSPITAL | Age: 69
End: 2024-01-08
Payer: MEDICARE

## 2024-01-08 ENCOUNTER — PATIENT MESSAGE (OUTPATIENT)
Dept: INTERNAL MEDICINE | Facility: CLINIC | Age: 69
End: 2024-01-08
Payer: MEDICARE

## 2024-01-08 DIAGNOSIS — Z79.4 TYPE 2 DIABETES MELLITUS WITH HYPERGLYCEMIA, WITH LONG-TERM CURRENT USE OF INSULIN: ICD-10-CM

## 2024-01-08 DIAGNOSIS — E11.65 TYPE 2 DIABETES MELLITUS WITH HYPERGLYCEMIA, WITH LONG-TERM CURRENT USE OF INSULIN: ICD-10-CM

## 2024-01-08 DIAGNOSIS — Z79.01 LONG TERM (CURRENT) USE OF ANTICOAGULANTS: Primary | ICD-10-CM

## 2024-01-08 DIAGNOSIS — Z79.01 LONG TERM (CURRENT) USE OF ANTICOAGULANTS: ICD-10-CM

## 2024-01-08 LAB
ESTIMATED AVG GLUCOSE: 177 MG/DL (ref 68–131)
HBA1C MFR BLD: 7.8 % (ref 4–5.6)
INR PPP: 2.7 (ref 0.8–1.2)
PROTHROMBIN TIME: 27.5 SEC (ref 9–12.5)

## 2024-01-08 PROCEDURE — 85610 PROTHROMBIN TIME: CPT | Performed by: INTERNAL MEDICINE

## 2024-01-08 PROCEDURE — 83036 HEMOGLOBIN GLYCOSYLATED A1C: CPT | Performed by: NURSE PRACTITIONER

## 2024-01-08 PROCEDURE — 93793 ANTICOAG MGMT PT WARFARIN: CPT | Mod: S$GLB,,,

## 2024-01-08 PROCEDURE — 36415 COLL VENOUS BLD VENIPUNCTURE: CPT | Performed by: INTERNAL MEDICINE

## 2024-01-08 NOTE — TELEPHONE ENCOUNTER
----- Message from Burke Castellanos MD sent at 1/4/2024  1:28 PM CST -----  Could you please check in on him?  This is unusually high.  If he's acutely ill with something or started taking something new that could account for this.  [facility] Clinic pharmacist has already instructed him to hold his dose for the next 2 days.

## 2024-01-08 NOTE — TELEPHONE ENCOUNTER
"Spoke w/ pt , they are on way to get labs drawn today. He did hold coumadin as instructed. Stated that he has not been "sick" per se, but did have a few days where he had some intermittent diarrhea, which has now completely resolved.    Advised pt to call if he starts having issues w/ diarrhea again, and also that we will be on lookout for lab results.  Pt will call with any further questions or concerns,   "

## 2024-01-09 NOTE — PROGRESS NOTES
Reschedule to 1/18/24 at   Answers submitted by the patient for this visit:  Review of Systems Questionnaire (Submitted on 1/10/2024)  activity change: No  unexpected weight change: No  neck pain: No  hearing loss: No  rhinorrhea: No  trouble swallowing: No  eye discharge: No  visual disturbance: Yes  chest tightness: No  wheezing: No  chest pain: No  palpitations: No  blood in stool: No  constipation: No  vomiting: No  diarrhea: Yes  polydipsia: No  polyuria: No  difficulty urinating: No  urgency: Yes  hematuria: No  joint swelling: No  arthralgias: No  headaches: No  weakness: No  confusion: No  dysphoric mood: No

## 2024-01-10 ENCOUNTER — OFFICE VISIT (OUTPATIENT)
Dept: INTERNAL MEDICINE | Facility: CLINIC | Age: 69
End: 2024-01-10
Payer: MEDICARE

## 2024-01-10 ENCOUNTER — PATIENT MESSAGE (OUTPATIENT)
Dept: INTERNAL MEDICINE | Facility: CLINIC | Age: 69
End: 2024-01-10

## 2024-01-10 DIAGNOSIS — E11.3593 TYPE 2 DIABETES MELLITUS WITH BOTH EYES AFFECTED BY PROLIFERATIVE RETINOPATHY WITHOUT MACULAR EDEMA, WITH LONG-TERM CURRENT USE OF INSULIN: Primary | ICD-10-CM

## 2024-01-10 DIAGNOSIS — N18.32 STAGE 3B CHRONIC KIDNEY DISEASE: ICD-10-CM

## 2024-01-10 DIAGNOSIS — C61 CANCER OF PROSTATE WITH LOW RECURRENCE RISK (STAGE T1-2A AND GLEASON < 7 AND PSA < 10): ICD-10-CM

## 2024-01-10 DIAGNOSIS — H35.033 HYPERTENSIVE RETINOPATHY, BILATERAL: ICD-10-CM

## 2024-01-10 DIAGNOSIS — Z79.4 TYPE 2 DIABETES MELLITUS WITH BOTH EYES AFFECTED BY PROLIFERATIVE RETINOPATHY WITHOUT MACULAR EDEMA, WITH LONG-TERM CURRENT USE OF INSULIN: Primary | ICD-10-CM

## 2024-01-10 DIAGNOSIS — Z86.73 H/O: CVA (CEREBROVASCULAR ACCIDENT): ICD-10-CM

## 2024-01-10 DIAGNOSIS — I10 ESSENTIAL HYPERTENSION: ICD-10-CM

## 2024-01-10 PROCEDURE — 99499 UNLISTED E&M SERVICE: CPT | Mod: 95,,, | Performed by: NURSE PRACTITIONER

## 2024-01-11 DIAGNOSIS — Z86.73 H/O: STROKE: ICD-10-CM

## 2024-01-11 RX ORDER — ATORVASTATIN CALCIUM 40 MG/1
TABLET, FILM COATED ORAL
Qty: 90 TABLET | Refills: 3 | Status: SHIPPED | OUTPATIENT
Start: 2024-01-11

## 2024-01-12 ENCOUNTER — HOSPITAL ENCOUNTER (INPATIENT)
Facility: HOSPITAL | Age: 69
LOS: 4 days | Discharge: HOME | DRG: 378 | End: 2024-01-16
Attending: EMERGENCY MEDICINE | Admitting: FAMILY MEDICINE
Payer: MEDICARE

## 2024-01-12 DIAGNOSIS — K92.1 MELENA: ICD-10-CM

## 2024-01-12 DIAGNOSIS — K92.2 ACUTE GI BLEEDING: ICD-10-CM

## 2024-01-12 DIAGNOSIS — I15.2 HYPERTENSION ASSOCIATED WITH DIABETES: ICD-10-CM

## 2024-01-12 DIAGNOSIS — E11.59 HYPERTENSION ASSOCIATED WITH DIABETES: ICD-10-CM

## 2024-01-12 DIAGNOSIS — Z79.4 CONTROLLED TYPE 2 DIABETES MELLITUS WITH COMPLICATION, WITH LONG-TERM CURRENT USE OF INSULIN: ICD-10-CM

## 2024-01-12 DIAGNOSIS — E11.8 CONTROLLED TYPE 2 DIABETES MELLITUS WITH COMPLICATION, WITH LONG-TERM CURRENT USE OF INSULIN: ICD-10-CM

## 2024-01-12 DIAGNOSIS — E11.65 TYPE 2 DIABETES MELLITUS WITH HYPERGLYCEMIA, WITH LONG-TERM CURRENT USE OF INSULIN: Chronic | ICD-10-CM

## 2024-01-12 DIAGNOSIS — Z79.4 TYPE 2 DIABETES MELLITUS WITH HYPERGLYCEMIA, WITH LONG-TERM CURRENT USE OF INSULIN: Chronic | ICD-10-CM

## 2024-01-12 DIAGNOSIS — N18.32 STAGE 3B CHRONIC KIDNEY DISEASE: Chronic | ICD-10-CM

## 2024-01-12 DIAGNOSIS — R42 DIZZINESS: ICD-10-CM

## 2024-01-12 DIAGNOSIS — D64.9 SYMPTOMATIC ANEMIA: Primary | ICD-10-CM

## 2024-01-12 DIAGNOSIS — Z86.73 H/O: CVA (CEREBROVASCULAR ACCIDENT): Chronic | ICD-10-CM

## 2024-01-12 DIAGNOSIS — R79.1 SUPRATHERAPEUTIC INR: ICD-10-CM

## 2024-01-12 PROBLEM — I73.9 PERIPHERAL ARTERY DISEASE: Chronic | Status: ACTIVE | Noted: 2018-07-19

## 2024-01-12 LAB
ABO + RH BLD: NORMAL
ALBUMIN SERPL BCP-MCNC: 3.6 G/DL (ref 3.5–5.2)
ALP SERPL-CCNC: 58 U/L (ref 55–135)
ALT SERPL W/O P-5'-P-CCNC: 16 U/L (ref 10–44)
ANION GAP SERPL CALC-SCNC: 9 MMOL/L (ref 8–16)
ANISOCYTOSIS BLD QL SMEAR: SLIGHT
APTT PPP: 31.2 SEC (ref 21–32)
AST SERPL-CCNC: 28 U/L (ref 10–40)
BASOPHILS # BLD AUTO: 0.02 K/UL (ref 0–0.2)
BASOPHILS NFR BLD: 0.3 % (ref 0–1.9)
BILIRUB SERPL-MCNC: 0.4 MG/DL (ref 0.1–1)
BLD GP AB SCN CELLS X3 SERPL QL: NORMAL
BUN SERPL-MCNC: 21 MG/DL (ref 8–23)
CALCIUM SERPL-MCNC: 8.8 MG/DL (ref 8.7–10.5)
CHLORIDE SERPL-SCNC: 111 MMOL/L (ref 95–110)
CO2 SERPL-SCNC: 18 MMOL/L (ref 23–29)
CREAT SERPL-MCNC: 1.8 MG/DL (ref 0.5–1.4)
DIFFERENTIAL METHOD BLD: ABNORMAL
EOSINOPHIL # BLD AUTO: 0 K/UL (ref 0–0.5)
EOSINOPHIL NFR BLD: 0.5 % (ref 0–8)
ERYTHROCYTE [DISTWIDTH] IN BLOOD BY AUTOMATED COUNT: 16.2 % (ref 11.5–14.5)
EST. GFR  (NO RACE VARIABLE): 40.5 ML/MIN/1.73 M^2
GLUCOSE SERPL-MCNC: 181 MG/DL (ref 70–110)
HCT VFR BLD AUTO: 16.9 % (ref 40–54)
HGB BLD-MCNC: 4.8 G/DL (ref 14–18)
HYPOCHROMIA BLD QL SMEAR: ABNORMAL
IMM GRANULOCYTES # BLD AUTO: 0.02 K/UL (ref 0–0.04)
IMM GRANULOCYTES NFR BLD AUTO: 0.3 % (ref 0–0.5)
INR PPP: 3.9 (ref 0.8–1.2)
IRON SERPL-MCNC: 43 UG/DL (ref 45–160)
LACTATE SERPL-SCNC: 6.2 MMOL/L (ref 0.5–2.2)
LIPASE SERPL-CCNC: 14 U/L (ref 4–60)
LYMPHOCYTES # BLD AUTO: 0.6 K/UL (ref 1–4.8)
LYMPHOCYTES NFR BLD: 8 % (ref 18–48)
MCH RBC QN AUTO: 22.5 PG (ref 27–31)
MCHC RBC AUTO-ENTMCNC: 28.4 G/DL (ref 32–36)
MCV RBC AUTO: 79 FL (ref 82–98)
MONOCYTES # BLD AUTO: 0.6 K/UL (ref 0.3–1)
MONOCYTES NFR BLD: 7.3 % (ref 4–15)
NEUTROPHILS # BLD AUTO: 6.3 K/UL (ref 1.8–7.7)
NEUTROPHILS NFR BLD: 83.6 % (ref 38–73)
NRBC BLD-RTO: 2 /100 WBC
OVALOCYTES BLD QL SMEAR: ABNORMAL
PLATELET # BLD AUTO: 412 K/UL (ref 150–450)
PLATELET BLD QL SMEAR: ABNORMAL
PMV BLD AUTO: 10.3 FL (ref 9.2–12.9)
POIKILOCYTOSIS BLD QL SMEAR: SLIGHT
POLYCHROMASIA BLD QL SMEAR: ABNORMAL
POTASSIUM SERPL-SCNC: 4.9 MMOL/L (ref 3.5–5.1)
PROT SERPL-MCNC: 7.1 G/DL (ref 6–8.4)
PROTHROMBIN TIME: 38.5 SEC (ref 9–12.5)
RBC # BLD AUTO: 2.13 M/UL (ref 4.6–6.2)
RETICS/RBC NFR AUTO: 1.6 % (ref 0.4–2)
SATURATED IRON: 11 % (ref 20–50)
SCHISTOCYTES BLD QL SMEAR: ABNORMAL
SCHISTOCYTES BLD QL SMEAR: PRESENT
SODIUM SERPL-SCNC: 138 MMOL/L (ref 136–145)
SPECIMEN OUTDATE: NORMAL
TOTAL IRON BINDING CAPACITY: 394 UG/DL (ref 250–450)
TRANSFERRIN SERPL-MCNC: 266 MG/DL (ref 200–375)
TROPONIN I SERPL DL<=0.01 NG/ML-MCNC: <0.006 NG/ML (ref 0–0.03)
WBC # BLD AUTO: 7.52 K/UL (ref 3.9–12.7)

## 2024-01-12 PROCEDURE — 96375 TX/PRO/DX INJ NEW DRUG ADDON: CPT

## 2024-01-12 PROCEDURE — P9016 RBC LEUKOCYTES REDUCED: HCPCS

## 2024-01-12 PROCEDURE — 85730 THROMBOPLASTIN TIME PARTIAL: CPT

## 2024-01-12 PROCEDURE — 25000003 PHARM REV CODE 250

## 2024-01-12 PROCEDURE — 83540 ASSAY OF IRON: CPT | Performed by: STUDENT IN AN ORGANIZED HEALTH CARE EDUCATION/TRAINING PROGRAM

## 2024-01-12 PROCEDURE — C9113 INJ PANTOPRAZOLE SODIUM, VIA: HCPCS

## 2024-01-12 PROCEDURE — 86901 BLOOD TYPING SEROLOGIC RH(D): CPT

## 2024-01-12 PROCEDURE — 93005 ELECTROCARDIOGRAM TRACING: CPT

## 2024-01-12 PROCEDURE — 82728 ASSAY OF FERRITIN: CPT | Performed by: STUDENT IN AN ORGANIZED HEALTH CARE EDUCATION/TRAINING PROGRAM

## 2024-01-12 PROCEDURE — 85610 PROTHROMBIN TIME: CPT

## 2024-01-12 PROCEDURE — 85045 AUTOMATED RETICULOCYTE COUNT: CPT | Performed by: STUDENT IN AN ORGANIZED HEALTH CARE EDUCATION/TRAINING PROGRAM

## 2024-01-12 PROCEDURE — 83690 ASSAY OF LIPASE: CPT

## 2024-01-12 PROCEDURE — 80053 COMPREHEN METABOLIC PANEL: CPT | Performed by: PHYSICIAN ASSISTANT

## 2024-01-12 PROCEDURE — 63600175 PHARM REV CODE 636 W HCPCS

## 2024-01-12 PROCEDURE — 36430 TRANSFUSION BLD/BLD COMPNT: CPT

## 2024-01-12 PROCEDURE — 85025 COMPLETE CBC W/AUTO DIFF WBC: CPT | Performed by: PHYSICIAN ASSISTANT

## 2024-01-12 PROCEDURE — 84484 ASSAY OF TROPONIN QUANT: CPT | Performed by: PHYSICIAN ASSISTANT

## 2024-01-12 PROCEDURE — 12000002 HC ACUTE/MED SURGE SEMI-PRIVATE ROOM

## 2024-01-12 PROCEDURE — 96365 THER/PROPH/DIAG IV INF INIT: CPT

## 2024-01-12 PROCEDURE — 86920 COMPATIBILITY TEST SPIN: CPT

## 2024-01-12 PROCEDURE — 99285 EMERGENCY DEPT VISIT HI MDM: CPT | Mod: 25

## 2024-01-12 PROCEDURE — 82746 ASSAY OF FOLIC ACID SERUM: CPT | Performed by: STUDENT IN AN ORGANIZED HEALTH CARE EDUCATION/TRAINING PROGRAM

## 2024-01-12 PROCEDURE — 83605 ASSAY OF LACTIC ACID: CPT

## 2024-01-12 PROCEDURE — 93010 ELECTROCARDIOGRAM REPORT: CPT | Mod: ,,, | Performed by: INTERNAL MEDICINE

## 2024-01-12 PROCEDURE — 82607 VITAMIN B-12: CPT | Performed by: STUDENT IN AN ORGANIZED HEALTH CARE EDUCATION/TRAINING PROGRAM

## 2024-01-12 RX ORDER — ACETAMINOPHEN 325 MG/1
650 TABLET ORAL
Status: COMPLETED | OUTPATIENT
Start: 2024-01-12 | End: 2024-01-12

## 2024-01-12 RX ORDER — PANTOPRAZOLE SODIUM 40 MG/10ML
40 INJECTION, POWDER, LYOPHILIZED, FOR SOLUTION INTRAVENOUS 2 TIMES DAILY
Status: DISCONTINUED | OUTPATIENT
Start: 2024-01-13 | End: 2024-01-13

## 2024-01-12 RX ORDER — PANTOPRAZOLE SODIUM 40 MG/10ML
80 INJECTION, POWDER, LYOPHILIZED, FOR SOLUTION INTRAVENOUS
Status: COMPLETED | OUTPATIENT
Start: 2024-01-12 | End: 2024-01-12

## 2024-01-12 RX ORDER — DIPHENHYDRAMINE HYDROCHLORIDE 50 MG/ML
25 INJECTION INTRAMUSCULAR; INTRAVENOUS ONCE
Status: COMPLETED | OUTPATIENT
Start: 2024-01-12 | End: 2024-01-12

## 2024-01-12 RX ADMIN — DIPHENHYDRAMINE HYDROCHLORIDE 25 MG: 50 INJECTION, SOLUTION INTRAMUSCULAR; INTRAVENOUS at 06:01

## 2024-01-12 RX ADMIN — PANTOPRAZOLE SODIUM 80 MG: 40 INJECTION, POWDER, FOR SOLUTION INTRAVENOUS at 06:01

## 2024-01-12 RX ADMIN — SODIUM CHLORIDE 500 ML: 9 INJECTION, SOLUTION INTRAVENOUS at 08:01

## 2024-01-12 RX ADMIN — PHYTONADIONE 5 MG: 10 INJECTION, EMULSION INTRAMUSCULAR; INTRAVENOUS; SUBCUTANEOUS at 08:01

## 2024-01-12 RX ADMIN — ACETAMINOPHEN 650 MG: 325 TABLET ORAL at 09:01

## 2024-01-12 NOTE — ED TRIAGE NOTES
Patient comes into the emergency department by POV with complaints of dizziness. Patient states that he has been feeling dizzy since Tuesday and having diarrhea a week ago. Patient denies nausea, vomiting, HA, LOC.

## 2024-01-12 NOTE — ED PROVIDER NOTES
Encounter Date: 1/12/2024       History     Chief Complaint   Patient presents with    Dizziness     68-year-old male with a past medical history of type 2 diabetes, prior CVA, HTN and CKD presents to the ED with his 2 brothers at bedside complaining of lightheadedness since Tuesday.  Denies any falls, head trauma, chest pain, shortness of breath, abdominal pain, headache.  He also notes that he has been having diarrhea over the last 1-2 weeks denies any black or bloody stool.  No other complaints at this time.    The history is provided by the patient, medical records and a relative.     Review of patient's allergies indicates:   Allergen Reactions    Plasma protein fraction Rash     Past Medical History:   Diagnosis Date    CVA (cerebral vascular accident)     Diabetes     Diabetic retinopathy     Elevated PSA     Hypertension     Prostate cancer     Stroke      Past Surgical History:   Procedure Laterality Date    CATARACT EXTRACTION      COLONOSCOPY N/A 5/31/2022    Procedure: COLONOSCOPY;  Surgeon: Juan Miguel Quintanilla MD;  Location: [facility] ([address] [place] [address]);  Service: Endoscopy;  Laterality: N/A;  Fully Vaccinatd, Coumadin hold per Coumadin clinic see 5/2 note for 5 days prior with Lovenox bridge - sm  prep instr portal and mailed - sm    FRACTURE SURGERY      hips x 2 - last fall was 2 yrs ago.    VA GARTH,SWALLOW FUNCTION,CINE/VIDEO RECORD  4/1/2022          Family History   Problem Relation Age of Onset    Hypertension Father     Heart attack Father     Breast cancer Sister     Diabetes Brother     Hypertension Brother      Social History     Tobacco Use    Smoking status: Never    Smokeless tobacco: Never   Substance Use Topics    Alcohol use: Yes     Comment: a few shots once a week.     Drug use: Yes     Types: Marijuana     Review of Systems    Physical Exam     Initial Vitals [01/12/24 1420]   BP Pulse Resp Temp SpO2   (!) 94/54 (!) 115 20 98.1 °F (36.7 °C) 100 %      MAP       --         Physical  Exam    Nursing note and vitals reviewed.  Constitutional: Vital signs are normal. He appears well-developed and well-nourished. He is not diaphoretic. No distress.   HENT:   Head: Normocephalic and atraumatic.   Right Ear: External ear normal.   Left Ear: External ear normal.   Eyes: EOM are normal. Right eye exhibits no discharge. Left eye exhibits no discharge.   Neck: Trachea normal. Neck supple. No thyroid mass present.   Normal range of motion.  Cardiovascular:  Regular rhythm, normal heart sounds and intact distal pulses.     Exam reveals no gallop and no friction rub.       No murmur heard.  Borderline tachycardic.   Pulmonary/Chest: Breath sounds normal. No respiratory distress. He has no wheezes. He has no rhonchi. He has no rales.   Abdominal: Abdomen is soft. Bowel sounds are normal. He exhibits no distension. There is no abdominal tenderness. There is no rebound and no guarding.   Genitourinary: Rectum:      Guaiac result positive (Black stool.).   Guaiac positive stool (Black stool.). : Acceptable.  Musculoskeletal:         General: No tenderness or edema. Normal range of motion.      Cervical back: Normal range of motion and neck supple.     Neurological: He is alert and oriented to person, place, and time. GCS score is 15. GCS eye subscore is 4. GCS verbal subscore is 5. GCS motor subscore is 6.   Skin: Skin is warm and dry. Capillary refill takes less than 2 seconds. No rash noted.   Psychiatric: He has a normal mood and affect.         ED Course   Procedures  Labs Reviewed   CBC W/ AUTO DIFFERENTIAL - Abnormal; Notable for the following components:       Result Value    RBC 2.13 (*)     Hemoglobin 4.8 (*)     Hematocrit 16.9 (*)     MCV 79 (*)     MCH 22.5 (*)     MCHC 28.4 (*)     RDW 16.2 (*)     Lymph # 0.6 (*)     nRBC 2 (*)     Gran % 83.6 (*)     Lymph % 8.0 (*)     All other components within normal limits    Narrative:       HGB/ HCT critical result(s) called and verbal  readback obtained   from Nichole Kelly RN. by RNS 01/12/2024 17:37   COMPREHENSIVE METABOLIC PANEL - Abnormal; Notable for the following components:    Chloride 111 (*)     CO2 18 (*)     Glucose 181 (*)     Creatinine 1.8 (*)     eGFR 40.5 (*)     All other components within normal limits   PROTIME-INR - Abnormal; Notable for the following components:    Prothrombin Time 38.5 (*)     INR 3.9 (*)     All other components within normal limits   TROPONIN I   LIPASE   APTT   LACTIC ACID, PLASMA   TYPE & SCREEN   POCT TROPONIN   PREPARE RBC SOFT     EKG Readings: (Independently Interpreted)   101 beats per minute.  Sinus tachycardia.  Normal axis.  No STEMI.     ECG Results              EKG 12-lead (Final result)  Result time 01/12/24 17:22:29      Final result by Interface, Lab In [facility] (01/12/24 17:22:29)                   Narrative:    Test Reason : R42,    Vent. Rate : 101 BPM     Atrial Rate : 101 BPM     P-R Int : 116 ms          QRS Dur : 086 ms      QT Int : 318 ms       P-R-T Axes : 057 029 -40 degrees     QTc Int : 412 ms    Sinus tachycardia  Nonspecific T wave abnormality  Abnormal ECG  When compared with ECG of 19-JUL-2018 10:57,  No significant change was found  Confirmed by Maximus SWAIN MD [id number]) on 1/12/2024 5:22:20 PM    Referred By: [email or web address]   SELF           Confirmed By:Maximus SWAIN MD                                  Imaging Results    None          Medications   sodium chloride 0.9% bolus 500 mL 500 mL (0 mLs Intravenous Stopped 1/12/24 2133)   pantoprazole injection 80 mg (80 mg Intravenous Given 1/12/24 1838)   diphenhydrAMINE injection 25 mg (25 mg Intravenous Given 1/12/24 1838)   phytonadione vitamin k (AQUA-MEPHYTON) 5 mg in dextrose 5 % (D5W) 50 mL IVPB (0 mg Intravenous Stopped 1/12/24 2133)   acetaminophen tablet 650 mg (650 mg Oral Given 1/12/24 2121)     Medical Decision Making  68-year-old male who appears to be in NAD presents to the ED complaining of lightheadedness and diarrhea.   ABC's intact.  Initial triage vital signs reveal tachycardia, borderline hypotension and otherwise unremarkable.    Differential diagnosis includes but is not limited to electrolyte abnormality, anemia, ACS, cardiac arrhythmia, pneumonia, UTI, dehydration    Amount and/or Complexity of Data Reviewed  Labs: ordered. Decision-making details documented in ED Course.  Radiology: ordered.    Risk  OTC drugs.  Prescription drug management.  Decision regarding hospitalization.               ED Course as of 01/12/24 2205 Fri Jan 12, 2024 1737 Creatinine(!): 1.8  Mildly worsened but similar to baseline [BG]   1738 Troponin I: <0.006  Decreases likelihood of ACS in the setting of an unremarkable EKG. [BG]   1759 Hemoglobin(!!): 4.8  New anemia.  Patient will be consented for PRBCs. [BG]   1814 Patient's family notified me that he had a reaction to a blood transfusion in the past.  He got hives and received Benadryl.  I will pretreat with Benadryl prior to on setting the transfusion. [BG]   1900 I will give the patient 5 mg of vitamin K as patient's INR is 3.9 [BG]   2127 Lipase: 14  Unlikely pancreatitis. [BG]   2204 Patient will be admitted to Hospital Medicine under inpatient designation for symptomatic anemia.  Patient understands and agrees with the plan. [BG]      ED Course User Index  [BG] Remy Irwin MD                             Clinical Impression:  Final diagnoses:  [R42] Dizziness  [D64.9] Symptomatic anemia (Primary)          ED Disposition Condition    Admit Stable                Remy Irwin MD  Resident  01/12/24 2204       Remy Irwin MD  Resident  01/12/24 2205

## 2024-01-12 NOTE — FIRST PROVIDER EVALUATION
Emergency Department TeleTriage Encounter Note      CHIEF COMPLAINT    Chief Complaint   Patient presents with    Dizziness       VITAL SIGNS   Initial Vitals [01/12/24 1420]   BP Pulse Resp Temp SpO2   (!) 94/54 (!) 115 20 98.1 °F (36.7 °C) 100 %      MAP       --            ALLERGIES    Review of patient's allergies indicates:   Allergen Reactions    Plasma protein fraction Rash       PROVIDER TRIAGE NOTE  Patient presents with complaint of feeling lightheaded and weak as if he is going to pass out.  Reports symptoms started about an hour prior to arrival.  Denies visual changes.  Denies speech difficulty.  Denies nausea or vomiting.  Denies shortness of breath.      Phy:   Constitutional: well nourished, well developed, appearing stated age, NAD        Initial orders will be placed and care will be transferred to an alternate provider when patient is roomed for a full evaluation. Any additional orders and the final disposition will be determined by that provider.        ORDERS  Labs Reviewed - No data to display    ED Orders (720h ago, onward)      Start Ordered     Status Ordering Provider    01/12/24 1422 01/12/24 1421  EKG 12-lead  Once         Completed by SHYAM RUIZ on 1/12/2024 at  2:30 PM SESSIONS, MARSHA MÉNDEZ              Virtual Visit Note: The provider triage portion of this emergency department evaluation and documentation was performed via [employer], a HIPAA-compliant telemedicine application, in concert with a tele-presenter in the room. A face to face patient evaluation with one of my colleagues will occur once the patient is placed in an emergency department room.      DISCLAIMER: This note was prepared with [employer] voice recognition transcription software. Garbled syntax, mangled pronouns, and other bizarre constructions may be attributed to that software system.

## 2024-01-12 NOTE — Clinical Note
Diagnosis: Dizziness [216185]   Future Attending Provider: DEEPAK MONSALVE [[id number]]   Reason for IP Medical Treatment  (Clinical interventions that can only be accomplished in the IP setting? ) :: Symptomatic anemia   I certify that Inpatient services for greater than or equal to 2 midnights are medically necessary:: No   Plans for Post-Acute care--if anticipated (pick the single best option):: A. No post acute care anticipated at this time

## 2024-01-13 ENCOUNTER — ANESTHESIA EVENT (OUTPATIENT)
Dept: ENDOSCOPY | Facility: HOSPITAL | Age: 69
DRG: 378 | End: 2024-01-13
Payer: MEDICARE

## 2024-01-13 ENCOUNTER — ANESTHESIA (OUTPATIENT)
Dept: ENDOSCOPY | Facility: HOSPITAL | Age: 69
DRG: 378 | End: 2024-01-13
Payer: MEDICARE

## 2024-01-13 LAB
ANION GAP SERPL CALC-SCNC: 8 MMOL/L (ref 8–16)
BASOPHILS # BLD AUTO: 0.05 K/UL (ref 0–0.2)
BASOPHILS NFR BLD: 0.3 % (ref 0–1.9)
BASOPHILS NFR BLD: 0.6 % (ref 0–1.9)
BASOPHILS NFR BLD: 0.6 % (ref 0–1.9)
BLD PROD TYP BPU: NORMAL
BLOOD UNIT EXPIRATION DATE: NORMAL
BLOOD UNIT TYPE CODE: 6200
BLOOD UNIT TYPE: NORMAL
BUN SERPL-MCNC: 20 MG/DL (ref 8–23)
CALCIUM SERPL-MCNC: 8.5 MG/DL (ref 8.7–10.5)
CHLORIDE SERPL-SCNC: 112 MMOL/L (ref 95–110)
CO2 SERPL-SCNC: 18 MMOL/L (ref 23–29)
CODING SYSTEM: NORMAL
CREAT SERPL-MCNC: 1.6 MG/DL (ref 0.5–1.4)
CROSSMATCH INTERPRETATION: NORMAL
DIFFERENTIAL METHOD BLD: ABNORMAL
DISPENSE STATUS: NORMAL
EOSINOPHIL # BLD AUTO: 0 K/UL (ref 0–0.5)
EOSINOPHIL # BLD AUTO: 0.1 K/UL (ref 0–0.5)
EOSINOPHIL # BLD AUTO: 0.1 K/UL (ref 0–0.5)
EOSINOPHIL NFR BLD: 0.2 % (ref 0–8)
EOSINOPHIL NFR BLD: 1.1 % (ref 0–8)
EOSINOPHIL NFR BLD: 1.2 % (ref 0–8)
ERYTHROCYTE [DISTWIDTH] IN BLOOD BY AUTOMATED COUNT: 15.2 % (ref 11.5–14.5)
ERYTHROCYTE [DISTWIDTH] IN BLOOD BY AUTOMATED COUNT: 15.4 % (ref 11.5–14.5)
ERYTHROCYTE [DISTWIDTH] IN BLOOD BY AUTOMATED COUNT: 15.6 % (ref 11.5–14.5)
EST. GFR  (NO RACE VARIABLE): 46.6 ML/MIN/1.73 M^2
FERRITIN SERPL-MCNC: 13 NG/ML (ref 20–300)
FOLATE SERPL-MCNC: 8.7 NG/ML (ref 4–24)
GLUCOSE SERPL-MCNC: 127 MG/DL (ref 70–110)
HCT VFR BLD AUTO: 23.1 % (ref 40–54)
HCT VFR BLD AUTO: 29.3 % (ref 40–54)
HCT VFR BLD AUTO: 30.4 % (ref 40–54)
HGB BLD-MCNC: 7.2 G/DL (ref 14–18)
HGB BLD-MCNC: 9.3 G/DL (ref 14–18)
HGB BLD-MCNC: 9.7 G/DL (ref 14–18)
IMM GRANULOCYTES # BLD AUTO: 0.05 K/UL (ref 0–0.04)
IMM GRANULOCYTES # BLD AUTO: 0.05 K/UL (ref 0–0.04)
IMM GRANULOCYTES # BLD AUTO: 0.07 K/UL (ref 0–0.04)
IMM GRANULOCYTES NFR BLD AUTO: 0.4 % (ref 0–0.5)
IMM GRANULOCYTES NFR BLD AUTO: 0.6 % (ref 0–0.5)
IMM GRANULOCYTES NFR BLD AUTO: 0.6 % (ref 0–0.5)
INR PPP: 1.8 (ref 0.8–1.2)
LACTATE SERPL-SCNC: 1.5 MMOL/L (ref 0.5–2.2)
LYMPHOCYTES # BLD AUTO: 0.5 K/UL (ref 1–4.8)
LYMPHOCYTES # BLD AUTO: 0.6 K/UL (ref 1–4.8)
LYMPHOCYTES # BLD AUTO: 0.7 K/UL (ref 1–4.8)
LYMPHOCYTES NFR BLD: 2.8 % (ref 18–48)
LYMPHOCYTES NFR BLD: 7.6 % (ref 18–48)
LYMPHOCYTES NFR BLD: 8.7 % (ref 18–48)
MCH RBC QN AUTO: 25.2 PG (ref 27–31)
MCH RBC QN AUTO: 25.7 PG (ref 27–31)
MCH RBC QN AUTO: 25.9 PG (ref 27–31)
MCHC RBC AUTO-ENTMCNC: 31.2 G/DL (ref 32–36)
MCHC RBC AUTO-ENTMCNC: 31.7 G/DL (ref 32–36)
MCHC RBC AUTO-ENTMCNC: 31.9 G/DL (ref 32–36)
MCV RBC AUTO: 81 FL (ref 82–98)
MONOCYTES # BLD AUTO: 0.7 K/UL (ref 0.3–1)
MONOCYTES # BLD AUTO: 0.9 K/UL (ref 0.3–1)
MONOCYTES # BLD AUTO: 1.7 K/UL (ref 0.3–1)
MONOCYTES NFR BLD: 10.2 % (ref 4–15)
MONOCYTES NFR BLD: 10.9 % (ref 4–15)
MONOCYTES NFR BLD: 8.9 % (ref 4–15)
NEUTROPHILS # BLD AUTO: 14.1 K/UL (ref 1.8–7.7)
NEUTROPHILS # BLD AUTO: 6.3 K/UL (ref 1.8–7.7)
NEUTROPHILS # BLD AUTO: 6.7 K/UL (ref 1.8–7.7)
NEUTROPHILS NFR BLD: 78.1 % (ref 38–73)
NEUTROPHILS NFR BLD: 81.1 % (ref 38–73)
NEUTROPHILS NFR BLD: 86.1 % (ref 38–73)
NRBC BLD-RTO: 1 /100 WBC
NRBC BLD-RTO: 1 /100 WBC
NRBC BLD-RTO: 2 /100 WBC
NUM UNITS TRANS PACKED RBC: NORMAL
PLATELET # BLD AUTO: 300 K/UL (ref 150–450)
PLATELET # BLD AUTO: 334 K/UL (ref 150–450)
PLATELET # BLD AUTO: 351 K/UL (ref 150–450)
PMV BLD AUTO: 10 FL (ref 9.2–12.9)
PMV BLD AUTO: 8.6 FL (ref 9.2–12.9)
PMV BLD AUTO: 9.6 FL (ref 9.2–12.9)
POCT GLUCOSE: 112 MG/DL (ref 70–110)
POCT GLUCOSE: 136 MG/DL (ref 70–110)
POTASSIUM SERPL-SCNC: 4.2 MMOL/L (ref 3.5–5.1)
PROTHROMBIN TIME: 18.9 SEC (ref 9–12.5)
RBC # BLD AUTO: 2.86 M/UL (ref 4.6–6.2)
RBC # BLD AUTO: 3.62 M/UL (ref 4.6–6.2)
RBC # BLD AUTO: 3.74 M/UL (ref 4.6–6.2)
SODIUM SERPL-SCNC: 138 MMOL/L (ref 136–145)
VIT B12 SERPL-MCNC: 326 PG/ML (ref 210–950)
WBC # BLD AUTO: 16.34 K/UL (ref 3.9–12.7)
WBC # BLD AUTO: 8.04 K/UL (ref 3.9–12.7)
WBC # BLD AUTO: 8.21 K/UL (ref 3.9–12.7)

## 2024-01-13 PROCEDURE — 88342 IMHCHEM/IMCYTCHM 1ST ANTB: CPT | Performed by: PATHOLOGY

## 2024-01-13 PROCEDURE — 37000009 HC ANESTHESIA EA ADD 15 MINS: Performed by: STUDENT IN AN ORGANIZED HEALTH CARE EDUCATION/TRAINING PROGRAM

## 2024-01-13 PROCEDURE — 20600001 HC STEP DOWN PRIVATE ROOM

## 2024-01-13 PROCEDURE — 63600175 PHARM REV CODE 636 W HCPCS: Performed by: STUDENT IN AN ORGANIZED HEALTH CARE EDUCATION/TRAINING PROGRAM

## 2024-01-13 PROCEDURE — 88342 IMHCHEM/IMCYTCHM 1ST ANTB: CPT | Mod: 26,,, | Performed by: PATHOLOGY

## 2024-01-13 PROCEDURE — 25000003 PHARM REV CODE 250: Performed by: STUDENT IN AN ORGANIZED HEALTH CARE EDUCATION/TRAINING PROGRAM

## 2024-01-13 PROCEDURE — 83605 ASSAY OF LACTIC ACID: CPT | Performed by: FAMILY MEDICINE

## 2024-01-13 PROCEDURE — 43239 EGD BIOPSY SINGLE/MULTIPLE: CPT | Performed by: STUDENT IN AN ORGANIZED HEALTH CARE EDUCATION/TRAINING PROGRAM

## 2024-01-13 PROCEDURE — C9113 INJ PANTOPRAZOLE SODIUM, VIA: HCPCS | Performed by: STUDENT IN AN ORGANIZED HEALTH CARE EDUCATION/TRAINING PROGRAM

## 2024-01-13 PROCEDURE — 25000003 PHARM REV CODE 250: Performed by: NURSE ANESTHETIST, CERTIFIED REGISTERED

## 2024-01-13 PROCEDURE — P9016 RBC LEUKOCYTES REDUCED: HCPCS

## 2024-01-13 PROCEDURE — 85610 PROTHROMBIN TIME: CPT | Performed by: STUDENT IN AN ORGANIZED HEALTH CARE EDUCATION/TRAINING PROGRAM

## 2024-01-13 PROCEDURE — 27201012 HC FORCEPS, HOT/COLD, DISP: Performed by: STUDENT IN AN ORGANIZED HEALTH CARE EDUCATION/TRAINING PROGRAM

## 2024-01-13 PROCEDURE — 88305 TISSUE EXAM BY PATHOLOGIST: CPT | Performed by: PATHOLOGY

## 2024-01-13 PROCEDURE — 0DB78ZX EXCISION OF STOMACH, PYLORUS, VIA NATURAL OR ARTIFICIAL OPENING ENDOSCOPIC, DIAGNOSTIC: ICD-10-PCS | Performed by: STUDENT IN AN ORGANIZED HEALTH CARE EDUCATION/TRAINING PROGRAM

## 2024-01-13 PROCEDURE — 43239 EGD BIOPSY SINGLE/MULTIPLE: CPT | Mod: ,,, | Performed by: STUDENT IN AN ORGANIZED HEALTH CARE EDUCATION/TRAINING PROGRAM

## 2024-01-13 PROCEDURE — D9220A PRA ANESTHESIA: Mod: ANES,,, | Performed by: STUDENT IN AN ORGANIZED HEALTH CARE EDUCATION/TRAINING PROGRAM

## 2024-01-13 PROCEDURE — D9220A PRA ANESTHESIA: Mod: CRNA,,, | Performed by: NURSE ANESTHETIST, CERTIFIED REGISTERED

## 2024-01-13 PROCEDURE — 30233N1 TRANSFUSION OF NONAUTOLOGOUS RED BLOOD CELLS INTO PERIPHERAL VEIN, PERCUTANEOUS APPROACH: ICD-10-PCS | Performed by: HOSPITALIST

## 2024-01-13 PROCEDURE — 25000003 PHARM REV CODE 250: Performed by: HOSPITALIST

## 2024-01-13 PROCEDURE — 63600175 PHARM REV CODE 636 W HCPCS: Performed by: NURSE ANESTHETIST, CERTIFIED REGISTERED

## 2024-01-13 PROCEDURE — 82962 GLUCOSE BLOOD TEST: CPT | Performed by: STUDENT IN AN ORGANIZED HEALTH CARE EDUCATION/TRAINING PROGRAM

## 2024-01-13 PROCEDURE — 88305 TISSUE EXAM BY PATHOLOGIST: CPT | Mod: 26,,, | Performed by: PATHOLOGY

## 2024-01-13 PROCEDURE — 36415 COLL VENOUS BLD VENIPUNCTURE: CPT | Mod: XB | Performed by: STUDENT IN AN ORGANIZED HEALTH CARE EDUCATION/TRAINING PROGRAM

## 2024-01-13 PROCEDURE — 85025 COMPLETE CBC W/AUTO DIFF WBC: CPT | Mod: 91 | Performed by: STUDENT IN AN ORGANIZED HEALTH CARE EDUCATION/TRAINING PROGRAM

## 2024-01-13 PROCEDURE — 99223 1ST HOSP IP/OBS HIGH 75: CPT | Mod: 25,GC,, | Performed by: STUDENT IN AN ORGANIZED HEALTH CARE EDUCATION/TRAINING PROGRAM

## 2024-01-13 PROCEDURE — 80048 BASIC METABOLIC PNL TOTAL CA: CPT | Performed by: STUDENT IN AN ORGANIZED HEALTH CARE EDUCATION/TRAINING PROGRAM

## 2024-01-13 PROCEDURE — 37000008 HC ANESTHESIA 1ST 15 MINUTES: Performed by: STUDENT IN AN ORGANIZED HEALTH CARE EDUCATION/TRAINING PROGRAM

## 2024-01-13 RX ORDER — LIDOCAINE HYDROCHLORIDE 20 MG/ML
INJECTION, SOLUTION EPIDURAL; INFILTRATION; INTRACAUDAL; PERINEURAL
Status: DISCONTINUED | OUTPATIENT
Start: 2024-01-13 | End: 2024-01-13

## 2024-01-13 RX ORDER — HALOPERIDOL 5 MG/ML
0.5 INJECTION INTRAMUSCULAR EVERY 10 MIN PRN
Status: DISCONTINUED | OUTPATIENT
Start: 2024-01-13 | End: 2024-01-13 | Stop reason: HOSPADM

## 2024-01-13 RX ORDER — SODIUM CHLORIDE 0.9 % (FLUSH) 0.9 %
3 SYRINGE (ML) INJECTION EVERY 4 HOURS PRN
Status: DISCONTINUED | OUTPATIENT
Start: 2024-01-13 | End: 2024-01-13 | Stop reason: HOSPADM

## 2024-01-13 RX ORDER — PANTOPRAZOLE SODIUM 40 MG/1
40 TABLET, DELAYED RELEASE ORAL
Status: DISCONTINUED | OUTPATIENT
Start: 2024-01-13 | End: 2024-01-16 | Stop reason: HOSPADM

## 2024-01-13 RX ORDER — LIDOCAINE 50 MG/G
1 PATCH TOPICAL
Status: DISCONTINUED | OUTPATIENT
Start: 2024-01-13 | End: 2024-01-16 | Stop reason: HOSPADM

## 2024-01-13 RX ORDER — ATORVASTATIN CALCIUM 40 MG/1
40 TABLET, FILM COATED ORAL DAILY
Status: DISCONTINUED | OUTPATIENT
Start: 2024-01-13 | End: 2024-01-16 | Stop reason: HOSPADM

## 2024-01-13 RX ORDER — FENTANYL CITRATE 50 UG/ML
25 INJECTION, SOLUTION INTRAMUSCULAR; INTRAVENOUS EVERY 5 MIN PRN
Status: DISCONTINUED | OUTPATIENT
Start: 2024-01-13 | End: 2024-01-13 | Stop reason: HOSPADM

## 2024-01-13 RX ORDER — ACETAMINOPHEN 325 MG/1
650 TABLET ORAL EVERY 6 HOURS PRN
Status: DISCONTINUED | OUTPATIENT
Start: 2024-01-13 | End: 2024-01-16 | Stop reason: HOSPADM

## 2024-01-13 RX ORDER — PROPOFOL 10 MG/ML
VIAL (ML) INTRAVENOUS
Status: DISCONTINUED | OUTPATIENT
Start: 2024-01-13 | End: 2024-01-13

## 2024-01-13 RX ADMIN — INSULIN DETEMIR 8 UNITS: 100 INJECTION, SOLUTION SUBCUTANEOUS at 02:01

## 2024-01-13 RX ADMIN — PANTOPRAZOLE SODIUM 40 MG: 40 TABLET, DELAYED RELEASE ORAL at 04:01

## 2024-01-13 RX ADMIN — PROPOFOL 40 MG: 10 INJECTION, EMULSION INTRAVENOUS at 10:01

## 2024-01-13 RX ADMIN — PANTOPRAZOLE SODIUM 40 MG: 40 INJECTION, POWDER, FOR SOLUTION INTRAVENOUS at 05:01

## 2024-01-13 RX ADMIN — SODIUM CHLORIDE: 0.9 INJECTION, SOLUTION INTRAVENOUS at 10:01

## 2024-01-13 RX ADMIN — AMANTADINE HYDROCHLORIDE 100 MG: 100 CAPSULE, LIQUID FILLED ORAL at 09:01

## 2024-01-13 RX ADMIN — ACETAMINOPHEN 650 MG: 325 TABLET ORAL at 02:01

## 2024-01-13 RX ADMIN — PROPOFOL 100 MCG/KG/MIN: 10 INJECTION, EMULSION INTRAVENOUS at 10:01

## 2024-01-13 RX ADMIN — INSULIN DETEMIR 8 UNITS: 100 INJECTION, SOLUTION SUBCUTANEOUS at 09:01

## 2024-01-13 RX ADMIN — ATORVASTATIN CALCIUM 40 MG: 40 TABLET, FILM COATED ORAL at 09:01

## 2024-01-13 RX ADMIN — WARFARIN SODIUM 5 MG: 4 TABLET ORAL at 05:01

## 2024-01-13 RX ADMIN — LIDOCAINE 5% 1 PATCH: 700 PATCH TOPICAL at 09:01

## 2024-01-13 RX ADMIN — PROPOFOL 60 MG: 10 INJECTION, EMULSION INTRAVENOUS at 10:01

## 2024-01-13 RX ADMIN — LIDOCAINE HYDROCHLORIDE 100 MG: 20 INJECTION, SOLUTION EPIDURAL; INFILTRATION; INTRACAUDAL at 10:01

## 2024-01-13 NOTE — ASSESSMENT & PLAN NOTE
Presents with dizziness in the setting of acute blood loss anemia from GI bleed.  Management as below.  Transfuse to maintain hemoglobin 7 or greater.  Iron, TIBC, ferritin, B12, folate ordered

## 2024-01-13 NOTE — ASSESSMENT & PLAN NOTE
Recent A1c around 7.9%.  Reports he was taken off of sliding scale insulin, and is now on Levemir 22 units nightly along with Jardiance.  We will continue a reduced dose of Levemir as to avoid hypoglycemia since he is NPO, and we will continue scheduled Accu-Cheks.  Patient's FSGs are controlled on current hypoglycemics.   Last A1c reviewed-   Lab Results   Component Value Date    HGBA1C 7.8 (H) 01/08/2024    HGBA1C 6.6 (H) 08/17/2023    HGBA1C 7.5 (H) 12/13/2022     Will hold PO hypoglycemics and will start correctional scale insulin  Most recent fingerstick glucose reviewed-   Recent Labs   Lab 01/13/24  0232   POCTGLUCOSE 136*     currently on   Antihyperglycemics (From admission, onward)      Start     Stop Route Frequency Ordered    01/13/24 0115  insulin detemir U-100 (Levemir) pen 8 Units         -- SubQ Nightly 01/13/24 0008

## 2024-01-13 NOTE — PROVATION PATIENT INSTRUCTIONS
Discharge Summary/Instructions after an Endoscopic Procedure  Patient Name: Kamlesh Diane  Patient MRN: [id number]  Patient YOB: 1955 Saturday, January 13, 2024  Inga Arcos MD  Dear patient,  As a result of recent federal legislation (The Federal Cures Act), you may   receive lab or pathology results from your procedure in your [facility]   account before your physician is able to contact you. Your physician or   their representative will relay the results to you with their   recommendations at their soonest availability.  Thank you,  RESTRICTIONS:  During your procedure today, you received medications for sedation.  These   medications may affect your judgment, balance and coordination.  Therefore,   for 24 hours, you have the following restrictions:   - DO NOT drive a car, operate machinery, make legal/financial decisions,   sign important papers or drink alcohol.    ACTIVITY:  Today: no heavy lifting, straining or running due to procedural   sedation/anesthesia.  The following day: return to full activity including work.  DIET:  Eat and drink normally unless instructed otherwise.     TREATMENT FOR COMMON SIDE EFFECTS:  - Mild abdominal pain, nausea, belching, bloating or excessive gas:  rest,   eat lightly and use a heating pad.  - Sore Throat: treat with throat lozenges and/or gargle with warm salt   water.  - Because air was used during the procedure, expelling large amounts of air   from your rectum or belching is normal.  - If a bowel prep was taken, you may not have a bowel movement for 1-3 days.    This is normal.  SYMPTOMS TO WATCH FOR AND REPORT TO YOUR PHYSICIAN:  1. Abdominal pain or bloating, other than gas cramps.  2. Chest pain.  3. Back pain.  4. Signs of infection such as: chills or fever occurring within 24 hours   after the procedure.  5. Rectal bleeding, which would show as bright red, maroon, or black stools.   (A tablespoon of blood from the rectum is not serious,  especially if   hemorrhoids are present.)  6. Vomiting.  7. Weakness or dizziness.  GO DIRECTLY TO THE NEAREST EMERGENCY ROOM IF YOU HAVE ANY OF THE FOLLOWING:      Difficulty breathing              Chills and/or fever over 101 F   Persistent vomiting and/or vomiting blood   Severe abdominal pain   Severe chest pain   Black, tarry stools   Bleeding- more than one tablespoon   Any other symptom or condition that you feel may need urgent attention  Your doctor recommends these additional instructions:  If any biopsies were taken, your doctors clinic will contact you in 1 to 2   weeks with any results.  - Return patient to hospital [facility] for ongoing care.   - Resume previous diet.   - Resume Coumadin (warfarin) at prior dose today.   - Use a proton pump inhibitor PO BID for 12 weeks.   - Repeat upper endoscopy in 8 weeks to check healing.   - Await pathology results.   - Resume Coumadin (warfarin) today at prior dose.  For questions, problems or results please call your physician - Inga Arcos MD at Work:  [phone].  [facility], EMERGENCY ROOM PHONE NUMBER: [phone]  IF A COMPLICATION OR EMERGENCY SITUATION ARISES AND YOU ARE UNABLE TO REACH   YOUR PHYSICIAN - GO DIRECTLY TO THE EMERGENCY ROOM.  MD Inga Green MD  1/13/2024 12:08:54 PM  This report has been verified and signed electronically.  Dear patient,  As a result of recent federal legislation (The Federal Cures Act), you may   receive lab or pathology results from your procedure in your [facility]   account before your physician is able to contact you. Your physician or   their representative will relay the results to you with their   recommendations at their soonest availability.  Thank you,  PROVATION

## 2024-01-13 NOTE — ASSESSMENT & PLAN NOTE
Renal function remains relatively around baseline.  We will monitor.  Avoid nephrotoxic agents as able, and renally dose all meds as applicable.  Creatine stable for now. BMP reviewed- noted Estimated Creatinine Clearance: 48.5 mL/min (A) (based on SCr of 1.6 mg/dL (H)). according to latest data. Monitor UOP and serial BMP and adjust therapy as needed. Renally dose meds.    Recent Labs   Lab 01/12/24  1529 01/13/24  0431   BUN 21 20   CREATININE 1.8* 1.6*       I & O     Intake/Output Summary (Last 24 hours) at 1/13/2024 0725  Last data filed at 1/13/2024 0601  Gross per 24 hour   Intake 1506.08 ml   Output 1100 ml   Net 406.08 ml

## 2024-01-13 NOTE — ASSESSMENT & PLAN NOTE
Patient is on chronic Coumadin since his CVA in 2005.  INR 3.9 on presentation, and he was given vitamin K in the ED due to active GI bleeding.  We will hold his Coumadin for now and monitor INR.  patient with INR   Recent Labs   Lab 01/08/24  1022 01/12/24  1810 01/13/24  0431   INR 2.7* 3.9* 1.8*    INR 1.8 s/p vitamin K.  resumed coumadin

## 2024-01-13 NOTE — CARE UPDATE
RAPID RESPONSE NURSE AI ALERT       AI alert received.    Chart Reviewed: 01/13/2024, 5:17 AM    MRN: [id number]  Bed: 49774/84045 A    Dx: Acute GI bleeding    Kamlesh Diane has a past medical history of CVA (cerebral vascular accident), Diabetes, Diabetic retinopathy, Elevated PSA, Hypertension, Prostate cancer, and Stroke.    Last VS: /74   Pulse 105   Temp 98.4 °F (36.9 °C)   Resp (!) 29   Ht 6' (1.829 m)   Wt 81.6 kg (180 lb)   SpO2 99%   BMI 24.41 kg/m²     24H Vital Sign Range:  Temp:  [97.5 °F (36.4 °C)-98.9 °F (37.2 °C)]   Pulse:  []   Resp:  [13-29]   BP: ()/(54-78)   SpO2:  [97 %-100 %]     Level of Consciousness (AVPU): alert    Recent Labs     01/12/24  1529   WBC 7.52   HGB 4.8*   HCT 16.9*          Recent Labs     01/12/24  1529      K 4.9   *   CO2 18*   BUN 21   CREATININE 1.8*   *        MEWS score: 1    Bedside RN, Lb  contacted. PRBC #2 of 3 infusing.  Bedside RN advised to ensure PRBCs infused prior to expiration time of 4 hours.  No concerns verbalized at this time. Instructed to call [phone] for further concerns or assistance.    Bradford Llamas, RN

## 2024-01-13 NOTE — ANESTHESIA POSTPROCEDURE EVALUATION
Anesthesia Post Evaluation    Patient: Kamlesh Diane    Procedure(s) Performed: Procedure(s) (LRB):  EGD (ESOPHAGOGASTRODUODENOSCOPY) (N/A)    Final Anesthesia Type: general      Patient location during evaluation: PACU  Patient participation: Yes- Able to Participate  Level of consciousness: awake and alert  Post-procedure vital signs: reviewed and stable  Pain management: adequate  Airway patency: patent    PONV status at discharge: No PONV  Anesthetic complications: no      Cardiovascular status: blood pressure returned to baseline  Respiratory status: unassisted  Hydration status: euvolemic  Follow-up not needed.              Vitals Value Taken Time   /64 01/13/24 1117   Temp 36.6 °C (97.9 °F) 01/13/24 1100   Pulse 98 01/13/24 1119   Resp 18 01/13/24 1119   SpO2 99 % 01/13/24 1119   Vitals shown include unvalidated device data.      No case tracking events are documented in the log.      Pain/Adeel Score: Pain Rating Prior to Med Admin: 10 (1/12/2024  9:21 PM)  Pain Rating Post Med Admin: 4 (1/12/2024 10:52 PM)  Adeel Score: 10 (1/13/2024 11:15 AM)

## 2024-01-13 NOTE — TREATMENT PLAN
GI Treatment Plan note:    Impression:            - Normal esophagus.                          - Small hiatal hernia.                          - Non-bleeding gastric ulcer with a clean ulcer                          base ([place] Class III).                          - Non-bleeding duodenal ulcers with a clean ulcer                          base ([place] Class III).                          - Biopsies were taken with a cold forceps for                          Helicobacter pylori testing.     Recommendation:        - Return patient to hospital patel for ongoing care.                          - Resume previous diet.                          - Resume Coumadin (warfarin) at prior dose today.                          - Use a proton pump inhibitor PO BID for 12 weeks.                          - Repeat upper endoscopy in 8 weeks to check                          healing.                          - Await pathology results.                          - Resume Coumadin (warfarin) today at prior dose.     Repeat endoscopy ordered.    We will sign off at this time.    Sandra Arriaza,   Gastroenterology PGY-4

## 2024-01-13 NOTE — ANESTHESIA PREPROCEDURE EVALUATION
Pre-operative evaluation for Procedure(s) (LRB):  EGD (ESOPHAGOGASTRODUODENOSCOPY) (N/A)    Kamlesh Diane is a 68 y.o. male with pmh DM2, CVA, prostate Ca, CKD3, HTN, long term anticoagulant use who presents with a GI bleed. Plan for the above procedure.     Consent was obtained for this procedure on 1/13/24. The e-consent filed in epic wrongly has the procedure as CTA acute GI bleed abdomen pelvis. This is a glitch in epic.     Patient Active Problem List   Diagnosis    Type 2 diabetes mellitus with hyperglycemia, with long-term current use of insulin    Screening PSA (prostate specific antigen)    H/O: CVA (cerebrovascular accident)    Long term (current) use of anticoagulants    Elevated PSA    Cancer of prostate with low recurrence risk (stage T1-2a and Brent < 7 and PSA < 10)    Tremor    Bone disorder    Peripheral artery disease    Hypertension associated with diabetes    Hypertensive retinopathy, bilateral    Stage 3b chronic kidney disease    Senile osteoporosis    Supratherapeutic INR    Acute GI bleeding    Symptomatic anemia        No current facility-administered medications on file prior to encounter.     Current Outpatient Medications on File Prior to Encounter   Medication Sig Dispense Refill    ACCU-CHEK JUSTIN PLUS TEST STRP Strp TEST FOUR TIMES DAILY AS DIRECTED 400 strip 3    alcohol swabs (ALCOHOL PREP PADS) PadM Use 4 times a day. 400 each 3    amantadine HCL (SYMMETREL) 100 mg capsule Take 1 capsule (100 mg total) by mouth 2 (two) times daily. 180 capsule 3    amLODIPine (NORVASC) 10 MG tablet Take 1 tablet (10 mg total) by mouth once daily. 90 tablet 3    atorvastatin (LIPITOR) 40 MG tablet TAKE 1 TABLET(40 MG) BY MOUTH EVERY DAY 90 tablet 3    blood-glucose meter kit Use as instructed 1 each 0    blood-glucose meter,continuous (DEXCOM G7 ) Misc Use as directed, e 11.65 1 each 1    blood-glucose sensor (DEXCOM G7 SENSOR) Adriana Change every 10  "days, e 11.65 3 each 11    calcitRIOL (ROCALTROL) 0.25 MCG Cap Take 0.25 mcg by mouth once daily.      calcitRIOL (ROCALTROL) 0.25 MCG Cap Take 1 capsule by mouth once daily.      carvediloL (COREG) 3.125 MG tablet Take 1 tablet (3.125 mg total) by mouth 2 (two) times daily. 180 tablet 3    cloNIDine (CATAPRES) 0.1 MG tablet Take 1 tablet (0.1 mg total) by mouth 3 (three) times daily. 270 tablet 0    empagliflozin (JARDIANCE) 10 mg tablet Take 1 tablet by mouth once daily.      ergocalciferol (ERGOCALCIFEROL) 50,000 unit Cap TAKE 1 CAPSULE BY MOUTH EVERY 7 DAYS 12 capsule 3    glucagon (BAQSIMI) 3 mg/actuation Spry Give one puff via nostril. Hold device between fingers and thumb, do not push plunger yet, insert tip gently into one nostril until finger(s) touch the outside of the nose, then push plunger firmly all the way in . Dose is complete when the green line disappears. 1 each 2    insulin lispro (HUMALOG KWIKPEN INSULIN) 100 unit/mL pen Inject 4 units only if sugar is >200 2x a day, max daily 8 units. 15 mL 3    JARDIANCE 10 mg tablet Take 10 mg by mouth.      lancets Misc 1 lancet by Misc.(Non-Drug; Combo Route) route 4 (four) times daily. 400 each 3    lancing device Misc 1 Device by Misc.(Non-Drug; Combo Route) route 2 (two) times daily with meals. 1 each 0    LEVEMIR FLEXTOUCH U100 INSULIN 100 unit/mL (3 mL) InPn pen ADMINISTER 22 UNITS UNDER THE SKIN EVERY EVENING 30 mL 3    losartan (COZAAR) 50 MG tablet TAKE 1 TABLET(50 MG) BY MOUTH EVERY DAY 90 tablet 3    mirabegron (MYRBETRIQ) 25 mg Tb24 ER tablet TAKE 1 TABLET(25 MG) BY MOUTH EVERY DAY 90 tablet 3    pen needle, diabetic (BD ULTRA-FINE SHORT PEN NEEDLE) 31 gauge x 5/16" Ndle USE THREE TIMES DAILY AS DIRECTED 200 each 2    polyethylene glycol (GOLYTELY) 236-22.74-6.74 -5.86 gram suspension TAKE 4000 ML BY MOUTH 1 TIME FOR 1 DOSE      RESTASIS 0.05 % ophthalmic emulsion INSTILL 1 DROP IN BOTH EYES TWICE DAILY 180 each 3    traMADol (ULTRAM) 50 mg " tablet Take 1 tablet (50 mg total) by mouth every 8 (eight) hours as needed for Pain. 15 tablet 0    warfarin (COUMADIN) 5 MG tablet TAKE 1 TABLET BY MOUTH DAILY 6 DAYS A WEEK OR AS DIRECTED BY COUMADIN CLINIC. 90 tablet 3       Past Surgical History:   Procedure Laterality Date    CATARACT EXTRACTION      COLONOSCOPY N/A 5/31/2022    Procedure: COLONOSCOPY;  Surgeon: Juan Miguel Quintanilla MD;  Location: [facility] ([address] [place] [address]);  Service: Endoscopy;  Laterality: N/A;  Fully Vaccinatd, Coumadin hold per Coumadin clinic see 5/2 note for 5 days prior with Lovenox bridge - sm  prep instr portal and mailed - sm    FRACTURE SURGERY      hips x 2 - last fall was 2 yrs ago.    [place] EVAL,SWALLOW FUNCTION,CINE/VIDEO RECORD  4/1/2022                Pre-op Assessment    I have reviewed the Patient Summary Reports.     I have reviewed the Nursing Notes. I have reviewed the NPO Status.   I have reviewed the Medications.     Review of Systems  Anesthesia Hx:    System negative unless otherwise specified in the HPI or problem list above           Denies Family Hx of Anesthesia complications.    Denies Personal Hx of Anesthesia complications.                    Hematology/Oncology:                   Hematology Comments: System negative unless otherwise specified in the HPI or problem list above                Oncology Comments: System negative unless otherwise specified in the HPI or problem list above     EENT/Dental:   System negative unless otherwise specified in the HPI or problem list above          Cardiovascular:                    System negative unless otherwise specified in the HPI or problem list above                         Pulmonary:         System negative unless otherwise specified in the HPI or problem list above               Renal/:     System negative unless otherwise specified in the HPI or problem list above             Hepatic/GI:        System negative unless otherwise specified in the HPI or problem list above           Musculoskeletal:     System negative unless otherwise specified in the HPI or problem list above            OB/GYN/PEDS:          System negative unless otherwise specified in the HPI or problem list above   Neurological:           System negative unless otherwise specified in the HPI or problem list above                            Endocrine:     System negative unless otherwise specified in the HPI or problem list above        Dermatological:  System negative unless otherwise specified in the HPI or problem list above   Psych:     System negative unless otherwise specified in the HPI or problem list above               Physical Exam  General: Well nourished, Cooperative, Alert and Oriented    Airway:  Mouth Opening: Normal  TM Distance: Normal  Tongue: Normal  Neck ROM: Normal ROM    Chest/Lungs:  Clear to auscultation, Normal Respiratory Rate    Heart:  Rate: Normal  Rhythm: Regular Rhythm  Sounds: Normal        Anesthesia Plan  Type of Anesthesia, risks & benefits discussed:    Anesthesia Type: MAC, Gen Natural Airway, Gen ETT  Intra-op Monitoring Plan: Standard ASA Monitors  Post Op Pain Control Plan: multimodal analgesia  Induction:  IV  Informed Consent: Informed consent signed with the Patient and all parties understand the risks and agree with anesthesia plan.  All questions answered.   ASA Score: 3  Day of Surgery Review of History & Physical: H&P Update referred to the surgeon/provider.    Ready For Surgery From Anesthesia Perspective.     .

## 2024-01-13 NOTE — HPI
Kamlesh Diane is a 68 y.o. 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     He reports that on Tuesday, he developed severe dizziness which occurred with any movement and lying at rest.  He denies any syncope, chest pain, shortness for breath, nausea, vomiting, or other significant symptoms.  He has just had persistent dizziness.  He did note that after [place], he had very loose and dark stools, which resolved.  However, these have intermittently returned, and he has had dark, loose stools for the past 2-3 days.  Denies any abdominal pain, and he has not taken any OTC meds except Tylenol.  He does drink 1 alcoholic beverage approximately 1 day per week.

## 2024-01-13 NOTE — ASSESSMENT & PLAN NOTE
Recent A1c around 7.9%.  Reports he was taken off of sliding scale insulin, and is now on Levemir 22 units nightly along with Jardiance.  We will continue a reduced dose of Levemir as to avoid hypoglycemia since he is NPO, and we will continue scheduled Accu-Cheks.

## 2024-01-13 NOTE — NURSING
0640: Unit 3 of 3 infusing.       0530: Contacted by Muriel RN with [facility] for communication on critical lab values. Continuing with plan of care. We appreciate their input and collaboration.     Blood infusing, still instructed phlebotomist to collect sample on opposite arm to trend critical values. Blood has been ordered to be administered over 4 hours each, for 3 units total. Last sample resulted at 1700.

## 2024-01-13 NOTE — ASSESSMENT & PLAN NOTE
Presents with dizziness in the setting of acute blood loss anemia from GI bleed.  Management as below.  Transfuse to maintain hemoglobin 7 or greater.  Iron, TIBC, ferritin, B12, folate ordered and hopefully collected prior to transfusion.

## 2024-01-13 NOTE — ASSESSMENT & PLAN NOTE
Patient is on chronic Coumadin since his CVA in 2005.  INR 3.9 on presentation, and he was given vitamin K in the ED due to active GI bleeding.  We will hold his Coumadin for now and monitor INR.

## 2024-01-13 NOTE — TRANSFER OF CARE
Anesthesia Transfer of Care Note    Patient: Kamlesh Diane    Procedure(s) Performed: Procedure(s) (LRB):  EGD (ESOPHAGOGASTRODUODENOSCOPY) (N/A)    Patient location: PACU    Anesthesia Type: general    Transport from OR: Transported from OR on 6-10 L/min O2 by face mask with adequate spontaneous ventilation    Post pain: adequate analgesia    Post assessment: no apparent anesthetic complications and tolerated procedure well    Post vital signs: stable    Level of consciousness: awake, alert and oriented    Nausea/Vomiting: no nausea/vomiting    Complications: none    Transfer of care protocol was followed    Last vitals: Visit Vitals  BP (!) 151/84 (BP Location: Left arm, Patient Position: Lying)   Pulse (!) 112   Temp 36.7 °C (98.1 °F) (Oral)   Resp 16   Ht 6' (1.829 m)   Wt 81.6 kg (180 lb)   SpO2 100%   BMI 24.41 kg/m²

## 2024-01-13 NOTE — ASSESSMENT & PLAN NOTE
Presents with reported dizziness along with intermittent dark stools, and has associated severe decrease in H/H from recent.  ED reports melena on exam.  We will place on GI bleed pathway, start PPI b.i.d., and transfuse to maintain hemoglobin 7 or greater.  Holding home Coumadin.  CTA ordered and pending.  Monitor with serial CBCs.  GI consulted; we will keep NPO for likely endoscopy.

## 2024-01-13 NOTE — H&P
[facility] - Emergency Dept  [facility] Medicine  History & Physical    Patient Name: Kamlesh Diane  MRN: [id number]  Patient Class: IP- Inpatient  Admission Date: 1/12/2024  Attending Physician: Tin Watson MD   Primary Care Provider: Debora Ontiveros MD         Patient information was obtained from patient, relative(s), and ER records.     Subjective:     Principal Problem:Acute GI bleeding    Chief Complaint:   Chief Complaint   Patient presents with    Dizziness        HPI: Kamlesh Diane is a 68 y.o. 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     He reports that on Tuesday, he developed severe dizziness which occurred with any movement and lying at rest.  He denies any syncope, chest pain, shortness for breath, nausea, vomiting, or other significant symptoms.  He has just had persistent dizziness.  He did note that after [place], he had very loose and dark stools, which resolved.  However, these have intermittently returned, and he has had dark, loose stools for the past 2-3 days.  Denies any abdominal pain, and he has not taken any OTC meds except Tylenol.  He does drink 1 alcoholic beverage approximately 1 day per week.         Past Medical History:   Diagnosis Date    CVA (cerebral vascular accident)     Diabetes     Diabetic retinopathy     Elevated PSA     Hypertension     Prostate cancer     Stroke        Past Surgical History:   Procedure Laterality Date    CATARACT EXTRACTION      COLONOSCOPY N/A 5/31/2022    Procedure: COLONOSCOPY;  Surgeon: Juan Miguel Quintanilla MD;  Location: [facility] ([address] [place] [address]);  Service: Endoscopy;  Laterality: N/A;  Fully Vaccinatd, Coumadin hold per Coumadin clinic see 5/2 note for 5 days prior with Lovenox bridge - sm  prep instr portal and mailed - sm    FRACTURE SURGERY      hips x 2 - last fall was 2 yrs ago.    [place] EVAL,SWALLOW  FUNCTION,CINE/VIDEO RECORD  4/1/2022            Review of patient's allergies indicates:   Allergen Reactions    Plasma protein fraction Rash       No current facility-administered medications on file prior to encounter.     Current Outpatient Medications on File Prior to Encounter   Medication Sig    ACCU-CHEK JUSTIN PLUS TEST STRP Strp TEST FOUR TIMES DAILY AS DIRECTED    alcohol swabs (ALCOHOL PREP PADS) PadM Use 4 times a day.    amantadine HCL (SYMMETREL) 100 mg capsule Take 1 capsule (100 mg total) by mouth 2 (two) times daily.    amLODIPine (NORVASC) 10 MG tablet Take 1 tablet (10 mg total) by mouth once daily.    atorvastatin (LIPITOR) 40 MG tablet TAKE 1 TABLET(40 MG) BY MOUTH EVERY DAY    blood-glucose meter kit Use as instructed    blood-glucose meter,continuous (DEXCOM G7 ) Misc Use as directed, e 11.65    blood-glucose sensor (DEXCOM G7 SENSOR) Adriana Change every 10 days, e 11.65    calcitRIOL (ROCALTROL) 0.25 MCG Cap Take 0.25 mcg by mouth once daily.    calcitRIOL (ROCALTROL) 0.25 MCG Cap Take 1 capsule by mouth once daily.    carvediloL (COREG) 3.125 MG tablet Take 1 tablet (3.125 mg total) by mouth 2 (two) times daily.    cloNIDine (CATAPRES) 0.1 MG tablet Take 1 tablet (0.1 mg total) by mouth 3 (three) times daily.    empagliflozin (JARDIANCE) 10 mg tablet Take 1 tablet by mouth once daily.    ergocalciferol (ERGOCALCIFEROL) 50,000 unit Cap TAKE 1 CAPSULE BY MOUTH EVERY 7 DAYS    glucagon (BAQSIMI) 3 mg/actuation Spry Give one puff via nostril. Hold device between fingers and thumb, do not push plunger yet, insert tip gently into one nostril until finger(s) touch the outside of the nose, then push plunger firmly all the way in . Dose is complete when the green line disappears.    insulin lispro (HUMALOG KWIKPEN INSULIN) 100 unit/mL pen Inject 4 units only if sugar is >200 2x a day, max daily 8 units.    JARDIANCE 10 mg tablet Take 10 mg by mouth.    lancets Misc 1 lancet by Misc.(Non-Drug;  "Combo Route) route 4 (four) times daily.    lancing device Misc 1 Device by Misc.(Non-Drug; Combo Route) route 2 (two) times daily with meals.    LEVEMIR FLEXTOUCH U100 INSULIN 100 unit/mL (3 mL) InPn pen ADMINISTER 22 UNITS UNDER THE SKIN EVERY EVENING    losartan (COZAAR) 50 MG tablet TAKE 1 TABLET(50 MG) BY MOUTH EVERY DAY    mirabegron (MYRBETRIQ) 25 mg Tb24 ER tablet TAKE 1 TABLET(25 MG) BY MOUTH EVERY DAY    pen needle, diabetic (BD ULTRA-FINE SHORT PEN NEEDLE) 31 gauge x 5/16" Ndle USE THREE TIMES DAILY AS DIRECTED    polyethylene glycol (GOLYTELY) 236-22.74-6.74 -5.86 gram suspension TAKE 4000 ML BY MOUTH 1 TIME FOR 1 DOSE    RESTASIS 0.05 % ophthalmic emulsion INSTILL 1 DROP IN BOTH EYES TWICE DAILY    traMADol (ULTRAM) 50 mg tablet Take 1 tablet (50 mg total) by mouth every 8 (eight) hours as needed for Pain.    warfarin (COUMADIN) 5 MG tablet TAKE 1 TABLET BY MOUTH DAILY 6 DAYS A WEEK OR AS DIRECTED BY COUMADIN CLINIC.     Family History       Problem Relation (Age of Onset)    Breast cancer Sister    Diabetes Brother    Heart attack Father    Hypertension Father, Brother          Tobacco Use    Smoking status: Never    Smokeless tobacco: Never   Substance and Sexual Activity    Alcohol use: Yes     Comment: a few shots once a week.     Drug use: Yes     Types: Marijuana    Sexual activity: Not Currently     Review of Systems   Constitutional:         All pertinent other ROS noted in HPI   All other systems reviewed and are negative.    Objective:     Vital Signs (Most Recent):  Temp: 98.7 °F (37.1 °C) (01/12/24 2330)  Pulse: 98 (01/12/24 2330)  Resp: 16 (01/12/24 2330)  BP: 128/71 (01/12/24 2330)  SpO2: 100 % (01/12/24 2330) Vital Signs (24h Range):  Temp:  [97.5 °F (36.4 °C)-98.7 °F (37.1 °C)] 98.7 °F (37.1 °C)  Pulse:  [] 98  Resp:  [16-20] 16  SpO2:  [97 %-100 %] 100 %  BP: ()/(54-78) 128/71     Weight: 81.6 kg (180 lb)  Body mass index is 23.75 kg/m².     Physical Exam  Vitals and nursing " note reviewed.   Constitutional:       General: He is not in acute distress.     Appearance: He is well-developed. He is not ill-appearing or diaphoretic.   HENT:      Head: Normocephalic and atraumatic.   Eyes:      General: No scleral icterus.     Conjunctiva/sclera: Conjunctivae normal.   Neck:      Vascular: No JVD.   Cardiovascular:      Rate and Rhythm: Normal rate and regular rhythm.   Pulmonary:      Effort: Pulmonary effort is normal. No respiratory distress.      Breath sounds: No wheezing or rales.   Musculoskeletal:      Right lower leg: No edema.      Left lower leg: No edema.   Skin:     Coloration: Skin is not jaundiced or pale.   Neurological:      Mental Status: He is alert and oriented to person, place, and time.      Motor: Weakness present. No abnormal muscle tone.      Comments: Notable dysarthria and mild right-sided weakness from prior CVA   Psychiatric:         Mood and Affect: Mood normal.         Behavior: Behavior normal.                Significant Labs: All pertinent labs within the past 24 hours have been reviewed.  BMP:   Recent Labs   Lab 01/12/24  1529   *      K 4.9   *   CO2 18*   BUN 21   CREATININE 1.8*   CALCIUM 8.8     CBC:   Recent Labs   Lab 01/12/24  1529   WBC 7.52   HGB 4.8*   HCT 16.9*          Significant Imaging: I have reviewed all pertinent imaging results/findings within the past 24 hours.  Assessment/Plan:     * Acute GI bleeding  Presents with reported dizziness along with intermittent dark stools, and has associated severe decrease in H/H from recent.  ED reports melena on exam.  We will place on GI bleed pathway, start PPI b.i.d., and transfuse to maintain hemoglobin 7 or greater.  Holding home Coumadin.  CTA ordered and pending.  Monitor with serial CBCs.  GI consulted; we will keep NPO for likely endoscopy.      Symptomatic anemia  Presents with dizziness in the setting of acute blood loss anemia from GI bleed.  Management as below.   Transfuse to maintain hemoglobin 7 or greater.  Iron, TIBC, ferritin, B12, folate ordered and hopefully collected prior to transfusion.    Supratherapeutic INR  Patient is on chronic Coumadin since his CVA in 2005.  INR 3.9 on presentation, and he was given vitamin K in the ED due to active GI bleeding.  We will hold his Coumadin for now and monitor INR.      Stage 3b chronic kidney disease  Renal function remains relatively around baseline.  We will monitor.  Avoid nephrotoxic agents as able, and renally dose all meds as applicable.    Hypertension associated with diabetes  Hold antihypertensives in the setting of acute GI bleed with symptomatic anemia, and can resume as warranted.      Peripheral artery disease  Continue statin.  Holding Coumadin given GI bleed.      H/O: CVA (cerebrovascular accident)  With resultant dysarthria and right sided weakness/ gait abnormality.  Currently at baseline.  Can continue statin, but hold Coumadin given GI bleed.      Type 2 diabetes mellitus with hyperglycemia, with long-term current use of insulin  Recent A1c around 7.9%.  Reports he was taken off of sliding scale insulin, and is now on Levemir 22 units nightly along with Jardiance.  We will continue a reduced dose of Levemir as to avoid hypoglycemia since he is NPO, and we will continue scheduled Accu-Cheks.      VTE Risk Mitigation (From admission, onward)           Ordered     IP VTE HIGH RISK PATIENT  Once         01/12/24 2246     Place sequential compression device  Until discontinued         01/12/24 2246                     Advance Care Planning   Patient is Full Code on discussion with him.  Patient's surrogate decision maker is his brother.                  Kendrick Maldonado MD  Department of Hospital Medicine  [facility] - Emergency Dept

## 2024-01-13 NOTE — CARE UPDATE
RAPID RESPONSE NURSE ROUND       Rounding completed with charge RNKate for follow up from H/H  reports H/H stable and lactic acid within normal range . No additional concerns verbalized at this time. Instructed to call [phone] for further concerns or assistance.

## 2024-01-13 NOTE — PLAN OF CARE
SW met with patient to complete initial assessment. Patient lives independently at home and is independent in ADLs. Preferred pharmacy is [facility] at [place]. Patient brother will transport patient home. Cm team to continue to follow.    01/13/24 1505   Discharge Assessment   Assessment Type Discharge Planning Assessment   Confirmed/corrected address, phone number and insurance Yes   Confirmed Demographics Correct on Facesheet   Source of Information patient;family   Communicated JOSUÉ with patient/caregiver Date not available/Unable to determine   People in Home alone   Do you expect to return to your current living situation? Yes   Prior to hospitilization cognitive status: Alert/Oriented   Current cognitive status: Alert/Oriented   Equipment Currently Used at Home walker, rolling;rollator;grab bar   Readmission within 30 days? No   Patient currently being followed by outpatient case management? No   Do you take prescription medications? Yes   Do you have any problems affording any of your prescribed medications? No   Is the patient taking medications as prescribed? yes   How do you get to doctors appointments? family or friend will provide   Are you on dialysis? No   Do you take coumadin? No   Discharge Plan A Home   DME Needed Upon Discharge  none   Discharge Plan discussed with: Patient   Transition of Care Barriers None   Physical Activity   On average, how many days per week do you engage in moderate to strenuous exercise (like a brisk walk)? 0 days   On average, how many minutes do you engage in exercise at this level? 0 min   Financial Resource Strain   How hard is it for you to pay for the very basics like food, housing, medical care, and heating? Not hard   Housing Stability   In the last 12 months, was there a time when you were not able to pay the mortgage or rent on time? N   In the last 12 months, how many places have you lived? 1   In the last 12 months, was there a time when you did not  have a steady place to sleep or slept in a shelter (including now)? N   Transportation Needs   In the past 12 months, has lack of transportation kept you from medical appointments or from getting medications? no   In the past 12 months, has lack of transportation kept you from meetings, work, or from getting things needed for daily living? No   Food Insecurity   Within the past 12 months, you worried that your food would run out before you got the money to buy more. Never true   Within the past 12 months, the food you bought just didn't last and you didn't have money to get more. Never true   Stress   Do you feel stress - tense, restless, nervous, or anxious, or unable to sleep at night because your mind is troubled all the time - these days? Not at all   Social Connections   In a typical week, how many times do you talk on the phone with family, friends, or neighbors? Three   How often do you get together with friends or relatives? Once   How often do you attend Taoism or Spiritism services? Never   Do you belong to any clubs or organizations such as Taoism groups, unions, fraternal or athletic groups, or school groups? No   How often do you attend meetings of the clubs or organizations you belong to? Never   Are you , , , , never , or living with a partner? Never marrie   Alcohol Use   Q1: How often do you have a drink containing alcohol? 2-4 pr month   Q2: How many drinks containing alcohol do you have on a typical day when you are drinking? 1 or 2   Q3: How often do you have six or more drinks on one occasion? Never     [facility] - Stepdown Flex ([place]-14)  Initial Discharge Assessment       Primary Care Provider: Debora Ontiveros MD    Admission Diagnosis: Dizziness [R42]  Symptomatic anemia [D64.9]    Admission Date: 1/12/2024  Expected Discharge Date: 1/15/2024    Transition of Care Barriers: (P) None    Payor: [employer] [facility] / Plan: [employer] [facility] /  Product Type: Medicare Advantage /     Extended Emergency Contact Information  Primary Emergency Contact: Krish Diane  Address: [address] [place] [address] [place]  Mobile Phone: [phone]  Relation: Brother  Secondary Emergency Contact: Celsa Diane   [place]  Mobile Phone: [phone]  Relation: None    Discharge Plan A: (P) Home         [id number] - [place], [place] - [address] AT [facility] [address]  [address]  [facility] [address]  Phone: [phone] Fax: [phone]    [id number] - [place], [place] - [address] AT [facility] [address] [address]  [address]  [place] [address]  Phone: [phone] Fax: [phone]      Initial Assessment (most recent)       Adult Discharge Assessment - 01/13/24 1505          Discharge Assessment    Assessment Type Discharge Planning Assessment (P)      Confirmed/corrected address, phone number and insurance Yes (P)      Confirmed Demographics Correct on Facesheet (P)      Source of Information patient;family (P)      Communicated JOSUÉ with patient/caregiver Date not available/Unable to determine (P)      People in Home alone (P)      Do you expect to return to your current living situation? Yes (P)      Prior to hospitilization cognitive status: Alert/Oriented (P)      Current cognitive status: Alert/Oriented (P)      Equipment Currently Used at Home walker, rolling;rollator;grab bar (P)      Readmission within 30 days? No (P)      Patient currently being followed by outpatient case management? No (P)      Do you take prescription medications? Yes (P)      Do you have any problems affording any of your prescribed medications? No (P)      Is the patient taking medications as prescribed? yes (P)      How do you get to doctors appointments? family or friend will provide (P)      Are you on dialysis? No (P)      Do you take  coumadin? No (P)      Discharge Plan A Home (P)      DME Needed Upon Discharge  none (P)      Discharge Plan discussed with: Patient (P)      Transition of Care Barriers None (P)         Physical Activity    On average, how many days per week do you engage in moderate to strenuous exercise (like a brisk walk)? 0 days (P)      On average, how many minutes do you engage in exercise at this level? 0 min (P)         Financial Resource Strain    How hard is it for you to pay for the very basics like food, housing, medical care, and heating? Not hard at all (P)         Housing Stability    In the last 12 months, was there a time when you were not able to pay the mortgage or rent on time? No (P)      In the last 12 months, how many places have you lived? 1 (P)      In the last 12 months, was there a time when you did not have a steady place to sleep or slept in a shelter (including now)? No (P)         Transportation Needs    In the past 12 months, has lack of transportation kept you from medical appointments or from getting medications? No (P)      In the past 12 months, has lack of transportation kept you from meetings, work, or from getting things needed for daily living? No (P)         Food Insecurity    Within the past 12 months, you worried that your food would run out before you got the money to buy more. Never true (P)      Within the past 12 months, the food you bought just didn't last and you didn't have money to get more. Never true (P)         Stress    Do you feel stress - tense, restless, nervous, or anxious, or unable to sleep at night because your mind is troubled all the time - these days? Not at all (P)         Social Connections    In a typical week, how many times do you talk on the phone with family, friends, or neighbors? Three times a week (P)      How often do you get together with friends or relatives? Once a week (P)      How often do you attend Lutheran or Yazdanism services? Never (P)      Do you  belong to any clubs or organizations such as Protestant groups, unions, fraternal or athletic groups, or school groups? No (P)      How often do you attend meetings of the clubs or organizations you belong to? Never (P)      Are you , , , , never , or living with a partner? Never  (P)         Alcohol Use    Q1: How often do you have a drink containing alcohol? 2-4 times a month (P)      Q2: How many drinks containing alcohol do you have on a typical day when you are drinking? 1 or 2 (P)      Q3: How often do you have six or more drinks on one occasion? Never (P)

## 2024-01-13 NOTE — CONSULTS
[facility]  Gastroenterology  Consult Note    Patient Name: Kamlesh Diane  MRN: [id number]  Admission Date: 1/12/2024  Hospital Length of Stay: 1 days  Code Status: Full Code   Attending Provider: Jaquan Prieto MD   Consulting Provider: Sandra Arriaza DO  Primary Care Physician: Debora Ontiveros MD  Principal Problem:Acute GI bleeding    Inpatient consult to Gastroenterology  Consult performed by: Sandra Arriaza DO  Consult ordered by: Kendrick Maldonado MD        Subjective:     HPI: Kamlesh Diane is a 68 y.o. male with history of CVA with residual R sided weakness and dysarthria (on coumadin as records denote etiology of stroke to be embolic in nature), IDDM, CKD III, HTN, HLD and PAD who presents to [facility] on 1/12 with dizziness and weakness. He reports that he has more or less not felt like himself for the past week. He states that he had been having black, tarry appearing stools for the last week. He denies any hematemesis, CGE or hematochezia. He takes his coumadin regularly. He is not on Plavix, but does take a baby ASA. Notes that he takes Tylenol for aches and pains and has not taken any NSAID's recently. He may have used petpo-bismol once over a week ago. He lives at home with his sister. He denies any abdominal pain or discomfort.     Baseline Hgb is near 13.8 in 8/2023. He presented to [facility] AF, tachycardic and borderline hypotensive. Resuscitated [id number] cc's of LR and IV PPI bolused and BID dosing initiated. Labs notable for Hgb 4/8 on arrival. S/p 2 units of PRBC's with repeat am Hgb at 7.2. Additional unit of blood transfused from ED overnight. INR 3.9, reversed to 1.8 on day of consult. CTA negative for acute GI bleed. GI consulted for endoscopic evaluation.     Past Medical History:   Diagnosis Date    CVA (cerebral vascular accident)     Diabetes     Diabetic retinopathy     Elevated PSA     Hypertension     Prostate cancer     Stroke        Past Surgical History:    Procedure Laterality Date    CATARACT EXTRACTION      COLONOSCOPY N/A 5/31/2022    Procedure: COLONOSCOPY;  Surgeon: Juan Miguel Quintanilla MD;  Location: [facility] ([address] [place] [address]);  Service: Endoscopy;  Laterality: N/A;  Fully Vaccinatd, Coumadin hold per Coumadin clinic see 5/2 note for 5 days prior with Lovenox bridge - sm  prep instr portal and mailed - sm    FRACTURE SURGERY      hips x 2 - last fall was 2 yrs ago.    DE EVAL,SWALLOW FUNCTION,CINE/VIDEO RECORD  4/1/2022            Family History   Problem Relation Age of Onset    Hypertension Father     Heart attack Father     Breast cancer Sister     Diabetes Brother     Hypertension Brother        Social History     Socioeconomic History    Marital status: Single   Occupational History    Occupation: worked in the hospital previously.    Tobacco Use    Smoking status: Never    Smokeless tobacco: Never   Substance and Sexual Activity    Alcohol use: Yes     Comment: a few shots once a week.     Drug use: Yes     Types: Marijuana    Sexual activity: Not Currently       No current facility-administered medications on file prior to encounter.     Current Outpatient Medications on File Prior to Encounter   Medication Sig Dispense Refill    ACCU-CHEK JUSTIN PLUS TEST STRP Strp TEST FOUR TIMES DAILY AS DIRECTED 400 strip 3    alcohol swabs (ALCOHOL PREP PADS) PadM Use 4 times a day. 400 each 3    amantadine HCL (SYMMETREL) 100 mg capsule Take 1 capsule (100 mg total) by mouth 2 (two) times daily. 180 capsule 3    amLODIPine (NORVASC) 10 MG tablet Take 1 tablet (10 mg total) by mouth once daily. 90 tablet 3    atorvastatin (LIPITOR) 40 MG tablet TAKE 1 TABLET(40 MG) BY MOUTH EVERY DAY 90 tablet 3    blood-glucose meter kit Use as instructed 1 each 0    blood-glucose meter,continuous (DEXCOM G7 ) Misc Use as directed, e 11.65 1 each 1    blood-glucose sensor (DEXCOM G7 SENSOR) Adriana Change every 10 days, e 11.65 3 each 11    calcitRIOL (ROCALTROL) 0.25 MCG Cap Take  "0.25 mcg by mouth once daily.      calcitRIOL (ROCALTROL) 0.25 MCG Cap Take 1 capsule by mouth once daily.      carvediloL (COREG) 3.125 MG tablet Take 1 tablet (3.125 mg total) by mouth 2 (two) times daily. 180 tablet 3    cloNIDine (CATAPRES) 0.1 MG tablet Take 1 tablet (0.1 mg total) by mouth 3 (three) times daily. 270 tablet 0    empagliflozin (JARDIANCE) 10 mg tablet Take 1 tablet by mouth once daily.      ergocalciferol (ERGOCALCIFEROL) 50,000 unit Cap TAKE 1 CAPSULE BY MOUTH EVERY 7 DAYS 12 capsule 3    glucagon (BAQSIMI) 3 mg/actuation Spry Give one puff via nostril. Hold device between fingers and thumb, do not push plunger yet, insert tip gently into one nostril until finger(s) touch the outside of the nose, then push plunger firmly all the way in . Dose is complete when the green line disappears. 1 each 2    insulin lispro (HUMALOG KWIKPEN INSULIN) 100 unit/mL pen Inject 4 units only if sugar is >200 2x a day, max daily 8 units. 15 mL 3    JARDIANCE 10 mg tablet Take 10 mg by mouth.      lancets Misc 1 lancet by Misc.(Non-Drug; Combo Route) route 4 (four) times daily. 400 each 3    lancing device Misc 1 Device by Misc.(Non-Drug; Combo Route) route 2 (two) times daily with meals. 1 each 0    LEVEMIR FLEXTOUCH U100 INSULIN 100 unit/mL (3 mL) InPn pen ADMINISTER 22 UNITS UNDER THE SKIN EVERY EVENING 30 mL 3    losartan (COZAAR) 50 MG tablet TAKE 1 TABLET(50 MG) BY MOUTH EVERY DAY 90 tablet 3    mirabegron (MYRBETRIQ) 25 mg Tb24 ER tablet TAKE 1 TABLET(25 MG) BY MOUTH EVERY DAY 90 tablet 3    pen needle, diabetic (BD ULTRA-FINE SHORT PEN NEEDLE) 31 gauge x 5/16" Ndle USE THREE TIMES DAILY AS DIRECTED 200 each 2    polyethylene glycol (GOLYTELY) 236-22.74-6.74 -5.86 gram suspension TAKE 4000 ML BY MOUTH 1 TIME FOR 1 DOSE      RESTASIS 0.05 % ophthalmic emulsion INSTILL 1 DROP IN BOTH EYES TWICE DAILY 180 each 3    traMADol (ULTRAM) 50 mg tablet Take 1 tablet (50 mg total) by mouth every 8 (eight) hours as " needed for Pain. 15 tablet 0    warfarin (COUMADIN) 5 MG tablet TAKE 1 TABLET BY MOUTH DAILY 6 DAYS A WEEK OR AS DIRECTED BY COUMADIN CLINIC. 90 tablet 3       Review of patient's allergies indicates:   Allergen Reactions    Plasma protein fraction Rash       Review of Systems   Constitutional:  Negative for chills and fever.   HENT:  Negative for congestion and sore throat.    Respiratory:  Negative for cough and shortness of breath.    Cardiovascular:  Negative for chest pain and palpitations.   Gastrointestinal:  Positive for melena. Negative for abdominal pain, constipation, diarrhea, nausea and vomiting.   Genitourinary:  Negative for dysuria and frequency.   Musculoskeletal:  Negative for back pain and myalgias.   Skin:  Negative for itching and rash.   Neurological:  Positive for dizziness and weakness. Negative for headaches.      Objective:     Vitals:    01/13/24 0830   BP: 118/78   Pulse: 108   Resp: 20   Temp: 98.7 °F (37.1 °C)     Constitutional:  not in acute distress and well developed  HENT: Head: Normal, normocephalic, atraumatic.  Eyes: conjunctiva clear and sclera nonicteric  Cardiovascular: tachycardic   Respiratory: normal chest expansion & respiratory effort   and no accessory muscle use  GI: soft, non-tender, without masses or organomegaly  Musculoskeletal: no muscular tenderness noted  Skin: normal color  Neurological: alert, oriented x3  Psychiatric: mood and affect are within normal limits    Significant Labs:  Recent Labs   Lab 01/12/24  1529 01/13/24  0431   HGB 4.8* 7.2*       Lab Results   Component Value Date    WBC 8.21 01/13/2024    HGB 7.2 (L) 01/13/2024    HCT 23.1 (L) 01/13/2024    MCV 81 (L) 01/13/2024     01/13/2024       Lab Results   Component Value Date     01/13/2024    K 4.2 01/13/2024     (H) 01/13/2024    CO2 18 (L) 01/13/2024    BUN 20 01/13/2024    CREATININE 1.6 (H) 01/13/2024    CALCIUM 8.5 (L) 01/13/2024    ANIONGAP 8 01/13/2024    ESTGFRAFRICA  51.1 (A) 02/09/2022    EGFRNONAA 44.2 (A) 02/09/2022       Lab Results   Component Value Date    ALT 16 01/12/2024    AST 28 01/12/2024    ALKPHOS 58 01/12/2024    BILITOT 0.4 01/12/2024       Lab Results   Component Value Date    INR 1.8 (H) 01/13/2024    INR 3.9 (H) 01/12/2024    INR 2.7 (H) 01/08/2024       Significant Imaging:  Reviewed pertinent radiology findings.       Assessment/Plan:     Kamlesh Diane is a 68 y.o. male with history of CVA with residual R sided weakness and dysarthria (on coumadin as records denote etiology of stroke to be embolic in nature), IDDM, CKD III, HTN, HLD and PAD who [place] [facility] [place] with dizziness and weakness    Baseline Hgb is near 13.8 in 8/2023. He presented [place] [facility], tachycardic and borderline hypotensive. Resuscitated [id number] cc's of LR and IV PPI bolused and BID dosing initiated. Labs notable for Hgb 4/8 on arrival. S/p 2 units of PRBC's with repeat am Hgb at 7.2. Additional unit of blood transfused from ED overnight. INR 3.9, reversed to 1.8 on day of consult. CTA negative for acute GI bleed. GI consulted for endoscopic evaluation.     Problem List:  Melena/Acute Blood Loss Anemia   Symptomatic Anemia/Fe Deficiency Anemia   Long Term Use of Anticoagulants   Supra-therapeutic INR     Recommendations:  - Plan for EGD today, 1/13   - Keep NPO   - Trend Hgb and transfuse for goal Hgb > 7, unless otherwise indicated  - Maintain IV access with 2 large bore IV's  - Intravascular resuscitation/support with IVF's   - Hold all NSAIDs and anticoagulants, unless contraindicated  - Bolus IV pantoprazole 80 mg followed by 40 mg BID  - Please correct any coagulopathy with platelets and FFP for goal of platelets >50K and INR <2.0  - Please notify GI team if there is significant change in patient's clinical status    Thank you for involving us in the care of Kamlesh Diane. Please call with any additional questions, concerns or changes in the patient's clinical status. We  will continue to follow.     Sandra Arriaza DO  Gastroenterology Fellow PGY- IV  [facility]

## 2024-01-13 NOTE — SUBJECTIVE & OBJECTIVE
Past Medical History:   Diagnosis Date    CVA (cerebral vascular accident)     Diabetes     Diabetic retinopathy     Elevated PSA     Hypertension     Prostate cancer     Stroke        Past Surgical History:   Procedure Laterality Date    CATARACT EXTRACTION      COLONOSCOPY N/A 5/31/2022    Procedure: COLONOSCOPY;  Surgeon: Juan Miguel Quintanilla MD;  Location: [facility] ([address] [place] [address]);  Service: Endoscopy;  Laterality: N/A;  Fully Vaccinatd, Coumadin hold per Coumadin clinic see 5/2 note for 5 days prior with Lovenox bridge - sm  prep instr portal and mailed - sm    FRACTURE SURGERY      hips x 2 - last fall was 2 yrs ago.    OH EVAL,SWALLOW FUNCTION,CINE/VIDEO RECORD  4/1/2022            Review of patient's allergies indicates:   Allergen Reactions    Plasma protein fraction Rash       No current facility-administered medications on file prior to encounter.     Current Outpatient Medications on File Prior to Encounter   Medication Sig    ACCU-CHEK JUSTIN PLUS TEST STRP Strp TEST FOUR TIMES DAILY AS DIRECTED    alcohol swabs (ALCOHOL PREP PADS) PadM Use 4 times a day.    amantadine HCL (SYMMETREL) 100 mg capsule Take 1 capsule (100 mg total) by mouth 2 (two) times daily.    amLODIPine (NORVASC) 10 MG tablet Take 1 tablet (10 mg total) by mouth once daily.    atorvastatin (LIPITOR) 40 MG tablet TAKE 1 TABLET(40 MG) BY MOUTH EVERY DAY    blood-glucose meter kit Use as instructed    blood-glucose meter,continuous (DEXCOM G7 ) Misc Use as directed, e 11.65    blood-glucose sensor (DEXCOM G7 SENSOR) Adriana Change every 10 days, e 11.65    calcitRIOL (ROCALTROL) 0.25 MCG Cap Take 0.25 mcg by mouth once daily.    calcitRIOL (ROCALTROL) 0.25 MCG Cap Take 1 capsule by mouth once daily.    carvediloL (COREG) 3.125 MG tablet Take 1 tablet (3.125 mg total) by mouth 2 (two) times daily.    cloNIDine (CATAPRES) 0.1 MG tablet Take 1 tablet (0.1 mg total) by mouth 3 (three) times daily.    empagliflozin (JARDIANCE) 10 mg tablet Take  "1 tablet by mouth once daily.    ergocalciferol (ERGOCALCIFEROL) 50,000 unit Cap TAKE 1 CAPSULE BY MOUTH EVERY 7 DAYS    glucagon (BAQSIMI) 3 mg/actuation Spry Give one puff via nostril. Hold device between fingers and thumb, do not push plunger yet, insert tip gently into one nostril until finger(s) touch the outside of the nose, then push plunger firmly all the way in . Dose is complete when the green line disappears.    insulin lispro (HUMALOG KWIKPEN INSULIN) 100 unit/mL pen Inject 4 units only if sugar is >200 2x a day, max daily 8 units.    JARDIANCE 10 mg tablet Take 10 mg by mouth.    lancets Misc 1 lancet by Misc.(Non-Drug; Combo Route) route 4 (four) times daily.    lancing device Misc 1 Device by Misc.(Non-Drug; Combo Route) route 2 (two) times daily with meals.    LEVEMIR FLEXTOUCH U100 INSULIN 100 unit/mL (3 mL) InPn pen ADMINISTER 22 UNITS UNDER THE SKIN EVERY EVENING    losartan (COZAAR) 50 MG tablet TAKE 1 TABLET(50 MG) BY MOUTH EVERY DAY    mirabegron (MYRBETRIQ) 25 mg Tb24 ER tablet TAKE 1 TABLET(25 MG) BY MOUTH EVERY DAY    pen needle, diabetic (BD ULTRA-FINE SHORT PEN NEEDLE) 31 gauge x 5/16" Ndle USE THREE TIMES DAILY AS DIRECTED    polyethylene glycol (GOLYTELY) 236-22.74-6.74 -5.86 gram suspension TAKE 4000 ML BY MOUTH 1 TIME FOR 1 DOSE    RESTASIS 0.05 % ophthalmic emulsion INSTILL 1 DROP IN BOTH EYES TWICE DAILY    traMADol (ULTRAM) 50 mg tablet Take 1 tablet (50 mg total) by mouth every 8 (eight) hours as needed for Pain.    warfarin (COUMADIN) 5 MG tablet TAKE 1 TABLET BY MOUTH DAILY 6 DAYS A WEEK OR AS DIRECTED BY COUMADIN CLINIC.     Family History       Problem Relation (Age of Onset)    Breast cancer Sister    Diabetes Brother    Heart attack Father    Hypertension Father, Brother          Tobacco Use    Smoking status: Never    Smokeless tobacco: Never   Substance and Sexual Activity    Alcohol use: Yes     Comment: a few shots once a week.     Drug use: Yes     Types: Marijuana    " Sexual activity: Not Currently     Review of Systems   Constitutional:         All pertinent other ROS noted in HPI   All other systems reviewed and are negative.    Objective:     Vital Signs (Most Recent):  Temp: 98.7 °F (37.1 °C) (01/12/24 2330)  Pulse: 98 (01/12/24 2330)  Resp: 16 (01/12/24 2330)  BP: 128/71 (01/12/24 2330)  SpO2: 100 % (01/12/24 2330) Vital Signs (24h Range):  Temp:  [97.5 °F (36.4 °C)-98.7 °F (37.1 °C)] 98.7 °F (37.1 °C)  Pulse:  [] 98  Resp:  [16-20] 16  SpO2:  [97 %-100 %] 100 %  BP: ()/(54-78) 128/71     Weight: 81.6 kg (180 lb)  Body mass index is 23.75 kg/m².     Physical Exam  Vitals and nursing note reviewed.   Constitutional:       General: He is not in acute distress.     Appearance: He is well-developed. He is not ill-appearing or diaphoretic.   HENT:      Head: Normocephalic and atraumatic.   Eyes:      General: No scleral icterus.     Conjunctiva/sclera: Conjunctivae normal.   Neck:      Vascular: No JVD.   Cardiovascular:      Rate and Rhythm: Normal rate and regular rhythm.   Pulmonary:      Effort: Pulmonary effort is normal. No respiratory distress.      Breath sounds: No wheezing or rales.   Musculoskeletal:      Right lower leg: No edema.      Left lower leg: No edema.   Skin:     Coloration: Skin is not jaundiced or pale.   Neurological:      Mental Status: He is alert and oriented to person, place, and time.      Motor: Weakness present. No abnormal muscle tone.      Comments: Notable dysarthria and mild right-sided weakness from prior CVA   Psychiatric:         Mood and Affect: Mood normal.         Behavior: Behavior normal.                Significant Labs: All pertinent labs within the past 24 hours have been reviewed.  BMP:   Recent Labs   Lab 01/12/24  1529   *      K 4.9   *   CO2 18*   BUN 21   CREATININE 1.8*   CALCIUM 8.8     CBC:   Recent Labs   Lab 01/12/24  1529   WBC 7.52   HGB 4.8*   HCT 16.9*          Significant Imaging:  I have reviewed all pertinent imaging results/findings within the past 24 hours.

## 2024-01-13 NOTE — ASSESSMENT & PLAN NOTE
Renal function remains relatively around baseline.  We will monitor.  Avoid nephrotoxic agents as able, and renally dose all meds as applicable.

## 2024-01-13 NOTE — ASSESSMENT & PLAN NOTE
Presents with reported dizziness along with intermittent dark stools, and has associated severe decrease in H/H from recent.  ED reports melena on exam.  We will place on GI bleed pathway, start PPI b.i.d., and transfuse to maintain hemoglobin 7 or greater.  Holding home Coumadin.  CTA ordered and pending.  Monitor with serial CBCs.  GI consulted; we will keep NPO for likely endoscopy.  1/13   Patient's with Normocytic anemia.. Hemoglobin stable. Etiology likely due to  acute blood loss  .  Current CBC reviewed-    Recent Labs   Lab 01/12/24  1529 01/13/24  0431 01/13/24  1314   HGB 4.8* 7.2* 9.3*         Component Value Date/Time    MCV 81 (L) 01/13/2024 1314    RDW 15.2 (H) 01/13/2024 1314    IRON 43 (L) 01/12/2024 2247    FERRITIN 13 (L) 01/12/2024 2247    RETIC 1.6 01/12/2024 2247    FOLATE 8.7 01/12/2024 2247    [id number] 326 01/12/2024 2247     Monitor CBC and transfuse if H/H drops below 7/21.    1/13 Hb 4.8-> 7.2 s/p PRBC transfusion.   EGD -  Normal esophagus.                          - Small hiatal hernia.                          - Non-bleeding gastric ulcer with a clean ulcer                          base ([place] Class III).                          - Non-bleeding duodenal ulcers with a clean ulcer                          base ([place] Class III).                          - Biopsies were taken with a cold forceps for                          Helicobacter pylori testing.   Resume Coumadin. Proton pump inhibitor PO BID for 12 weeks. Repeat upper endoscopy in 8 weeks to check healing.

## 2024-01-13 NOTE — ASSESSMENT & PLAN NOTE
With resultant dysarthria and right sided weakness/ gait abnormality.  Currently at baseline.  Can continue statin, but hold Coumadin given GI bleed.

## 2024-01-13 NOTE — PROGRESS NOTES
[facility] ([place] [address])  [facility] Medicine  Progress Note    Patient Name: Kamlesh Diane  MRN: [id number]  Patient Class: IP- Inpatient   Admission Date: 1/12/2024  Length of Stay: 1 days  Attending Physician: Jaquan Prieto MD  Primary Care Provider: Debora Ontiveros MD        Subjective:     Principal Problem:Acute GI bleeding        HPI:  Kamlesh Diane is a 68 y.o. 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     He reports that on Tuesday, he developed severe dizziness which occurred with any movement and lying at rest.  He denies any syncope, chest pain, shortness for breath, nausea, vomiting, or other significant symptoms.  He has just had persistent dizziness.  He did note that after Blanquita, he had very loose and dark stools, which resolved.  However, these have intermittently returned, and he has had dark, loose stools for the past 2-3 days.  Denies any abdominal pain, and he has not taken any OTC meds except Tylenol.  He does drink 1 alcoholic beverage approximately 1 day per week.         Overview/Hospital Course:  1/13 Hb 4.8-> 7.2 s/p PRBC transfusion. CTA . INR 1.8 s/p vitamin K. NPO and GI eval.  started tylenol and lidocaine patch for low back ache   EGD -  Normal esophagus.                          - Small hiatal hernia.                          - Non-bleeding gastric ulcer with a clean ulcer                          base (Brent[place] Class III).                          - Non-bleeding duodenal ulcers with a clean ulcer                          base ([place] Class III).                          - Biopsies were taken with a cold forceps for                          Helicobacter pylori testing.   Resume Coumadin. Proton pump inhibitor PO BID for 12 weeks. Repeat upper endoscopy in 8 weeks to check healing.           Review of Systems:   Pain scale:    Constitutional:  fever,  chills, headache, vision loss, hearing loss, weight loss, Generalized weakness, falls, loss of smell, loss of taste, poor appetite,  sore throat  Respiratory: cough, shortness of breath.   Cardiovascular: chest pain, dizziness, palpitations, orthopnea, swelling of feet, syncope  Gastrointestinal: nausea, vomiting, abdominal pain, diarrhea, black stool,  blood in stool, change in bowel habits, constipation  Genitourinary: hematuria, dysuria, urgency, frequency  Integument/Breast: rash,  pruritis  Hematologic/Lymphatic: easy bruising, lymphadenopathy  Musculoskeletal: arthralgias , myalgias, back pain, neck pain, knee pain  Neurological: confusion, seizures, tremors, slurred speech  Behavioral/Psych:  depression, anxiety, auditory or visual hallucinations     OBJECTIVE:     Physical Exam:  Body mass index is 24.41 kg/m².    Constitutional: Appears well-developed and well-nourished.   Head: Normocephalic and atraumatic.   Neck: Normal range of motion. Neck supple.   Cardiovascular: Normal heart rate.  Regular heart rhythm.  Pulmonary/Chest: Effort normal.   Abdominal: No distension.  No tenderness  Musculoskeletal: Normal range of motion. No edema. basdline right hemiparesis, dysarthria ( old CVA)   Neurological: Alert and oriented to person, place, and time.   Skin: Skin is warm and dry.   Psychiatric: Normal mood and affect. Behavior is normal.                  Vital Signs  Temp: 98.2 °F (36.8 °C) (01/13/24 1415)  Pulse: 99 (01/13/24 1145)  Resp: 16 (01/13/24 1145)  BP: 123/68 (01/13/24 1145)  SpO2: 100 % (01/13/24 1145)     24 Hour VS Range    Temp:  [97.5 °F (36.4 °C)-98.9 °F (37.2 °C)]   Pulse:  []   Resp:  [13-29]   BP: ()/(57-84)   SpO2:  [97 %-100 %]     Intake/Output Summary (Last 24 hours) at 1/13/2024 1453  Last data filed at 1/13/2024 1200  Gross per 24 hour   Intake 1506.08 ml   Output 1550 ml   Net -43.92 ml         I/O This Shift:  I/O this shift:  In: -   Out: 450  "[Urine:450]    Wt Readings from Last 3 Encounters:   01/13/24 81.6 kg (180 lb)   11/07/23 78.6 kg (173 lb 4.5 oz)   08/17/23 78.6 kg (173 lb 4.5 oz)       I have personally reviewed the vitals and recorded Intake/Output     Laboratory/Diagnostic Data:    CBC/Anemia Labs: Coags:    Recent Labs   Lab 01/12/24  1529 01/12/24  2247 01/13/24  0431 01/13/24  1314   WBC 7.52  --  8.21 8.04   HGB 4.8*  --  7.2* 9.3*   HCT 16.9*  --  23.1* 29.3*     --  351 300   MCV 79*  --  81* 81*   RDW 16.2*  --  15.6* 15.2*   IRON  --  43*  --   --    FERRITIN  --  13*  --   --    RETIC  --  1.6  --   --    FOLATE  --  8.7  --   --    [id number]  --  326  --   --     Recent Labs   Lab 01/08/24  1022 01/12/24  1810 01/13/24  0431   INR 2.7* 3.9* 1.8*   APTT  --  31.2  --         Chemistries: ABG:   Recent Labs   Lab 01/12/24  1529 01/13/24  0431    138   K 4.9 4.2   * 112*   CO2 18* 18*   BUN 21 20   CREATININE 1.8* 1.6*   CALCIUM 8.8 8.5*   PROT 7.1  --    BILITOT 0.4  --    ALKPHOS 58  --    ALT 16  --    AST 28  --     No results for input(s): "PH", "PCO2", "PO2", "HCO3", "POCSATURATED", "BE" in the last 168 hours.     POCT Glucose: HbA1c:    Recent Labs   Lab 01/13/24  0232 01/13/24  1104   POCTGLUCOSE 136* 112*    Hemoglobin A1C   Date Value Ref Range Status   01/08/2024 7.8 (H) 4.0 - 5.6 % Final     Comment:     ADA Screening Guidelines:  5.7-6.4%  Consistent with prediabetes  >or=6.5%  Consistent with diabetes    High levels of fetal hemoglobin interfere with the HbA1C  assay. Heterozygous hemoglobin variants (HbS, HgC, etc)do  not significantly interfere with this assay.   However, presence of multiple variants may affect accuracy.     08/17/2023 6.6 (H) 4.0 - 5.6 % Final     Comment:     ADA Screening Guidelines:  5.7-6.4%  Consistent with prediabetes  >or=6.5%  Consistent with diabetes    High levels of fetal hemoglobin interfere with the HbA1C  assay. Heterozygous hemoglobin variants (HbS, HgC, etc)do  not " "significantly interfere with this assay.   However, presence of multiple variants may affect accuracy.     12/13/2022 7.5 (H) 4.0 - 5.6 % Final     Comment:     ADA Screening Guidelines:  5.7-6.4%  Consistent with prediabetes  >or=6.5%  Consistent with diabetes    High levels of fetal hemoglobin interfere with the HbA1C  assay. Heterozygous hemoglobin variants (HbS, HgC, etc)do  not significantly interfere with this assay.   However, presence of multiple variants may affect accuracy.          Cardiac Enzymes: Ejection Fractions:    Recent Labs     01/12/24  1529   TROPONINI <0.006    No results found for: "EF"       No results for input(s): "COLORU", "APPEARANCEUA", "PHUR", "SPECGRAV", "PROTEINUA", "GLUCUA", "KETONESU", "BILIRUBINUA", "OCCULTUA", "NITRITE", "UROBILINOGEN", "LEUKOCYTESUR", "RBCUA", "WBCUA", "BACTERIA", "SQUAMEPITHEL", "HYALINECASTS" in the last 48 hours.    Invalid input(s): "WRIGHTSUR"    Lactate (Lactic Acid) (mmol/L)   Date Value   01/13/2024 1.5   01/12/2024 6.2 (HH)     No results found for: "BNP"  CRP (mg/L)   Date Value   04/10/2019 3.2   09/29/2010 7.9   09/27/2010 17.7 (H)   09/09/2010 45.2 (H)     Sed Rate   Date Value   04/10/2019 13 mm/Hr   09/29/2010 64 mm/hr (H)   09/27/2010 78 mm/hr (H)   09/09/2010 90 mm/hr (H)   09/01/2010 21 mm/hr (H)     No results found for: "DDIMER"  Ferritin (ng/mL)   Date Value   01/12/2024 13 (L)   08/17/2023 61     No results found for: "LDH"  Troponin I   Date Value   01/12/2024 <0.006 ng/mL   09/01/2010 0.008 ng/ml     CPK (U/L)   Date Value   09/01/2010 102     Results for orders placed or performed in visit on 05/31/21   Vitamin D   Result Value Ref Range    Vit D, 25-Hydroxy 41 30 - 96 ng/mL   Results for orders placed or performed in visit on 10/02/20   Vitamin D   Result Value Ref Range    Vit D, 25-Hydroxy 40 30 - 96 ng/mL   Results for orders placed or performed in visit on 06/02/20   Vitamin D   Result Value Ref Range    Vit D, 25-Hydroxy 38 30 - 96 " "ng/mL     No results found for: "[id number]"    Microbiology labs for the last week  Microbiology Results (last 7 days)       ** No results found for the last 168 hours. **            Reviewed and noted in plan where applicable- Please see chart for full lab data.    Lines/Drains:       Peripheral IV - Single Lumen 01/12/24 1530 20 G Left;Posterior Forearm (Active)   Site Assessment Clean;Dry;Intact 01/13/24 0414   Extremity Assessment Distal to IV No warmth;No swelling;No redness;No abnormal discoloration 01/13/24 0414   Line Status Flushed;Infusing 01/13/24 0414   Dressing Status Clean;Dry;Intact 01/13/24 0414   Dressing Intervention Integrity maintained 01/13/24 0414   Number of days: 0            Peripheral IV - Single Lumen 01/12/24 2000 20 G Posterior;Left Forearm (Active)   Site Assessment Clean;Dry;Intact 01/13/24 0414   Extremity Assessment Distal to IV No warmth;No swelling;No redness;No abnormal discoloration 01/13/24 0414   Line Status Flushed;Saline locked 01/13/24 0414   Dressing Status Clean;Dry;Intact 01/13/24 0414   Number of days: 0       Imaging  ECG Results              EKG 12-lead (Final result)  Result time 01/12/24 17:22:29      Final result by Interface, Lab In [facility] (01/12/24 17:22:29)               Narrative:    Test Reason : R42,    Vent. Rate : 101 BPM     Atrial Rate : 101 BPM     P-R Int : 116 ms          QRS Dur : 086 ms      QT Int : 318 ms       P-R-T Axes : 057 029 -40 degrees     QTc Int : 412 ms    Sinus tachycardia  Nonspecific T wave abnormality  Abnormal ECG  When compared with ECG of 19-JUL-2018 10:57,  No significant change was found  Confirmed by Maximus SWAIN MD ([id number]) on 1/12/2024 5:22:20 PM    Referred By: [email or web address]   SELF           Confirmed By:Maximus SWAIN MD                                  No results found for this or any previous visit.      CTA Acute GI [place], Abdomen and Pelvis  Narrative: EXAMINATION:  CTA acute GI bleed, abdomen and pelvis without and with " contrast    CLINICAL HISTORY:  Acute lower GI.    TECHNIQUE:  3.75 mm axial images of the abdomen and pelvis obtained before and after the administration of contrast.  Delayed 3 minutes axial images also acquired.  Coronal and sagittal images reformatted.  The patient received 100 cc of Omni 350 IV contrast.    COMPARISON:  None    FINDINGS:  Subsegmental peripheral dependent atelectasis.  No pericardial effusion.  No pleural effusion.    The abdominal aorta tapers normally, there is mild atherosclerotic plaque.  The celiac trunk, SMA, bilateral renal artery and ATA are patent.    No extravasation of contrast seen to suggest an acute GI bleeding.    The distal esophagus and stomach appear normal.  High density material at the gastroesophageal junction, present on the pre contrast images.  The small bowel is not dilated.  There is a very large amount of stool in the colon.  The appendix is normal.    No mesenteric inflammatory change.  No adenopathy.    No free air, no free fluid.    The liver appears normal.  The gallbladder is present, there is a small amount of sludge seen within.  The biliary ducts are not dilated.    The pancreas, spleen, bilateral adrenal glands, bilateral kidneys and ureters appear normal.  There is a 1.9 cm and 1.3 cm benign left renal cysts.  The bladder is significantly distended with urine.  The prostate is mildly enlarged.    The abdominal wall is intact.  There is cutaneous thickening or inflammatory change left anterior abdominal wall just inferior to the umbilicus.  The inguinal regions appear normal.    Left hip hemiarthroplasty.  No osseous lesion seen.  Large posterior disc osteophyte complex L5-S1.  Impression: No evidence of acute GI bleed.    Large amount of stool in the colon.    Significant bladder distension.    Gallbladder sludge.    Cutaneous thickening noted left lower anterior abdomen, to correlate clinically, cellulitis could have this appearance.    Electronically signed  by: Felicitas Ocampo MD  Date:    01/13/2024  Time:    08:46      Labs, Imaging, EKG and Diagnostic results from 1/13/2024 were reviewed.    Medications:  Medication list was reviewed and changes noted under Assessment/Plan.  No current facility-administered medications on file prior to encounter.     Current Outpatient Medications on File Prior to Encounter   Medication Sig Dispense Refill    ACCU-CHEK JUSTIN PLUS TEST STRP Strp TEST FOUR TIMES DAILY AS DIRECTED 400 strip 3    alcohol swabs (ALCOHOL PREP PADS) PadM Use 4 times a day. 400 each 3    amantadine HCL (SYMMETREL) 100 mg capsule Take 1 capsule (100 mg total) by mouth 2 (two) times daily. 180 capsule 3    amLODIPine (NORVASC) 10 MG tablet Take 1 tablet (10 mg total) by mouth once daily. 90 tablet 3    atorvastatin (LIPITOR) 40 MG tablet TAKE 1 TABLET(40 MG) BY MOUTH EVERY DAY 90 tablet 3    blood-glucose meter kit Use as instructed 1 each 0    blood-glucose meter,continuous (DEXCOM G7 ) Misc Use as directed, e 11.65 1 each 1    blood-glucose sensor (DEXCOM G7 SENSOR) Adriana Change every 10 days, e 11.65 3 each 11    calcitRIOL (ROCALTROL) 0.25 MCG Cap Take 0.25 mcg by mouth once daily.      calcitRIOL (ROCALTROL) 0.25 MCG Cap Take 1 capsule by mouth once daily.      carvediloL (COREG) 3.125 MG tablet Take 1 tablet (3.125 mg total) by mouth 2 (two) times daily. 180 tablet 3    cloNIDine (CATAPRES) 0.1 MG tablet Take 1 tablet (0.1 mg total) by mouth 3 (three) times daily. 270 tablet 0    empagliflozin (JARDIANCE) 10 mg tablet Take 1 tablet by mouth once daily.      ergocalciferol (ERGOCALCIFEROL) 50,000 unit Cap TAKE 1 CAPSULE BY MOUTH EVERY 7 DAYS 12 capsule 3    glucagon (BAQSIMI) 3 mg/actuation Spry Give one puff via nostril. Hold device between fingers and thumb, do not push plunger yet, insert tip gently into one nostril until finger(s) touch the outside of the nose, then push plunger firmly all the way in . Dose is complete when the green line  "disappears. 1 each 2    insulin lispro (HUMALOG KWIKPEN INSULIN) 100 unit/mL pen Inject 4 units only if sugar is >200 2x a day, max daily 8 units. 15 mL 3    JARDIANCE 10 mg tablet Take 10 mg by mouth.      lancets Misc 1 lancet by Misc.(Non-Drug; Combo Route) route 4 (four) times daily. 400 each 3    lancing device Misc 1 Device by Misc.(Non-Drug; Combo Route) route 2 (two) times daily with meals. 1 each 0    LEVEMIR FLEXTOUCH U100 INSULIN 100 unit/mL (3 mL) InPn pen ADMINISTER 22 UNITS UNDER THE SKIN EVERY EVENING 30 mL 3    losartan (COZAAR) 50 MG tablet TAKE 1 TABLET(50 MG) BY MOUTH EVERY DAY 90 tablet 3    mirabegron (MYRBETRIQ) 25 mg Tb24 ER tablet TAKE 1 TABLET(25 MG) BY MOUTH EVERY DAY 90 tablet 3    pen needle, diabetic (BD ULTRA-FINE SHORT PEN NEEDLE) 31 gauge x 5/16" Ndle USE THREE TIMES DAILY AS DIRECTED 200 each 2    polyethylene glycol (GOLYTELY) 236-22.74-6.74 -5.86 gram suspension TAKE 4000 ML BY MOUTH 1 TIME FOR 1 DOSE      RESTASIS 0.05 % ophthalmic emulsion INSTILL 1 DROP IN BOTH EYES TWICE DAILY 180 each 3    traMADol (ULTRAM) 50 mg tablet Take 1 tablet (50 mg total) by mouth every 8 (eight) hours as needed for Pain. 15 tablet 0    warfarin (COUMADIN) 5 MG tablet TAKE 1 TABLET BY MOUTH DAILY 6 DAYS A WEEK OR AS DIRECTED BY COUMADIN CLINIC. 90 tablet 3     Scheduled Medications:  amantadine HCL, 100 mg, Oral, BID  atorvastatin, 40 mg, Oral, Daily  insulin detemir U-100 (Levemir), 8 Units, Subcutaneous, QHS  LIDOcaine, 1 patch, Transdermal, Q24H  pantoprazole, 40 mg, Oral, BID AC  warfarin, 5 mg, Oral, Daily      PRN: acetaminophen, iohexoL  Infusions:   Estimated Creatinine Clearance: 48.5 mL/min (A) (based on SCr of 1.6 mg/dL (H)).             Assessment/Plan:      * Acute GI bleeding  Presents with reported dizziness along with intermittent dark stools, and has associated severe decrease in H/H from recent.  ED reports melena on exam.  We will place on GI bleed pathway, start PPI b.i.d., and " transfuse to maintain hemoglobin 7 or greater.  Holding home Coumadin.  CTA ordered and pending.  Monitor with serial CBCs.  GI consulted; we will keep NPO for likely endoscopy.  1/13   Patient's with Normocytic anemia.. Hemoglobin stable. Etiology likely due to  acute blood loss  .  Current CBC reviewed-    Recent Labs   Lab 01/12/24  1529 01/13/24  0431 01/13/24  1314   HGB 4.8* 7.2* 9.3*         Component Value Date/Time    MCV 81 (L) 01/13/2024 1314    RDW 15.2 (H) 01/13/2024 1314    IRON 43 (L) 01/12/2024 2247    FERRITIN 13 (L) 01/12/2024 2247    RETIC 1.6 01/12/2024 2247    FOLATE 8.7 01/12/2024 2247    [id number] 326 01/12/2024 2247     Monitor CBC and transfuse if H/H drops below 7/21.    1/13 Hb 4.8-> 7.2 s/p PRBC transfusion.   EGD -  Normal esophagus.                          - Small hiatal hernia.                          - Non-bleeding gastric ulcer with a clean ulcer                          base ([place] Class III).                          - Non-bleeding duodenal ulcers with a clean ulcer                          base ([place] Class III).                          - Biopsies were taken with a cold forceps for                          Helicobacter pylori testing.   Resume Coumadin. Proton pump inhibitor PO BID for 12 weeks. Repeat upper endoscopy in 8 weeks to check healing.     Symptomatic anemia  Presents with dizziness in the setting of acute blood loss anemia from GI bleed.  Management as below.  Transfuse to maintain hemoglobin 7 or greater.  Iron, TIBC, ferritin, B12, folate ordered     Supratherapeutic INR  Patient is on chronic Coumadin since his CVA in 2005.  INR 3.9 on presentation, and he was given vitamin K in the ED due to active GI bleeding.  We will hold his Coumadin for now and monitor INR.  patient with INR   Recent Labs   Lab 01/08/24  1022 01/12/24  1810 01/13/24  0431   INR 2.7* 3.9* 1.8*    INR 1.8 s/p vitamin K.  resumed coumadin    Stage 3b chronic kidney disease  Renal function  remains relatively around baseline.  We will monitor.  Avoid nephrotoxic agents as able, and renally dose all meds as applicable.  Creatine stable for now. BMP reviewed- noted Estimated Creatinine Clearance: 48.5 mL/min (A) (based on SCr of 1.6 mg/dL (H)). according to latest data. Monitor UOP and serial BMP and adjust therapy as needed. Renally dose meds.    Recent Labs   Lab 01/12/24  1529 01/13/24  0431   BUN 21 20   CREATININE 1.8* 1.6*       I & O     Intake/Output Summary (Last 24 hours) at 1/13/2024 0725  Last data filed at 1/13/2024 0601  Gross per 24 hour   Intake 1506.08 ml   Output 1100 ml   Net 406.08 ml        Hypertension associated with diabetes  Hold antihypertensives in the setting of acute GI bleed with symptomatic anemia, and can resume as warranted.      Peripheral artery disease  Continue statin.  Holding Coumadin given GI bleed.      H/O: CVA (cerebrovascular accident)  With resultant dysarthria and right sided weakness/ gait abnormality.  Currently at baseline.  Can continue statin, but hold Coumadin given GI bleed.      Type 2 diabetes mellitus with hyperglycemia, with long-term current use of insulin  Recent A1c around 7.9%.  Reports he was taken off of sliding scale insulin, and is now on Levemir 22 units nightly along with Jardiance.  We will continue a reduced dose of Levemir as to avoid hypoglycemia since he is NPO, and we will continue scheduled Accu-Cheks.  Patient's FSGs are controlled on current hypoglycemics.   Last A1c reviewed-   Lab Results   Component Value Date    HGBA1C 7.8 (H) 01/08/2024    HGBA1C 6.6 (H) 08/17/2023    HGBA1C 7.5 (H) 12/13/2022     Will hold PO hypoglycemics and will start correctional scale insulin  Most recent fingerstick glucose reviewed-   Recent Labs   Lab 01/13/24  0232   POCTGLUCOSE 136*     currently on   Antihyperglycemics (From admission, onward)      Start     Stop Route Frequency Ordered    01/13/24 0115  insulin detemir U-100 (Levemir) pen 8  Units         -- SubQ Nightly 01/13/24 0008               VTE Risk Mitigation (From admission, onward)           Ordered     warfarin split tablet 5 mg  Daily         01/13/24 1347     IP VTE HIGH RISK PATIENT  Once         01/12/24 2246     Place sequential compression device  Until discontinued         01/12/24 2246                    Discharge Planning   JOSUÉ: 1/15/2024     Code Status: Full Code   Is the patient medically ready for discharge?: (No Documentation)    Reason for patient still in hospital (select all that apply): Treatment                     Jaquan Prieto MD  Department of Hospital Medicine   [facility] ([place])

## 2024-01-13 NOTE — ASSESSMENT & PLAN NOTE
Hold antihypertensives in the setting of acute GI bleed with symptomatic anemia, and can resume as warranted.

## 2024-01-13 NOTE — HOSPITAL COURSE
1/13 Hb 4.8-> 7.2 s/p PRBC transfusion. CTA . INR 1.8 s/p vitamin K. NPO and GI eval.  started tylenol and lidocaine patch for low back ache   EGD -  Normal esophagus.                          - Small hiatal hernia.                          - Non-bleeding gastric ulcer with a clean ulcer                          base ([place] Class III).                          - Non-bleeding duodenal ulcers with a clean ulcer                          base ([place] Class III).                          - Biopsies were taken with a cold forceps for                          Helicobacter pylori testing.   Resume Coumadin. Proton pump inhibitor PO BID for 12 weeks. Repeat upper endoscopy in 8 weeks to check healing.   1/14 Hb stable. leucocytosis trending up. Pancultures . CX ray -Bilateral patchy opacities in the lower lung zones, likely atelectasis. procalcitonin slightly elevated. No pleural effusion or pneumothorax.  UA negative. BCX 2 . started on azithromycin . INR 1.2. started on lovenox bridge   1/15 leucocytosis resolved. Hb stable . INR 1.5 continue lovenox and coumadin .Bicarb 16.  started on sodium bicarbonate 1300mg BID   1/16 Bicarb improved to 20. Hb stable. discharge today

## 2024-01-13 NOTE — NURSING TRANSFER
Nursing Transfer Note      1/13/2024   11:35 AM    Reason patient is being transferred: Recovery criteria met, returning to IP room    PACU nurse giving handoff: DRAKE Eng    Nurse receiving handoff: JOHN Pérez    Transfer to [id number]    Transfer via stretcher    Transported by PCT    Transfer Vital Signs @ 1130  Temperature: 97.9  Blood Pressure: 126/66  Heart Rate:99 NSR  O2 Sat: 99% on RA  Respirations:22    Medicines/Equipment sent with patient: IV pump    Any special needs or follow-up needed: See GI note/recs    Patient belongings transferred with patient: No    Chart send with patient: Yes    Notified: Patient declined    Patient reassessed prior to transfer at: 1/13/24 @ 1130   62 yo female returns as requested for recheck of deep skin wound above the R knee. [facility] her as the area had not been a problem for more than 4 weeks. She is washing the wound daily, and leaving it open when she can. The distal extremity swelling and bruising have improved. She would like to have some Gabapentin. She hasn't taken it in about a year, but would like to have some for her generalized RA arthralgia and low back pain. PE: Obese WF   Repeat BP - 122/100   R Knee wound does appear to be slowly granulating; no active drainage; distally, ecchymosis is resolving, and there is no pitting     Imp: Resolving Knee Skin wound   RA   HTN    Plan: Will rx Gabapentin at 300 mg size w/ titration instructions   She will see Dr Kourtney Garcia as scheduled in late Feb   Discussed sodium/salt use   Call if knee wound regresses  _______________________  Expected course of current diagnosed problem(s) as well as expected progression and possible complications, and desired follow up with provider are discussed with patient. Patient is encouraged to be back in touch with any questions or concerns. Patient expresses understanding of plan of care. Patient is given AVS which includes diagnoses, current medications, vitals.

## 2024-01-14 PROBLEM — D72.829 LEUCOCYTOSIS: Status: ACTIVE | Noted: 2024-01-14

## 2024-01-14 LAB
ALBUMIN SERPL BCP-MCNC: 3.5 G/DL (ref 3.5–5.2)
ALP SERPL-CCNC: 75 U/L (ref 55–135)
ALT SERPL W/O P-5'-P-CCNC: 14 U/L (ref 10–44)
ANION GAP SERPL CALC-SCNC: 12 MMOL/L (ref 8–16)
AST SERPL-CCNC: 14 U/L (ref 10–40)
BACTERIA #/AREA URNS AUTO: NORMAL /HPF
BASOPHILS # BLD AUTO: 0.07 K/UL (ref 0–0.2)
BASOPHILS NFR BLD: 0.4 % (ref 0–1.9)
BILIRUB SERPL-MCNC: 0.8 MG/DL (ref 0.1–1)
BILIRUB UR QL STRIP: NEGATIVE
BUN SERPL-MCNC: 18 MG/DL (ref 8–23)
CALCIUM SERPL-MCNC: 9 MG/DL (ref 8.7–10.5)
CHLORIDE SERPL-SCNC: 112 MMOL/L (ref 95–110)
CLARITY UR REFRACT.AUTO: CLEAR
CO2 SERPL-SCNC: 18 MMOL/L (ref 23–29)
COLOR UR AUTO: YELLOW
CREAT SERPL-MCNC: 1.6 MG/DL (ref 0.5–1.4)
DIFFERENTIAL METHOD BLD: ABNORMAL
EOSINOPHIL # BLD AUTO: 0.1 K/UL (ref 0–0.5)
EOSINOPHIL NFR BLD: 0.4 % (ref 0–8)
ERYTHROCYTE [DISTWIDTH] IN BLOOD BY AUTOMATED COUNT: 15.9 % (ref 11.5–14.5)
EST. GFR  (NO RACE VARIABLE): 46.6 ML/MIN/1.73 M^2
GLUCOSE SERPL-MCNC: 123 MG/DL (ref 70–110)
GLUCOSE UR QL STRIP: ABNORMAL
HCT VFR BLD AUTO: 29.5 % (ref 40–54)
HGB BLD-MCNC: 9.3 G/DL (ref 14–18)
HGB UR QL STRIP: NEGATIVE
IMM GRANULOCYTES # BLD AUTO: 0.05 K/UL (ref 0–0.04)
IMM GRANULOCYTES NFR BLD AUTO: 0.3 % (ref 0–0.5)
INR PPP: 1.2 (ref 0.8–1.2)
KETONES UR QL STRIP: ABNORMAL
LEUKOCYTE ESTERASE UR QL STRIP: NEGATIVE
LYMPHOCYTES # BLD AUTO: 0.8 K/UL (ref 1–4.8)
LYMPHOCYTES NFR BLD: 4.7 % (ref 18–48)
MCH RBC QN AUTO: 25.7 PG (ref 27–31)
MCHC RBC AUTO-ENTMCNC: 31.5 G/DL (ref 32–36)
MCV RBC AUTO: 82 FL (ref 82–98)
MICROSCOPIC COMMENT: NORMAL
MONOCYTES # BLD AUTO: 1.3 K/UL (ref 0.3–1)
MONOCYTES NFR BLD: 8 % (ref 4–15)
NEUTROPHILS # BLD AUTO: 13.9 K/UL (ref 1.8–7.7)
NEUTROPHILS NFR BLD: 86.2 % (ref 38–73)
NITRITE UR QL STRIP: NEGATIVE
NRBC BLD-RTO: 1 /100 WBC
PH UR STRIP: 5 [PH] (ref 5–8)
PLATELET # BLD AUTO: 359 K/UL (ref 150–450)
PMV BLD AUTO: 9.5 FL (ref 9.2–12.9)
POCT GLUCOSE: 126 MG/DL (ref 70–110)
POCT GLUCOSE: 180 MG/DL (ref 70–110)
POCT GLUCOSE: 190 MG/DL (ref 70–110)
POTASSIUM SERPL-SCNC: 3.8 MMOL/L (ref 3.5–5.1)
PROCALCITONIN SERPL IA-MCNC: 0.58 NG/ML
PROT SERPL-MCNC: 7 G/DL (ref 6–8.4)
PROT UR QL STRIP: NEGATIVE
PROTHROMBIN TIME: 12.7 SEC (ref 9–12.5)
RBC # BLD AUTO: 3.62 M/UL (ref 4.6–6.2)
RBC #/AREA URNS AUTO: 1 /HPF (ref 0–4)
SODIUM SERPL-SCNC: 142 MMOL/L (ref 136–145)
SP GR UR STRIP: 1.02 (ref 1–1.03)
SQUAMOUS #/AREA URNS AUTO: 0 /HPF
URN SPEC COLLECT METH UR: ABNORMAL
WBC # BLD AUTO: 16.14 K/UL (ref 3.9–12.7)
WBC #/AREA URNS AUTO: 1 /HPF (ref 0–5)
YEAST UR QL AUTO: NORMAL

## 2024-01-14 PROCEDURE — 36415 COLL VENOUS BLD VENIPUNCTURE: CPT | Mod: XB | Performed by: HOSPITALIST

## 2024-01-14 PROCEDURE — 63600175 PHARM REV CODE 636 W HCPCS: Performed by: HOSPITALIST

## 2024-01-14 PROCEDURE — 20600001 HC STEP DOWN PRIVATE ROOM

## 2024-01-14 PROCEDURE — 25000003 PHARM REV CODE 250: Performed by: STUDENT IN AN ORGANIZED HEALTH CARE EDUCATION/TRAINING PROGRAM

## 2024-01-14 PROCEDURE — 80053 COMPREHEN METABOLIC PANEL: CPT | Performed by: HOSPITALIST

## 2024-01-14 PROCEDURE — 85025 COMPLETE CBC W/AUTO DIFF WBC: CPT | Performed by: STUDENT IN AN ORGANIZED HEALTH CARE EDUCATION/TRAINING PROGRAM

## 2024-01-14 PROCEDURE — 84145 PROCALCITONIN (PCT): CPT | Performed by: HOSPITALIST

## 2024-01-14 PROCEDURE — 87040 BLOOD CULTURE FOR BACTERIA: CPT | Mod: 59 | Performed by: HOSPITALIST

## 2024-01-14 PROCEDURE — 36415 COLL VENOUS BLD VENIPUNCTURE: CPT | Performed by: STUDENT IN AN ORGANIZED HEALTH CARE EDUCATION/TRAINING PROGRAM

## 2024-01-14 PROCEDURE — 81001 URINALYSIS AUTO W/SCOPE: CPT | Performed by: HOSPITALIST

## 2024-01-14 PROCEDURE — 85610 PROTHROMBIN TIME: CPT | Performed by: HOSPITALIST

## 2024-01-14 PROCEDURE — 25000003 PHARM REV CODE 250: Performed by: HOSPITALIST

## 2024-01-14 PROCEDURE — 63700000 PHARM REV CODE 250 ALT 637 W/O HCPCS: Performed by: HOSPITALIST

## 2024-01-14 RX ORDER — AZITHROMYCIN 250 MG/1
250 TABLET, FILM COATED ORAL DAILY
Status: DISCONTINUED | OUTPATIENT
Start: 2024-01-15 | End: 2024-01-16 | Stop reason: HOSPADM

## 2024-01-14 RX ORDER — AZITHROMYCIN 250 MG/1
500 TABLET, FILM COATED ORAL ONCE
Status: COMPLETED | OUTPATIENT
Start: 2024-01-14 | End: 2024-01-14

## 2024-01-14 RX ORDER — ENOXAPARIN SODIUM 100 MG/ML
1 INJECTION SUBCUTANEOUS EVERY 12 HOURS
Status: DISCONTINUED | OUTPATIENT
Start: 2024-01-14 | End: 2024-01-16

## 2024-01-14 RX ADMIN — ACETAMINOPHEN 650 MG: 325 TABLET ORAL at 09:01

## 2024-01-14 RX ADMIN — LIDOCAINE 5% 1 PATCH: 700 PATCH TOPICAL at 09:01

## 2024-01-14 RX ADMIN — INSULIN DETEMIR 8 UNITS: 100 INJECTION, SOLUTION SUBCUTANEOUS at 09:01

## 2024-01-14 RX ADMIN — ATORVASTATIN CALCIUM 40 MG: 40 TABLET, FILM COATED ORAL at 09:01

## 2024-01-14 RX ADMIN — AMANTADINE HYDROCHLORIDE 100 MG: 100 CAPSULE, LIQUID FILLED ORAL at 09:01

## 2024-01-14 RX ADMIN — PANTOPRAZOLE SODIUM 40 MG: 40 TABLET, DELAYED RELEASE ORAL at 04:01

## 2024-01-14 RX ADMIN — AZITHROMYCIN DIHYDRATE 500 MG: 250 TABLET ORAL at 04:01

## 2024-01-14 RX ADMIN — PANTOPRAZOLE SODIUM 40 MG: 40 TABLET, DELAYED RELEASE ORAL at 06:01

## 2024-01-14 RX ADMIN — ACETAMINOPHEN 650 MG: 325 TABLET ORAL at 01:01

## 2024-01-14 RX ADMIN — ENOXAPARIN SODIUM 80 MG: 80 INJECTION SUBCUTANEOUS at 09:01

## 2024-01-14 RX ADMIN — WARFARIN SODIUM 5 MG: 4 TABLET ORAL at 04:01

## 2024-01-14 NOTE — PROGRESS NOTES
[facility] - [facility] ([place] [address])  [facility]  Progress Note    Patient Name: Kamlesh Diane  MRN: [id number]  Patient Class: IP- Inpatient   Admission Date: 1/12/2024  Length of Stay: 2 days  Attending Physician: Jaquan Prieto MD  Primary Care Provider: Debora Ontiveros MD        Subjective:     Principal Problem:Acute GI bleeding        HPI:  Kamlesh Diane is a 68 y.o. 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     He reports that on Tuesday, he developed severe dizziness which occurred with any movement and lying at rest.  He denies any syncope, chest pain, shortness for breath, nausea, vomiting, or other significant symptoms.  He has just had persistent dizziness.  He did note that after Blanquita, he had very loose and dark stools, which resolved.  However, these have intermittently returned, and he has had dark, loose stools for the past 2-3 days.  Denies any abdominal pain, and he has not taken any OTC meds except Tylenol.  He does drink 1 alcoholic beverage approximately 1 day per week.         Overview/Hospital Course:  1/13 Hb 4.8-> 7.2 s/p PRBC transfusion. CTA . INR 1.8 s/p vitamin K. NPO and GI eval.  started tylenol and lidocaine patch for low back ache   EGD -  Normal esophagus.                          - Small hiatal hernia.                          - Non-bleeding gastric ulcer with a clean ulcer                          base (Brenton Class III).                          - Non-bleeding duodenal ulcers with a clean ulcer                          base (Brenton Class III).                          - Biopsies were taken with a cold forceps for                          Helicobacter pylori testing.   Resume Coumadin. Proton pump inhibitor PO BID for 12 weeks. Repeat upper endoscopy in 8 weeks to check healing.   1/14 Hb stable. leucocytosis trending up.  Pancultures . CX ray -Bilateral patchy opacities in the lower lung zones, likely atelectasis. procalcitonin slightly elevated. No pleural effusion or pneumothorax.  UA negative. BCX 2 . started on azithromycin           Review of Systems:   Pain scale:   Constitutional:  fever,  chills, headache, vision loss, hearing loss, weight loss, Generalized weakness, falls, loss of smell, loss of taste, poor appetite,  sore throat  Respiratory: cough, shortness of breath.   Cardiovascular: chest pain, dizziness, palpitations, orthopnea, swelling of feet, syncope  Gastrointestinal: nausea, vomiting, abdominal pain, diarrhea, black stool,  blood in stool, change in bowel habits, constipation  Genitourinary: hematuria, dysuria, urgency, frequency  Integument/Breast: rash,  pruritis  Hematologic/Lymphatic: easy bruising, lymphadenopathy  Musculoskeletal: arthralgias , myalgias, back pain, neck pain, knee pain  Neurological: confusion, seizures, tremors, slurred speech  Behavioral/Psych:  depression, anxiety, auditory or visual hallucinations     OBJECTIVE:     Physical Exam:  Body mass index is 24.43 kg/m².    Constitutional: Appears well-developed and well-nourished.   Head: Normocephalic and atraumatic.   Neck: Normal range of motion. Neck supple.   Cardiovascular: Normal heart rate.  Regular heart rhythm.  Pulmonary/Chest: Effort normal.   Abdominal: No distension.  No tenderness  Musculoskeletal: Normal range of motion. No edema. basdline right hemiparesis, dysarthria ( old CVA)   Neurological: Alert and oriented to person, place, and time.   Skin: Skin is warm and dry.   Psychiatric: Normal mood and affect. Behavior is normal.                  Vital Signs  Temp: 98.6 °F (37 °C) (01/14/24 1529)  Pulse: 109 (01/14/24 1529)  Resp: 15 (01/14/24 1529)  BP: 129/79 (01/14/24 1529)  SpO2: 97 % (01/14/24 1529)     24 Hour VS Range    Temp:  [98.2 °F (36.8 °C)-98.6 °F (37 °C)]   Pulse:  [100-120]   Resp:  [15-21]   BP:  "(129-150)/(76-88)   SpO2:  [97 %-100 %]     Intake/Output Summary (Last 24 hours) at 1/14/2024 1727  Last data filed at 1/14/2024 0431  Gross per 24 hour   Intake no documentation   Output 700 ml   Net -700 ml         I/O This Shift:  No intake/output data recorded.    Wt Readings from Last 3 Encounters:   01/14/24 81.7 kg (180 lb 1.9 oz)   11/07/23 78.6 kg (173 lb 4.5 oz)   08/17/23 78.6 kg (173 lb 4.5 oz)       I have personally reviewed the vitals and recorded Intake/Output     Laboratory/Diagnostic Data:    CBC/Anemia Labs: Coags:    Recent Labs   Lab 01/12/24  2247 01/13/24  0431 01/13/24  1314 01/13/24  1841 01/14/24  0601   WBC  --    < > 8.04 16.34* 16.14*   HGB  --    < > 9.3* 9.7* 9.3*   HCT  --    < > 29.3* 30.4* 29.5*   PLT  --    < > 300 334 359   MCV  --    < > 81* 81* 82   RDW  --    < > 15.2* 15.4* 15.9*   IRON 43*  --   --   --   --    FERRITIN 13*  --   --   --   --    RETIC 1.6  --   --   --   --    FOLATE 8.7  --   --   --   --    MLCQCFLL99 326  --   --   --   --     < > = values in this interval not displayed.    Recent Labs   Lab 01/12/24  1810 01/13/24  0431 01/14/24  0818   INR 3.9* 1.8* 1.2   APTT 31.2  --   --         Chemistries: ABG:   Recent Labs   Lab 01/12/24  1529 01/13/24  0431 01/14/24  0818    138 142   K 4.9 4.2 3.8   * 112* 112*   CO2 18* 18* 18*   BUN 21 20 18   CREATININE 1.8* 1.6* 1.6*   CALCIUM 8.8 8.5* 9.0   PROT 7.1  --  7.0   BILITOT 0.4  --  0.8   ALKPHOS 58  --  75   ALT 16  --  14   AST 28  --  14    No results for input(s): "PH", "PCO2", "PO2", "HCO3", "POCSATURATED", "BE" in the last 168 hours.     POCT Glucose: HbA1c:    Recent Labs   Lab 01/13/24  0232 01/13/24  1104 01/14/24  1208   POCTGLUCOSE 136* 112* 190*    Hemoglobin A1C   Date Value Ref Range Status   01/08/2024 7.8 (H) 4.0 - 5.6 % Final     Comment:     ADA Screening Guidelines:  5.7-6.4%  Consistent with prediabetes  >or=6.5%  Consistent with diabetes    High levels of fetal hemoglobin " "interfere with the HbA1C  assay. Heterozygous hemoglobin variants (HbS, HgC, etc)do  not significantly interfere with this assay.   However, presence of multiple variants may affect accuracy.     08/17/2023 6.6 (H) 4.0 - 5.6 % Final     Comment:     ADA Screening Guidelines:  5.7-6.4%  Consistent with prediabetes  >or=6.5%  Consistent with diabetes    High levels of fetal hemoglobin interfere with the HbA1C  assay. Heterozygous hemoglobin variants (HbS, HgC, etc)do  not significantly interfere with this assay.   However, presence of multiple variants may affect accuracy.     12/13/2022 7.5 (H) 4.0 - 5.6 % Final     Comment:     ADA Screening Guidelines:  5.7-6.4%  Consistent with prediabetes  >or=6.5%  Consistent with diabetes    High levels of fetal hemoglobin interfere with the HbA1C  assay. Heterozygous hemoglobin variants (HbS, HgC, etc)do  not significantly interfere with this assay.   However, presence of multiple variants may affect accuracy.          Cardiac Enzymes: Ejection Fractions:    Recent Labs     01/12/24  1529   TROPONINI <0.006    No results found for: "EF"       Recent Labs   Lab 01/14/24  1214   COLORU Yellow   APPEARANCEUA Clear   PHUR 5.0   SPECGRAV 1.025   PROTEINUA Negative   GLUCUA 3+*   KETONESU 1+*   BILIRUBINUA Negative   OCCULTUA Negative   NITRITE Negative   LEUKOCYTESUR Negative   RBCUA 1   WBCUA 1   BACTERIA None   SQUAMEPITHEL 0       Procalcitonin (ng/mL)   Date Value   01/14/2024 0.58 (H)     Lactate (Lactic Acid) (mmol/L)   Date Value   01/13/2024 1.5   01/12/2024 6.2 (HH)     No results found for: "BNP"  CRP (mg/L)   Date Value   04/10/2019 3.2   09/29/2010 7.9   09/27/2010 17.7 (H)   09/09/2010 45.2 (H)     Sed Rate   Date Value   04/10/2019 13 mm/Hr   09/29/2010 64 mm/hr (H)   09/27/2010 78 mm/hr (H)   09/09/2010 90 mm/hr (H)   09/01/2010 21 mm/hr (H)     No results found for: "DDIMER"  Ferritin (ng/mL)   Date Value   01/12/2024 13 (L)   08/17/2023 61     No results found for: " ""LDH"  Troponin I   Date Value   01/12/2024 <0.006 ng/mL   09/01/2010 0.008 ng/ml     CPK (U/L)   Date Value   09/01/2010 102     Results for orders placed or performed in visit on 05/31/21   Vitamin D   Result Value Ref Range    Vit D, 25-Hydroxy 41 30 - 96 ng/mL   Results for orders placed or performed in visit on 10/02/20   Vitamin D   Result Value Ref Range    Vit D, 25-Hydroxy 40 30 - 96 ng/mL   Results for orders placed or performed in visit on 06/02/20   Vitamin D   Result Value Ref Range    Vit D, 25-Hydroxy 38 30 - 96 ng/mL     No results found for: "[id number]"    Microbiology labs for the last week  Microbiology Results (last 7 days)       Procedure Component Value Units Date/Time    Blood culture [[id number]] Collected: 01/14/24 0818    Order Status: Completed Specimen: Blood Updated: 01/14/24 1545     Blood Culture, Routine No Growth to date    Narrative:      Rt wrist    Blood culture [[id number]] Collected: 01/14/24 0817    Order Status: Completed Specimen: Blood Updated: 01/14/24 1545     Blood Culture, Routine No Growth to date            Reviewed and noted in plan where applicable- Please see chart for full lab data.    Lines/Drains:       Peripheral IV - Single Lumen 01/12/24 1530 20 G Left;Posterior Forearm (Active)   Site Assessment Clean;Dry;Intact 01/13/24 0414   Extremity Assessment Distal to IV No warmth;No swelling;No redness;No abnormal discoloration 01/13/24 0414   Line Status Flushed;Infusing 01/13/24 0414   Dressing Status Clean;Dry;Intact 01/13/24 0414   Dressing Intervention Integrity maintained 01/13/24 0414   Number of days: 0            Peripheral IV - Single Lumen 01/12/24 2000 20 G Posterior;Left Forearm (Active)   Site Assessment Clean;Dry;Intact 01/13/24 0414   Extremity Assessment Distal to IV No warmth;No swelling;No redness;No abnormal discoloration 01/13/24 0414   Line Status Flushed;Saline locked 01/13/24 0414   Dressing Status Clean;Dry;Intact 01/13/24 0414   Number of " days: 0       Imaging  ECG Results              EKG 12-lead (Final result)  Result time 01/12/24 17:22:29      Final result by Interface, Lab In [facility] (01/12/24 17:22:29)               Narrative:    Test Reason : R42,    Vent. Rate : 101 BPM     Atrial Rate : 101 BPM     P-R Int : 116 ms          QRS Dur : 086 ms      QT Int : 318 ms       P-R-T Axes : 057 029 -40 degrees     QTc Int : 412 ms    Sinus tachycardia  Nonspecific T wave abnormality  Abnormal ECG  When compared with ECG of 19-JUL-2018 10:57,  No significant change was found  Confirmed by Maximus SWAIN MD ([id number]) on 1/12/2024 5:22:20 PM    Referred By: BROOKE   SELF           Confirmed By:Maximus SWAIN MD                                  No results found for this or any previous visit.      X-Ray Chest 1 View  Narrative: EXAMINATION:  XR CHEST 1 VIEW    CLINICAL HISTORY:  leucocytosis;    TECHNIQUE:  Single frontal view of the chest was performed.    COMPARISON:  09/06/2010    FINDINGS:  Bilateral patchy opacities in the lower lung zones, likely atelectasis.  No pleural effusion or pneumothorax.  Cardiac silhouette is unremarkable.  Impression: As above.    Electronically signed by: Juan Antonio Wahl MD  Date:    01/14/2024  Time:    08:30      Labs, Imaging, EKG and Diagnostic results from 1/14/2024 were reviewed.    Medications:  Medication list was reviewed and changes noted under Assessment/Plan.  No current facility-administered medications on file prior to encounter.     Current Outpatient Medications on File Prior to Encounter   Medication Sig Dispense Refill    ACCU-CHEK JUSTIN PLUS TEST STRP Strp TEST FOUR TIMES DAILY AS DIRECTED 400 strip 3    alcohol swabs (ALCOHOL PREP PADS) PadM Use 4 times a day. 400 each 3    amantadine HCL (SYMMETREL) 100 mg capsule Take 1 capsule (100 mg total) by mouth 2 (two) times daily. 180 capsule 3    amLODIPine (NORVASC) 10 MG tablet Take 1 tablet (10 mg total) by mouth once daily. 90 tablet 3    atorvastatin (LIPITOR)  "40 MG tablet TAKE 1 TABLET(40 MG) BY MOUTH EVERY DAY 90 tablet 3    blood-glucose meter kit Use as instructed 1 each 0    blood-glucose meter,continuous (DEXCOM G7 ) Misc Use as directed, e 11.65 1 each 1    blood-glucose sensor (DEXCOM G7 SENSOR) Adriana Change every 10 days, e 11.65 3 each 11    calcitRIOL (ROCALTROL) 0.25 MCG Cap Take 0.25 mcg by mouth once daily.      calcitRIOL (ROCALTROL) 0.25 MCG Cap Take 1 capsule by mouth once daily.      carvediloL (COREG) 3.125 MG tablet Take 1 tablet (3.125 mg total) by mouth 2 (two) times daily. 180 tablet 3    cloNIDine (CATAPRES) 0.1 MG tablet Take 1 tablet (0.1 mg total) by mouth 3 (three) times daily. 270 tablet 0    empagliflozin (JARDIANCE) 10 mg tablet Take 1 tablet by mouth once daily.      ergocalciferol (ERGOCALCIFEROL) 50,000 unit Cap TAKE 1 CAPSULE BY MOUTH EVERY 7 DAYS 12 capsule 3    glucagon (BAQSIMI) 3 mg/actuation Spry Give one puff via nostril. Hold device between fingers and thumb, do not push plunger yet, insert tip gently into one nostril until finger(s) touch the outside of the nose, then push plunger firmly all the way in . Dose is complete when the green line disappears. 1 each 2    insulin lispro (HUMALOG KWIKPEN INSULIN) 100 unit/mL pen Inject 4 units only if sugar is >200 2x a day, max daily 8 units. 15 mL 3    JARDIANCE 10 mg tablet Take 10 mg by mouth.      lancets Misc 1 lancet by Misc.(Non-Drug; Combo Route) route 4 (four) times daily. 400 each 3    lancing device Misc 1 Device by Misc.(Non-Drug; Combo Route) route 2 (two) times daily with meals. 1 each 0    LEVEMIR FLEXTOUCH U100 INSULIN 100 unit/mL (3 mL) InPn pen ADMINISTER 22 UNITS UNDER THE SKIN EVERY EVENING 30 mL 3    losartan (COZAAR) 50 MG tablet TAKE 1 TABLET(50 MG) BY MOUTH EVERY DAY 90 tablet 3    mirabegron (MYRBETRIQ) 25 mg Tb24 ER tablet TAKE 1 TABLET(25 MG) BY MOUTH EVERY DAY 90 tablet 3    pen needle, diabetic (BD ULTRA-FINE SHORT PEN NEEDLE) 31 gauge x 5/16" Ndle USE " THREE TIMES DAILY AS DIRECTED 200 each 2    polyethylene glycol (GOLYTELY) 236-22.74-6.74 -5.86 gram suspension TAKE 4000 ML BY MOUTH 1 TIME FOR 1 DOSE      RESTASIS 0.05 % ophthalmic emulsion INSTILL 1 DROP IN BOTH EYES TWICE DAILY 180 each 3    traMADol (ULTRAM) 50 mg tablet Take 1 tablet (50 mg total) by mouth every 8 (eight) hours as needed for Pain. 15 tablet 0    warfarin (COUMADIN) 5 MG tablet TAKE 1 TABLET BY MOUTH DAILY 6 DAYS A WEEK OR AS DIRECTED BY COUMADIN CLINIC. 90 tablet 3     Scheduled Medications:  amantadine HCL, 100 mg, Oral, BID  atorvastatin, 40 mg, Oral, Daily  [START ON 1/15/2024] azithromycin, 250 mg, Oral, Daily  insulin detemir U-100 (Levemir), 8 Units, Subcutaneous, QHS  LIDOcaine, 1 patch, Transdermal, Q24H  pantoprazole, 40 mg, Oral, BID AC  warfarin, 5 mg, Oral, Daily      PRN: acetaminophen, iohexoL  Infusions:   Estimated Creatinine Clearance: 48.5 mL/min (A) (based on SCr of 1.6 mg/dL (H)).             Assessment/Plan:      * Acute GI bleeding  Presents with reported dizziness along with intermittent dark stools, and has associated severe decrease in H/H from recent.  ED reports melena on exam.  We will place on GI bleed pathway, start PPI b.i.d., and transfuse to maintain hemoglobin 7 or greater.  Holding home Coumadin.  CTA ordered and pending.  Monitor with serial CBCs.  GI consulted; we will keep NPO for likely endoscopy.  1/13   Patient's with Normocytic anemia.. Hemoglobin stable. Etiology likely due to  acute blood loss  .  Current CBC reviewed-    Recent Labs   Lab 01/13/24  1314 01/13/24  1841 01/14/24  0601   HGB 9.3* 9.7* 9.3*         Component Value Date/Time    MCV 82 01/14/2024 0601    RDW 15.9 (H) 01/14/2024 0601    IRON 43 (L) 01/12/2024 2247    FERRITIN 13 (L) 01/12/2024 2247    RETIC 1.6 01/12/2024 2247    FOLATE 8.7 01/12/2024 2247    PHSTHGVT34 326 01/12/2024 2247     Monitor CBC and transfuse if H/H drops below 7/21.    1/13 Hb 4.8-> 7.2 s/p PRBC transfusion.    EGD -  Normal esophagus.                          - Small hiatal hernia.                          - Non-bleeding gastric ulcer with a clean ulcer                          base (Brent[place] Class III).                          - Non-bleeding duodenal ulcers with a clean ulcer                          base ([place] Class III).                          - Biopsies were taken with a cold forceps for                          Helicobacter pylori testing.   Resume Coumadin. Proton pump inhibitor PO BID for 12 weeks. Repeat upper endoscopy in 8 weeks to check healing.     Leucocytosis    Patient with leucocytosis   Recent Labs   Lab 01/13/24  1314 01/13/24  1841 01/14/24  0601   WBC 8.04 16.34* 16.14*     . Afebrile. BCX 2, pending . CX ray -Bilateral patchy opacities in the lower lung zones, likely atelectasis. procalcitonin slightly elevated. No pleural effusion or pneumothorax.  UA negative. BCX 2 . started on azithromycin       Symptomatic anemia  Presents with dizziness in the setting of acute blood loss anemia from GI bleed.  Management as below.  Transfuse to maintain hemoglobin 7 or greater.  Iron, TIBC, ferritin, B12, folate ordered   1/14 consistent with iron deficiency. iron replacement     Supratherapeutic INR  Patient is on chronic Coumadin since his CVA in 2005.  INR 3.9 on presentation, and he was given vitamin K in the ED due to active GI bleeding.  We will hold his Coumadin for now and monitor INR.  patient with INR   Recent Labs   Lab 01/08/24  1022 01/12/24  1810 01/13/24  0431   INR 2.7* 3.9* 1.8*      INR 1.8 s/p vitamin K.  resumed coumadin    Stage 3b chronic kidney disease  Renal function remains relatively around baseline.  We will monitor.  Avoid nephrotoxic agents as able, and renally dose all meds as applicable.  Creatine stable for now. BMP reviewed- noted Estimated Creatinine Clearance: 48.5 mL/min (A) (based on SCr of 1.6 mg/dL (H)). according to latest data. Monitor UOP and serial BMP and adjust  therapy as needed. Renally dose meds.    Recent Labs   Lab 01/12/24  1529 01/13/24  0431   BUN 21 20   CREATININE 1.8* 1.6*         I & O     Intake/Output Summary (Last 24 hours) at 1/14/2024 0810  Last data filed at 1/14/2024 0431  Gross per 24 hour   Intake no documentation   Output 1150 ml   Net -1150 ml          Hypertension associated with diabetes  Hold antihypertensives in the setting of acute GI bleed with symptomatic anemia, and can resume as warranted.      Peripheral artery disease  Continue statin.  Holding Coumadin given GI bleed.      H/O: CVA (cerebrovascular accident)  With resultant dysarthria and right sided weakness/ gait abnormality.  Currently at baseline.  Can continue statin, but hold Coumadin given GI bleed.      Type 2 diabetes mellitus with hyperglycemia, with long-term current use of insulin  Recent A1c around 7.9%.  Reports he was taken off of sliding scale insulin, and is now on Levemir 22 units nightly along with Jardiance.  We will continue a reduced dose of Levemir as to avoid hypoglycemia since he is NPO, and we will continue scheduled Accu-Cheks.  Patient's FSGs are controlled on current hypoglycemics.   Last A1c reviewed-   Lab Results   Component Value Date    HGBA1C 7.8 (H) 01/08/2024    HGBA1C 6.6 (H) 08/17/2023    HGBA1C 7.5 (H) 12/13/2022     Will hold PO hypoglycemics and will start correctional scale insulin  Most recent fingerstick glucose reviewed-   Recent Labs   Lab 01/13/24  1104   POCTGLUCOSE 112*     currently on   Antihyperglycemics (From admission, onward)      Start     Stop Route Frequency Ordered    01/13/24 0115  insulin detemir U-100 (Levemir) pen 8 Units         -- SubQ Nightly 01/13/24 0008               VTE Risk Mitigation (From admission, onward)           Ordered     warfarin split tablet 5 mg  Daily         01/13/24 1347     IP VTE HIGH RISK PATIENT  Once         01/12/24 2246     Place sequential compression device  Until discontinued         01/12/24  2246                    Discharge Planning   JOSUÉ: 1/16/2024     Code Status: Full Code   Is the patient medically ready for discharge?: (No Documentation)    Reason for patient still in hospital (select all that apply): Treatment  Discharge Plan A: Home                  Jaquan Prieto MD  Department of Hospital Medicine   [facility] - Stepdown [facility] ([place])

## 2024-01-14 NOTE — NURSING
Patient has rested in bed comfortably today with no new complaints, no new changes.  Blood cultures and labs collected earlier in the shift.  Safety precautions in place with call light in reach.

## 2024-01-14 NOTE — ASSESSMENT & PLAN NOTE
Presents with reported dizziness along with intermittent dark stools, and has associated severe decrease in H/H from recent.  ED reports melena on exam.  We will place on GI bleed pathway, start PPI b.i.d., and transfuse to maintain hemoglobin 7 or greater.  Holding home Coumadin.  CTA ordered and pending.  Monitor with serial CBCs.  GI consulted; we will keep NPO for likely endoscopy.  1/13   Patient's with Normocytic anemia.. Hemoglobin stable. Etiology likely due to  acute blood loss  .  Current CBC reviewed-    Recent Labs   Lab 01/13/24  1841 01/14/24  0601 01/15/24  0443   HGB 9.7* 9.3* 9.4*  9.4*         Component Value Date/Time    MCV 80 (L) 01/15/2024 0443    MCV 80 (L) 01/15/2024 0443    RDW 16.2 (H) 01/15/2024 0443    RDW 16.1 (H) 01/15/2024 0443    IRON 43 (L) 01/12/2024 2247    FERRITIN 13 (L) 01/12/2024 2247    RETIC 1.6 01/12/2024 2247    FOLATE 8.7 01/12/2024 2247    [id number] 326 01/12/2024 2247     Monitor CBC and transfuse if H/H drops below 7/21.    1/13 Hb 4.8-> 7.2 s/p PRBC transfusion.   EGD -  Normal esophagus.                          - Small hiatal hernia.                          - Non-bleeding gastric ulcer with a clean ulcer                          base ([place]rent[place] Class III).                          - Non-bleeding duodenal ulcers with a clean ulcer                          base ([place] Class III).                          - Biopsies were taken with a cold forceps for                          Helicobacter pylori testing.   Resume Coumadin. Proton pump inhibitor PO BID for 12 weeks. Repeat upper endoscopy in 8 weeks to check healing.

## 2024-01-14 NOTE — ASSESSMENT & PLAN NOTE
Renal function remains relatively around baseline.  We will monitor.  Avoid nephrotoxic agents as able, and renally dose all meds as applicable.  Creatine stable for now. BMP reviewed- noted Estimated Creatinine Clearance: 45.6 mL/min (A) (based on SCr of 1.7 mg/dL (H)). according to latest data. Monitor UOP and serial BMP and adjust therapy as needed. Renally dose meds.    Recent Labs   Lab 01/13/24  0431 01/14/24  0818 01/15/24  0443   BUN 20 18 21   CREATININE 1.6* 1.6* 1.7*       I & O     Intake/Output Summary (Last 24 hours) at 1/15/2024 0933  Last data filed at 1/15/2024 0707  Gross per 24 hour   Intake no documentation   Output 900 ml   Net -900 ml

## 2024-01-14 NOTE — NURSING
Patient vitals stable. Patient went for EGD - results reviewed with MD and pt. NPO discontinued pt.cardiac diet ordered. Patient complaint of back pain- PRN medication given for mild relief. Family remains at bedside. Safety precautions maintained. Call light in reach.

## 2024-01-14 NOTE — ASSESSMENT & PLAN NOTE
Recent A1c around 7.9%.  Reports he was taken off of sliding scale insulin, and is now on Levemir 22 units nightly along with Jardiance.  We will continue a reduced dose of Levemir as to avoid hypoglycemia since he is NPO, and we will continue scheduled Accu-Cheks.  Patient's FSGs are controlled on current hypoglycemics.   Last A1c reviewed-   Lab Results   Component Value Date    HGBA1C 7.8 (H) 01/08/2024    HGBA1C 6.6 (H) 08/17/2023    HGBA1C 7.5 (H) 12/13/2022     Will hold PO hypoglycemics and will start correctional scale insulin  Most recent fingerstick glucose reviewed-   Recent Labs   Lab 01/14/24  1208 01/14/24 2003 01/14/24  2319 01/15/24  0551   POCTGLUCOSE 190* 180* 126* 111*     currently on   Antihyperglycemics (From admission, onward)      Start     Stop Route Frequency Ordered    01/13/24 0115  insulin detemir U-100 (Levemir) pen 8 Units         -- SubQ Nightly 01/13/24 0008

## 2024-01-14 NOTE — PLAN OF CARE
Plan of care reviewed.     Problem: Adjustment to Illness (Gastrointestinal Bleeding)  Goal: Optimal Coping with Acute Illness  Outcome: Ongoing, Progressing     Problem: Bleeding (Gastrointestinal Bleeding)  Goal: Hemostasis  Outcome: Ongoing, Progressing     Problem: Diabetes Comorbidity  Goal: Blood Glucose Level Within Targeted Range  Outcome: Ongoing, Progressing     Problem: Fall Injury Risk  Goal: Absence of Fall and Fall-Related Injury  Outcome: Ongoing, Progressing

## 2024-01-14 NOTE — ASSESSMENT & PLAN NOTE
Presents with dizziness in the setting of acute blood loss anemia from GI bleed.  Management as below.  Transfuse to maintain hemoglobin 7 or greater.  Iron, TIBC, ferritin, B12, folate ordered   1/14 consistent with iron deficiency. iron replacement

## 2024-01-14 NOTE — PLAN OF CARE
Problem: Adult Inpatient Plan of Care  Goal: Plan of Care Review  Outcome: Ongoing, Progressing  Goal: Patient-Specific Goal (Individualized)  Outcome: Ongoing, Progressing  Goal: Absence of Hospital-Acquired Illness or Injury  Outcome: Ongoing, Progressing  Goal: Optimal Comfort and Wellbeing  Outcome: Ongoing, Progressing  Goal: Readiness for Transition of Care  Outcome: Ongoing, Progressing     Problem: Adjustment to Illness (Gastrointestinal Bleeding)  Goal: Optimal Coping with Acute Illness  Outcome: Ongoing, Progressing     Problem: Bleeding (Gastrointestinal Bleeding)  Goal: Hemostasis  Outcome: Ongoing, Progressing     Problem: Diabetes Comorbidity  Goal: Blood Glucose Level Within Targeted Range  Outcome: Ongoing, Progressing     Problem: Fall Injury Risk  Goal: Absence of Fall and Fall-Related Injury  Outcome: Ongoing, Progressing    No acute events overnight. One complaint of headache tx with tylenol.     0000 CB  0600 CB

## 2024-01-14 NOTE — ASSESSMENT & PLAN NOTE
Patient with leucocytosis   Recent Labs   Lab 01/13/24  1841 01/14/24  0601 01/15/24  0443   WBC 16.34* 16.14* 11.79  12.32     . Afebrile. BCX 2, pending . CX ray -Bilateral patchy opacities in the lower lung zones, likely atelectasis. procalcitonin slightly elevated. No pleural effusion or pneumothorax.  UA negative. BCX 2 . started on azithromycin

## 2024-01-14 NOTE — ASSESSMENT & PLAN NOTE
Patient is on chronic Coumadin since his CVA in 2005.  INR 3.9 on presentation, and he was given vitamin K in the ED due to active GI bleeding.  We will hold his Coumadin for now and monitor INR.  patient with INR   Recent Labs   Lab 01/12/24  1810 01/13/24  0431 01/14/24  0818   INR 3.9* 1.8* 1.2    INR 1.8 s/p vitamin K.  resumed coumadin. INR 1.2. started on lovenox bridge

## 2024-01-15 LAB
ALBUMIN SERPL BCP-MCNC: 3.4 G/DL (ref 3.5–5.2)
ALP SERPL-CCNC: 73 U/L (ref 55–135)
ALT SERPL W/O P-5'-P-CCNC: 15 U/L (ref 10–44)
ANION GAP SERPL CALC-SCNC: 10 MMOL/L (ref 8–16)
AST SERPL-CCNC: 25 U/L (ref 10–40)
BASOPHILS # BLD AUTO: 0.04 K/UL (ref 0–0.2)
BASOPHILS # BLD AUTO: 0.04 K/UL (ref 0–0.2)
BASOPHILS NFR BLD: 0.3 % (ref 0–1.9)
BASOPHILS NFR BLD: 0.3 % (ref 0–1.9)
BILIRUB SERPL-MCNC: 0.7 MG/DL (ref 0.1–1)
BUN SERPL-MCNC: 21 MG/DL (ref 8–23)
CALCIUM SERPL-MCNC: 8.8 MG/DL (ref 8.7–10.5)
CHLORIDE SERPL-SCNC: 112 MMOL/L (ref 95–110)
CO2 SERPL-SCNC: 16 MMOL/L (ref 23–29)
CREAT SERPL-MCNC: 1.7 MG/DL (ref 0.5–1.4)
DIFFERENTIAL METHOD BLD: ABNORMAL
DIFFERENTIAL METHOD BLD: ABNORMAL
EOSINOPHIL # BLD AUTO: 0.2 K/UL (ref 0–0.5)
EOSINOPHIL # BLD AUTO: 0.2 K/UL (ref 0–0.5)
EOSINOPHIL NFR BLD: 1.3 % (ref 0–8)
EOSINOPHIL NFR BLD: 1.4 % (ref 0–8)
ERYTHROCYTE [DISTWIDTH] IN BLOOD BY AUTOMATED COUNT: 16.1 % (ref 11.5–14.5)
ERYTHROCYTE [DISTWIDTH] IN BLOOD BY AUTOMATED COUNT: 16.2 % (ref 11.5–14.5)
EST. GFR  (NO RACE VARIABLE): 43.4 ML/MIN/1.73 M^2
GLUCOSE SERPL-MCNC: 105 MG/DL (ref 70–110)
HCT VFR BLD AUTO: 29.7 % (ref 40–54)
HCT VFR BLD AUTO: 29.8 % (ref 40–54)
HGB BLD-MCNC: 9.4 G/DL (ref 14–18)
HGB BLD-MCNC: 9.4 G/DL (ref 14–18)
IMM GRANULOCYTES # BLD AUTO: 0.04 K/UL (ref 0–0.04)
IMM GRANULOCYTES # BLD AUTO: 0.04 K/UL (ref 0–0.04)
IMM GRANULOCYTES NFR BLD AUTO: 0.3 % (ref 0–0.5)
IMM GRANULOCYTES NFR BLD AUTO: 0.3 % (ref 0–0.5)
INR PPP: 1.5 (ref 0.8–1.2)
LYMPHOCYTES # BLD AUTO: 0.7 K/UL (ref 1–4.8)
LYMPHOCYTES # BLD AUTO: 0.8 K/UL (ref 1–4.8)
LYMPHOCYTES NFR BLD: 6.3 % (ref 18–48)
LYMPHOCYTES NFR BLD: 6.3 % (ref 18–48)
MAGNESIUM SERPL-MCNC: 2.3 MG/DL (ref 1.6–2.6)
MCH RBC QN AUTO: 25.3 PG (ref 27–31)
MCH RBC QN AUTO: 25.4 PG (ref 27–31)
MCHC RBC AUTO-ENTMCNC: 31.5 G/DL (ref 32–36)
MCHC RBC AUTO-ENTMCNC: 31.6 G/DL (ref 32–36)
MCV RBC AUTO: 80 FL (ref 82–98)
MCV RBC AUTO: 80 FL (ref 82–98)
MONOCYTES # BLD AUTO: 1 K/UL (ref 0.3–1)
MONOCYTES # BLD AUTO: 1 K/UL (ref 0.3–1)
MONOCYTES NFR BLD: 8.2 % (ref 4–15)
MONOCYTES NFR BLD: 8.3 % (ref 4–15)
NEUTROPHILS # BLD AUTO: 10.3 K/UL (ref 1.8–7.7)
NEUTROPHILS # BLD AUTO: 9.8 K/UL (ref 1.8–7.7)
NEUTROPHILS NFR BLD: 83.4 % (ref 38–73)
NEUTROPHILS NFR BLD: 83.6 % (ref 38–73)
NRBC BLD-RTO: 0 /100 WBC
NRBC BLD-RTO: 0 /100 WBC
PLATELET # BLD AUTO: 343 K/UL (ref 150–450)
PLATELET # BLD AUTO: 361 K/UL (ref 150–450)
PMV BLD AUTO: 9.1 FL (ref 9.2–12.9)
PMV BLD AUTO: 9.4 FL (ref 9.2–12.9)
POCT GLUCOSE: 104 MG/DL (ref 70–110)
POCT GLUCOSE: 111 MG/DL (ref 70–110)
POCT GLUCOSE: 154 MG/DL (ref 70–110)
POCT GLUCOSE: 188 MG/DL (ref 70–110)
POTASSIUM SERPL-SCNC: 4.2 MMOL/L (ref 3.5–5.1)
PROT SERPL-MCNC: 6.9 G/DL (ref 6–8.4)
PROTHROMBIN TIME: 16.2 SEC (ref 9–12.5)
RBC # BLD AUTO: 3.7 M/UL (ref 4.6–6.2)
RBC # BLD AUTO: 3.71 M/UL (ref 4.6–6.2)
SODIUM SERPL-SCNC: 138 MMOL/L (ref 136–145)
WBC # BLD AUTO: 11.79 K/UL (ref 3.9–12.7)
WBC # BLD AUTO: 12.32 K/UL (ref 3.9–12.7)

## 2024-01-15 PROCEDURE — 85610 PROTHROMBIN TIME: CPT | Performed by: HOSPITALIST

## 2024-01-15 PROCEDURE — 63600175 PHARM REV CODE 636 W HCPCS: Performed by: HOSPITALIST

## 2024-01-15 PROCEDURE — 36415 COLL VENOUS BLD VENIPUNCTURE: CPT | Performed by: STUDENT IN AN ORGANIZED HEALTH CARE EDUCATION/TRAINING PROGRAM

## 2024-01-15 PROCEDURE — 20600001 HC STEP DOWN PRIVATE ROOM

## 2024-01-15 PROCEDURE — 63700000 PHARM REV CODE 250 ALT 637 W/O HCPCS: Performed by: HOSPITALIST

## 2024-01-15 PROCEDURE — 80053 COMPREHEN METABOLIC PANEL: CPT | Performed by: HOSPITALIST

## 2024-01-15 PROCEDURE — 36415 COLL VENOUS BLD VENIPUNCTURE: CPT | Mod: XB | Performed by: HOSPITALIST

## 2024-01-15 PROCEDURE — 25000003 PHARM REV CODE 250: Performed by: STUDENT IN AN ORGANIZED HEALTH CARE EDUCATION/TRAINING PROGRAM

## 2024-01-15 PROCEDURE — 94761 N-INVAS EAR/PLS OXIMETRY MLT: CPT

## 2024-01-15 PROCEDURE — 85025 COMPLETE CBC W/AUTO DIFF WBC: CPT | Mod: 91 | Performed by: HOSPITALIST

## 2024-01-15 PROCEDURE — 25000003 PHARM REV CODE 250: Performed by: HOSPITALIST

## 2024-01-15 PROCEDURE — 83735 ASSAY OF MAGNESIUM: CPT | Performed by: HOSPITALIST

## 2024-01-15 PROCEDURE — 85025 COMPLETE CBC W/AUTO DIFF WBC: CPT | Performed by: STUDENT IN AN ORGANIZED HEALTH CARE EDUCATION/TRAINING PROGRAM

## 2024-01-15 RX ORDER — AZITHROMYCIN 250 MG/1
250 TABLET, FILM COATED ORAL DAILY
Qty: 3 TABLET | Refills: 0 | Status: SHIPPED | OUTPATIENT
Start: 2024-01-16 | End: 2024-01-19

## 2024-01-15 RX ORDER — INSULIN LISPRO 100 [IU]/ML
INJECTION, SOLUTION INTRAVENOUS; SUBCUTANEOUS
Qty: 12 ML | Refills: 1 | OUTPATIENT
Start: 2024-01-15 | End: 2024-01-16

## 2024-01-15 RX ORDER — SODIUM BICARBONATE 650 MG/1
1300 TABLET ORAL 2 TIMES DAILY
Status: DISCONTINUED | OUTPATIENT
Start: 2024-01-15 | End: 2024-01-16 | Stop reason: HOSPADM

## 2024-01-15 RX ORDER — PANTOPRAZOLE SODIUM 40 MG/1
40 TABLET, DELAYED RELEASE ORAL
Qty: 60 TABLET | Refills: 0 | Status: SHIPPED | OUTPATIENT
Start: 2024-01-16 | End: 2025-03-10

## 2024-01-15 RX ORDER — INSULIN DETEMIR 100 [IU]/ML
8 INJECTION, SOLUTION SUBCUTANEOUS NIGHTLY
Qty: 30 ML | Refills: 3
Start: 2024-01-15

## 2024-01-15 RX ORDER — LOSARTAN POTASSIUM 25 MG/1
25 TABLET ORAL DAILY
Qty: 30 TABLET | Refills: 2 | Status: SHIPPED | OUTPATIENT
Start: 2024-01-15 | End: 2024-04-04 | Stop reason: SDUPTHER

## 2024-01-15 RX ORDER — SODIUM BICARBONATE 650 MG/1
1300 TABLET ORAL 2 TIMES DAILY
Qty: 12 TABLET | Refills: 0 | Status: SHIPPED | OUTPATIENT
Start: 2024-01-15 | End: 2024-01-29 | Stop reason: SDUPTHER

## 2024-01-15 RX ADMIN — LIDOCAINE 5% 1 PATCH: 700 PATCH TOPICAL at 08:01

## 2024-01-15 RX ADMIN — AZITHROMYCIN DIHYDRATE 250 MG: 250 TABLET ORAL at 08:01

## 2024-01-15 RX ADMIN — WARFARIN SODIUM 5 MG: 4 TABLET ORAL at 05:01

## 2024-01-15 RX ADMIN — ATORVASTATIN CALCIUM 40 MG: 40 TABLET, FILM COATED ORAL at 08:01

## 2024-01-15 RX ADMIN — ACETAMINOPHEN 650 MG: 325 TABLET ORAL at 05:01

## 2024-01-15 RX ADMIN — SODIUM BICARBONATE 650 MG TABLET 1300 MG: at 10:01

## 2024-01-15 RX ADMIN — PANTOPRAZOLE SODIUM 40 MG: 40 TABLET, DELAYED RELEASE ORAL at 05:01

## 2024-01-15 RX ADMIN — ENOXAPARIN SODIUM 80 MG: 80 INJECTION SUBCUTANEOUS at 10:01

## 2024-01-15 RX ADMIN — SODIUM BICARBONATE 650 MG TABLET 1300 MG: at 11:01

## 2024-01-15 RX ADMIN — PANTOPRAZOLE SODIUM 40 MG: 40 TABLET, DELAYED RELEASE ORAL at 06:01

## 2024-01-15 RX ADMIN — INSULIN DETEMIR 8 UNITS: 100 INJECTION, SOLUTION SUBCUTANEOUS at 10:01

## 2024-01-15 RX ADMIN — AMANTADINE HYDROCHLORIDE 100 MG: 100 CAPSULE, LIQUID FILLED ORAL at 08:01

## 2024-01-15 RX ADMIN — ENOXAPARIN SODIUM 80 MG: 80 INJECTION SUBCUTANEOUS at 08:01

## 2024-01-15 RX ADMIN — AMANTADINE HYDROCHLORIDE 100 MG: 100 CAPSULE, LIQUID FILLED ORAL at 10:01

## 2024-01-15 NOTE — PLAN OF CARE
Problem: Adult Inpatient Plan of Care  Goal: Plan of Care Review  Outcome: Ongoing, Progressing  Goal: Patient-Specific Goal (Individualized)  Outcome: Ongoing, Progressing  Goal: Absence of Hospital-Acquired Illness or Injury  Outcome: Ongoing, Progressing  Goal: Optimal Comfort and Wellbeing  Outcome: Ongoing, Progressing  Goal: Readiness for Transition of Care  Outcome: Ongoing, Progressing     Problem: Adjustment to Illness (Gastrointestinal Bleeding)  Goal: Optimal Coping with Acute Illness  Outcome: Ongoing, Progressing     Problem: Bleeding (Gastrointestinal Bleeding)  Goal: Hemostasis  Outcome: Ongoing, Progressing     Problem: Diabetes Comorbidity  Goal: Blood Glucose Level Within Targeted Range  Outcome: Ongoing, Progressing     Problem: Fall Injury Risk  Goal: Absence of Fall and Fall-Related Injury  Outcome: Ongoing, Progressing       Patient in bed with complaints of shoulder pain. Pain resolved. Safety and aspirations precautions in place.

## 2024-01-15 NOTE — PROGRESS NOTES
[facility] - Stepdown [facility] ([place] [address])  [facility] Medicine  Progress Note    Patient Name: Kamlesh Diane  MRN: [id number]  Patient Class: IP- Inpatient   Admission Date: 1/12/2024  Length of Stay: 3 days  Attending Physician: Jaquan Prieto MD  Primary Care Provider: Debora Ontiveros MD        Subjective:     Principal Problem:Acute GI bleeding        HPI:  Kamlesh Diane is a 68 y.o. 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     He reports that on Tuesday, he developed severe dizziness which occurred with any movement and lying at rest.  He denies any syncope, chest pain, shortness for breath, nausea, vomiting, or other significant symptoms.  He has just had persistent dizziness.  He did note that after Blanquita, he had very loose and dark stools, which resolved.  However, these have intermittently returned, and he has had dark, loose stools for the past 2-3 days.  Denies any abdominal pain, and he has not taken any OTC meds except Tylenol.  He does drink 1 alcoholic beverage approximately 1 day per week.         Overview/Hospital Course:  1/13 Hb 4.8-> 7.2 s/p PRBC transfusion. CTA . INR 1.8 s/p vitamin K. NPO and GI eval.  started tylenol and lidocaine patch for low back ache   EGD -  Normal esophagus.                          - Small hiatal hernia.                          - Non-bleeding gastric ulcer with a clean ulcer                          base ([place] Class III).                          - Non-bleeding duodenal ulcers with a clean ulcer                          base ([place] Class III).                          - Biopsies were taken with a cold forceps for                          Helicobacter pylori testing.   Resume Coumadin. Proton pump inhibitor PO BID for 12 weeks. Repeat upper endoscopy in 8 weeks to check healing.   1/14 Hb stable. leucocytosis trending up.  Pancultures . CX ray -Bilateral patchy opacities in the lower lung zones, likely atelectasis. procalcitonin slightly elevated. No pleural effusion or pneumothorax.  UA negative. BCX 2 . started on azithromycin . INR 1.2. started on lovenox bridge   1/15 leucocytosis resolved. Hb stable . INR 1.5 continue lovenox and coumadin .Bicarb 16.  started on sodium bicarbonate 1300mg BID           Review of Systems:   Pain scale:   Constitutional:  fever,  chills, headache, vision loss, hearing loss, weight loss, Generalized weakness, falls, loss of smell, loss of taste, poor appetite,  sore throat  Respiratory: cough, shortness of breath.   Cardiovascular: chest pain, dizziness, palpitations, orthopnea, swelling of feet, syncope  Gastrointestinal: nausea, vomiting, abdominal pain, diarrhea, black stool,  blood in stool, change in bowel habits, constipation  Genitourinary: hematuria, dysuria, urgency, frequency  Integument/Breast: rash,  pruritis  Hematologic/Lymphatic: easy bruising, lymphadenopathy  Musculoskeletal: arthralgias , myalgias, back pain, neck pain, knee pain  Neurological: confusion, seizures, tremors, slurred speech  Behavioral/Psych:  depression, anxiety, auditory or visual hallucinations     OBJECTIVE:     Physical Exam:  Body mass index is 24.43 kg/m².    Constitutional: Appears well-developed and well-nourished.   Head: Normocephalic and atraumatic.   Neck: Normal range of motion. Neck supple.   Cardiovascular: Normal heart rate.  Regular heart rhythm.  Pulmonary/Chest: Effort normal.   Abdominal: No distension.  No tenderness  Musculoskeletal: Normal range of motion. No edema. basdline right hemiparesis, dysarthria ( old CVA)   Neurological: Alert and oriented to person, place, and time.   Skin: Skin is warm and dry.   Psychiatric: Normal mood and affect. Behavior is normal.                  Vital Signs  Temp: 98.2 °F (36.8 °C) (01/15/24 0833)  Pulse: 104 (01/15/24 1510)  Resp: 18 (01/15/24 0833)  BP:  "125/76 (01/15/24 0833)  SpO2: 100 % (01/15/24 1510)     24 Hour VS Range    Temp:  [97.8 °F (36.6 °C)-98.5 °F (36.9 °C)]   Pulse:  []   Resp:  [18-20]   BP: (118-130)/(67-76)   SpO2:  [97 %-100 %]     Intake/Output Summary (Last 24 hours) at 1/15/2024 1554  Last data filed at 1/15/2024 0707  Gross per 24 hour   Intake no documentation   Output 900 ml   Net -900 ml         I/O This Shift:  I/O this shift:  In: -   Out: 600 [Urine:600]    Wt Readings from Last 3 Encounters:   01/14/24 81.7 kg (180 lb 1.9 oz)   11/07/23 78.6 kg (173 lb 4.5 oz)   08/17/23 78.6 kg (173 lb 4.5 oz)       I have personally reviewed the vitals and recorded Intake/Output     Laboratory/Diagnostic Data:    CBC/Anemia Labs: Coags:    Recent Labs   Lab 01/12/24  2247 01/13/24  0431 01/13/24  1841 01/14/24  0601 01/15/24  0443   WBC  --    < > 16.34* 16.14* 11.79  12.32   HGB  --    < > 9.7* 9.3* 9.4*  9.4*   HCT  --    < > 30.4* 29.5* 29.8*  29.7*   PLT  --    < > 334 359 343  361   MCV  --    < > 81* 82 80*  80*   RDW  --    < > 15.4* 15.9* 16.1*  16.2*   IRON 43*  --   --   --   --    FERRITIN 13*  --   --   --   --    RETIC 1.6  --   --   --   --    FOLATE 8.7  --   --   --   --    FFLEMMBA09 326  --   --   --   --     < > = values in this interval not displayed.    Recent Labs   Lab 01/12/24  1810 01/13/24  0431 01/14/24  0818 01/15/24  0931   INR 3.9* 1.8* 1.2 1.5*   APTT 31.2  --   --   --         Chemistries: ABG:   Recent Labs   Lab 01/12/24  1529 01/13/24  0431 01/14/24  0818 01/15/24  0443    138 142 138   K 4.9 4.2 3.8 4.2   * 112* 112* 112*   CO2 18* 18* 18* 16*   BUN 21 20 18 21   CREATININE 1.8* 1.6* 1.6* 1.7*   CALCIUM 8.8 8.5* 9.0 8.8   PROT 7.1  --  7.0 6.9   BILITOT 0.4  --  0.8 0.7   ALKPHOS 58  --  75 73   ALT 16  --  14 15   AST 28  --  14 25   MG  --   --   --  2.3    No results for input(s): "PH", "PCO2", "PO2", "HCO3", "POCSATURATED", "BE" in the last 168 hours.     POCT Glucose: HbA1c:    Recent " "Labs   Lab 01/14/24  1208 01/14/24  1625 01/14/24  2003 01/14/24  2319 01/15/24  0551 01/15/24  0835   POCTGLUCOSE 190* 154* 180* 126* 111* 104    Hemoglobin A1C   Date Value Ref Range Status   01/08/2024 7.8 (H) 4.0 - 5.6 % Final     Comment:     ADA Screening Guidelines:  5.7-6.4%  Consistent with prediabetes  >or=6.5%  Consistent with diabetes    High levels of fetal hemoglobin interfere with the HbA1C  assay. Heterozygous hemoglobin variants (HbS, HgC, etc)do  not significantly interfere with this assay.   However, presence of multiple variants may affect accuracy.     08/17/2023 6.6 (H) 4.0 - 5.6 % Final     Comment:     ADA Screening Guidelines:  5.7-6.4%  Consistent with prediabetes  >or=6.5%  Consistent with diabetes    High levels of fetal hemoglobin interfere with the HbA1C  assay. Heterozygous hemoglobin variants (HbS, HgC, etc)do  not significantly interfere with this assay.   However, presence of multiple variants may affect accuracy.     12/13/2022 7.5 (H) 4.0 - 5.6 % Final     Comment:     ADA Screening Guidelines:  5.7-6.4%  Consistent with prediabetes  >or=6.5%  Consistent with diabetes    High levels of fetal hemoglobin interfere with the HbA1C  assay. Heterozygous hemoglobin variants (HbS, HgC, etc)do  not significantly interfere with this assay.   However, presence of multiple variants may affect accuracy.          Cardiac Enzymes: Ejection Fractions:    No results for input(s): "CPK", "CPKMB", "MB", "TROPONINI" in the last 72 hours.   No results found for: "EF"       Recent Labs   Lab 01/14/24  1214   COLORU Yellow   APPEARANCEUA Clear   PHUR 5.0   SPECGRAV 1.025   PROTEINUA Negative   GLUCUA 3+*   KETONESU 1+*   BILIRUBINUA Negative   OCCULTUA Negative   NITRITE Negative   LEUKOCYTESUR Negative   RBCUA 1   WBCUA 1   BACTERIA None   SQUAMEPITHEL 0       Procalcitonin (ng/mL)   Date Value   01/14/2024 0.58 (H)     Lactate (Lactic Acid) (mmol/L)   Date Value   01/13/2024 1.5   01/12/2024 6.2 (HH) " "    No results found for: "BNP"  CRP (mg/L)   Date Value   04/10/2019 3.2   09/29/2010 7.9   09/27/2010 17.7 (H)   09/09/2010 45.2 (H)     Sed Rate   Date Value   04/10/2019 13 mm/Hr   09/29/2010 64 mm/hr (H)   09/27/2010 78 mm/hr (H)   09/09/2010 90 mm/hr (H)   09/01/2010 21 mm/hr (H)     No results found for: "DDIMER"  Ferritin (ng/mL)   Date Value   01/12/2024 13 (L)   08/17/2023 61     No results found for: "LDH"  Troponin I   Date Value   01/12/2024 <0.006 ng/mL   09/01/2010 0.008 ng/ml     CPK (U/L)   Date Value   09/01/2010 102     Results for orders placed or performed in visit on 05/31/21   Vitamin D   Result Value Ref Range    Vit D, 25-Hydroxy 41 30 - 96 ng/mL   Results for orders placed or performed in visit on 10/02/20   Vitamin D   Result Value Ref Range    Vit D, 25-Hydroxy 40 30 - 96 ng/mL   Results for orders placed or performed in visit on 06/02/20   Vitamin D   Result Value Ref Range    Vit D, 25-Hydroxy 38 30 - 96 ng/mL     No results found for: "[id number]"    Microbiology labs for the last week  Microbiology Results (last 7 days)       Procedure Component Value Units Date/Time    Blood culture [[id number]] Collected: 01/14/24 0818    Order Status: Completed Specimen: Blood Updated: 01/15/24 1012     Blood Culture, Routine No Growth to date      No Growth to date    Narrative:      Rt wrist    Blood culture [[id number]] Collected: 01/14/24 0817    Order Status: Completed Specimen: Blood Updated: 01/15/24 1012     Blood Culture, Routine No Growth to date      No Growth to date            Reviewed and noted in plan where applicable- Please see chart for full lab data.    Lines/Drains:       Peripheral IV - Single Lumen 01/12/24 1530 20 G Left;Posterior Forearm (Active)   Site Assessment Clean;Dry;Intact 01/13/24 8994   Extremity Assessment Distal to IV No warmth;No swelling;No redness;No abnormal discoloration 01/13/24 0414   Line Status Flushed;Infusing 01/13/24 0414   Dressing Status " Clean;Dry;Intact 01/13/24 0414   Dressing Intervention Integrity maintained 01/13/24 0414   Number of days: 0            Peripheral IV - Single Lumen 01/12/24 2000 20 G Posterior;Left Forearm (Active)   Site Assessment Clean;Dry;Intact 01/13/24 0414   Extremity Assessment Distal to IV No warmth;No swelling;No redness;No abnormal discoloration 01/13/24 0414   Line Status Flushed;Saline locked 01/13/24 0414   Dressing Status Clean;Dry;Intact 01/13/24 0414   Number of days: 0       Imaging  ECG Results              EKG 12-lead (Final result)  Result time 01/12/24 17:22:29      Final result by Interface, Lab [facility] (01/12/24 17:22:29)               Narrative:    Test Reason : R42,    Vent. Rate : 101 BPM     Atrial Rate : 101 BPM     P-R Int : 116 ms          QRS Dur : 086 ms      QT Int : 318 ms       P-R-T Axes : 057 029 -40 degrees     QTc Int : 412 ms    Sinus tachycardia  Nonspecific T wave abnormality  Abnormal ECG  When compared with ECG of 19-JUL-2018 10:57,  No significant change was found  Confirmed by Maximus SWAIN MD ([id number]) on 1/12/2024 5:22:20 PM    Referred By: [email or web address]   SELF           Confirmed By:Maximus SWAIN MD                                  No results found for this or any previous visit.      X-Ray Chest 1 View  Narrative: EXAMINATION:  XR CHEST 1 VIEW    CLINICAL HISTORY:  leucocytosis;    TECHNIQUE:  Single frontal view of the chest was performed.    COMPARISON:  09/06/2010    FINDINGS:  Bilateral patchy opacities in the lower lung zones, likely atelectasis.  No pleural effusion or pneumothorax.  Cardiac silhouette is unremarkable.  Impression: As above.    Electronically signed by: Juan Antonio Wahl MD  Date:    01/14/2024  Time:    08:30      Labs, Imaging, EKG and Diagnostic results from 1/15/2024 were reviewed.    Medications:  Medication list was reviewed and changes noted under Assessment/Plan.  No current facility-administered medications on file prior to encounter.     Current Outpatient  Medications on File Prior to Encounter   Medication Sig Dispense Refill    ACCU-CHEK JUSTIN PLUS TEST STRP Strp TEST FOUR TIMES DAILY AS DIRECTED 400 strip 3    alcohol swabs (ALCOHOL PREP PADS) PadM Use 4 times a day. 400 each 3    amantadine HCL (SYMMETREL) 100 mg capsule Take 1 capsule (100 mg total) by mouth 2 (two) times daily. 180 capsule 3    amLODIPine (NORVASC) 10 MG tablet Take 1 tablet (10 mg total) by mouth once daily. 90 tablet 3    atorvastatin (LIPITOR) 40 MG tablet TAKE 1 TABLET(40 MG) BY MOUTH EVERY DAY 90 tablet 3    blood-glucose meter kit Use as instructed 1 each 0    blood-glucose meter,continuous (DEXCOM G7 ) Misc Use as directed, e 11.65 1 each 1    blood-glucose sensor (DEXCOM G7 SENSOR) Adriana Change every 10 days, e 11.65 3 each 11    calcitRIOL (ROCALTROL) 0.25 MCG Cap Take 0.25 mcg by mouth once daily.      calcitRIOL (ROCALTROL) 0.25 MCG Cap Take 1 capsule by mouth once daily.      carvediloL (COREG) 3.125 MG tablet Take 1 tablet (3.125 mg total) by mouth 2 (two) times daily. 180 tablet 3    cloNIDine (CATAPRES) 0.1 MG tablet Take 1 tablet (0.1 mg total) by mouth 3 (three) times daily. 270 tablet 0    empagliflozin (JARDIANCE) 10 mg tablet Take 1 tablet by mouth once daily.      ergocalciferol (ERGOCALCIFEROL) 50,000 unit Cap TAKE 1 CAPSULE BY MOUTH EVERY 7 DAYS 12 capsule 3    glucagon (BAQSIMI) 3 mg/actuation Spry Give one puff via nostril. Hold device between fingers and thumb, do not push plunger yet, insert tip gently into one nostril until finger(s) touch the outside of the nose, then push plunger firmly all the way in . Dose is complete when the green line disappears. 1 each 2    insulin lispro (HUMALOG KWIKPEN INSULIN) 100 unit/mL pen Inject 4 units only if sugar is >200 2x a day, max daily 8 units. 15 mL 3    JARDIANCE 10 mg tablet Take 10 mg by mouth.      lancets Misc 1 lancet by Misc.(Non-Drug; Combo Route) route 4 (four) times daily. 400 each 3    lancing device Misc 1  "Device by Misc.(Non-Drug; Combo Route) route 2 (two) times daily with meals. 1 each 0    LEVEMIR FLEXTOUCH U100 INSULIN 100 unit/mL (3 mL) InPn pen ADMINISTER 22 UNITS UNDER THE SKIN EVERY EVENING 30 mL 3    losartan (COZAAR) 50 MG tablet TAKE 1 TABLET(50 MG) BY MOUTH EVERY DAY 90 tablet 3    mirabegron (MYRBETRIQ) 25 mg Tb24 ER tablet TAKE 1 TABLET(25 MG) BY MOUTH EVERY DAY 90 tablet 3    pen needle, diabetic (BD ULTRA-FINE SHORT PEN NEEDLE) 31 gauge x 5/16" Ndle USE THREE TIMES DAILY AS DIRECTED 200 each 2    polyethylene glycol (GOLYTELY) 236-22.74-6.74 -5.86 gram suspension TAKE 4000 ML BY MOUTH 1 TIME FOR 1 DOSE      RESTASIS 0.05 % ophthalmic emulsion INSTILL 1 DROP IN BOTH EYES TWICE DAILY 180 each 3    traMADol (ULTRAM) 50 mg tablet Take 1 tablet (50 mg total) by mouth every 8 (eight) hours as needed for Pain. 15 tablet 0    warfarin (COUMADIN) 5 MG tablet TAKE 1 TABLET BY MOUTH DAILY 6 DAYS A WEEK OR AS DIRECTED BY COUMADIN CLINIC. 90 tablet 3     Scheduled Medications:  amantadine HCL, 100 mg, Oral, BID  atorvastatin, 40 mg, Oral, Daily  azithromycin, 250 mg, Oral, Daily  enoxparin, 1 mg/kg, Subcutaneous, Q12H (prophylaxis, 0900/2100)  insulin detemir U-100 (Levemir), 8 Units, Subcutaneous, QHS  LIDOcaine, 1 patch, Transdermal, Q24H  pantoprazole, 40 mg, Oral, BID AC  sodium bicarbonate, 1,300 mg, Oral, BID  warfarin, 5 mg, Oral, Daily      PRN: acetaminophen, iohexoL  Infusions:   Estimated Creatinine Clearance: 45.6 mL/min (A) (based on SCr of 1.7 mg/dL (H)).             Assessment/Plan:      * Acute GI bleeding  Presents with reported dizziness along with intermittent dark stools, and has associated severe decrease in H/H from recent.  ED reports melena on exam.  We will place on GI bleed pathway, start PPI b.i.d., and transfuse to maintain hemoglobin 7 or greater.  Holding home Coumadin.  CTA ordered and pending.  Monitor with serial CBCs.  GI consulted; we will keep NPO for likely endoscopy.  1/13 "   Patient's with Normocytic anemia.. Hemoglobin stable. Etiology likely due to  acute blood loss  .  Current CBC reviewed-    Recent Labs   Lab 01/13/24  1841 01/14/24  0601 01/15/24  0443   HGB 9.7* 9.3* 9.4*  9.4*         Component Value Date/Time    MCV 80 (L) 01/15/2024 0443    MCV 80 (L) 01/15/2024 0443    RDW 16.2 (H) 01/15/2024 0443    RDW 16.1 (H) 01/15/2024 0443    IRON 43 (L) 01/12/2024 2247    FERRITIN 13 (L) 01/12/2024 2247    RETIC 1.6 01/12/2024 2247    FOLATE 8.7 01/12/2024 2247    [id number] 326 01/12/2024 2247     Monitor CBC and transfuse if H/H drops below 7/21.    1/13 Hb 4.8-> 7.2 s/p PRBC transfusion.   EGD -  Normal esophagus.                          - Small hiatal hernia.                          - Non-bleeding gastric ulcer with a clean ulcer                          base ([place] Class III).                          - Non-bleeding duodenal ulcers with a clean ulcer                          base ([place] Class III).                          - Biopsies were taken with a cold forceps for                          Helicobacter pylori testing.   Resume Coumadin. Proton pump inhibitor PO BID for 12 weeks. Repeat upper endoscopy in 8 weeks to check healing.     Leucocytosis    Patient with leucocytosis   Recent Labs   Lab 01/13/24  1841 01/14/24  0601 01/15/24  0443   WBC 16.34* 16.14* 11.79  12.32     . Afebrile. BCX 2, pending . CX ray -Bilateral patchy opacities in the lower lung zones, likely atelectasis. procalcitonin slightly elevated. No pleural effusion or pneumothorax.  UA negative. BCX 2 . started on azithromycin       Symptomatic anemia  Presents with dizziness in the setting of acute blood loss anemia from GI bleed.  Management as below.  Transfuse to maintain hemoglobin 7 or greater.  Iron, TIBC, ferritin, B12, folate ordered   1/14 consistent with iron deficiency. iron replacement     Supratherapeutic INR  Patient is on chronic Coumadin since his CVA in 2005.  INR 3.9 on  presentation, and he was given vitamin K in the ED due to active GI bleeding.  We will hold his Coumadin for now and monitor INR.  patient with INR   Recent Labs   Lab 01/12/24  1810 01/13/24  0431 01/14/24  0818   INR 3.9* 1.8* 1.2    INR 1.8 s/p vitamin K.  resumed coumadin. INR 1.2. started on lovenox bridge       Stage 3b chronic kidney disease  Renal function remains relatively around baseline.  We will monitor.  Avoid nephrotoxic agents as able, and renally dose all meds as applicable.  Creatine stable for now. BMP reviewed- noted Estimated Creatinine Clearance: 45.6 mL/min (A) (based on SCr of 1.7 mg/dL (H)). according to latest data. Monitor UOP and serial BMP and adjust therapy as needed. Renally dose meds.    Recent Labs   Lab 01/13/24  0431 01/14/24  0818 01/15/24  0443   BUN 20 18 21   CREATININE 1.6* 1.6* 1.7*       I & O     Intake/Output Summary (Last 24 hours) at 1/15/2024 0933  Last data filed at 1/15/2024 0707  Gross per 24 hour   Intake no documentation   Output 900 ml   Net -900 ml        Hypertension associated with diabetes  Hold antihypertensives in the setting of acute GI bleed with symptomatic anemia, and can resume as warranted.      Peripheral artery disease  Continue statin.  Holding Coumadin given GI bleed.      H/O: CVA (cerebrovascular accident)  With resultant dysarthria and right sided weakness/ gait abnormality.  Currently at baseline.  Can continue statin, but hold Coumadin given GI bleed.      Type 2 diabetes mellitus with hyperglycemia, with long-term current use of insulin  Recent A1c around 7.9%.  Reports he was taken off of sliding scale insulin, and is now on Levemir 22 units nightly along with Jardiance.  We will continue a reduced dose of Levemir as to avoid hypoglycemia since he is NPO, and we will continue scheduled Accu-Cheks.  Patient's FSGs are controlled on current hypoglycemics.   Last A1c reviewed-   Lab Results   Component Value Date    HGBA1C 7.8 (H) 01/08/2024     HGBA1C 6.6 (H) 08/17/2023    HGBA1C 7.5 (H) 12/13/2022     Will hold PO hypoglycemics and will start correctional scale insulin  Most recent fingerstick glucose reviewed-   Recent Labs   Lab 01/14/24  1208 01/14/24 2003 01/14/24  2319 01/15/24  0551   POCTGLUCOSE 190* 180* 126* 111*     currently on   Antihyperglycemics (From admission, onward)      Start     Stop Route Frequency Ordered    01/13/24 0115  insulin detemir U-100 (Levemir) pen 8 Units         -- SubQ Nightly 01/13/24 0008               VTE Risk Mitigation (From admission, onward)           Ordered     enoxaparin injection 80 mg  Every 12 hours         01/14/24 1728     warfarin split tablet 5 mg  Daily         01/13/24 1347     IP VTE HIGH RISK PATIENT  Once         01/12/24 2246     Place sequential compression device  Until discontinued         01/12/24 2246                    Discharge Planning   JOSUÉ: 1/16/2024     Code Status: Full Code   Is the patient medically ready for discharge?: (No Documentation)    Reason for patient still in hospital (select all that apply): Treatment  Discharge Plan A: Home                  Jaquan Prieto MD  Department of Hospital Medicine   [facility] - Stepdown [facility] ([place])

## 2024-01-16 ENCOUNTER — TELEPHONE (OUTPATIENT)
Dept: GASTROENTEROLOGY | Facility: CLINIC | Age: 69
End: 2024-01-16
Payer: MEDICARE

## 2024-01-16 VITALS
DIASTOLIC BLOOD PRESSURE: 95 MMHG | TEMPERATURE: 98 F | HEART RATE: 97 BPM | SYSTOLIC BLOOD PRESSURE: 115 MMHG | BODY MASS INDEX: 24.4 KG/M2 | OXYGEN SATURATION: 100 % | WEIGHT: 180.13 LBS | RESPIRATION RATE: 19 BRPM | HEIGHT: 72 IN

## 2024-01-16 LAB
ALBUMIN SERPL BCP-MCNC: 3.4 G/DL (ref 3.5–5.2)
ALP SERPL-CCNC: 63 U/L (ref 55–135)
ALT SERPL W/O P-5'-P-CCNC: 15 U/L (ref 10–44)
ANION GAP SERPL CALC-SCNC: 9 MMOL/L (ref 8–16)
AST SERPL-CCNC: 15 U/L (ref 10–40)
BASOPHILS # BLD AUTO: 0.04 K/UL (ref 0–0.2)
BASOPHILS # BLD AUTO: 0.05 K/UL (ref 0–0.2)
BASOPHILS NFR BLD: 0.5 % (ref 0–1.9)
BASOPHILS NFR BLD: 0.6 % (ref 0–1.9)
BILIRUB SERPL-MCNC: 0.5 MG/DL (ref 0.1–1)
BUN SERPL-MCNC: 21 MG/DL (ref 8–23)
CALCIUM SERPL-MCNC: 8.9 MG/DL (ref 8.7–10.5)
CHLORIDE SERPL-SCNC: 112 MMOL/L (ref 95–110)
CO2 SERPL-SCNC: 20 MMOL/L (ref 23–29)
CREAT SERPL-MCNC: 1.6 MG/DL (ref 0.5–1.4)
DIFFERENTIAL METHOD BLD: ABNORMAL
DIFFERENTIAL METHOD BLD: ABNORMAL
EOSINOPHIL # BLD AUTO: 0.2 K/UL (ref 0–0.5)
EOSINOPHIL # BLD AUTO: 0.2 K/UL (ref 0–0.5)
EOSINOPHIL NFR BLD: 1.9 % (ref 0–8)
EOSINOPHIL NFR BLD: 2 % (ref 0–8)
ERYTHROCYTE [DISTWIDTH] IN BLOOD BY AUTOMATED COUNT: 16.2 % (ref 11.5–14.5)
ERYTHROCYTE [DISTWIDTH] IN BLOOD BY AUTOMATED COUNT: 16.7 % (ref 11.5–14.5)
EST. GFR  (NO RACE VARIABLE): 46.6 ML/MIN/1.73 M^2
GLUCOSE SERPL-MCNC: 111 MG/DL (ref 70–110)
HCT VFR BLD AUTO: 32.1 % (ref 40–54)
HCT VFR BLD AUTO: 32.6 % (ref 40–54)
HGB BLD-MCNC: 9.8 G/DL (ref 14–18)
HGB BLD-MCNC: 9.9 G/DL (ref 14–18)
IMM GRANULOCYTES # BLD AUTO: 0.01 K/UL (ref 0–0.04)
IMM GRANULOCYTES # BLD AUTO: 0.01 K/UL (ref 0–0.04)
IMM GRANULOCYTES NFR BLD AUTO: 0.1 % (ref 0–0.5)
IMM GRANULOCYTES NFR BLD AUTO: 0.1 % (ref 0–0.5)
INR PPP: 2.2 (ref 0.8–1.2)
LYMPHOCYTES # BLD AUTO: 0.7 K/UL (ref 1–4.8)
LYMPHOCYTES # BLD AUTO: 0.7 K/UL (ref 1–4.8)
LYMPHOCYTES NFR BLD: 7.8 % (ref 18–48)
LYMPHOCYTES NFR BLD: 8.4 % (ref 18–48)
MAGNESIUM SERPL-MCNC: 2.4 MG/DL (ref 1.6–2.6)
MCH RBC QN AUTO: 25.1 PG (ref 27–31)
MCH RBC QN AUTO: 25.4 PG (ref 27–31)
MCHC RBC AUTO-ENTMCNC: 30.4 G/DL (ref 32–36)
MCHC RBC AUTO-ENTMCNC: 30.5 G/DL (ref 32–36)
MCV RBC AUTO: 82 FL (ref 82–98)
MCV RBC AUTO: 84 FL (ref 82–98)
MONOCYTES # BLD AUTO: 0.8 K/UL (ref 0.3–1)
MONOCYTES # BLD AUTO: 0.8 K/UL (ref 0.3–1)
MONOCYTES NFR BLD: 9.7 % (ref 4–15)
MONOCYTES NFR BLD: 9.9 % (ref 4–15)
NEUTROPHILS # BLD AUTO: 6.8 K/UL (ref 1.8–7.7)
NEUTROPHILS # BLD AUTO: 6.8 K/UL (ref 1.8–7.7)
NEUTROPHILS NFR BLD: 79.4 % (ref 38–73)
NEUTROPHILS NFR BLD: 79.6 % (ref 38–73)
NRBC BLD-RTO: 0 /100 WBC
NRBC BLD-RTO: 0 /100 WBC
PLATELET # BLD AUTO: 372 K/UL (ref 150–450)
PLATELET # BLD AUTO: 384 K/UL (ref 150–450)
PMV BLD AUTO: 9.4 FL (ref 9.2–12.9)
PMV BLD AUTO: 9.6 FL (ref 9.2–12.9)
POCT GLUCOSE: 109 MG/DL (ref 70–110)
POCT GLUCOSE: 126 MG/DL (ref 70–110)
POCT GLUCOSE: 139 MG/DL (ref 70–110)
POTASSIUM SERPL-SCNC: 4 MMOL/L (ref 3.5–5.1)
PROT SERPL-MCNC: 7 G/DL (ref 6–8.4)
PROTHROMBIN TIME: 22.4 SEC (ref 9–12.5)
RBC # BLD AUTO: 3.9 M/UL (ref 4.6–6.2)
RBC # BLD AUTO: 3.9 M/UL (ref 4.6–6.2)
SODIUM SERPL-SCNC: 141 MMOL/L (ref 136–145)
WBC # BLD AUTO: 8.48 K/UL (ref 3.9–12.7)
WBC # BLD AUTO: 8.54 K/UL (ref 3.9–12.7)

## 2024-01-16 PROCEDURE — 80053 COMPREHEN METABOLIC PANEL: CPT | Performed by: HOSPITALIST

## 2024-01-16 PROCEDURE — 36415 COLL VENOUS BLD VENIPUNCTURE: CPT | Performed by: HOSPITALIST

## 2024-01-16 PROCEDURE — 83735 ASSAY OF MAGNESIUM: CPT | Performed by: HOSPITALIST

## 2024-01-16 PROCEDURE — 25000003 PHARM REV CODE 250: Performed by: HOSPITALIST

## 2024-01-16 PROCEDURE — 85025 COMPLETE CBC W/AUTO DIFF WBC: CPT | Performed by: HOSPITALIST

## 2024-01-16 PROCEDURE — 25000003 PHARM REV CODE 250: Performed by: STUDENT IN AN ORGANIZED HEALTH CARE EDUCATION/TRAINING PROGRAM

## 2024-01-16 PROCEDURE — 85025 COMPLETE CBC W/AUTO DIFF WBC: CPT | Mod: 91 | Performed by: STUDENT IN AN ORGANIZED HEALTH CARE EDUCATION/TRAINING PROGRAM

## 2024-01-16 PROCEDURE — 63700000 PHARM REV CODE 250 ALT 637 W/O HCPCS: Performed by: HOSPITALIST

## 2024-01-16 PROCEDURE — 85610 PROTHROMBIN TIME: CPT | Performed by: HOSPITALIST

## 2024-01-16 RX ORDER — WARFARIN 2.5 MG/1
2.5 TABLET ORAL DAILY
Qty: 2 TABLET | Refills: 0 | Status: SHIPPED | OUTPATIENT
Start: 2024-01-16 | End: 2024-01-18

## 2024-01-16 RX ORDER — WARFARIN 2.5 MG/1
2.5 TABLET ORAL DAILY
Status: DISCONTINUED | OUTPATIENT
Start: 2024-01-16 | End: 2024-01-16 | Stop reason: HOSPADM

## 2024-01-16 RX ORDER — WARFARIN SODIUM 5 MG/1
TABLET ORAL
Qty: 90 TABLET | Refills: 3 | Status: SHIPPED | OUTPATIENT
Start: 2024-01-18

## 2024-01-16 RX ORDER — INSULIN LISPRO 100 [IU]/ML
INJECTION, SOLUTION INTRAVENOUS; SUBCUTANEOUS
Qty: 12 ML | Refills: 1 | Status: SHIPPED | OUTPATIENT
Start: 2024-01-16

## 2024-01-16 RX ADMIN — LIDOCAINE 5% 1 PATCH: 700 PATCH TOPICAL at 09:01

## 2024-01-16 RX ADMIN — WARFARIN SODIUM 2.5 MG: 2.5 TABLET ORAL at 05:01

## 2024-01-16 RX ADMIN — PANTOPRAZOLE SODIUM 40 MG: 40 TABLET, DELAYED RELEASE ORAL at 03:01

## 2024-01-16 RX ADMIN — AMANTADINE HYDROCHLORIDE 100 MG: 100 CAPSULE, LIQUID FILLED ORAL at 09:01

## 2024-01-16 RX ADMIN — PANTOPRAZOLE SODIUM 40 MG: 40 TABLET, DELAYED RELEASE ORAL at 05:01

## 2024-01-16 RX ADMIN — SODIUM BICARBONATE 650 MG TABLET 1300 MG: at 09:01

## 2024-01-16 RX ADMIN — AZITHROMYCIN DIHYDRATE 250 MG: 250 TABLET ORAL at 09:01

## 2024-01-16 RX ADMIN — ATORVASTATIN CALCIUM 40 MG: 40 TABLET, FILM COATED ORAL at 09:01

## 2024-01-16 NOTE — PROGRESS NOTES
[facility] ([place] [address])  [facility]  Progress Note    Patient Name: Kamlesh Diane  MRN: [id number]  Patient Class: IP- Inpatient   Admission Date: 1/12/2024  Length of Stay: 4 days  Attending Physician: Jaquan Prieto MD  Primary Care Provider: Debora Ontiveros MD        Subjective:     Principal Problem:Acute GI bleeding        HPI:  Kamlesh Diane is a 68 y.o. 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     He reports that on Tuesday, he developed severe dizziness which occurred with any movement and lying at rest.  He denies any syncope, chest pain, shortness for breath, nausea, vomiting, or other significant symptoms.  He has just had persistent dizziness.  He did note that after Blanquita, he had very loose and dark stools, which resolved.  However, these have intermittently returned, and he has had dark, loose stools for the past 2-3 days.  Denies any abdominal pain, and he has not taken any OTC meds except Tylenol.  He does drink 1 alcoholic beverage approximately 1 day per week.         Overview/Hospital Course:  1/13 Hb 4.8-> 7.2 s/p PRBC transfusion. CTA . INR 1.8 s/p vitamin K. NPO and GI eval.  started tylenol and lidocaine patch for low back ache   EGD -  Normal esophagus.                          - Small hiatal hernia.                          - Non-bleeding gastric ulcer with a clean ulcer                          base (Brent[place] Class III).                          - Non-bleeding duodenal ulcers with a clean ulcer                          base ([place] Class III).                          - Biopsies were taken with a cold forceps for                          Helicobacter pylori testing.   Resume Coumadin. Proton pump inhibitor PO BID for 12 weeks. Repeat upper endoscopy in 8 weeks to check healing.   1/14 Hb stable. leucocytosis trending up.  Pancultures . CX ray -Bilateral patchy opacities in the lower lung zones, likely atelectasis. procalcitonin slightly elevated. No pleural effusion or pneumothorax.  UA negative. BCX 2 . started on azithromycin . INR 1.2. started on lovenox bridge   1/15 leucocytosis resolved. Hb stable . INR 1.5 continue lovenox and coumadin .Bicarb 16.  started on sodium bicarbonate 1300mg BID   1/16 Bicarb improved to 20. Hb stable. discharge today          Review of Systems:   Pain scale:   Constitutional:  fever,  chills, headache, vision loss, hearing loss, weight loss, Generalized weakness, falls, loss of smell, loss of taste, poor appetite,  sore throat  Respiratory: cough, shortness of breath.   Cardiovascular: chest pain, dizziness, palpitations, orthopnea, swelling of feet, syncope  Gastrointestinal: nausea, vomiting, abdominal pain, diarrhea, black stool,  blood in stool, change in bowel habits, constipation  Genitourinary: hematuria, dysuria, urgency, frequency  Integument/Breast: rash,  pruritis  Hematologic/Lymphatic: easy bruising, lymphadenopathy  Musculoskeletal: arthralgias , myalgias, back pain, neck pain, knee pain  Neurological: confusion, seizures, tremors, slurred speech  Behavioral/Psych:  depression, anxiety, auditory or visual hallucinations     OBJECTIVE:     Physical Exam:  Body mass index is 24.43 kg/m².    Constitutional: Appears well-developed and well-nourished.   Head: Normocephalic and atraumatic.   Neck: Normal range of motion. Neck supple.   Cardiovascular: Normal heart rate.  Regular heart rhythm.  Pulmonary/Chest: Effort normal.   Abdominal: No distension.  No tenderness  Musculoskeletal: Normal range of motion. No edema. basdline right hemiparesis, dysarthria ( old CVA)   Neurological: Alert and oriented to person, place, and time.   Skin: Skin is warm and dry.   Psychiatric: Normal mood and affect. Behavior is normal.                  Vital Signs  Temp: 98.4 °F (36.9 °C) (01/16/24  "0440)  Pulse: 97 (01/16/24 0440)  Resp: (Abnormal) 26 (01/16/24 0440)  BP: 132/76 (01/16/24 0440)  SpO2: (Abnormal) 61 % (01/16/24 0440)     24 Hour VS Range    Temp:  [98.1 °F (36.7 °C)-98.4 °F (36.9 °C)]   Pulse:  []   Resp:  [16-26]   BP: (125-132)/(70-83)   SpO2:  [52 %-100 %]     Intake/Output Summary (Last 24 hours) at 1/16/2024 0717  Last data filed at 1/16/2024 0624  Gross per 24 hour   Intake no documentation   Output 1770 ml   Net -1770 ml         I/O This Shift:  No intake/output data recorded.    Wt Readings from Last 3 Encounters:   01/14/24 81.7 kg (180 lb 1.9 oz)   11/07/23 78.6 kg (173 lb 4.5 oz)   08/17/23 78.6 kg (173 lb 4.5 oz)       I have personally reviewed the vitals and recorded Intake/Output     Laboratory/Diagnostic Data:    CBC/Anemia Labs: Coags:    Recent Labs   Lab 01/12/24  2247 01/13/24  0431 01/14/24  0601 01/15/24  0443 01/16/24  0505   WBC  --    < > 16.14* 11.79  12.32 8.54  8.48   HGB  --    < > 9.3* 9.4*  9.4* 9.8*  9.9*   HCT  --    < > 29.5* 29.8*  29.7* 32.1*  32.6*   PLT  --    < > 359 343  361 372  384   MCV  --    < > 82 80*  80* 82  84   RDW  --    < > 15.9* 16.1*  16.2* 16.7*  16.2*   IRON 43*  --   --   --   --    FERRITIN 13*  --   --   --   --    RETIC 1.6  --   --   --   --    FOLATE 8.7  --   --   --   --    [id number] 326  --   --   --   --     < > = values in this interval not displayed.    Recent Labs   Lab 01/12/24  1810 01/13/24  0431 01/14/24  0818 01/15/24  0931   INR 3.9* 1.8* 1.2 1.5*   APTT 31.2  --   --   --         Chemistries: ABG:   Recent Labs   Lab 01/14/24  0818 01/15/24  0443 01/16/24  0505    138 141   K 3.8 4.2 4.0   * 112* 112*   CO2 18* 16* 20*   BUN 18 21 21   CREATININE 1.6* 1.7* 1.6*   CALCIUM 9.0 8.8 8.9   PROT 7.0 6.9 7.0   BILITOT 0.8 0.7 0.5   ALKPHOS 75 73 63   ALT 14 15 15   AST 14 25 15   MG  --  2.3 2.4    No results for input(s): "PH", "PCO2", "PO2", "HCO3", "POCSATURATED", "BE" in the last 168 hours. " "    POCT Glucose: HbA1c:    Recent Labs   Lab 01/14/24  2319 01/15/24  0551 01/15/24  0835 01/15/24  1641 01/15/24  2346 01/16/24  0442   POCTGLUCOSE 126* 111* 104 126* 188* 109    Hemoglobin A1C   Date Value Ref Range Status   01/08/2024 7.8 (H) 4.0 - 5.6 % Final     Comment:     ADA Screening Guidelines:  5.7-6.4%  Consistent with prediabetes  >or=6.5%  Consistent with diabetes    High levels of fetal hemoglobin interfere with the HbA1C  assay. Heterozygous hemoglobin variants (HbS, HgC, etc)do  not significantly interfere with this assay.   However, presence of multiple variants may affect accuracy.     08/17/2023 6.6 (H) 4.0 - 5.6 % Final     Comment:     ADA Screening Guidelines:  5.7-6.4%  Consistent with prediabetes  >or=6.5%  Consistent with diabetes    High levels of fetal hemoglobin interfere with the HbA1C  assay. Heterozygous hemoglobin variants (HbS, HgC, etc)do  not significantly interfere with this assay.   However, presence of multiple variants may affect accuracy.     12/13/2022 7.5 (H) 4.0 - 5.6 % Final     Comment:     ADA Screening Guidelines:  5.7-6.4%  Consistent with prediabetes  >or=6.5%  Consistent with diabetes    High levels of fetal hemoglobin interfere with the HbA1C  assay. Heterozygous hemoglobin variants (HbS, HgC, etc)do  not significantly interfere with this assay.   However, presence of multiple variants may affect accuracy.          Cardiac Enzymes: Ejection Fractions:    No results for input(s): "CPK", "CPKMB", "MB", "TROPONINI" in the last 72 hours.   No results found for: "EF"       Recent Labs   Lab 01/14/24  1214   COLORU Yellow   APPEARANCEUA Clear   PHUR 5.0   SPECGRAV 1.025   PROTEINUA Negative   GLUCUA 3+*   KETONESU 1+*   BILIRUBINUA Negative   OCCULTUA Negative   NITRITE Negative   LEUKOCYTESUR Negative   RBCUA 1   WBCUA 1   BACTERIA None   SQUAMEPITHEL 0       Procalcitonin (ng/mL)   Date Value   01/14/2024 0.58 (H)     Lactate (Lactic Acid) (mmol/L)   Date Value " "  01/13/2024 1.5   01/12/2024 6.2 (HH)     No results found for: "BNP"  CRP (mg/L)   Date Value   04/10/2019 3.2   09/29/2010 7.9   09/27/2010 17.7 (H)   09/09/2010 45.2 (H)     Sed Rate   Date Value   04/10/2019 13 mm/Hr   09/29/2010 64 mm/hr (H)   09/27/2010 78 mm/hr (H)   09/09/2010 90 mm/hr (H)   09/01/2010 21 mm/hr (H)     No results found for: "DDIMER"  Ferritin (ng/mL)   Date Value   01/12/2024 13 (L)   08/17/2023 61     No results found for: "LDH"  Troponin I   Date Value   01/12/2024 <0.006 ng/mL   09/01/2010 0.008 ng/ml     CPK (U/L)   Date Value   09/01/2010 102     Results for orders placed or performed in visit on 05/31/21   Vitamin D   Result Value Ref Range    Vit D, 25-Hydroxy 41 30 - 96 ng/mL   Results for orders placed or performed in visit on 10/02/20   Vitamin D   Result Value Ref Range    Vit D, 25-Hydroxy 40 30 - 96 ng/mL   Results for orders placed or performed in visit on 06/02/20   Vitamin D   Result Value Ref Range    Vit D, 25-Hydroxy 38 30 - 96 ng/mL     No results found for: "[id number]"    Microbiology labs for the last week  Microbiology Results (last 7 days)       Procedure Component Value Units Date/Time    Blood culture [[id number]] Collected: 01/14/24 0818    Order Status: Completed Specimen: Blood Updated: 01/15/24 1012     Blood Culture, Routine No Growth to date      No Growth to date    Narrative:      Rt wrist    Blood culture [[id number]] Collected: 01/14/24 0817    Order Status: Completed Specimen: Blood Updated: 01/15/24 1012     Blood Culture, Routine No Growth to date      No Growth to date            Reviewed and noted in plan where applicable- Please see chart for full lab data.    Lines/Drains:       Peripheral IV - Single Lumen 01/12/24 1530 20 G Left;Posterior Forearm (Active)   Site Assessment Clean;Dry;Intact 01/13/24 0414   Extremity Assessment Distal to IV No warmth;No swelling;No redness;No abnormal discoloration 01/13/24 0414   Line Status Flushed;Infusing " 01/13/24 0414   Dressing Status Clean;Dry;Intact 01/13/24 0414   Dressing Intervention Integrity maintained 01/13/24 0414   Number of days: 0            Peripheral IV - Single Lumen 01/12/24 2000 20 G Posterior;Left Forearm (Active)   Site Assessment Clean;Dry;Intact 01/13/24 0414   Extremity Assessment Distal to IV No warmth;No swelling;No redness;No abnormal discoloration 01/13/24 0414   Line Status Flushed;Saline locked 01/13/24 0414   Dressing Status Clean;Dry;Intact 01/13/24 0414   Number of days: 0       Imaging  ECG Results              EKG 12-lead (Final result)  Result time 01/12/24 17:22:29      Final result by Interface, Lab In [facility] (01/12/24 17:22:29)               Narrative:    Test Reason : R42,    Vent. Rate : 101 BPM     Atrial Rate : 101 BPM     P-R Int : 116 ms          QRS Dur : 086 ms      QT Int : 318 ms       P-R-T Axes : 057 029 -40 degrees     QTc Int : 412 ms    Sinus tachycardia  Nonspecific T wave abnormality  Abnormal ECG  When compared with ECG of 19-JUL-2018 10:57,  No significant change was found  Confirmed by Maximus SWAIN MD ([id number]) on 1/12/2024 5:22:20 PM    Referred By: [email or web address]   SELF           Confirmed By:Maximus SWAIN MD                                  No results found for this or any previous visit.      X-Ray Chest 1 View  Narrative: EXAMINATION:  XR CHEST 1 VIEW    CLINICAL HISTORY:  leucocytosis;    TECHNIQUE:  Single frontal view of the chest was performed.    COMPARISON:  09/06/2010    FINDINGS:  Bilateral patchy opacities in the lower lung zones, likely atelectasis.  No pleural effusion or pneumothorax.  Cardiac silhouette is unremarkable.  Impression: As above.    Electronically signed by: Juan Antonio Wahl MD  Date:    01/14/2024  Time:    08:30      Labs, Imaging, EKG and Diagnostic results from 1/16/2024 were reviewed.    Medications:  Medication list was reviewed and changes noted under Assessment/Plan.  No current facility-administered medications on file prior to  "encounter.     Current Outpatient Medications on File Prior to Encounter   Medication Sig Dispense Refill    ACCU-CHEK JUSTIN PLUS TEST STRP Strp TEST FOUR TIMES DAILY AS DIRECTED 400 strip 3    alcohol swabs (ALCOHOL PREP PADS) PadM Use 4 times a day. 400 each 3    amantadine HCL (SYMMETREL) 100 mg capsule Take 1 capsule (100 mg total) by mouth 2 (two) times daily. 180 capsule 3    atorvastatin (LIPITOR) 40 MG tablet TAKE 1 TABLET(40 MG) BY MOUTH EVERY DAY 90 tablet 3    blood-glucose meter kit Use as instructed 1 each 0    blood-glucose meter,continuous (DEXCOM G7 ) Misc Use as directed, e 11.65 1 each 1    blood-glucose sensor (DEXCOM G7 SENSOR) Adriana Change every 10 days, e 11.65 3 each 11    carvediloL (COREG) 3.125 MG tablet Take 1 tablet (3.125 mg total) by mouth 2 (two) times daily. 180 tablet 3    ergocalciferol (ERGOCALCIFEROL) 50,000 unit Cap TAKE 1 CAPSULE BY MOUTH EVERY 7 DAYS 12 capsule 3    glucagon (BAQSIMI) 3 mg/actuation Spry Give one puff via nostril. Hold device between fingers and thumb, do not push plunger yet, insert tip gently into one nostril until finger(s) touch the outside of the nose, then push plunger firmly all the way in . Dose is complete when the green line disappears. 1 each 2    lancets Misc 1 lancet by Misc.(Non-Drug; Combo Route) route 4 (four) times daily. 400 each 3    lancing device Misc 1 Device by Misc.(Non-Drug; Combo Route) route 2 (two) times daily with meals. 1 each 0    mirabegron (MYRBETRIQ) 25 mg Tb24 ER tablet TAKE 1 TABLET(25 MG) BY MOUTH EVERY DAY 90 tablet 3    pen needle, diabetic (BD ULTRA-FINE SHORT PEN NEEDLE) 31 gauge x 5/16" Ndle USE THREE TIMES DAILY AS DIRECTED 200 each 2    warfarin (COUMADIN) 5 MG tablet TAKE 1 TABLET BY MOUTH DAILY 6 DAYS A WEEK OR AS DIRECTED BY COUMADIN CLINIC. 90 tablet 3     Scheduled Medications:  amantadine HCL, 100 mg, Oral, BID  atorvastatin, 40 mg, Oral, Daily  azithromycin, 250 mg, Oral, Daily  enoxparin, 1 mg/kg, " Subcutaneous, Q12H (prophylaxis, 0900/2100)  insulin detemir U-100 (Levemir), 8 Units, Subcutaneous, QHS  LIDOcaine, 1 patch, Transdermal, Q24H  pantoprazole, 40 mg, Oral, BID AC  sodium bicarbonate, 1,300 mg, Oral, BID  warfarin, 5 mg, Oral, Daily      PRN: acetaminophen, iohexoL  Infusions:   Estimated Creatinine Clearance: 48.5 mL/min (A) (based on SCr of 1.6 mg/dL (H)).             Assessment/Plan:      * Acute GI bleeding  Presents with reported dizziness along with intermittent dark stools, and has associated severe decrease in H/H from recent.  ED reports melena on exam.  We will place on GI bleed pathway, start PPI b.i.d., and transfuse to maintain hemoglobin 7 or greater.  Holding home Coumadin.  CTA ordered and pending.  Monitor with serial CBCs.  GI consulted; we will keep NPO for likely endoscopy.  1/13   Patient's with Normocytic anemia.. Hemoglobin stable. Etiology likely due to  acute blood loss  .  Current CBC reviewed-    Recent Labs   Lab 01/13/24  1841 01/14/24  0601 01/15/24  0443   HGB 9.7* 9.3* 9.4*  9.4*         Component Value Date/Time    MCV 80 (L) 01/15/2024 0443    MCV 80 (L) 01/15/2024 0443    RDW 16.2 (H) 01/15/2024 0443    RDW 16.1 (H) 01/15/2024 0443    IRON 43 (L) 01/12/2024 2247    FERRITIN 13 (L) 01/12/2024 2247    RETIC 1.6 01/12/2024 2247    FOLATE 8.7 01/12/2024 2247    [id number] 326 01/12/2024 2247     Monitor CBC and transfuse if H/H drops below 7/21.    1/13 Hb 4.8-> 7.2 s/p PRBC transfusion.   EGD -  Normal esophagus.                          - Small hiatal hernia.                          - Non-bleeding gastric ulcer with a clean ulcer                          base ([place] Class III).                          - Non-bleeding duodenal ulcers with a clean ulcer                          base ([place] Class III).                          - Biopsies were taken with a cold forceps for                          Helicobacter pylori testing.   Resume Coumadin. Proton pump inhibitor  PO BID for 12 weeks. Repeat upper endoscopy in 8 weeks to check healing.     Leucocytosis    Patient with leucocytosis   Recent Labs   Lab 01/13/24  1841 01/14/24  0601 01/15/24  0443   WBC 16.34* 16.14* 11.79  12.32     . Afebrile. BCX 2, pending . CX ray -Bilateral patchy opacities in the lower lung zones, likely atelectasis. procalcitonin slightly elevated. No pleural effusion or pneumothorax.  UA negative. BCX 2 . started on azithromycin       Symptomatic anemia  Presents with dizziness in the setting of acute blood loss anemia from GI bleed.  Management as below.  Transfuse to maintain hemoglobin 7 or greater.  Iron, TIBC, ferritin, B12, folate ordered   1/14 consistent with iron deficiency. iron replacement     Supratherapeutic INR  Patient is on chronic Coumadin since his CVA in 2005.  INR 3.9 on presentation, and he was given vitamin K in the ED due to active GI bleeding.  We will hold his Coumadin for now and monitor INR.  patient with INR   Recent Labs   Lab 01/12/24  1810 01/13/24  0431 01/14/24  0818   INR 3.9* 1.8* 1.2    INR 1.8 s/p vitamin K.  resumed coumadin. INR 1.2. started on lovenox bridge       Stage 3b chronic kidney disease  Renal function remains relatively around baseline.  We will monitor.  Avoid nephrotoxic agents as able, and renally dose all meds as applicable.  Creatine stable for now. BMP reviewed- noted Estimated Creatinine Clearance: 48.5 mL/min (A) (based on SCr of 1.6 mg/dL (H)). according to latest data. Monitor UOP and serial BMP and adjust therapy as needed. Renally dose meds.    Recent Labs   Lab 01/14/24  0818 01/15/24  0443 01/16/24  0505   BUN 18 21 21   CREATININE 1.6* 1.7* 1.6*       I & O     Intake/Output Summary (Last 24 hours) at 1/16/2024 0716  Last data filed at 1/16/2024 0624  Gross per 24 hour   Intake no documentation   Output 1770 ml   Net -1770 ml        Hypertension associated with diabetes  Hold antihypertensives in the setting of acute GI bleed with  symptomatic anemia, and can resume as warranted.      Peripheral artery disease  Continue statin.  Holding Coumadin given GI bleed.      H/O: CVA (cerebrovascular accident)  With resultant dysarthria and right sided weakness/ gait abnormality.  Currently at baseline.  Can continue statin, but hold Coumadin given GI bleed.      Type 2 diabetes mellitus with hyperglycemia, with long-term current use of insulin  Recent A1c around 7.9%.  Reports he was taken off of sliding scale insulin, and is now on Levemir 22 units nightly along with Jardiance.  We will continue a reduced dose of Levemir as to avoid hypoglycemia since he is NPO, and we will continue scheduled Accu-Cheks.  Patient's FSGs are controlled on current hypoglycemics.   Last A1c reviewed-   Lab Results   Component Value Date    HGBA1C 7.8 (H) 01/08/2024    HGBA1C 6.6 (H) 08/17/2023    HGBA1C 7.5 (H) 12/13/2022     Will hold PO hypoglycemics and will start correctional scale insulin  Most recent fingerstick glucose reviewed-   Recent Labs   Lab 01/15/24  0835 01/15/24  1641 01/15/24  2346 01/16/24  0442   POCTGLUCOSE 104 126* 188* 109     currently on   Antihyperglycemics (From admission, onward)      Start     Stop Route Frequency Ordered    01/13/24 0115  insulin detemir U-100 (Levemir) pen 8 Units         -- SubQ Nightly 01/13/24 0008               VTE Risk Mitigation (From admission, onward)           Ordered     enoxaparin injection 80 mg  Every 12 hours         01/14/24 1728     warfarin split tablet 5 mg  Daily         01/13/24 1347     IP VTE HIGH RISK PATIENT  Once         01/12/24 2246     Place sequential compression device  Until discontinued         01/12/24 2246                    Discharge Planning   JOSUÉ: 1/16/2024     Code Status: Full Code   Is the patient medically ready for discharge?: (No Documentation)    Reason for patient still in hospital (select all that apply): Treatment  Discharge Plan A: Home                  Jaquan Prieto  MD  Department of Hospital Medicine   [facility] ([place])

## 2024-01-16 NOTE — ASSESSMENT & PLAN NOTE
Renal function remains relatively around baseline.  We will monitor.  Avoid nephrotoxic agents as able, and renally dose all meds as applicable.  Creatine stable for now. BMP reviewed- noted Estimated Creatinine Clearance: 48.5 mL/min (A) (based on SCr of 1.6 mg/dL (H)). according to latest data. Monitor UOP and serial BMP and adjust therapy as needed. Renally dose meds.    Recent Labs   Lab 01/14/24  0818 01/15/24  0443 01/16/24  0505   BUN 18 21 21   CREATININE 1.6* 1.7* 1.6*       I & O     Intake/Output Summary (Last 24 hours) at 1/16/2024 0716  Last data filed at 1/16/2024 0624  Gross per 24 hour   Intake no documentation   Output 1770 ml   Net -1770 ml

## 2024-01-16 NOTE — PLAN OF CARE
Problem: Adult Inpatient Plan of Care  Goal: Plan of Care Review  Outcome: Ongoing, Progressing  Goal: Patient-Specific Goal (Individualized)  Outcome: Ongoing, Progressing  Goal: Absence of Hospital-Acquired Illness or Injury  Outcome: Ongoing, Progressing  Goal: Optimal Comfort and Wellbeing  Outcome: Ongoing, Progressing  Goal: Readiness for Transition of Care  Outcome: Ongoing, Progressing     Problem: Adjustment to Illness (Gastrointestinal Bleeding)  Goal: Optimal Coping with Acute Illness  Outcome: Ongoing, Progressing     Problem: Bleeding (Gastrointestinal Bleeding)  Goal: Hemostasis  Outcome: Ongoing, Progressing     Problem: Diabetes Comorbidity  Goal: Blood Glucose Level Within Targeted Range  Outcome: Ongoing, Progressing     Problem: Fall Injury Risk  Goal: Absence of Fall and Fall-Related Injury  Outcome: Ongoing, Progressing       No change in patient condition overnight. No signs of bleeding with stable vital signs.

## 2024-01-16 NOTE — TELEPHONE ENCOUNTER
----- Message from Mariela Conte sent at 1/16/2024 12:43 PM CST -----  Contact: [phone]  the patient is calling to get scheduled for a appt.  Pt access tried but no appts are available.  the patient can be reached at.  [phone]

## 2024-01-16 NOTE — DISCHARGE SUMMARY
[facility] - Stepdown Flex ([place] [id number])  [facility] Medicine  Discharge Summary      Patient Name: Kamlesh Diane  MRN: [id number]  PATRICIA: [id number]  Patient Class: IP- Inpatient  Admission Date: 1/12/2024  Hospital Length of Stay: 4 days  Discharge Date and Time:  01/16/2024 7:05 PM  Attending Physician: Jaquan Prieto MD   Discharging Provider: Jaquan Prieto MD  Primary Care Provider: Debora Ontiveros MD  [facility] Medicine Team: [facility] HOSP MED D Jaquan Prieto MD  Primary Care Team: [facility] D    HPI:   Kamlesh Diane is a 68 y.o. 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     He reports that on Tuesday, he developed severe dizziness which occurred with any movement and lying at rest.  He denies any syncope, chest pain, shortness for breath, nausea, vomiting, or other significant symptoms.  He has just had persistent dizziness.  He did note that after Christmas, he had very loose and dark stools, which resolved.  However, these have intermittently returned, and he has had dark, loose stools for the past 2-3 days.  Denies any abdominal pain, and he has not taken any OTC meds except Tylenol.  He does drink 1 alcoholic beverage approximately 1 day per week.         Procedure(s) (LRB):  EGD (ESOPHAGOGASTRODUODENOSCOPY) (N/A)      Hospital Course:   1/13 Hb 4.8-> 7.2 s/p PRBC transfusion. CTA . INR 1.8 s/p vitamin K. NPO and GI eval.  started tylenol and lidocaine patch for low back ache   EGD -  Normal esophagus.                          - Small hiatal hernia.                          - Non-bleeding gastric ulcer with a clean ulcer                          base ([place] Class III).                          - Non-bleeding duodenal ulcers with a clean ulcer                          base ([place] Class III).                          - Biopsies were taken with a cold forceps for                           Helicobacter pylori testing.   Resume Coumadin. Proton pump inhibitor PO BID for 12 weeks. Repeat upper endoscopy in 8 weeks to check healing.   1/14 Hb stable. leucocytosis trending up. Pancultures . CX ray -Bilateral patchy opacities in the lower lung zones, likely atelectasis. procalcitonin slightly elevated. No pleural effusion or pneumothorax.  UA negative. BCX 2 . started on azithromycin . INR 1.2. started on lovenox bridge   1/15 leucocytosis resolved. Hb stable . INR 1.5 continue lovenox and coumadin .Bicarb 16.  started on sodium bicarbonate 1300mg BID   1/16 Bicarb improved to 20. Hb stable. discharge today     Goals of Care Treatment Preferences:  Code Status: Full Code      Consults:   Consults (From admission, onward)          Status Ordering Provider     [facility] Medicine PharmD Consult  Once        Provider:  (Not yet assigned)    Acknowledged SHERRILL DE LA TORRE     Inpatient consult to Social Work/Case Management  Once        Provider:  (Not yet assigned)    Acknowledged DEEPAK MONSALVE     Inpatient consult to Gastroenterology  Once        Provider:  (Not yet assigned)    Completed MARSHA CRUZ                Assessment/Plan:      * Acute GI bleeding  Presents with reported dizziness along with intermittent dark stools, and has associated severe decrease in H/H from recent.  ED reports melena on exam.  We will place on GI bleed pathway, start PPI b.i.d., and transfuse to maintain hemoglobin 7 or greater.  Holding home Coumadin.  CTA ordered and pending.  Monitor with serial CBCs.  GI consulted; we will keep NPO for likely endoscopy.  1/13   Patient's with Normocytic anemia.. Hemoglobin stable. Etiology likely due to  acute blood loss  .  Current CBC reviewed-          Recent Labs   Lab 01/13/24  1841 01/14/24  0601 01/15/24  0443   HGB 9.7* 9.3* 9.4*  9.4*                Component Value Date/Time     MCV 80 (L) 01/15/2024 0443     MCV 80 (L) 01/15/2024 0443      RDW 16.2 (H) 01/15/2024 0443     RDW 16.1 (H) 01/15/2024 0443     IRON 43 (L) 01/12/2024 2247     FERRITIN 13 (L) 01/12/2024 2247     RETIC 1.6 01/12/2024 2247     FOLATE 8.7 01/12/2024 2247     [id number] 326 01/12/2024 2247      Monitor CBC and transfuse if H/H drops below 7/21.    1/13 Hb 4.8-> 7.2 s/p PRBC transfusion.   EGD -  Normal esophagus.                          - Small hiatal hernia.                          - Non-bleeding gastric ulcer with a clean ulcer                          base (Brent[place] Class III).                          - Non-bleeding duodenal ulcers with a clean ulcer                          base ([place] Class III).                          - Biopsies were taken with a cold forceps for                          Helicobacter pylori testing.   Resume Coumadin. Proton pump inhibitor PO BID for 12 weeks. Repeat upper endoscopy in 8 weeks to check healing.      Leucocytosis     Patient with leucocytosis         Recent Labs   Lab 01/13/24  1841 01/14/24  0601 01/15/24  0443   WBC 16.34* 16.14* 11.79  12.32     . Afebrile. BCX 2, pending . CX ray -Bilateral patchy opacities in the lower lung zones, likely atelectasis. procalcitonin slightly elevated. No pleural effusion or pneumothorax.  UA negative. BCX 2 . started on azithromycin         Symptomatic anemia  Presents with dizziness in the setting of acute blood loss anemia from GI bleed.  Management as below.  Transfuse to maintain hemoglobin 7 or greater.  Iron, TIBC, ferritin, B12, folate ordered   1/14 consistent with iron deficiency. iron replacement      Supratherapeutic INR  Patient is on chronic Coumadin since his CVA in 2005.  INR 3.9 on presentation, and he was given vitamin K in the ED due to active GI bleeding.  We will hold his Coumadin for now and monitor INR.  patient with INR         Recent Labs   Lab 01/12/24  1810 01/13/24  0431 01/14/24  0818   INR 3.9* 1.8* 1.2    INR 1.8 s/p vitamin K.  resumed coumadin. INR 1.2. started on  lovenox bridge         Stage 3b chronic kidney disease  Renal function remains relatively around baseline.  We will monitor.  Avoid nephrotoxic agents as able, and renally dose all meds as applicable.  Creatine stable for now. BMP reviewed- noted Estimated Creatinine Clearance: 45.6 mL/min (A) (based on SCr of 1.7 mg/dL (H)). according to latest data. Monitor UOP and serial BMP and adjust therapy as needed. Renally dose meds.           Recent Labs   Lab 01/13/24  0431 01/14/24  0818 01/15/24  0443   BUN 20 18 21   CREATININE 1.6* 1.6* 1.7*         I & O      Intake/Output Summary (Last 24 hours) at 1/15/2024 0933  Last data filed at 1/15/2024 0707      Gross per 24 hour   Intake no documentation   Output 900 ml   Net -900 ml         Hypertension associated with diabetes  Hold antihypertensives in the setting of acute GI bleed with symptomatic anemia, and can resume as warranted.        Peripheral artery disease  Continue statin.  Holding Coumadin given GI bleed.        H/O: CVA (cerebrovascular accident)  With resultant dysarthria and right sided weakness/ gait abnormality.  Currently at baseline.  Can continue statin, but hold Coumadin given GI bleed.        Type 2 diabetes mellitus with hyperglycemia, with long-term current use of insulin  Recent A1c around 7.9%.  Reports he was taken off of sliding scale insulin, and is now on Levemir 22 units nightly along with Jardiance.  We will continue a reduced dose of Levemir as to avoid hypoglycemia since he is NPO, and we will continue scheduled Accu-Cheks.  Patient's FSGs are controlled on current hypoglycemics.   Last A1c reviewed-         Lab Results   Component Value Date     HGBA1C 7.8 (H) 01/08/2024     HGBA1C 6.6 (H) 08/17/2023     HGBA1C 7.5 (H) 12/13/2022      Will hold PO hypoglycemics and will start correctional scale insulin  Most recent fingerstick glucose reviewed-          Recent Labs   Lab 01/14/24  1208 01/14/24 2003 01/14/24  2319 01/15/24  3992    POCTGLUCOSE 190* 180* 126* 111*      currently on   Antihyperglycemics (From admission, onward)        Start     Stop Route Frequency Ordered     01/13/24 0115   insulin detemir U-100 (Levemir) pen 8 Units         -- SubQ Nightly 01/13/24 0008            Final Active Diagnoses:    Diagnosis Date Noted POA    PRINCIPAL PROBLEM:  Acute GI bleeding [K92.2] 01/12/2024 Yes    Leucocytosis [D72.829] 01/14/2024 Yes    Supratherapeutic INR [R79.1] 01/12/2024 Yes     Chronic    Symptomatic anemia [D64.9] 01/12/2024 Yes    Stage 3b chronic kidney disease [N18.32] 06/02/2020 Yes     Chronic    Hypertension associated with diabetes [E11.59, I15.2] 10/24/2019 Yes     Chronic    Peripheral artery disease [I73.9] 07/19/2018 Yes     Chronic    H/O: CVA (cerebrovascular accident) [Z86.73] 04/05/2018 Not Applicable     Chronic    Type 2 diabetes mellitus with hyperglycemia, with long-term current use of insulin [E11.65, Z79.4] 04/05/2018 Not Applicable     Chronic      Problems Resolved During this Admission:       Discharged Condition: fair    Disposition: Home or Self Carehome    Follow Up:    Patient Instructions:      Ambulatory referral/consult to Gastroenterology   Standing Status: Future   Referral Priority: Routine Referral Type: Consultation   Referral Reason: Specialty Services Required   Requested Specialty: Gastroenterology   Number of Visits Requested: 1       Significant Diagnostic Studies: Labs: BMP:   Recent Labs   Lab 01/15/24  0443 01/16/24  0505    111*    141   K 4.2 4.0   * 112*   CO2 16* 20*   BUN 21 21   CREATININE 1.7* 1.6*   CALCIUM 8.8 8.9   MG 2.3 2.4   , CMP   Recent Labs   Lab 01/15/24  0443 01/16/24  0505    141   K 4.2 4.0   * 112*   CO2 16* 20*    111*   BUN 21 21   CREATININE 1.7* 1.6*   CALCIUM 8.8 8.9   PROT 6.9 7.0   ALBUMIN 3.4* 3.4*   BILITOT 0.7 0.5   ALKPHOS 73 63   AST 25 15   ALT 15 15   ANIONGAP 10 9   , CBC   Recent Labs   Lab 01/15/24  0443  "01/16/24  0505   WBC 11.79  12.32 8.54  8.48   HGB 9.4*  9.4* 9.8*  9.9*   HCT 29.8*  29.7* 32.1*  32.6*     361 372  384   , INR   Lab Results   Component Value Date    INR 2.2 (H) 01/16/2024    INR 1.5 (H) 01/15/2024    INR 1.2 01/14/2024   , Lipid Panel   Lab Results   Component Value Date    CHOL 151 08/17/2023    HDL 43 08/17/2023    LDLCALC 97.6 08/17/2023    TRIG 52 08/17/2023    CHOLHDL 28.5 08/17/2023   , Troponin   Recent Labs   Lab 01/12/24  1529   TROPONINI <0.006   , A1C:   Recent Labs   Lab 08/17/23  1039 01/08/24  1022   HGBA1C 6.6* 7.8*   , and All labs within the past 24 hours have been reviewed  Microbiology: Blood Culture   Lab Results   Component Value Date    LABBLOO No Growth to date 01/14/2024    LABBLOO No Growth to date 01/14/2024    LABBLOO No Growth to date 01/14/2024   , Sputum Culture No results found for: "GSRESP", "RESPIRATORYC", and Urine Culture    Lab Results   Component Value Date    LABURIN No significant growth 05/14/2018       X-Ray Chest 1 View  Narrative: EXAMINATION:  XR CHEST 1 VIEW    CLINICAL HISTORY:  leucocytosis;    TECHNIQUE:  Single frontal view of the chest was performed.    COMPARISON:  09/06/2010    FINDINGS:  Bilateral patchy opacities in the lower lung zones, likely atelectasis.  No pleural effusion or pneumothorax.  Cardiac silhouette is unremarkable.  Impression: As above.    Electronically signed by: Juan Antonio Wahl MD  Date:    01/14/2024  Time:    08:30     Pending Diagnostic Studies:       Procedure Component Value Units Date/Time    Specimen to Pathology, Surgery Gastrointestinal tract [[id number]] Collected: 01/13/24 1047    Order Status: Sent Lab Status: In process Updated: 01/13/24 1322    Specimen: Tissue            Medications:  Reconciled Home Medications:      Medication List        Start taking these medications      azithromycin 250 MG tablet  Commonly known as: Z-CATHERINE  Take 1 tablet (250 mg total) by mouth once daily. for 3 days   "   insulin lispro 100 unit/mL injection  Glucose  Novolog Insulin Subcutaneous        0 - 60   Orange juice or glucose tablet, hold insulin      No insulin   201-250  1 units   251-300  2 units   301-350  3 units   351-400  4 units   >400   5 units then call physician  Replaces: insulin lispro 100 unit/mL pen     pantoprazole 40 MG tablet  Commonly known as: PROTONIX  Take 1 tablet (40 mg total) by mouth 2 (two) times daily before meals.     sodium bicarbonate 650 MG tablet  Take 2 tablets (1,300 mg total) by mouth 2 (two) times daily. for 3 days            Change how you take these medications      LEVEMIR FLEXTOUCH U100 INSULIN 100 unit/mL (3 mL) Inpn pen  Generic drug: insulin detemir U-100 (Levemir)  Inject 8 Units into the skin every evening.  What changed:   how much to take  how to take this  when to take this  additional instructions     losartan 25 MG tablet  Commonly known as: COZAAR  Take 1 tablet (25 mg total) by mouth once daily.  What changed:   medication strength  how much to take  when to take this     * warfarin 2.5 MG tablet  Commonly known as: COUMADIN  Take 1 tablet (2.5 mg total) by mouth Daily. for 2 days  What changed: You were already taking a medication with the same name, and this prescription was added. Make sure you understand how and when to take each.     * warfarin 5 MG tablet  Commonly known as: COUMADIN  TAKE 1 TABLET BY MOUTH DAILY 6 DAYS A WEEK OR AS DIRECTED BY COUMADIN CLINIC.  Start taking on: January 18, 2024  What changed: These instructions start on January 18, 2024. If you are unsure what to do until then, ask your doctor or other care provider.      * This list has 2 medication(s) that are the same as other medications prescribed for you. Read the directions carefully, and ask your doctor or other care provider to review them with you.       Continue taking these medications      ACCU-CHEK JUSTIN PLUS TEST STRP Strp  Generic drug: blood sugar diagnostic  TEST FOUR  "TIMES DAILY AS DIRECTED     alcohol swabs Padm  Commonly known as: ALCOHOL PREP PADS  Use 4 times a day.     amantadine  mg capsule  Commonly known as: SYMMETREL  Take 1 capsule (100 mg total) by mouth 2 (two) times daily.     atorvastatin 40 MG tablet  Commonly known as: LIPITOR  TAKE 1 TABLET(40 MG) BY MOUTH EVERY DAY     BAQSIMI 3 mg/actuation Spry  Generic drug: glucagon  Give one puff via nostril. Hold device between fingers and thumb, do not push plunger yet, insert tip gently into one nostril until finger(s) touch the outside of the nose, then push plunger firmly all the way in . Dose is complete when the green line disappears.     blood-glucose meter kit  Use as instructed     carvediloL 3.125 MG tablet  Commonly known as: COREG  Take 1 tablet (3.125 mg total) by mouth 2 (two) times daily.     DEXCOM G7  Misc  Generic drug: blood-glucose meter,continuous  Use as directed, e 11.65     DEXCOM G7 SENSOR Adriana  Generic drug: blood-glucose sensor  Change every 10 days, e 11.65     ergocalciferol 50,000 unit Cap  Commonly known as: ERGOCALCIFEROL  TAKE 1 CAPSULE BY MOUTH EVERY 7 DAYS     lancets Misc  1 lancet by Misc.(Non-Drug; Combo Route) route 4 (four) times daily.     lancing device Misc  1 Device by Misc.(Non-Drug; Combo Route) route 2 (two) times daily with meals.     MYRBETRIQ 25 mg Tb24 ER tablet  Generic drug: mirabegron  TAKE 1 TABLET(25 MG) BY MOUTH EVERY DAY     pen needle, diabetic 31 gauge x 5/16" Ndle  Commonly known as: BD ULTRA-FINE SHORT PEN NEEDLE  USE THREE TIMES DAILY AS DIRECTED            Stop taking these medications      amLODIPine 10 MG tablet  Commonly known as: NORVASC     calcitRIOL 0.25 MCG Cap  Commonly known as: ROCALTROL     cloNIDine 0.1 MG tablet  Commonly known as: CATAPRES     insulin lispro 100 unit/mL pen  Commonly known as: HumaLOG KwikPen Insulin  Replaced by: insulin lispro 100 unit/mL injection     JARDIANCE 10 mg tablet  Generic drug: empagliflozin   "   polyethylene glycol 236-22.74-6.74 -5.86 gram suspension  Commonly known as: GoLYTELY     RESTASIS 0.05 % ophthalmic emulsion  Generic drug: cycloSPORINE     traMADoL 50 mg tablet  Commonly known as: ULTRAM              Indwelling Lines/Drains at time of discharge:   Lines/Drains/Airways       None                   Time spent on the discharge of patient: 40 minutes         Jaquan Prieto MD  Department of Hospital Medicine  [facility] - Stepdown Flex ([place])

## 2024-01-16 NOTE — PLAN OF CARE
01/16/24 1241   Discharge Reassessment   Assessment Type Discharge Planning Reassessment   Did the patient's condition or plan change since previous assessment? No   Discharge Plan discussed with: Patient   Communicated JOSUÉ with patient/caregiver Yes   Discharge Plan A Home with family   Discharge Plan B Home   DME Needed Upon Discharge  none   Transition of Care Barriers None   Post-Acute Status   Post-Acute Authorization Other   Other Status No Post-Acute Service Needs   Hospital Resources/Appts/Education Provided Appointments scheduled and added to AVS;Provided education on problems/symptoms using teachback   Discharge Delays None known at this time     CM met with patient  to discuss any changes in discharge planning.  Patients plan is to  home with family, no needs.   No changes in DC plans. JOSUÉ: 1/16/24    1243 pm  CM spoke with Mariela LAMA in GI scheduling and in basket message sent to GI nurse to call the patient and set up.     HOSPFU with  gastroenterology   MD: TBDERIC  DATE:  TBD  TIME: TBD    2:35 pm  CM spoke with the patients brother, Ashton and updated on patient being discharged and will be here to  the patient with a set of warm clothing      Brooke Marie RN  Case Management  [facility]  [phone]

## 2024-01-16 NOTE — PROGRESS NOTES
Notified by Feroz Mcgee PharmD pt will be advised to take warfarin 2.5mg 1/16-1/18 until f/u 1/18.  Currently admitted.     Pt recently admitted for acute GIB & symptomatic anemia.  Will confirm f/u is scheduled.

## 2024-01-16 NOTE — PLAN OF CARE
Problem: Adult Inpatient Plan of Care  Goal: Plan of Care Review  Outcome: Ongoing, Progressing  Flowsheets (Taken 1/15/2024 1826)  Plan of Care Reviewed With: patient  Goal: Patient-Specific Goal (Individualized)  Outcome: Ongoing, Progressing  Goal: Absence of Hospital-Acquired Illness or Injury  Outcome: Ongoing, Progressing  Intervention: Identify and Manage Fall Risk  Flowsheets (Taken 1/15/2024 1826)  Safety Promotion/Fall Prevention:   assistive device/personal item within reach   instructed to call staff for mobility  Intervention: Prevent Skin Injury  Flowsheets (Taken 1/15/2024 1826)  Body Position:   sitting up in bed   position maintained  Skin Protection:   incontinence pads utilized   tubing/devices free from skin contact  Intervention: Prevent and Manage VTE (Venous Thromboembolism) Risk  Flowsheets (Taken 1/15/2024 1826)  Activity Management: Rolling - L1  VTE Prevention/Management: bleeding precations maintained  Range of Motion: active ROM (range of motion) encouraged  Intervention: Prevent Infection  Flowsheets (Taken 1/15/2024 1826)  Infection Prevention: single patient room provided  Goal: Optimal Comfort and Wellbeing  Outcome: Ongoing, Progressing  Intervention: Monitor Pain and Promote Comfort  Flowsheets (Taken 1/15/2024 1826)  Pain Management Interventions: care clustered  Intervention: Provide Person-Centered Care  Flowsheets (Taken 1/15/2024 1826)  Trust Relationship/Rapport:   care explained   choices provided   thoughts/feelings acknowledged     Patient alert and oriented x4; call light and personal belongings within reach; free from falls/injuries during the shift; patient able to express needs, no needs at this time; will continue with poc.

## 2024-01-16 NOTE — CONSULTS
PHARMACY CONSULT NOTE: WARFARIN  Kamlesh Diane is a 68 y.o. male on warfarin therapy for  long term use of anticoagulants s/p CVA  . PharmD has been consulted for warfarin dosing.    Current order: warfarin 5mg daily  Home dose: warfarin 2.5mg on Monday, warfarin 5mg all other days  Coumadin clinic enrollment: Active  INR goal: 2-3    Lab Results   Component Value Date    INR 2.2 (H) 01/16/2024    INR 1.5 (H) 01/15/2024    INR 1.2 01/14/2024     Significant drug interactions: no major drug interactions noted  Diet: diabetic cardiac, 2000 calorie    Recommendation(s):   INR now therapeutic, recommend to discontinue lovenox  Change warfarin to 2.5mg daily x 2 days, keep coumadin clinic appointment 1/18  Daily PT/INR daily if still admitted  Pharmacy will continue to follow and monitor warfarin    Thank you for the consult,  Feroz Mcgee, PharmD  Extension: [phone]    **Note: Consults are reviewed Monday-Friday 7:00am-3:30pm. THE ABOVE RECOMMENDATIONS ARE ONLY SUGGESTED.THE RECOMMENDATIONS SHOULD BE CONSIDERED IN CONJUNCTION WITH ALL PATIENT FACTORS. **

## 2024-01-16 NOTE — ASSESSMENT & PLAN NOTE
Recent A1c around 7.9%.  Reports he was taken off of sliding scale insulin, and is now on Levemir 22 units nightly along with Jardiance.  We will continue a reduced dose of Levemir as to avoid hypoglycemia since he is NPO, and we will continue scheduled Accu-Cheks.  Patient's FSGs are controlled on current hypoglycemics.   Last A1c reviewed-   Lab Results   Component Value Date    HGBA1C 7.8 (H) 01/08/2024    HGBA1C 6.6 (H) 08/17/2023    HGBA1C 7.5 (H) 12/13/2022     Will hold PO hypoglycemics and will start correctional scale insulin  Most recent fingerstick glucose reviewed-   Recent Labs   Lab 01/15/24  0835 01/15/24  1641 01/15/24  2346 01/16/24  0442   POCTGLUCOSE 104 126* 188* 109     currently on   Antihyperglycemics (From admission, onward)      Start     Stop Route Frequency Ordered    01/13/24 0115  insulin detemir U-100 (Levemir) pen 8 Units         -- SubQ Nightly 01/13/24 0008

## 2024-01-17 ENCOUNTER — PATIENT OUTREACH (OUTPATIENT)
Dept: ADMINISTRATIVE | Facility: CLINIC | Age: 69
End: 2024-01-17
Payer: MEDICARE

## 2024-01-17 NOTE — NURSING
Patient given d/c instructions; IV's removed with catheters intact; patient transported via wheelchair to private vehicle.

## 2024-01-17 NOTE — PROGRESS NOTES
CHIEF COMPLAINT: Type 2 Diabetes     HPI: Mr. Kamlesh Diane is a 68 y.o. male who was diagnosed with Type 2 DM > 5 years.   Pt has h/o MCA stroke, HTN, PAD, low bone density-on fosamax, prostate ca-radiation treatments, CKD3, CR 1.6-2.  Past Medical History:   Diagnosis Date    CVA (cerebral vascular accident)     Diabetes     Diabetic retinopathy     Elevated PSA     Hypertension     Prostate cancer     Stroke      Pt was last seen by me in fall 2023.  Arrived with brother  A1c went up from 6.6% to 7.8%.  Recent admission, gi bleed , ulcers  Using dexcom g7   , 205, 253 mg/dl   Yesterday: 160, 155 mg/dl  Brunch and dinner-eater  Off jardiance , amlodipine per discharge instructions.  Currently on lispro -correction scale if sugar >200 mg/dl   Pt/inr 2.4, scare 3.9 on 1/12/24.   The patient location is: home  The chief complaint leading to consultation is: type 2 dm     Visit type: audiovisual    Face to Face time with patient: 20, 25   minutes of total time spent on the encounter, which includes face to face time and non-face to face time preparing to see the patient (eg, review of tests), Obtaining and/or reviewing separately obtained history, Documenting clinical information in the electronic or other health record, Independently interpreting results (not separately reported) and communicating results to the patient/family/caregiver, or Care coordination (not separately reported).         Each patient to whom he or she provides medical services by telemedicine is:  (1) informed of the relationship between the physician and patient and the respective role of any other health care provider with respect to management of the patient; and (2) notified that he or she may decline to receive medical services by telemedicine and may withdraw from such care at any time.    Notes:      Lab Results   Component Value Date    HGBA1C 7.8 (H) 01/08/2024     PREVIOUS DIABETES MEDICATIONS TRIED  novolog   levemir    humalog (lispro)    CURRENT DIABETIC MEDS: lispro if bg > 200 +1, >250 +2, >300 +3, >350+4, etc. Levemir 8 units at night, off jardiance     Injections 3x a day max   Testing 4x a day max    Diabetes Management Status    Statin: Taking  ACE/ARB: Taking    Screening or Prevention Patient's value Goal Complete/Controlled?   HgA1C Testing and Control   Lab Results   Component Value Date    HGBA1C 7.8 (H) 01/08/2024      Annually/Less than 8% No   Lipid profile : 08/17/2023 Annually Yes   LDL control Lab Results   Component Value Date    LDLCALC 97.6 08/17/2023    Annually/Less than 100 mg/dl  Yes   Nephropathy screening Lab Results   Component Value Date    LABMICR 40.0 01/14/2022     Lab Results   Component Value Date    PROTEINUA Negative 01/14/2024    Annually Yes   Blood pressure BP Readings from Last 1 Encounters:   01/16/24 (!) 115/95    Less than 140/90 No   Dilated retinal exam : 10/03/2023 Annually Yes   Foot exam   : 11/07/2023 Annually No     REVIEW OF SYSTEMS  General: +weakness-improved, fatigue, +weight loss   Eyes: no double or blurred vision, eye pain, or redness  Cardiovascular: no chest pain, palpitations, edema, or murmurs.   Respiratory: no cough or dyspnea.   GI: no heartburn, nausea, or changes in bowel patterns; good appetite.   Skin: no rashes, dryness, itching, or reactions at insulin injection sites.  Neuro: + numbness, tingling, tremors, or vertigo. +hip pain (L)  Endocrine: no polyuria, polydipsia, polyphagia, heat or cold intolerance.     Vital Signs  There were no vitals taken for this visit.    Hemoglobin A1C   Date Value Ref Range Status   01/08/2024 7.8 (H) 4.0 - 5.6 % Final     Comment:     ADA Screening Guidelines:  5.7-6.4%  Consistent with prediabetes  >or=6.5%  Consistent with diabetes    High levels of fetal hemoglobin interfere with the HbA1C  assay. Heterozygous hemoglobin variants (HbS, HgC, etc)do  not significantly interfere with this assay.   However, presence of multiple  variants may affect accuracy.     08/17/2023 6.6 (H) 4.0 - 5.6 % Final     Comment:     ADA Screening Guidelines:  5.7-6.4%  Consistent with prediabetes  >or=6.5%  Consistent with diabetes    High levels of fetal hemoglobin interfere with the HbA1C  assay. Heterozygous hemoglobin variants (HbS, HgC, etc)do  not significantly interfere with this assay.   However, presence of multiple variants may affect accuracy.     12/13/2022 7.5 (H) 4.0 - 5.6 % Final     Comment:     ADA Screening Guidelines:  5.7-6.4%  Consistent with prediabetes  >or=6.5%  Consistent with diabetes    High levels of fetal hemoglobin interfere with the HbA1C  assay. Heterozygous hemoglobin variants (HbS, HgC, etc)do  not significantly interfere with this assay.   However, presence of multiple variants may affect accuracy.          Chemistry        Component Value Date/Time     01/16/2024 0505    K 4.0 01/16/2024 0505     (H) 01/16/2024 0505    CO2 20 (L) 01/16/2024 0505    BUN 21 01/16/2024 0505    CREATININE 1.6 (H) 01/16/2024 0505     (H) 01/16/2024 0505        Component Value Date/Time    CALCIUM 8.9 01/16/2024 0505    ALKPHOS 63 01/16/2024 0505    AST 15 01/16/2024 0505    ALT 15 01/16/2024 0505    BILITOT 0.5 01/16/2024 0505    ESTGFRAFRICA 51.1 (A) 02/09/2022 0933    EGFRNONAA 44.2 (A) 02/09/2022 0933           Lab Results   Component Value Date    TSH 1.535 08/17/2023      Lab Results   Component Value Date    CHOL 151 08/17/2023    CHOL 139 05/31/2021    CHOL 137 06/02/2020     Lab Results   Component Value Date    HDL 43 08/17/2023    HDL 37 (L) 05/31/2021    HDL 35 (L) 06/02/2020     Lab Results   Component Value Date    LDLCALC 97.6 08/17/2023    LDLCALC 86.6 05/31/2021    LDLCALC 86.4 06/02/2020     Lab Results   Component Value Date    TRIG 52 08/17/2023    TRIG 77 05/31/2021    TRIG 78 06/02/2020     Lab Results   Component Value Date    CHOLHDL 28.5 08/17/2023    CHOLHDL 26.6 05/31/2021    CHOLHDL 25.5 06/02/2020          PHYSICAL EXAMINATION  Constitutional: Appears well, no distress    Diabetes Foot Exam:   Deferred     Assessment/Plan  1. Type 2 diabetes mellitus with hyperglycemia, with long-term current use of insulin  Hemoglobin A1C    Basic Metabolic Panel      2. Stage 3b chronic kidney disease  Basic Metabolic Panel      3. Peripheral artery disease        4. Hypertensive retinopathy, bilateral        5. Hypertension associated with diabetes        6. H/O: CVA (cerebrovascular accident)        7. Cancer of prostate with low recurrence risk (stage T1-2a and [place] < 7 and PSA < 10)          1-7.  Follow up in 4 months  Continue use of dexcom g7  Increase levemir to 12 units at night  Hold jardiance until visit with pcp 1/26/24.  Continue lispro ssi above > 200.   F/u with nephrology-[place]  F/u with urology   Less insulin, will prevent lows, cgm will help as well   A1c goal < 7.5%  Went up -stressors with health, etc.  F/ u with vascular   Bring  for review next visit   Answers submitted by the patient for this visit:  Review of Systems Questionnaire (Submitted on 1/18/2024)  activity change: No  unexpected weight change: No  neck pain: No  hearing loss: No  rhinorrhea: Yes  trouble swallowing: No  eye discharge: No  visual disturbance: No  chest tightness: No  wheezing: No  chest pain: No  palpitations: No  blood in stool: Yes  constipation: No  vomiting: No  diarrhea: Yes  polydipsia: No  polyuria: No  difficulty urinating: No  urgency: No  hematuria: No  joint swelling: No  arthralgias: No  headaches: No  weakness: No  confusion: No  dysphoric mood: No

## 2024-01-17 NOTE — PLAN OF CARE
[facility] - [facility] ([place])  Discharge Final Note    Primary Care Provider: Debora Ontiveros MD    Expected Discharge Date: 1/16/2024    Final Discharge Note (most recent)       Final Note - 01/16/24 1241          Final Note    Assessment Type Discharge Planning Reassessment     Hospital Resources/Appts/Education Provided Appointments scheduled and added to AVS;Provided education on problems/symptoms using teachback        Post-Acute Status    Post-Acute Authorization Other     Other Status No Post-Acute Service Needs     Discharge Delays None known at this time                     Important Message from Medicare  Important Message from Medicare regarding Discharge Appeal Rights: Explained to patient/caregiver, Signed/date by patient/caregiver     Date IMM was signed: 01/16/24  Time IMM was signed: 0826      Future Appointments   Date Time Provider Department Center   1/18/2024  8:30 AM LAB, APPOINTMENT [facility]   1/18/2024  9:00 AM INJECTION [facility]   1/18/2024  1:00 PM Soledad Mckeon, APRN, FNP [facility]   1/26/2024 12:00 PM Debora Ontiveros MD [facility]   2/7/2024 10:30 AM [place], DOVM [facility]         CM met with patient and discussed discharge plans, patient to [place] home with no needs.  Patients  medications delivered to bedside. No HME/DME  delivered  to bedside and follow up appointment[s] scheduled.   Transportation provided by family via private vehicle.        Brooke Marie RN  Case Management  [facility]  [phone]

## 2024-01-17 NOTE — PLAN OF CARE
Problem: Adult Inpatient Plan of Care  Goal: Plan of Care Review  Outcome: Ongoing, Progressing  Flowsheets (Taken 1/16/2024 1757)  Plan of Care Reviewed With: patient  Goal: Patient-Specific Goal (Individualized)  Outcome: Ongoing, Progressing  Goal: Absence of Hospital-Acquired Illness or Injury  Outcome: Ongoing, Progressing  Intervention: Prevent Skin Injury  Flowsheets (Taken 1/16/2024 1757)  Body Position: position changed independently  Skin Protection: incontinence pads utilized  Intervention: Prevent and Manage VTE (Venous Thromboembolism) Risk  Flowsheets (Taken 1/16/2024 1757)  Activity Management: Rolling - L1  VTE Prevention/Management: bleeding precations maintained  Range of Motion: active ROM (range of motion) encouraged  Intervention: Prevent Infection  Flowsheets (Taken 1/16/2024 1757)  Infection Prevention: single patient room provided  Goal: Optimal Comfort and Wellbeing  Outcome: Ongoing, Progressing  Intervention: Monitor Pain and Promote Comfort  Flowsheets (Taken 1/16/2024 1757)  Pain Management Interventions: pain management plan reviewed with patient/caregiver  Intervention: Provide Person-Centered Care  Flowsheets (Taken 1/16/2024 1757)  Trust Relationship/Rapport:   care explained   thoughts/feelings acknowledged  Goal: Readiness for Transition of Care  Outcome: Ongoing, Progressing

## 2024-01-17 NOTE — PROGRESS NOTES
C3 nurse spoke with Kamlesh Diane, patient's niece, Celsa, for a TCC post hospital discharge follow up call. The patient has a scheduled [facility] appointment with Debora Ontiveros MD on 1/26/2024 @ 12 PM.

## 2024-01-18 ENCOUNTER — ANTI-COAG VISIT (OUTPATIENT)
Dept: CARDIOLOGY | Facility: CLINIC | Age: 69
End: 2024-01-18
Payer: MEDICARE

## 2024-01-18 ENCOUNTER — OFFICE VISIT (OUTPATIENT)
Dept: INTERNAL MEDICINE | Facility: CLINIC | Age: 69
End: 2024-01-18
Payer: MEDICARE

## 2024-01-18 ENCOUNTER — INFUSION (OUTPATIENT)
Dept: INFECTIOUS DISEASES | Facility: HOSPITAL | Age: 69
End: 2024-01-18
Payer: MEDICARE

## 2024-01-18 VITALS
OXYGEN SATURATION: 97 % | WEIGHT: 180.13 LBS | DIASTOLIC BLOOD PRESSURE: 80 MMHG | SYSTOLIC BLOOD PRESSURE: 132 MMHG | RESPIRATION RATE: 20 BRPM | TEMPERATURE: 99 F | HEIGHT: 72 IN | HEART RATE: 110 BPM | BODY MASS INDEX: 24.4 KG/M2

## 2024-01-18 DIAGNOSIS — E11.59 HYPERTENSION ASSOCIATED WITH DIABETES: Chronic | ICD-10-CM

## 2024-01-18 DIAGNOSIS — E11.65 TYPE 2 DIABETES MELLITUS WITH HYPERGLYCEMIA, WITH LONG-TERM CURRENT USE OF INSULIN: Primary | Chronic | ICD-10-CM

## 2024-01-18 DIAGNOSIS — H35.033 HYPERTENSIVE RETINOPATHY, BILATERAL: ICD-10-CM

## 2024-01-18 DIAGNOSIS — Z79.4 TYPE 2 DIABETES MELLITUS WITH HYPERGLYCEMIA, WITH LONG-TERM CURRENT USE OF INSULIN: Primary | Chronic | ICD-10-CM

## 2024-01-18 DIAGNOSIS — Z86.73 H/O: CVA (CEREBROVASCULAR ACCIDENT): Chronic | ICD-10-CM

## 2024-01-18 DIAGNOSIS — Z79.01 LONG TERM (CURRENT) USE OF ANTICOAGULANTS: Primary | ICD-10-CM

## 2024-01-18 DIAGNOSIS — M81.0 SENILE OSTEOPOROSIS: Primary | ICD-10-CM

## 2024-01-18 DIAGNOSIS — N18.32 STAGE 3B CHRONIC KIDNEY DISEASE: Chronic | ICD-10-CM

## 2024-01-18 DIAGNOSIS — C61 CANCER OF PROSTATE WITH LOW RECURRENCE RISK (STAGE T1-2A AND GLEASON < 7 AND PSA < 10): ICD-10-CM

## 2024-01-18 DIAGNOSIS — I73.9 PERIPHERAL ARTERY DISEASE: Chronic | ICD-10-CM

## 2024-01-18 DIAGNOSIS — I15.2 HYPERTENSION ASSOCIATED WITH DIABETES: Chronic | ICD-10-CM

## 2024-01-18 LAB
COMMENT: NORMAL
FINAL PATHOLOGIC DIAGNOSIS: NORMAL
GROSS: NORMAL
Lab: NORMAL

## 2024-01-18 PROCEDURE — 99214 OFFICE O/P EST MOD 30 MIN: CPT | Mod: 95,,, | Performed by: NURSE PRACTITIONER

## 2024-01-18 PROCEDURE — 96372 THER/PROPH/DIAG INJ SC/IM: CPT

## 2024-01-18 PROCEDURE — 63600175 PHARM REV CODE 636 W HCPCS: Mod: JZ,JG | Performed by: INTERNAL MEDICINE

## 2024-01-18 RX ADMIN — DENOSUMAB 60 MG: 60 INJECTION SUBCUTANEOUS at 09:01

## 2024-01-18 NOTE — PROGRESS NOTES
Arrived per walker for his  prolia injection. Prolia 60 mg SQ in left abdomen.  Pt taking vitamin D and denies any recent dental work  Limited assessment completed.

## 2024-01-18 NOTE — Clinical Note
Hope all is well-recent admission, holiday eating , A1c went up Instructed to go up with insulin from 8 to 12 units Using dexcom g7 now:) If bp looks okay we can start back jardiance 10 mg daily It has been on hold fyi

## 2024-01-18 NOTE — PROGRESS NOTES
INR at goal. Medications and chart reviewed. No changes noted to necessitate adjustment of warfarin or follow-up plan. See calendar.  Pt recently admitted for GIB and warfarin resumed on d/c.  Will f/u w/ upcoming appt.

## 2024-01-19 ENCOUNTER — TELEPHONE (OUTPATIENT)
Dept: ENDOSCOPY | Facility: HOSPITAL | Age: 69
End: 2024-01-19
Payer: MEDICARE

## 2024-01-19 DIAGNOSIS — K27.9 PEPTIC ULCER DISEASE: Primary | ICD-10-CM

## 2024-01-19 LAB
BACTERIA BLD CULT: NORMAL
BACTERIA BLD CULT: NORMAL

## 2024-01-19 NOTE — TELEPHONE ENCOUNTER
"----- Message from Mariana Bass sent at 2024  8:12 AM CST -----  Regarding: FW: EGD    ----- Message -----  From: Sandra Arriaza DO  Sent: 2024  12:23 PM CST  To: [facility]  Subject: EGD                                              Procedure: EGD    Diagnosis: Peptic ulcer disease    Procedure Timin- 12 weeks     #If within 4 weeks selected, please brayan as high priority#    #If greater than 12 weeks, please select "5-12 weeks" and delay sending until 2 months prior to requested date#     Provider: Any GI provider    Location: [address]    Additional Scheduling Information: Blood thinners    Prep Specifications:N/A    Is the patient taking a GLP-1 Agonist:no    Have you attached a patient to this message: yes        "

## 2024-01-19 NOTE — TELEPHONE ENCOUNTER
Spoke to pt to schedule procedure(s) Upper Endoscopy (EGD)       Physician to perform procedure(s) Dr. FAINA Jenkins   Date of Procedure (s) 03/18/24  Arrival Time 8:30 AM  Time of Procedure(s) 9:30 AM   Location of Procedure(s) [address]  Type of Rx Prep sent to patient: N/A  Instructions provided to patient via [facility]    Patient was informed on the following information and verbalized understanding. Screening questionnaire reviewed with patient and complete. If procedure requires anesthesia, a responsible adult needs to be present to accompany the patient home, patient cannot drive after receiving anesthesia. Appointment details are tentative, especially check-in time. Patient will receive a prep-op call 7 days prior to confirm check-in time for procedure. If applicable the patient should contact their pharmacy to verify Rx for procedure prep is ready for pick-up. Patient was advised to call the scheduling department at [phone] if pharmacy states no Rx is available. Patient was advised to call the endoscopy scheduling department if any questions or concerns arise.      [facility] Scheduling Department

## 2024-01-19 NOTE — TELEPHONE ENCOUNTER
"----- Message from Mariana Bass sent at 2024  8:12 AM CST -----  Regarding: FW: EGD    ----- Message -----  From: Sandra Arriaza DO  Sent: 2024  12:23 PM CST  To: [facility] Patients  Subject: EGD                                              Procedure: EGD    Diagnosis: Peptic ulcer disease    Procedure Timin- 12 weeks     #If within 4 weeks selected, please brayan as high priority#    #If greater than 12 weeks, please select "5-12 weeks" and delay sending until 2 months prior to requested date#     Provider: Any GI provider    Location: [address]    Additional Scheduling Information: Blood thinners    Prep Specifications:N/A    Is the patient taking a GLP-1 Agonist:no    Have you attached a patient to this message: yes        "

## 2024-01-22 ENCOUNTER — TELEPHONE (OUTPATIENT)
Dept: PRIMARY CARE CLINIC | Facility: CLINIC | Age: 69
End: 2024-01-22
Payer: MEDICARE

## 2024-01-22 ENCOUNTER — TELEPHONE (OUTPATIENT)
Dept: ENDOSCOPY | Facility: HOSPITAL | Age: 69
End: 2024-01-22
Payer: MEDICARE

## 2024-01-22 NOTE — TELEPHONE ENCOUNTER
Spoke with pt brother, Krish. Advised that jardiance was ok for pt to re-start. Jardiance 10mg sent to [facility] on [address].  Reminded brother of pt's upcoming appt on Friday 1/26

## 2024-01-22 NOTE — TELEPHONE ENCOUNTER
----- Message from Steven Nye MA sent at 2024 10:33 AM CST -----  Regardin/18-BT  The patient is currently under an internal PCP Jaquan Prieto MD care and requires a blood thinner Coumadin/Jantoven (warfarin) for their upcoming scheduled Upper Endoscopy (EGD) on 24.

## 2024-01-22 NOTE — TELEPHONE ENCOUNTER
Please let pt know ok to start back on jardiance 10mg daily - sent to pharmacy. Has appt in a few days. Thanks!

## 2024-01-26 ENCOUNTER — LAB VISIT (OUTPATIENT)
Dept: LAB | Facility: HOSPITAL | Age: 69
End: 2024-01-26
Attending: INTERNAL MEDICINE
Payer: MEDICARE

## 2024-01-26 ENCOUNTER — OFFICE VISIT (OUTPATIENT)
Dept: PRIMARY CARE CLINIC | Facility: CLINIC | Age: 69
End: 2024-01-26
Payer: MEDICARE

## 2024-01-26 ENCOUNTER — ANTI-COAG VISIT (OUTPATIENT)
Dept: CARDIOLOGY | Facility: CLINIC | Age: 69
End: 2024-01-26
Payer: MEDICARE

## 2024-01-26 VITALS
BODY MASS INDEX: 24.41 KG/M2 | TEMPERATURE: 98 F | SYSTOLIC BLOOD PRESSURE: 115 MMHG | DIASTOLIC BLOOD PRESSURE: 61 MMHG | HEART RATE: 109 BPM | OXYGEN SATURATION: 99 % | WEIGHT: 180 LBS

## 2024-01-26 DIAGNOSIS — N18.31 STAGE 3A CHRONIC KIDNEY DISEASE: ICD-10-CM

## 2024-01-26 DIAGNOSIS — I70.0 AORTIC ATHEROSCLEROSIS: ICD-10-CM

## 2024-01-26 DIAGNOSIS — Z79.01 LONG TERM (CURRENT) USE OF ANTICOAGULANTS: ICD-10-CM

## 2024-01-26 DIAGNOSIS — E11.65 TYPE 2 DIABETES MELLITUS WITH HYPERGLYCEMIA, WITH LONG-TERM CURRENT USE OF INSULIN: ICD-10-CM

## 2024-01-26 DIAGNOSIS — D50.0 IRON DEFICIENCY ANEMIA DUE TO CHRONIC BLOOD LOSS: ICD-10-CM

## 2024-01-26 DIAGNOSIS — N25.81 HYPERPARATHYROIDISM, SECONDARY RENAL: ICD-10-CM

## 2024-01-26 DIAGNOSIS — R05.9 COUGH, UNSPECIFIED TYPE: ICD-10-CM

## 2024-01-26 DIAGNOSIS — I69.320 HEMIPARESIS, APHASIA, AND DYSPHAGIA AS LATE EFFECTS OF STROKE: ICD-10-CM

## 2024-01-26 DIAGNOSIS — Z79.01 LONG TERM (CURRENT) USE OF ANTICOAGULANTS: Primary | ICD-10-CM

## 2024-01-26 DIAGNOSIS — I69.391 HEMIPARESIS, APHASIA, AND DYSPHAGIA AS LATE EFFECTS OF STROKE: ICD-10-CM

## 2024-01-26 DIAGNOSIS — E78.5 HYPERLIPIDEMIA, UNSPECIFIED HYPERLIPIDEMIA TYPE: ICD-10-CM

## 2024-01-26 DIAGNOSIS — Z09 HOSPITAL DISCHARGE FOLLOW-UP: Primary | ICD-10-CM

## 2024-01-26 DIAGNOSIS — Z79.4 TYPE 2 DIABETES MELLITUS WITH HYPERGLYCEMIA, WITH LONG-TERM CURRENT USE OF INSULIN: ICD-10-CM

## 2024-01-26 DIAGNOSIS — K25.4 GASTROINTESTINAL HEMORRHAGE ASSOCIATED WITH GASTRIC ULCER: ICD-10-CM

## 2024-01-26 DIAGNOSIS — I69.359 HEMIPARESIS, APHASIA, AND DYSPHAGIA AS LATE EFFECTS OF STROKE: ICD-10-CM

## 2024-01-26 DIAGNOSIS — A04.8 POSITIVE H. PYLORI TEST: ICD-10-CM

## 2024-01-26 DIAGNOSIS — I10 BENIGN ESSENTIAL HYPERTENSION: ICD-10-CM

## 2024-01-26 PROBLEM — R79.1 SUPRATHERAPEUTIC INR: Chronic | Status: RESOLVED | Noted: 2024-01-12 | Resolved: 2024-01-26

## 2024-01-26 LAB
CTP QC/QA: YES
CTP QC/QA: YES
INR PPP: 2.6 (ref 0.8–1.2)
POC MOLECULAR INFLUENZA A AGN: NEGATIVE
POC MOLECULAR INFLUENZA B AGN: NEGATIVE
PROTHROMBIN TIME: 26.4 SEC (ref 9–12.5)
SARS-COV-2 AG RESP QL IA.RAPID: NEGATIVE

## 2024-01-26 PROCEDURE — 93793 ANTICOAG MGMT PT WARFARIN: CPT | Mod: S$GLB,,,

## 2024-01-26 PROCEDURE — 87811 SARS-COV-2 COVID19 W/OPTIC: CPT | Mod: QW,S$GLB,, | Performed by: INTERNAL MEDICINE

## 2024-01-26 PROCEDURE — 87502 INFLUENZA DNA AMP PROBE: CPT | Mod: QW,S$GLB,, | Performed by: INTERNAL MEDICINE

## 2024-01-26 PROCEDURE — 85610 PROTHROMBIN TIME: CPT | Performed by: INTERNAL MEDICINE

## 2024-01-26 PROCEDURE — 99999 PR PBB SHADOW E&M-EST. PATIENT-LVL IV: CPT | Mod: PBBFAC,,, | Performed by: INTERNAL MEDICINE

## 2024-01-26 PROCEDURE — 99495 TRANSJ CARE MGMT MOD F2F 14D: CPT | Mod: S$GLB,,, | Performed by: INTERNAL MEDICINE

## 2024-01-26 PROCEDURE — 36415 COLL VENOUS BLD VENIPUNCTURE: CPT | Performed by: INTERNAL MEDICINE

## 2024-01-26 RX ORDER — CLARITHROMYCIN 500 MG/1
500 TABLET, FILM COATED ORAL EVERY 12 HOURS
Qty: 28 TABLET | Refills: 0 | Status: SHIPPED | OUTPATIENT
Start: 2024-01-26 | End: 2024-02-09

## 2024-01-26 RX ORDER — AMOXICILLIN 500 MG/1
1000 CAPSULE ORAL EVERY 12 HOURS
Qty: 56 CAPSULE | Refills: 0 | Status: SHIPPED | OUTPATIENT
Start: 2024-01-26 | End: 2024-02-09

## 2024-01-26 RX ORDER — FERROUS SULFATE 325(65) MG
325 TABLET ORAL
Qty: 30 TABLET | Refills: 0 | Status: SHIPPED | OUTPATIENT
Start: 2024-01-26

## 2024-01-26 NOTE — PATIENT INSTRUCTIONS
START 2 antibiotics (clarithromycin and amoxicillin twice daily) to treat H pylori (likely bacteria that caused the stomach ulcers). Keep appointment for repeat upper endoscopy to make sure the ulcers heal.     Start ferrous sulfate (iron supplement) daily with breakfast for 1 month. This may turn your stool black and that's ok as long as the stool is not black and tarry (sticky and loose). Call us right away if this occurs.     Labs today.

## 2024-01-26 NOTE — PROGRESS NOTES
Transitional Care Note  Subjective:       Patient ID: Kamlesh Diane is a 68 y.o. male.  Chief Complaint: Cough and Hospital Follow Up    Family and/or Caretaker present at visit?  Yes, brother  Diagnostic tests reviewed/disposition: I have reviewed all completed as well as pending diagnostic tests at the time of discharge.  Disease/illness education: yes  Home health/community services discussion/referrals: Patient does not have home health established from hospital visit.  They do not need home health.  If needed, we will set up home health for the patient.   Establishment or re-establishment of referral orders for community resources: No other necessary community resources.   Discussion with other health care providers: No discussion with other health care providers necessary.     HPI  Hosp 1/12 through 1/16/24 for symptomatic anemia due to GIB w/ supratherapeutic INR while being on coumadin (h/o multiple CVA (thought to be embolic) w/ residual R sided weakness, Wernicke's aphasia and R vision loss).   Admit Hgb was 4.8. s/p pRBC transfusion. Discharge Hgb 9.4.   CTA A/P -   No evidence of acute GI bleed.  Large amount of stool in the colon.  Significant bladder distension.  Gallbladder sludge.  Cutaneous thickening noted left lower anterior abdomen, to correlate clinically, cellulitis could have this appearance.  EGD -   Impression:            - Normal esophagus.                          - Small hiatal hernia.                          - Non-bleeding gastric ulcer with a clean ulcer                          base ([place] Class III).                          - Non-bleeding duodenal ulcers with a clean ulcer                          base ([place] Class III).                          - Biopsies were taken with a cold forceps for                          Helicobacter pylori testing.   -->Plan for Protonix BID x 12 weeks and to repeat EGD in 8 weeks to check for healing (has repeat scope scheduled w/ Dr. Jenkins  3/18/24).   Path positive for H pylori  CXR w/ B patchy opacities w/ slightly elevated procalcitonin. S/p azithro.   Labs consistent with iron deficiency anemia (low ferritin). B12 326 and folate wnl.  Resumed coumadin on discharge. Follows w/ coumadin clinic. Today's INR is in process.     DM2 - levemir 8 u qhs, Novolog SSI qac (hasn't had to use the preprandial much), jardiance 10mg daily.   Levemir 25 units brought his insulin down too low.   Has DEXCOM.   Follows w/ Soledad Mckeon NP  A1c in hosp reviewed and <8.     HTN - losartan 50mg daily, amlodipine 10mg daily, coreg 3.125mg BID, clonidine 0.1mg TID.   CKD3 - follows w/ Dr. Chahal (outside nephrology). On calcitriol 0.25mcg daily for hyperPTH, ergo 50k weekly. Discharge Cr was 1.6, eGFR 46.6.  Was on sodium bicarb while in hosp for acidosis.     HLD atorvastatin 40mg daily.   CTA A/P - The abdominal aorta tapers normally, there is mild atherosclerotic plaque.  The celiac trunk, SMA, bilateral renal artery and ATA are patent.   LDL 8/2023 97.6    Cough that started after he was discharged from the hospital. No SOB. Non productive. No fevers/chills. No more black stools. Normal BM. No nausea/vomiting/diarrhea/stomach pains. Energy so much better after transfusion.     Review of Systems  Comprehensive review of systems otherwise negative. See history/subjective section for more details.    Objective:      Physical Exam    /61 (BP Location: Right arm, Patient Position: Sitting, BP Method: Large (Manual))   Pulse 109   Temp 98.4 °F (36.9 °C) (Oral)   Wt 81.6 kg (180 lb)   SpO2 99%   BMI 24.41 kg/m²     Gen - A+OX4, NAD  HEENT - PERRL, OP clear. Pale conjunctiva.   Neck - no LAD  CV - tachycardic. No m/r  Chest - CTAB, no crackles. Normal work of breathing.   Abd - S/NT/ND/+BS  Ext - 2+ B radial pulses. No LE edema.   Neuro/MSK - antalgic gait. Walks w/ a walker. Unsteady standing on the scale. Some RUE spasticity. 4-5 RUE and RLE m strength and  5/5 LUE and LLE m strength. No spinal tenderness to palpation.     Previous labs reviewed.    Assessment/Plan       Kamlesh was seen today for cough and hospital follow up.    Diagnoses and all orders for this visit:    Hospital discharge follow-up  -     clarithromycin (BIAXIN) 500 MG tablet; Take 1 tablet (500 mg total) by mouth every 12 (twelve) hours. for 14 days  -     amoxicillin (AMOXIL) 500 MG capsule; Take 2 capsules (1,000 mg total) by mouth every 12 (twelve) hours. for 14 days  -     CBC Auto Differential; Future; Expected date: 01/26/2024  -     Comprehensive Metabolic Panel; Future; Expected date: 01/26/2024    Gastrointestinal hemorrhage associated with gastric ulcer  -     CBC Auto Differential; Future; Expected date: 01/26/2024  -     Comprehensive Metabolic Panel; Future; Expected date: 01/26/2024    Positive H. pylori test  -     clarithromycin (BIAXIN) 500 MG tablet; Take 1 tablet (500 mg total) by mouth every 12 (twelve) hours. for 14 days  -     amoxicillin (AMOXIL) 500 MG capsule; Take 2 capsules (1,000 mg total) by mouth every 12 (twelve) hours. for 14 days    Type 2 diabetes mellitus with hyperglycemia, with long-term current use of insulin  -     Comprehensive Metabolic Panel; Future; Expected date: 01/26/2024  -     Microalbumin/Creatinine Ratio, Urine; Future; Expected date: 01/26/2024    Benign essential hypertension  -     Comprehensive Metabolic Panel; Future; Expected date: 01/26/2024    Stage 3a chronic kidney disease  -     CBC Auto Differential; Future; Expected date: 01/26/2024  -     Comprehensive Metabolic Panel; Future; Expected date: 01/26/2024    Hyperparathyroidism, secondary renal  -     PTH, intact; Future; Expected date: 01/26/2024    Hemiparesis, aphasia, and dysphagia as late effects of stroke - stable.     Hyperlipidemia, unspecified hyperlipidemia type  -     Lipid Panel; Future; Expected date: 01/26/2024    Aortic atherosclerosis  -     Lipid Panel; Future; Expected  date: 01/26/2024    Iron deficiency anemia due to chronic blood loss  -     ferrous sulfate (FEOSOL) 325 mg (65 mg iron) Tab tablet; Take 1 tablet (325 mg total) by mouth daily with breakfast.    Cough, unspecified type - covid and flu negative.   -     SARS Coronavirus 2 Antigen, POCT Manual Read  -     POCT Influenza A/B Molecular    START 2 antibiotics (clarithromycin and amoxicillin twice daily) to treat H pylori (likely bacteria that caused the stomach ulcers). Keep appointment for repeat upper endoscopy to make sure the ulcers heal.     Start ferrous sulfate (iron supplement) daily with breakfast for 1 month. This may turn your stool black and that's ok as long as the stool is not black and tarry (sticky and loose). Call us right away if this occurs.     Labs today.     Sent message to Dr. Jenkins and also coumadin clinic about H pylori treatment.     Advance Care Planning     Date: 01/26/2024  Patient did not wish or was not able to name a surrogate decision maker or provide an Advance Care Plan. Given packet. Will decide if he wants brother or sister to be HCPOA.     2 mo f/u, sooner if needed.     Debora Ontiveros MD  Department of Internal Medicine - [facility]  12:36 PM

## 2024-01-26 NOTE — Clinical Note
Carey, I saw Kamlesh Roxi today for hosp f/u for GIB due to duodenal and gastric ulcers. H pylori positive on path. Started clarithromycin and amoxil x 14 days. Has EGD scheduled for 3/18/24 with you. Just FYI. Hope you're doing well! Debora

## 2024-01-27 DIAGNOSIS — E11.8 CONTROLLED TYPE 2 DIABETES MELLITUS WITH COMPLICATION, WITH LONG-TERM CURRENT USE OF INSULIN: ICD-10-CM

## 2024-01-27 DIAGNOSIS — Z79.4 CONTROLLED TYPE 2 DIABETES MELLITUS WITH COMPLICATION, WITH LONG-TERM CURRENT USE OF INSULIN: ICD-10-CM

## 2024-01-29 ENCOUNTER — TELEPHONE (OUTPATIENT)
Dept: PRIMARY CARE CLINIC | Facility: CLINIC | Age: 69
End: 2024-01-29
Payer: MEDICARE

## 2024-01-29 DIAGNOSIS — K92.2 GASTROINTESTINAL HEMORRHAGE, UNSPECIFIED GASTROINTESTINAL HEMORRHAGE TYPE: Primary | ICD-10-CM

## 2024-01-29 DIAGNOSIS — N18.32 STAGE 3B CHRONIC KIDNEY DISEASE: ICD-10-CM

## 2024-01-29 RX ORDER — INSULIN DETEMIR 100 [IU]/ML
INJECTION, SOLUTION SUBCUTANEOUS
Qty: 42 ML | OUTPATIENT
Start: 2024-01-29

## 2024-01-29 RX ORDER — SODIUM BICARBONATE 650 MG/1
1300 TABLET ORAL 2 TIMES DAILY
Qty: 120 TABLET | Refills: 0 | Status: SHIPPED | OUTPATIENT
Start: 2024-01-29 | End: 2024-05-20

## 2024-01-29 NOTE — TELEPHONE ENCOUNTER
Left msg for pt to call clinic back reg lab results & labs needed on tomorrow 1/30. Will also send portal message.

## 2024-01-29 NOTE — TELEPHONE ENCOUNTER
Please call and notify pt:  Kidney function is fairly stable but very mildly worse. He's still acidotic so I want him to restart the sodium bicarb 650mg 2 pill 2x/day. Follow up with outside kidney doctor as scheduled. His anemia has also worsened a little since discharge. This is concerning since he was hospitalized for GIB last week. Please have him repeat CBC this week. Start iron supplement as discussed in visit. Please confirm he's taking his other two antibiotics for H pylor in addition to pantoprazole BID. Thanks!

## 2024-02-07 ENCOUNTER — ANTI-COAG VISIT (OUTPATIENT)
Dept: CARDIOLOGY | Facility: CLINIC | Age: 69
End: 2024-02-07
Payer: MEDICARE

## 2024-02-07 ENCOUNTER — LAB VISIT (OUTPATIENT)
Dept: LAB | Facility: HOSPITAL | Age: 69
End: 2024-02-07
Attending: INTERNAL MEDICINE
Payer: MEDICARE

## 2024-02-07 DIAGNOSIS — Z79.01 LONG TERM (CURRENT) USE OF ANTICOAGULANTS: ICD-10-CM

## 2024-02-07 DIAGNOSIS — Z79.01 LONG TERM (CURRENT) USE OF ANTICOAGULANTS: Primary | ICD-10-CM

## 2024-02-07 LAB
INR PPP: 2.8 (ref 0.8–1.2)
PROTHROMBIN TIME: 27.8 SEC (ref 9–12.5)

## 2024-02-07 PROCEDURE — 36415 COLL VENOUS BLD VENIPUNCTURE: CPT | Performed by: INTERNAL MEDICINE

## 2024-02-07 PROCEDURE — 85610 PROTHROMBIN TIME: CPT | Performed by: INTERNAL MEDICINE

## 2024-02-07 PROCEDURE — 93793 ANTICOAG MGMT PT WARFARIN: CPT | Mod: S$GLB,,,

## 2024-02-20 ENCOUNTER — TELEPHONE (OUTPATIENT)
Dept: PRIMARY CARE CLINIC | Facility: CLINIC | Age: 69
End: 2024-02-20
Payer: MEDICARE

## 2024-02-20 NOTE — TELEPHONE ENCOUNTER
Appointment Request   Kamlesh Diane   Sent:  10:38 AM   To: SERG[facility] Primary Care Clinical Support Staff      Kamlesh Diane   MRN: [id number] : 1955   Pt Home: [phone]     Entered: [phone]        Message    Appointment Request From: Kamlesh Diane      With Provider: Debora Ontiveros MD [[facility] - [place]]      Preferred Date Range: Any      Preferred Times: Any Time      Reason for visit: Need to talk about Kamlesh having someone come to this house to help clean him up help him take a bath      Comments:   Take care of Kamlesh

## 2024-02-20 NOTE — TELEPHONE ENCOUNTER
Pt would like to talk about Kamlesh having someone come to this house to help clean him up help him take a bath           Any reccomendations for this pt? Next PCP apt is in April

## 2024-02-21 NOTE — TELEPHONE ENCOUNTER
B, can you get more info? Are we talking about short term or long term? Would you give them some resources if they're talking about long term?

## 2024-02-28 ENCOUNTER — ANTI-COAG VISIT (OUTPATIENT)
Dept: CARDIOLOGY | Facility: CLINIC | Age: 69
End: 2024-02-28
Payer: MEDICARE

## 2024-02-28 ENCOUNTER — LAB VISIT (OUTPATIENT)
Dept: LAB | Facility: HOSPITAL | Age: 69
End: 2024-02-28
Attending: INTERNAL MEDICINE
Payer: MEDICARE

## 2024-02-28 DIAGNOSIS — Z79.01 LONG TERM (CURRENT) USE OF ANTICOAGULANTS: Primary | ICD-10-CM

## 2024-02-28 DIAGNOSIS — Z79.01 LONG TERM (CURRENT) USE OF ANTICOAGULANTS: ICD-10-CM

## 2024-02-28 LAB
INR PPP: 1.5 (ref 0.8–1.2)
PROTHROMBIN TIME: 15.6 SEC (ref 9–12.5)

## 2024-02-28 PROCEDURE — 93793 ANTICOAG MGMT PT WARFARIN: CPT | Mod: S$GLB,,,

## 2024-02-28 PROCEDURE — 85610 PROTHROMBIN TIME: CPT | Performed by: INTERNAL MEDICINE

## 2024-02-28 PROCEDURE — 36415 COLL VENOUS BLD VENIPUNCTURE: CPT | Performed by: INTERNAL MEDICINE

## 2024-02-28 NOTE — PROGRESS NOTES
[facility]    2024 3:34 PM    Assessment/Plan:    Patient presents today with subtherapeutic  INR.    Assessment of patient findings and chart review: Pt has upcoming EGD.  Will hold 5 days prior to procedure per calendar.    Recommendation for patient's warfarin regimen: Dose per calendar.     Recommend repeat INR Re-assess post procedure.   _________________________________________________________________    Kamlesh Diane (68 y.o.) is followed by the [facility].    Anticoagulation Summary  As of 2024      INR goal:  2.0-3.0   TTR:  71.4 % (5.8 y)   INR used for dosin.5 (2024)   Warfarin maintenance plan:  2.5 mg (5 mg x 0.5) every Mon; 5 mg (5 mg x 1) all other days   Weekly warfarin total:  32.5 mg   Plan last modified:  Miguel Angel Rouse, PharmD (2023)   Next INR check:  3/25/2024   Target end date:      Indications    Long term (current) use of anticoagulants [Z79.01]  Cerebrovascular accident (CVA) due to embolism of precerebral artery (Resolved) [I63.10]                 Anticoagulation Episode Summary       INR check location:  Clinic Lab    Preferred lab:      Send INR reminders to:  [facility] COUMADIN MONITORING POOL    Comments:  [facility]          Anticoagulation Care Providers       Provider Role Specialty Phone number    Debora Ontiveros MD Responsible Internal Medicine [phone]            Patient Findings       Negatives:  Signs/symptoms of thrombosis, Signs/symptoms of bleeding, Laboratory test error suspected, Change in health, Change in alcohol use, Change in activity, Upcoming invasive procedure, Emergency department visit, Upcoming dental procedure, Missed doses, Extra doses, Change in medications, Change in diet/appetite, Hospital admission, Bruising, Other complaints    Comments:  Patient verbally confirmed the accurate dosage and frequency of their coumadin  medication. The patient states that they adhered to the medication plan as scheduled on the calendar without any deviations.  No changes to report

## 2024-03-06 ENCOUNTER — OFFICE VISIT (OUTPATIENT)
Dept: PODIATRY | Facility: CLINIC | Age: 69
End: 2024-03-06
Payer: MEDICARE

## 2024-03-06 VITALS — HEIGHT: 72 IN | BODY MASS INDEX: 24.38 KG/M2 | WEIGHT: 180 LBS

## 2024-03-06 DIAGNOSIS — L84 CORN OR CALLUS: ICD-10-CM

## 2024-03-06 DIAGNOSIS — E11.42 DIABETIC POLYNEUROPATHY ASSOCIATED WITH TYPE 2 DIABETES MELLITUS: Primary | ICD-10-CM

## 2024-03-06 DIAGNOSIS — B35.1 ONYCHOMYCOSIS DUE TO DERMATOPHYTE: ICD-10-CM

## 2024-03-06 PROCEDURE — 11056 PARNG/CUTG B9 HYPRKR LES 2-4: CPT | Mod: Q9,S$GLB,, | Performed by: PODIATRIST

## 2024-03-06 PROCEDURE — 99499 UNLISTED E&M SERVICE: CPT | Mod: S$GLB,,, | Performed by: PODIATRIST

## 2024-03-06 PROCEDURE — 11721 DEBRIDE NAIL 6 OR MORE: CPT | Mod: Q9,59,S$GLB, | Performed by: PODIATRIST

## 2024-03-06 PROCEDURE — 99999 PR PBB SHADOW E&M-EST. PATIENT-LVL III: CPT | Mod: PBBFAC,,, | Performed by: PODIATRIST

## 2024-03-06 NOTE — PROGRESS NOTES
Subjective:      Patient ID: Kamlesh Diane is a 68 y.o. male.    Chief Complaint: Diabetic Foot Exam (Soledad Mckeon APRN, JANIS at 1/18/2024) and Nail Care    Kamlesh is a 68 y.o. male who presents to the clinic for evaluation and treatment of high risk feet. Kamlesh has a past medical history of CVA (cerebral vascular accident), Diabetes, Diabetic retinopathy, Elevated PSA, Hypertension, Prostate cancer, and Stroke. The patient's chief complaint is long, thick toenails. This patient has documented high risk feet requiring routine maintenance secondary to peripheral neuropathy.    PCP: Debora Ontiveros MD    Date Last Seen by PCP:   Chief Complaint   Patient presents with    Diabetic Foot Exam     Soledad Mckeon APRN, BASILIAP at 1/18/2024    Nail Care       Current shoe gear:  Affected Foot: Slip-on shoes     Unaffected Foot: Slip-on shoes    Hemoglobin A1C   Date Value Ref Range Status   01/08/2024 7.8 (H) 4.0 - 5.6 % Final     Comment:     ADA Screening Guidelines:  5.7-6.4%  Consistent with prediabetes  >or=6.5%  Consistent with diabetes    High levels of fetal hemoglobin interfere with the HbA1C  assay. Heterozygous hemoglobin variants (HbS, HgC, etc)do  not significantly interfere with this assay.   However, presence of multiple variants may affect accuracy.     08/17/2023 6.6 (H) 4.0 - 5.6 % Final     Comment:     ADA Screening Guidelines:  5.7-6.4%  Consistent with prediabetes  >or=6.5%  Consistent with diabetes    High levels of fetal hemoglobin interfere with the HbA1C  assay. Heterozygous hemoglobin variants (HbS, HgC, etc)do  not significantly interfere with this assay.   However, presence of multiple variants may affect accuracy.     12/13/2022 7.5 (H) 4.0 - 5.6 % Final     Comment:     ADA Screening Guidelines:  5.7-6.4%  Consistent with prediabetes  >or=6.5%  Consistent with diabetes    High levels of fetal hemoglobin interfere with the HbA1C  assay. Heterozygous hemoglobin variants (HbS, HgC,  etc)do  not significantly interfere with this assay.   However, presence of multiple variants may affect accuracy.         Review of Systems   Constitutional: Negative for chills, fever and malaise/fatigue.   HENT:  Negative for hearing loss.    Cardiovascular:  Positive for leg swelling. Negative for claudication.   Respiratory:  Negative for shortness of breath.    Skin:  Positive for color change, dry skin, nail changes and unusual hair distribution. Negative for flushing and rash.   Musculoskeletal:  Negative for joint pain and myalgias.   Gastrointestinal:  Negative for nausea and vomiting.   Neurological:  Positive for numbness, paresthesias and sensory change. Negative for loss of balance.   Psychiatric/Behavioral:  Negative for altered mental status.            Objective:      Physical Exam  Vitals reviewed.   Constitutional:       Appearance: He is well-developed.   Cardiovascular:      Pulses:           Dorsalis pedis pulses are 0 on the right side and 0 on the left side.        Posterior tibial pulses are 1+ on the right side and 1+ on the left side.   Musculoskeletal:      Right ankle: Decreased range of motion. Abnormal pulse.      Right Achilles Tendon: Ramirez's test negative.      Left ankle: Decreased range of motion. Abnormal pulse.      Left Achilles Tendon: Ramirez's test negative.      Right foot: Decreased range of motion.      Left foot: Decreased range of motion.   Feet:      Right foot:      Protective Sensation: 5 sites tested.  2 sites sensed.      Left foot:      Protective Sensation: 5 sites tested.  2 sites sensed.   Skin:     General: Skin is cool and dry.      Capillary Refill: Capillary refill takes more than 3 seconds.      Findings: No wound.      Comments: HKL's noted to b/L HIPJs       Neurological:      Mental Status: He is alert.      Comments: diminished sensation noted to b/L lower extremities   Psychiatric:         Behavior: Behavior normal. Behavior is cooperative.          Nails x10 are elongated by  3-7mm's curling underneath toes, thickened by 3-4 mm's, dystrophic, and are dark in  coloration . Xerosis Bilaterally.           Assessment:       Encounter Diagnoses   Name Primary?    Diabetic polyneuropathy associated with type 2 diabetes mellitus Yes    Onychomycosis due to dermatophyte     Corn or callus          Plan:       Kamlesh was seen today for diabetic foot exam and nail care.    Diagnoses and all orders for this visit:    Diabetic polyneuropathy associated with type 2 diabetes mellitus    Onychomycosis due to dermatophyte    Corn or callus      I counseled the patient on his conditions, their implications and medical management.    - Shoe inspection. Diabetic Foot Education. Patient reminded of the importance of good nutrition and blood sugar control to help prevent podiatric complications of diabetes. Patient instructed on proper foot hygeine. We discussed wearing proper shoe gear, daily foot inspections, never walking without protective shoe gear, never putting sharp instruments to feet, routine podiatric nail visits every 2-3 months.    After cleansing with an alcohol prep pad, the about mentioned hyperkeratotic lesions were sharply debrided X 2 utilizing a #15 blade to a smooth base without incident. Pt tolerated the procedure well and reported comfort to the debarment sites. Pt will continue to use padding and moisture the callused areas.     - With patient's permission, nails were aggressively reduced and debrided x 10 to their soft tissue attachment mechanically and with electric , removing all offending nail and debris. Patient relates relief following the procedure. He will continue to monitor the areas daily, inspect his feet, wear protective shoe gear when ambulatory, moisturizer to maintain skin integrity and follow in this office in approximately 2-3 months, sooner p.r.n.

## 2024-03-12 ENCOUNTER — TELEPHONE (OUTPATIENT)
Dept: ENDOSCOPY | Facility: HOSPITAL | Age: 69
End: 2024-03-12
Payer: MEDICARE

## 2024-03-26 ENCOUNTER — ANTI-COAG VISIT (OUTPATIENT)
Dept: CARDIOLOGY | Facility: CLINIC | Age: 69
End: 2024-03-26
Payer: MEDICARE

## 2024-03-26 ENCOUNTER — LAB VISIT (OUTPATIENT)
Dept: LAB | Facility: HOSPITAL | Age: 69
End: 2024-03-26
Attending: INTERNAL MEDICINE
Payer: MEDICARE

## 2024-03-26 DIAGNOSIS — Z79.01 LONG TERM (CURRENT) USE OF ANTICOAGULANTS: Primary | ICD-10-CM

## 2024-03-26 DIAGNOSIS — Z79.01 LONG TERM (CURRENT) USE OF ANTICOAGULANTS: ICD-10-CM

## 2024-03-26 LAB
INR PPP: 2 (ref 0.8–1.2)
PROTHROMBIN TIME: 20.7 SEC (ref 9–12.5)

## 2024-03-26 PROCEDURE — 36415 COLL VENOUS BLD VENIPUNCTURE: CPT | Performed by: INTERNAL MEDICINE

## 2024-03-26 PROCEDURE — 85610 PROTHROMBIN TIME: CPT | Performed by: INTERNAL MEDICINE

## 2024-03-26 PROCEDURE — 93793 ANTICOAG MGMT PT WARFARIN: CPT | Mod: S$GLB,,,

## 2024-03-28 DIAGNOSIS — E55.9 VITAMIN D DEFICIENCY: ICD-10-CM

## 2024-03-28 RX ORDER — ERGOCALCIFEROL 1.25 MG/1
CAPSULE ORAL
Qty: 12 CAPSULE | Refills: 3 | Status: SHIPPED | OUTPATIENT
Start: 2024-03-28

## 2024-04-04 DIAGNOSIS — I15.2 HYPERTENSION ASSOCIATED WITH DIABETES: ICD-10-CM

## 2024-04-04 DIAGNOSIS — E11.59 HYPERTENSION ASSOCIATED WITH DIABETES: ICD-10-CM

## 2024-04-04 RX ORDER — LOSARTAN POTASSIUM 25 MG/1
25 TABLET ORAL DAILY
Qty: 30 TABLET | Refills: 11 | Status: SHIPPED | OUTPATIENT
Start: 2024-04-04

## 2024-04-04 NOTE — TELEPHONE ENCOUNTER
----- Message from Maggie Montoya sent at 4/4/2024  3:42 PM CDT -----  Contact: [phone] maria isabel  Requesting an RX refill or new RX.  Is this a refill or new RX:   RX name and strength (copy/paste from chart):  losartan (COZAAR) 25 MG tablet 30 tablet  Is this a 30 day or 90 day RX: 30  Pharmacy name and phone # (copy/paste from chart):    [id number] [place], [place] - [facility] AT [facility] & [address]  [facility]  [facility] [address]  Phone: [phone] Fax: [phone]       The doctors have asked that we provide their patients with the following 2 reminders -- prescription refills can take up to 72 hours, and a friendly reminder that in the future you can use your [facility] account to request refills:

## 2024-04-09 ENCOUNTER — ANTI-COAG VISIT (OUTPATIENT)
Dept: CARDIOLOGY | Facility: CLINIC | Age: 69
End: 2024-04-09
Payer: MEDICARE

## 2024-04-09 ENCOUNTER — LAB VISIT (OUTPATIENT)
Dept: LAB | Facility: HOSPITAL | Age: 69
End: 2024-04-09
Attending: INTERNAL MEDICINE
Payer: MEDICARE

## 2024-04-09 DIAGNOSIS — Z79.01 LONG TERM (CURRENT) USE OF ANTICOAGULANTS: Primary | ICD-10-CM

## 2024-04-09 DIAGNOSIS — Z79.01 LONG TERM (CURRENT) USE OF ANTICOAGULANTS: ICD-10-CM

## 2024-04-09 LAB
INR PPP: 1.8 (ref 0.8–1.2)
PROTHROMBIN TIME: 19.1 SEC (ref 9–12.5)

## 2024-04-09 PROCEDURE — 36415 COLL VENOUS BLD VENIPUNCTURE: CPT | Performed by: INTERNAL MEDICINE

## 2024-04-09 PROCEDURE — 85610 PROTHROMBIN TIME: CPT | Performed by: INTERNAL MEDICINE

## 2024-04-09 PROCEDURE — 93793 ANTICOAG MGMT PT WARFARIN: CPT | Mod: S$GLB,,,

## 2024-04-11 ENCOUNTER — PATIENT MESSAGE (OUTPATIENT)
Dept: PRIMARY CARE CLINIC | Facility: CLINIC | Age: 69
End: 2024-04-11
Payer: MEDICARE

## 2024-04-15 PROBLEM — K92.2 ACUTE GI BLEEDING: Status: RESOLVED | Noted: 2024-01-12 | Resolved: 2024-04-15

## 2024-04-23 ENCOUNTER — ANTI-COAG VISIT (OUTPATIENT)
Dept: CARDIOLOGY | Facility: CLINIC | Age: 69
End: 2024-04-23
Payer: MEDICARE

## 2024-04-23 ENCOUNTER — LAB VISIT (OUTPATIENT)
Dept: LAB | Facility: HOSPITAL | Age: 69
End: 2024-04-23
Attending: INTERNAL MEDICINE
Payer: MEDICARE

## 2024-04-23 DIAGNOSIS — Z79.01 LONG TERM (CURRENT) USE OF ANTICOAGULANTS: ICD-10-CM

## 2024-04-23 DIAGNOSIS — Z79.01 LONG TERM (CURRENT) USE OF ANTICOAGULANTS: Primary | ICD-10-CM

## 2024-04-23 LAB
INR PPP: 2 (ref 0.8–1.2)
PROTHROMBIN TIME: 20.7 SEC (ref 9–12.5)

## 2024-04-23 PROCEDURE — 36415 COLL VENOUS BLD VENIPUNCTURE: CPT | Performed by: INTERNAL MEDICINE

## 2024-04-23 PROCEDURE — 93793 ANTICOAG MGMT PT WARFARIN: CPT | Mod: S$GLB,,,

## 2024-04-23 PROCEDURE — 85610 PROTHROMBIN TIME: CPT | Performed by: INTERNAL MEDICINE

## 2024-04-23 NOTE — PROGRESS NOTES
[facility]    2024 1:03 PM    Assessment/Plan:    Patient presents today with therapeutic INR.    Assessment of patient findings and chart review: Reviewed     Recommendation for patient's warfarin regimen: Continue current maintenance dose    Recommend repeat INR in 3 weeks  _________________________________________________________________    Kamlesh LEWIS Roxi (68 y.o.) is followed by the [facility].    Anticoagulation Summary  As of 2024      INR goal:  2.0-3.0   TTR:  69.6% (5.9 y)   INR used for dosin.0 (2024)   Warfarin maintenance plan:  2.5 mg (5 mg x 0.5) every Mon; 5 mg (5 mg x 1) all other days   Weekly warfarin total:  32.5 mg   Plan last modified:  Miguel Angel Rouse, PharmD (2023)   Next INR check:     Target end date:      Indications    Long term (current) use of anticoagulants [Z79.01]  Cerebrovascular accident (CVA) due to embolism of precerebral artery (Resolved) [I63.10]                 Anticoagulation Episode Summary       INR check location:  Clinic Lab    Preferred lab:      Send INR reminders to:  [facility] COUMADIN MONITORING POOL    Comments:  [facility]          Anticoagulation Care Providers       Provider Role Specialty Phone number    Debora Ontiveros MD [facility] Internal Medicine [phone]

## 2024-05-14 ENCOUNTER — LAB VISIT (OUTPATIENT)
Dept: LAB | Facility: HOSPITAL | Age: 69
End: 2024-05-14
Attending: INTERNAL MEDICINE
Payer: MEDICARE

## 2024-05-14 ENCOUNTER — ANTI-COAG VISIT (OUTPATIENT)
Dept: CARDIOLOGY | Facility: CLINIC | Age: 69
End: 2024-05-14
Payer: MEDICARE

## 2024-05-14 ENCOUNTER — TELEPHONE (OUTPATIENT)
Dept: PRIMARY CARE CLINIC | Facility: CLINIC | Age: 69
End: 2024-05-14
Payer: MEDICARE

## 2024-05-14 DIAGNOSIS — Z79.01 LONG TERM (CURRENT) USE OF ANTICOAGULANTS: ICD-10-CM

## 2024-05-14 DIAGNOSIS — Z79.01 LONG TERM (CURRENT) USE OF ANTICOAGULANTS: Primary | ICD-10-CM

## 2024-05-14 LAB
INR PPP: 2 (ref 0.8–1.2)
PROTHROMBIN TIME: 21.4 SEC (ref 9–12.5)

## 2024-05-14 PROCEDURE — 93793 ANTICOAG MGMT PT WARFARIN: CPT | Mod: S$GLB,,,

## 2024-05-14 PROCEDURE — 36415 COLL VENOUS BLD VENIPUNCTURE: CPT | Performed by: INTERNAL MEDICINE

## 2024-05-14 PROCEDURE — 85610 PROTHROMBIN TIME: CPT | Performed by: INTERNAL MEDICINE

## 2024-05-14 NOTE — PROGRESS NOTES
[facility]    2024 1:52 PM    Assessment/Plan:    Patient presents today with therapeutic INR.    Assessment of patient findings and chart review: Reviewed     Recommendation for patient's warfarin regimen: Continue current maintenance dose    Recommend repeat INR in 4 weeks  _________________________________________________________________    Kamlesh LEWIS Roxi (68 y.o.) is followed by the [facility].    Anticoagulation Summary  As of 2024      INR goal:  2.0-3.0   TTR:  69.9% (6 y)   INR used for dosin.0 (2024)   Warfarin maintenance plan:  2.5 mg (5 mg x 0.5) every Mon; 5 mg (5 mg x 1) all other days   Weekly warfarin total:  32.5 mg   Plan last modified:  Miguel Angel Rouse, PharmD (2023)   Next INR check:  2024   Target end date:      Indications    Long term (current) use of anticoagulants [Z79.01]  Cerebrovascular accident (CVA) due to embolism of precerebral artery (Resolved) [I63.10]                 Anticoagulation Episode Summary       INR check location:  Clinic Lab    Preferred lab:      Send INR reminders to:  [facility] COUMADIN MONITORING POOL    Comments:  [facility]          Anticoagulation Care Providers       Provider Role Specialty Phone number    Debora Ontiveros MD [facility] Internal Medicine [phone]

## 2024-05-17 NOTE — PROGRESS NOTES
CHIEF COMPLAINT: Type 2 Diabetes     HPI: Mr. Kamlesh Diane is a 68 y.o. male who was diagnosed with Type 2 DM > 5 years.   Pt has h/o MCA stroke, HTN, PAD, low bone density-on fosamax, prostate ca-radiation treatments, CKD3, CR 1.6-2.  Past Medical History:   Diagnosis Date    CVA (cerebral vascular accident)     Diabetes     Diabetic retinopathy     Elevated PSA     Hypertension     Prostate cancer     Stroke      Pt was last seen by me in winter 2024.  Lab Results   Component Value Date    HGBA1C 7.3 (H) 05/14/2024     Arrived with brother  A1c went down from 7.8% to 7.3%  Using dexcom g7   Forgot   Loss 22# over the past 6 months  Brunch and dinner-eater    Sister on phone- avg 122 mg/dl, 119, 117, 118 mg/dl    Egg ,hashbrown or biscuit, sausage   Varies- cooked meals- red beans, rice , mashed potatoes, fish and shrimp, coleslaw, chicken,  no greens  Cookies- oatmeal , oreos, chips- occ   Drinks: water, coke zero    Walks  a block once a day      PREVIOUS DIABETES MEDICATIONS TRIED  novolog   levemir   humalog (lispro)    CURRENT DIABETIC MEDS: lispro if bg > 200 +1, >250 +2, >300 +3, >350+4, etc. Levemir 8 units at night, jardiance 10 mg daily      Injections 3x a day max   Testing 4x a day max    Diabetes Management Status    Statin: Taking  ACE/ARB: Taking    Screening or Prevention Patient's value Goal Complete/Controlled?   HgA1C Testing and Control   Lab Results   Component Value Date    HGBA1C 7.3 (H) 05/14/2024      Annually/Less than 8% No   Lipid profile : 01/26/2024 Annually Yes   LDL control Lab Results   Component Value Date    LDLCALC 72.6 01/26/2024    Annually/Less than 100 mg/dl  Yes   Nephropathy screening Lab Results   Component Value Date    LABMICR 40.0 01/14/2022     Lab Results   Component Value Date    PROTEINUA Negative 01/14/2024    Annually Yes   Blood pressure BP Readings from Last 1 Encounters:   01/26/24 115/61    Less than 140/90 No   Dilated retinal exam : 10/03/2023  Annually Yes   Foot exam   : 03/06/2024 Annually No     REVIEW OF SYSTEMS  General: +weakness-improved, fatigue, +weight loss 22# in the past 6 months  Eyes: no double or blurred vision, eye pain, or redness  Cardiovascular: no chest pain, palpitations, edema, or murmurs.   Respiratory: no cough or dyspnea.   GI: no heartburn, nausea, or changes in bowel patterns; good appetite.   Skin: no rashes, dryness, itching, or reactions at insulin injection sites.  Neuro: + numbness, tingling, tremors, or vertigo. +hip pain (L)  Endocrine: no polyuria, polydipsia, polyphagia, heat or cold intolerance.     Vital Signs  BP (!) 134/92 (BP Location: Left arm, Patient Position: Sitting, BP Method: Medium (Manual))   Pulse 106   Ht 6' (1.829 m)   Wt 71.6 kg (157 lb 13.6 oz)   SpO2 98%   BMI 21.41 kg/m²     Hemoglobin A1C   Date Value Ref Range Status   05/14/2024 7.3 (H) 4.0 - 5.6 % Final     Comment:     ADA Screening Guidelines:  5.7-6.4%  Consistent with prediabetes  >or=6.5%  Consistent with diabetes    High levels of fetal hemoglobin interfere with the HbA1C  assay. Heterozygous hemoglobin variants (HbS, HgC, etc)do  not significantly interfere with this assay.   However, presence of multiple variants may affect accuracy.     01/08/2024 7.8 (H) 4.0 - 5.6 % Final     Comment:     ADA Screening Guidelines:  5.7-6.4%  Consistent with prediabetes  >or=6.5%  Consistent with diabetes    High levels of fetal hemoglobin interfere with the HbA1C  assay. Heterozygous hemoglobin variants (HbS, HgC, etc)do  not significantly interfere with this assay.   However, presence of multiple variants may affect accuracy.     08/17/2023 6.6 (H) 4.0 - 5.6 % Final     Comment:     ADA Screening Guidelines:  5.7-6.4%  Consistent with prediabetes  >or=6.5%  Consistent with diabetes    High levels of fetal hemoglobin interfere with the HbA1C  assay. Heterozygous hemoglobin variants (HbS, HgC, etc)do  not significantly interfere with this assay.    However, presence of multiple variants may affect accuracy.          Chemistry        Component Value Date/Time     05/14/2024 1034    K 4.3 05/14/2024 1034     05/14/2024 1034    CO2 23 05/14/2024 1034    BUN 17 05/14/2024 1034    CREATININE 1.7 (H) 05/14/2024 1034     (H) 05/14/2024 1034        Component Value Date/Time    CALCIUM 9.3 05/14/2024 1034    ALKPHOS 61 01/26/2024 1331    AST 18 01/26/2024 1331    ALT 18 01/26/2024 1331    BILITOT 0.5 01/26/2024 1331    ESTGFRAFRICA 51.1 (A) 02/09/2022 0933    EGFRNONAA 44.2 (A) 02/09/2022 0933           Lab Results   Component Value Date    TSH 1.535 08/17/2023      Lab Results   Component Value Date    CHOL 127 01/26/2024    CHOL 151 08/17/2023    CHOL 139 05/31/2021     Lab Results   Component Value Date    HDL 38 (L) 01/26/2024    HDL 43 08/17/2023    HDL 37 (L) 05/31/2021     Lab Results   Component Value Date    LDLCALC 72.6 01/26/2024    LDLCALC 97.6 08/17/2023    LDLCALC 86.6 05/31/2021     Lab Results   Component Value Date    TRIG 82 01/26/2024    TRIG 52 08/17/2023    TRIG 77 05/31/2021     Lab Results   Component Value Date    CHOLHDL 29.9 01/26/2024    CHOLHDL 28.5 08/17/2023    CHOLHDL 26.6 05/31/2021           Constitutional:  Well developed, well nourished, NAD.  ENT: External ears no masses with nose patent; normal hearing.   Neck:  Supple; trachea midline; no thyromegaly.   Cardiovascular: Normal heart sounds, no LE edema.     Lungs:  Normal effort; lungs anterior bilaterally clear to auscultation.  Abdomen:  Soft, no masses,  no hernias.  MS: No clubbing or cyanosis of nails noted  Skin: No rashes, lesions, or ulcers; no nodules.  Psychiatric: Good judgement and insight; normal mood and affect.  Neurological: intact mostly     Diabetes Foot Exam:   Deferred     Assessment/Plan  1. Type 2 diabetes mellitus with hyperglycemia, with long-term current use of insulin  Hemoglobin A1C    Microalbumin/Creatinine Ratio, Urine      2. Stage  3b chronic kidney disease        3. Long term (current) use of anticoagulants        4. Hypertensive retinopathy, bilateral        5. Hypertension associated with diabetes        6. H/O: CVA (cerebrovascular accident)        7. Cancer of prostate with low recurrence risk (stage T1-2a and Brent < 7 and PSA < 10)          1-7.  Follow up in 5 months  A1c urine mac in 5 months   Continue use of dexcom g7  Goal -gain 5#   Continue regimen above   Bp elevated   Eats 2 meals a day   F/u with neuro, cards   F/u with retinal specialist   Continue to monitor kidney functions, CR 1.7   F/u w/ urology

## 2024-05-20 ENCOUNTER — OFFICE VISIT (OUTPATIENT)
Dept: INTERNAL MEDICINE | Facility: CLINIC | Age: 69
End: 2024-05-20
Payer: MEDICARE

## 2024-05-20 VITALS
OXYGEN SATURATION: 98 % | HEIGHT: 72 IN | BODY MASS INDEX: 21.38 KG/M2 | HEART RATE: 106 BPM | DIASTOLIC BLOOD PRESSURE: 92 MMHG | WEIGHT: 157.88 LBS | SYSTOLIC BLOOD PRESSURE: 134 MMHG

## 2024-05-20 DIAGNOSIS — N18.32 STAGE 3B CHRONIC KIDNEY DISEASE: Chronic | ICD-10-CM

## 2024-05-20 DIAGNOSIS — C61 CANCER OF PROSTATE WITH LOW RECURRENCE RISK (STAGE T1-2A AND GLEASON < 7 AND PSA < 10): ICD-10-CM

## 2024-05-20 DIAGNOSIS — Z79.01 LONG TERM (CURRENT) USE OF ANTICOAGULANTS: ICD-10-CM

## 2024-05-20 DIAGNOSIS — I15.2 HYPERTENSION ASSOCIATED WITH DIABETES: Chronic | ICD-10-CM

## 2024-05-20 DIAGNOSIS — E11.65 TYPE 2 DIABETES MELLITUS WITH HYPERGLYCEMIA, WITH LONG-TERM CURRENT USE OF INSULIN: Primary | Chronic | ICD-10-CM

## 2024-05-20 DIAGNOSIS — Z86.73 H/O: CVA (CEREBROVASCULAR ACCIDENT): Chronic | ICD-10-CM

## 2024-05-20 DIAGNOSIS — E11.59 HYPERTENSION ASSOCIATED WITH DIABETES: Chronic | ICD-10-CM

## 2024-05-20 DIAGNOSIS — Z79.4 TYPE 2 DIABETES MELLITUS WITH HYPERGLYCEMIA, WITH LONG-TERM CURRENT USE OF INSULIN: Primary | Chronic | ICD-10-CM

## 2024-05-20 DIAGNOSIS — H35.033 HYPERTENSIVE RETINOPATHY, BILATERAL: ICD-10-CM

## 2024-05-20 PROCEDURE — 1160F RVW MEDS BY RX/DR IN RCRD: CPT | Mod: CPTII,S$GLB,, | Performed by: NURSE PRACTITIONER

## 2024-05-20 PROCEDURE — 99999 PR PBB SHADOW E&M-EST. PATIENT-LVL V: CPT | Mod: PBBFAC,,, | Performed by: NURSE PRACTITIONER

## 2024-05-20 PROCEDURE — 1101F PT FALLS ASSESS-DOCD LE1/YR: CPT | Mod: CPTII,S$GLB,, | Performed by: NURSE PRACTITIONER

## 2024-05-20 PROCEDURE — 3008F BODY MASS INDEX DOCD: CPT | Mod: CPTII,S$GLB,, | Performed by: NURSE PRACTITIONER

## 2024-05-20 PROCEDURE — 3051F HG A1C>EQUAL 7.0%<8.0%: CPT | Mod: CPTII,S$GLB,, | Performed by: NURSE PRACTITIONER

## 2024-05-20 PROCEDURE — 3080F DIAST BP >= 90 MM HG: CPT | Mod: CPTII,S$GLB,, | Performed by: NURSE PRACTITIONER

## 2024-05-20 PROCEDURE — 1126F AMNT PAIN NOTED NONE PRSNT: CPT | Mod: CPTII,S$GLB,, | Performed by: NURSE PRACTITIONER

## 2024-05-20 PROCEDURE — 1159F MED LIST DOCD IN RCRD: CPT | Mod: CPTII,S$GLB,, | Performed by: NURSE PRACTITIONER

## 2024-05-20 PROCEDURE — 4010F ACE/ARB THERAPY RXD/TAKEN: CPT | Mod: CPTII,S$GLB,, | Performed by: NURSE PRACTITIONER

## 2024-05-20 PROCEDURE — 99214 OFFICE O/P EST MOD 30 MIN: CPT | Mod: S$GLB,,, | Performed by: NURSE PRACTITIONER

## 2024-05-20 PROCEDURE — 3075F SYST BP GE 130 - 139MM HG: CPT | Mod: CPTII,S$GLB,, | Performed by: NURSE PRACTITIONER

## 2024-05-20 PROCEDURE — 3288F FALL RISK ASSESSMENT DOCD: CPT | Mod: CPTII,S$GLB,, | Performed by: NURSE PRACTITIONER

## 2024-05-20 NOTE — PATIENT INSTRUCTIONS
Follow up in 5-6 months w/Irielle   A1c bmp ,urine mac prior -5 months     Lab Results   Component Value Date    HGBA1C 7.3 (H) 05/14/2024     Goal less than 7.5%    [email or web address]  Eat fit giuseppe  Myfitnesspal giuseppe  [email or web address]    mySugr giuseppe       Goal  no higher than 180 /200     Levemir 8 units at night   Jardiance 10 mg daily   Lispro as needed if sugar is >200

## 2024-06-04 ENCOUNTER — TELEPHONE (OUTPATIENT)
Dept: PRIMARY CARE CLINIC | Facility: CLINIC | Age: 69
End: 2024-06-04
Payer: MEDICARE

## 2024-06-04 NOTE — TELEPHONE ENCOUNTER
Good morning Kamlesh Rosario received a jury [employer]. I called to let them know his health issues. I was informed to have his doctor fax a letter to them stating this. Fax# [phone]. Thanks

## 2024-06-04 NOTE — LETTER
[facility] - [facility]  [address] [address] June 4, 2024    Kamlesh Diane  [address] [address]      To Whom It May Concern:    Kamlesh Diane is unable to participate in jury duty due to history of stroke, hemiparesis and word finding difficulty.    If you have any questions or concerns, please feel free to call my office.    Sincerely,  Debora Ontiveros MD

## 2024-06-05 ENCOUNTER — PATIENT MESSAGE (OUTPATIENT)
Dept: PRIMARY CARE CLINIC | Facility: CLINIC | Age: 69
End: 2024-06-05
Payer: MEDICARE

## 2024-06-06 NOTE — TELEPHONE ENCOUNTER
"Spoke w/ pt brother. Brother states that pt told him that he had a "shaking episode" this am. When pt's brother inquired further, pt stated it was his whole body,and that he had to sit down. Pt stated to brother that he had one of these episodes "about a month ago" Brother stated that pt is not too aware of timelines, and it may have been sooner than that.  Brother stated that he cannot get his brother here to clinic until Monday to see BEBETO Cochran for further eval. Appt made for 2pm for eval  Advised Brother to send us BG and BP / vitals  via portal if they are able.  Report any new episodes of shaking to PCP office.     "

## 2024-06-06 NOTE — TELEPHONE ENCOUNTER
B can you call to check on him? Can they check his temp, sugar and BP? Did it recur? If it's just one ep, nothing to do. If continued, should get eval.

## 2024-06-10 ENCOUNTER — OFFICE VISIT (OUTPATIENT)
Dept: PRIMARY CARE CLINIC | Facility: CLINIC | Age: 69
End: 2024-06-10
Payer: MEDICARE

## 2024-06-10 VITALS
DIASTOLIC BLOOD PRESSURE: 88 MMHG | SYSTOLIC BLOOD PRESSURE: 140 MMHG | BODY MASS INDEX: 21.83 KG/M2 | HEART RATE: 89 BPM | OXYGEN SATURATION: 100 % | WEIGHT: 160.94 LBS

## 2024-06-10 DIAGNOSIS — R25.1 TREMOR: ICD-10-CM

## 2024-06-10 DIAGNOSIS — R25.1 OCCASIONAL TREMORS: Primary | ICD-10-CM

## 2024-06-10 PROCEDURE — 3079F DIAST BP 80-89 MM HG: CPT | Mod: CPTII,S$GLB,, | Performed by: INTERNAL MEDICINE

## 2024-06-10 PROCEDURE — 99999 PR PBB SHADOW E&M-EST. PATIENT-LVL V: CPT | Mod: PBBFAC,,, | Performed by: INTERNAL MEDICINE

## 2024-06-10 PROCEDURE — 4010F ACE/ARB THERAPY RXD/TAKEN: CPT | Mod: CPTII,S$GLB,, | Performed by: INTERNAL MEDICINE

## 2024-06-10 PROCEDURE — 99215 OFFICE O/P EST HI 40 MIN: CPT | Mod: S$GLB,,, | Performed by: INTERNAL MEDICINE

## 2024-06-10 PROCEDURE — 1101F PT FALLS ASSESS-DOCD LE1/YR: CPT | Mod: CPTII,S$GLB,, | Performed by: INTERNAL MEDICINE

## 2024-06-10 PROCEDURE — 3008F BODY MASS INDEX DOCD: CPT | Mod: CPTII,S$GLB,, | Performed by: INTERNAL MEDICINE

## 2024-06-10 PROCEDURE — 3077F SYST BP >= 140 MM HG: CPT | Mod: CPTII,S$GLB,, | Performed by: INTERNAL MEDICINE

## 2024-06-10 PROCEDURE — 1126F AMNT PAIN NOTED NONE PRSNT: CPT | Mod: CPTII,S$GLB,, | Performed by: INTERNAL MEDICINE

## 2024-06-10 PROCEDURE — 1159F MED LIST DOCD IN RCRD: CPT | Mod: CPTII,S$GLB,, | Performed by: INTERNAL MEDICINE

## 2024-06-10 PROCEDURE — 3288F FALL RISK ASSESSMENT DOCD: CPT | Mod: CPTII,S$GLB,, | Performed by: INTERNAL MEDICINE

## 2024-06-10 PROCEDURE — 3051F HG A1C>EQUAL 7.0%<8.0%: CPT | Mod: CPTII,S$GLB,, | Performed by: INTERNAL MEDICINE

## 2024-06-10 NOTE — ASSESSMENT & PLAN NOTE
No tremors noted at the current visit   Patient had history of seizures post CVA  He is unsure if he has ever been on seizure medication   We will refer to Neurology for further workup, EEG  No urgent indication to start antiepileptic medication at this point   Patient given instructions to call the clinic if this episode were to happen again

## 2024-06-10 NOTE — PROGRESS NOTES
Clinic Note  6/10/2024      Subjective:       Patient ID:  Kamlesh is a 68 y.o. male being seen for an established visit.    Chief Complaint: Tremors    68-year-old with history of CVA and hypertension is here for a same-day visit for tremors   He is with his brother at the visit   He mentioned that he had an episode of shaking a month ago and I once again in the last few days   He describes the episode as tremor like, his entire body shaking which resolved with sitting down and resting.  No loss of consciousness, tongue biting, rolling of eyes, bladder or bowel incontinence noted   Nobody witnessed the episode as patient lives alone   He did not have any memory lapse after the episode   He wears a Dexcom, which did not show any low blood glucose during the episode  Patient mentioned that he had multiple episodes of seizures while he he was admitted for stroke multiple times.  He is unsure if he was ever on seizure medication.          Review of Systems   Constitutional:  Negative for diaphoresis.   Respiratory:  Negative for cough.    Cardiovascular:  Negative for chest pain and palpitations.   Neurological:  Positive for tingling and tremors. Negative for focal weakness, loss of consciousness and weakness.       Medication List with Changes/Refills   Current Medications    ACCU-CHEK JUSTIN PLUS TEST STRP STRP    TEST FOUR TIMES DAILY AS DIRECTED    ALCOHOL SWABS (ALCOHOL PREP PADS) PADM    Use 4 times a day.    AMANTADINE HCL (SYMMETREL) 100 MG CAPSULE    Take 1 capsule (100 mg total) by mouth 2 (two) times daily.    ATORVASTATIN (LIPITOR) 40 MG TABLET    TAKE 1 TABLET(40 MG) BY MOUTH EVERY DAY    BLOOD-GLUCOSE METER KIT    Use as instructed    BLOOD-GLUCOSE METER,CONTINUOUS (DEXCOM G7 ) MISC    Use as directed, e 11.65    BLOOD-GLUCOSE SENSOR (DEXCOM G7 SENSOR) MARIA VICTORIA    Change every 10 days, e 11.65    CARVEDILOL (COREG) 3.125 MG TABLET    Take 1 tablet (3.125 mg total) by mouth 2 (two) times daily.     "EMPAGLIFLOZIN (JARDIANCE) 10 MG TABLET    Take 1 tablet (10 mg total) by mouth once daily.    ERGOCALCIFEROL (ERGOCALCIFEROL) 50,000 UNIT CAP    TAKE 1 CAPSULE BY MOUTH EVERY 7 DAYS    FERROUS SULFATE (FEOSOL) 325 MG (65 MG IRON) TAB TABLET    Take 1 tablet (325 mg total) by mouth daily with breakfast.    GLUCAGON (BAQSIMI) 3 MG/ACTUATION SPRY    Give one puff via nostril. Hold device between fingers and thumb, do not push plunger yet, insert tip gently into one nostril until finger(s) touch the outside of the nose, then push plunger firmly all the way in . Dose is complete when the green line disappears.    INSULIN LISPRO 100 UNIT/ML INJECTION    Glucose  Novolog Insulin Subcutaneous        0 - 60   Orange juice or glucose tablet, hold insulin      No insulin   201-250  1 units   251-300  2 units   301-350  3 units   351-400  4 units   >400   5 units then call physician    LANCETS MISC    1 lancet by Misc.(Non-Drug; Combo Route) route 4 (four) times daily.    LANCING DEVICE MISC    1 Device by Misc.(Non-Drug; Combo Route) route 2 (two) times daily with meals.    LEVEMIR FLEXTOUCH U100 INSULIN 100 UNIT/ML (3 ML) INPN PEN    Inject 8 Units into the skin every evening.    LOSARTAN (COZAAR) 25 MG TABLET    Take 1 tablet (25 mg total) by mouth once daily.    MIRABEGRON (MYRBETRIQ) 25 MG TB24 ER TABLET    TAKE 1 TABLET(25 MG) BY MOUTH EVERY DAY    PANTOPRAZOLE (PROTONIX) 40 MG TABLET    Take 1 tablet (40 mg total) by mouth 2 (two) times daily before meals.    PEN NEEDLE, DIABETIC (BD ULTRA-FINE SHORT PEN NEEDLE) 31 GAUGE X 5/16" NDLE    USE THREE TIMES DAILY AS DIRECTED    SODIUM BICARBONATE 650 MG TABLET    Take 2 tablets (1,300 mg total) by mouth 2 (two) times daily.    WARFARIN (COUMADIN) 5 MG TABLET    TAKE 1 TABLET BY MOUTH DAILY 6 DAYS A WEEK OR AS DIRECTED BY COUMADIN CLINIC.           Objective:      BP (!) 140/88 (BP Location: Left arm, Patient Position: Sitting, BP Method: Medium (Manual))   Pulse 89   " Wt 73 kg (160 lb 15 oz)   SpO2 100%   BMI 21.83 kg/m²   Estimated body mass index is 21.83 kg/m² as calculated from the following:    Height as of 5/20/24: 6' (1.829 m).    Weight as of this encounter: 73 kg (160 lb 15 oz).  Physical Exam  Constitutional:       Appearance: Normal appearance. He is normal weight.   HENT:      Head: Normocephalic and atraumatic.      Nose: Nose normal.      Mouth/Throat:      Mouth: Mucous membranes are moist.   Eyes:      Pupils: Pupils are equal, round, and reactive to light.   Cardiovascular:      Rate and Rhythm: Normal rate and regular rhythm.      Pulses: Normal pulses.      Heart sounds: Normal heart sounds. No murmur heard.  Pulmonary:      Effort: Pulmonary effort is normal.      Breath sounds: Normal breath sounds.   Abdominal:      General: Bowel sounds are normal.      Palpations: Abdomen is soft.   Skin:     General: Skin is warm.   Neurological:      General: No focal deficit present.      Mental Status: He is alert and oriented to person, place, and time.      Cranial Nerves: No cranial nerve deficit.   Psychiatric:         Mood and Affect: Mood normal.           Assessment and Plan:         Problem List Items Addressed This Visit          Neuro    Tremor    Current Assessment & Plan     No tremors noted at the current visit   Patient had history of seizures post CVA  He is unsure if he has ever been on seizure medication   We will refer to Neurology for further workup, EEG  No urgent indication to start antiepileptic medication at this point   Patient given instructions to call the clinic if this episode were to happen again          Other Visit Diagnoses       Occasional tremors    -  Primary    Relevant Orders    Ambulatory referral/consult to Neurology            Follow Up:   Follow up in about 4 weeks (around 7/8/2024).    Other Orders Placed This Visit:  Orders Placed This Encounter   Procedures    Ambulatory referral/consult to Neurology         Willow  Christy

## 2024-06-11 ENCOUNTER — LAB VISIT (OUTPATIENT)
Dept: LAB | Facility: HOSPITAL | Age: 69
End: 2024-06-11
Attending: INTERNAL MEDICINE
Payer: MEDICARE

## 2024-06-11 ENCOUNTER — ANTI-COAG VISIT (OUTPATIENT)
Dept: CARDIOLOGY | Facility: CLINIC | Age: 69
End: 2024-06-11
Payer: MEDICARE

## 2024-06-11 DIAGNOSIS — Z79.01 LONG TERM (CURRENT) USE OF ANTICOAGULANTS: Primary | ICD-10-CM

## 2024-06-11 DIAGNOSIS — Z79.01 LONG TERM (CURRENT) USE OF ANTICOAGULANTS: ICD-10-CM

## 2024-06-11 LAB
INR PPP: 2 (ref 0.8–1.2)
PROTHROMBIN TIME: 20.9 SEC (ref 9–12.5)

## 2024-06-11 PROCEDURE — 36415 COLL VENOUS BLD VENIPUNCTURE: CPT | Performed by: INTERNAL MEDICINE

## 2024-06-11 PROCEDURE — 93793 ANTICOAG MGMT PT WARFARIN: CPT | Mod: S$GLB,,,

## 2024-06-11 PROCEDURE — 85610 PROTHROMBIN TIME: CPT | Performed by: INTERNAL MEDICINE

## 2024-06-11 NOTE — PROGRESS NOTES
[facility]    2024 1:58 PM    Assessment/Plan:    Patient presents today with therapeutic INR.    Assessment of patient findings and chart review: Reviewed     Recommendation for patient's warfarin regimen: Continue current maintenance dose    Recommend repeat INR in 5 weeks  _________________________________________________________________    Kamlesh LEWIS Roxi (68 y.o.) is followed by the [facility].    Anticoagulation Summary  As of 2024      INR goal:  2.0-3.0   TTR:  70.2% (6 y)   INR used for dosin.0 (2024)   Warfarin maintenance plan:  2.5 mg (5 mg x 0.5) every Mon; 5 mg (5 mg x 1) all other days   Weekly warfarin total:  32.5 mg   Plan last modified:  Miguel Angel Rouse, PharmD (2023)   Next INR check:  2024   Target end date:      Indications    Long term (current) use of anticoagulants [Z79.01]  Cerebrovascular accident (CVA) due to embolism of precerebral artery (Resolved) [I63.10]                 Anticoagulation Episode Summary       INR check location:  Clinic Lab    Preferred lab:      Send INR reminders to:  [facility] COUMADIN MONITORING POOL    Comments:  [facility]          Anticoagulation Care Providers       Provider Role Specialty Phone number    Debora Ontiveros MD [facility] Internal Medicine [phone]

## 2024-06-12 ENCOUNTER — PATIENT MESSAGE (OUTPATIENT)
Dept: PRIMARY CARE CLINIC | Facility: CLINIC | Age: 69
End: 2024-06-12
Payer: MEDICARE

## 2024-06-21 ENCOUNTER — TELEPHONE (OUTPATIENT)
Dept: PRIMARY CARE CLINIC | Facility: CLINIC | Age: 69
End: 2024-06-21
Payer: MEDICARE

## 2024-06-21 NOTE — TELEPHONE ENCOUNTER
Tried to contact pt / Left a message for patient to call the office back. Re  needs rescheduling due to dr. Ontiveros been out 7/11

## 2024-07-15 ENCOUNTER — LAB VISIT (OUTPATIENT)
Dept: LAB | Facility: HOSPITAL | Age: 69
End: 2024-07-15
Attending: INTERNAL MEDICINE
Payer: MEDICARE

## 2024-07-15 ENCOUNTER — ANTI-COAG VISIT (OUTPATIENT)
Dept: CARDIOLOGY | Facility: CLINIC | Age: 69
End: 2024-07-15
Payer: MEDICARE

## 2024-07-15 DIAGNOSIS — Z79.01 LONG TERM (CURRENT) USE OF ANTICOAGULANTS: ICD-10-CM

## 2024-07-15 DIAGNOSIS — Z79.01 LONG TERM (CURRENT) USE OF ANTICOAGULANTS: Primary | ICD-10-CM

## 2024-07-15 LAB
INR PPP: 2 (ref 0.8–1.2)
PROTHROMBIN TIME: 21.1 SEC (ref 9–12.5)

## 2024-07-15 PROCEDURE — 93793 ANTICOAG MGMT PT WARFARIN: CPT | Mod: S$GLB,,,

## 2024-07-15 PROCEDURE — 85610 PROTHROMBIN TIME: CPT | Performed by: INTERNAL MEDICINE

## 2024-07-15 PROCEDURE — 36415 COLL VENOUS BLD VENIPUNCTURE: CPT | Performed by: INTERNAL MEDICINE

## 2024-07-15 NOTE — PROGRESS NOTES
[facility]    07/15/2024 1:21 PM    Assessment/Plan:    Patient presents today with therapeutic INR.    Assessment of patient findings and chart review: Reviewed     Recommendation for patient's warfarin regimen: Continue current maintenance dose    Recommend repeat INR in 6 weeks  _________________________________________________________________    Kamlesh Diane (69 y.o.) is followed by the [facility].    Anticoagulation Summary  As of 7/15/2024      INR goal:  2.0-3.0   TTR:  70.7% (6.1 y)   INR used for dosin.0 (7/15/2024)   Warfarin maintenance plan:  2.5 mg (5 mg x 0.5) every Mon; 5 mg (5 mg x 1) all other days   Weekly warfarin total:  32.5 mg   Plan last modified:  Miguel Angel Rouse, PharmD (2023)   Next INR check:  2024   Target end date:      Indications    Long term (current) use of anticoagulants [Z79.01]  Cerebrovascular accident (CVA) due to embolism of precerebral artery (Resolved) [I63.10]                 Anticoagulation Episode Summary       INR check location:  Clinic Lab    Preferred lab:      Send INR reminders to:  [facility] COUMADIN MONITORING POOL    Comments:  [facility]          Anticoagulation Care Providers       Provider Role Specialty Phone number    Debora Ontiveros MD [facility] Internal Medicine [phone]

## 2024-07-16 ENCOUNTER — PATIENT MESSAGE (OUTPATIENT)
Dept: CARDIOLOGY | Facility: CLINIC | Age: 69
End: 2024-07-16
Payer: MEDICARE

## 2024-07-22 ENCOUNTER — INFUSION (OUTPATIENT)
Dept: INFECTIOUS DISEASES | Facility: HOSPITAL | Age: 69
End: 2024-07-22
Payer: MEDICARE

## 2024-07-22 VITALS
WEIGHT: 162.25 LBS | SYSTOLIC BLOOD PRESSURE: 159 MMHG | RESPIRATION RATE: 20 BRPM | TEMPERATURE: 98 F | HEART RATE: 93 BPM | OXYGEN SATURATION: 98 % | BODY MASS INDEX: 21.5 KG/M2 | DIASTOLIC BLOOD PRESSURE: 81 MMHG | HEIGHT: 73 IN

## 2024-07-22 DIAGNOSIS — M81.0 SENILE OSTEOPOROSIS: Primary | ICD-10-CM

## 2024-07-22 PROCEDURE — 96372 THER/PROPH/DIAG INJ SC/IM: CPT

## 2024-07-22 PROCEDURE — 63600175 PHARM REV CODE 636 W HCPCS: Mod: JZ,JG | Performed by: INTERNAL MEDICINE

## 2024-07-22 RX ADMIN — DENOSUMAB 60 MG: 60 INJECTION SUBCUTANEOUS at 09:07

## 2024-07-22 NOTE — PROGRESS NOTES
Pt here for Prolia injection. Confirms use of Vitamin D/Calcium supplement. Denies dental procedures < 3 months. Administered per guidelines.    Next appointment scheduled     Limited head-to-toe assessment due to privacy issues and visit reason though the opportunity was given for patient to express any concerns

## 2024-08-06 ENCOUNTER — PATIENT MESSAGE (OUTPATIENT)
Dept: PRIMARY CARE CLINIC | Facility: CLINIC | Age: 69
End: 2024-08-06
Payer: MEDICARE

## 2024-08-22 RX ORDER — WARFARIN SODIUM 5 MG/1
TABLET ORAL
Qty: 90 TABLET | Refills: 3 | Status: SHIPPED | OUTPATIENT
Start: 2024-08-22

## 2024-08-26 ENCOUNTER — LAB VISIT (OUTPATIENT)
Dept: LAB | Facility: HOSPITAL | Age: 69
End: 2024-08-26
Attending: INTERNAL MEDICINE
Payer: MEDICARE

## 2024-08-26 ENCOUNTER — ANTI-COAG VISIT (OUTPATIENT)
Dept: CARDIOLOGY | Facility: CLINIC | Age: 69
End: 2024-08-26
Payer: MEDICARE

## 2024-08-26 DIAGNOSIS — Z79.01 LONG TERM (CURRENT) USE OF ANTICOAGULANTS: Primary | ICD-10-CM

## 2024-08-26 DIAGNOSIS — Z79.01 LONG TERM (CURRENT) USE OF ANTICOAGULANTS: ICD-10-CM

## 2024-08-26 LAB
INR PPP: 2.4 (ref 0.8–1.2)
PROTHROMBIN TIME: 25.2 SEC (ref 9–12.5)

## 2024-08-26 PROCEDURE — 36415 COLL VENOUS BLD VENIPUNCTURE: CPT | Performed by: INTERNAL MEDICINE

## 2024-08-26 PROCEDURE — 85610 PROTHROMBIN TIME: CPT | Performed by: INTERNAL MEDICINE

## 2024-08-26 PROCEDURE — 93793 ANTICOAG MGMT PT WARFARIN: CPT | Mod: S$GLB,,,

## 2024-08-26 NOTE — PROGRESS NOTES
[facility]    2024 4:22 PM    Assessment/Plan:    Patient presents today with therapeutic INR.    Assessment of patient findings and chart review: Reviewed     Recommendation for patient's warfarin regimen: Continue current maintenance dose    Recommend repeat INR in 6 weeks  _________________________________________________________________    Kamlesh Diane (69 y.o.) is followed by the [facility].    Anticoagulation Summary  As of 2024      INR goal:  2.0-3.0   TTR:  71.2% (6.3 y)   INR used for dosin.4 (2024)   Warfarin maintenance plan:  2.5 mg (5 mg x 0.5) every Mon; 5 mg (5 mg x 1) all other days   Weekly warfarin total:  32.5 mg   Plan last modified:  Miguel Angel Rouse, PharmD (2023)   Next INR check:  10/7/2024   Target end date:      Indications    Long term (current) use of anticoagulants [Z79.01]  Cerebrovascular accident (CVA) due to embolism of precerebral artery (Resolved) [I63.10]                 Anticoagulation Episode Summary       INR check location:  Clinic Lab    Preferred lab:      Send INR reminders to:  [facility] COUMADIN MONITORING POOL    Comments:  [facility]          Anticoagulation Care Providers       Provider Role Specialty Phone number    Debora Ontiveros MD [facility] Internal Medicine [phone]

## 2024-08-27 ENCOUNTER — TELEPHONE (OUTPATIENT)
Dept: PRIMARY CARE CLINIC | Facility: CLINIC | Age: 69
End: 2024-08-27
Payer: MEDICARE

## 2024-08-28 ENCOUNTER — TELEPHONE (OUTPATIENT)
Dept: PRIMARY CARE CLINIC | Facility: CLINIC | Age: 69
End: 2024-08-28
Payer: MEDICARE

## 2024-08-28 NOTE — TELEPHONE ENCOUNTER
----- Message from Andrew Murray sent at 8/28/2024 11:30 AM CDT -----  Contact: [phone]  Patient is returning a phone call.    Who left a message for the patient:  Yola    Does patient know what this is regarding:   appt    Would you like a call back, or a response through your [facility] portal?:   call    Comments:

## 2024-10-07 ENCOUNTER — ANTI-COAG VISIT (OUTPATIENT)
Dept: CARDIOLOGY | Facility: CLINIC | Age: 69
End: 2024-10-07
Payer: MEDICARE

## 2024-10-07 ENCOUNTER — LAB VISIT (OUTPATIENT)
Dept: LAB | Facility: HOSPITAL | Age: 69
End: 2024-10-07
Attending: INTERNAL MEDICINE
Payer: MEDICARE

## 2024-10-07 ENCOUNTER — TELEPHONE (OUTPATIENT)
Dept: PRIMARY CARE CLINIC | Facility: CLINIC | Age: 69
End: 2024-10-07
Payer: MEDICARE

## 2024-10-07 DIAGNOSIS — Z79.01 LONG TERM (CURRENT) USE OF ANTICOAGULANTS: ICD-10-CM

## 2024-10-07 DIAGNOSIS — Z79.01 LONG TERM (CURRENT) USE OF ANTICOAGULANTS: Primary | ICD-10-CM

## 2024-10-07 LAB
INR PPP: 2.7 (ref 0.8–1.2)
PROTHROMBIN TIME: 28.2 SEC (ref 9–12.5)

## 2024-10-07 PROCEDURE — 36415 COLL VENOUS BLD VENIPUNCTURE: CPT | Performed by: INTERNAL MEDICINE

## 2024-10-07 PROCEDURE — 93793 ANTICOAG MGMT PT WARFARIN: CPT | Mod: S$GLB,,,

## 2024-10-07 PROCEDURE — 85610 PROTHROMBIN TIME: CPT | Performed by: INTERNAL MEDICINE

## 2024-10-07 NOTE — TELEPHONE ENCOUNTER
Overdue to see me. Can you schedule f/u appt - not urgent if he's kelvin hernandez? Also remind to bring ACP. Thanks!

## 2024-10-07 NOTE — PROGRESS NOTES
[facility]    10/07/2024 4:29 PM    Assessment/Plan:    Patient presents today with therapeutic INR.    Assessment of patient findings and chart review: Reviewed     Recommendation for patient's warfarin regimen: Continue current maintenance dose    Recommend repeat INR in 6 weeks  _________________________________________________________________    Kamlesh Diane (69 y.o.) is followed by the [facility].    Anticoagulation Summary  As of 10/7/2024      INR goal:  2.0-3.0   TTR:  71.8% (6.4 y)   INR used for dosin.7 (10/7/2024)   Warfarin maintenance plan:  2.5 mg (5 mg x 0.5) every Mon; 5 mg (5 mg x 1) all other days   Weekly warfarin total:  32.5 mg   Plan last modified:  Miguel Angel Rouse, PharmD (2023)   Next INR check:  2024   Target end date:  --    Indications    Long term (current) use of anticoagulants [Z79.01]  Cerebrovascular accident (CVA) due to embolism of precerebral artery (Resolved) [I63.10]                 Anticoagulation Episode Summary       INR check location:  Clinic Lab    Preferred lab:  --    Send INR reminders to:  [facility] COUMADIN MONITORING POOL    Comments:  [facility]          Anticoagulation Care Providers       Provider Role Specialty Phone number    Debora Ontiveros MD [facility] Internal Medicine [phone]

## 2024-10-11 DIAGNOSIS — E11.65 TYPE 2 DIABETES MELLITUS WITH HYPERGLYCEMIA, WITH LONG-TERM CURRENT USE OF INSULIN: ICD-10-CM

## 2024-10-11 DIAGNOSIS — Z86.73 H/O: CVA (CEREBROVASCULAR ACCIDENT): Primary | Chronic | ICD-10-CM

## 2024-10-11 DIAGNOSIS — D50.0 IRON DEFICIENCY ANEMIA DUE TO CHRONIC BLOOD LOSS: ICD-10-CM

## 2024-10-11 DIAGNOSIS — Z79.4 TYPE 2 DIABETES MELLITUS WITH HYPERGLYCEMIA, WITH LONG-TERM CURRENT USE OF INSULIN: ICD-10-CM

## 2024-10-11 RX ORDER — BLOOD-GLUCOSE SENSOR
EACH MISCELLANEOUS
Qty: 3 EACH | Refills: 11 | Status: SHIPPED | OUTPATIENT
Start: 2024-10-11

## 2024-10-11 RX ORDER — FERROUS SULFATE TAB 325 MG (65 MG ELEMENTAL FE) 325 (65 FE) MG
TAB ORAL
Qty: 30 TABLET | Refills: 0 | Status: SHIPPED | OUTPATIENT
Start: 2024-10-11

## 2024-10-11 RX ORDER — WARFARIN SODIUM 5 MG/1
TABLET ORAL
Qty: 42 TABLET | Refills: 3 | Status: SHIPPED | OUTPATIENT
Start: 2024-10-11

## 2024-10-11 RX ORDER — BLOOD-GLUCOSE,RECEIVER,CONT
EACH MISCELLANEOUS
Qty: 1 EACH | Refills: 1 | Status: SHIPPED | OUTPATIENT
Start: 2024-10-11

## 2024-10-15 ENCOUNTER — LAB VISIT (OUTPATIENT)
Dept: LAB | Facility: HOSPITAL | Age: 69
End: 2024-10-15
Payer: MEDICARE

## 2024-10-15 DIAGNOSIS — E11.65 TYPE 2 DIABETES MELLITUS WITH HYPERGLYCEMIA, WITH LONG-TERM CURRENT USE OF INSULIN: Chronic | ICD-10-CM

## 2024-10-15 DIAGNOSIS — N18.32 STAGE 3B CHRONIC KIDNEY DISEASE: Chronic | ICD-10-CM

## 2024-10-15 DIAGNOSIS — Z79.4 TYPE 2 DIABETES MELLITUS WITH HYPERGLYCEMIA, WITH LONG-TERM CURRENT USE OF INSULIN: Chronic | ICD-10-CM

## 2024-10-15 LAB
ANION GAP SERPL CALC-SCNC: 6 MMOL/L (ref 8–16)
BUN SERPL-MCNC: 18 MG/DL (ref 8–23)
CALCIUM SERPL-MCNC: 9 MG/DL (ref 8.7–10.5)
CHLORIDE SERPL-SCNC: 108 MMOL/L (ref 95–110)
CO2 SERPL-SCNC: 24 MMOL/L (ref 23–29)
CREAT SERPL-MCNC: 1.9 MG/DL (ref 0.5–1.4)
EST. GFR  (NO RACE VARIABLE): 37.7 ML/MIN/1.73 M^2
ESTIMATED AVG GLUCOSE: 154 MG/DL (ref 68–131)
GLUCOSE SERPL-MCNC: 141 MG/DL (ref 70–110)
HBA1C MFR BLD: 7 % (ref 4–5.6)
POTASSIUM SERPL-SCNC: 4.3 MMOL/L (ref 3.5–5.1)
SODIUM SERPL-SCNC: 138 MMOL/L (ref 136–145)

## 2024-10-15 PROCEDURE — 83036 HEMOGLOBIN GLYCOSYLATED A1C: CPT | Performed by: NURSE PRACTITIONER

## 2024-10-15 PROCEDURE — 80048 BASIC METABOLIC PNL TOTAL CA: CPT | Performed by: NURSE PRACTITIONER

## 2024-10-15 PROCEDURE — 36415 COLL VENOUS BLD VENIPUNCTURE: CPT | Performed by: NURSE PRACTITIONER

## 2024-10-17 ENCOUNTER — PATIENT MESSAGE (OUTPATIENT)
Dept: INTERNAL MEDICINE | Facility: CLINIC | Age: 69
End: 2024-10-17
Payer: MEDICARE

## 2024-10-20 PROBLEM — I11.0 HYPERTENSIVE HEART DISEASE WITH HEART FAILURE: Status: ACTIVE | Noted: 2024-10-20

## 2024-10-21 ENCOUNTER — OFFICE VISIT (OUTPATIENT)
Dept: INTERNAL MEDICINE | Facility: CLINIC | Age: 69
End: 2024-10-21
Payer: MEDICARE

## 2024-10-21 ENCOUNTER — IMMUNIZATION (OUTPATIENT)
Dept: INTERNAL MEDICINE | Facility: CLINIC | Age: 69
End: 2024-10-21
Payer: MEDICARE

## 2024-10-21 VITALS
HEIGHT: 73 IN | OXYGEN SATURATION: 100 % | HEART RATE: 96 BPM | DIASTOLIC BLOOD PRESSURE: 88 MMHG | BODY MASS INDEX: 21.79 KG/M2 | WEIGHT: 164.44 LBS | SYSTOLIC BLOOD PRESSURE: 136 MMHG

## 2024-10-21 DIAGNOSIS — Z79.4 TYPE 2 DIABETES MELLITUS WITH HYPERGLYCEMIA, WITH LONG-TERM CURRENT USE OF INSULIN: Primary | Chronic | ICD-10-CM

## 2024-10-21 DIAGNOSIS — I73.9 PERIPHERAL ARTERY DISEASE: Chronic | ICD-10-CM

## 2024-10-21 DIAGNOSIS — E11.59 HYPERTENSION ASSOCIATED WITH DIABETES: Chronic | ICD-10-CM

## 2024-10-21 DIAGNOSIS — H35.033 HYPERTENSIVE RETINOPATHY, BILATERAL: ICD-10-CM

## 2024-10-21 DIAGNOSIS — I11.0 HYPERTENSIVE HEART DISEASE WITH HEART FAILURE: ICD-10-CM

## 2024-10-21 DIAGNOSIS — I15.2 HYPERTENSION ASSOCIATED WITH DIABETES: Chronic | ICD-10-CM

## 2024-10-21 DIAGNOSIS — Z79.4 CONTROLLED TYPE 2 DIABETES MELLITUS WITH COMPLICATION, WITH LONG-TERM CURRENT USE OF INSULIN: ICD-10-CM

## 2024-10-21 DIAGNOSIS — E11.65 TYPE 2 DIABETES MELLITUS WITH HYPERGLYCEMIA, WITH LONG-TERM CURRENT USE OF INSULIN: Primary | Chronic | ICD-10-CM

## 2024-10-21 DIAGNOSIS — Z23 NEED FOR VACCINATION: Primary | ICD-10-CM

## 2024-10-21 DIAGNOSIS — E11.8 CONTROLLED TYPE 2 DIABETES MELLITUS WITH COMPLICATION, WITH LONG-TERM CURRENT USE OF INSULIN: ICD-10-CM

## 2024-10-21 PROCEDURE — 3288F FALL RISK ASSESSMENT DOCD: CPT | Mod: CPTII,S$GLB,, | Performed by: NURSE PRACTITIONER

## 2024-10-21 PROCEDURE — 3008F BODY MASS INDEX DOCD: CPT | Mod: CPTII,S$GLB,, | Performed by: NURSE PRACTITIONER

## 2024-10-21 PROCEDURE — 3066F NEPHROPATHY DOC TX: CPT | Mod: CPTII,S$GLB,, | Performed by: NURSE PRACTITIONER

## 2024-10-21 PROCEDURE — 1101F PT FALLS ASSESS-DOCD LE1/YR: CPT | Mod: CPTII,S$GLB,, | Performed by: NURSE PRACTITIONER

## 2024-10-21 PROCEDURE — 99214 OFFICE O/P EST MOD 30 MIN: CPT | Mod: S$GLB,,, | Performed by: NURSE PRACTITIONER

## 2024-10-21 PROCEDURE — 3075F SYST BP GE 130 - 139MM HG: CPT | Mod: CPTII,S$GLB,, | Performed by: NURSE PRACTITIONER

## 2024-10-21 PROCEDURE — 3061F NEG MICROALBUMINURIA REV: CPT | Mod: CPTII,S$GLB,, | Performed by: NURSE PRACTITIONER

## 2024-10-21 PROCEDURE — 99999 PR PBB SHADOW E&M-EST. PATIENT-LVL V: CPT | Mod: PBBFAC,,, | Performed by: NURSE PRACTITIONER

## 2024-10-21 PROCEDURE — 4010F ACE/ARB THERAPY RXD/TAKEN: CPT | Mod: CPTII,S$GLB,, | Performed by: NURSE PRACTITIONER

## 2024-10-21 PROCEDURE — 3079F DIAST BP 80-89 MM HG: CPT | Mod: CPTII,S$GLB,, | Performed by: NURSE PRACTITIONER

## 2024-10-21 PROCEDURE — 1160F RVW MEDS BY RX/DR IN RCRD: CPT | Mod: CPTII,S$GLB,, | Performed by: NURSE PRACTITIONER

## 2024-10-21 PROCEDURE — 1126F AMNT PAIN NOTED NONE PRSNT: CPT | Mod: CPTII,S$GLB,, | Performed by: NURSE PRACTITIONER

## 2024-10-21 PROCEDURE — 3051F HG A1C>EQUAL 7.0%<8.0%: CPT | Mod: CPTII,S$GLB,, | Performed by: NURSE PRACTITIONER

## 2024-10-21 PROCEDURE — 1159F MED LIST DOCD IN RCRD: CPT | Mod: CPTII,S$GLB,, | Performed by: NURSE PRACTITIONER

## 2024-10-21 RX ORDER — INSULIN DETEMIR 100 [IU]/ML
INJECTION, SOLUTION SUBCUTANEOUS
Start: 2024-10-21

## 2024-10-21 NOTE — PATIENT INSTRUCTIONS
Follow up in 5 months w/Irielle   A1c prior -5 months   Optometry 2024   Influenza hd today  Covid19 today     Bring  each visit -dexcom g7     Lab Results   Component Value Date    HGBA1C 7.0 (H) 10/15/2024     Goal less than 7.5%     Levemir 12 units at night   Jardiance 10 mg daily     [email or web address]  Eat fit giuseppe  Buzz Referralsfitnesspal giuseppe  [email or web address]    Mysugr giuseppe     Goal  no higher than 180

## 2024-10-21 NOTE — PROGRESS NOTES
CHIEF COMPLAINT: Type 2 Diabetes     HPI: Mr. Kamlesh Diane is a 69 y.o. male who was diagnosed with Type 2 DM > 5 years.   Pt has h/o MCA stroke, HTN, PAD, low bone density-on fosamax, prostate ca-radiation treatments, CKD3, CR 1.6-2.  Past Medical History:   Diagnosis Date    CVA (cerebral vascular accident)     Diabetes     Diabetic retinopathy     Elevated PSA     Hypertension     Prostate cancer     Stroke      Pt was last seen by me in spring 2024.   Being seen by me again today.   Has dexcom g7, forgot .   No recent falls, using up right walker.   Arrived w/ brother.   Lab Results   Component Value Date    HGBA1C 7.0 (H) 10/15/2024   Eating 2 meals, lunch and dinner.  Gain 7#    Dietary habits:     Egg ,hashbrown or biscuit, sausage   Varies- cooked meals- red beans, rice , mashed potatoes, fish and shrimp, coleslaw, chicken,  no greens  Cookies- oatmeal , oreos, chips- occ   Drinks: water, coke zero    Walks  a block once a day  CR 1.9       PREVIOUS DIABETES MEDICATIONS TRIED  novolog   levemir   humalog (lispro)  jardiance    CURRENT DIABETIC MEDS: Levemir 12 units at night, jardiance 10 mg daily      Injections 3x a day max   Testing 4x a day max    Diabetes Management Status    Statin: Taking  ACE/ARB: Taking    Screening or Prevention Patient's value Goal Complete/Controlled?   HgA1C Testing and Control   Lab Results   Component Value Date    HGBA1C 7.0 (H) 10/15/2024      Annually/Less than 8% No   Lipid profile : 06/03/2024 Annually Yes   LDL control Lab Results   Component Value Date    LDLCALC 72.6 01/26/2024    Annually/Less than 100 mg/dl  Yes   Nephropathy screening Lab Results   Component Value Date    LABMICR 6.0 10/15/2024     Lab Results   Component Value Date    PROTEINUA Negative 01/14/2024    Annually Yes   Blood pressure BP Readings from Last 1 Encounters:   07/22/24 (!) 159/81    Less than 140/90 No   Dilated retinal exam : 10/03/2023 Annually Yes   Foot exam   : 03/06/2024  "Annually No     REVIEW OF SYSTEMS  General: +weakness-improved, fatigue, +weight gain 7# in the past 5 months  Eyes: no double or blurred vision, eye pain, or redness  Cardiovascular: no chest pain, palpitations, edema, or murmurs.   Respiratory: no cough or dyspnea.   GI: no heartburn, nausea, or changes in bowel patterns; good appetite.   Skin: no rashes, dryness, itching, or reactions at insulin injection sites.  Neuro: + numbness, tingling, tremors, or vertigo. +hip pain (L)  Endocrine: no polyuria, polydipsia, polyphagia, heat or cold intolerance.     Vital Signs  /88 (BP Location: Left arm, Patient Position: Sitting)   Pulse 96   Ht 6' 1" (1.854 m)   Wt 74.6 kg (164 lb 7.4 oz)   SpO2 100%   BMI 21.70 kg/m²     Hemoglobin A1C   Date Value Ref Range Status   10/15/2024 7.0 (H) 4.0 - 5.6 % Final     Comment:     ADA Screening Guidelines:  5.7-6.4%  Consistent with prediabetes  >or=6.5%  Consistent with diabetes    High levels of fetal hemoglobin interfere with the HbA1C  assay. Heterozygous hemoglobin variants (HbS, HgC, etc)do  not significantly interfere with this assay.   However, presence of multiple variants may affect accuracy.     05/14/2024 7.3 (H) 4.0 - 5.6 % Final     Comment:     ADA Screening Guidelines:  5.7-6.4%  Consistent with prediabetes  >or=6.5%  Consistent with diabetes    High levels of fetal hemoglobin interfere with the HbA1C  assay. Heterozygous hemoglobin variants (HbS, HgC, etc)do  not significantly interfere with this assay.   However, presence of multiple variants may affect accuracy.     01/08/2024 7.8 (H) 4.0 - 5.6 % Final     Comment:     ADA Screening Guidelines:  5.7-6.4%  Consistent with prediabetes  >or=6.5%  Consistent with diabetes    High levels of fetal hemoglobin interfere with the HbA1C  assay. Heterozygous hemoglobin variants (HbS, HgC, etc)do  not significantly interfere with this assay.   However, presence of multiple variants may affect accuracy.          " Chemistry        Component Value Date/Time     10/15/2024 1326    K 4.3 10/15/2024 1326     10/15/2024 1326    CO2 24 10/15/2024 1326    BUN 18 10/15/2024 1326    CREATININE 1.9 (H) 10/15/2024 1326     (H) 10/15/2024 1326        Component Value Date/Time    CALCIUM 9.0 10/15/2024 1326    ALKPHOS 49 06/03/2024 1258    ALKPHOS 61 01/26/2024 1331    AST 18 06/03/2024 1258    AST 18 01/26/2024 1331    ALT 17 06/03/2024 1258    ALT 18 01/26/2024 1331    BILITOT 0.4 06/03/2024 1258    BILITOT 0.5 01/26/2024 1331    ESTGFRAFRICA 51.1 (A) 02/09/2022 0933    EGFRNONAA 44.2 (A) 02/09/2022 0933           Lab Results   Component Value Date    TSH 1.535 08/17/2023      Lab Results   Component Value Date    CHOL 127 01/26/2024    CHOL 151 08/17/2023    CHOL 139 05/31/2021     Lab Results   Component Value Date    HDL 38 (L) 01/26/2024    HDL 43 08/17/2023    HDL 37 (L) 05/31/2021     Lab Results   Component Value Date    LDLCALC 72.6 01/26/2024    LDLCALC 97.6 08/17/2023    LDLCALC 86.6 05/31/2021     Lab Results   Component Value Date    TRIG 82 01/26/2024    TRIG 52 08/17/2023    TRIG 77 05/31/2021     Lab Results   Component Value Date    CHOLHDL 29.9 01/26/2024    CHOLHDL 28.5 08/17/2023    CHOLHDL 26.6 05/31/2021           Constitutional:  Well developed, well nourished, NAD.  ENT: External ears no masses with nose patent; normal hearing.   Neck:  Supple; trachea midline; no thyromegaly.   Cardiovascular: Normal heart sounds, no LE edema.     Lungs:  Normal effort; lungs anterior bilaterally clear to auscultation.  Abdomen:  Soft, no masses,  no hernias.  MS: No clubbing or cyanosis of nails noted  Skin: No rashes, lesions, or ulcers; no nodules.  Psychiatric: Good judgement and insight; normal mood and affect.  Neurological: intact mostly     Diabetes Foot Exam:   Deferred     Assessment/Plan  1. Type 2 diabetes mellitus with hyperglycemia, with long-term current use of insulin  Hemoglobin A1C next time    F/u in 5 months   Bring  next visit   A1c below goal   Goal < 7.5%  Discussed dietary habits, stressors   Optometry 2024       2. Peripheral artery disease  F/u with vascular , stable       3. Hypertension associated with diabetes  Bp controlled  On [id number]       4. Hypertensive retinopathy, bilateral  F/u with retinal specialist  Stable       5. Hypertensive heart disease with heart failure  F/u with cards,  Stable       6. Type 2 diabetes mellitus with complication, with long-term current use of insulin  LEVEMIR FLEXTOUCH U100 INSULIN 100 unit/mL (3 mL) InPn pen-updated dose

## 2024-10-23 ENCOUNTER — PATIENT MESSAGE (OUTPATIENT)
Dept: PRIMARY CARE CLINIC | Facility: CLINIC | Age: 69
End: 2024-10-23
Payer: MEDICARE

## 2024-10-23 ENCOUNTER — PATIENT MESSAGE (OUTPATIENT)
Dept: UROLOGY | Facility: CLINIC | Age: 69
End: 2024-10-23
Payer: MEDICARE

## 2024-10-23 DIAGNOSIS — Z92.3 HISTORY OF EXTERNAL BEAM RADIATION THERAPY: ICD-10-CM

## 2024-10-23 DIAGNOSIS — Z91.81 AT RISK FOR FALLING: ICD-10-CM

## 2024-10-23 DIAGNOSIS — C61 PROSTATE CANCER: ICD-10-CM

## 2024-10-23 DIAGNOSIS — N32.81 OVERACTIVE BLADDER: Primary | ICD-10-CM

## 2024-10-23 DIAGNOSIS — I63.10 CEREBROVASCULAR ACCIDENT (CVA) DUE TO EMBOLISM OF PRECEREBRAL ARTERY: ICD-10-CM

## 2024-10-31 ENCOUNTER — TELEPHONE (OUTPATIENT)
Dept: PRIMARY CARE CLINIC | Facility: CLINIC | Age: 69
End: 2024-10-31
Payer: MEDICARE

## 2024-11-01 DIAGNOSIS — R25.1 TREMOR: ICD-10-CM

## 2024-11-01 RX ORDER — AMANTADINE HYDROCHLORIDE 100 MG/1
100 CAPSULE, GELATIN COATED ORAL 2 TIMES DAILY
Qty: 180 CAPSULE | Refills: 3 | Status: SHIPPED | OUTPATIENT
Start: 2024-11-01

## 2024-11-01 RX ORDER — MIRABEGRON 25 MG/1
25 TABLET, FILM COATED, EXTENDED RELEASE ORAL DAILY
Qty: 90 TABLET | Refills: 3 | Status: SHIPPED | OUTPATIENT
Start: 2024-11-01 | End: 2025-10-27

## 2024-11-08 ENCOUNTER — PATIENT MESSAGE (OUTPATIENT)
Dept: PRIMARY CARE CLINIC | Facility: CLINIC | Age: 69
End: 2024-11-08
Payer: MEDICARE

## 2024-11-11 ENCOUNTER — OFFICE VISIT (OUTPATIENT)
Dept: NEUROLOGY | Facility: CLINIC | Age: 69
End: 2024-11-11
Payer: MEDICARE

## 2024-11-11 ENCOUNTER — TELEPHONE (OUTPATIENT)
Dept: PRIMARY CARE CLINIC | Facility: CLINIC | Age: 69
End: 2024-11-11
Payer: MEDICARE

## 2024-11-11 VITALS — DIASTOLIC BLOOD PRESSURE: 106 MMHG | HEART RATE: 104 BPM | SYSTOLIC BLOOD PRESSURE: 166 MMHG

## 2024-11-11 DIAGNOSIS — R25.1 TREMOR: ICD-10-CM

## 2024-11-11 DIAGNOSIS — R56.9 SEIZURE-LIKE ACTIVITY: Primary | ICD-10-CM

## 2024-11-11 PROCEDURE — 99999 PR PBB SHADOW E&M-EST. PATIENT-LVL IV: CPT | Mod: PBBFAC,,, | Performed by: STUDENT IN AN ORGANIZED HEALTH CARE EDUCATION/TRAINING PROGRAM

## 2024-11-11 PROCEDURE — 3051F HG A1C>EQUAL 7.0%<8.0%: CPT | Mod: CPTII,S$GLB,, | Performed by: STUDENT IN AN ORGANIZED HEALTH CARE EDUCATION/TRAINING PROGRAM

## 2024-11-11 PROCEDURE — 4010F ACE/ARB THERAPY RXD/TAKEN: CPT | Mod: CPTII,S$GLB,, | Performed by: STUDENT IN AN ORGANIZED HEALTH CARE EDUCATION/TRAINING PROGRAM

## 2024-11-11 PROCEDURE — 99205 OFFICE O/P NEW HI 60 MIN: CPT | Mod: S$GLB,,, | Performed by: STUDENT IN AN ORGANIZED HEALTH CARE EDUCATION/TRAINING PROGRAM

## 2024-11-11 PROCEDURE — 1101F PT FALLS ASSESS-DOCD LE1/YR: CPT | Mod: CPTII,S$GLB,, | Performed by: STUDENT IN AN ORGANIZED HEALTH CARE EDUCATION/TRAINING PROGRAM

## 2024-11-11 PROCEDURE — 1126F AMNT PAIN NOTED NONE PRSNT: CPT | Mod: CPTII,S$GLB,, | Performed by: STUDENT IN AN ORGANIZED HEALTH CARE EDUCATION/TRAINING PROGRAM

## 2024-11-11 PROCEDURE — 3066F NEPHROPATHY DOC TX: CPT | Mod: CPTII,S$GLB,, | Performed by: STUDENT IN AN ORGANIZED HEALTH CARE EDUCATION/TRAINING PROGRAM

## 2024-11-11 PROCEDURE — 1159F MED LIST DOCD IN RCRD: CPT | Mod: CPTII,S$GLB,, | Performed by: STUDENT IN AN ORGANIZED HEALTH CARE EDUCATION/TRAINING PROGRAM

## 2024-11-11 PROCEDURE — 3080F DIAST BP >= 90 MM HG: CPT | Mod: CPTII,S$GLB,, | Performed by: STUDENT IN AN ORGANIZED HEALTH CARE EDUCATION/TRAINING PROGRAM

## 2024-11-11 PROCEDURE — 3288F FALL RISK ASSESSMENT DOCD: CPT | Mod: CPTII,S$GLB,, | Performed by: STUDENT IN AN ORGANIZED HEALTH CARE EDUCATION/TRAINING PROGRAM

## 2024-11-11 PROCEDURE — 3061F NEG MICROALBUMINURIA REV: CPT | Mod: CPTII,S$GLB,, | Performed by: STUDENT IN AN ORGANIZED HEALTH CARE EDUCATION/TRAINING PROGRAM

## 2024-11-11 PROCEDURE — 3077F SYST BP >= 140 MM HG: CPT | Mod: CPTII,S$GLB,, | Performed by: STUDENT IN AN ORGANIZED HEALTH CARE EDUCATION/TRAINING PROGRAM

## 2024-11-11 NOTE — PATIENT INSTRUCTIONS
You were seen today for tremor and concern for seizures.  I have a low suspicion for seizure but we will obtain an EEG (brainwave study) to be certain. This study looks at your brainwaves to see if you are at risk for seizures.  I will have you see a movement disorder sub specialist for your tremor.

## 2024-11-11 NOTE — PROGRESS NOTES
"  [facility]  Epilepsy Clinic Initial Consult Note    [facility] - NEUROLOGY 7TH [place] [facility], [place]    Date: 11/11/24  Patient Name: Kamlesh Diane   MRN: [id number]   PCP: Debora Ontiveros  Referring Provider: Debora Ontiveros MD    Assessment/plan:     68 yo male with history of prior CVA on coumadin due to suspicion for embolic stroke presenting for evaluation of tremor and spells of generalized shaking without loss of consciousness.  Low suspicion for spells of generalized shaking with maintained awareness to be seizure.  In terms of the "tremor" I did not see a resting tremor or cogwheel rigidity but he does have some dysmetria with his movements of his right side.  I suspect his tremor may really be a form of ataxia.  He is on amantadine which he has been on for several years and I am unsure if this medication has been beneficial.      Plan:  Will refer to movement disorders for evaluation of tremor  EEG routine, no indication for ASMs at this time   Follow up with myself: PRN    I spent a total of 60 minutes in face to face time with the patient, over half of which was spent on counseling and education about the patient's diagnosis and medications.      Aishwarya Gonsales MD  [facility]   Department of Neurology    Patient note was created using MModal Dictation.  Any errors in syntax or even information may not have been identified and edited on initial review prior to signing this note.  Subjective:       HPI:   Mr. Kamlesh Diane is a 69 y.o. male who presents for evaluation of paroxysmal spells concerning for seizure.  The patient is accompanied at this visit by his brother.      He reports he had a stroke 10 years ago.  Stroke affected the right side of his body causing weakness, dysarthria, and vision loss.  He reports tremors have been occurring on and off since he had the stroke.  He denies any loss of consciousness with the episodes of tremor.  He " reports today the tremor typically affects his right arm or leg - sometimes he will grab his arm or leg to try to stop the tremor but he can't control it.  The tremor makes it difficult to perform tasks such as eating or washing his face.     He reports that he has also had about 2 episodes of whole body shaking without loss of consciousness.  Was not feeling dizzy or lightheaded during the episode.  Does report feeling anxious.  He did not have any low blood glucose during the episode (wears glucose monitor).  The episode resolved spontaneously and lasted about 1 minute.      He reports that he had 2-3 GTC seizures when he was having the stroke, but since that time he has not had any further seizures.  He was previously on Keppra for a short period after the stroke but he no longer takes this medication.      Handedness: right  Event Onset Age: late 50s  Seizure/ Epilepsy Risk Factors: stroke  Birth/Developmental History: birth and development unremarkable  Psychiatric/Behavioral Comorbitidies: none    Prior Evaluation:  No prior EEG  MRI brain (report only) 2010  SCATTERED AREAS OF ACUTE ISCHEMIC INFARCT THROUGHOUT THE RIGHT MCA   TERRITORY AS ABOVE, RELATED TO PROXIMAL RIGHT MCA OCCLUSION.  THE LARGEST   IN THE RIGHT PARIETAL LOBE AT THE PARIETOOCCIPITAL SULCUS, A REGION OF   WATERSHED, ENHANCES SUGGESTING THAT THE INITIAL INSULT MAY HAVE OCCURRED   UP TO SEVERAL DAYS TO A WEEK PRIOR.   CTA  SHORT SEGMENT OCCLUSION VERSUS CRITICAL STENOSIS RIGHT DISTAL M1/PROXIMAL   M2 SEGMENT OF THE MCA WITH SLIGHT DECREASED FLOW WITHIN THE RIGHT MCA   SORIN.         PMH: HTN, T2DM, prostate cancer   Family Hx:   Social Hx: lives by himself but his sister is close by    PAST MEDICAL HISTORY:  Past Medical History:   Diagnosis Date    CVA (cerebral vascular accident)     Diabetes     Diabetic retinopathy     Elevated PSA     Hypertension     Prostate cancer     Stroke        PAST SURGICAL HISTORY:  Past Surgical History:    Procedure Laterality Date    CATARACT EXTRACTION      COLONOSCOPY N/A 5/31/2022    Procedure: COLONOSCOPY;  Surgeon: Juan Miguel Quintanilla MD;  Location: [facility] ENDO (4TH FLR);  Service: Endoscopy;  Laterality: N/A;  Fully Vaccinatd, Coumadin hold per Coumadin clinic see 5/2 note for 5 days prior with Lovenox bridge - sm  prep instr portal and mailed - sm    ESOPHAGOGASTRODUODENOSCOPY N/A 1/13/2024    Procedure: EGD (ESOPHAGOGASTRODUODENOSCOPY);  Surgeon: Inga Arcos MD;  Location: [facility] ENDO (2ND FLR);  Service: Gastroenterology;  Laterality: N/A;    FRACTURE SURGERY      hips x 2 - last fall was 2 yrs ago.    OR GARTH,SWALLOW FUNCTION,CINE/VIDEO RECORD  4/1/2022            CURRENT MEDS:  Current Outpatient Medications   Medication Sig Dispense Refill    ACCU-CHEK JUSTIN PLUS TEST STRP Strp TEST FOUR TIMES DAILY AS DIRECTED 400 strip 3    alcohol swabs (ALCOHOL PREP PADS) PadM Use 4 times a day. 400 each 3    amantadine HCL (SYMMETREL) 100 mg capsule Take 1 capsule (100 mg total) by mouth 2 (two) times daily. 180 capsule 3    atorvastatin (LIPITOR) 40 MG tablet TAKE 1 TABLET(40 MG) BY MOUTH EVERY DAY 90 tablet 3    blood-glucose meter,continuous (DEXCOM G7 ) Misc USE AS DIRECTED 1 each 1    blood-glucose sensor (DEXCOM G7 SENSOR) Adriana CHANGE EVERY 10 DAYS 3 each 11    carvediloL (COREG) 3.125 MG tablet Take 1 tablet (3.125 mg total) by mouth 2 (two) times daily. 180 tablet 3    empagliflozin (JARDIANCE) 10 mg tablet Take 1 tablet (10 mg total) by mouth once daily. 90 tablet 3    ergocalciferol (ERGOCALCIFEROL) 50,000 unit Cap TAKE 1 CAPSULE BY MOUTH EVERY 7 DAYS 12 capsule 3    FEROSUL 325 mg (65 mg iron) Tab tablet TAKE 1 TABLET BY MOUTH EVERY DAY WITH BREAKFAST 30 tablet 0    lancets Misc 1 lancet by Misc.(Non-Drug; Combo Route) route 4 (four) times daily. 400 each 3    LEVEMIR FLEXTOUCH U100 INSULIN 100 unit/mL (3 mL) InPn pen Inject 12 units at night      losartan (COZAAR) 25 MG tablet Take 1 tablet  "(25 mg total) by mouth once daily. 30 tablet 11    pantoprazole (PROTONIX) 40 MG tablet Take 1 tablet (40 mg total) by mouth 2 (two) times daily before meals. 60 tablet 0    pen needle, diabetic (BD ULTRA-FINE SHORT PEN NEEDLE) 31 gauge x 5/16" Ndle USE THREE TIMES DAILY AS DIRECTED 200 each 2    vibegron 75 mg Tab Take 1 tablet (75 mg total) by mouth Daily. 90 tablet 3    warfarin (COUMADIN) 5 MG tablet TAKE 1 TABLET BY MOUTH ON WEDNESDAYS, THURSDAYS, SATURDAYS, AND SUNDAYS OR AS DIRECTED BY COUMADIN CLINIC 42 tablet 3    blood-glucose meter kit Use as instructed 1 each 0    lancing device Misc 1 Device by Misc.(Non-Drug; Combo Route) route 2 (two) times daily with meals. 1 each 0    sodium bicarbonate 650 MG tablet Take 2 tablets (1,300 mg total) by mouth 2 (two) times daily. (Patient not taking: Reported on 5/20/2024) 120 tablet 0     No current facility-administered medications for this visit.       ALLERGIES:  Review of patient's allergies indicates:   Allergen Reactions    Plasma protein fraction Rash       FAMILY HISTORY:  Family History   Problem Relation Name Age of Onset    Hypertension Father      Heart attack Father      Breast cancer Sister      Diabetes Brother Kamaljit     Hypertension Brother Kamaljit        SOCIAL HISTORY:  Social History     Tobacco Use    Smoking status: Never    Smokeless tobacco: Never   Substance Use Topics    Alcohol use: Yes     Comment: a few shots once a week.     Drug use: Yes     Types: Marijuana        Review of Systems:   12 system review of systems is negative except for the symptoms mentioned in HPI.     Objective:     Vitals:    11/11/24 0838   BP: (!) 166/106   BP Location: Right arm   Patient Position: Sitting   Pulse: 104       General: NAD, well nourished   Eyes: no tearing, discharge, no erythema   ENT: moist mucous membranes of the oral cavity, nares patent    Neck: Supple, Full range of motion  Cardiovascular: Warm and well perfused, pulses equal and " symmetrical  Lungs: Normal work of breathing, normal chest wall excursions  Skin: No rash, lesions, or breakdown on exposed skin  Psychiatry: Mood and affect are appropriate   Abdomen: soft, non tender, non distended  Extremeties: No cyanosis, clubbing or edema.    Neurological   MENTAL STATUS: Alert and oriented to person, place, and time. Speech is dysarthrtic.    CRANIAL NERVES: R visual field cut.  EOMI, slight right facial droop.    SENSORY: intact to light touch  MOTOR: Normal bulk and tone.   Right sided pronator drift  R arm 4+/5 SA/EE/EF and hand .  R leg 4+/5 HF/KE/KF/DF/PF  5/5 strength left arm and leg  REFLEXES: Symmetric and 2+ throughout. Toes down going bilaterally.   CEREBELLAR/COORDINATION/GAIT: hemiparetic gait ambulates with walker  Dysmetria seen on finger to nose on right   No resting tremor or postural tremor noted

## 2024-11-18 ENCOUNTER — HOSPITAL ENCOUNTER (EMERGENCY)
Facility: HOSPITAL | Age: 69
Discharge: HOME | End: 2024-11-18
Attending: EMERGENCY MEDICINE
Payer: MEDICARE

## 2024-11-18 ENCOUNTER — ANTI-COAG VISIT (OUTPATIENT)
Dept: CARDIOLOGY | Facility: CLINIC | Age: 69
End: 2024-11-18
Payer: MEDICARE

## 2024-11-18 VITALS
HEIGHT: 73 IN | RESPIRATION RATE: 17 BRPM | OXYGEN SATURATION: 99 % | SYSTOLIC BLOOD PRESSURE: 177 MMHG | TEMPERATURE: 98 F | WEIGHT: 160 LBS | HEART RATE: 104 BPM | DIASTOLIC BLOOD PRESSURE: 101 MMHG | BODY MASS INDEX: 21.2 KG/M2

## 2024-11-18 DIAGNOSIS — S22.32XA CLOSED FRACTURE OF ONE RIB OF LEFT SIDE, INITIAL ENCOUNTER: Primary | ICD-10-CM

## 2024-11-18 DIAGNOSIS — R00.0 TACHYCARDIA: ICD-10-CM

## 2024-11-18 DIAGNOSIS — R07.81 RIB PAIN ON LEFT SIDE: ICD-10-CM

## 2024-11-18 DIAGNOSIS — Z79.01 LONG TERM (CURRENT) USE OF ANTICOAGULANTS: Primary | ICD-10-CM

## 2024-11-18 LAB
OHS QRS DURATION: 72 MS
OHS QTC CALCULATION: 439 MS

## 2024-11-18 PROCEDURE — 25000003 PHARM REV CODE 250: Performed by: EMERGENCY MEDICINE

## 2024-11-18 PROCEDURE — 93010 ELECTROCARDIOGRAM REPORT: CPT | Mod: ,,, | Performed by: STUDENT IN AN ORGANIZED HEALTH CARE EDUCATION/TRAINING PROGRAM

## 2024-11-18 PROCEDURE — 93793 ANTICOAG MGMT PT WARFARIN: CPT | Mod: S$GLB,,,

## 2024-11-18 PROCEDURE — 93005 ELECTROCARDIOGRAM TRACING: CPT

## 2024-11-18 PROCEDURE — 99284 EMERGENCY DEPT VISIT MOD MDM: CPT | Mod: 25

## 2024-11-18 RX ORDER — OXYCODONE HYDROCHLORIDE 5 MG/1
5 TABLET ORAL
Status: COMPLETED | OUTPATIENT
Start: 2024-11-18 | End: 2024-11-18

## 2024-11-18 RX ORDER — HYDROCODONE BITARTRATE AND ACETAMINOPHEN 5; 325 MG/1; MG/1
1 TABLET ORAL EVERY 6 HOURS PRN
Qty: 12 TABLET | Refills: 0 | Status: SHIPPED | OUTPATIENT
Start: 2024-11-18 | End: 2024-11-26 | Stop reason: SDUPTHER

## 2024-11-18 RX ORDER — LIDOCAINE 50 MG/G
1 PATCH TOPICAL DAILY
Qty: 14 PATCH | Refills: 0 | Status: SHIPPED | OUTPATIENT
Start: 2024-11-18 | End: 2024-12-02

## 2024-11-18 RX ORDER — LIDOCAINE 50 MG/G
1 PATCH TOPICAL
Status: DISCONTINUED | OUTPATIENT
Start: 2024-11-18 | End: 2024-11-18 | Stop reason: HOSPADM

## 2024-11-18 RX ADMIN — LIDOCAINE 1 PATCH: 50 PATCH CUTANEOUS at 01:11

## 2024-11-18 RX ADMIN — OXYCODONE 5 MG: 5 TABLET ORAL at 12:11

## 2024-11-18 NOTE — ED PROVIDER NOTES
Encounter Date: 11/18/2024       History     Chief Complaint   Patient presents with    Fall     Pt reports fall on Saturday, denies head injury, walking with walker and fell back, c/o pain under left arm, pt with hx of stroke and slurred speech at baseline     Kamlesh Diane is a 69-year-old male with a history of CVA, diabetes, hypertension on Coumadin presenting today for left rib pain.  Yesterday he was taking a walk when he was startled by a dog, lost his balance and fell landing on his left chest.  He has been having pain with inspiration and movement since then.  He tried Tylenol without significant relief.  He did not hit his head or lose consciousness.  No other injuries noted.  No abdominal pain.        Review of patient's allergies indicates:   Allergen Reactions    Plasma protein fraction Rash     Past Medical History:   Diagnosis Date    CVA (cerebral vascular accident)     Diabetes     Diabetic retinopathy     Elevated PSA     Hypertension     Prostate cancer     Stroke      Past Surgical History:   Procedure Laterality Date    CATARACT EXTRACTION      COLONOSCOPY N/A 5/31/2022    Procedure: COLONOSCOPY;  Surgeon: Juan Miguel Quintanilla MD;  Location: [facility] (4TH FLR);  Service: Endoscopy;  Laterality: N/A;  Fully Vaccinatd, Coumadin hold per Coumadin clinic see 5/2 note for 5 days prior with Lovenox bridge - sm  prep instr portal and mailed - sm    ESOPHAGOGASTRODUODENOSCOPY N/A 1/13/2024    Procedure: EGD (ESOPHAGOGASTRODUODENOSCOPY);  Surgeon: Inga Arcos MD;  Location: [facility] (2ND FLR);  Service: Gastroenterology;  Laterality: N/A;    FRACTURE SURGERY      hips x 2 - last fall was 2 yrs ago.    MARIBELL LIANG,SWALLOW FUNCTION,CINE/VIDEO RECORD  4/1/2022          Family History   Problem Relation Name Age of Onset    Hypertension Father      Heart attack Father      Breast cancer Sister      Diabetes Brother Kamaljit     Hypertension Brother Kamaljit      Social History     Tobacco Use     Smoking status: Never    Smokeless tobacco: Never   Substance Use Topics    Alcohol use: Yes     Comment: a few shots once a week.     Drug use: Yes     Types: Marijuana     Review of Systems    Physical Exam     Initial Vitals [11/18/24 1109]   BP Pulse Resp Temp SpO2   (!) 197/123 (!) 116 18 98.4 °F (36.9 °C) 95 %      MAP       --         Physical Exam    Nursing note and vitals reviewed.  Constitutional: He appears well-developed and well-nourished. No distress.   HENT: Mouth/Throat: Oropharynx is clear and moist.   Eyes: Conjunctivae are normal.   Neck: Neck supple.   Cardiovascular:  Normal rate, regular rhythm and intact distal pulses.           Pulmonary/Chest: Breath sounds normal. He has no wheezes. He has no rales. He exhibits tenderness (Left lateral chest wall tenderness, no ecchymosis or hematoma noted.  No flail chest or crepitus).   Abdominal: Abdomen is soft. Bowel sounds are normal. There is no abdominal tenderness.   Musculoskeletal:         General: No edema.      Cervical back: Neck supple.     Lymphadenopathy:     He has no cervical adenopathy.   Neurological: He is alert and oriented to person, place, and time.   + baseline dysarthria from previous CVA   Skin: No rash noted.   Psychiatric: He has a normal mood and affect.         ED Course   Procedures  Labs Reviewed - No data to display  EKG Readings: (Independently Interpreted)   EKG:  Sinus tachycardia 109 with short IN interval, nonspecific ST change.  Normal axis, no STEMI     ECG Results              EKG 12-lead (Final result)        Collection Time Result Time QRS Duration OHS QTC Calculation    11/18/24 11:13:20 11/18/24 13:22:28 72 439                     Final result by Interface, Lab In [facility] (11/18/24 13:22:32)                   Narrative:    Test Reason : R00.0,    Vent. Rate : 109 BPM     Atrial Rate : 109 BPM     P-R Int :  98 ms          QRS Dur :  72 ms      QT Int : 326 ms       P-R-T Axes :  59  20  66 degrees    QTcB Int  : 439 ms    Sinus tachycardia with short GA  Nonspecific T wave abnormality  Otherwise normal ECG  When compared with ECG of 12-Jan-2024 14:25,  No significant change was found    Confirmed by Gary Sheffield ([id number]) on 11/18/2024 1:22:27 PM    Referred By:            Confirmed By: Gary Sheffield                                  Imaging Results              X-Ray Ribs 2 View Left (Final result)  Result time 11/18/24 13:02:53      Final result by Jordin Florez MD (11/18/24 13:02:53)                   Impression:      Fracture left 5th rib.      Electronically signed by: Jordin Florez MD  Date:    11/18/2024  Time:    13:02               Narrative:    EXAMINATION:  XR RIBS 2 VIEW LEFT    CLINICAL HISTORY:  Pleurodynia    TECHNIQUE:  Two views of the left ribs were performed.    COMPARISON:  Concurrent chest radiograph    FINDINGS:  The lateral portion of the left rib 5 shows a recent appearing fracture with minor displacement.  No other rib fracture is detected.  Left lung is expanded without pneumothorax.                                       X-Ray Chest PA And Lateral (Final result)  Result time 11/18/24 12:57:10      Final result by Jordin Florez MD (11/18/24 12:57:10)                   Impression:      No acute cardiopulmonary disease      Electronically signed by: Jordin Florez MD  Date:    11/18/2024  Time:    12:57               Narrative:    EXAMINATION:  XR CHEST PA AND LATERAL    CLINICAL HISTORY:  chest pain;    TECHNIQUE:  PA and lateral views of the chest were performed.    COMPARISON:  01/14/2024    FINDINGS:  The heart size is normal.  Mediastinum shows aortic atherosclerosis.  Lungs are expanded and clear.  No lung consolidation or pleural fluid is detected.  The skeletal structures are intact.                                    X-Rays:   Independently Interpreted Readings:   Other Readings:  Chest x-ray:  No cardiomegaly, no pneumothorax, hemothorax.  Rib x-ray:   Mildly displaced 5th rib fracture on the left    Medications   oxyCODONE immediate release tablet 5 mg (5 mg Oral Given 11/18/24 1206)     Medical Decision Making  Emergent evaluation a 69-year-old male presenting today with acute left-sided chest pain after a fall yesterday.  Not hit his head or lose consciousness.  Took Tylenol without significant improvement.    Vital signs reviewed, mildly hypertensive and tachycardic, likely pain related.  No hypoxemia noted.    Differential includes but not limited to rib fracture, rib contusion, pneumothorax, chest wall contusion.  Low suspicion for ACS based on his current history.  He denies head injury, no obvious head trauma noted on exam, at neurologic baseline.  Denies headache, nausea or vomiting.  Low suspicion for intracranial process    Plan for pain control, x-ray, EKG    X-ray confirmed left rib fracture, mild displacement without complication such as pneumothorax or hemothorax.      Will place a lidocaine patch and instructed on how to use an incentive spirometer..  Discharge with prescription for oxycodone, lidocaine patch.  Return to the ED for worsening symptoms otherwise please see PCP.      Amount and/or Complexity of Data Reviewed  External Data Reviewed: notes.     Details: INR today 2.4 with Coumadin clinic  Radiology: ordered and independent interpretation performed.  ECG/medicine tests: ordered and independent interpretation performed.    Risk  Prescription drug management.                                      Clinical Impression:  Final diagnoses:  [R00.0] Tachycardia  [R07.81] Rib pain on left side  [S22.32XA] Closed fracture of one rib of left side, initial encounter (Primary)                 Theresa Qiu MD  11/18/24 1605

## 2024-11-18 NOTE — DISCHARGE INSTRUCTIONS
Please use the incentive spirometer every 4 hours at home to help keep the lungs are expanded and prevent pneumonia.  Prescription for lidocaine patch and stronger pain medicine ( Norco) sent to the pharmacy.  If pain is mild, can take only Tylenol.  Return to emergency department for any concerning symptom.

## 2024-11-18 NOTE — ED NOTES
Patient identifiers for Kamlesh Diane 69 y.o. male checked and correct.  Chief Complaint   Patient presents with    Fall     Pt reports fall on Saturday, denies head injury, walking with walker and fell back, c/o pain under left arm, pt with hx of stroke and slurred speech at baseline     Past Medical History:   Diagnosis Date    CVA (cerebral vascular accident)     Diabetes     Diabetic retinopathy     Elevated PSA     Hypertension     Prostate cancer     Stroke      Allergies reported:   Review of patient's allergies indicates:   Allergen Reactions    Plasma protein fraction Rash         LOC: Patient is awake, alert, and aware of environment with an appropriate affect. Patient is oriented x 4 and speaking appropriately. Hx stroke, pt's speech is slurred at baseline   APPEARANCE: Patient resting comfortably and in no acute distress. Patient is clean and well groomed, patient's clothing is properly fastened.  HEENT: WDL  SKIN: The skin is warm and dry. Patient has normal skin turgor and moist mucus membranes.   MUSCULOSKELETAL: Patient is moving all extremities well, no obvious deformities noted. Pulses intact.   RESPIRATORY: Airway is open and patent. Respirations are spontaneous and non-labored with normal effort and rate.  CARDIAC: Patient tachycardic, 116 on cardiac monitor. No peripheral edema noted. Denies chest pain and SOB at at time of assessment.   ABDOMEN: No distention noted. Soft and non-tender upon palpation.  NEUROLOGICAL: pupils 3mm, PERRL. Facial expression is symmetrical. Hand grasps are equal bilaterally. Normal sensation in all extremities when touched with finger.

## 2024-11-18 NOTE — PROVIDER PROGRESS NOTES - EMERGENCY DEPT.
Encounter Date: 11/18/2024    ED Physician Progress Notes         EKG - STEMI Decision  Initial Reading: No STEMI present.

## 2024-11-18 NOTE — PROGRESS NOTES
[facility]    2024 1:50 PM    Assessment/Plan:    Patient presents today with therapeutic INR.    Assessment of patient findings and chart review: Reviewed.  Pt recently seen in the ED s/p fall for closed fracture of one rib of left side.  D/C'd on pain medication.     Recommendation for patient's warfarin regimen: Continue current maintenance dose    Recommend repeat INR in 3 weeks  _________________________________________________________________    Kamlesh JOSHUA Roxi (69 y.o.) is followed by the [facility].    Anticoagulation Summary  As of 2024      INR goal:  2.0-3.0   TTR:  72.3% (6.5 y)   INR used for dosin.4 (2024)   Warfarin maintenance plan:  2.5 mg (5 mg x 0.5) every Mon; 5 mg (5 mg x 1) all other days   Weekly warfarin total:  32.5 mg   Plan last modified:  Miguel Angel Rouse, PharmD (2023)   Next INR check:  2025   Target end date:  --    Indications    Long term (current) use of anticoagulants [Z79.01]  Cerebrovascular accident (CVA) due to embolism of precerebral artery (Resolved) [I63.10]                 Anticoagulation Episode Summary       INR check location:  Clinic Lab    Preferred lab:  --    Send INR reminders to:  [facility] COUMADIN MONITORING POOL    Comments:  [facility]          Anticoagulation Care Providers       Provider Role Specialty Phone number    Debora Ontiveros MD Responsible Internal Medicine [phone]

## 2024-11-18 NOTE — ED NOTES
Bed: [facility]  Expected date: 11/17/24  Expected time:   Means of arrival:   Comments:

## 2024-11-19 ENCOUNTER — PATIENT OUTREACH (OUTPATIENT)
Dept: EMERGENCY MEDICINE | Facility: HOSPITAL | Age: 69
End: 2024-11-19
Payer: MEDICARE

## 2024-11-19 ENCOUNTER — TELEPHONE (OUTPATIENT)
Dept: PRIMARY CARE CLINIC | Facility: CLINIC | Age: 69
End: 2024-11-19
Payer: MEDICARE

## 2024-11-25 DIAGNOSIS — E55.9 VITAMIN D DEFICIENCY: ICD-10-CM

## 2024-11-25 RX ORDER — ERGOCALCIFEROL 1.25 MG/1
CAPSULE ORAL
Qty: 12 CAPSULE | Refills: 3 | Status: SHIPPED | OUTPATIENT
Start: 2024-11-25

## 2024-11-26 ENCOUNTER — OFFICE VISIT (OUTPATIENT)
Dept: PRIMARY CARE CLINIC | Facility: CLINIC | Age: 69
End: 2024-11-26
Payer: MEDICARE

## 2024-11-26 VITALS
BODY MASS INDEX: 21.55 KG/M2 | TEMPERATURE: 98 F | DIASTOLIC BLOOD PRESSURE: 72 MMHG | SYSTOLIC BLOOD PRESSURE: 126 MMHG | HEART RATE: 98 BPM | HEIGHT: 73 IN | OXYGEN SATURATION: 99 % | WEIGHT: 162.56 LBS

## 2024-11-26 DIAGNOSIS — Z79.4 TYPE 2 DIABETES MELLITUS WITH HYPERGLYCEMIA, WITH LONG-TERM CURRENT USE OF INSULIN: Chronic | ICD-10-CM

## 2024-11-26 DIAGNOSIS — E11.65 TYPE 2 DIABETES MELLITUS WITH HYPERGLYCEMIA, WITH LONG-TERM CURRENT USE OF INSULIN: Chronic | ICD-10-CM

## 2024-11-26 DIAGNOSIS — Z86.19 HISTORY OF HELICOBACTER PYLORI INFECTION: ICD-10-CM

## 2024-11-26 DIAGNOSIS — N18.32 STAGE 3B CHRONIC KIDNEY DISEASE: Chronic | ICD-10-CM

## 2024-11-26 DIAGNOSIS — S22.32XA CLOSED FRACTURE OF ONE RIB OF LEFT SIDE, INITIAL ENCOUNTER: Primary | ICD-10-CM

## 2024-11-26 DIAGNOSIS — Z87.11 HISTORY OF GASTRIC ULCER: ICD-10-CM

## 2024-11-26 DIAGNOSIS — I15.2 HYPERTENSION ASSOCIATED WITH DIABETES: Chronic | ICD-10-CM

## 2024-11-26 DIAGNOSIS — E11.59 HYPERTENSION ASSOCIATED WITH DIABETES: Chronic | ICD-10-CM

## 2024-11-26 DIAGNOSIS — M81.0 SENILE OSTEOPOROSIS: ICD-10-CM

## 2024-11-26 PROCEDURE — 99999 PR PBB SHADOW E&M-EST. PATIENT-LVL V: CPT | Mod: PBBFAC,,, | Performed by: INTERNAL MEDICINE

## 2024-11-26 RX ORDER — HYDROCODONE BITARTRATE AND ACETAMINOPHEN 5; 325 MG/1; MG/1
1 TABLET ORAL EVERY 6 HOURS PRN
Qty: 12 TABLET | Refills: 0 | Status: SHIPPED | OUTPATIENT
Start: 2024-11-26 | End: 2024-11-29

## 2024-11-26 NOTE — PROGRESS NOTES
"Subjective:       Patient ID: Kamlesh Diane is a 69 y.o. male.    Chief Complaint: ED follow up    HPI  Pt is well known to me. Last saw pt 1/2024 for a hosp f/u due to symptomatic anemia from GIB.  S/p h pylori treatment. Needs documentation of clearance (w/ repeat EGD to document ulcer healing).  Last Hgb 6/3/24 10.6  No blood in stool or urine.     Dm2 - Levemir 12 u qhs, jardiance 10mg daily.   DEXCOM in place. No low sugars.   Follows w/ Soledad Mckeon NP   A1c - 10/15/24 7  Eye - scheduled 1/7/24 - Dr. Noonan.     Went to ED 11/18/24 s/p fall - fell backwards while walking w/ his stand up walker due to being startled by a dog. Found to have L 5th rib fx.   Has been walking to the corner daily.     HTN - losartan 25mg daily, amlodipine 10mg daily, coreg 3.125mg BID  Follows w/ outside nephrologist. Last Cr 1.59, eGFR 47 6/3/24. Urinating fine.   Hasn't been checking BP at home.     R hand tremors - resolved now; not dropping anything; h/o stroke; sometimes the 4th finger gets locked up and have to pop it open. Seen neurology. Referral to movement d/o clinic. On amantadine but not sure if helping.    Review of Systems  Comprehensive review of systems otherwise negative. See history/subjective section for more details.    Objective:      Physical Exam    /72   Pulse 98   Temp 98.3 °F (36.8 °C) (Oral)   Ht 6' 1" (1.854 m)   Wt 73.8 kg (162 lb 9.4 oz)   SpO2 99%   BMI 21.45 kg/m²     Gen - A+OX4, NAD  HEENT - PERRL, OP clear. Pale conjunctiva.   Neck - no LAD  CV - tachycardic. No m/r  Chest - CTAB, no crackles. Normal work of breathing.   Abd - S/NT/ND/+BS  Ext - 2+ B radial pulses. No LE edema.   Neuro/MSK - antalgic gait. Walks w/ a walker. Unsteady standing on the scale. Some RUE spasticity. 4-5 RUE and RLE m strength and 5/5 LUE and LLE m strength. No spinal tenderness to palpation. Pain on palpation of the L 5th rib.     Previous labs reviewed.       Assessment/Plan     Kamlesh was seen today " for hospital follow up.    Diagnoses and all orders for this visit:    Closed fracture of one rib of left side, initial encounter - cont tylenol prn. Cont lidocaine patch. Ok to refill norco 1 more time. Important for pain control. Repeat rib xr in 6-8 weeks.  -     HYDROcodone-acetaminophen (NORCO) 5-325 mg per tablet; Take 1 tablet by mouth every 6 (six) hours as needed for Pain.  -     X-Ray Ribs 2 View Left; Future    History of Helicobacter pylori infection - s/p antibiotics. Never got clearance confirmation. Needs repeat EGD to document resolution of gastric ulcers.  -     Ambulatory referral/consult to Gastroenterology; Future    History of gastric ulcer  -     Ambulatory referral/consult to Gastroenterology; Future    Type 2 diabetes mellitus with hyperglycemia, with long-term current use of insulin - cont current meds. A1c at goal. Has eye appt scheduled for Jan.    Stage 3b chronic kidney disease  -     Comprehensive Metabolic Panel; Future  -     CBC Auto Differential; Future  -     PTH, Intact; Future  -     Phosphorus; Future  -     Vitamin D; Future  -     Ferritin; Future  -     Iron and TIBC; Future    Hypertension associated with diabetes - Stable and controlled. Continue current medications.  -     Comprehensive Metabolic Panel; Future    Senile osteoporosis - cont prolia injection q 6 mo.  -     Comprehensive Metabolic Panel; Future  -     PTH, Intact; Future  -     Vitamin D; Future        Follow up in about 2 months (around 1/26/2025).      Debora Ontiveros MD  Department of Internal Medicine - [facility]  10:38 AM

## 2024-11-27 ENCOUNTER — TELEPHONE (OUTPATIENT)
Dept: NEUROLOGY | Facility: CLINIC | Age: 69
End: 2024-11-27
Payer: MEDICARE

## 2024-11-27 ENCOUNTER — TELEPHONE (OUTPATIENT)
Dept: OPHTHALMOLOGY | Facility: CLINIC | Age: 69
End: 2024-11-27
Payer: MEDICARE

## 2024-11-27 NOTE — TELEPHONE ENCOUNTER
----- Message from Angie sent at 11/27/2024 10:09 AM CST -----  Good morning,     This patient called asking to schedule his routine annual visit for Diabetes with Dr. Tapia. Please call him to schedule: [phone]    Thank you!  Angie

## 2024-11-27 NOTE — TELEPHONE ENCOUNTER
LVM to let patient know his appointment with Dr. Tapia has been scheduled as requested. I noticed pt is scheduled with Dr. Noonan in January. Not sure is that appt is just for a refraction.

## 2024-12-09 ENCOUNTER — LAB VISIT (OUTPATIENT)
Dept: LAB | Facility: HOSPITAL | Age: 69
End: 2024-12-09
Attending: INTERNAL MEDICINE
Payer: MEDICARE

## 2024-12-09 ENCOUNTER — ANTI-COAG VISIT (OUTPATIENT)
Dept: CARDIOLOGY | Facility: CLINIC | Age: 69
End: 2024-12-09
Payer: MEDICARE

## 2024-12-09 DIAGNOSIS — N18.32 STAGE 3B CHRONIC KIDNEY DISEASE: Chronic | ICD-10-CM

## 2024-12-09 DIAGNOSIS — E11.59 HYPERTENSION ASSOCIATED WITH DIABETES: Chronic | ICD-10-CM

## 2024-12-09 DIAGNOSIS — M81.0 SENILE OSTEOPOROSIS: ICD-10-CM

## 2024-12-09 DIAGNOSIS — I15.2 HYPERTENSION ASSOCIATED WITH DIABETES: Chronic | ICD-10-CM

## 2024-12-09 DIAGNOSIS — Z79.01 LONG TERM (CURRENT) USE OF ANTICOAGULANTS: Primary | ICD-10-CM

## 2024-12-09 DIAGNOSIS — Z79.01 LONG TERM (CURRENT) USE OF ANTICOAGULANTS: ICD-10-CM

## 2024-12-09 LAB
25(OH)D3+25(OH)D2 SERPL-MCNC: 42 NG/ML (ref 30–96)
ALBUMIN SERPL BCP-MCNC: 3.6 G/DL (ref 3.5–5.2)
ALP SERPL-CCNC: 158 U/L (ref 40–150)
ALT SERPL W/O P-5'-P-CCNC: 20 U/L (ref 10–44)
ANION GAP SERPL CALC-SCNC: 8 MMOL/L (ref 8–16)
AST SERPL-CCNC: 18 U/L (ref 10–40)
BASOPHILS # BLD AUTO: 0.02 K/UL (ref 0–0.2)
BASOPHILS NFR BLD: 0.4 % (ref 0–1.9)
BILIRUB SERPL-MCNC: 0.4 MG/DL (ref 0.1–1)
BUN SERPL-MCNC: 14 MG/DL (ref 8–23)
CALCIUM SERPL-MCNC: 8.7 MG/DL (ref 8.7–10.5)
CHLORIDE SERPL-SCNC: 110 MMOL/L (ref 95–110)
CO2 SERPL-SCNC: 23 MMOL/L (ref 23–29)
CREAT SERPL-MCNC: 1.5 MG/DL (ref 0.5–1.4)
DIFFERENTIAL METHOD BLD: ABNORMAL
EOSINOPHIL # BLD AUTO: 0.2 K/UL (ref 0–0.5)
EOSINOPHIL NFR BLD: 2.7 % (ref 0–8)
ERYTHROCYTE [DISTWIDTH] IN BLOOD BY AUTOMATED COUNT: 16.5 % (ref 11.5–14.5)
EST. GFR  (NO RACE VARIABLE): 50.1 ML/MIN/1.73 M^2
FERRITIN SERPL-MCNC: 44 NG/ML (ref 20–300)
GLUCOSE SERPL-MCNC: 107 MG/DL (ref 70–110)
HCT VFR BLD AUTO: 39.5 % (ref 40–54)
HGB BLD-MCNC: 12.1 G/DL (ref 14–18)
IMM GRANULOCYTES # BLD AUTO: 0.01 K/UL (ref 0–0.04)
IMM GRANULOCYTES NFR BLD AUTO: 0.2 % (ref 0–0.5)
INR PPP: 4.2 (ref 0.8–1.2)
IRON SERPL-MCNC: 41 UG/DL (ref 45–160)
LYMPHOCYTES # BLD AUTO: 1 K/UL (ref 1–4.8)
LYMPHOCYTES NFR BLD: 17.1 % (ref 18–48)
MCH RBC QN AUTO: 25.8 PG (ref 27–31)
MCHC RBC AUTO-ENTMCNC: 30.6 G/DL (ref 32–36)
MCV RBC AUTO: 84 FL (ref 82–98)
MONOCYTES # BLD AUTO: 0.5 K/UL (ref 0.3–1)
MONOCYTES NFR BLD: 9.4 % (ref 4–15)
NEUTROPHILS # BLD AUTO: 4 K/UL (ref 1.8–7.7)
NEUTROPHILS NFR BLD: 70.2 % (ref 38–73)
NRBC BLD-RTO: 0 /100 WBC
PHOSPHATE SERPL-MCNC: 2.8 MG/DL (ref 2.7–4.5)
PLATELET # BLD AUTO: 301 K/UL (ref 150–450)
PMV BLD AUTO: 11.3 FL (ref 9.2–12.9)
POTASSIUM SERPL-SCNC: 4.1 MMOL/L (ref 3.5–5.1)
PROT SERPL-MCNC: 7.2 G/DL (ref 6–8.4)
PROTHROMBIN TIME: 41.9 SEC (ref 9–12.5)
PTH-INTACT SERPL-MCNC: 161.4 PG/ML (ref 9–77)
RBC # BLD AUTO: 4.69 M/UL (ref 4.6–6.2)
SATURATED IRON: 10 % (ref 20–50)
SODIUM SERPL-SCNC: 141 MMOL/L (ref 136–145)
TOTAL IRON BINDING CAPACITY: 417 UG/DL (ref 250–450)
TRANSFERRIN SERPL-MCNC: 282 MG/DL (ref 200–375)
WBC # BLD AUTO: 5.66 K/UL (ref 3.9–12.7)

## 2024-12-09 PROCEDURE — 83540 ASSAY OF IRON: CPT | Performed by: INTERNAL MEDICINE

## 2024-12-09 PROCEDURE — 82728 ASSAY OF FERRITIN: CPT | Performed by: INTERNAL MEDICINE

## 2024-12-09 PROCEDURE — 82306 VITAMIN D 25 HYDROXY: CPT | Performed by: INTERNAL MEDICINE

## 2024-12-09 PROCEDURE — 85610 PROTHROMBIN TIME: CPT | Performed by: INTERNAL MEDICINE

## 2024-12-09 PROCEDURE — 84100 ASSAY OF PHOSPHORUS: CPT | Performed by: INTERNAL MEDICINE

## 2024-12-09 PROCEDURE — 36415 COLL VENOUS BLD VENIPUNCTURE: CPT | Performed by: INTERNAL MEDICINE

## 2024-12-09 PROCEDURE — 93793 ANTICOAG MGMT PT WARFARIN: CPT | Mod: S$GLB,,,

## 2024-12-09 PROCEDURE — 85025 COMPLETE CBC W/AUTO DIFF WBC: CPT | Performed by: INTERNAL MEDICINE

## 2024-12-09 PROCEDURE — 83970 ASSAY OF PARATHORMONE: CPT | Performed by: INTERNAL MEDICINE

## 2024-12-09 PROCEDURE — 80053 COMPREHEN METABOLIC PANEL: CPT | Performed by: INTERNAL MEDICINE

## 2024-12-09 NOTE — PROGRESS NOTES
[facility]    2024 12:55 PM    Assessment/Plan:    Patient presents today with supratherapeutic INR.    Assessment of patient findings and chart review: Pt supratherapeutic likely due to increase in alcohol intake.     Recommendation for patient's warfarin regimen: Hold dose today then resume current maintenance dose    Recommend repeat INR in 1 week w/ upcoming appt  _________________________________________________________________    Kamlesh Diane (69 y.o.) is followed by the [facility].    Anticoagulation Summary  As of 2024      INR goal:  2.0-3.0   TTR:  71.9% (6.5 y)   INR used for dosin.2 (2024)   Warfarin maintenance plan:  2.5 mg (5 mg x 0.5) every Mon; 5 mg (5 mg x 1) all other days   Weekly warfarin total:  32.5 mg   Plan last modified:  Miguel Angel Rouse, PharmD (2023)   Next INR check:  2024   Target end date:  --    Indications    Long term (current) use of anticoagulants [Z79.01]  Cerebrovascular accident (CVA) due to embolism of precerebral artery (Resolved) [I63.10]                 Anticoagulation Episode Summary       INR check location:  Clinic Lab    Preferred lab:  --    Send INR reminders to:  [facility] COUMADIN MONITORING POOL    Comments:  [facility]          Anticoagulation Care Providers       Provider Role Specialty Phone number    Debora Ontiveros MD [facility] Internal Medicine [phone]

## 2024-12-17 ENCOUNTER — HOSPITAL ENCOUNTER (OUTPATIENT)
Dept: NEUROLOGY | Facility: CLINIC | Age: 69
Discharge: HOME | End: 2024-12-17
Payer: MEDICARE

## 2024-12-17 ENCOUNTER — DOCUMENTATION ONLY (OUTPATIENT)
Dept: NEUROLOGY | Facility: CLINIC | Age: 69
End: 2024-12-17
Payer: MEDICARE

## 2024-12-17 ENCOUNTER — LAB VISIT (OUTPATIENT)
Dept: LAB | Facility: HOSPITAL | Age: 69
End: 2024-12-17
Attending: INTERNAL MEDICINE
Payer: MEDICARE

## 2024-12-17 ENCOUNTER — ANTI-COAG VISIT (OUTPATIENT)
Dept: CARDIOLOGY | Facility: CLINIC | Age: 69
End: 2024-12-17
Payer: MEDICARE

## 2024-12-17 ENCOUNTER — OFFICE VISIT (OUTPATIENT)
Dept: OPHTHALMOLOGY | Facility: CLINIC | Age: 69
End: 2024-12-17
Payer: MEDICARE

## 2024-12-17 ENCOUNTER — CLINICAL SUPPORT (OUTPATIENT)
Dept: OPHTHALMOLOGY | Facility: CLINIC | Age: 69
End: 2024-12-17
Payer: MEDICARE

## 2024-12-17 DIAGNOSIS — E11.3593 CONTROLLED TYPE 2 DIABETES MELLITUS WITH BOTH EYES AFFECTED BY PROLIFERATIVE RETINOPATHY WITHOUT MACULAR EDEMA, WITH LONG-TERM CURRENT USE OF INSULIN: ICD-10-CM

## 2024-12-17 DIAGNOSIS — Z79.4 TYPE 2 DIABETES MELLITUS WITH BOTH EYES AFFECTED BY PROLIFERATIVE RETINOPATHY WITHOUT MACULAR EDEMA, WITH LONG-TERM CURRENT USE OF INSULIN: Primary | ICD-10-CM

## 2024-12-17 DIAGNOSIS — Z79.01 LONG TERM (CURRENT) USE OF ANTICOAGULANTS: ICD-10-CM

## 2024-12-17 DIAGNOSIS — R56.9 SEIZURE-LIKE ACTIVITY: ICD-10-CM

## 2024-12-17 DIAGNOSIS — Z79.4 CONTROLLED TYPE 2 DIABETES MELLITUS WITH BOTH EYES AFFECTED BY PROLIFERATIVE RETINOPATHY WITHOUT MACULAR EDEMA, WITH LONG-TERM CURRENT USE OF INSULIN: ICD-10-CM

## 2024-12-17 DIAGNOSIS — H35.033 HYPERTENSIVE RETINOPATHY, BILATERAL: ICD-10-CM

## 2024-12-17 DIAGNOSIS — Z79.4 TYPE 2 DIABETES MELLITUS WITH HYPERGLYCEMIA, WITH LONG-TERM CURRENT USE OF INSULIN: Chronic | ICD-10-CM

## 2024-12-17 DIAGNOSIS — E11.3593 TYPE 2 DIABETES MELLITUS WITH BOTH EYES AFFECTED BY PROLIFERATIVE RETINOPATHY WITHOUT MACULAR EDEMA, WITH LONG-TERM CURRENT USE OF INSULIN: Primary | ICD-10-CM

## 2024-12-17 DIAGNOSIS — E11.65 TYPE 2 DIABETES MELLITUS WITH HYPERGLYCEMIA, WITH LONG-TERM CURRENT USE OF INSULIN: Chronic | ICD-10-CM

## 2024-12-17 DIAGNOSIS — Z79.01 LONG TERM (CURRENT) USE OF ANTICOAGULANTS: Primary | ICD-10-CM

## 2024-12-17 DIAGNOSIS — H43.11 VITREOUS HEMORRHAGE, RIGHT: ICD-10-CM

## 2024-12-17 LAB
INR PPP: 2.9 (ref 0.8–1.2)
PROTHROMBIN TIME: 29.3 SEC (ref 9–12.5)

## 2024-12-17 PROCEDURE — 3288F FALL RISK ASSESSMENT DOCD: CPT | Mod: CPTII,S$GLB,, | Performed by: OPHTHALMOLOGY

## 2024-12-17 PROCEDURE — 3051F HG A1C>EQUAL 7.0%<8.0%: CPT | Mod: CPTII,S$GLB,, | Performed by: OPHTHALMOLOGY

## 2024-12-17 PROCEDURE — 3061F NEG MICROALBUMINURIA REV: CPT | Mod: CPTII,S$GLB,, | Performed by: OPHTHALMOLOGY

## 2024-12-17 PROCEDURE — 1126F AMNT PAIN NOTED NONE PRSNT: CPT | Mod: CPTII,S$GLB,, | Performed by: OPHTHALMOLOGY

## 2024-12-17 PROCEDURE — 92201 OPSCPY EXTND RTA DRAW UNI/BI: CPT | Mod: S$GLB,,, | Performed by: OPHTHALMOLOGY

## 2024-12-17 PROCEDURE — 1101F PT FALLS ASSESS-DOCD LE1/YR: CPT | Mod: CPTII,S$GLB,, | Performed by: OPHTHALMOLOGY

## 2024-12-17 PROCEDURE — 99999 PR PBB SHADOW E&M-EST. PATIENT-LVL III: CPT | Mod: PBBFAC,,, | Performed by: OPHTHALMOLOGY

## 2024-12-17 PROCEDURE — 4010F ACE/ARB THERAPY RXD/TAKEN: CPT | Mod: CPTII,S$GLB,, | Performed by: OPHTHALMOLOGY

## 2024-12-17 PROCEDURE — 92134 CPTRZ OPH DX IMG PST SGM RTA: CPT | Mod: S$GLB,,, | Performed by: OPHTHALMOLOGY

## 2024-12-17 PROCEDURE — 95816 EEG AWAKE AND DROWSY: CPT | Mod: S$GLB,,, | Performed by: PSYCHIATRY & NEUROLOGY

## 2024-12-17 PROCEDURE — 85610 PROTHROMBIN TIME: CPT | Performed by: INTERNAL MEDICINE

## 2024-12-17 PROCEDURE — 36415 COLL VENOUS BLD VENIPUNCTURE: CPT | Performed by: INTERNAL MEDICINE

## 2024-12-17 PROCEDURE — 92014 COMPRE OPH EXAM EST PT 1/>: CPT | Mod: S$GLB,,, | Performed by: OPHTHALMOLOGY

## 2024-12-17 PROCEDURE — 1159F MED LIST DOCD IN RCRD: CPT | Mod: CPTII,S$GLB,, | Performed by: OPHTHALMOLOGY

## 2024-12-17 PROCEDURE — 3066F NEPHROPATHY DOC TX: CPT | Mod: CPTII,S$GLB,, | Performed by: OPHTHALMOLOGY

## 2024-12-17 PROCEDURE — 93793 ANTICOAG MGMT PT WARFARIN: CPT | Mod: S$GLB,,,

## 2024-12-17 PROCEDURE — 1160F RVW MEDS BY RX/DR IN RCRD: CPT | Mod: CPTII,S$GLB,, | Performed by: OPHTHALMOLOGY

## 2024-12-17 RX ORDER — FLUORESCEIN 500 MG/ML
5 INJECTION INTRAVENOUS ONCE
Status: SHIPPED | OUTPATIENT
Start: 2024-12-17

## 2024-12-17 NOTE — PROGRESS NOTES
EEG Hook up  No sign of skin breakdown noticed    Skin Integrity: Normal     Dante Villafana   12/17/2024 10:58 AM

## 2024-12-17 NOTE — PROGRESS NOTES
HPI     Diabetic Eye Exam     Additional comments: yearly           Comments    Patient here today with c/o fluctuating VA, OD worse, no pain and floaters   without flashes.          Last edited by Saniya Holloway on 12/17/2024  1:14 PM.          OCT - NO ME OU    Prior [place] FA - peripheral NP and NV OU      A/P    1. PDR OU  T2 uncontrolled on insulin  No ME OU  Watch ST fibrosis  PRP OU  11/20 12/24 - increased VH OD x 2 weeks,but settling per patient.  BP was high, also on coumadin    Will monitor for improvement at check FA on FU for any active NV    HOB elevated    2. VH OS   Minimal    3. PCIOL OU    4. HTN Ret OU  BS/BP/chol control    5. Prior CVA   On coumadin      6 weeks OCT/FA OD

## 2024-12-17 NOTE — PROCEDURES
Procedures  EEG REPORT      Kamlesh Diane  [id number]  1955    DATE OF SERVICE: 12/17/2024         METHODOLOGY      Extended electroencephalographic recording is made while the patient is ambulatory and continuing normal daily activities.  Electrodes are placed according to the International 10-20 placement system and included T1 and T2 electrode placement.  Twenty four (24) channels of digital signal (sampling rate of 512/sec) was simultaneously recorded from the scalp including EKG and eye monitors.  Recording band pass was 0.1 to 100 hz and all data was stored digitally on the recorder.  The patient is instructed to press an event button when clinical symptoms occur and write the symptoms into a diary. Activation procedures which include photic stimulation, hyperventilation and instructing patients to perform simple task are done in selected patients.        The EEG is displayed on a monitor screen and can be reformatted into different montages for evaluation.  The entire recoding is submitted for computer assisted analysis to detect spike and electrographic seizure activity.  The entire recording is visually reviewed and the times identified by computer analysis as being spikes or seizures are reviewed again.  Compresses spectral analysis (CSA) is also performed on the activity recorded from each individual channel.  This is displayed as a power display of frequencies from 0 to 30 Hz over time.   The CSA analysis is done and displayed continuously.  This is reviewed for asymmetries in power between homologous areas of the scalp and for presence of changes in power which canbe seen when seizures occur.  Sections of suspected abnormalities on the CSA is then compared with the original EEG recording.  .     [employer] software was also utilized in the review of this study.  This software suite analyzes the EEG recording in multiple domains.  Coherence and rhythmicity is computed to identify EEG sections  which may contain organized seizures.  Each channel undergoes analysis to detect presence of spike and sharp waves which have special and morphological characteristic of epileptic activity.  The routine EEG recording is converted from spacial into frequency domain.  This is then displayed comparing homologous areas to identify areas of significant asymmetry.  Algorithm to identify non-cortically generated artifact is used to separate eye movement, EMG and other artifact from the EEG     Recording Times    A total of 00:30:24 hours of EEG was recorded.      EEG FINDINGS:  Background activity:   The background rhythm was characterized by alpha and anterior dominant beta activity with a 10-11Hz posterior dominant alpha rhythm at 30-70 microvolts.   Symmetry and continuity: the background was continuous and symmetric     Sleep:   No sleep transients were seen.    Activation procedures:   Photic stimulation was performed with no abnormalities seen    Abnormal activity:   No epileptiform discharges, periodic discharges, lateralized rhythmic delta activity or electrographic seizures were seen.    IMPRESSION:   Normal EEG fo the waking state.      Wesley Spann MD  Neurology-Epilepsy.  [facility].

## 2024-12-18 NOTE — PROGRESS NOTES
[facility]    2024 1:57 PM    Assessment/Plan:    Patient presents today with therapeutic INR.    Assessment of patient findings and chart review: Reviewed    Recommendation for patient's warfarin regimen: Continue current maintenance dose    Recommend repeat INR in 3 weeks  _________________________________________________________________    Kamlesh Diane (69 y.o.) is followed by the [email or web address] [facility].    Anticoagulation Summary  As of 2024      INR goal:  2.0-3.0   TTR:  71.7% (6.6 y)   INR used for dosin.9 (2024)   Warfarin maintenance plan:  2.5 mg (5 mg x 0.5) every Mon; 5 mg (5 mg x 1) all other days   Weekly warfarin total:  32.5 mg   Plan last modified:  Miguel Angel Rouse, PharmD (2023)   Next INR check:  2025   Target end date:  --    Indications    Long term (current) use of anticoagulants [Z79.01]  Cerebrovascular accident (CVA) due to embolism of precerebral artery (Resolved) [I63.10]                 Anticoagulation Episode Summary       INR check location:  Clinic Lab    Preferred lab:  --    Send INR reminders to:  [facility] COUMADIN MONITORING POOL    Comments:  [facility]          Anticoagulation Care Providers       Provider Role Specialty Phone number    Debora Ontiveros MD [facility] Internal Medicine [phone]

## 2024-12-23 DIAGNOSIS — E11.59 HYPERTENSION ASSOCIATED WITH DIABETES: ICD-10-CM

## 2024-12-23 DIAGNOSIS — I15.2 HYPERTENSION ASSOCIATED WITH DIABETES: ICD-10-CM

## 2024-12-23 RX ORDER — CARVEDILOL 3.12 MG/1
3.12 TABLET ORAL
Qty: 180 TABLET | Refills: 3 | Status: SHIPPED | OUTPATIENT
Start: 2024-12-23

## 2024-12-30 DIAGNOSIS — E11.59 HYPERTENSION ASSOCIATED WITH DIABETES: ICD-10-CM

## 2024-12-30 DIAGNOSIS — I15.2 HYPERTENSION ASSOCIATED WITH DIABETES: ICD-10-CM

## 2024-12-30 RX ORDER — LOSARTAN POTASSIUM 25 MG/1
TABLET ORAL
Qty: 90 TABLET | Refills: 3 | Status: SHIPPED | OUTPATIENT
Start: 2024-12-30

## 2025-01-03 ENCOUNTER — TELEPHONE (OUTPATIENT)
Dept: OPTOMETRY | Facility: CLINIC | Age: 70
End: 2025-01-03
Payer: MEDICARE

## 2025-01-03 ENCOUNTER — OFFICE VISIT (OUTPATIENT)
Dept: UROLOGY | Facility: CLINIC | Age: 70
End: 2025-01-03
Payer: MEDICARE

## 2025-01-03 VITALS
HEART RATE: 103 BPM | DIASTOLIC BLOOD PRESSURE: 95 MMHG | BODY MASS INDEX: 21.6 KG/M2 | WEIGHT: 163 LBS | SYSTOLIC BLOOD PRESSURE: 149 MMHG | HEIGHT: 73 IN

## 2025-01-03 DIAGNOSIS — Z91.81 AT RISK FOR FALLING: ICD-10-CM

## 2025-01-03 DIAGNOSIS — N32.81 OVERACTIVE BLADDER: Primary | ICD-10-CM

## 2025-01-03 DIAGNOSIS — C61 PROSTATE CANCER: ICD-10-CM

## 2025-01-03 DIAGNOSIS — I63.10 CEREBROVASCULAR ACCIDENT (CVA) DUE TO EMBOLISM OF PRECEREBRAL ARTERY: ICD-10-CM

## 2025-01-03 DIAGNOSIS — Z92.3 HISTORY OF EXTERNAL BEAM RADIATION THERAPY: ICD-10-CM

## 2025-01-03 DIAGNOSIS — R39.9 LOWER URINARY TRACT SYMPTOMS (LUTS): ICD-10-CM

## 2025-01-03 LAB — POC RESIDUAL URINE VOLUME: 163 ML (ref 0–100)

## 2025-01-03 PROCEDURE — 99999 PR PBB SHADOW E&M-EST. PATIENT-LVL IV: CPT | Mod: PBBFAC,,, | Performed by: NURSE PRACTITIONER

## 2025-01-03 NOTE — PROGRESS NOTES
CHIEF COMPLAINT:    Kamlesh Diane is a 69 y.o. male presents today for Annual Visit     HISTORY OF PRESENTING ILLINESS:    Kamlesh Diane is a 69 y.o. insulin dependent Diabetic male who is an established patient of our clinic. He is s/p CVA with right sided weakness >10 years ago. He has a history of Stage I (T1c, N0, M0, PSA < 10, GG 1) adenocarcinoma of the prostate. He was initially diagnosed June, 2018.      02/22/2022 Re-Staging TRUS bx with Dr. Rodríguez:  -Beauty 7 (3+4): left apex  -Beauty 6 (3+3): left middle, right apex, right middle,right base  -PSA was 13.4 at time of bx     (-) bone scan 03/08/2022     The patient was referred for treatment and completed radiotherapy to the prostate, seminal vesicles and pelvic nodes in May of 2022.   Last office visit was 08/08/2022 with Dr. Muñoz.            Lab Results   Component Value Date     PSADIAG 1.8 08/04/2022     PSATOTAL 7.1 (H) 08/12/2020         Last clinic visit was 03/28/2023 with me.   Using Gemtesa 75mg for OAB and was pleased.      Here today for f/u visit and med refill.   Reports very pleased with urination. FOS is good for him.  No straining. Leaking. Nocturia 0-1x.   Over doing well.           REVIEW OF SYSTEMS:  Review of Systems   Constitutional: Negative.  Negative for chills and fever.   Eyes:  Negative for double vision.   Respiratory:  Negative for cough and shortness of breath.    Cardiovascular:  Negative for chest pain and palpitations.   Gastrointestinal:  Negative for abdominal pain, constipation, diarrhea, nausea and vomiting.   Genitourinary:  Positive for frequency (Daytime LUTS; nocturia 0-1x). Negative for dysuria and urgency.        See HPI   Musculoskeletal:  Negative for falls.   Neurological:  Positive for weakness. Negative for dizziness and seizures.   Endo/Heme/Allergies:  Negative for polydipsia.         PATIENT HISTORY:    Past Medical History:   Diagnosis Date    CVA (cerebral vascular accident)      Diabetes     Diabetic retinopathy     Elevated PSA     Hypertension     Prostate cancer     Stroke        Past Surgical History:   Procedure Laterality Date    CATARACT EXTRACTION      COLONOSCOPY N/A 5/31/2022    Procedure: COLONOSCOPY;  Surgeon: Juan Miguel Quintanilla MD;  Location: [facility] ENDO (4TH FLR);  Service: Endoscopy;  Laterality: N/A;  Fully Vaccinatd, Coumadin hold per Coumadin clinic see 5/2 note for 5 days prior with Lovenox bridge - sm  prep instr portal and mailed - sm    ESOPHAGOGASTRODUODENOSCOPY N/A 1/13/2024    Procedure: EGD (ESOPHAGOGASTRODUODENOSCOPY);  Surgeon: Inga Arcos MD;  Location: [facility] ENDO (2ND FLR);  Service: Gastroenterology;  Laterality: N/A;    FRACTURE SURGERY      hips x 2 - last fall was 2 yrs ago.    MARIBELL LIANG,SWALLOW FUNCTION,CINE/VIDEO RECORD  4/1/2022            Family History   Problem Relation Name Age of Onset    Hypertension Father      Heart attack Father      Breast cancer Sister      Diabetes Brother Kamaljit     Hypertension Brother Kamaljit        Social History     Socioeconomic History    Marital status: Single   Occupational History    Occupation: worked in the hospital previously.    Tobacco Use    Smoking status: Never    Smokeless tobacco: Never   Substance and Sexual Activity    Alcohol use: Yes     Comment: a few shots once a week.     Drug use: Yes     Types: Marijuana    Sexual activity: Not Currently     Social Drivers of Health     Financial Resource Strain: Low Risk  (1/18/2024)    Overall Financial Resource Strain (CARDIA)     Difficulty of Paying Living Expenses: Not hard at all   Food Insecurity: No Food Insecurity (1/18/2024)    Hunger Vital Sign     Worried About Running Out of Food in the Last Year: Never true     Ran Out of Food in the Last Year: Never true   Transportation Needs: No Transportation Needs (1/18/2024)    PRAPARE - Transportation     Lack of Transportation (Medical): No     Lack of Transportation (Non-Medical): No   Physical  Activity: Inactive (1/18/2024)    Exercise Vital Sign     Days of Exercise per Week: 0 days     Minutes of Exercise per Session: 10 min   Stress: No Stress Concern Present (1/18/2024)    [employer] - Occupational Stress Questionnaire     Feeling of Stress : Not at all   Housing Stability: Unknown (1/18/2024)    Housing Stability Vital Sign     Unable to Pay for Housing in the Last Year: No     Number of Places Lived in the Last Year: 1     Unstable Housing in the Last Year: Patient declined       Allergies:  Raw vegetable and Plasma protein fraction    Medications:    Current Outpatient Medications:     ACCU-CHEK JUSTIN PLUS TEST STRP Strp, TEST FOUR TIMES DAILY AS DIRECTED, Disp: 400 strip, Rfl: 3    alcohol swabs (ALCOHOL PREP PADS) PadM, Use 4 times a day., Disp: 400 each, Rfl: 3    amantadine HCL (SYMMETREL) 100 mg capsule, Take 1 capsule (100 mg total) by mouth 2 (two) times daily., Disp: 180 capsule, Rfl: 3    atorvastatin (LIPITOR) 40 MG tablet, TAKE 1 TABLET(40 MG) BY MOUTH EVERY DAY, Disp: 90 tablet, Rfl: 3    blood-glucose meter,continuous (DEXCOM G7 ) Misc, USE AS DIRECTED, Disp: 1 each, Rfl: 1    blood-glucose sensor (DEXCOM G7 SENSOR) Adriana, CHANGE EVERY 10 DAYS, Disp: 3 each, Rfl: 11    carvediloL (COREG) 3.125 MG tablet, TAKE 1 TABLET(3.125 MG) BY MOUTH TWICE DAILY WITH MEALS, Disp: 180 tablet, Rfl: 3    empagliflozin (JARDIANCE) 10 mg tablet, Take 1 tablet (10 mg total) by mouth once daily., Disp: 90 tablet, Rfl: 3    ergocalciferol (ERGOCALCIFEROL) 50,000 unit Cap, TAKE 1 CAPSULE BY MOUTH EVERY 7 DAYS, Disp: 12 capsule, Rfl: 3    FEROSUL 325 mg (65 mg iron) Tab tablet, TAKE 1 TABLET BY MOUTH EVERY DAY WITH BREAKFAST, Disp: 30 tablet, Rfl: 0    lancets Misc, 1 lancet by Misc.(Non-Drug; Combo Route) route 4 (four) times daily., Disp: 400 each, Rfl: 3    LEVEMIR FLEXTOUCH U100 INSULIN 100 unit/mL (3 mL) InPn pen, Inject 12 units at night, Disp: , Rfl:     losartan  "(COZAAR) 25 MG tablet, TAKE 1 TABLET(25 MG) BY MOUTH DAILY, Disp: 90 tablet, Rfl: 3    pantoprazole (PROTONIX) 40 MG tablet, Take 1 tablet (40 mg total) by mouth 2 (two) times daily before meals., Disp: 60 tablet, Rfl: 0    pen needle, diabetic (BD ULTRA-FINE SHORT PEN NEEDLE) 31 gauge x 5/16" Ndle, USE THREE TIMES DAILY AS DIRECTED, Disp: 200 each, Rfl: 2    warfarin (COUMADIN) 5 MG tablet, TAKE 1 TABLET BY MOUTH ON WEDNESDAYS, THURSDAYS, SATURDAYS, AND SUNDAYS OR AS DIRECTED BY COUMADIN CLINIC, Disp: 42 tablet, Rfl: 3    blood-glucose meter kit, Use as instructed, Disp: 1 each, Rfl: 0    lancing device Misc, 1 Device by Misc.(Non-Drug; Combo Route) route 2 (two) times daily with meals., Disp: 1 each, Rfl: 0    vibegron 75 mg Tab, Take 1 tablet (75 mg total) by mouth Daily., Disp: 90 tablet, Rfl: 3    Current Facility-Administered Medications:     fluorescein 500 mg/5 mL (10 %) injection 500 mg, 5 mL, Intravenous, Once,     PHYSICAL EXAMINATION:  Physical Exam  Vitals and nursing note reviewed.   Constitutional:       General: He is awake.      Appearance: Normal appearance.   HENT:      Head: Normocephalic.      Right Ear: External ear normal.      Left Ear: External ear normal.      Nose: Nose normal.   Cardiovascular:      Rate and Rhythm: Normal rate.   Pulmonary:      Effort: Pulmonary effort is normal. No respiratory distress.   Abdominal:      Palpations: Abdomen is soft.      Tenderness: There is no abdominal tenderness. There is no right CVA tenderness or left CVA tenderness.   Musculoskeletal:         General: No swelling.      Cervical back: Normal range of motion.      Right lower leg: No edema.      Left lower leg: No edema.   Skin:     General: Skin is warm and dry.   Neurological:      General: No focal deficit present.      Mental Status: He is alert and oriented to person, place, and time.   Psychiatric:         Mood and Affect: Mood normal.         Behavior: Behavior is cooperative. "           LABS:    Unable to urinate in the office.   Bladder Scan was 163      Lab Results   Component Value Date    PSA 6.2 (H) 04/05/2018    PSADIAG 1.6 08/17/2023    PSADIAG 1.8 08/04/2022    PSADIAG 13.4 (H) 01/14/2022    PSATOTAL 7.1 (H) 08/12/2020       Lab Results   Component Value Date    CREATININE 1.5 (H) 12/09/2024    EGFRNORACEVR 50.1 (A) 12/09/2024               IMPRESSION:    Encounter Diagnoses   Name Primary?    Overactive bladder Yes    Prostate cancer     History of external beam radiation therapy     At risk for falling     Cerebrovascular accident (CVA) due to embolism of precerebral artery     Lower urinary tract symptoms (LUTS)          Assessment:       1. Overactive bladder    2. Prostate cancer    3. History of external beam radiation therapy    4. At risk for falling    5. Cerebrovascular accident (CVA) due to embolism of precerebral artery    6. Lower urinary tract symptoms (LUTS)        Plan:         I spent a total of 30 minutes on the day of the visit. This includes face to face time and non-face to face time preparing to see the patient (eg, review of tests), obtaining and/or reviewing separately obtained history, documenting clinical information in the electronic or other health record, independently interpreting results and communicating results to the patient/family/caregiver, or care coordinator  Reassurance with today's in office UA; emptying bladder well.   Reviewed the possible contributory factors.  Reviewed management; including possible medical and surgical options; including home remedies.  Recommendations for PCP follow up visit; any need to go to the ER.    Recommended lifestyle modifications with a proper, healthy diet, good hydration but during the day. Reducing bladder irritants.   Benefits of regular exercise  Continue Gemtesa;   PSA today  RTC one year with PSA

## 2025-01-07 ENCOUNTER — LAB VISIT (OUTPATIENT)
Dept: LAB | Facility: HOSPITAL | Age: 70
End: 2025-01-07
Attending: INTERNAL MEDICINE
Payer: MEDICARE

## 2025-01-07 ENCOUNTER — OFFICE VISIT (OUTPATIENT)
Dept: OPTOMETRY | Facility: CLINIC | Age: 70
End: 2025-01-07
Payer: MEDICARE

## 2025-01-07 ENCOUNTER — ANTI-COAG VISIT (OUTPATIENT)
Dept: CARDIOLOGY | Facility: CLINIC | Age: 70
End: 2025-01-07
Payer: MEDICARE

## 2025-01-07 DIAGNOSIS — I63.10 CEREBROVASCULAR ACCIDENT (CVA) DUE TO EMBOLISM OF PRECEREBRAL ARTERY: ICD-10-CM

## 2025-01-07 DIAGNOSIS — C61 PROSTATE CANCER: ICD-10-CM

## 2025-01-07 DIAGNOSIS — H43.11 VITREOUS HEMORRHAGE, RIGHT: ICD-10-CM

## 2025-01-07 DIAGNOSIS — H11.133 CONJUNCTIVAL PIGMENTATIONS OF BOTH EYES: ICD-10-CM

## 2025-01-07 DIAGNOSIS — N32.81 OVERACTIVE BLADDER: ICD-10-CM

## 2025-01-07 DIAGNOSIS — E11.59 HYPERTENSION ASSOCIATED WITH DIABETES: Chronic | ICD-10-CM

## 2025-01-07 DIAGNOSIS — Z92.3 HISTORY OF EXTERNAL BEAM RADIATION THERAPY: ICD-10-CM

## 2025-01-07 DIAGNOSIS — E11.3593 TYPE 2 DIABETES MELLITUS WITH BOTH EYES AFFECTED BY PROLIFERATIVE RETINOPATHY WITHOUT MACULAR EDEMA, WITH LONG-TERM CURRENT USE OF INSULIN: ICD-10-CM

## 2025-01-07 DIAGNOSIS — Z79.4 TYPE 2 DIABETES MELLITUS WITH HYPERGLYCEMIA, WITH LONG-TERM CURRENT USE OF INSULIN: Primary | Chronic | ICD-10-CM

## 2025-01-07 DIAGNOSIS — H52.4 PRESBYOPIA: ICD-10-CM

## 2025-01-07 DIAGNOSIS — Z91.81 AT RISK FOR FALLING: ICD-10-CM

## 2025-01-07 DIAGNOSIS — E11.65 TYPE 2 DIABETES MELLITUS WITH HYPERGLYCEMIA, WITH LONG-TERM CURRENT USE OF INSULIN: Primary | Chronic | ICD-10-CM

## 2025-01-07 DIAGNOSIS — Z79.01 LONG TERM (CURRENT) USE OF ANTICOAGULANTS: ICD-10-CM

## 2025-01-07 DIAGNOSIS — I15.2 HYPERTENSION ASSOCIATED WITH DIABETES: Chronic | ICD-10-CM

## 2025-01-07 DIAGNOSIS — Z79.4 TYPE 2 DIABETES MELLITUS WITH BOTH EYES AFFECTED BY PROLIFERATIVE RETINOPATHY WITHOUT MACULAR EDEMA, WITH LONG-TERM CURRENT USE OF INSULIN: ICD-10-CM

## 2025-01-07 DIAGNOSIS — Z79.01 LONG TERM (CURRENT) USE OF ANTICOAGULANTS: Primary | ICD-10-CM

## 2025-01-07 DIAGNOSIS — H11.153 PINGUECULA OF BOTH EYES: ICD-10-CM

## 2025-01-07 DIAGNOSIS — R39.9 LOWER URINARY TRACT SYMPTOMS (LUTS): ICD-10-CM

## 2025-01-07 LAB
COMPLEXED PSA SERPL-MCNC: 1.7 NG/ML (ref 0–4)
INR PPP: 3.7 (ref 0.8–1.2)
PROTHROMBIN TIME: 36.7 SEC (ref 9–12.5)

## 2025-01-07 PROCEDURE — 92015 DETERMINE REFRACTIVE STATE: CPT | Mod: S$GLB,,, | Performed by: OPTOMETRIST

## 2025-01-07 PROCEDURE — 99999 PR PBB SHADOW E&M-EST. PATIENT-LVL III: CPT | Mod: PBBFAC,,, | Performed by: OPTOMETRIST

## 2025-01-07 PROCEDURE — 1126F AMNT PAIN NOTED NONE PRSNT: CPT | Mod: CPTII,S$GLB,, | Performed by: OPTOMETRIST

## 2025-01-07 PROCEDURE — 84153 ASSAY OF PSA TOTAL: CPT | Performed by: NURSE PRACTITIONER

## 2025-01-07 PROCEDURE — 92012 INTRM OPH EXAM EST PATIENT: CPT | Mod: S$GLB,,, | Performed by: OPTOMETRIST

## 2025-01-07 PROCEDURE — 93793 ANTICOAG MGMT PT WARFARIN: CPT | Mod: S$GLB,,,

## 2025-01-07 PROCEDURE — 1100F PTFALLS ASSESS-DOCD GE2>/YR: CPT | Mod: CPTII,S$GLB,, | Performed by: OPTOMETRIST

## 2025-01-07 PROCEDURE — 36415 COLL VENOUS BLD VENIPUNCTURE: CPT | Performed by: INTERNAL MEDICINE

## 2025-01-07 PROCEDURE — 3288F FALL RISK ASSESSMENT DOCD: CPT | Mod: CPTII,S$GLB,, | Performed by: OPTOMETRIST

## 2025-01-07 PROCEDURE — 85610 PROTHROMBIN TIME: CPT | Performed by: INTERNAL MEDICINE

## 2025-01-07 NOTE — PROGRESS NOTES
HPI    DLS: 12/17/2024 - Dr. Tapia    CC: Pt is here today for a routine eye exam. Pt states that he has no   complaints about his -vision with his glasses.     (-)Dryness  (-)Burning  (-)Itchiness  (-)Tearing  (-)Flashes  (+)Floaters   (-)Photophobia  (-)Eye Pain      Diabetic: yes  A1C: 7.0 on 10/15/2024    Contact Lens: no    Eye Meds: none    Last edited by Karissa Stephens MA on 1/7/2025  2:03 PM.            Assessment /Plan     For exam results, see Encounter Report.    Type 2 diabetes mellitus with hyperglycemia, with long-term current use of insulin  Hypertension associated with diabetes  Type 2 diabetes mellitus with both eyes affected by proliferative retinopathy without macular edema, with long-term current use of insulin  Vitreous hemorrhage, right   Follow up with Dr. Gamino as scheduled    Pinguecula of both eyes  Conjunctival pigmentations of both eyes   Monitor    Presbyopia   Rx specs    RTC 1 year, sooner PRN

## 2025-01-07 NOTE — PROGRESS NOTES
[facility]    01/07/2025 4:17 PM    Assessment/Plan:    Patient presents today with supratherapeutic INR.    Assessment of patient findings and chart review: Supratherapeutic INR could be due to increase in alcohol intake.     Recommendation for patient's warfarin regimen: Hold dose today then resume current maintenance dose    Recommend repeat INR in 2 weeks  _________________________________________________________________    Kamlesh Diane (69 y.o.) is followed by the [facility].    Anticoagulation Summary  As of 1/7/2025      INR goal:  2.0-3.0   TTR:  71.2% (6.6 y)   INR used for dosing:  3.7 (1/7/2025)   Warfarin maintenance plan:  2.5 mg (5 mg x 0.5) every Mon; 5 mg (5 mg x 1) all other days   Weekly warfarin total:  32.5 mg   Plan last modified:  Miguel Angel Rouse, PharmD (2/16/2023)   Next INR check:  1/21/2025   Target end date:  --    Indications    Long term (current) use of anticoagulants [Z79.01]  Cerebrovascular accident (CVA) due to embolism of precerebral artery (Resolved) [I63.10]                 Anticoagulation Episode Summary       INR check location:  Clinic Lab    Preferred lab:  --    Send INR reminders to:  [facility] COUMADIN MONITORING POOL    Comments:  [facility]          Anticoagulation Care Providers       Provider Role Specialty Phone number    Debora Ontiveros MD [facility] Internal Medicine [phone]

## 2025-01-09 ENCOUNTER — TELEPHONE (OUTPATIENT)
Dept: UROLOGY | Facility: CLINIC | Age: 70
End: 2025-01-09
Payer: MEDICARE

## 2025-01-09 NOTE — TELEPHONE ENCOUNTER
Patient notified that his PSA was great; stable.   No concern for Prostate Cancer   We will see him next year.

## 2025-01-28 ENCOUNTER — PATIENT MESSAGE (OUTPATIENT)
Dept: PRIMARY CARE CLINIC | Facility: CLINIC | Age: 70
End: 2025-01-28
Payer: MEDICARE

## 2025-01-28 ENCOUNTER — OFFICE VISIT (OUTPATIENT)
Dept: OPHTHALMOLOGY | Facility: CLINIC | Age: 70
End: 2025-01-28
Payer: MEDICARE

## 2025-01-28 ENCOUNTER — LAB VISIT (OUTPATIENT)
Dept: LAB | Facility: HOSPITAL | Age: 70
End: 2025-01-28
Attending: INTERNAL MEDICINE
Payer: MEDICARE

## 2025-01-28 ENCOUNTER — CLINICAL SUPPORT (OUTPATIENT)
Dept: OPHTHALMOLOGY | Facility: CLINIC | Age: 70
End: 2025-01-28
Payer: MEDICARE

## 2025-01-28 DIAGNOSIS — H43.11 VITREOUS HEMORRHAGE, RIGHT: Primary | ICD-10-CM

## 2025-01-28 DIAGNOSIS — H35.033 HYPERTENSIVE RETINOPATHY, BILATERAL: ICD-10-CM

## 2025-01-28 DIAGNOSIS — Z79.01 LONG TERM (CURRENT) USE OF ANTICOAGULANTS: ICD-10-CM

## 2025-01-28 DIAGNOSIS — E11.3593 TYPE 2 DIABETES MELLITUS WITH BOTH EYES AFFECTED BY PROLIFERATIVE RETINOPATHY WITHOUT MACULAR EDEMA, WITH LONG-TERM CURRENT USE OF INSULIN: ICD-10-CM

## 2025-01-28 DIAGNOSIS — Z79.4 TYPE 2 DIABETES MELLITUS WITH BOTH EYES AFFECTED BY PROLIFERATIVE RETINOPATHY WITHOUT MACULAR EDEMA, WITH LONG-TERM CURRENT USE OF INSULIN: ICD-10-CM

## 2025-01-28 LAB
INR PPP: 1.9 (ref 0.8–1.2)
PROTHROMBIN TIME: 20 SEC (ref 9–12.5)

## 2025-01-28 PROCEDURE — 92134 CPTRZ OPH DX IMG PST SGM RTA: CPT | Mod: S$GLB,,, | Performed by: OPHTHALMOLOGY

## 2025-01-28 PROCEDURE — 1101F PT FALLS ASSESS-DOCD LE1/YR: CPT | Mod: CPTII,S$GLB,, | Performed by: OPHTHALMOLOGY

## 2025-01-28 PROCEDURE — 92014 COMPRE OPH EXAM EST PT 1/>: CPT | Mod: S$GLB,,, | Performed by: OPHTHALMOLOGY

## 2025-01-28 PROCEDURE — 36415 COLL VENOUS BLD VENIPUNCTURE: CPT | Performed by: INTERNAL MEDICINE

## 2025-01-28 PROCEDURE — 99999 PR PBB SHADOW E&M-EST. PATIENT-LVL III: CPT | Mod: PBBFAC,,, | Performed by: OPHTHALMOLOGY

## 2025-01-28 PROCEDURE — 92235 FLUORESCEIN ANGRPH MLTIFRAME: CPT | Mod: S$GLB,,, | Performed by: OPHTHALMOLOGY

## 2025-01-28 PROCEDURE — 1159F MED LIST DOCD IN RCRD: CPT | Mod: CPTII,S$GLB,, | Performed by: OPHTHALMOLOGY

## 2025-01-28 PROCEDURE — 85610 PROTHROMBIN TIME: CPT | Performed by: INTERNAL MEDICINE

## 2025-01-28 PROCEDURE — 3288F FALL RISK ASSESSMENT DOCD: CPT | Mod: CPTII,S$GLB,, | Performed by: OPHTHALMOLOGY

## 2025-01-28 PROCEDURE — 1160F RVW MEDS BY RX/DR IN RCRD: CPT | Mod: CPTII,S$GLB,, | Performed by: OPHTHALMOLOGY

## 2025-01-28 RX ADMIN — FLUORESCEIN 500 MG: 500 INJECTION INTRAVENOUS at 02:01

## 2025-01-28 NOTE — PROGRESS NOTES
HPI     dm      oct      FA       Additional comments: DM    Last bs  100  Last A1c   unsure    Oct   FA  Pt has noticed a decrease in va ou  x last visit     Dls  12/17/24          Last edited by Bisi Juarez on 1/28/2025  2:09 PM.          OCT - NO ME OU    [place] FA 1/25  - OD - no obvious active NV under VH  OS ST NV mostly regressed      A/P    1. PDR OU  T2 uncontrolled on insulin  No ME OU  Watch ST fibrosis  PRP OU  11/20 12/24 - increased VH OD x 2 weeks,but settling per patient.  BP was high, also on coumadin    No active NV on FA  Monitor for improvement  IF NI next visit, then consider PPV    HOB elevated    2. VH OS   Minimal    3. PCIOL OU    4. HTN Ret OU  BS/BP/chol control    5. Prior CVA   On coumadin      8 weeks OCT and dilate OD

## 2025-01-29 ENCOUNTER — TELEPHONE (OUTPATIENT)
Dept: PRIMARY CARE CLINIC | Facility: CLINIC | Age: 70
End: 2025-01-29

## 2025-01-29 ENCOUNTER — ANTI-COAG VISIT (OUTPATIENT)
Dept: CARDIOLOGY | Facility: CLINIC | Age: 70
End: 2025-01-29
Payer: MEDICARE

## 2025-01-29 ENCOUNTER — OFFICE VISIT (OUTPATIENT)
Dept: PRIMARY CARE CLINIC | Facility: CLINIC | Age: 70
End: 2025-01-29
Payer: MEDICARE

## 2025-01-29 VITALS
OXYGEN SATURATION: 98 % | TEMPERATURE: 98 F | SYSTOLIC BLOOD PRESSURE: 135 MMHG | HEART RATE: 101 BPM | DIASTOLIC BLOOD PRESSURE: 65 MMHG | HEIGHT: 73 IN | BODY MASS INDEX: 21.51 KG/M2

## 2025-01-29 DIAGNOSIS — N32.81 OVERACTIVE BLADDER: ICD-10-CM

## 2025-01-29 DIAGNOSIS — E78.5 HYPERLIPIDEMIA ASSOCIATED WITH TYPE 2 DIABETES MELLITUS: ICD-10-CM

## 2025-01-29 DIAGNOSIS — R74.8 ELEVATED ALKALINE PHOSPHATASE LEVEL: ICD-10-CM

## 2025-01-29 DIAGNOSIS — N18.31 CKD STAGE 3A, GFR 45-59 ML/MIN: ICD-10-CM

## 2025-01-29 DIAGNOSIS — E55.9 VITAMIN D DEFICIENCY: ICD-10-CM

## 2025-01-29 DIAGNOSIS — I70.0 AORTIC ATHEROSCLEROSIS: ICD-10-CM

## 2025-01-29 DIAGNOSIS — E11.69 HYPERLIPIDEMIA ASSOCIATED WITH TYPE 2 DIABETES MELLITUS: ICD-10-CM

## 2025-01-29 DIAGNOSIS — R25.1 TREMOR: ICD-10-CM

## 2025-01-29 DIAGNOSIS — Z85.46 HISTORY OF PROSTATE CANCER: ICD-10-CM

## 2025-01-29 DIAGNOSIS — Z79.01 LONG TERM (CURRENT) USE OF ANTICOAGULANTS: Primary | ICD-10-CM

## 2025-01-29 DIAGNOSIS — I69.359 CVA, OLD, HEMIPARESIS: ICD-10-CM

## 2025-01-29 DIAGNOSIS — N25.81 HYPERPARATHYROIDISM, SECONDARY RENAL: ICD-10-CM

## 2025-01-29 DIAGNOSIS — Z86.19 HISTORY OF HELICOBACTER PYLORI INFECTION: Primary | ICD-10-CM

## 2025-01-29 DIAGNOSIS — Z79.4 TYPE 2 DIABETES MELLITUS WITH HYPERGLYCEMIA, WITH LONG-TERM CURRENT USE OF INSULIN: ICD-10-CM

## 2025-01-29 DIAGNOSIS — Z79.01 CHRONIC ANTICOAGULATION: ICD-10-CM

## 2025-01-29 DIAGNOSIS — M81.0 AGE RELATED OSTEOPOROSIS, UNSPECIFIED PATHOLOGICAL FRACTURE PRESENCE: ICD-10-CM

## 2025-01-29 DIAGNOSIS — E11.65 TYPE 2 DIABETES MELLITUS WITH HYPERGLYCEMIA, WITH LONG-TERM CURRENT USE OF INSULIN: ICD-10-CM

## 2025-01-29 DIAGNOSIS — I10 BENIGN ESSENTIAL HYPERTENSION: ICD-10-CM

## 2025-01-29 PROCEDURE — 99999 PR PBB SHADOW E&M-EST. PATIENT-LVL IV: CPT | Mod: PBBFAC,,, | Performed by: INTERNAL MEDICINE

## 2025-01-29 NOTE — PROGRESS NOTES
Subjective:       Patient ID: Kamlesh Diane is a 69 y.o. male.    Chief Complaint: Follow-up    HPI  Lives w/ sister, Olinda, who sets up meds for him. Accompanied by brother who brings pt to clinic and lives in [place]. Walks w/ walker.   Had a fall 11/2024 w/ L rib fx. Reports no more falls. Ribs do not hurt anymore.     Symptomatic anemia from GIB hospitalized 1/2024.  S/p h pylori treatment. Needs documentation of clearance (w/ repeat EGD to document ulcer healing).  Last Hgb 12/9/24 12.1  No blood in stool or urine.   Last C scope 5/31/22.  Impression:            - Preparation of the colon was fair.                          - One 3 mm polyp in the cecum, removed with a                          jumbo cold forceps. Resected and retrieved.                          - One 5 mm polyp in the transverse colon, removed                          with a cold snare. Resected and retrieved.                          - Non-bleeding external hemorrhoids.   Repeat in 3 yrs.  No nausea/vomiting/diarrhea/constipation/abdominal pain.     Dm2 - Levemir 12 u qhs, jardiance 10mg daily.   DEXCOM in place. No low sugars.   Follows w/ Soledad Mckeon NP - LOV 10/21/24  A1C - 10/15/24 7  EYE - PDR OU. Follows w/ Dr. Tapia - LOV yesterday. F/u in 8 weeks.  MAC - neg 10/15/24    CKD3a - Cr baseline 1.5-1.9, most recently Cr 1.5, eGFR 50.1 12/9/24. Alk phos elevated 12/9/24 at 158 (new)  hyperPTH - on calcitriol 0.25mcg daily. PTH 12/9/24 161.4  Follows w/ Dr. Chahal, outside nephrologist. Missed appt a few weeks ago.     HLD - atorvastatin 40mg daily. Aortic atherosclerosis on CT A/P  LDL - 6/3/24 74    HTN - losartan 25mg daily, amlodipine 10mg daily, coreg 3.125mg BID   Did not take BP meds this morning.     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   Has been on coumadin for embolic stroke. Follows w/ coumadin clinic. INR yesterday was 1.9.  Walks w/ walker.     OAB - gemtesa 75mg  "daily. Wakes up once in the morning to urinate.   Prostate CA s/p radiation. Diagnostic PSA 1/7/25 1.7  LOV w/ Lyly Munroe NP 1/3/25. F/u in 1 yr.    Tremors and spells of generalized shaking w/o LOC. Saw Dr. Kong in neuro. Referred to movement d/o clinic.  EEG 12/17/24  Normal EEG fo the waking state.     Osteoporosis - Prolia q 6 months. Due today but have to reschedule due to appt.   Last DEXA 3/30/23 - Osteoporosis w/ high fx risk.    Review of Systems  Comprehensive review of systems otherwise negative. See history/subjective section for more details.    Objective:      Physical Exam    /65   Pulse 101   Temp 97.9 °F (36.6 °C) (Oral)   Ht 6' 1" (1.854 m)   SpO2 98%   BMI 21.51 kg/m²     Gen - A+OX4, NAD, slurred speech (chronic).  HEENT - PERRL, OP clear. MMM.   Neck - no LAD  CV - tachycardic. No m/r  Chest - CTAB, no crackles. Normal work of breathing.   Abd - S/NT/ND/+BS  Ext - 2+ B radial pulses. No LE edema.   Neuro/MSK - antalgic gait. Walks w/ a walker. Some RUE spasticity. 4-5 RUE and RLE m strength and 5/5 LUE and LLE m strength. No spinal tenderness to palpation.      Previous labs reviewed.       Assessment/Plan     Kamlesh was seen today for follow-up.    Diagnoses and all orders for this visit:    History of Helicobacter pylori infection - s/p treatment. H/o GIB a yr ago. Needs documentation of eradication.  -     H. pylori antigen, stool; Future    Type 2 diabetes mellitus with hyperglycemia, with long-term current use of insulin - cont current meds. Repeat A1c.   -     Hemoglobin A1C; Future  -     Microalbumin/Creatinine Ratio, Urine; Future    CKD stage 3a, GFR 45-59 ml/min - has appt w/ Dr. Chahal 2/11.  -     Comprehensive Metabolic Panel; Future    Hyperparathyroidism, secondary renal - cont calcitriol.    Hyperlipidemia associated with type 2 diabetes mellitus - cont statin. LDL goal of <70.  -     Lipid Panel; Future    Elevated alkaline phosphatase level  -     " GAMMA GT; Future  -     Comprehensive Metabolic Panel; Future  -     Vitamin D; Future    Vitamin D deficiency  -     Vitamin D; Future    Benign essential hypertension - hasn't taken meds this AM.     Aortic atherosclerosis - cont statin as above.     CVA, old, hemiparesis - DM and BP control as above. Cont statin. Likely will inc to atorva 80.    Chronic anticoagulation  Per coumadin clinic instructions:  Per coumadin clinic's instruction since INR (coumadin level) was 1.9, which is a little below goal:  Boost dose today (1/29/25 - Wednesday) to 7.5mg then resume current maintenance dose  Recommend repeat INR in 2 weeks.    Overactive bladder - doing well. Cont gemtesa.    History of prostate cancer - PSA ok. Continue f/u w/ urology. Due next year.     Tremor - resolved. EEG neg.    Age related osteoporosis, unspecified pathological fracture presence - rescheduled prolia injection.     Advance Care Planning     Date: 01/29/2025  Patient did not wish or was not able to name a surrogate decision maker or provide an Advance Care Plan.     I spent a total of 45 minutes on the day of the visit.This includes face to face time and non-face to face time preparing to see the patient (eg, review of tests), obtaining and/or reviewing separately obtained history, documenting clinical information in the electronic or other health record, independently interpreting results and communicating results to the patient/family/caregiver, or care coordinator.      Follow up in about 4 months (around 5/29/2025).      Debora Ontiveros MD  Department of Internal Medicine - [facility]  9:45 AM

## 2025-01-29 NOTE — TELEPHONE ENCOUNTER
Spoke w/ pt's niece, she is contacting pt's brother that brings him to clinic.  Pt had appt at infusion for prolia shot at 10:45 that overlapped PCP appt     Pt can delay Prolia shot at this time, but needs to be seen by PCP.   PCP aware.

## 2025-01-29 NOTE — PATIENT INSTRUCTIONS
Per coumadin clinic's instruction since INR (coumadin level) was 1.9, which is a little below goal:  Boost dose today (1/29/25 - Wednesday) to 7.5mg then resume current maintenance dose  Recommend repeat INR in 2 weeks.

## 2025-01-29 NOTE — TELEPHONE ENCOUNTER
----- Message from Debora Ontiveros MD sent at 1/29/2025 12:29 PM CST -----  Will you make the ACP documents? Thanks!

## 2025-01-29 NOTE — PROGRESS NOTES
[facility]    2025 9:26 AM    Assessment/Plan:    Patient presents today with subtherapeutic  INR.    Assessment of patient findings and chart review: Reviewed    Recommendation for patient's warfarin regimen: Boost dose today to 7.5mg then resume current maintenance dose    Recommend repeat INR in 2 weeks  _________________________________________________________________    Kamlesh Diane (69 y.o.) is followed by the [facility].    Anticoagulation Summary  As of 2025      INR goal:  2.0-3.0   TTR:  71.0% (6.7 y)   INR used for dosin.9 (2025)   Warfarin maintenance plan:  2.5 mg (5 mg x 0.5) every Mon; 5 mg (5 mg x 1) all other days   Weekly warfarin total:  32.5 mg   Plan last modified:  Miguel Angel Rouse, PharmD (2023)   Next INR check:  2025   Target end date:  --    Indications    Long term (current) use of anticoagulants [Z79.01]  Cerebrovascular accident (CVA) due to embolism of precerebral artery (Resolved) [I63.10]                 Anticoagulation Episode Summary       INR check location:  Clinic Lab    Preferred lab:  --    Send INR reminders to:  [facility] COUMADIN MONITORING POOL    Comments:  [facility]          Anticoagulation Care Providers       Provider Role Specialty Phone number    Debora Ontiveros MD [facility] Internal Medicine [phone]

## 2025-02-04 ENCOUNTER — OFFICE VISIT (OUTPATIENT)
Facility: CLINIC | Age: 70
End: 2025-02-04
Payer: MEDICARE

## 2025-02-04 DIAGNOSIS — R27.0 ATAXIA DUE TO ACUTE CEREBROVASCULAR DISEASE: Primary | ICD-10-CM

## 2025-02-04 DIAGNOSIS — I67.89 ATAXIA DUE TO ACUTE CEREBROVASCULAR DISEASE: Primary | ICD-10-CM

## 2025-02-04 DIAGNOSIS — R25.1 TREMOR: ICD-10-CM

## 2025-02-04 PROCEDURE — 99999 PR PBB SHADOW E&M-EST. PATIENT-LVL II: CPT | Mod: PBBFAC,,, | Performed by: PSYCHIATRY & NEUROLOGY

## 2025-02-04 PROCEDURE — 99213 OFFICE O/P EST LOW 20 MIN: CPT | Mod: S$GLB,,, | Performed by: PSYCHIATRY & NEUROLOGY

## 2025-02-04 NOTE — PROGRESS NOTES
[facility]IC NEW CONSULT NOTE    PCP/Referring Provider:   Kalee Gonsales MD  [address] [place] [address]  Date of Service: 2/4/2025    Chief Complaint: Tremors    HPI: Kamlesh Diane is a  69 y.o. male with PMH most notable for stroke , presenting for evaluation.    Stroke in 2006. Tremor spell a few months ago. Only on right side. Lasted about 1 minute. No LOC. Had normal EEG.    He always has tremors with action in right hand, present since stroke.     CT HEAD 2016: Encephalomalacia in the right cerebellum, bilateral occipital, right parietal, right frontal, and right temporal lobes is similar to previous.     Current Medications:  Outpatient Encounter Medications as of 2/4/2025   Medication Sig Dispense Refill    ACCU-CHEK JUSTIN PLUS TEST STRP Strp TEST FOUR TIMES DAILY AS DIRECTED 400 strip 3    alcohol swabs (ALCOHOL PREP PADS) PadM Use 4 times a day. 400 each 3    atorvastatin (LIPITOR) 40 MG tablet TAKE 1 TABLET(40 MG) BY MOUTH EVERY DAY 90 tablet 3    blood-glucose meter kit Use as instructed 1 each 0    blood-glucose meter,continuous (DEXCOM G7 ) Misc USE AS DIRECTED 1 each 1    blood-glucose sensor (DEXCOM G7 SENSOR) Adriana CHANGE EVERY 10 DAYS 3 each 11    carvediloL (COREG) 3.125 MG tablet TAKE 1 TABLET(3.125 MG) BY MOUTH TWICE DAILY WITH MEALS 180 tablet 3    empagliflozin (JARDIANCE) 10 mg tablet Take 1 tablet (10 mg total) by mouth once daily. 90 tablet 3    ergocalciferol (ERGOCALCIFEROL) 50,000 unit Cap TAKE 1 CAPSULE BY MOUTH EVERY 7 DAYS 12 capsule 3    FEROSUL 325 mg (65 mg iron) Tab tablet TAKE 1 TABLET BY MOUTH EVERY DAY WITH BREAKFAST 30 tablet 0    lancets Misc 1 lancet by Misc.(Non-Drug; Combo Route) route 4 (four) times daily. 400 each 3    lancing device Misc 1 Device by Misc.(Non-Drug; Combo Route) route 2 (two) times daily with meals. 1 each 0    LEVEMIR FLEXTOUCH U100 INSULIN 100 unit/mL (3 mL) InPn pen Inject 12 units at night    "   losartan (COZAAR) 25 MG tablet TAKE 1 TABLET(25 MG) BY MOUTH DAILY 90 tablet 3    pantoprazole (PROTONIX) 40 MG tablet Take 1 tablet (40 mg total) by mouth 2 (two) times daily before meals. 60 tablet 0    pen needle, diabetic (BD ULTRA-FINE SHORT PEN NEEDLE) 31 gauge x 5/16" Ndle USE THREE TIMES DAILY AS DIRECTED 200 each 2    vibegron 75 mg Tab Take 1 tablet (75 mg total) by mouth Daily. 90 tablet 3    warfarin (COUMADIN) 5 MG tablet TAKE 1 TABLET BY MOUTH ON , , , AND SUNDAYS OR AS DIRECTED BY COUMADIN CLINIC 42 tablet 3    [DISCONTINUED] amantadine HCL (SYMMETREL) 100 mg capsule Take 1 capsule (100 mg total) by mouth 2 (two) times daily. 180 capsule 3    [] fluorescein 500 mg/5 mL (10 %) injection 500 mg        No facility-administered encounter medications on file as of 2025.       Past Medical History:  Patient Active Problem List   Diagnosis    Type 2 diabetes mellitus with hyperglycemia, with long-term current use of insulin    Screening PSA (prostate specific antigen)    H/O: CVA (cerebrovascular accident)    Long term (current) use of anticoagulants    Elevated PSA    Cancer of prostate with low recurrence risk (stage T1-2a and Garner < 7 and PSA < 10)    Tremor    Bone disorder    Peripheral artery disease    Hypertension associated with diabetes    Hypertensive retinopathy, bilateral    Stage 3b chronic kidney disease    Senile osteoporosis    Symptomatic anemia    Leucocytosis    Hypertensive heart disease with heart failure    Type 2 diabetes mellitus with both eyes affected by proliferative retinopathy without macular edema, with long-term current use of insulin    Vitreous hemorrhage, right       Past Surgical History:  Past Surgical History:   Procedure Laterality Date    CATARACT EXTRACTION      COLONOSCOPY N/A 2022    Procedure: COLONOSCOPY;  Surgeon: Juan Miguel Quintanilla MD;  Location: [facility] ([address] [place] [address]);  Service: Endoscopy;  Laterality: N/A;  Fully " Vaccinatd, Coumadin hold per Coumadin clinic see 5/2 note for 5 days prior with Lovenox bridge - sm  prep instr portal and mailed - sm    ESOPHAGOGASTRODUODENOSCOPY N/A 1/13/2024    Procedure: EGD (ESOPHAGOGASTRODUODENOSCOPY);  Surgeon: Inga Arcos MD;  Location: [facility] ([address] [place] [address]);  Service: Gastroenterology;  Laterality: N/A;    FRACTURE SURGERY      hips x 2 - last fall was 2 yrs ago.    NE EVAL,SWALLOW FUNCTION,CINE/VIDEO RECORD  4/1/2022            Social:  Social History     Socioeconomic History    Marital status: Single   Occupational History    Occupation: worked in the hospital previously.    Tobacco Use    Smoking status: Never    Smokeless tobacco: Never   Substance and Sexual Activity    Alcohol use: Yes     Comment: a few shots once a week.     Drug use: Yes     Types: Marijuana    Sexual activity: Not Currently     Social Drivers of Health     Financial Resource Strain: Low Risk  (1/18/2024)    Overall Financial Resource Strain (CARDIA)     Difficulty of Paying Living Expenses: Not hard at all   Food Insecurity: No Food Insecurity (1/18/2024)    Hunger Vital Sign     Worried About Running Out of Food in the Last Year: Never true     Ran Out of Food in the Last Year: Never true   Transportation Needs: No Transportation Needs (1/18/2024)    PRAPARE - Transportation     Lack of Transportation (Medical): No     Lack of Transportation (Non-Medical): No   Physical Activity: Inactive (1/18/2024)    Exercise Vital Sign     Days of Exercise per Week: 0 days     Minutes of Exercise per Session: 10 min   Stress: No Stress Concern Present (1/18/2024)    [employer] - Occupational Stress Questionnaire     Feeling of Stress : Not at all   Housing Stability: Unknown (1/18/2024)    Housing Stability Vital Sign     Unable to Pay for Housing in the Last Year: No     Number of Places Lived in the Last Year: 1     Unstable Housing in the Last Year: Patient declined       Family  History:  Family History   Problem Relation Name Age of Onset    Hypertension Father      Heart attack Father      Breast cancer Sister      Diabetes Brother Kamaljit     Hypertension Brother Kamaljit        PHYSICAL:  There were no vitals taken for this visit.    General Medical Examination:  General: Good hygiene, appropriate appearance.  HEENT: Normocephalic, atraumatic.   Ext: No clubbing, cyanosis, or edema.     Mental Status:  Mood/Affect: Appropriate/congruent.  Level of consciousness: Awake, alert.  Orientation: Oriented to person, place, time and situation.  Language: Normal fluency, able to name, repeat, and follow complex multi-step commands      Motor:   BUE without drift  Tone: normal  Bradykinesia: none    Coordination:   -Finger to nose: moderate severe ataxia in RUE    Laboratory Data:    Lab Results   Component Value Date    TSH 1.535 08/17/2023     Lab Results   Component Value Date    [id number] 326 01/12/2024         Imaging:  No results found. However, due to the size of the patient record, not all encounters were searched. Please check Results Review for a complete set of results.    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 Recent spell of right arm shaking possibly a focal seizure, but normal EEG and he has had no recurrence.     Can consider seizure drug if recurs. Discussed seizure precautions.     Can stop amantadine as this appeared in chart in 2018 without a clear indication. I would not expect this to help his ataxia.    RTC PRN    I spent a total of 21 minutes on the day of the visit.  This includes face to face time and non-face to face time preparing to see the patient (eg, review of tests), obtaining and/or reviewing separately obtained history, documenting clinical information in the electronic or other health  record, independently interpreting results and communicating results to the patient/family/caregiver, or care coordinator.          Andrea Dai MD  [facility]  Department of Neurology  Movement Disorders

## 2025-02-11 ENCOUNTER — INFUSION (OUTPATIENT)
Dept: INFECTIOUS DISEASES | Facility: HOSPITAL | Age: 70
End: 2025-02-11
Payer: MEDICARE

## 2025-02-11 VITALS
RESPIRATION RATE: 21 BRPM | HEART RATE: 99 BPM | OXYGEN SATURATION: 96 % | HEIGHT: 73 IN | SYSTOLIC BLOOD PRESSURE: 138 MMHG | TEMPERATURE: 98 F | DIASTOLIC BLOOD PRESSURE: 80 MMHG | WEIGHT: 166.31 LBS | BODY MASS INDEX: 22.04 KG/M2

## 2025-02-11 DIAGNOSIS — M81.0 SENILE OSTEOPOROSIS: Primary | ICD-10-CM

## 2025-02-11 PROCEDURE — 96372 THER/PROPH/DIAG INJ SC/IM: CPT

## 2025-02-11 PROCEDURE — 63600175 PHARM REV CODE 636 W HCPCS: Mod: JZ,TB | Performed by: INTERNAL MEDICINE

## 2025-02-11 RX ADMIN — DENOSUMAB 60 MG: 60 INJECTION SUBCUTANEOUS at 02:02

## 2025-02-13 ENCOUNTER — TELEPHONE (OUTPATIENT)
Dept: ENDOSCOPY | Facility: HOSPITAL | Age: 70
End: 2025-02-13
Payer: MEDICARE

## 2025-02-13 ENCOUNTER — LAB VISIT (OUTPATIENT)
Dept: LAB | Facility: HOSPITAL | Age: 70
End: 2025-02-13
Attending: INTERNAL MEDICINE
Payer: MEDICARE

## 2025-02-13 ENCOUNTER — ANTI-COAG VISIT (OUTPATIENT)
Dept: CARDIOLOGY | Facility: CLINIC | Age: 70
End: 2025-02-13
Payer: MEDICARE

## 2025-02-13 ENCOUNTER — OFFICE VISIT (OUTPATIENT)
Dept: GASTROENTEROLOGY | Facility: CLINIC | Age: 70
End: 2025-02-13
Payer: MEDICARE

## 2025-02-13 VITALS
SYSTOLIC BLOOD PRESSURE: 143 MMHG | HEART RATE: 84 BPM | WEIGHT: 169.06 LBS | HEIGHT: 73 IN | DIASTOLIC BLOOD PRESSURE: 91 MMHG | BODY MASS INDEX: 22.4 KG/M2

## 2025-02-13 DIAGNOSIS — N18.31 CKD STAGE 3A, GFR 45-59 ML/MIN: ICD-10-CM

## 2025-02-13 DIAGNOSIS — Z79.01 LONG TERM (CURRENT) USE OF ANTICOAGULANTS: Primary | ICD-10-CM

## 2025-02-13 DIAGNOSIS — F12.20 CANNABIS DEPENDENCE, UNCOMPLICATED: ICD-10-CM

## 2025-02-13 DIAGNOSIS — Z86.19 HISTORY OF HELICOBACTER PYLORI INFECTION: Primary | ICD-10-CM

## 2025-02-13 DIAGNOSIS — E78.5 HYPERLIPIDEMIA ASSOCIATED WITH TYPE 2 DIABETES MELLITUS: ICD-10-CM

## 2025-02-13 DIAGNOSIS — Z79.4 TYPE 2 DIABETES MELLITUS WITH HYPERGLYCEMIA, WITH LONG-TERM CURRENT USE OF INSULIN: ICD-10-CM

## 2025-02-13 DIAGNOSIS — E55.9 VITAMIN D DEFICIENCY: ICD-10-CM

## 2025-02-13 DIAGNOSIS — R74.8 ELEVATED ALKALINE PHOSPHATASE LEVEL: ICD-10-CM

## 2025-02-13 DIAGNOSIS — Z79.01 LONG TERM (CURRENT) USE OF ANTICOAGULANTS: ICD-10-CM

## 2025-02-13 DIAGNOSIS — E11.65 TYPE 2 DIABETES MELLITUS WITH HYPERGLYCEMIA, WITH LONG-TERM CURRENT USE OF INSULIN: ICD-10-CM

## 2025-02-13 DIAGNOSIS — I73.9 PERIPHERAL ARTERY DISEASE: Chronic | ICD-10-CM

## 2025-02-13 DIAGNOSIS — K63.5 POLYP OF COLON, UNSPECIFIED PART OF COLON, UNSPECIFIED TYPE: ICD-10-CM

## 2025-02-13 DIAGNOSIS — Z87.11 HISTORY OF GASTRIC ULCER: ICD-10-CM

## 2025-02-13 DIAGNOSIS — E11.69 HYPERLIPIDEMIA ASSOCIATED WITH TYPE 2 DIABETES MELLITUS: ICD-10-CM

## 2025-02-13 LAB
25(OH)D3+25(OH)D2 SERPL-MCNC: 48 NG/ML (ref 30–96)
ALBUMIN SERPL BCP-MCNC: 3.8 G/DL (ref 3.5–5.2)
ALP SERPL-CCNC: 75 U/L (ref 40–150)
ALT SERPL W/O P-5'-P-CCNC: 31 U/L (ref 10–44)
ANION GAP SERPL CALC-SCNC: 11 MMOL/L (ref 8–16)
AST SERPL-CCNC: 27 U/L (ref 10–40)
BILIRUB SERPL-MCNC: 0.4 MG/DL (ref 0.1–1)
BUN SERPL-MCNC: 22 MG/DL (ref 8–23)
CALCIUM SERPL-MCNC: 9.7 MG/DL (ref 8.7–10.5)
CHLORIDE SERPL-SCNC: 104 MMOL/L (ref 95–110)
CHOLEST SERPL-MCNC: 146 MG/DL (ref 120–199)
CHOLEST/HDLC SERPL: 3.5 {RATIO} (ref 2–5)
CO2 SERPL-SCNC: 25 MMOL/L (ref 23–29)
CREAT SERPL-MCNC: 1.6 MG/DL (ref 0.5–1.4)
EST. GFR  (NO RACE VARIABLE): 46.4 ML/MIN/1.73 M^2
ESTIMATED AVG GLUCOSE: 169 MG/DL (ref 68–131)
GGT SERPL-CCNC: 22 U/L (ref 8–55)
GLUCOSE SERPL-MCNC: 152 MG/DL (ref 70–110)
HBA1C MFR BLD: 7.5 % (ref 4–5.6)
HDLC SERPL-MCNC: 42 MG/DL (ref 40–75)
HDLC SERPL: 28.8 % (ref 20–50)
INR PPP: 2.7 (ref 0.8–1.2)
LDLC SERPL CALC-MCNC: 79 MG/DL (ref 63–159)
NONHDLC SERPL-MCNC: 104 MG/DL
POTASSIUM SERPL-SCNC: 4.2 MMOL/L (ref 3.5–5.1)
PROT SERPL-MCNC: 7.6 G/DL (ref 6–8.4)
PROTHROMBIN TIME: 27.3 SEC (ref 9–12.5)
SODIUM SERPL-SCNC: 140 MMOL/L (ref 136–145)
TRIGL SERPL-MCNC: 125 MG/DL (ref 30–150)

## 2025-02-13 PROCEDURE — 80053 COMPREHEN METABOLIC PANEL: CPT | Performed by: INTERNAL MEDICINE

## 2025-02-13 PROCEDURE — 99999 PR PBB SHADOW E&M-EST. PATIENT-LVL IV: CPT | Mod: PBBFAC,,,

## 2025-02-13 PROCEDURE — 83036 HEMOGLOBIN GLYCOSYLATED A1C: CPT | Performed by: INTERNAL MEDICINE

## 2025-02-13 PROCEDURE — 82977 ASSAY OF GGT: CPT | Performed by: INTERNAL MEDICINE

## 2025-02-13 PROCEDURE — 82306 VITAMIN D 25 HYDROXY: CPT | Performed by: INTERNAL MEDICINE

## 2025-02-13 PROCEDURE — 80061 LIPID PANEL: CPT | Performed by: INTERNAL MEDICINE

## 2025-02-13 PROCEDURE — 85610 PROTHROMBIN TIME: CPT | Performed by: INTERNAL MEDICINE

## 2025-02-13 PROCEDURE — 36415 COLL VENOUS BLD VENIPUNCTURE: CPT | Performed by: INTERNAL MEDICINE

## 2025-02-13 RX ORDER — PANTOPRAZOLE SODIUM 40 MG/1
40 TABLET, DELAYED RELEASE ORAL DAILY
Qty: 90 TABLET | Refills: 3 | Status: SHIPPED | OUTPATIENT
Start: 2025-02-13 | End: 2026-02-08

## 2025-02-13 NOTE — TELEPHONE ENCOUNTER
Pt. Seen in Clinic today and request to schedule pt. For EGD/Colon given to Nurse. Called pt. In Lobby twice with no answer. Will call pt. On phone on Monday.

## 2025-02-13 NOTE — TELEPHONE ENCOUNTER
"/Colonoscopy  Received: Today  Elizabeth Johnston NP  P [facility]  Caller: Unspecified (Today,  3:20 PM)  Procedure: EGD/Colonoscopy    Diagnosis: Surveillance colonoscopy - Hx of colon polyps, Peptic ulcer disease, and hx of h.pylori    Procedure Timin-12 weeks    *If within 4 weeks selected, please brayan as high priority*    *If greater than 12 weeks, please select "5-12 weeks" and delay sending until 3 months prior to requested date*    Location: Any Site    Additional Scheduling Information: Blood thinners    Prep Specifications:Standard prep    Is the patient taking a GLP-1 Agonist:no    Have you attached a patient to this message: yes  "

## 2025-02-13 NOTE — PATIENT INSTRUCTIONS
For GERD/Reflux:     Take your PPI 30-45 minutes before your first protein containing meal (breakfast) every day.       Remain upright for at least 3 hours after eating.      Elevate the head of the bed for nighttime.      Avoid foods that you have noticed make your symptoms worse (possible triggers include: peppermint, alcohol, chocolate, caffeine, spicy foods, greasy/fried foods, acidic foods-citrus).

## 2025-02-13 NOTE — PROGRESS NOTES
Gastroenterology Clinic Consultation Note    Reason for Visit:  The primary encounter diagnosis was History of Helicobacter pylori infection. Diagnoses of Cannabis dependence, uncomplicated, History of gastric ulcer, Polyp of colon, unspecified part of colon, unspecified type, and Peripheral artery disease were also pertinent to this visit.    PCP:   Debora Ontiveros   [address] [facility] / [place] [id number]      Initial HPI   This is a 69 y.o. male presenting for GI followup. Referred from his PCP for further discussion given his history of H. Pylori. Presents with brother (Jose) who helps care for the patient. Patient with EGD done 1/13/2024 that noted gastric and duodenal ulcers with H. Pylori positive. Believes that he completed the antibiotics. No followup EGD performed. Family not certain if he is still taking Protonix BID. H. Pylori stool still pending from PCP. Is currently on Warfarin for PAD. Does still smoke cannabis.       ROS:  Review of Systems   Constitutional:  Negative for chills, fever, malaise/fatigue and weight loss.   HENT:  Negative for sore throat.    Eyes:  Negative for redness.   Gastrointestinal:  Negative for abdominal pain, blood in stool, constipation, diarrhea, heartburn, melena, nausea and vomiting.   Skin:  Negative for rash.   Neurological:  Negative for dizziness, loss of consciousness and weakness.        Medical History:  has a past medical history of CVA (cerebral vascular accident), Diabetes, Diabetic retinopathy, Elevated PSA, Hypertension, Prostate cancer, and Stroke.    Surgical History:  has a past surgical history that includes Fracture surgery; Cataract extraction; pr eval,swallow function,cine/video record (4/1/2022); Colonoscopy (N/A, 5/31/2022); and Esophagogastroduodenoscopy (N/A, 1/13/2024).    Family History: family history includes Breast cancer in his sister; Diabetes in his brother; Heart  "attack in his father; Hypertension in his brother and father..       Review of patient's allergies indicates:   Allergen Reactions    Raw vegetable Hives     All Green Vegetable    Plasma protein fraction Rash       Current Outpatient Medications on File Prior to Visit   Medication Sig Dispense Refill    ACCU-CHEK JUSTIN PLUS TEST STRP Strp TEST FOUR TIMES DAILY AS DIRECTED 400 strip 3    atorvastatin (LIPITOR) 40 MG tablet TAKE 1 TABLET(40 MG) BY MOUTH EVERY DAY 90 tablet 3    blood-glucose meter kit Use as instructed 1 each 0    blood-glucose meter,continuous (DEXCOM G7 ) Misc USE AS DIRECTED 1 each 1    blood-glucose sensor (DEXCOM G7 SENSOR) Adriana CHANGE EVERY 10 DAYS 3 each 11    carvediloL (COREG) 3.125 MG tablet TAKE 1 TABLET(3.125 MG) BY MOUTH TWICE DAILY WITH MEALS 180 tablet 3    empagliflozin (JARDIANCE) 10 mg tablet Take 1 tablet (10 mg total) by mouth once daily. 90 tablet 3    ergocalciferol (ERGOCALCIFEROL) 50,000 unit Cap TAKE 1 CAPSULE BY MOUTH EVERY 7 DAYS 12 capsule 3    FEROSUL 325 mg (65 mg iron) Tab tablet TAKE 1 TABLET BY MOUTH EVERY DAY WITH BREAKFAST 30 tablet 0    lancets Misc 1 lancet by Misc.(Non-Drug; Combo Route) route 4 (four) times daily. 400 each 3    lancing device Misc 1 Device by Misc.(Non-Drug; Combo Route) route 2 (two) times daily with meals. 1 each 0    LEVEMIR FLEXTOUCH U100 INSULIN 100 unit/mL (3 mL) InPn pen Inject 12 units at night      losartan (COZAAR) 25 MG tablet TAKE 1 TABLET(25 MG) BY MOUTH DAILY 90 tablet 3    pen needle, diabetic (BD ULTRA-FINE SHORT PEN NEEDLE) 31 gauge x 5/16" Ndle USE THREE TIMES DAILY AS DIRECTED 200 each 2    vibegron 75 mg Tab Take 1 tablet (75 mg total) by mouth Daily. 90 tablet 3    warfarin (COUMADIN) 5 MG tablet TAKE 1 TABLET BY MOUTH ON WEDNESDAYS, THURSDAYS, SATURDAYS, AND SUNDAYS OR AS DIRECTED BY COUMADIN CLINIC 42 tablet 3    [DISCONTINUED] pantoprazole (PROTONIX) 40 MG tablet Take 1 tablet (40 mg total) by mouth 2 (two) times " "daily before meals. 60 tablet 0    alcohol swabs (ALCOHOL PREP PADS) PadM Use 4 times a day. 400 each 3     No current facility-administered medications on file prior to visit.         Objective Findings:    Vital Signs:  BP (!) 143/91 (BP Location: Left arm, Patient Position: Sitting)   Pulse 84   Ht 6' 1" (1.854 m)   Wt 76.7 kg (169 lb 1.5 oz)   BMI 22.31 kg/m²   Body mass index is 22.31 kg/m².    Physical Exam:  Physical Exam  Vitals and nursing note reviewed.   Constitutional:       Appearance: He is normal weight. He is not ill-appearing.   HENT:      Mouth/Throat:      Mouth: Mucous membranes are moist.      Pharynx: Oropharynx is clear.   Eyes:      General: No scleral icterus.  Abdominal:      General: Abdomen is flat. Bowel sounds are normal. There is no distension.      Palpations: Abdomen is soft. There is no mass.      Tenderness: There is no abdominal tenderness.      Hernia: No hernia is present.   Skin:     General: Skin is warm and dry.      Capillary Refill: Capillary refill takes less than 2 seconds.      Coloration: Skin is not jaundiced or pale.   Neurological:      Mental Status: He is alert. Mental status is at baseline.      Motor: Weakness present.             Labs:  Lab Results   Component Value Date    WBC 5.66 12/09/2024    HGB 12.1 (L) 12/09/2024    HCT 39.5 (L) 12/09/2024     12/09/2024    CRP 3.2 04/10/2019    CHOL 127 01/26/2024    TRIG 82 01/26/2024    HDL 38 (L) 01/26/2024    ALKPHOS 158 (H) 12/09/2024    LIPASE 14 01/12/2024    ALT 20 12/09/2024    AST 18 12/09/2024     12/09/2024    K 4.1 12/09/2024     12/09/2024    CREATININE 1.5 (H) 12/09/2024    BUN 14 12/09/2024    CO2 23 12/09/2024    TSH 1.535 08/17/2023    PSA 6.2 (H) 04/05/2018    INR 1.9 (H) 01/28/2025    HGBA1C 7.0 (H) 10/15/2024       Imaging reviewed: No pertinent imaging reviewed       Endoscopy reviewed: EGD 1/13/2024  Impression:            - Normal esophagus.                          - Small " hiatal hernia.                          - Non-bleeding gastric ulcer with a clean ulcer                          base ([place] Class III).                          - Non-bleeding duodenal ulcers with a clean ulcer                          base ([place] Class III).                          - Biopsies were taken with a cold forceps for                          Helicobacter pylori testing.   Recommendation:        - [facility] patient to [facility] for [facility] care.                          - Resume previous diet.                          - Resume Coumadin (warfarin) at prior dose today.                          - Use a proton pump inhibitor PO BID for 12 weeks.                          - Repeat upper endoscopy in 8 weeks to check                          healing.                          - Await pathology results.                          - Resume Coumadin (warfarin) today at prior dose.   Attending Participation:        I was present and participated during the entire procedure from        insertion to removal of the endoscope.   MD Inga Green MD   1/13/2024 12:08:54 PM     Colonoscopy 5/31/2022  Impression:            - Preparation of the colon was fair.                          - One 3 mm polyp in the cecum, removed with a                          jumbo cold forceps. Resected and retrieved.                          - One 5 mm polyp in the transverse colon, removed                          with a cold snare. Resected and retrieved.                          - Non-bleeding external hemorrhoids.   Recommendation:        - Repeat colonoscopy in 3 years for surveillance                          given polyps and prep quality.                          - Discharge patient to home.                          - Patient has a contact number available for                          emergencies. The signs and symptoms of potential                          delayed complications were discussed with the                           patient. Return to normal activities tomorrow.                          Written discharge instructions were provided to                          the patient.                          - Await pathology results.                          - The findings and recommendations were discussed                          with the patient's family.   Attending Participation:        I personally performed the entire procedure.   Juan Miguel Quintanilla MD   5/31/2022 3:40:55 PM     Assessment:  1. History of Helicobacter pylori infection    2. Cannabis dependence, uncomplicated    3. History of gastric ulcer    4. Polyp of colon, unspecified part of colon, unspecified type    5. Peripheral artery disease      Orders Placed This Encounter    pantoprazole (PROTONIX) 40 MG tablet         Plan:  H. Pylori stool test pending, however, uncertain if patient still taking PPI. Given hx with anticoagulation, recommended continuing PPI once daily. Referral placed for EGD for further evaluation.  Still actively smoking marijuana. No GI symptoms reported related to this.   Ref for EGD. PPI once daily.   Ref for colonoscopy. Discussed prep with brother and they wish to proceed. Will assist patient with completion.  Will need bridge with lovenox prior to procedure.       Thank you for allowing me to participate in this patient's care.    Sincerely,     Elizabeth Johnston NP  Gastroenterology Department  [facility]

## 2025-02-14 ENCOUNTER — PATIENT MESSAGE (OUTPATIENT)
Dept: CARDIOLOGY | Facility: CLINIC | Age: 70
End: 2025-02-14
Payer: MEDICARE

## 2025-02-14 ENCOUNTER — PATIENT MESSAGE (OUTPATIENT)
Dept: PRIMARY CARE CLINIC | Facility: CLINIC | Age: 70
End: 2025-02-14
Payer: MEDICARE

## 2025-02-14 DIAGNOSIS — Z86.73 H/O: STROKE: ICD-10-CM

## 2025-02-14 RX ORDER — ATORVASTATIN CALCIUM 40 MG/1
40 TABLET, FILM COATED ORAL DAILY
Qty: 90 TABLET | Refills: 3 | Status: SHIPPED | OUTPATIENT
Start: 2025-02-14

## 2025-02-14 NOTE — TELEPHONE ENCOUNTER
[facility]   Progress Note    Ria Zhang Patient Status:  Inpatient    1953 MRN [id number]   Location [facility] [address] Attending Rajat Craig, *   Hosp Day # 14 PCP Rajat Craig MD     HPI:  69 year old female with PMH of mechanical AVR and MV repair on coumadin admitted  with weakness and SOB.  INR was 5.7 and Hgb was 6.1   She was found to have a large spontaneous retroperitoneal bleed. INR was reversed.  Heparin drip started 10/11 (no bolus).  Subsequently had a drop in Hgb 10/12 with recurrent retroperitoneal bleed and heparin stopped--repeat CT on 10/14/2022 revealed increasing size of retroperitoneal bleed.  Hemoglobin however has been stable since heparin was stopped.         Subjective:  Laying in bed, denies any chest pains, states abdominal pain is resolved.  Denies any shortness of breath.  Wants to sit up in the chair.  Objective:    Intake/Output:  I/O last 3 completed shifts:  In: 403 [P.O.:120; Blood:283]  Out: -      Weight    10/10/22 0503 10/10/22 1033 10/11/22 0600 10/12/22 0600   Weight: 134.6 kg (296 lb 11.8 oz) 134.6 kg (296 lb 11.8 oz) (!) 139.4 kg (307 lb 5.1 oz) (!) 139.5 kg (307 lb 8.7 oz)        Allergies:  ALLERGIES:   Allergen Reactions   • Iodinated Diagnostic Agents HIVES         Visit Vitals  /53 (BP Location: RUE - Right upper extremity)   Pulse 66   Temp 98.1 °F (36.7 °C) (Oral)   Resp 16   Ht 5' 7\" (1.702 m)   Wt (!) 139.5 kg (307 lb 8.7 oz)   SpO2 97%   BMI 48.17 kg/m²     Telemetry: NSR,       Physical Exam:  General: A, A, O x 3, morbidly obese  HEENT: No focal deficits.  Neck:  HD cath R IJV  Cardiac: Regular rate and rhythm, S1, S2 normal, click of prosthetic heart valve heard, ejection systolic murmur at the aortic area.    Lungs: CTA anteriorly  Diminished breath sounds at the bases  Abdomen: Diff to examine, no distention, no particular tenderness. BS +  Extremities: No edema.   Skin: Warm and dry.  Refill request and MyChart message.       Laboratory/Data:    Recent Labs     12/02/21  2216 12/03/21  0748 12/04/21  0510 12/05/21  0539 09/30/22  1532 09/30/22  1950 10/01/22  0507 10/02/22  0807 10/02/22  1751 10/05/22  0652 10/06/22  0601 10/07/22  0521 10/08/22  0318 10/11/22  0552 10/13/22  0614 10/14/22  0210   CHOLESTEROL  --   --  162  --   --   --   --   --   --   --   --   --   --   --   --   --    CALCLDL  --   --  101  --   --   --   --   --   --   --   --   --   --   --   --   --    CHOHDL  --   --  3.6  --   --   --   --   --   --   --   --   --   --   --   --   --    HDL  --   --  45*  --   --   --   --   --   --   --   --   --   --   --   --   --    TRIGLYCERIDE  --   --  78  --   --   --   --   --   --   --   --   --   --   --   --   --    AST 16  --   --    < > 675*  --    < >  --    < > 123* 60* 41*  --   --   --   --    SODIUM 135  --  136   < > 134* 134*   < >  --    < > 139 137 139   < > 138 135 133*   CHLORIDE 103  --  102   < > 100 102   < >  --    < > 104 99 96*   < > 100 99 98   BUN 23*  --  24*   < > 45* 47*   < >  --    < > 28* 30* 52*   < > 49* 38* 48*   BCRAT 19  --  25   < > 13 15   < >  --    < > 12 12 14   < > 11 12 11   POTASSIUM 4.1  --  3.8   < > 5.6* 5.9*   < >  --    < > 4.1 3.5 3.4   < > 4.1 4.0 4.0   GLUCOSE 156*  --  196*   < > 302* 311*   < >  --    < > 154* 170* 170*   < > 179* 222* 183*   CREATININE 1.18*  --  0.96*   < > 3.53* 3.20*   < >  --    < > 2.26* 2.41* 3.77*   < > 4.28* 3.29* 4.20*   CALCIUM 9.6  --  9.6   < > 8.9 7.9*   < >  --    < > 8.3* 8.9 9.0   < > 9.2 8.6 8.7   TSH 2.340 0.800  --   --   --   --   --   --   --   --   --   --   --   --   --   --    HGBA1C 8.0*  --   --   --   --   --   --  6.3*  --   --   --   --   --   --   --   --    HTROPI  --   --   --   --  151* 537*  --   --   --   --   --   --   --   --   --   --     < > = values in this interval not displayed.       Recent Labs     11/27/21  1122 12/02/21  2216 09/20/22  0229 09/30/22  1532 10/12/22  0523 10/12/22  1934 10/13/22  0614  10/13/22  1301 10/14/22  0210 10/14/22  0755   WBC 8.1 6.6 7.5   < > 22.9*  --  21.7*  --  16.5*  --    RBC 4.69 4.65 4.81   < > 2.23*  --  2.40*  --  2.48*  --    HGB 13.2 13.0 13.0   < > 6.6*   < > 7.3* 6.8* 7.2* 7.4*   HCT 41.1 40.7 40.6   < > 21.2*   < > 22.5* 21.6* 23.1* 23.2*   MCV 87.6 87.5 84.4   < > 95.1  --  93.8  --  93.1  --    MCHC 32.1 31.9* 32.0   < > 31.1*  --  32.4  --  31.2*  --    RDWCV 13.6 14.0 14.3   < > 17.2*  --  17.0*  --  16.9*  --     207 200   < > 182  --  175  --  183  --    TLYMPH 27 26 23  --   --   --   --   --   --   --     < > = values in this interval not displayed.         LAST EKG:    Encounter Date: 09/30/22   Electrocardiogram 12-Lead   Result Value    Ventricular Rate EKG/Min (BPM) 91    Atrial Rate (BPM) 91    IA-Interval (MSEC) 180    QRS-Interval (MSEC) 158    QT-Interval (MSEC) 404    QTc 497    P Axis (Degrees) 55    R Axis (Degrees) -59    T Axis (Degrees) 103    REPORT TEXT      Normal sinus rhythm  with sinus arrhythmia  Left axis deviation  Left bundle branch block  Abnormal ECG  Confirmed by ERIKA NULL, ISI CHILDS ([id number]) on 10/7/2022 6:26:13 PM         LAST ECHO/ECHO STRESS:      Patient: Ria Zhang     Study Date/Time:        Oct 2 2022 9:05AM  ------------------------------------------------------------------------------  INDICATIONS:   Bacteremia.    ------------------------------------------------------------------------------  STUDY CONCLUSIONS  SUMMARY:    1.  Left ventricle: The cavity size is normal. Wall thickness is moderately  increased. Systolic function is vigorous. The ejection fraction was  measured by visual estimation. Wall motion is normal; there are no  regional wall motion abnormalities. Cannot assess LV diastolic function.  The ejection fraction is 65%.  2.  Ventricular septum: Thickness is moderately increased.  3.  Aortic valve: Not well visualized. A mechanical valve is present. Velocity  is increased. There is trivial  regurgitation. The mean systolic gradient  is 27mm Hg. The peak systolic gradient is 47mm Hg.  4.  Mitral valve: An annuloplasty ring is present. Prior procedures include  surgical repair. Leaflet separation is normal. Inflow velocity is  increased. There is mild regurgitation. The mean diastolic gradient is 9mm  Hg.  5.  Left atrium: The atrium is normal in size.  6.  Right ventricle: The cavity size is normal. Wall thickness is normal.  Systolic function is normal. Systolic pressure is severely increased. The  estimated peak pressure is 91mm Hg.  7.  Tricuspid valve: There is moderate regurgitation.  8.  Pulmonary arteries: Systolic pressure is severely increased, estimated to  be 91mm Hg.  9.  Pericardium, extracardiac: The pericardium is normal in appearance. There  is no pericardial effusion. No evidence of pleural fluid accumulation.  10. Sinus tachycardia with an intraventricular conduction delay.  11. If clinically indicated, a TRE can be considered to better visualize the  aortic and mitral valves.  IMPRESSIONS:  Severe pulmonary hypertension.      Prepared and electronically signed by  Chapito Celestin MD  10/03/2022 06:55        CATH REPORT: 5/27/20  Results for orders placed or performed during the hospital encounter of 05/27/20   Cath/PV Case    Narrative    Normal coronary arteries.  Left ventriculogram was not done.       CT ABDOMEN PELVIS WO CONTRAST   Final Result   1. Increased right retroperitoneal hemorrhage with probable acute   components given hyperattenuation.   2. Increased hemorrhage within the right psoas muscle.   3. Mild right hydronephrosis with anterior displacement of the right kidney   appears similar compared to the prior exam.   4. Cholelithiasis at the gallbladder neck and cystic duct, but no CT   evidence for acute cholecystitis.   5. Bilateral pleural effusions with adjacent atelectasis, consolidation, or   aspiration.   6. Apparent cystic structure adjacent to the duodenum in  close proximity to   the pancreas for which follow-up with contrast-enhanced MRI/MRCP is   recommended.   7. Please see above for additional observations.        Dr. Hyatt was notified of the findings over secure perfect serve message   from Dr. Muñoz on 10/12/2022 at 16:58      Electronically Signed by: KILLIAN MUÑOZ M.D.    Signed on: 10/12/2022 5:00 PM          XR ABDOMEN 1 VIEW   Final Result      1. Good position of indwelling enteric tube.      2. Nonspecific nonobstructive bowel gas pattern. No obvious evidence of   pneumoperitoneum. If there is high clinical suspicion for obstruction,   serial abdominal exams and close clinical monitoring are advised.            Electronically Signed by: SHAN STODDARD DO    Signed on: 10/10/2022 10:34 AM          XR ABDOMEN 2 VIEWS   Final Result   Paucity of bowel gas. There is no gaseous bowel distention.   Nonspecific bowel gas pattern.      Electronically Signed by: EVETTE SETH M.D.    Signed on: 10/8/2022 2:05 PM          XR ABDOMEN 1 VIEW   Final Result      1. Interval placement of enteric tube which extends over the stomach.   2. Interval decreased gaseous distention of the stomach.   3. Additional findings as described above.      Electronically Signed by: KESHA VARGAS M.D.    Signed on: 10/6/2022 4:06 PM          XR ABDOMEN 1 VIEW   Final Result       Gas distention of the stomach. No gas distention of bowel.         Electronically Signed by: EVETTE SETH M.D.    Signed on: 10/6/2022 11:53 AM          CT ABDOMEN PELVIS WO CONTRAST   Final Result       1.  Retroperitoneal hemorrhage, mostly in the right perinephric and right   anterior pararenal space.  Other compartments of hemorrhage also present as   above.  There is been redistribution of the hemorrhage compared to previous   examination with more hemorrhage seen in the left paracolic gutter and less   hemorrhage seen in the pelvis.  Overall, the volume of hemorrhage is likely   not significantly  changed.        2.  No free air or bowel obstruction.      3.  Diffuse body wall edema.        4.  Probable cholelithiasis.        5.  Bibasilar atelectasis with small bilateral pleural effusions.        6.  Other findings as above.      Electronically Signed by: VANNA RIVAS M.D.    Signed on: 10/4/2022 5:24 PM          CT HEAD WO CONTRAST   Final Result   1.  No CT evidence of acute intracranial abnormality.                  Electronically Signed by: VANNA RIVAS M.D.    Signed on: 10/3/2022 10:37 PM          XR CHEST PA OR AP 1 VIEW   Final Result   1. Cardiomegaly and central vascular congestion with findings of CHF.   2. Small bilateral pleural effusions.   3. Interval extubation of ET and enteric tubes.      Electronically Signed by: EMMA BOUCHER MD    Signed on: 10/3/2022 10:43 AM          US VASC PORTAL HEPATIC DUPLEX ONLY COMPLETE   Final Result      1.  Hepatic and portal vasculature are patent with blood flow in the   correct direction.  Other findings as above.                  Electronically Signed by: LISBETH FAROOQ M.D.    Signed on: 10/2/2022 8:08 AM          XR CHEST PA OR AP 1 VIEW   Final Result       1.  Lines and tubes as above.      2.  Findings suggest volume overload/interstitial edema.      3.  Opacities in the lower lungs may represent edema or infiltrates.  No   significant change.         Electronically Signed by: VANNA RIVAS M.D.    Signed on: 9/30/2022 9:04 PM          XR CHEST PA OR AP 1 VIEW   Final Result      Line placement, the tip is in the right atrium. Consider withdrawal by at   least 5 cm for ideal position in the SVC.      Other findings as described above.      Electronically Signed by: EVETET SETH M.D.    Signed on: 9/30/2022 7:15 PM          CT ABDOMEN PELVIS WO CONTRAST   Final Result       There is a large amount of retroperitoneal hemorrhage centered in the   right side of the abdomen, extending to the pelvis, displacing the right   kidney anteriorly.       Unable to evaluate for vascular injury with no intravenous contrast. Some   collection of blood identified adjacent to the IVC. The more distal portion   of the IVC is collapsed. Seen with hypotension.       This was discussed with Dr. Palacio 9/30/2022 at 6:11 PM.        No history of trauma..                        Electronically Signed by: EVETTE SETH M.D.    Signed on: 9/30/2022 6:14 PM          CT CHEST WO CONTRAST   Final Result   Impression:       Small right pleural effusion with atelectasis, consolidation.        Tip of a feeding tube is at the GE junction consider advancement..      Electronically Signed by: EVETTE SETH M.D.    Signed on: 9/30/2022 6:01 PM          CT HEAD WO CONTRAST   Final Result       No intracranial hemorrhage or acute cortical infarct..            Electronically Signed by: EVETTE SETH M.D.    Signed on: 9/30/2022 5:56 PM          XR CHEST PA OR AP 1 VIEW   Final Result      1.  Endotracheal and enteric tubes as above.  Of note, the enteric tube   terminates at the GE junction and can be advanced further into the stomach.          2.  Low lung volumes with bibasilar atelectasis.  Probable small right   pleural effusion.         Electronically Signed by: VANNA RIVAS M.D.    Signed on: 9/30/2022 5:56 PM          XR CHEST PA OR AP 1 VIEW   Final Result      As above.      Electronically Signed by: KESHA VARGAS M.D.    Signed on: 9/30/2022 4:18 PM          IR PERMANENT DIALYSIS CATHETER INSERTION AGE 5 OR OLDER    (Results Pending)              Medications:  Current Facility-Administered Medications   Medication Dose Route Frequency Provider Last Rate Last Admin   • ciprofloxacin (CIPRO) tablet 500 mg  500 mg Oral Daily Alvin Au MD   500 mg at 10/14/22 0837   • sodium chloride 0.9% infusion   Intravenous Continuous PRN Ediila Hyatt MD       • albumin human (SPA) 25 % injection 12.5 g  12.5 g Intravenous PRN Ghassan Velez MD       • sodium chloride 0.9% infusion    Intravenous Continuous PRN Rajat Craig MD       • acetaminophen (TYLENOL) tablet 500 mg  500 mg Oral Q6H PRN Rajat Craig MD   500 mg at 10/11/22 1826   • traMADol (ULTRAM) tablet 50 mg  50 mg Oral Q6H PRN Rajat Craig MD   50 mg at 10/14/22 0029   • [Held by provider] metoPROLOL tartrate (LOPRESSOR) tablet 25 mg  25 mg Oral 2 times per day Rajat Craig MD   25 mg at 10/11/22 2218   • docusate sodium-sennosides (SENOKOT S) 50-8.6 MG 1 tablet  1 tablet Oral BID Rajat Craig MD   1 tablet at 10/14/22 0806   • midodrine (PROAMATINE) tablet 10 mg  10 mg Oral 3 times per day Ghassan Velez MD   10 mg at 10/14/22 0532   • midodrine (PROAMATINE) tablet 5 mg  5 mg Oral Once PRN Surjit White DO       • sodium chloride (NORMAL SALINE) 0.9 % bolus 100-200 mL  100-200 mL Intravenous PRN Surjit White DO       • sodium chloride (PF) 0.9 % injection 10 mL  10 mL Intracatheter PRN Yoseph Valero MD       • pantoprazole (PROTONIX INJECT) injection 40 mg  40 mg Intravenous 2 times per day Paris Strange CNP   40 mg at 10/14/22 0807   • metoCLOPramide (REGLAN) injection 5 mg  5 mg Intravenous Q6H Harrison Garcia MD   5 mg at 10/14/22 0806   • ondansetron (ZOFRAN) injection 4 mg  4 mg Intravenous Q6H PRN Rajat Craig MD   4 mg at 10/06/22 0843   • sodium chloride (PF) 0.9 % injection 10 mL  10 mL Intracatheter PRN Yoseph Valero MD   10 mL at 10/06/22 2054   • brimonidine (ALPHAGAN) 0.2 % ophthalmic solution 1 drop  1 drop Both Eyes BID Rajat Craig MD   1 drop at 10/14/22 0806   • dorzolamide-timolol (COSOPT) 22.3-6.8 MG/ML ophthalmic solution 1 drop  1 drop Both Eyes BID Rajat Craig MD   1 drop at 10/14/22 0806   • sodium chloride (PF) 0.9 % injection 10 mL  10 mL Intracatheter PRN Yoseph Valero MD   10 mL at 10/06/22 2055   • insulin lispro (ADMELOG,HumaLOG) - Correction Dose   Subcutaneous 4x Daily  iVctor Manuel Colin MD   3  Units at 10/14/22 0829   • bisacodyl (DULCOLAX) suppository 10 mg  10 mg Rectal Daily PRN Emilee Sandoval NP   10 mg at 10/02/22 1004   • polyethylene glycol (MIRALAX) packet 17 g  17 g Oral Daily PRN Emilee Sandoval NP   17 g at 10/03/22 0922   • labetalol (NORMODYNE) injection 20 mg  20 mg Intravenous Q1H PRN iVctor Manuel Colin MD   20 mg at 10/02/22 1510   • sodium chloride 0.9 % flush bag 25 mL  25 mL Intravenous PRN Brenda Conklin NP 50 mL/hr at 10/12/22 2308 25 mL at 10/12/22 2308   • sodium chloride (PF) 0.9 % injection 2 mL  2 mL Intracatheter 2 times per day Brenda Conklin NP   2 mL at 10/14/22 0816   • sodium chloride 0.9% infusion   Intravenous Continuous PRN Brenda Conklin NP       • sodium chloride 0.9% infusion   Intravenous Continuous PRN Brenda Conklin NP       • dextrose 50 % injection 25 g  25 g Intravenous PRN Brenda Conklin NP       • dextrose 50 % injection 12.5 g  12.5 g Intravenous PRN Brenda Conklin NP       • glucagon (GLUCAGEN) injection 1 mg  1 mg Intramuscular PRN Brenda Conklin NP       • dextrose (GLUTOSE) 40 % gel 15 g  15 g Oral PRN Brenda Conklin NP       • dextrose (GLUTOSE) 40 % gel 30 g  30 g Oral PRN Brenda Conklin NP         Current Facility-Administered Medications   Medication Dose Route Frequency Provider Last Rate Last Admin   • sodium chloride 0.9% infusion   Intravenous Continuous PRN Edilia Hyatt MD       • sodium chloride 0.9% infusion   Intravenous Continuous PRN Rajat Craig MD       • sodium chloride 0.9% infusion   Intravenous Continuous PRN Brenda ANTWON Conklin NP       • sodium chloride 0.9% infusion   Intravenous Continuous PRN Brenda ANTWON Conklin NP           Assessment and Plan:  Patient Active Problem List   Diagnosis   • Type 2 diabetes mellitus (CMS/HCC)   • Presence of prosthetic heart valve   • Pleural effusion   • Personal history of pulmonary embolism   • Personal history of other venous thrombosis and embolism   •  Pancreatic cyst   • Other specified postprocedural states   • Other specified postprocedural states   • Osteoarthrosis   • Obstructive sleep apnea syndrome   • Morbid obesity with body mass index of 45.0-49.9 in adult (CMS/HCC)   • Mechanical heart valve present   • Hypotension   • Hypertension   • Hypocalcemia   • Hyperlipidemia   • Hypercholesterolemia   • Glaucoma of both eyes   • Generalized weakness   • Excessive anticoagulation   • Hip pain   • Chronic pain of both knees   • Bundle branch block, left   • Atrial fibrillation (CMS/HCC)   • Atelectasis   • MRSA infection   • History of infection due to multiple drug resistant bacterium   • Heart murmur   • History of blood transfusion   • Glaucoma   • Endometriosis   • Pancreas cyst   • Chronic pain   • Aortic stenosis   • Left foot pain   • Septic shock (CMS/HCC)         Impression:     Recurrent R sided RP bleed   H/o mech AVR--anticoagulation on hold due to retroperitoneal bleed.  Severe pulm htn on echo; likely from acute resp failure requiring intubation on admission  DREA; started on HD this admission--renal function stable, hemodialysis on hold at this time, renal to reassess on a daily basis per their notes.  Transient thrombocytopenia; seen by hematology-platelet count returned to normal.        Plan:  Remains off anticoagulation. Hbg stable   Clinically stable, no cardiac symptoms at this time, renal function stable, hemodialysis on hold.  Appears to be slowly improving.  Await hematology recommendation      Trey Stubbs MD.    [facility]

## 2025-02-14 NOTE — PROGRESS NOTES
[facility]    2025 9:39 AM    Assessment/Plan:    Patient presents today with therapeutic INR.    Assessment of patient findings and chart review: Reviewed    Recommendation for patient's warfarin regimen: Continue current maintenance dose    Recommend repeat INR in 2 weeks  _________________________________________________________________    Kamlesh Diane (69 y.o.) is followed by the [email or web address].    Anticoagulation Summary  As of 2025      INR goal:  2.0-3.0   TTR:  71.2% (6.7 y)   INR used for dosin.7 (2025)   Warfarin maintenance plan:  2.5 mg (5 mg x 0.5) every Mon; 5 mg (5 mg x 1) all other days   Weekly warfarin total:  32.5 mg   Plan last modified:  Miguel Angel Rouse, PharmD (2023)   Next INR check:  2025   Target end date:  --    Indications    Long term (current) use of anticoagulants [Z79.01]  Cerebrovascular accident (CVA) due to embolism of precerebral artery (Resolved) [I63.10]                 Anticoagulation Episode Summary       INR check location:  Clinic Lab    Preferred lab:  --    Send INR reminders to:  [facility] COUMADIN MONITORING POOL    Comments:  [facility]          Anticoagulation Care Providers       Provider Role Specialty Phone number    Debora Ontiveros MD [facility] Internal Medicine [phone]

## 2025-02-17 DIAGNOSIS — E11.65 TYPE 2 DIABETES MELLITUS WITH HYPERGLYCEMIA, WITH LONG-TERM CURRENT USE OF INSULIN: Primary | Chronic | ICD-10-CM

## 2025-02-17 DIAGNOSIS — Z79.4 TYPE 2 DIABETES MELLITUS WITH HYPERGLYCEMIA, WITH LONG-TERM CURRENT USE OF INSULIN: Primary | Chronic | ICD-10-CM

## 2025-02-17 RX ORDER — EMPAGLIFLOZIN 10 MG/1
10 TABLET, FILM COATED ORAL
Qty: 90 TABLET | Refills: 3 | Status: SHIPPED | OUTPATIENT
Start: 2025-02-17

## 2025-02-25 ENCOUNTER — TELEPHONE (OUTPATIENT)
Dept: ENDOSCOPY | Facility: HOSPITAL | Age: 70
End: 2025-02-25
Payer: MEDICARE

## 2025-02-25 NOTE — TELEPHONE ENCOUNTER
"From: Elizabeth Johnston NP   Sent: 2025   3:21 PM CST   To: [facility]   Subject: EGD/Colonoscopy                                  Procedure: EGD/Colonoscopy     Diagnosis: Surveillance colonoscopy - Hx of colon polyps, Peptic ulcer disease, and hx of h.pylori     Procedure Timin-12 weeks     *If within 4 weeks selected, please brayan as high priority*     *If greater than 12 weeks, please select "5-12 weeks" and delay sending until 3 months prior to requested date*     Location: Any Site     Additional Scheduling Information: Blood thinners     Prep Specifications:Standard prep     Is the patient taking a GLP-1 Agonist:no     Lvm with call back number  " [[email or web address]] : Ms. JACKSON LIND is a very pleasant 63 year female who comes in for follow up evaluation of bilateral knee pain. The patient has had excellent relief over the past several months with Orthovisc. The symptoms have recently started to return and she is interested in repeat injections. The pain remains located primarily in the anterior aspect of the knees and is intermittent in nature and described as aching. The pain is rated as 5/10 during today's visit, and ranges from 2-7/10. The patient's symptoms are aggravated by walking and carrying heavy items and alleviated by massage, HA injections, elevation and exercise. The patient denies any night pain, numbness/tingling, weakness, or bowel/bladder dysfunction. The patient has no other complaints at this time.

## 2025-02-27 ENCOUNTER — LAB VISIT (OUTPATIENT)
Dept: LAB | Facility: HOSPITAL | Age: 70
End: 2025-02-27
Attending: INTERNAL MEDICINE
Payer: MEDICARE

## 2025-02-27 ENCOUNTER — ANTI-COAG VISIT (OUTPATIENT)
Dept: CARDIOLOGY | Facility: CLINIC | Age: 70
End: 2025-02-27
Payer: MEDICARE

## 2025-02-27 DIAGNOSIS — Z79.01 LONG TERM (CURRENT) USE OF ANTICOAGULANTS: Primary | ICD-10-CM

## 2025-02-27 DIAGNOSIS — Z79.01 LONG TERM (CURRENT) USE OF ANTICOAGULANTS: ICD-10-CM

## 2025-02-27 LAB
INR PPP: 3.2 (ref 0.8–1.2)
PROTHROMBIN TIME: 32.2 SEC (ref 9–12.5)

## 2025-02-27 PROCEDURE — 36415 COLL VENOUS BLD VENIPUNCTURE: CPT | Performed by: INTERNAL MEDICINE

## 2025-02-27 PROCEDURE — 85610 PROTHROMBIN TIME: CPT | Performed by: INTERNAL MEDICINE

## 2025-02-27 PROCEDURE — 93793 ANTICOAG MGMT PT WARFARIN: CPT | Mod: S$GLB,,,

## 2025-02-27 NOTE — PROGRESS NOTES
[facility]    02/27/2025 3:07 PM    Assessment/Plan:    Patient presents today with supratherapeutic INR.    Assessment of patient findings and chart review: Reviewed    Recommendation for patient's warfarin regimen: Lower dose today to 2.5mg then resume current maintenance dose    Recommend repeat INR in 2 weeks  _________________________________________________________________    Kamlesh Diane (69 y.o.) is followed by the [facility].    Anticoagulation Summary  As of 2/27/2025      INR goal:  2.0-3.0   TTR:  71.1% (6.8 y)   INR used for dosing:  3.2 (2/27/2025)   Warfarin maintenance plan:  2.5 mg (5 mg x 0.5) every Mon; 5 mg (5 mg x 1) all other days   Weekly warfarin total:  32.5 mg   Plan last modified:  Miguel Angel Rouse, PharmD (2/16/2023)   Next INR check:  3/13/2025   Target end date:  --    Indications    Long term (current) use of anticoagulants [Z79.01]  Cerebrovascular accident (CVA) due to embolism of precerebral artery (Resolved) [I63.10]                 Anticoagulation Episode Summary       INR check location:  Clinic Lab    Preferred lab:  --    Send INR reminders to:  [facility] COUMADIN MONITORING POOL    Comments:  [facility]          Anticoagulation Care Providers       Provider Role Specialty Phone number    Debora Ontiveros MD [facility] Internal Medicine [phone]

## 2025-03-13 ENCOUNTER — TELEPHONE (OUTPATIENT)
Dept: ENDOSCOPY | Facility: HOSPITAL | Age: 70
End: 2025-03-13
Payer: MEDICARE

## 2025-03-13 ENCOUNTER — PATIENT MESSAGE (OUTPATIENT)
Dept: ENDOSCOPY | Facility: HOSPITAL | Age: 70
End: 2025-03-13
Payer: MEDICARE

## 2025-03-13 NOTE — TELEPHONE ENCOUNTER
"Called pt to schedule egd and colonoscopy. Lvm and portal message sent. 2nd attempt      ---- Message -----   From: Elizabeth Johnston NP   Sent: 2025   3:21 PM CST   To: [facility]   Subject: EGD/Colonoscopy                                  Procedure: EGD/Colonoscopy     Diagnosis: Surveillance colonoscopy - Hx of colon polyps, Peptic ulcer disease, and hx of h.pylori     Procedure Timin-12 weeks     *If within 4 weeks selected, please brayan as high priority*     *If greater than 12 weeks, please select "5-12 weeks" and delay sending until 3 months prior to requested date*     Location: Any Site     Additional Scheduling Information: Blood thinners     Prep Specifications:Standard prep     Is the patient taking a GLP-1 Agonist:no     Have you attached a patient to this message: yes     "

## 2025-03-21 ENCOUNTER — TELEPHONE (OUTPATIENT)
Dept: INTERNAL MEDICINE | Facility: CLINIC | Age: 70
End: 2025-03-21
Payer: MEDICARE

## 2025-03-21 NOTE — TELEPHONE ENCOUNTER
----- Message from [email or web address] sent at 3/21/2025 11:06 AM CDT -----    Patient Returning CallWho Called:pt Does the patient know what this is regarding?:need appt rescheduleWould the patient rather a call back or a response via [facility]? [facility]:[phone] Information: call back

## 2025-03-26 ENCOUNTER — TELEPHONE (OUTPATIENT)
Dept: ENDOSCOPY | Facility: HOSPITAL | Age: 70
End: 2025-03-26
Payer: MEDICARE

## 2025-03-26 VITALS — BODY MASS INDEX: 22.4 KG/M2 | HEIGHT: 73 IN | WEIGHT: 169.06 LBS

## 2025-03-26 VITALS — BODY MASS INDEX: 22.4 KG/M2 | HEIGHT: 73 IN | WEIGHT: 169 LBS

## 2025-03-26 DIAGNOSIS — Z12.11 ENCOUNTER FOR COLONOSCOPY DUE TO HISTORY OF COLONIC POLYP: Primary | ICD-10-CM

## 2025-03-26 DIAGNOSIS — Z86.0100 ENCOUNTER FOR COLONOSCOPY DUE TO HISTORY OF COLONIC POLYP: Primary | ICD-10-CM

## 2025-03-26 DIAGNOSIS — K27.9 PEPTIC ULCER DISEASE: ICD-10-CM

## 2025-03-26 DIAGNOSIS — Z86.19 HISTORY OF HELICOBACTER PYLORI INFECTION: ICD-10-CM

## 2025-03-26 DIAGNOSIS — Z12.11 COLON CANCER SCREENING: ICD-10-CM

## 2025-03-26 RX ORDER — POLYETHYLENE GLYCOL 3350, SODIUM SULFATE ANHYDROUS, SODIUM BICARBONATE, SODIUM CHLORIDE, POTASSIUM CHLORIDE 236; 22.74; 6.74; 5.86; 2.97 G/4L; G/4L; G/4L; G/4L; G/4L
4 POWDER, FOR SOLUTION ORAL ONCE
Qty: 4000 ML | Refills: 0 | Status: SHIPPED | OUTPATIENT
Start: 2025-03-26 | End: 2025-03-26

## 2025-03-26 NOTE — TELEPHONE ENCOUNTER
"Elizabeth Johnston NP  P [facility]  Caller: Unspecified (Today,  3:20 PM)  Procedure: EGD/Colonoscopy    Diagnosis: Surveillance colonoscopy - Hx of colon polyps, Peptic ulcer disease, and hx of h.pylori    Procedure Timin-12 weeks    *If within 4 weeks selected, please brayan as high priority*    *If greater than 12 weeks, please select "5-12 weeks" and delay sending until 3 months prior to requested date*    Location: Any Site    Additional Scheduling Information: Blood thinners    Prep Specifications:Standard prep    Is the patient taking a GLP-1 Agonist:no    Have you attached a patient to this message: yes  "

## 2025-03-26 NOTE — TELEPHONE ENCOUNTER
Referral for procedure from [facility]      Spoke to patient's brother to schedule procedure(s) Colonoscopy/EGD       Physician to perform procedure(s) Dr. LALIT Morgan  Date of Procedure (s) 5/2/25  Arrival Time 9:45 AM  Time of Procedure(s) 10:45 AM   Location of Procedure(s) [facility]  Type of Rx Prep sent to patient: PEG  Instructions provided to patient via [facility]    Patient was informed on the following information and verbalized understanding. Screening questionnaire reviewed with patient and complete. If procedure requires anesthesia, a responsible adult needs to be present to accompany the patient home, patient cannot drive after receiving anesthesia. Appointment details are tentative, especially check-in time. Patient will receive a prep-op call 7 days prior to confirm check-in time for procedure. If applicable the patient should contact their pharmacy to verify Rx for procedure prep is ready for pick-up. Patient was advised to call the scheduling department at [phone] if pharmacy states no Rx is available. Patient was advised to call the endoscopy scheduling department if any questions or concerns arise.       Endoscopy Scheduling Department    Pt is currently taking Coumadin/Jantoven (warfarin). Message sent to Endoscopy clearance nurse per protocol to submit Coumadin/Jantoven (warfarin) hold.

## 2025-03-27 ENCOUNTER — TELEPHONE (OUTPATIENT)
Dept: ENDOSCOPY | Facility: HOSPITAL | Age: 70
End: 2025-03-27
Payer: MEDICARE

## 2025-03-27 NOTE — TELEPHONE ENCOUNTER
Dear Coumadin clinic,    Patient has a scheduled procedure Colonoscopy/EGD on 5/2/25 and is currently taking a blood thinner. In order to ensure patient safety, we would like to confirm that the patient can place their blood thinner medication on hold for the procedure. Can he/she discontinue Coumadin/Jantoven (warfarin) for a minimum of 5 days prior to the procedure?     Thank you for your prompt reply.    [facility] Endoscopy Scheduling

## 2025-03-27 NOTE — TELEPHONE ENCOUNTER
----- Message from DRAKE Hawthorne sent at 3/26/2025 11:32 AM CDT -----  Regardin/2 BT  The patient is currently under an internal neurologist, marina Harris. Patient is followed by [facility]. Is patient okay to hold blood thinner Coumadin/Jantoven (warfarin) for their upcoming scheduled Colonoscopy/EGD on 25. Notes: Main [place]

## 2025-03-29 ENCOUNTER — HOSPITAL ENCOUNTER (EMERGENCY)
Facility: HOSPITAL | Age: 70
Discharge: HOME | End: 2025-03-30
Attending: EMERGENCY MEDICINE
Payer: MEDICARE

## 2025-03-29 DIAGNOSIS — S42.402A CLOSED FRACTURE OF LEFT ELBOW, INITIAL ENCOUNTER: ICD-10-CM
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
## 2025-03-29 DIAGNOSIS — T14.90XA TRAUMA: ICD-10-CM

## 2025-03-29 LAB
ABSOLUTE EOSINOPHIL (OHS): 0.07 K/UL
ABSOLUTE MONOCYTE (OHS): 0.43 K/UL (ref 0.3–1)
ABSOLUTE NEUTROPHIL COUNT (OHS): 6.98 K/UL (ref 1.8–7.7)
ALBUMIN SERPL BCP-MCNC: 3.5 G/DL (ref 3.5–5.2)
ALP SERPL-CCNC: 48 UNIT/L (ref 40–150)
ALT SERPL W/O P-5'-P-CCNC: 14 UNIT/L (ref 10–44)
ANION GAP (OHS): 8 MMOL/L (ref 8–16)
AST SERPL-CCNC: 15 UNIT/L (ref 11–45)
BACTERIA #/AREA URNS AUTO: ABNORMAL /HPF
BASOPHILS # BLD AUTO: 0.02 K/UL
BASOPHILS NFR BLD AUTO: 0.2 %
BILIRUB SERPL-MCNC: 0.3 MG/DL (ref 0.1–1)
BILIRUB UR QL STRIP.AUTO: NEGATIVE
BUN SERPL-MCNC: 20 MG/DL (ref 8–23)
CALCIUM SERPL-MCNC: 8.4 MG/DL (ref 8.7–10.5)
CHLORIDE SERPL-SCNC: 110 MMOL/L (ref 95–110)
CLARITY UR: CLEAR
CO2 SERPL-SCNC: 22 MMOL/L (ref 23–29)
COLOR UR AUTO: YELLOW
CREAT SERPL-MCNC: 1.5 MG/DL (ref 0.5–1.4)
ERYTHROCYTE [DISTWIDTH] IN BLOOD BY AUTOMATED COUNT: 15.5 % (ref 11.5–14.5)
GFR SERPLBLD CREATININE-BSD FMLA CKD-EPI: 50 ML/MIN/1.73/M2
GLUCOSE SERPL-MCNC: 164 MG/DL (ref 70–110)
GLUCOSE UR QL STRIP: ABNORMAL
HCT VFR BLD AUTO: 39.8 % (ref 40–54)
HCV AB SERPL QL IA: NORMAL
HGB BLD-MCNC: 12.5 GM/DL (ref 14–18)
HGB UR QL STRIP: NEGATIVE
HIV 1+2 AB+HIV1 P24 AG SERPL QL IA: NORMAL
HYALINE CASTS UR QL AUTO: 2 /LPF (ref 0–1)
IMM GRANULOCYTES # BLD AUTO: 0.03 K/UL (ref 0–0.04)
IMM GRANULOCYTES NFR BLD AUTO: 0.4 % (ref 0–0.5)
INR PPP: 2.7 (ref 0.8–1.2)
KETONES UR QL STRIP: NEGATIVE
LEUKOCYTE ESTERASE UR QL STRIP: NEGATIVE
LYMPHOCYTES # BLD AUTO: 0.91 K/UL (ref 1–4.8)
MCH RBC QN AUTO: 27.4 PG (ref 27–50)
MCHC RBC AUTO-ENTMCNC: 31.4 G/DL (ref 32–36)
MCV RBC AUTO: 87 FL (ref 82–98)
MICROSCOPIC COMMENT: ABNORMAL
NITRITE UR QL STRIP: NEGATIVE
NUCLEATED RBC (/100WBC) (OHS): 0 /100 WBC
PH UR STRIP: 6 [PH]
PLATELET # BLD AUTO: 261 K/UL (ref 150–450)
PMV BLD AUTO: 10.3 FL (ref 9.2–12.9)
POTASSIUM SERPL-SCNC: 4.3 MMOL/L (ref 3.5–5.1)
PROT SERPL-MCNC: 7 GM/DL (ref 6–8.4)
PROT UR QL STRIP: NEGATIVE
PROTHROMBIN TIME: 27.1 SECONDS (ref 9–12.5)
RBC # BLD AUTO: 4.56 M/UL (ref 4.6–6.2)
RBC #/AREA URNS AUTO: 1 /HPF (ref 0–4)
RELATIVE EOSINOPHIL (OHS): 0.8 %
RELATIVE LYMPHOCYTE (OHS): 10.8 % (ref 18–48)
RELATIVE MONOCYTE (OHS): 5.1 % (ref 4–15)
RELATIVE NEUTROPHIL (OHS): 82.7 % (ref 38–73)
SODIUM SERPL-SCNC: 140 MMOL/L (ref 136–145)
SP GR UR STRIP: 1.02
TROPONIN I SERPL HS-MCNC: <3 NG/L
UROBILINOGEN UR STRIP-ACNC: NEGATIVE EU/DL
WBC # BLD AUTO: 8.44 K/UL (ref 3.9–12.7)
WBC #/AREA URNS AUTO: 1 /HPF (ref 0–5)
YEAST UR QL AUTO: ABNORMAL /HPF

## 2025-03-29 PROCEDURE — 84484 ASSAY OF TROPONIN QUANT: CPT

## 2025-03-29 PROCEDURE — 99285 EMERGENCY DEPT VISIT HI MDM: CPT | Mod: 25

## 2025-03-29 PROCEDURE — 93010 ELECTROCARDIOGRAM REPORT: CPT | Mod: ,,, | Performed by: INTERNAL MEDICINE

## 2025-03-29 PROCEDURE — 80053 COMPREHEN METABOLIC PANEL: CPT | Performed by: EMERGENCY MEDICINE

## 2025-03-29 PROCEDURE — 81003 URINALYSIS AUTO W/O SCOPE: CPT

## 2025-03-29 PROCEDURE — 86803 HEPATITIS C AB TEST: CPT | Performed by: PHYSICIAN ASSISTANT

## 2025-03-29 PROCEDURE — 87389 HIV-1 AG W/HIV-1&-2 AB AG IA: CPT | Performed by: PHYSICIAN ASSISTANT

## 2025-03-29 PROCEDURE — 85610 PROTHROMBIN TIME: CPT

## 2025-03-29 PROCEDURE — 93005 ELECTROCARDIOGRAM TRACING: CPT

## 2025-03-29 PROCEDURE — 85025 COMPLETE CBC W/AUTO DIFF WBC: CPT

## 2025-03-29 PROCEDURE — 25000003 PHARM REV CODE 250

## 2025-03-29 RX ORDER — ACETAMINOPHEN 325 MG/1
650 TABLET ORAL
Status: COMPLETED | OUTPATIENT
Start: 2025-03-29 | End: 2025-03-29

## 2025-03-29 RX ORDER — BACITRACIN ZINC 500 [USP'U]/G
OINTMENT TOPICAL
Status: COMPLETED | OUTPATIENT
Start: 2025-03-29 | End: 2025-03-29

## 2025-03-29 RX ADMIN — BACITRACIN 1 EACH: 500 OINTMENT TOPICAL at 09:03

## 2025-03-29 RX ADMIN — ACETAMINOPHEN 650 MG: 325 TABLET ORAL at 05:03

## 2025-03-29 NOTE — PLAN OF CARE
[facility] - Emergency Dept  Discharge Assessment    Primary Care Provider: Debora Ontiveros MD     Discharge Assessment (most recent)       BRIEF DISCHARGE ASSESSMENT - 03/29/25 5032          Discharge Planning    Assessment Type Discharge Planning Assessment (P)      Resource/Environmental Concerns none (P)      Support Systems Home care staff;Family members (P)      Equipment Currently Used at Home walker, standard (P)      Current Living Arrangements home (P)      Patient/Family Anticipates Transition to home (P)      Patient/Family Anticipated Services at Transition none (P)      DME Needed Upon Discharge  none (P)      Discharge Plan A Home with family;Home (P)      Discharge Plan B Home with family (P)         Physical Activity    On average, how many days per week do you engage in moderate to strenuous exercise (like a brisk walk)? 7 days (P)      On average, how many minutes do you engage in exercise at this level? 10 min (P)         Financial Resource Strain    How hard is it for you to pay for the very basics like food, housing, medical care, and heating? Not hard at all (P)         Housing Stability    In the last 12 months, was there a time when you were not able to pay the mortgage or rent on time? No (P)      At any time in the past 12 months, were you homeless or living in a shelter (including now)? No (P)         Transportation Needs    In the past 12 months, has lack of transportation kept you from medical appointments or from getting medications? No (P)      In the past 12 months, has lack of transportation kept you from meetings, work, or from getting things needed for daily living? No (P)         Food Insecurity    Within the past 12 months, you worried that your food would run out before you got the money to buy more. Never true (P)      Within the past 12 months, the food you bought just didn't last and you didn't have money to get more. Never true (P)         Stress    Do you feel stress - tense,  restless, nervous, or anxious, or unable to sleep at night because your mind is troubled all the time - these days? Not at all (P)         Social Isolation    How often do you feel lonely or isolated from those around you?  Never (P)         Alcohol Use    Q1: How often do you have a drink containing alcohol? 2-3 times a week (P)      Q2: How many drinks containing alcohol do you have on a typical day when you are drinking? 1 or 2 (P)      Q3: How often do you have six or more drinks on one occasion? Never (P)         Utilities    In the past 12 months has the electric, gas, oil, or water company threatened to shut off services in your home? No (P)         Health Literacy    How often do you need to have someone help you when you read instructions, pamphlets, or other written material from your doctor or pharmacy? Never (P)                      Vivienne Begum LMSW  Case Management  Emergency Department  [phone]

## 2025-03-29 NOTE — ED TRIAGE NOTES
Kamlesh Diane, an 69 y.o. male presents to the ED via EMS after witnessed fall secondary to dizziness. Pt states he fell outside while using his walker. -head strike -LOC +blood thinners. Pt states he fell on L elbow. Abrasion noted to L elbow. Pt endorses 5/10 HA. Denies CP, SOB, NVD, fevers, chills, urinary complaints, dizziness. Hx of stroke, pancreatic cancer, HTN.      Chief Complaint   Patient presents with    Fall     Witnessed fall from standing,  endorses dizziness prior to fall. Denies loc and blood thinners Hx stroke and pancreatic cancer.  C/o right elbow pain     Review of patient's allergies indicates:   Allergen Reactions    Raw vegetable Hives     All Green Vegetable    Plasma protein fraction Rash     Past Medical History:   Diagnosis Date    CVA (cerebral vascular accident)     Diabetes     Diabetic retinopathy     Elevated PSA     Hypertension     Prostate cancer     Stroke

## 2025-03-29 NOTE — ED PROVIDER NOTES
Encounter Date: 3/29/2025       History     Chief Complaint   Patient presents with    Fall     Witnessed fall from standing,  endorses dizziness prior to fall. Denies loc and blood thinners Hx stroke and pancreatic cancer.  C/o right elbow pain     Patient is a 69-year-old with history of CVA on warfarin presenting due to fall with associated lightheadedness.  States that he was outside for about an hour when he got up and took 3 steps with his walker got extremely lightheaded and fell to the ground.  Denies any head trauma.  States that he landed on his left elbow.  Before the fall, patient states that he felt completely normal.  Denies any chest pain, shortness of breath, cough, congestion, fevers, body aches, chills, changes in urination, changes in bowel movements including constipation or diarrhea, abdominal pain, swelling in his legs.         Review of patient's allergies indicates:   Allergen Reactions    Raw vegetable Hives     All Green Vegetable    Plasma protein fraction Rash     Past Medical History:   Diagnosis Date    CVA (cerebral vascular accident)     Diabetes     Diabetic retinopathy     Elevated PSA     Hypertension     Prostate cancer     Stroke      Past Surgical History:   Procedure Laterality Date    CATARACT EXTRACTION      COLONOSCOPY N/A 5/31/2022    Procedure: COLONOSCOPY;  Surgeon: Juan Miguel Quintanilla MD;  Location: [facility] (4TH FLR);  Service: Endoscopy;  Laterality: N/A;  Fully Vaccinatd, Coumadin hold per Coumadin clinic see 5/2 note for 5 days prior with Lovenox bridge - sm  prep instr portal and mailed - sm    ESOPHAGOGASTRODUODENOSCOPY N/A 1/13/2024    Procedure: EGD (ESOPHAGOGASTRODUODENOSCOPY);  Surgeon: Inga Arcos MD;  Location: [facility] (2ND FLR);  Service: Gastroenterology;  Laterality: N/A;    FRACTURE SURGERY      hips x 2 - last fall was 2 yrs ago.    IL EVAL,SWALLOW FUNCTION,CINE/VIDEO RECORD  4/1/2022          Family History   Problem Relation Name Age of Onset     Hypertension Father      Heart attack Father      Breast cancer Sister      Diabetes Brother Kamaljit     Hypertension Brother Kamaljit      Social History[1]  Review of Systems  Per HPI  Physical Exam     Initial Vitals [03/29/25 1439]   BP Pulse Resp Temp SpO2   136/78 82 20 98 °F (36.7 °C) 99 %      MAP       --         Physical Exam    Constitutional: He appears well-developed and well-nourished. He is not diaphoretic. No distress.   Eyes: Conjunctivae and EOM are normal. Pupils are equal, round, and reactive to light. Right eye exhibits no discharge. Left eye exhibits no discharge. No scleral icterus.   Cardiovascular:  Normal rate, regular rhythm and normal heart sounds.           Pulmonary/Chest: Breath sounds normal. No stridor. No respiratory distress. He has no wheezes. He has no rhonchi. He has no rales.   Abdominal: Abdomen is soft. He exhibits no distension. There is no abdominal tenderness. There is no rebound and no guarding.   Musculoskeletal:         General: Tenderness present. No edema.      Comments: Left elbow abrasion and tenderness to palpation. FROM without resistance. Shoulder joints FROM. No other tenderness to palpation, deformity noted on exam.     Neurological: He is alert. GCS score is 15. GCS eye subscore is 4. GCS verbal subscore is 5. GCS motor subscore is 6.   5+ strength in upper and lower extremities  Dysmetria present on finger to nose testing   Sensation to light touch intact in upper and lower extremities  Sensation intact in all 3 quadrants of trigeminal nerve  Face symmetric with symmetric eyebrow raise and symmetric smile  Tongue midline      Skin: Skin is warm and dry.   Psychiatric: He has a normal mood and affect.         ED Course   Procedures  Labs Reviewed   URINALYSIS, REFLEX TO URINE CULTURE - Abnormal       Result Value    Color, UA Yellow      Appearance, UA Clear      pH, UA 6.0      Spec Grav UA 1.020      Protein, UA Negative      Glucose, UA 4+ (*)      Ketones, UA Negative      Bilirubin, UA Negative      Blood, UA Negative      Nitrites, UA Negative      Urobilinogen, UA Negative      Leukocyte Esterase, UA Negative     CBC WITH DIFFERENTIAL - Abnormal    WBC 8.44      RBC 4.56 (*)     HGB 12.5 (*)     HCT 39.8 (*)     MCV 87      MCH 27.4      MCHC 31.4 (*)     RDW 15.5 (*)     Platelet Count 261      MPV 10.3      Nucleated RBC 0      Neut % 82.7 (*)     Lymph % 10.8 (*)     Mono % 5.1      Eos % 0.8      Basophil % 0.2      Imm Grans % 0.4      Neut # 6.98      Lymph # 0.91 (*)     Mono # 0.43      Eos # 0.07      Baso # 0.02      Imm Grans # 0.03     PROTIME-INR - Abnormal    PT 27.1 (*)     INR 2.7 (*)    COMPREHENSIVE METABOLIC PANEL - Abnormal    Sodium 140      Potassium 4.3      Chloride 110      CO2 22 (*)     Glucose 164 (*)     BUN 20      Creatinine 1.5 (*)     Calcium 8.4 (*)     Protein Total 7.0      Albumin 3.5      Bilirubin Total 0.3      ALP 48      AST 15      ALT 14      Anion Gap 8      eGFR 50 (*)    URINALYSIS MICROSCOPIC - Abnormal    RBC, UA 1      WBC, UA 1      Bacteria, UA Rare      Yeast, UA None      Hyaline Casts, UA 2 (*)     Microscopic Comment       HEPATITIS C ANTIBODY - Normal    Hep C Ab Interp Non-Reactive     HIV 1 / 2 ANTIBODY - Normal    HIV 1/2 Ag/Ab Non-Reactive     TROPONIN I HIGH SENSITIVITY - Normal    Troponin High Sensitive <3     CBC W/ AUTO DIFFERENTIAL    Narrative:     The following orders were created for panel order CBC auto differential.  Procedure                               Abnormality         Status                     ---------                               -----------         ------                     CBC with Differential[[id number]]       Abnormal            Final result                 Please view results for these tests on the individual orders.     EKG Readings: (Independently Interpreted)   Normal axis, normal sinus rhythm, no significant ST elevations or ST depressions.  MT interval WNL.      ECG Results              EKG 12-lead (Final result)        Collection Time Result Time QRS Duration OHS QTC Calculation    03/29/25 14:48:28 03/30/25 11:01:18 88 419                     Final result by Interface, Lab In [facility] (03/30/25 11:01:26)                   Narrative:    Test Reason : W19.XXXA,    Vent. Rate :  75 BPM     Atrial Rate :  75 BPM     P-R Int : 142 ms          QRS Dur :  88 ms      QT Int : 376 ms       P-R-T Axes :  63  33  74 degrees    QTcB Int : 419 ms    Normal sinus rhythm  Early transition  Nonspecific T wave abnormality  Abnormal ECG  When compared with ECG of 18-Nov-2024 11:13,  No significant change was found  Confirmed by Tia Alcantara ([id number]) on 3/30/2025 11:01:16 AM    Referred By: AAAREFERRAL SELF           Confirmed By: Keker Maricruz                                  Imaging Results              CT Arm Elbow Without Contrast Left (Final result)  Result time 03/30/25 03:17:12      Final result by Vu Sepulveda MD (03/30/25 03:17:12)                   Impression:      No acute fracture or dislocation.    Additional findings discussed above.    Electronically signed by resident: Steve Blanchard  Date:    03/30/2025  Time:    02:53    Electronically signed by: Vu Sepulveda MD  Date:    03/30/2025  Time:    03:17               Narrative:    EXAMINATION:  CT ARM ELBOW WITHOUT CONTRAST LEFT; CT 3D RENDERING WO INDEPENDENT WORKSTATION    CLINICAL HISTORY:  Fracture, elbow;; elbow fracture;    TECHNIQUE:  Axial 0.625-mm images of the left elbow obtained without intravenous contrast.  Data submitted for coronal and sagittal reformats.    3D reconstructed images were acquired by post processing for a better visualization of the anatomic structures, with concurrent supervision and archived for permanent records.    COMPARISON:  Left elbow radiograph, 03/29/2025.    FINDINGS:  Exam quality is limited by suboptimal positioning with the patient's torso in the field of view, and  mild motion.  There is no evidence of fracture or dislocation.  There are punctate calcifications adjacent to the medial epicondyle and olecranon which are nonspecific, though potentially chronic.  Joints maintain appropriate alignment.  Minor degenerative change noted.  Bony mineralization is normal.  No significant joint effusion.  Vascular calcifications noted.  Soft tissues are otherwise unremarkable.                                       CT 3D Rendering WO Independent Workstation (Final result)  Result time 03/30/25 03:17:12      Final result by Vu Sepulveda MD (03/30/25 03:17:12)                   Impression:      No acute fracture or dislocation.    Additional findings discussed above.    Electronically signed by resident: Steve Blanchard  Date:    03/30/2025  Time:    02:53    Electronically signed by: Vu Sepulveda MD  Date:    03/30/2025  Time:    03:17               Narrative:    EXAMINATION:  CT ARM ELBOW WITHOUT CONTRAST LEFT; CT 3D RENDERING WO INDEPENDENT WORKSTATION    CLINICAL HISTORY:  Fracture, elbow;; elbow fracture;    TECHNIQUE:  Axial 0.625-mm images of the left elbow obtained without intravenous contrast.  Data submitted for coronal and sagittal reformats.    3D reconstructed images were acquired by post processing for a better visualization of the anatomic structures, with concurrent supervision and archived for permanent records.    COMPARISON:  Left elbow radiograph, 03/29/2025.    FINDINGS:  Exam quality is limited by suboptimal positioning with the patient's torso in the field of view, and mild motion.  There is no evidence of fracture or dislocation.  There are punctate calcifications adjacent to the medial epicondyle and olecranon which are nonspecific, though potentially chronic.  Joints maintain appropriate alignment.  Minor degenerative change noted.  Bony mineralization is normal.  No significant joint effusion.  Vascular calcifications noted.  Soft tissues are otherwise  unremarkable.                                       CT Head Without Contrast (Final result)  Result time 03/29/25 19:54:56      Final result by Vu Sepulveda MD (03/29/25 19:54:56)                   Impression:      No CT evidence of acute intracranial abnormality.    Generalized cerebral volume loss and chronic ischemic changes.    No acute cervical spine fracture.    Degenerative changes in the cervical spine.      Electronically signed by: Vu Sepulveda MD  Date:    03/29/2025  Time:    19:54               Narrative:    EXAMINATION:  CT HEAD WITHOUT CONTRAST; CT CERVICAL SPINE WITHOUT CONTRAST    CLINICAL HISTORY:  Headache, new or worsening (Age >= 50y);; Fall from standing >65;    TECHNIQUE:  CT images of the head and cervical spine without contrast.  Coronal and sagittal reconstructions were created for both acquisitions.    COMPARISON:  CT, 09/18/2010.    FINDINGS:  CT head:    Large region of encephalomalacia in the right MCA distribution suggestive of remote infarcts.  Additional large region of encephalomalacia in the bilateral occipital lobes and moderate to large region of encephalomalacia in the right cerebellum.  Findings are suggestive of remote infarcts.    No evidence of acute territorial infarct allowing for chronic ischemic changes.  No acute intracranial hemorrhage, mass effect, or midline shift.    Mild ex vacuo dilation of the ventricles with generalized cerebral volume loss and multifocal encephalomalacia.  No gross hydrocephalus.    No extra-axial hemorrhage or mass.    Operative changes in the calvarium.  No displaced calvarial fracture.  Old dental caries.    CT cervical spine:    Grossly normal sagittal curvature and alignment.  Moderate to advanced multilevel degenerative changes throughout the cervical spine.  Disc space height loss most notable at C6-C7.  Multilevel facet/uncovertebral arthropathy more pronounced on the left side.  No acute displaced fracture.  Mild multilevel  central canal stenosis.  No severe central canal stenosis.  Variable multilevel neural foraminal narrowing, most pronounced on the right at C3-C4 and C6-C7.  Minimal emphysema in the lung apices.  Atherosclerotic calcifications in the carotid arteries.                                       CT Cervical Spine Without Contrast (Final result)  Result time 03/29/25 19:54:56      Final result by Vu Sepulveda MD (03/29/25 19:54:56)                   Impression:      No CT evidence of acute intracranial abnormality.    Generalized cerebral volume loss and chronic ischemic changes.    No acute cervical spine fracture.    Degenerative changes in the cervical spine.      Electronically signed by: Vu Sepulveda MD  Date:    03/29/2025  Time:    19:54               Narrative:    EXAMINATION:  CT HEAD WITHOUT CONTRAST; CT CERVICAL SPINE WITHOUT CONTRAST    CLINICAL HISTORY:  Headache, new or worsening (Age >= 50y);; Fall from standing >65;    TECHNIQUE:  CT images of the head and cervical spine without contrast.  Coronal and sagittal reconstructions were created for both acquisitions.    COMPARISON:  CT, 09/18/2010.    FINDINGS:  CT head:    Large region of encephalomalacia in the right MCA distribution suggestive of remote infarcts.  Additional large region of encephalomalacia in the bilateral occipital lobes and moderate to large region of encephalomalacia in the right cerebellum.  Findings are suggestive of remote infarcts.    No evidence of acute territorial infarct allowing for chronic ischemic changes.  No acute intracranial hemorrhage, mass effect, or midline shift.    Mild ex vacuo dilation of the ventricles with generalized cerebral volume loss and multifocal encephalomalacia.  No gross hydrocephalus.    No extra-axial hemorrhage or mass.    Operative changes in the calvarium.  No displaced calvarial fracture.  Old dental caries.    CT cervical spine:    Grossly normal sagittal curvature and alignment.  Moderate  to advanced multilevel degenerative changes throughout the cervical spine.  Disc space height loss most notable at C6-C7.  Multilevel facet/uncovertebral arthropathy more pronounced on the left side.  No acute displaced fracture.  Mild multilevel central canal stenosis.  No severe central canal stenosis.  Variable multilevel neural foraminal narrowing, most pronounced on the right at C3-C4 and C6-C7.  Minimal emphysema in the lung apices.  Atherosclerotic calcifications in the carotid arteries.                                        X-Ray Elbow Complete Left (Final result)  Result time 03/29/25 17:10:15      Final result by Diogo Polanco MD (03/29/25 17:10:15)                   Impression:      Minimal displacement of the anterior fat pad.  The findings may reflect radiooccult fracture of the left elbow.  Please consider short-term follow-up.    This report was flagged in Epic as abnormal.      Electronically signed by: Diogo Polanco MD  Date:    03/29/2025  Time:    17:10               Narrative:    EXAMINATION:  XR ELBOW COMPLETE 3 VIEW LEFT    CLINICAL HISTORY:  Injury, unspecified, initial encounter    TECHNIQUE:  AP, lateral, and oblique views of the left elbow were performed.    COMPARISON:  None    FINDINGS:  The bone mineralization is within normal limits.  There is no cortical step-off.  There is no evidence of periostitis.    There is an enthesophyte overlying the olecranon process.  There is joint space narrowing.  There is minimal displacement of the anterior fat pad.    There are advanced vascular calcifications in the visualized proximal forearm.  No radiopaque foreign body is identified.                                       Medications   acetaminophen tablet 650 mg (650 mg Oral Given 3/29/25 1703)   bacitracin zinc ointment (1 each Topical (Top) Given by Other 3/29/25 2119)     Medical Decision Making  Kamlesh Diane is a 69 y.o. male with a past medical history of CVAs on Warfarin presenting to  the ED with fall and lightheadedness. History and physical exam as above. Initial vital signs stable and non-actionable. Initial work-up to include: Labs (CBC, CMP, Troponin, UA,), Imaging (xray elbow), EKG,    Differential diagnosis for this patient includes, but is not limited to: ACS, CVA, UTI, anemia, electrolyte derangement, fracture    Results of workup include EKG as above.  Troponin WNL.  Low concern for ACS.  Hemoglobin at baseline.  No leukocytosis.  Electrolytes unremarkable.  PT INR therapeutic.  CT head and CT cervical spine WNL.  Low concern for CVA.  Elbow x-ray demonstrating anterior fat pad minimally displacement.  CT of patient's elbow.  Patient lives alone however lives next door to his sister and his brother who both system at home.  However after discussion with family, family is not there enough to be able to take care of him if he was nonambulatory.  Patient uses a walker at baseline.  Patient is signed out to oncoming ED team pending CT scan of patient's elbow.  If fracture is ruled out patient can go home his brother and follow up with PCP.  If patient has a fracture, he will likely need to come in as he is unable to take care facility.        Amount and/or Complexity of Data Reviewed  Labs: ordered. Decision-making details documented in ED Course.  Radiology: ordered.    Risk  OTC drugs.              Attending Attestation:   Physician Attestation Statement for Resident:  As the supervising MD   Physician Attestation Statement: I have personally seen and examined this patient.   I agree with the above history.  -:   As the supervising MD I agree with the above PE.     As the supervising MD I agree with the above treatment, course, plan, and disposition.                Attending ED Notes:   STAFF ATTENDING PHYSICIAN NOTE:  I have individually/jointly evaluated Kamlesh Diane and discussed their ED management with the resident physician. I have also reviewed their notes, assessments, and  procedures documented.  I was present during all critical portions of any procedure(s) performed on Kamlesh Diane.   ____________________  Moises Nina MD, [facility]  Emergency Medicine Staff        ED Course as of 04/03/25 1849   Sat Mar 29, 2025   1725 PT(!): 27.1 [MB]   1725 Hemoglobin(!): 12.5 [MB]   1725 WBC: 8.44 [MB]   1753 Troponin I High Sensitivity: <3 [MB]      ED Course User Index  [MB] Familia Juarez MD                           Clinical Impression:  Final diagnoses:  [W19.XXXA] Fall (Primary)  [T14.90XA] Trauma  [S42.402A] Closed fracture of left elbow, initial encounter          ED Disposition Condition    Discharge Stable          ED Prescriptions    None       Follow-up Information       Follow up With Specialties Details Why Contact Info    [facility] Orthopedics Schedule an appointment as soon as possible for a visit in 1 week  [address] [address]  [phone]             Familia Juarez MD  Resident  03/29/25 2250         [1]   Social History  Tobacco Use    Smoking status: Never    Smokeless tobacco: Never   Substance Use Topics    Alcohol use: Yes     Comment: a few shots once a week.     Drug use: Yes     Types: Marijuana        Maximiliano Nina MD  04/03/25 2694

## 2025-03-30 VITALS
OXYGEN SATURATION: 98 % | HEIGHT: 73 IN | HEART RATE: 107 BPM | SYSTOLIC BLOOD PRESSURE: 182 MMHG | TEMPERATURE: 98 F | WEIGHT: 169 LBS | DIASTOLIC BLOOD PRESSURE: 83 MMHG | BODY MASS INDEX: 22.4 KG/M2 | RESPIRATION RATE: 18 BRPM

## 2025-03-30 LAB
OHS QRS DURATION: 88 MS
OHS QTC CALCULATION: 419 MS

## 2025-03-30 NOTE — PLAN OF CARE
Felicia reached out to pt's brother Krish [phone], to  patient. Mr. Rutherford stated he's on his way to  patient.          Vivienne Begum LMSW  Case Management  Emergency Department  [phone]

## 2025-03-30 NOTE — DISCHARGE INSTRUCTIONS
You were seen in the emergency room for a fall.  We will obtain labs and imaging that did not show that you had signs of a stroke, and your labs are very reassuring.  We did find that you had signs of an elbow fracture.  We placed her arm in the splint.  You should follow up with ortho in 1 week.  We placed the referral and included their phone number.  They should call you tomorrow.  Please return to the emergency room if you have in episode of fainting.

## 2025-03-30 NOTE — PROVIDER PROGRESS NOTES - EMERGENCY DEPT.
Encounter Date: 3/29/2025    ED Physician Progress Notes        I assumed care of patient at sign out pending: CT elbow.    68yo M, hx of stroke, uses a walker at baseline. Mechanical fall onto elbow. Minimally displaced anterior fat pad. CT of elbow pending. Lives at home. Will not be safe at home. If fracture - needs to come in because he cannot put weight on it. If not fracture, can put weight on it and can go with brother  --      Summary of Results:    CT Arm Elbow Without Contrast Left   Final Result      No acute fracture or dislocation.      Additional findings discussed above.      Electronically signed by resident: Steve Blanchard   Date:    03/30/2025   Time:    02:53      Electronically signed by: Vu Sepulveda MD   Date:    03/30/2025   Time:    03:17      CT 3D Rendering WO Independent Workstation   Final Result      No acute fracture or dislocation.      Additional findings discussed above.      Electronically signed by resident: Steve Blanchard   Date:    03/30/2025   Time:    02:53      Electronically signed by: Vu Sepulveda MD   Date:    03/30/2025   Time:    03:17      CT Head Without Contrast   Final Result      No CT evidence of acute intracranial abnormality.      Generalized cerebral volume loss and chronic ischemic changes.      No acute cervical spine fracture.      Degenerative changes in the cervical spine.         Electronically signed by: Vu Sepulveda MD   Date:    03/29/2025   Time:    19:54      CT Cervical Spine Without Contrast   Final Result      No CT evidence of acute intracranial abnormality.      Generalized cerebral volume loss and chronic ischemic changes.      No acute cervical spine fracture.      Degenerative changes in the cervical spine.         Electronically signed by: Vu Sepulveda MD   Date:    03/29/2025   Time:    19:54      X-Ray Elbow Complete Left   Final Result   Abnormal      Minimal displacement of the anterior fat pad.  The findings may reflect  radiooccult fracture of the left elbow.  Please consider short-term follow-up.      This report was flagged in Epic as abnormal.         Electronically signed by: Diogo Polanco MD   Date:    03/29/2025   Time:    17:10        .    Disposition:  discharge.

## 2025-03-30 NOTE — ED NOTES
Care assumed at this time. Pt resting comfortably in bed. Bilateral side rails raised for safety. NAD noted.

## 2025-03-31 ENCOUNTER — TELEPHONE (OUTPATIENT)
Dept: PRIMARY CARE CLINIC | Facility: CLINIC | Age: 70
End: 2025-03-31
Payer: MEDICARE

## 2025-03-31 ENCOUNTER — PATIENT OUTREACH (OUTPATIENT)
Facility: OTHER | Age: 70
End: 2025-03-31
Payer: MEDICARE

## 2025-03-31 NOTE — PROGRESS NOTES
Patient was seen in the ED on 3/30/25. Phoned patient to assist with Post ED Discharge /navigation. Spoke with patients Caregiver, Celsa, who agreed to assistance scheduling a PCP follow-up. In Basket message sent to PCP staff for scheduling assistance.  Ovidio Maldonado

## 2025-03-31 NOTE — TELEPHONE ENCOUNTER
----- Message from Ovidio Maldonado sent at 3/31/2025 11:05 AM CDT -----  Regarding: ED F/U  Good morning,,This pt was discharged from the ED on 3/30/25 and has orders to follow up with Dr Ontiveros. In compliance with Medicare 2+ Chronic Condition patient measure mandates, this patient requires a follow up appt within 7 days of ED visit d/c date. I am requesting scheduling assistance as you are booked, and I am unable to schedule pt an appt within 7 days. Thank you for your assistance. Please advise of appt date and time so that I can set an appt reminder and confirm with patient.Ovidio Maldonado

## 2025-04-01 ENCOUNTER — PATIENT OUTREACH (OUTPATIENT)
Facility: OTHER | Age: 70
End: 2025-04-01
Payer: MEDICARE

## 2025-04-01 ENCOUNTER — LAB VISIT (OUTPATIENT)
Dept: LAB | Facility: HOSPITAL | Age: 70
End: 2025-04-01
Payer: MEDICARE

## 2025-04-01 ENCOUNTER — ANTI-COAG VISIT (OUTPATIENT)
Dept: CARDIOLOGY | Facility: CLINIC | Age: 70
End: 2025-04-01
Payer: MEDICARE

## 2025-04-01 ENCOUNTER — TELEPHONE (OUTPATIENT)
Dept: PRIMARY CARE CLINIC | Facility: CLINIC | Age: 70
End: 2025-04-01
Payer: MEDICARE

## 2025-04-01 DIAGNOSIS — Z79.01 LONG TERM (CURRENT) USE OF ANTICOAGULANTS: ICD-10-CM

## 2025-04-01 DIAGNOSIS — Z79.01 LONG TERM (CURRENT) USE OF ANTICOAGULANTS: Primary | ICD-10-CM

## 2025-04-01 LAB
INR PPP: 2.7 (ref 0.8–1.2)
PROTHROMBIN TIME: 27.7 SECONDS (ref 9–12.5)

## 2025-04-01 PROCEDURE — 85610 PROTHROMBIN TIME: CPT

## 2025-04-01 PROCEDURE — 93793 ANTICOAG MGMT PT WARFARIN: CPT | Mod: S$GLB,,,

## 2025-04-01 PROCEDURE — 36415 COLL VENOUS BLD VENIPUNCTURE: CPT

## 2025-04-01 NOTE — PROGRESS NOTES
Patients's Caregiver. Kamaljit, phoned wondering if upcoming ED Discharge visit can be virtual instead of in office. In Basket message sent ti staff to advise.  Ovidio Maldonado

## 2025-04-01 NOTE — PROGRESS NOTES
Phoned patient to advise of Post ED Discharge follow-up appointment information. Patient was unavailable. Message left on voice mail informing patient of ED Follow-up 4/3/25 at 1:40 with Dr Nilton Diaz at [facility] [address] [place] [address].  Ovidio Maldonado

## 2025-04-01 NOTE — PROGRESS NOTES
[facility]    2025 4:07 PM    Assessment/Plan:    Patient presents today with therapeutic INR.    Assessment of patient findings and chart review: Reviewed.  Pt recently seen in ED 2/2 fall/dizziness, closed fracture of left elbow (surgery declined).     Recommendation for patient's warfarin regimen: Continue current maintenance dose    Recommend repeat INR in 3 weeks prior to upcoming procedure.   _________________________________________________________________    Kamlesh Diane (69 y.o.) is followed by the [facility].    Anticoagulation Summary  As of 2025      INR goal:  2.0-3.0   TTR:  71.0% (6.9 y)   INR used for dosin.7 (2025)   Warfarin maintenance plan:  2.5 mg (5 mg x 0.5) every Mon; 5 mg (5 mg x 1) all other days   Weekly warfarin total:  32.5 mg   Plan last modified:  Miguel Angel Rouse, PharmD (2023)   Next INR check:  2025   Target end date:  --    Indications    Long term (current) use of anticoagulants [Z79.01]  Cerebrovascular accident (CVA) due to embolism of precerebral artery (Resolved) [I63.10]                 Anticoagulation Episode Summary       INR check location:  Clinic Lab    Preferred lab:  --    Send INR reminders to:  [facility] COUMADIN MONITORING POOL    Comments:  [facility]          Anticoagulation Care Providers       Provider Role Specialty Phone number    Debora Ontiveros MD Responsible Internal Medicine [phone]

## 2025-04-02 ENCOUNTER — PATIENT OUTREACH (OUTPATIENT)
Facility: OTHER | Age: 70
End: 2025-04-02
Payer: MEDICARE

## 2025-04-02 NOTE — TELEPHONE ENCOUNTER
----- Message from Ovidio Maldonado sent at 4/1/2025  2:17 PM CDT -----  Regarding: ED F/U  Good Afternoon,The above patient is scheduled to see Dr Diaz on 4/3/25 for ED Discharge follow-up.Patient's Caregivers wondered if it is possible to do the visit as a virtual visit instead of in office. Please advise.Ovidio Maldonado  
Left vm today to confirm appt w/ brother.   
Spoke to family member regarding pt's appt, per Dr. Diaz, he needs to be seen in person for further evaluations. They agreed to bring pt in.   
show

## 2025-04-02 NOTE — PROGRESS NOTES
Phoned patient to advise, per PCP staff, ED follow-up appointment has to be in person visit. Patient was unavailable. Message left in detail on voice mail.  Ovidio Maldonado

## 2025-04-03 ENCOUNTER — PATIENT OUTREACH (OUTPATIENT)
Facility: OTHER | Age: 70
End: 2025-04-03
Payer: MEDICARE

## 2025-04-03 NOTE — PROGRESS NOTES
Patients Caregiver, Krish, phoned requesting to cancel the scheduled ED Discharge appointment. Patient doing better. Will keep PCP appointment in May. Appointment reminder set.  Ovidio Maldonado

## 2025-04-04 NOTE — TELEPHONE ENCOUNTER
Left vm for pt / caretaker to see if they would be agreeable to having Care @ home visit for his HFU. Portal sent as well.

## 2025-04-24 ENCOUNTER — LAB VISIT (OUTPATIENT)
Dept: LAB | Facility: HOSPITAL | Age: 70
End: 2025-04-24
Attending: INTERNAL MEDICINE
Payer: MEDICARE

## 2025-04-24 ENCOUNTER — ANTI-COAG VISIT (OUTPATIENT)
Dept: CARDIOLOGY | Facility: CLINIC | Age: 70
End: 2025-04-24
Payer: MEDICARE

## 2025-04-24 DIAGNOSIS — Z79.01 LONG TERM (CURRENT) USE OF ANTICOAGULANTS: ICD-10-CM

## 2025-04-24 DIAGNOSIS — Z79.01 LONG TERM (CURRENT) USE OF ANTICOAGULANTS: Primary | ICD-10-CM

## 2025-04-24 LAB
INR PPP: 2.4 (ref 0.8–1.2)
PROTHROMBIN TIME: 24.8 SECONDS (ref 9–12.5)

## 2025-04-24 PROCEDURE — 85610 PROTHROMBIN TIME: CPT

## 2025-04-24 PROCEDURE — 93793 ANTICOAG MGMT PT WARFARIN: CPT | Mod: S$GLB,,,

## 2025-04-24 PROCEDURE — 36415 COLL VENOUS BLD VENIPUNCTURE: CPT

## 2025-04-24 NOTE — PROGRESS NOTES
[facility]    2025 3:53 PM    Assessment/Plan:    Patient presents today with therapeutic INR.    Assessment of patient findings and chart review: Pt has upcoming EGD.  Hold warfarin 5 days prior to procedure.     Recommendation for patient's warfarin regimen: Dosing per calendar     Recommend repeat INR 25  _________________________________________________________________    Kamlesh Diane (69 y.o.) is followed by the [facility].    Anticoagulation Summary  As of 2025      INR goal:  2.0-3.0   TTR:  71.3% (6.9 y)   INR used for dosin.4 (2025)   Warfarin maintenance plan:  2.5 mg (5 mg x 0.5) every Mon; 5 mg (5 mg x 1) all other days   Weekly warfarin total:  32.5 mg   Plan last modified:  Miguel Angel Rouse, PharmD (2023)   Next INR check:  2025   Target end date:  --    Indications    Long term (current) use of anticoagulants [Z79.01]  Cerebrovascular accident (CVA) due to embolism of precerebral artery (Resolved) [I63.10]                 Anticoagulation Episode Summary       INR check location:  Clinic Lab    Preferred lab:  --    Send INR reminders to:  [facility] COUMADIN MONITORING POOL    Comments:  [facility]          Anticoagulation Care Providers       Provider Role Specialty Phone number    Deboar Ontiveros MD [facility] Internal Medicine [phone]

## 2025-04-29 ENCOUNTER — PATIENT OUTREACH (OUTPATIENT)
Facility: OTHER | Age: 70
End: 2025-04-29
Payer: MEDICARE

## 2025-04-29 ENCOUNTER — TELEPHONE (OUTPATIENT)
Dept: GASTROENTEROLOGY | Facility: CLINIC | Age: 70
End: 2025-04-29
Payer: MEDICARE

## 2025-04-29 NOTE — TELEPHONE ENCOUNTER
Copied from CRM #[id number]. Topic: General Inquiry - Patient Advice  >> 2025  4:33 PM Nannette wrote:  Pt is requesting a  call from someone in the office and is asking for a return call back soon. Thanks.     Reason for call:discuss plan of care for up coming surgery on  for cleanser      Patient's DX:     Patient requesting call back or [facility] [phone] (M)

## 2025-04-29 NOTE — PROGRESS NOTES
Phoned patient for follow-up. Patient has an upcoming appointment. Reminded patient of appointment 5/1/25 at 10:00 with J[facility]IS Reyez at [facility] [address] [place] [address]. Patient declined additional needs at this time.  Ovidio Maldonado

## 2025-04-30 ENCOUNTER — TELEPHONE (OUTPATIENT)
Dept: ENDOSCOPY | Facility: HOSPITAL | Age: 70
End: 2025-04-30
Payer: MEDICARE

## 2025-04-30 NOTE — PROGRESS NOTES
CHIEF COMPLAINT: Type 2 Diabetes     HPI: Mr. Kamlesh Diane is a 69 y.o. male who was diagnosed with Type 2 DM > 5 years.   Pt  h/o MCA stroke,  hasHTN, PAD, low bone density-on fosamax, prostate ca-radiation treatments, CKD3, CR 1.6-2.  Past Medical History:   Diagnosis Date    CVA (cerebral vascular accident)     Diabetes     Diabetic retinopathy     Elevated PSA     Hypertension     Prostate cancer     Stroke      Had fall in the past 2 weeks, hit elbow(L)   Last seen by me in fall 2024.   Being seen by me again today.   Has dexcom g7, forgot .   Arrived w/ brother.   Bg 154, 164, 152, 107 mg/dl  Lab Results   Component Value Date    HGBA1C 7.5 (H) 02/13/2025   Eating 2 meals, lunch and dinner.  loss 7#    Dietary habits:   varies  Egg ,hashbrown or biscuit, sausage   Varies- cooked meals- red beans, rice , mashed potatoes, fish and shrimp, coleslaw, chicken,  no greens  Cookies- oatmeal , oreos, chips- occ   Drinks: water, coke zero    Walks  a block once a day  CR 1.9 -1.5      PREVIOUS DIABETES MEDICATIONS TRIED  novolog   levemir   humalog (lispro)  jardiance    CURRENT DIABETIC MEDS: Levemir 12 units at night, jardiance 10 mg daily      Injections 3x a day max   Testing 4x a day max    Diabetes Management Status    Statin: Taking  ACE/ARB: Taking    Screening or Prevention Patient's value Goal Complete/Controlled?   HgA1C Testing and Control   Lab Results   Component Value Date    HGBA1C 7.5 (H) 02/13/2025      Annually/Less than 8% No   Lipid profile : 02/13/2025 Annually Yes   LDL control Lab Results   Component Value Date    LDLCALC 79.0 02/13/2025    Annually/Less than 100 mg/dl  Yes   Nephropathy screening Lab Results   Component Value Date    LABMICR 6.0 10/15/2024     Lab Results   Component Value Date    PROTEINUA Negative 03/29/2025    Annually Yes   Blood pressure BP Readings from Last 1 Encounters:   05/01/25 102/82    Less than 140/90 No   Dilated retinal exam : 01/28/2025 Annually  "Yes   Foot exam   : 03/06/2024 Annually No     REVIEW OF SYSTEMS  General: +weakness-improved, fatigue, +weight loss 7# in the past 5 months  Eyes: no double or blurred vision, eye pain, or redness  Cardiovascular: no chest pain, palpitations, edema, or murmurs.   Respiratory: no cough or dyspnea.   GI: no heartburn, nausea, or changes in bowel patterns; good appetite.   Skin: no rashes, dryness, itching, or reactions at insulin injection sites.  Neuro: + numbness, tingling, tremors, or vertigo. +hip pain (L)  Endocrine: no polyuria, polydipsia, polyphagia, heat or cold intolerance.     Vital Signs  /82 (BP Location: Left arm, Patient Position: Sitting)   Pulse 90   Ht 6' 1" (1.854 m)   Wt 75.9 kg (167 lb 5.3 oz)   SpO2 99%   BMI 22.08 kg/m²     Hemoglobin A1C   Date Value Ref Range Status   02/13/2025 7.5 (H) 4.0 - 5.6 % Final     Comment:     ADA Screening Guidelines:  5.7-6.4%  Consistent with prediabetes  >or=6.5%  Consistent with diabetes    High levels of fetal hemoglobin interfere with the HbA1C  assay. Heterozygous hemoglobin variants (HbS, HgC, etc)do  not significantly interfere with this assay.   However, presence of multiple variants may affect accuracy.     10/15/2024 7.0 (H) 4.0 - 5.6 % Final     Comment:     ADA Screening Guidelines:  5.7-6.4%  Consistent with prediabetes  >or=6.5%  Consistent with diabetes    High levels of fetal hemoglobin interfere with the HbA1C  assay. Heterozygous hemoglobin variants (HbS, HgC, etc)do  not significantly interfere with this assay.   However, presence of multiple variants may affect accuracy.     05/14/2024 7.3 (H) 4.0 - 5.6 % Final     Comment:     ADA Screening Guidelines:  5.7-6.4%  Consistent with prediabetes  >or=6.5%  Consistent with diabetes    High levels of fetal hemoglobin interfere with the HbA1C  assay. Heterozygous hemoglobin variants (HbS, HgC, etc)do  not significantly interfere with this assay.   However, presence of multiple variants " may affect accuracy.       % HEMOGLOBIN A1C   Date Value Ref Range Status   06/03/2024 7.6 (H) <5.7 % of total Hgb Final     Comment:     For someone without known diabetes, a hemoglobin A1c  value of 6.5% or greater indicates that they may have   diabetes and this should be confirmed with a follow-up   test.    For someone with known diabetes, a value <7% indicates   that their diabetes is well controlled and a value   greater than or equal to 7% indicates suboptimal   control. A1c targets should be individualized based on   duration of diabetes, age, comorbid conditions, and   other considerations.    Currently, no consensus exists regarding use of  hemoglobin A1c for diagnosis of diabetes for children.        This test was performed on the Roche delores c503 platform.  Effective 11/13/23, a change in test platforms from the  Abbott  to the Roche delores c503 may have shifted  HbA1c results compared to historical results.  Based on laboratory validation testing conducted at  [facility], the Roche platform relative to the Abbott  platform had an average increase in HbA1c value of  < or = 0.3%. This difference is within accepted   variability established by the National Glycohemoglobin  Standardization Program. Note that not all individuals  will have had a shift in their results and direct  comparisons between historical and current results for  testing conducted on different platforms is not  recommended.            Chemistry        Component Value Date/Time     03/29/2025 1802     02/13/2025 1600    K 4.3 03/29/2025 1802    K 4.2 02/13/2025 1600     03/29/2025 1802     02/13/2025 1600    CO2 22 (L) 03/29/2025 1802    CO2 25 02/13/2025 1600    BUN 20 03/29/2025 1802    CREATININE 1.5 (H) 03/29/2025 1802     (H) 03/29/2025 1802     (H) 02/13/2025 1600        Component Value Date/Time    CALCIUM 8.4 (L) 03/29/2025 1802    CALCIUM 9.7 02/13/2025 1600    ALKPHOS 48 03/29/2025 1802     ALKPHOS 75 02/13/2025 1600    AST 15 03/29/2025 1802    AST 27 02/13/2025 1600    ALT 14 03/29/2025 1802    ALT 31 02/13/2025 1600    BILITOT 0.3 03/29/2025 1802    BILITOT 0.4 02/13/2025 1600    ESTGFRAFRICA 51.1 (A) 02/09/2022 0933    EGFRNONAA 44.2 (A) 02/09/2022 0933           Lab Results   Component Value Date    TSH 1.535 08/17/2023      Lab Results   Component Value Date    CHOL 146 02/13/2025    CHOL 127 01/26/2024    CHOL 151 08/17/2023     Lab Results   Component Value Date    HDL 42 02/13/2025    HDL 38 (L) 01/26/2024    HDL 43 08/17/2023     Lab Results   Component Value Date    LDLCALC 79.0 02/13/2025    LDLCALC 72.6 01/26/2024    LDLCALC 97.6 08/17/2023     Lab Results   Component Value Date    TRIG 125 02/13/2025    TRIG 82 01/26/2024    TRIG 52 08/17/2023     Lab Results   Component Value Date    CHOLHDL 28.8 02/13/2025    CHOLHDL 29.9 01/26/2024    CHOLHDL 28.5 08/17/2023           Constitutional:  Well developed, well nourished, NAD.  ENT: External ears no masses with nose patent; normal hearing.   Neck:  Supple; trachea midline; no thyromegaly.   Cardiovascular: Normal heart sounds, no LE edema.     Lungs:  Normal effort; lungs anterior bilaterally clear to auscultation.  Abdomen:  Soft, no masses,  no hernias.  MS: No clubbing or cyanosis of nails noted  Skin: No rashes, lesions, or ulcers; no nodules.  Psychiatric: Good judgement and insight; normal mood and affect.  Neurological: intact mostly     Diabetes Foot Exam:   Protective Sensation (w/ 10 gram monofilament):  Right: Intact  Left: Intact    Visual Inspection:  Normal -  Bilateral, Dry Skin -  Bilateral, and Onychomycosis -  Bilateral  Great toes- fungal   Thickened nails, long    Pedal Pulses:   Right: Present  Left: Present    Assessment/Plan  1. Type 2 diabetes mellitus with hyperglycemia, with long-term current use of insulin  Hemoglobin A1C    Hemoglobin A1C  Follow up in 5 months w/ me   Change lantus   A1c goal less than 7.5%    Podiatry 2025   Continue regimen , levemir -discontinuing -has boxes at hoome still   Cut insulin in 1/2 night before colonoscopy         2. Type 2 diabetes mellitus with both eyes affected by proliferative retinopathy without macular edema, with long-term current use of insulin  F/u with ophthalmology  [place]       3. Symptomatic anemia  On iron pill  Stable  Colonoscopy tomorrow       4. Stage 3b chronic kidney disease  Improved  On arb/acei  On jardiance       5. Peripheral artery disease  F/u with vascular   Stable       6. Hypertensive retinopathy, bilateral  F/u with retinal specialist   Stable       7. Hypertensive heart disease with heart failure  F/u with cards  On jardiance   On bb       8. H/O: CVA (cerebrovascular accident)  On warfarin, vibegron   Stable       9. Hypertension associated with diabetes  Bp controlled  Stable   Managed per pcp

## 2025-05-01 ENCOUNTER — LAB VISIT (OUTPATIENT)
Dept: LAB | Facility: HOSPITAL | Age: 70
End: 2025-05-01
Payer: MEDICARE

## 2025-05-01 ENCOUNTER — TELEPHONE (OUTPATIENT)
Dept: ENDOSCOPY | Facility: HOSPITAL | Age: 70
End: 2025-05-01
Payer: MEDICARE

## 2025-05-01 ENCOUNTER — OFFICE VISIT (OUTPATIENT)
Dept: INTERNAL MEDICINE | Facility: CLINIC | Age: 70
End: 2025-05-01
Payer: MEDICARE

## 2025-05-01 VITALS
DIASTOLIC BLOOD PRESSURE: 82 MMHG | SYSTOLIC BLOOD PRESSURE: 102 MMHG | HEIGHT: 73 IN | BODY MASS INDEX: 22.17 KG/M2 | WEIGHT: 167.31 LBS | OXYGEN SATURATION: 99 % | HEART RATE: 90 BPM

## 2025-05-01 DIAGNOSIS — E11.3593 TYPE 2 DIABETES MELLITUS WITH BOTH EYES AFFECTED BY PROLIFERATIVE RETINOPATHY WITHOUT MACULAR EDEMA, WITH LONG-TERM CURRENT USE OF INSULIN: ICD-10-CM

## 2025-05-01 DIAGNOSIS — E11.65 TYPE 2 DIABETES MELLITUS WITH HYPERGLYCEMIA, WITH LONG-TERM CURRENT USE OF INSULIN: Chronic | ICD-10-CM

## 2025-05-01 DIAGNOSIS — I73.9 PERIPHERAL ARTERY DISEASE: Chronic | ICD-10-CM

## 2025-05-01 DIAGNOSIS — Z79.4 TYPE 2 DIABETES MELLITUS WITH HYPERGLYCEMIA, WITH LONG-TERM CURRENT USE OF INSULIN: Chronic | ICD-10-CM

## 2025-05-01 DIAGNOSIS — Z79.4 TYPE 2 DIABETES MELLITUS WITH BOTH EYES AFFECTED BY PROLIFERATIVE RETINOPATHY WITHOUT MACULAR EDEMA, WITH LONG-TERM CURRENT USE OF INSULIN: ICD-10-CM

## 2025-05-01 DIAGNOSIS — I15.2 HYPERTENSION ASSOCIATED WITH DIABETES: Chronic | ICD-10-CM

## 2025-05-01 DIAGNOSIS — E11.59 HYPERTENSION ASSOCIATED WITH DIABETES: Chronic | ICD-10-CM

## 2025-05-01 DIAGNOSIS — Z86.73 H/O: CVA (CEREBROVASCULAR ACCIDENT): Chronic | ICD-10-CM

## 2025-05-01 DIAGNOSIS — Z79.4 TYPE 2 DIABETES MELLITUS WITH HYPERGLYCEMIA, WITH LONG-TERM CURRENT USE OF INSULIN: Primary | Chronic | ICD-10-CM

## 2025-05-01 DIAGNOSIS — I11.0 HYPERTENSIVE HEART DISEASE WITH HEART FAILURE: ICD-10-CM

## 2025-05-01 DIAGNOSIS — D64.9 SYMPTOMATIC ANEMIA: ICD-10-CM

## 2025-05-01 DIAGNOSIS — H35.033 HYPERTENSIVE RETINOPATHY, BILATERAL: ICD-10-CM

## 2025-05-01 DIAGNOSIS — E11.65 TYPE 2 DIABETES MELLITUS WITH HYPERGLYCEMIA, WITH LONG-TERM CURRENT USE OF INSULIN: Primary | Chronic | ICD-10-CM

## 2025-05-01 DIAGNOSIS — N18.32 STAGE 3B CHRONIC KIDNEY DISEASE: Chronic | ICD-10-CM

## 2025-05-01 LAB
EAG (OHS): 177 MG/DL (ref 68–131)
HBA1C MFR BLD: 7.8 % (ref 4–5.6)

## 2025-05-01 PROCEDURE — 83036 HEMOGLOBIN GLYCOSYLATED A1C: CPT

## 2025-05-01 PROCEDURE — 3051F HG A1C>EQUAL 7.0%<8.0%: CPT | Mod: CPTII,S$GLB,, | Performed by: NURSE PRACTITIONER

## 2025-05-01 PROCEDURE — 1101F PT FALLS ASSESS-DOCD LE1/YR: CPT | Mod: CPTII,S$GLB,, | Performed by: NURSE PRACTITIONER

## 2025-05-01 PROCEDURE — 1159F MED LIST DOCD IN RCRD: CPT | Mod: CPTII,S$GLB,, | Performed by: NURSE PRACTITIONER

## 2025-05-01 PROCEDURE — 36415 COLL VENOUS BLD VENIPUNCTURE: CPT

## 2025-05-01 PROCEDURE — 3074F SYST BP LT 130 MM HG: CPT | Mod: CPTII,S$GLB,, | Performed by: NURSE PRACTITIONER

## 2025-05-01 PROCEDURE — 4010F ACE/ARB THERAPY RXD/TAKEN: CPT | Mod: CPTII,S$GLB,, | Performed by: NURSE PRACTITIONER

## 2025-05-01 PROCEDURE — 1160F RVW MEDS BY RX/DR IN RCRD: CPT | Mod: CPTII,S$GLB,, | Performed by: NURSE PRACTITIONER

## 2025-05-01 PROCEDURE — 1126F AMNT PAIN NOTED NONE PRSNT: CPT | Mod: CPTII,S$GLB,, | Performed by: NURSE PRACTITIONER

## 2025-05-01 PROCEDURE — 3288F FALL RISK ASSESSMENT DOCD: CPT | Mod: CPTII,S$GLB,, | Performed by: NURSE PRACTITIONER

## 2025-05-01 PROCEDURE — 99999 PR PBB SHADOW E&M-EST. PATIENT-LVL IV: CPT | Mod: PBBFAC,,, | Performed by: NURSE PRACTITIONER

## 2025-05-01 PROCEDURE — 99214 OFFICE O/P EST MOD 30 MIN: CPT | Mod: S$GLB,,, | Performed by: NURSE PRACTITIONER

## 2025-05-01 PROCEDURE — 3079F DIAST BP 80-89 MM HG: CPT | Mod: CPTII,S$GLB,, | Performed by: NURSE PRACTITIONER

## 2025-05-01 PROCEDURE — 3008F BODY MASS INDEX DOCD: CPT | Mod: CPTII,S$GLB,, | Performed by: NURSE PRACTITIONER

## 2025-05-01 RX ORDER — INSULIN GLARGINE 100 [IU]/ML
INJECTION, SOLUTION SUBCUTANEOUS
Start: 2025-05-01

## 2025-05-01 NOTE — PATIENT INSTRUCTIONS
Follow up in 5 months w/Irielle  A1c prior -5 months   A1c today   Podiatry 2025     Lab Results   Component Value Date    HGBA1C 7.5 (H) 02/13/2025     Lantus or levemir 12 units at night  Night before colonoscopy 6 units     [email or web address]  Eat fit giuseppe  Mixpanelpal giuseppe  [email or web address]    mySugr giuseppe  Glucose guide giuseppe

## 2025-05-02 ENCOUNTER — PATIENT MESSAGE (OUTPATIENT)
Dept: INTERNAL MEDICINE | Facility: CLINIC | Age: 70
End: 2025-05-02
Payer: MEDICARE

## 2025-05-02 ENCOUNTER — ANESTHESIA EVENT (OUTPATIENT)
Dept: ENDOSCOPY | Facility: HOSPITAL | Age: 70
End: 2025-05-02
Payer: MEDICARE

## 2025-05-02 ENCOUNTER — ANESTHESIA (OUTPATIENT)
Dept: ENDOSCOPY | Facility: HOSPITAL | Age: 70
End: 2025-05-02
Payer: MEDICARE

## 2025-05-02 ENCOUNTER — HOSPITAL ENCOUNTER (OUTPATIENT)
Facility: HOSPITAL | Age: 70
Discharge: HOME | End: 2025-05-02
Attending: INTERNAL MEDICINE | Admitting: INTERNAL MEDICINE
Payer: MEDICARE

## 2025-05-02 VITALS
BODY MASS INDEX: 22.13 KG/M2 | RESPIRATION RATE: 18 BRPM | SYSTOLIC BLOOD PRESSURE: 140 MMHG | HEART RATE: 85 BPM | WEIGHT: 167 LBS | OXYGEN SATURATION: 98 % | DIASTOLIC BLOOD PRESSURE: 83 MMHG | HEIGHT: 73 IN | TEMPERATURE: 97 F

## 2025-05-02 DIAGNOSIS — A04.8 H. PYLORI INFECTION: ICD-10-CM

## 2025-05-02 DIAGNOSIS — K27.9 PEPTIC ULCER: Primary | ICD-10-CM

## 2025-05-02 LAB — POCT GLUCOSE: 114 MG/DL (ref 70–110)

## 2025-05-02 PROCEDURE — G0105 COLORECTAL SCRN; HI RISK IND: HCPCS | Mod: 74 | Performed by: INTERNAL MEDICINE

## 2025-05-02 PROCEDURE — 25000003 PHARM REV CODE 250: Performed by: INTERNAL MEDICINE

## 2025-05-02 PROCEDURE — 82962 GLUCOSE BLOOD TEST: CPT | Performed by: INTERNAL MEDICINE

## 2025-05-02 PROCEDURE — 37000008 HC ANESTHESIA 1ST 15 MINUTES: Performed by: INTERNAL MEDICINE

## 2025-05-02 PROCEDURE — G0105 COLORECTAL SCRN; HI RISK IND: HCPCS | Mod: 53,GC,, | Performed by: INTERNAL MEDICINE

## 2025-05-02 PROCEDURE — 43235 EGD DIAGNOSTIC BRUSH WASH: CPT | Performed by: INTERNAL MEDICINE

## 2025-05-02 PROCEDURE — 37000009 HC ANESTHESIA EA ADD 15 MINS: Performed by: INTERNAL MEDICINE

## 2025-05-02 PROCEDURE — 63600175 PHARM REV CODE 636 W HCPCS: Performed by: NURSE ANESTHETIST, CERTIFIED REGISTERED

## 2025-05-02 PROCEDURE — 43235 EGD DIAGNOSTIC BRUSH WASH: CPT | Mod: ,,, | Performed by: INTERNAL MEDICINE

## 2025-05-02 RX ORDER — PROPOFOL 10 MG/ML
VIAL (ML) INTRAVENOUS
Status: DISCONTINUED | OUTPATIENT
Start: 2025-05-02 | End: 2025-05-02

## 2025-05-02 RX ORDER — FENTANYL CITRATE 50 UG/ML
INJECTION, SOLUTION INTRAMUSCULAR; INTRAVENOUS
Status: DISCONTINUED | OUTPATIENT
Start: 2025-05-02 | End: 2025-05-02

## 2025-05-02 RX ORDER — SODIUM CHLORIDE 9 MG/ML
INJECTION, SOLUTION INTRAVENOUS CONTINUOUS
Status: DISCONTINUED | OUTPATIENT
Start: 2025-05-02 | End: 2025-05-02 | Stop reason: HOSPADM

## 2025-05-02 RX ORDER — LIDOCAINE HYDROCHLORIDE 20 MG/ML
INJECTION, SOLUTION EPIDURAL; INFILTRATION; INTRACAUDAL; PERINEURAL
Status: DISCONTINUED | OUTPATIENT
Start: 2025-05-02 | End: 2025-05-02

## 2025-05-02 RX ADMIN — LIDOCAINE HYDROCHLORIDE 80 MG: 20 INJECTION, SOLUTION EPIDURAL; INFILTRATION; INTRACAUDAL; PERINEURAL at 11:05

## 2025-05-02 RX ADMIN — FENTANYL CITRATE 50 MCG: 50 INJECTION, SOLUTION INTRAMUSCULAR; INTRAVENOUS at 10:05

## 2025-05-02 RX ADMIN — PROPOFOL 50 MG: 10 INJECTION, EMULSION INTRAVENOUS at 11:05

## 2025-05-02 RX ADMIN — GLYCOPYRROLATE 0.2 MG: 0.2 INJECTION, SOLUTION INTRAMUSCULAR; INTRAVENOUS at 10:05

## 2025-05-02 RX ADMIN — SODIUM CHLORIDE: 0.9 INJECTION, SOLUTION INTRAVENOUS at 10:05

## 2025-05-02 RX ADMIN — PROPOFOL 150 MCG/KG/MIN: 10 INJECTION, EMULSION INTRAVENOUS at 11:05

## 2025-05-02 RX ADMIN — FENTANYL CITRATE 50 MCG: 50 INJECTION, SOLUTION INTRAMUSCULAR; INTRAVENOUS at 11:05

## 2025-05-02 NOTE — ANESTHESIA PREPROCEDURE EVALUATION
05/02/2025  Kamlesh Diane is a 69 y.o., male.  [facility]  Anesthesia Pre-Operative Evaluation       Patient Name: Kamlesh Diane  YOB: 1955  MRN: [id number]  [facility]: [id number]      Code Status: Prior   Date of Procedure: 5/2/2025  Anesthesia: Choice Procedure: Procedure(s) (LRB):  EGD (ESOPHAGOGASTRODUODENOSCOPY) (N/A)  COLONOSCOPY, SCREENING, HIGH RISK PATIENT (N/A)  Pre-Operative Diagnosis: Encounter for colonoscopy due to history of colonic polyp [Z12.11, Z86.0100]  Peptic ulcer disease [K27.9]  History of Helicobacter pylori infection [Z86.19]  Proceduralist: Surgeons and Role:     * Jaylon Morgan MD - Primary        SUBJECTIVE:   Kamlesh Diane is a 69 y.o. male who  has a past medical history of CVA (cerebral vascular accident), Diabetes, Diabetic retinopathy, Elevated PSA, Hypertension, Prostate cancer, and Stroke..     he has a current medication list which includes the following long-term medication(s): atorvastatin, blood-glucose meter, dexcom g7 , dexcom g7 sensor, carvedilol, lantus solostar u-100 insulin, lancing device, losartan, pantoprazole, and warfarin.     ALLERGIES:     Review of patient's allergies indicates:   Allergen Reactions    Raw vegetable Hives     All Green Vegetable    Plasma protein fraction Rash     LDA:          Lines/Drains/Airways       None                  Anesthesia Evaluation     Patient summary reviewed    No history of anesthetic complications   Airway   Mallampati: III  TM distance: Normal  Neck ROM: Normal ROM  Dental    (+) Intact    Pulmonary    (-) shortness of breath, sleep apnea  Cardiovascular   Exercise tolerance: good  (+) hypertension  (-) past MI, angina, CONDE    ECG reviewed    Neuro/Psych    (+) CVA residual symptoms, psychiatric history    GI/Hepatic/Renal    (+) chronic renal disease CKD, bowel prep     Endo/Other    (+) diabetes mellitus type 2 poorly controlled using insulin  Abdominal                   MEDICATIONS:     Antibiotics (From admission, onward)      None          VTE Risk Mitigation (From admission, onward)      None              Current Medications[1]       History:   There are no hospital problems to display for this patient.    Surgical History:    has a past surgical history that includes Fracture surgery; Cataract extraction; pr eval,swallow function,cine/video record (4/1/2022); Colonoscopy (N/A, 5/31/2022); and Esophagogastroduodenoscopy (N/A, 1/13/2024).   Social History:    reports that he is not currently sexually active.  reports that he has never smoked. He has never used smokeless tobacco. He reports current alcohol use. He reports current drug use. Drug: Marijuana.     OBJECTIVE:     Vital Signs (Most Recent):    Vital Signs Range (Last 24H):          There is no height or weight on file to calculate BMI.   Wt Readings from Last 4 Encounters:   05/01/25 75.9 kg (167 lb 5.3 oz)   03/29/25 76.7 kg (169 lb)   03/26/25 76.7 kg (169 lb)   03/26/25 76.7 kg (169 lb 1.5 oz)       Significant Labs:  Lab Results   Component Value Date    WBC 8.44 03/29/2025    HGB 12.5 (L) 03/29/2025    HCT 39.8 (L) 03/29/2025     03/29/2025     03/29/2025    K 4.3 03/29/2025     03/29/2025    CREATININE 1.5 (H) 03/29/2025    BUN 20 03/29/2025    CO2 22 (L) 03/29/2025     (H) 03/29/2025    CALCIUM 8.4 (L) 03/29/2025    MG 2.4 01/16/2024    PHOS 2.8 12/09/2024    ALKPHOS 48 03/29/2025    ALT 14 03/29/2025    AST 15 03/29/2025    ALBUMIN 3.5 03/29/2025    INR 2.4 (H) 04/24/2025    PROTIME 24.8 (H) 04/24/2025    APTT 31.2 01/12/2024    HGBA1C 7.8 (H) 05/01/2025     09/01/2010    CPKMB 1.8 09/01/2010    TROPONINI <0.006 01/12/2024    MB 1.8 09/01/2010     No LMP for male patient.  Recent Results (from the past 72 hours)   Hemoglobin A1C    Collection Time: 05/01/25 10:56 AM   Result  "Value Ref Range    Hemoglobin A1c 7.8 (H) 4.0 - 5.6 %    Estimated Average Glucose 177 (H) 68 - 131 mg/dL       EKG:   Results for orders placed or performed during the hospital encounter of 03/29/25   EKG 12-lead    Collection Time: 03/29/25  2:48 PM   Result Value Ref Range    QRS Duration 88 ms    [facility] QTC Calculation 419 ms    Narrative    Test Reason : W19.XXXA,    Vent. Rate :  75 BPM     Atrial Rate :  75 BPM     P-R Int : 142 ms          QRS Dur :  88 ms      QT Int : 376 ms       P-R-T Axes :  63  33  74 degrees    QTcB Int : 419 ms    Normal sinus rhythm  Early transition  Nonspecific T wave abnormality  Abnormal ECG  When compared with ECG of 18-Nov-2024 11:13,  No significant change was found  Confirmed by Tia Alcantara ([id number]) on 3/30/2025 11:01:16 AM    Referred By: AAAREFERRAL SELF           Confirmed By: Tia Alcantara       TTE:  No results found. However, due to the size of the patient record, not all encounters were searched. Please check Results Review for a complete set of results.  No results found for: "EF"   No results found. However, due to the size of the patient record, not all encounters were searched. Please check Results Review for a complete set of results.  YASMIN:  No results found. However, due to the size of the patient record, not all encounters were searched. Please check Results Review for a complete set of results.  Stress Test:  No results found for this or any previous visit.     LHC:  No results found for this or any previous visit.     PFT:  No results found for: "FEV1", "FVC", "RWH9HFN", "TLC", "DLCO"     ASSESSMENT/PLAN:        Pre-op Assessment    I have reviewed the Patient Summary Reports.    I have reviewed the NPO Status.      Review of Systems  Anesthesia Hx:  No problems with previous Anesthesia Denies Hx of Anesthetic complications            Denies Family Hx of Anesthesia complications.    Denies Personal Hx of Anesthesia complications.                  "   Social:  Recreational Drugs Daily marijuana use      Hematology/Oncology:  Hematology Normal   Oncology Normal                                   EENT/Dental:  EENT/Dental Normal           Cardiovascular:  Exercise tolerance: good   Hypertension   Denies MI.        Denies Angina.       Denies CONDE.  ECG has been reviewed.                            Pulmonary:  Pulmonary Normal      Denies Shortness of breath.   Denies Sleep Apnea.                Renal/:  Chronic Renal Disease, CKD                Hepatic/GI:  Hepatic/GI Normal Bowel Prep.                   Musculoskeletal:  Musculoskeletal Normal                Neurological:   CVA, residual symptoms        MCA stroke                            Endocrine:  Diabetes, poorly controlled, type 2, using insulin           Dermatological:  Skin Normal    Psych:  Psychiatric History                Physical Exam  General: Well nourished, Cooperative, Alert and Oriented  R sided weakness, dysarthria  Airway:  Mallampati: III / II  Mouth Opening: Normal  TM Distance: Normal  Tongue: Normal  Neck ROM: Normal ROM    Dental:  Intact      Anesthesia Plan  Type of Anesthesia, risks & benefits discussed:    Anesthesia Type: Gen Natural Airway  Intra-op Monitoring Plan: Standard ASA Monitors  Post Op Pain Control Plan: multimodal analgesia and IV/PO Opioids PRN  Induction:  IV  Informed Consent: Informed consent signed with the Patient and all parties understand the risks and agree with anesthesia plan.  All questions answered. Patient consented to blood products? No  ASA Score: 3  Day of Surgery Review of History & Physical: H&P Update referred to the surgeon/provider.    Ready For Surgery From Anesthesia Perspective.     .             [1]  Current Facility-Administered Medications   Medication Dose Route Frequency Provider Last Rate Last Admin    0.9% NaCl infusion   Intravenous Continuous Jaylon Morgan MD

## 2025-05-02 NOTE — PROVATION PATIENT INSTRUCTIONS
Discharge Summary/Instructions after an Endoscopic Procedure  Patient Name: Kamlesh Diane  Patient MRN: [id number]  Patient YOB: 1955  Friday, May 2, 2025  Jaylon Morgan MD  Dear patient,  As a result of recent federal legislation (The Federal Cures Act), you may   receive lab or pathology results from your procedure in your [facility]   account before your physician is able to contact you. Your physician or   their representative will relay the results to you with their   recommendations at their soonest availability.  Thank you,  RESTRICTIONS:  During your procedure today, you received medications for sedation.  These   medications may affect your judgment, balance and coordination.  Therefore,   for 24 hours, you have the following restrictions:   - DO NOT drive a car, operate machinery, make legal/financial decisions,   sign important papers or drink alcohol.    ACTIVITY:  Today: no heavy lifting, straining or running due to procedural   sedation/anesthesia.  The following day: return to full activity including work.  DIET:  Eat and drink normally unless instructed otherwise.     TREATMENT FOR COMMON SIDE EFFECTS:  - Mild abdominal pain, nausea, belching, bloating or excessive gas:  rest,   eat lightly and use a heating pad.  - Sore Throat: treat with throat lozenges and/or gargle with warm salt   water.  - Because air was used during the procedure, expelling large amounts of air   from your rectum or belching is normal.  - If a bowel prep was taken, you may not have a bowel movement for 1-3 days.    This is normal.  SYMPTOMS TO WATCH FOR AND REPORT TO YOUR PHYSICIAN:  1. Abdominal pain or bloating, other than gas cramps.  2. Chest pain.  3. Back pain.  4. Signs of infection such as: chills or fever occurring within 24 hours   after the procedure.  5. Rectal bleeding, which would show as bright red, maroon, or black stools.   (A tablespoon of blood from the rectum is not serious, especially if    hemorrhoids are present.)  6. Vomiting.  7. Weakness or dizziness.  GO DIRECTLY TO THE NEAREST EMERGENCY ROOM IF YOU HAVE ANY OF THE FOLLOWING:      Difficulty breathing              Chills and/or fever over 101 F   Persistent vomiting and/or vomiting blood   Severe abdominal pain   Severe chest pain   Black, tarry stools   Bleeding- more than one tablespoon   Any other symptom or condition that you feel may need urgent attention  Your doctor recommends these additional instructions:  If any biopsies were taken, your doctors clinic will contact you in 1 to 2   weeks with any results.  - Discharge patient to home.   - Perform a colonoscopy today.   - The findings and recommendations were discussed with the designated   responsible adult.  For questions, problems or results please call your physician - Jaylon Morgan MD at Work:  [phone].  [facility], EMERGENCY ROOM PHONE NUMBER: [phone]  IF A COMPLICATION OR EMERGENCY SITUATION ARISES AND YOU ARE UNABLE TO REACH   YOUR PHYSICIAN - GO DIRECTLY TO THE EMERGENCY ROOM.  Jaylon Morgan MD  5/2/2025 11:11:19 AM  This report has been verified and signed electronically.  Dear patient,  As a result of recent federal legislation (The Federal Cures Act), you may   receive lab or pathology results from your procedure in your [facility]   account before your physician is able to contact you. Your physician or   their representative will relay the results to you with their   recommendations at their soonest availability.  Thank you,  PROVATION

## 2025-05-02 NOTE — TRANSFER OF CARE
"Anesthesia Transfer of Care Note    Patient: Kamlesh Diane    Procedure(s) Performed: Procedure(s) (LRB):  EGD (ESOPHAGOGASTRODUODENOSCOPY) (N/A)  COLONOSCOPY, SCREENING, HIGH RISK PATIENT (N/A)    Patient location: GI    Anesthesia Type: general    Transport from OR: Transported from OR on room air with adequate spontaneous ventilation    Post pain: adequate analgesia    Post assessment: no apparent anesthetic complications    Post vital signs: stable    Level of consciousness: awake and alert    Nausea/Vomiting: no nausea/vomiting    Complications: none    Transfer of care protocol was followed      Last vitals: Visit Vitals  /74(BP Location: Left arm, Patient Position: Lying)   Pulse 85   Temp 36.5 °C (97.7 °F) (Temporal)   Resp 17   Ht 6' 1" (1.854 m)   Wt 75.8 kg (167 lb)   SpO2 100%   BMI 22.03 kg/m²     "

## 2025-05-02 NOTE — ANESTHESIA POSTPROCEDURE EVALUATION
Anesthesia Post Evaluation    Patient: Kamlesh Diane    Procedure(s) Performed: Procedure(s) (LRB):  EGD (ESOPHAGOGASTRODUODENOSCOPY) (N/A)  COLONOSCOPY, SCREENING, HIGH RISK PATIENT (N/A)    Final Anesthesia Type: general      Patient location during evaluation: GI PACU  Patient participation: Yes- Able to Participate  Level of consciousness: awake and alert and oriented  Post-procedure vital signs: reviewed and stable  Pain management: adequate  Airway patency: patent    PONV status at discharge: No PONV  Anesthetic complications: no      Cardiovascular status: blood pressure returned to baseline  Respiratory status: unassisted and spontaneous ventilation  Hydration status: euvolemic  Follow-up not needed.          Vitals Value Taken Time   /83 05/02/25 11:55   Temp 36.2 °C (97.2 °F) 05/02/25 11:25   Pulse 85 05/02/25 11:55   Resp 18 05/02/25 11:55   SpO2 98 % 05/02/25 11:55         Event Time   Out of Recovery 12:07:06         Pain/Adeel Score: Adeel Score: 10 (5/2/2025 11:55 AM)

## 2025-05-02 NOTE — PLAN OF CARE
Discharge criteria met.  Written and verbal instructions given to patient and brother at bedside.  Patient verbalized understanding and has no questions at this time.  Escorted to lobby to family member/friend via wheelchair.  NAD and no c/o pain or discomfort.

## 2025-05-02 NOTE — H&P
Short Stay Endoscopy History and Physical    PCP - Debora Ontiveros MD     Procedure - EGD & Colonoscopy  ASA - per anesthesia  Mallampati - per anesthesia  History of Anesthesia problems - no  Family history Anesthesia problems -  no   Plan of anesthesia - General    HPI:  This is a 69 y.o. male here for evaluation of :     Peptic ulcer, prior H pylori infection, prior colon polyps      ROS:  Constitutional: No fevers, chills, No weight loss  CV: No chest pain  Pulm: No cough, No shortness of breath  Ophtho: No vision changes  GI: see HPI  Derm: No rash    Medical History:  has a past medical history of CVA (cerebral vascular accident), Diabetes, Diabetic retinopathy, Elevated PSA, Hypertension, Prostate cancer, and Stroke.    Surgical History:  has a past surgical history that includes Fracture surgery; Cataract extraction; pr eval,swallow function,cine/video record (4/1/2022); Colonoscopy (N/A, 5/31/2022); and Esophagogastroduodenoscopy (N/A, 1/13/2024).    Family History: family history includes Breast cancer in his sister; Diabetes in his brother; Heart attack in his father; Hypertension in his brother and father.. Otherwise no colon cancer, inflammatory bowel disease, or GI malignancies.    Social History:  reports that he has never smoked. He has never used smokeless tobacco. He reports current alcohol use. He reports current drug use. Drug: Marijuana.    Review of patient's allergies indicates:   Allergen Reactions    Raw vegetable Hives     All Green Vegetable    Plasma protein fraction Rash       Medications:   Prescriptions Prior to Admission[1]    Physical Exam:    Vital Signs: There were no vitals filed for this visit.    General Appearance: Well appearing in no acute distress      Labs:  Lab Results   Component Value Date    WBC 8.44 03/29/2025    HGB 12.5 (L) 03/29/2025    HCT 39.8 (L) 03/29/2025     03/29/2025    CHOL 146 02/13/2025    TRIG 125 02/13/2025    HDL 42 02/13/2025    ALT 14 03/29/2025     AST 15 03/29/2025     03/29/2025    K 4.3 03/29/2025     03/29/2025    CREATININE 1.5 (H) 03/29/2025    BUN 20 03/29/2025    CO2 22 (L) 03/29/2025    TSH 1.535 08/17/2023    PSA 6.2 (H) 04/05/2018    INR 2.4 (H) 04/24/2025    HGBA1C 7.8 (H) 05/01/2025       I have explained the risks and benefits of endoscopy procedures to the patient including but not limited to bleeding, perforation, infection, and death.  The patient was asked if they understand and allowed to ask any further questions to their satisfaction.      Gil Howell MD         [1]   Medications Prior to Admission   Medication Sig Dispense Refill Last Dose/Taking    ACCU-CHEK JUSTIN PLUS TEST STRP Strp TEST FOUR TIMES DAILY AS DIRECTED 400 strip 3     alcohol swabs (ALCOHOL PREP PADS) PadM Use 4 times a day. 400 each 3     atorvastatin (LIPITOR) 40 MG tablet Take 1 tablet (40 mg total) by mouth once daily. 90 tablet 3     blood-glucose meter kit Use as instructed 1 each 0     blood-glucose meter,continuous (DEXCOM G7 ) Misc USE AS DIRECTED 1 each 1     blood-glucose sensor (DEXCOM G7 SENSOR) Adriana CHANGE EVERY 10 DAYS 3 each 11     carvediloL (COREG) 3.125 MG tablet TAKE 1 TABLET(3.125 MG) BY MOUTH TWICE DAILY WITH MEALS 180 tablet 3     ergocalciferol (ERGOCALCIFEROL) 50,000 unit Cap TAKE 1 CAPSULE BY MOUTH EVERY 7 DAYS 12 capsule 3     FEROSUL 325 mg (65 mg iron) Tab tablet TAKE 1 TABLET BY MOUTH EVERY DAY WITH BREAKFAST 30 tablet 0     insulin glargine U-100, Lantus, (LANTUS SOLOSTAR U-100 INSULIN) 100 unit/mL (3 mL) InPn pen Inject 12-16 units at night.       JARDIANCE 10 mg tablet TAKE 1 TABLET(10 MG) BY MOUTH EVERY DAY 90 tablet 3     lancets Misc 1 lancet by Misc.(Non-Drug; Combo Route) route 4 (four) times daily. 400 each 3     lancing device Misc 1 Device by Misc.(Non-Drug; Combo Route) route 2 (two) times daily with meals. 1 each 0     losartan (COZAAR) 25 MG tablet TAKE 1 TABLET(25 MG) BY MOUTH DAILY 90 tablet 3   "   pantoprazole (PROTONIX) 40 MG tablet Take 1 tablet (40 mg total) by mouth once daily. 90 tablet 3     pen needle, diabetic (BD ULTRA-FINE SHORT PEN NEEDLE) 31 gauge x 5/16" Ndle USE THREE TIMES DAILY AS DIRECTED 200 each 2     vibegron 75 mg Tab Take 1 tablet (75 mg total) by mouth Daily. 90 tablet 3     warfarin (COUMADIN) 5 MG tablet TAKE 1 TABLET BY MOUTH ON WEDNESDAYS, THURSDAYS, SATURDAYS, AND SUNDAYS OR AS DIRECTED BY COUMADIN CLINIC 42 tablet 3 4/30/2025     "

## 2025-05-02 NOTE — PROVATION PATIENT INSTRUCTIONS
Discharge Summary/Instructions after an Endoscopic Procedure  Patient Name: Kamlesh Diane  Patient MRN: [id number]  Patient YOB: 1955  Friday, May 2, 2025  Jaylon Morgan MD  Dear patient,  As a result of recent federal legislation (The Federal Cures Act), you may   receive lab or pathology results from your procedure in your [facility]   account before your physician is able to contact you. Your physician or   their representative will relay the results to you with their   recommendations at their soonest availability.  Thank you,  RESTRICTIONS:  During your procedure today, you received medications for sedation.  These   medications may affect your judgment, balance and coordination.  Therefore,   for 24 hours, you have the following restrictions:   - DO NOT drive a car, operate machinery, make legal/financial decisions,   sign important papers or drink alcohol.    ACTIVITY:  Today: no heavy lifting, straining or running due to procedural   sedation/anesthesia.  The following day: return to full activity including work.  DIET:  Eat and drink normally unless instructed otherwise.     TREATMENT FOR COMMON SIDE EFFECTS:  - Mild abdominal pain, nausea, belching, bloating or excessive gas:  rest,   eat lightly and use a heating pad.  - Sore Throat: treat with throat lozenges and/or gargle with warm salt   water.  - Because air was used during the procedure, expelling large amounts of air   from your rectum or belching is normal.  - If a bowel prep was taken, you may not have a bowel movement for 1-3 days.    This is normal.  SYMPTOMS TO WATCH FOR AND REPORT TO YOUR PHYSICIAN:  1. Abdominal pain or bloating, other than gas cramps.  2. Chest pain.  3. Back pain.  4. Signs of infection such as: chills or fever occurring within 24 hours   after the procedure.  5. Rectal bleeding, which would show as bright red, maroon, or black stools.   (A tablespoon of blood from the rectum is not serious, especially if    hemorrhoids are present.)  6. Vomiting.  7. Weakness or dizziness.  GO DIRECTLY TO THE NEAREST EMERGENCY ROOM IF YOU HAVE ANY OF THE FOLLOWING:      Difficulty breathing              Chills and/or fever over 101 F   Persistent vomiting and/or vomiting blood   Severe abdominal pain   Severe chest pain   Black, tarry stools   Bleeding- more than one tablespoon   Any other symptom or condition that you feel may need urgent attention  Your doctor recommends these additional instructions:  If any biopsies were taken, your doctors clinic will contact you in 1 to 2   weeks with any results.  - Discharge patient to home (ambulatory).   - Patient has a contact number available for emergencies.  The signs and   symptoms of potential delayed complications were discussed with the   patient.  Return to normal activities tomorrow.  Written discharge   instructions were provided to the patient.   - Resume previous diet.   - Continue present medications.   - Return to primary care physician as previously scheduled.   - Repeat colonoscopy at appointment to be scheduled because the bowel   preparation was poor.   - Recommend following up in clinic to discuss missed stool samples and   aggressive bowel regimen prior to rescheduling (second time he has had   suboptimal preparation).  - Resume Coumadin (warfarin) at prior dose today.  For questions, problems or results please call your physician - Jaylon Morgan MD at Work:  [phone].  [facility], EMERGENCY ROOM PHONE NUMBER: [phone]  IF A COMPLICATION OR EMERGENCY SITUATION ARISES AND YOU ARE UNABLE TO REACH   YOUR PHYSICIAN - GO DIRECTLY TO THE EMERGENCY ROOM.  Jaylon Morgan MD  5/2/2025 11:17:11 AM  This report has been verified and signed electronically.  Dear patient,  As a result of recent federal legislation (The Federal Cures Act), you may   receive lab or pathology results from your procedure in your [facility]   account before your physician is able to contact  you. Your physician or   their representative will relay the results to you with their   recommendations at their soonest availability.  Thank you,  PROVATION

## 2025-05-08 ENCOUNTER — LAB VISIT (OUTPATIENT)
Dept: LAB | Facility: HOSPITAL | Age: 70
End: 2025-05-08
Attending: INTERNAL MEDICINE
Payer: MEDICARE

## 2025-05-08 ENCOUNTER — ANTI-COAG VISIT (OUTPATIENT)
Dept: CARDIOLOGY | Facility: CLINIC | Age: 70
End: 2025-05-08
Payer: MEDICARE

## 2025-05-08 DIAGNOSIS — Z79.01 LONG TERM (CURRENT) USE OF ANTICOAGULANTS: ICD-10-CM

## 2025-05-08 DIAGNOSIS — Z79.01 LONG TERM (CURRENT) USE OF ANTICOAGULANTS: Primary | ICD-10-CM

## 2025-05-08 LAB
INR PPP: 1.6 (ref 0.8–1.2)
PROTHROMBIN TIME: 17.4 SECONDS (ref 9–12.5)

## 2025-05-08 PROCEDURE — 85610 PROTHROMBIN TIME: CPT

## 2025-05-08 PROCEDURE — 93793 ANTICOAG MGMT PT WARFARIN: CPT | Mod: S$GLB,,,

## 2025-05-08 PROCEDURE — 36415 COLL VENOUS BLD VENIPUNCTURE: CPT

## 2025-05-08 NOTE — PROGRESS NOTES
[facility]    2025 4:31 PM    Assessment/Plan:    Patient presents today with subtherapeutic  INR.    Assessment of patient findings and chart review: all doses confirmed, recent hold last week for EGD    Recommendation for patient's warfarin regimen: Boost dose today to 7.5mg then resume current maintenance dose    Recommend repeat INR in 1 week  _________________________________________________________________    Kamlesh Diane (69 y.o.) is followed by the [facility].    Anticoagulation Summary  As of 2025      INR goal:  2.0-3.0   TTR:  71.1% (7 y)   INR used for dosin.6 (2025)   Warfarin maintenance plan:  2.5 mg (5 mg x 0.5) every Mon; 5 mg (5 mg x 1) all other days   Weekly warfarin total:  32.5 mg   Plan last modified:  Miguel Angel Rouse, PharmD (2023)   Next INR check:  5/15/2025   Target end date:  --    Indications    Long term (current) use of anticoagulants [Z79.01]  Cerebrovascular accident (CVA) due to embolism of precerebral artery (Resolved) [I63.10]                 Anticoagulation Episode Summary       INR check location:  Clinic Lab    Preferred lab:  --    Send INR reminders to:  [facility] COUMADIN MONITORING POOL    Comments:  [facility]          Anticoagulation Care Providers       Provider Role Specialty Phone number    Debora Ontiveros MD [facility] Internal Medicine [phone]

## 2025-05-09 NOTE — PROGRESS NOTES
INR not at goal. Medications, chart, and patient findings reviewed. See calendar for adjustments to dose and follow up plan.  Pt denies any changes.  Instructed (Diane) pt to take 5mg 4/21 & resume maintenance dose.  Plan to re-assess in 4 weeks.  She indicated understanding.      Yes

## 2025-05-13 NOTE — TELEPHONE ENCOUNTER
----- Message from Karen Padua, DO sent at 5/13/2025  2:15 PM CDT -----  Hi can you let [place] know that labs look better.    Electrolytes are normal.  Kidney tests are still abnormal but improved.  I'm wondering if he was just more dehydrated and he's still slowly recovering from that.  It looks like the bump in the creatinine was before the actual cardioversion, so it could also be if he may've been going in and out of afib.  I think that since it's better, I'm ok with just monitoring.  I will place standing orders in a month to assess stability. He has an appt with cardiology on 6/9 so he can go to the lab that day too.    CBC (blood counts) are normal  (see key below).  White blood cells are improved - back to normal.    We will continue as discussed in the last office visit and follow up as planned.   Please let me know if there's any questions or concerns. Thank you.  OLIVIER       Spoke with family member. Patient is still in hospital. He will be discharged later today. Asked to be called back once he is home.

## 2025-05-15 ENCOUNTER — LAB VISIT (OUTPATIENT)
Dept: LAB | Facility: HOSPITAL | Age: 70
End: 2025-05-15
Attending: INTERNAL MEDICINE
Payer: MEDICARE

## 2025-05-15 ENCOUNTER — ANTI-COAG VISIT (OUTPATIENT)
Dept: CARDIOLOGY | Facility: CLINIC | Age: 70
End: 2025-05-15
Payer: MEDICARE

## 2025-05-15 DIAGNOSIS — Z79.01 LONG TERM (CURRENT) USE OF ANTICOAGULANTS: ICD-10-CM

## 2025-05-15 DIAGNOSIS — Z79.01 LONG TERM (CURRENT) USE OF ANTICOAGULANTS: Primary | ICD-10-CM

## 2025-05-15 LAB
INR PPP: 2.4 (ref 0.8–1.2)
PROTHROMBIN TIME: 24.5 SECONDS (ref 9–12.5)

## 2025-05-15 PROCEDURE — 36415 COLL VENOUS BLD VENIPUNCTURE: CPT

## 2025-05-15 PROCEDURE — 85610 PROTHROMBIN TIME: CPT

## 2025-05-15 PROCEDURE — 93793 ANTICOAG MGMT PT WARFARIN: CPT | Mod: S$GLB,,,

## 2025-05-15 NOTE — PROGRESS NOTES
[facility]    05/15/2025 3:46 PM    Assessment/Plan:    Patient presents today with therapeutic INR.    Assessment of patient findings and chart review: Reviewed    Recommendation for patient's warfarin regimen: Continue current maintenance dose    Recommend repeat INR in 2 weeks  _________________________________________________________________    Kamlesh LEWIS Greenwood (69 y.o.) is followed by the [facility].    Anticoagulation Summary  As of 5/15/2025      INR goal:  2.0-3.0   TTR:  71.1% (7 y)   INR used for dosin.4 (5/15/2025)   Warfarin maintenance plan:  2.5 mg (5 mg x 0.5) every Mon; 5 mg (5 mg x 1) all other days   Weekly warfarin total:  32.5 mg   Plan last modified:  Miguel Angel Rouse, PharmD (2023)   Next INR check:  2025   Target end date:  --    Indications    Long term (current) use of anticoagulants [Z79.01]  Cerebrovascular accident (CVA) due to embolism of precerebral artery (Resolved) [I63.10]                 Anticoagulation Episode Summary       INR check location:  Clinic Lab    Preferred lab:  --    Send INR reminders to:  [facility] COUMADIN MONITORING POOL    Comments:  [facility]          Anticoagulation Care Providers       Provider Role Specialty Phone number    Debora Ontiveros MD [facility] Internal Medicine [phone]

## 2025-05-29 ENCOUNTER — TELEPHONE (OUTPATIENT)
Dept: OPHTHALMOLOGY | Facility: CLINIC | Age: 70
End: 2025-05-29
Payer: MEDICARE

## 2025-05-29 ENCOUNTER — OFFICE VISIT (OUTPATIENT)
Dept: PRIMARY CARE CLINIC | Facility: CLINIC | Age: 70
End: 2025-05-29
Payer: MEDICARE

## 2025-05-29 ENCOUNTER — LAB VISIT (OUTPATIENT)
Dept: LAB | Facility: HOSPITAL | Age: 70
End: 2025-05-29
Attending: INTERNAL MEDICINE
Payer: MEDICARE

## 2025-05-29 ENCOUNTER — ANTI-COAG VISIT (OUTPATIENT)
Dept: CARDIOLOGY | Facility: CLINIC | Age: 70
End: 2025-05-29
Payer: MEDICARE

## 2025-05-29 VITALS
WEIGHT: 176 LBS | BODY MASS INDEX: 23.33 KG/M2 | HEIGHT: 73 IN | OXYGEN SATURATION: 99 % | HEART RATE: 86 BPM | SYSTOLIC BLOOD PRESSURE: 124 MMHG | TEMPERATURE: 98 F | DIASTOLIC BLOOD PRESSURE: 82 MMHG

## 2025-05-29 DIAGNOSIS — Z99.89 DEPENDENT ON WALKER FOR AMBULATION: ICD-10-CM

## 2025-05-29 DIAGNOSIS — N18.31 CKD STAGE 3A, GFR 45-59 ML/MIN: ICD-10-CM

## 2025-05-29 DIAGNOSIS — Z79.4 TYPE 2 DIABETES MELLITUS WITH BOTH EYES AFFECTED BY PROLIFERATIVE RETINOPATHY WITHOUT MACULAR EDEMA, WITH LONG-TERM CURRENT USE OF INSULIN: ICD-10-CM

## 2025-05-29 DIAGNOSIS — Z79.01 LONG TERM (CURRENT) USE OF ANTICOAGULANTS: Primary | ICD-10-CM

## 2025-05-29 DIAGNOSIS — J43.9 PULMONARY EMPHYSEMA, UNSPECIFIED EMPHYSEMA TYPE: ICD-10-CM

## 2025-05-29 DIAGNOSIS — F12.20 CANNABIS DEPENDENCE, UNCOMPLICATED: ICD-10-CM

## 2025-05-29 DIAGNOSIS — E11.65 TYPE 2 DIABETES MELLITUS WITH HYPERGLYCEMIA, WITH LONG-TERM CURRENT USE OF INSULIN: Chronic | ICD-10-CM

## 2025-05-29 DIAGNOSIS — E78.5 HYPERLIPIDEMIA, UNSPECIFIED HYPERLIPIDEMIA TYPE: ICD-10-CM

## 2025-05-29 DIAGNOSIS — E11.3593 TYPE 2 DIABETES MELLITUS WITH BOTH EYES AFFECTED BY PROLIFERATIVE RETINOPATHY WITHOUT MACULAR EDEMA, WITH LONG-TERM CURRENT USE OF INSULIN: ICD-10-CM

## 2025-05-29 DIAGNOSIS — R56.9 SEIZURE-LIKE ACTIVITY: Primary | ICD-10-CM

## 2025-05-29 DIAGNOSIS — Z79.4 TYPE 2 DIABETES MELLITUS WITH HYPERGLYCEMIA, WITH LONG-TERM CURRENT USE OF INSULIN: Chronic | ICD-10-CM

## 2025-05-29 DIAGNOSIS — Z86.73 HISTORY OF CVA (CEREBROVASCULAR ACCIDENT): ICD-10-CM
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
## 2025-05-29 DIAGNOSIS — Z79.01 LONG TERM (CURRENT) USE OF ANTICOAGULANTS: ICD-10-CM

## 2025-05-29 PROBLEM — Z12.5 SCREENING PSA (PROSTATE SPECIFIC ANTIGEN): Status: RESOLVED | Noted: 2018-04-05 | Resolved: 2025-05-29

## 2025-05-29 LAB
INR PPP: 2.2 (ref 0.8–1.2)
PROTHROMBIN TIME: 23 SECONDS (ref 9–12.5)

## 2025-05-29 PROCEDURE — 99999 PR PBB SHADOW E&M-EST. PATIENT-LVL V: CPT | Mod: PBBFAC,,, | Performed by: INTERNAL MEDICINE

## 2025-05-29 PROCEDURE — 85610 PROTHROMBIN TIME: CPT

## 2025-05-29 PROCEDURE — 36415 COLL VENOUS BLD VENIPUNCTURE: CPT

## 2025-05-29 RX ORDER — ROSUVASTATIN CALCIUM 40 MG/1
40 TABLET, COATED ORAL NIGHTLY
Qty: 90 TABLET | Refills: 3 | Status: SHIPPED | OUTPATIENT
Start: 2025-05-29 | End: 2026-05-29

## 2025-05-29 NOTE — PROGRESS NOTES
[facility]    2025 3:04 PM    Assessment/Plan:    Patient presents today with therapeutic INR.    Assessment of patient findings and chart review: INR in range. No changes noted to medications or chart to necessitate adjustment to warfarin regimen    Recommendation for patient's warfarin regimen: Continue current maintenance dose    Recommend repeat INR in 1.5  weeks  _________________________________________________________________    Kamlesh Diane (69 y.o.) is followed by the [facility].    Anticoagulation Summary  As of 2025      INR goal:  2.0-3.0   TTR:  71.2% (7 y)   INR used for dosin.2 (2025)   Warfarin maintenance plan:  2.5 mg (5 mg x 0.5) every Mon; 5 mg (5 mg x 1) all other days   Weekly warfarin total:  32.5 mg   Plan last modified:  Miguel Angel Rouse, PharmD (2023)   Next INR check:  6/10/2025   Target end date:  --    Indications    Long term (current) use of anticoagulants [Z79.01]  Cerebrovascular accident (CVA) due to embolism of precerebral artery (Resolved) [I63.10]                 Anticoagulation Episode Summary       INR check location:  Clinic Lab    Preferred lab:  --    Send INR reminders to:  [facility] COUMADIN MONITORING POOL    Comments:  [facility]          Anticoagulation Care Providers       Provider Role Specialty Phone number    Debora Ontiveros MD [facility] Internal Medicine [phone]

## 2025-05-29 NOTE — PROGRESS NOTES
Subjective:       Patient ID: Kamlesh Diane is a 69 y.o. male.    Chief Complaint: Follow-up    HPI  Last saw pt 1/29/25  Since that visit:  Saw Dr. Andrea Dai in neurology movement d/o clinic 2/24/25. Per note, pt w/ post CVA ataxia. R arm shaking poss focal seizure but normal EEG. Consider seizure drugs if recurs and rec seizure precautions. Stop amantadine (doesn't expect this to help w/ ataxia). F/u prn.  GI - Elizabeth Johnston NP 2/13/25.  Soledad Mcekon NP for DM 5/1/25 - A1C 2/13/25 7.5-->5/1/25 7.8. DEXCOM beeping less. Last eye appt w/ Dr. Tapia 1/28/25. OU PDR.   EGD 5/2/25 - NORMAL (ulcers resolved).  Cscope 5/2/25 - Impression:            - Preparation of the colon was poor.                          - Stool in the rectum and in the sigmoid colon.                          - No specimens collected.     Had a fall a few mo ago - he was sitting outside at the front of the house (maybe in the sun). When standing up from sitting, thinks he got up too fast and felt dizzy, he fell but was able to brace himself. Neighbors were there and sister was there. Went to [facility] by his house.   CT head 3/29/25  No CT evidence of acute intracranial abnormality.  Generalized cerebral volume loss and chronic ischemic changes.  No acute cervical spine fracture.  Degenerative changes in the cervical spine.  Minimal emphysema in the lung apices. Atherosclerotic calcifications in the carotid arteries.   CT c spine   No CT evidence of acute intracranial abnormality.  Generalized cerebral volume loss and chronic ischemic changes.  No acute cervical spine fracture.  Degenerative changes in the cervical spine.  CT arm/elbow  No acute fracture or dislocation.   CT 3d elbow  Exam quality is limited by suboptimal positioning with the patient's torso in the field of view, and mild motion. There is no evidence of fracture or dislocation. There are punctate calcifications adjacent to the medial epicondyle and olecranon which are  nonspecific, though potentially chronic. Joints maintain appropriate alignment. Minor degenerative change noted. Bony mineralization is normal. No significant joint effusion. Vascular calcifications noted. Soft tissues are otherwise unremarkable.     2 times last week when he got out of the shower, he felt a little dizzy - transient and went away after a few seconds. No CP/palpitations. DEXCOM in place and didn't beep at him (doesn't think it's low sugars)    Follows w/ coumadin clinic. INR 5/15/25 2.4    Ckd3a - Cr baseline 1.5-1.9, most recently 3/29/25 1.5, eGFR 50.  Jardiance 10mg daily, losartan 25mg daily    HLD - atorva 40.  LDL 79 25    4Ms for Medical Decision-Making in Older Adults    Last Completed EAWV:  None    MEDICATIONS:  High Risk Medications:  Total Active Medications: 0  This patient does not have an active medication from one of the medication groupers.    MOBILITY:  Activities of Daily Livin/29/2025    11:43 AM   Activities of Daily Living   Ambulation Assistance Required   Ambulation Assistance --   Dressing Independent   Transfers Independent   Toileting Continent of bladder;Continent of bowel   Feeding Independent   Cleaning home/Chores Assistance Required   Telephone use Independent   Shopping Assistance Required   Paying bills Assistance Required   Taking meds Independent   If required, who assists the patient with ADLs? sister and brother     Fall Risk:      2025    11:00 AM 2025    10:00 AM 2025     3:00 PM   Fall Risk Assessment - Outpatient   Mobility Status Ambulatory w/ assistance Ambulatory w/ assistance Ambulatory   Number of falls 1 1 0   Identified as fall risk True True False     Disability Status:      2025    11:45 AM   Disability Status   Are you deaf or do you have serious difficulty hearing? N   Are you blind or do you have serious difficulty seeing, even when wearing glasses? N   Because of a physical, mental, or emotional condition, do you  have serious difficulty concentrating, remembering, or making decisions? N   Do you have serious difficulty walking or climbing stairs? Y   Do you have difficulty dressing or bathing? Y   Because of a physical, mental, or emotional condition, do you have difficulty doing errands alone such as visiting a doctor's office or shopping? N     Nutrition Screenin/29/2025    11:44 AM   Nutrition Screening   Has food intake declined over the past three months due to loss of appetite, digestive problems, chewing or swallowing difficulties? No decrease in food intake   Involuntary weight loss during the last 3 months? No weight loss   Mobility? Able to get out of bed/chair, but does not go out   Has the patient suffered psychological stress or acute disease in the past three months? No   Neuropsychological problems? No psychological problems   Body Mass Index (BMI)?  BMI 23 or greater   Screening Score 13   Interpretation Normal nutritional status    Screening Score: 0-7 Malnourished, 8-11 At Risk, 12-14 Normal  Get Up and Go:       No data to display              Whisper Test:      2025    11:44 AM   Whisper Test   Whisper Test Normal           MENTATION:   Has Dementia Dx: No  Has Anxiety Dx: No    Depression Patient Health Questionnaire:      2025     2:37 PM   Depression Patient Health Questionnaire   Over the last two weeks how often have you been bothered by little interest or pleasure in doing things Not at all   Over the last two weeks how often have you been bothered by feeling down, depressed or hopeless Not at all   PHQ-2 Total Score 0     Cognitive Function Screening:       No data to display              Cognitive Function Screening Total - Less than 4 = Abnormal,  Greater than or equal to 4 = Normal        WHAT MATTERS MOST:  Advance Care Planning   ACP Status:   Patient has had an ACP conversation  Living Will: No  Power of : No  [place]: No    Advance Care Planning     Date:  "05/29/2025  Patient did not wish or was not able to name a surrogate decision maker or provide an Advance Care Plan.           Review of Systems  Comprehensive review of systems otherwise negative. See history/subjective section for more details.      Objective:      Physical Exam    /82   Pulse 86   Temp 98.3 °F (36.8 °C) (Oral)   Ht 6' 1" (1.854 m)   Wt 79.8 kg (176 lb)   SpO2 99%   BMI 23.22 kg/m²     Gen - A+OX4, NAD, slurred speech (chronic).  HEENT - PERRL, OP clear. MMM.   Neck - no LAD  CV - tachycardic. No m/r  Chest - CTAB, no crackles. Normal work of breathing.   Abd - S/NT/ND/+BS  Ext - 2+ B radial pulses. No LE edema.   Neuro/MSK - antalgic gait. Walks w/ a walker. Some RUE spasticity. 4-5 RUE and RLE m strength and 5/5 LUE and LLE m strength. No spinal tenderness to palpation.      Previous labs reviewed.       Assessment/Plan     Kamlesh was seen today for follow-up.    Diagnoses and all orders for this visit:    Seizure-like activity - will cont to monitor. Reviewed neurology's note.     Hyperlipidemia, unspecified hyperlipidemia type - stop atorvastatin and start crestor for more aggressive cholesterol control - LDL goal of <70.  -     rosuvastatin (CRESTOR) 40 MG Tab; Take 1 tablet (40 mg total) by mouth every evening.    History of CVA (cerebrovascular accident)  -     rosuvastatin (CRESTOR) 40 MG Tab; Take 1 tablet (40 mg total) by mouth every evening.    Fall, sequela - doing ok. Make sure when standing from sitting, wait till dizziness resolves before walking.     CKD stage 3a, GFR 45-59 ml/min - cont jardiance and arb.  -     Comprehensive Metabolic Panel; Future  -     PTH, Intact; Future    Type 2 diabetes mellitus with both eyes affected by proliferative retinopathy without macular edema, with long-term current use of insulin - f/u w/ Soledad Mckeon NP. Overdue to see Dr. Tapia.   -     Ambulatory referral/consult to Ophthalmology; Future    Pulmonary emphysema, unspecified " emphysema type - no SOB/CONDE/wheezing/cough. Former smoker. Rec to cut down/stop THC.     Cannabis dependence, uncomplicated - as above.    Type 2 diabetes mellitus with hyperglycemia, with long-term current use of insulin - as above.     Dependent on walker for ambulation - cont to walk w/ rollator at all times.         Follow up in about 3 months (around 8/29/2025).      Debora Ontiveros MD  Department of Internal Medicine - [facility]  11:19 AM

## 2025-05-29 NOTE — PATIENT INSTRUCTIONS
Stop atorvastatin and start rosuvastatin 40mg daily (to be more aggressive with your cholesterol control).   Stop pantoprazole (if you have any reflux, then you can get back on it).

## 2025-06-06 ENCOUNTER — OFFICE VISIT (OUTPATIENT)
Dept: OPHTHALMOLOGY | Facility: CLINIC | Age: 70
End: 2025-06-06
Payer: MEDICARE

## 2025-06-06 ENCOUNTER — CLINICAL SUPPORT (OUTPATIENT)
Dept: OPHTHALMOLOGY | Facility: CLINIC | Age: 70
End: 2025-06-06
Payer: MEDICARE

## 2025-06-06 DIAGNOSIS — Z79.4 TYPE 2 DIABETES MELLITUS WITH BOTH EYES AFFECTED BY PROLIFERATIVE RETINOPATHY WITHOUT MACULAR EDEMA, WITH LONG-TERM CURRENT USE OF INSULIN: Primary | ICD-10-CM

## 2025-06-06 DIAGNOSIS — H43.11 VITREOUS HEMORRHAGE, RIGHT: ICD-10-CM

## 2025-06-06 DIAGNOSIS — H35.033 HYPERTENSIVE RETINOPATHY, BILATERAL: ICD-10-CM

## 2025-06-06 DIAGNOSIS — E11.3593 TYPE 2 DIABETES MELLITUS WITH BOTH EYES AFFECTED BY PROLIFERATIVE RETINOPATHY WITHOUT MACULAR EDEMA, WITH LONG-TERM CURRENT USE OF INSULIN: Primary | ICD-10-CM

## 2025-06-06 PROCEDURE — 99999 PR PBB SHADOW E&M-EST. PATIENT-LVL IV: CPT | Mod: PBBFAC,,, | Performed by: OPHTHALMOLOGY

## 2025-06-10 ENCOUNTER — OFFICE VISIT (OUTPATIENT)
Dept: PODIATRY | Facility: CLINIC | Age: 70
End: 2025-06-10
Payer: MEDICARE

## 2025-06-10 ENCOUNTER — ANTI-COAG VISIT (OUTPATIENT)
Dept: CARDIOLOGY | Facility: CLINIC | Age: 70
End: 2025-06-10
Payer: MEDICARE

## 2025-06-10 ENCOUNTER — PATIENT MESSAGE (OUTPATIENT)
Dept: CARDIOLOGY | Facility: CLINIC | Age: 70
End: 2025-06-10

## 2025-06-10 ENCOUNTER — LAB VISIT (OUTPATIENT)
Dept: LAB | Facility: HOSPITAL | Age: 70
End: 2025-06-10
Payer: MEDICARE

## 2025-06-10 VITALS
SYSTOLIC BLOOD PRESSURE: 133 MMHG | HEIGHT: 73 IN | DIASTOLIC BLOOD PRESSURE: 83 MMHG | WEIGHT: 175.94 LBS | HEART RATE: 98 BPM | BODY MASS INDEX: 23.32 KG/M2

## 2025-06-10 DIAGNOSIS — L84 CORN OR CALLUS: ICD-10-CM

## 2025-06-10 DIAGNOSIS — Z79.4 TYPE 2 DIABETES MELLITUS WITH HYPERGLYCEMIA, WITH LONG-TERM CURRENT USE OF INSULIN: Chronic | ICD-10-CM

## 2025-06-10 DIAGNOSIS — Z79.01 LONG TERM (CURRENT) USE OF ANTICOAGULANTS: Primary | ICD-10-CM

## 2025-06-10 DIAGNOSIS — B35.1 ONYCHOMYCOSIS DUE TO DERMATOPHYTE: Primary | ICD-10-CM

## 2025-06-10 DIAGNOSIS — Z79.01 LONG TERM (CURRENT) USE OF ANTICOAGULANTS: ICD-10-CM

## 2025-06-10 DIAGNOSIS — E11.65 TYPE 2 DIABETES MELLITUS WITH HYPERGLYCEMIA, WITH LONG-TERM CURRENT USE OF INSULIN: Chronic | ICD-10-CM

## 2025-06-10 LAB
INR PPP: 2.5 (ref 0.8–1.2)
PROTHROMBIN TIME: 26.1 SECONDS (ref 9–12.5)

## 2025-06-10 PROCEDURE — 93793 ANTICOAG MGMT PT WARFARIN: CPT | Mod: S$GLB,,,

## 2025-06-10 PROCEDURE — 11721 DEBRIDE NAIL 6 OR MORE: CPT | Mod: XS,Q9,S$GLB, | Performed by: PODIATRIST

## 2025-06-10 PROCEDURE — 99499 UNLISTED E&M SERVICE: CPT | Mod: S$GLB,,, | Performed by: PODIATRIST

## 2025-06-10 PROCEDURE — 36415 COLL VENOUS BLD VENIPUNCTURE: CPT

## 2025-06-10 PROCEDURE — 99999 PR PBB SHADOW E&M-EST. PATIENT-LVL III: CPT | Mod: PBBFAC,,, | Performed by: PODIATRIST

## 2025-06-10 PROCEDURE — 11056 PARNG/CUTG B9 HYPRKR LES 2-4: CPT | Mod: Q9,S$GLB,, | Performed by: PODIATRIST

## 2025-06-10 PROCEDURE — 85610 PROTHROMBIN TIME: CPT

## 2025-06-10 NOTE — PROGRESS NOTES
[facility]    06/10/2025 11:34 AM    Assessment/Plan:    Patient presents today with therapeutic INR.    Assessment of patient findings and chart review: Reviewed    Recommendation for patient's warfarin regimen: Continue current maintenance dose    Recommend repeat INR in 4 weeks  _________________________________________________________________    Kamlesh LEWIS Roxi (69 y.o.) is followed by the [facility].    Anticoagulation Summary  As of 6/10/2025      INR goal:  2.0-3.0   TTR:  71.4% (7 y)   INR used for dosin.5 (6/10/2025)   Warfarin maintenance plan:  2.5 mg (5 mg x 0.5) every Mon; 5 mg (5 mg x 1) all other days   Weekly warfarin total:  32.5 mg   Plan last modified:  Miguel Angel Rouse, PharmD (2023)   Next INR check:  2025   Target end date:  --    Indications    Long term (current) use of anticoagulants [Z79.01]  Cerebrovascular accident (CVA) due to embolism of precerebral artery (Resolved) [I63.10]                 Anticoagulation Episode Summary       INR check location:  Clinic Lab    Preferred lab:  --    Send INR reminders to:  [facility] COUMADIN MONITORING POOL    Comments:  [facility]          Anticoagulation Care Providers       Provider Role Specialty Phone number    Debora Ontiveros MD [facility] Internal Medicine [phone]

## 2025-06-24 NOTE — PROGRESS NOTES
Subjective:      Patient ID: Kamlesh Diane is a 70 y.o. male.    Chief Complaint: Diabetic Foot Exam (Debora Ontiveros MD at 5/29/2025) and Nail Care    Kamlesh is a 70 y.o. male who presents to the clinic for evaluation and treatment of high risk feet. Kamlesh has a past medical history of CVA (cerebral vascular accident), Diabetes, Diabetic retinopathy, Elevated PSA, Hypertension, Prostate cancer, and Stroke. The patient's chief complaint is long, thick toenails. This patient has documented high risk feet requiring routine maintenance secondary to peripheral neuropathy.    PCP: Debora Ontiveros MD    Date Last Seen by PCP:   Chief Complaint   Patient presents with    Diabetic Foot Exam     Debora Ontiveros MD at 5/29/2025    Nail Care       Current shoe gear:  Affected Foot: Slip-on shoes     Unaffected Foot: Slip-on shoes    Hemoglobin A1C   Date Value Ref Range Status   02/13/2025 7.5 (H) 4.0 - 5.6 % Final     Comment:     ADA Screening Guidelines:  5.7-6.4%  Consistent with prediabetes  >or=6.5%  Consistent with diabetes    High levels of fetal hemoglobin interfere with the HbA1C  assay. Heterozygous hemoglobin variants (HbS, HgC, etc)do  not significantly interfere with this assay.   However, presence of multiple variants may affect accuracy.     10/15/2024 7.0 (H) 4.0 - 5.6 % Final     Comment:     ADA Screening Guidelines:  5.7-6.4%  Consistent with prediabetes  >or=6.5%  Consistent with diabetes    High levels of fetal hemoglobin interfere with the HbA1C  assay. Heterozygous hemoglobin variants (HbS, HgC, etc)do  not significantly interfere with this assay.   However, presence of multiple variants may affect accuracy.     05/14/2024 7.3 (H) 4.0 - 5.6 % Final     Comment:     ADA Screening Guidelines:  5.7-6.4%  Consistent with prediabetes  >or=6.5%  Consistent with diabetes    High levels of fetal hemoglobin interfere with the HbA1C  assay. Heterozygous hemoglobin variants (HbS, HgC, etc)do  not significantly interfere  with this assay.   However, presence of multiple variants may affect accuracy.       Hemoglobin A1c   Date Value Ref Range Status   05/01/2025 7.8 (H) 4.0 - 5.6 % Final     Comment:     ADA Screening Guidelines:  5.7-6.4%  Consistent with prediabetes  >=6.5%  Consistent with diabetes    High levels of fetal hemoglobin interfere with the HbA1C  assay. Heterozygous hemoglobin variants (HbS, HgC, etc)do  not significantly interfere with this assay.   However, presence of multiple variants may affect accuracy.     % HEMOGLOBIN A1C   Date Value Ref Range Status   06/03/2024 7.6 (H) <5.7 % of total Hgb Final     Comment:     For someone without known diabetes, a hemoglobin A1c  value of 6.5% or greater indicates that they may have   diabetes and this should be confirmed with a follow-up   test.    For someone with known diabetes, a value <7% indicates   that their diabetes is well controlled and a value   greater than or equal to 7% indicates suboptimal   control. A1c targets should be individualized based on   duration of diabetes, age, comorbid conditions, and   other considerations.    Currently, no consensus exists regarding use of  hemoglobin A1c for diagnosis of diabetes for children.        This test was performed on the Roche delores c503 platform.  Effective 11/13/23, a change in test platforms from the  A[employer]ott  to the Roche delores c503 may have shifted  HbA1c results compared to historical results.  Based on laboratory validation testing conducted at  [employer], the Roche platform relative to the Abbott  platform had an average increase in HbA1c value of  < or = 0.3%. This difference is within accepted   variability established by the National Glycohemoglobin  Standardization Program. Note that not all individuals  will have had a shift in their results and direct  comparisons between historical and current results for  testing conducted on different platforms is not  recommended.           Review of Systems    Constitutional: Negative for chills, fever and malaise/fatigue.   HENT:  Negative for hearing loss.    Cardiovascular:  Positive for leg swelling. Negative for claudication.   Respiratory:  Negative for shortness of breath.    Skin:  Positive for color change, dry skin, nail changes and unusual hair distribution. Negative for flushing and rash.   Musculoskeletal:  Negative for joint pain and myalgias.   Gastrointestinal:  Negative for nausea and vomiting.   Neurological:  Positive for numbness, paresthesias and sensory change. Negative for loss of balance.   Psychiatric/Behavioral:  Negative for altered mental status.            Objective:      Physical Exam  Vitals reviewed.   Constitutional:       Appearance: He is well-developed.   Cardiovascular:      Pulses:           Dorsalis pedis pulses are 0 on the right side and 0 on the left side.        Posterior tibial pulses are 1+ on the right side and 1+ on the left side.   Musculoskeletal:      Right ankle: Decreased range of motion. Abnormal pulse.      Right Achilles Tendon: Ramirez's test negative.      Left ankle: Decreased range of motion. Abnormal pulse.      Left Achilles Tendon: Ramirez's test negative.      Right foot: Decreased range of motion.      Left foot: Decreased range of motion.   Feet:      Right foot:      Protective Sensation: 5 sites tested.  2 sites sensed.      Left foot:      Protective Sensation: 5 sites tested.  2 sites sensed.   Skin:     General: Skin is cool and dry.      Capillary Refill: Capillary refill takes more than 3 seconds.      Findings: No wound.      Comments: HKL's noted to b/L HIPJs       Neurological:      Mental Status: He is alert.      Comments: diminished sensation noted to b/L lower extremities   Psychiatric:         Behavior: Behavior normal. Behavior is cooperative.         Nails x10 are elongated by  3-9mm's curling underneath toes, thickened by 3-4 mm's, dystrophic, and are dark in  coloration . Xerosis  Bilaterally.           Assessment:       Encounter Diagnoses   Name Primary?    Type 2 diabetes mellitus with hyperglycemia, with long-term current use of insulin     Onychomycosis due to dermatophyte Yes    Corn or callus          Plan:       Kamlesh was seen today for diabetic foot exam and nail care.    Diagnoses and all orders for this visit:    Onychomycosis due to dermatophyte    Type 2 diabetes mellitus with hyperglycemia, with long-term current use of insulin  -     Ambulatory referral/consult to Podiatry    Corn or callus      I counseled the patient on his conditions, their implications and medical management.    - Shoe inspection. Diabetic Foot Education. Patient reminded of the importance of good nutrition and blood sugar control to help prevent podiatric complications of diabetes. Patient instructed on proper foot hygeine. We discussed wearing proper shoe gear, daily foot inspections, never walking without protective shoe gear, never putting sharp instruments to feet, routine podiatric nail visits every 2-3 months.      - After cleansing with an alcohol prep pad, the about mentioned hyperkeratotic lesions were sharply debrided X 2 utilizing a #15 blade to a smooth base without incident. Pt tolerated the procedure well and reported comfort to the debarment sites. Pt will continue to use padding and moisture the callused areas.     - With patient's permission, nails were aggressively reduced and debrided x 10 to their soft tissue attachment mechanically  removing all offending nail and debris. Patient relates relief following the procedure. He will continue to monitor the areas daily, inspect his feet, wear protective shoe gear when ambulatory, moisturizer to maintain skin integrity and follow in this office in approximately 2-3 months, sooner p.r.n.

## 2025-07-08 ENCOUNTER — PATIENT MESSAGE (OUTPATIENT)
Dept: CARDIOLOGY | Facility: CLINIC | Age: 70
End: 2025-07-08

## 2025-07-08 ENCOUNTER — ANTI-COAG VISIT (OUTPATIENT)
Dept: CARDIOLOGY | Facility: CLINIC | Age: 70
End: 2025-07-08
Payer: MEDICARE

## 2025-07-08 ENCOUNTER — LAB VISIT (OUTPATIENT)
Dept: LAB | Facility: HOSPITAL | Age: 70
End: 2025-07-08
Attending: INTERNAL MEDICINE
Payer: MEDICARE

## 2025-07-08 DIAGNOSIS — Z79.01 LONG TERM (CURRENT) USE OF ANTICOAGULANTS: Primary | ICD-10-CM

## 2025-07-08 DIAGNOSIS — Z79.01 LONG TERM (CURRENT) USE OF ANTICOAGULANTS: ICD-10-CM

## 2025-07-08 LAB
INR PPP: 3.4 (ref 0.8–1.2)
PROTHROMBIN TIME: 34 SECONDS (ref 9–12.5)

## 2025-07-08 PROCEDURE — 36415 COLL VENOUS BLD VENIPUNCTURE: CPT

## 2025-07-08 PROCEDURE — 93793 ANTICOAG MGMT PT WARFARIN: CPT | Mod: S$GLB,,,

## 2025-07-08 PROCEDURE — 85610 PROTHROMBIN TIME: CPT

## 2025-07-21 ENCOUNTER — TELEPHONE (OUTPATIENT)
Dept: PRIMARY CARE CLINIC | Facility: CLINIC | Age: 70
End: 2025-07-21
Payer: MEDICARE

## 2025-07-21 NOTE — TELEPHONE ENCOUNTER
regarding:pt caregiver is calling to see if pt is still on his pressure medication and if so can give his caregiver a call as soon as possible.

## 2025-08-12 ENCOUNTER — INFUSION (OUTPATIENT)
Dept: INFECTIOUS DISEASES | Facility: HOSPITAL | Age: 70
End: 2025-08-12
Payer: MEDICARE

## 2025-08-12 VITALS
RESPIRATION RATE: 18 BRPM | TEMPERATURE: 98 F | HEIGHT: 73 IN | DIASTOLIC BLOOD PRESSURE: 87 MMHG | OXYGEN SATURATION: 98 % | SYSTOLIC BLOOD PRESSURE: 152 MMHG | BODY MASS INDEX: 20.41 KG/M2 | HEART RATE: 96 BPM | WEIGHT: 154 LBS

## 2025-08-12 DIAGNOSIS — M81.0 SENILE OSTEOPOROSIS: Primary | ICD-10-CM

## 2025-08-12 PROCEDURE — 96372 THER/PROPH/DIAG INJ SC/IM: CPT

## 2025-08-12 PROCEDURE — 63600175 PHARM REV CODE 636 W HCPCS: Mod: JZ,TB | Performed by: INTERNAL MEDICINE

## 2025-08-12 RX ADMIN — DENOSUMAB 60 MG: 60 INJECTION SUBCUTANEOUS at 03:08

## 2025-08-14 DIAGNOSIS — Z86.73 H/O: CVA (CEREBROVASCULAR ACCIDENT): Chronic | ICD-10-CM

## 2025-08-14 RX ORDER — WARFARIN SODIUM 5 MG/1
TABLET ORAL
Qty: 90 TABLET | Refills: 2 | Status: SHIPPED | OUTPATIENT
Start: 2025-08-14

## 2025-08-18 ENCOUNTER — LAB VISIT (OUTPATIENT)
Dept: LAB | Facility: HOSPITAL | Age: 70
End: 2025-08-18
Attending: INTERNAL MEDICINE
Payer: MEDICARE

## 2025-08-18 ENCOUNTER — ANTI-COAG VISIT (OUTPATIENT)
Dept: CARDIOLOGY | Facility: CLINIC | Age: 70
End: 2025-08-18
Payer: MEDICARE

## 2025-08-18 DIAGNOSIS — Z79.01 LONG TERM (CURRENT) USE OF ANTICOAGULANTS: Primary | ICD-10-CM

## 2025-08-18 DIAGNOSIS — Z79.01 LONG TERM (CURRENT) USE OF ANTICOAGULANTS: ICD-10-CM

## 2025-08-18 LAB
INR PPP: 2.8 (ref 0.8–1.2)
PROTHROMBIN TIME: 28.7 SECONDS (ref 9–12.5)

## 2025-08-18 PROCEDURE — 93793 ANTICOAG MGMT PT WARFARIN: CPT | Mod: S$GLB,,,

## 2025-08-18 PROCEDURE — 36415 COLL VENOUS BLD VENIPUNCTURE: CPT

## 2025-08-18 PROCEDURE — 85610 PROTHROMBIN TIME: CPT

## 2025-08-26 ENCOUNTER — OFFICE VISIT (OUTPATIENT)
Dept: OPHTHALMOLOGY | Facility: CLINIC | Age: 70
End: 2025-08-26
Payer: MEDICARE

## 2025-08-26 DIAGNOSIS — H43.11 VITREOUS HEMORRHAGE, RIGHT: ICD-10-CM

## 2025-08-26 DIAGNOSIS — Z79.4 TYPE 2 DIABETES MELLITUS WITH BOTH EYES AFFECTED BY PROLIFERATIVE RETINOPATHY WITHOUT MACULAR EDEMA, WITH LONG-TERM CURRENT USE OF INSULIN: Primary | ICD-10-CM

## 2025-08-26 DIAGNOSIS — E11.3593 TYPE 2 DIABETES MELLITUS WITH BOTH EYES AFFECTED BY PROLIFERATIVE RETINOPATHY WITHOUT MACULAR EDEMA, WITH LONG-TERM CURRENT USE OF INSULIN: Primary | ICD-10-CM

## 2025-08-26 PROCEDURE — 92014 COMPRE OPH EXAM EST PT 1/>: CPT | Mod: S$GLB,,, | Performed by: OPHTHALMOLOGY

## 2025-08-26 PROCEDURE — 4010F ACE/ARB THERAPY RXD/TAKEN: CPT | Mod: CPTII,S$GLB,, | Performed by: OPHTHALMOLOGY

## 2025-08-26 PROCEDURE — 92201 OPSCPY EXTND RTA DRAW UNI/BI: CPT | Mod: S$GLB,,, | Performed by: OPHTHALMOLOGY

## 2025-08-26 PROCEDURE — 1159F MED LIST DOCD IN RCRD: CPT | Mod: CPTII,S$GLB,, | Performed by: OPHTHALMOLOGY

## 2025-08-26 PROCEDURE — 3051F HG A1C>EQUAL 7.0%<8.0%: CPT | Mod: CPTII,S$GLB,, | Performed by: OPHTHALMOLOGY

## 2025-08-26 PROCEDURE — 92134 CPTRZ OPH DX IMG PST SGM RTA: CPT | Mod: S$GLB,,, | Performed by: OPHTHALMOLOGY

## 2025-08-26 PROCEDURE — 3288F FALL RISK ASSESSMENT DOCD: CPT | Mod: CPTII,S$GLB,, | Performed by: OPHTHALMOLOGY

## 2025-08-26 PROCEDURE — 1101F PT FALLS ASSESS-DOCD LE1/YR: CPT | Mod: CPTII,S$GLB,, | Performed by: OPHTHALMOLOGY

## 2025-08-26 PROCEDURE — 99999 PR PBB SHADOW E&M-EST. PATIENT-LVL III: CPT | Mod: PBBFAC,,, | Performed by: OPHTHALMOLOGY

## 2025-08-26 PROCEDURE — 1160F RVW MEDS BY RX/DR IN RCRD: CPT | Mod: CPTII,S$GLB,, | Performed by: OPHTHALMOLOGY

## 2025-08-28 ENCOUNTER — TELEPHONE (OUTPATIENT)
Dept: PRIMARY CARE CLINIC | Facility: CLINIC | Age: 70
End: 2025-08-28
Payer: MEDICARE

## 2025-08-28 ENCOUNTER — OFFICE VISIT (OUTPATIENT)
Dept: PRIMARY CARE CLINIC | Facility: CLINIC | Age: 70
End: 2025-08-28
Payer: MEDICARE

## 2025-08-28 VITALS
SYSTOLIC BLOOD PRESSURE: 132 MMHG | BODY MASS INDEX: 20.67 KG/M2 | TEMPERATURE: 99 F | DIASTOLIC BLOOD PRESSURE: 78 MMHG | HEIGHT: 73 IN | WEIGHT: 156 LBS | HEART RATE: 105 BPM | OXYGEN SATURATION: 97 %

## 2025-08-28 DIAGNOSIS — N18.31 CKD STAGE 3A, GFR 45-59 ML/MIN: ICD-10-CM

## 2025-08-28 DIAGNOSIS — M54.2 NECK PAIN ON RIGHT SIDE: Primary | ICD-10-CM

## 2025-08-28 DIAGNOSIS — M81.0 SENILE OSTEOPOROSIS: ICD-10-CM

## 2025-08-28 DIAGNOSIS — E11.65 TYPE 2 DIABETES MELLITUS WITH HYPERGLYCEMIA, WITH LONG-TERM CURRENT USE OF INSULIN: ICD-10-CM

## 2025-08-28 DIAGNOSIS — D64.9 NORMOCYTIC ANEMIA: ICD-10-CM

## 2025-08-28 DIAGNOSIS — Z79.4 TYPE 2 DIABETES MELLITUS WITH HYPERGLYCEMIA, WITH LONG-TERM CURRENT USE OF INSULIN: ICD-10-CM

## 2025-08-28 DIAGNOSIS — I69.328 HEMIPARESIS AND SPEECH AND LANGUAGE DEFICIT AS LATE EFFECT OF CEREBROVASCULAR ACCIDENT (CVA): ICD-10-CM

## 2025-08-28 DIAGNOSIS — I10 BENIGN ESSENTIAL HYPERTENSION: ICD-10-CM

## 2025-08-28 DIAGNOSIS — I69.359 HEMIPARESIS AND SPEECH AND LANGUAGE DEFICIT AS LATE EFFECT OF CEREBROVASCULAR ACCIDENT (CVA): ICD-10-CM

## 2025-08-28 PROCEDURE — 1160F RVW MEDS BY RX/DR IN RCRD: CPT | Mod: CPTII,S$GLB,, | Performed by: INTERNAL MEDICINE

## 2025-08-28 PROCEDURE — G2211 COMPLEX E/M VISIT ADD ON: HCPCS | Mod: S$GLB,,, | Performed by: INTERNAL MEDICINE

## 2025-08-28 PROCEDURE — 1159F MED LIST DOCD IN RCRD: CPT | Mod: CPTII,S$GLB,, | Performed by: INTERNAL MEDICINE

## 2025-08-28 PROCEDURE — 4010F ACE/ARB THERAPY RXD/TAKEN: CPT | Mod: CPTII,S$GLB,, | Performed by: INTERNAL MEDICINE

## 2025-08-28 PROCEDURE — 3051F HG A1C>EQUAL 7.0%<8.0%: CPT | Mod: CPTII,S$GLB,, | Performed by: INTERNAL MEDICINE

## 2025-08-28 PROCEDURE — 1125F AMNT PAIN NOTED PAIN PRSNT: CPT | Mod: CPTII,S$GLB,, | Performed by: INTERNAL MEDICINE

## 2025-08-28 PROCEDURE — 99214 OFFICE O/P EST MOD 30 MIN: CPT | Mod: S$GLB,,, | Performed by: INTERNAL MEDICINE

## 2025-08-28 PROCEDURE — 3075F SYST BP GE 130 - 139MM HG: CPT | Mod: CPTII,S$GLB,, | Performed by: INTERNAL MEDICINE

## 2025-08-28 PROCEDURE — 3078F DIAST BP <80 MM HG: CPT | Mod: CPTII,S$GLB,, | Performed by: INTERNAL MEDICINE

## 2025-08-28 PROCEDURE — 99999 PR PBB SHADOW E&M-EST. PATIENT-LVL V: CPT | Mod: PBBFAC,,, | Performed by: INTERNAL MEDICINE

## 2025-08-28 PROCEDURE — 3008F BODY MASS INDEX DOCD: CPT | Mod: CPTII,S$GLB,, | Performed by: INTERNAL MEDICINE

## 2025-08-28 PROCEDURE — 1101F PT FALLS ASSESS-DOCD LE1/YR: CPT | Mod: CPTII,S$GLB,, | Performed by: INTERNAL MEDICINE

## 2025-08-28 PROCEDURE — 3288F FALL RISK ASSESSMENT DOCD: CPT | Mod: CPTII,S$GLB,, | Performed by: INTERNAL MEDICINE

## 2025-08-28 RX ORDER — CYCLOBENZAPRINE HCL 5 MG
5 TABLET ORAL 3 TIMES DAILY PRN
Qty: 30 TABLET | Refills: 0 | Status: SHIPPED | OUTPATIENT
Start: 2025-08-28 | End: 2025-09-07

## 2025-09-04 ENCOUNTER — HOSPITAL ENCOUNTER (OUTPATIENT)
Dept: RADIOLOGY | Facility: CLINIC | Age: 70
Discharge: HOME | End: 2025-09-04
Attending: INTERNAL MEDICINE
Payer: MEDICARE

## 2025-09-04 DIAGNOSIS — M81.0 SENILE OSTEOPOROSIS: ICD-10-CM

## 2025-09-04 PROCEDURE — 77080 DXA BONE DENSITY AXIAL: CPT | Mod: TC
